# Patient Record
Sex: FEMALE | Race: WHITE | Employment: UNEMPLOYED | URBAN - METROPOLITAN AREA
[De-identification: names, ages, dates, MRNs, and addresses within clinical notes are randomized per-mention and may not be internally consistent; named-entity substitution may affect disease eponyms.]

---

## 2019-03-27 ENCOUNTER — HOSPITAL ENCOUNTER (OUTPATIENT)
Dept: NEUROLOGY | Age: 49
Discharge: HOME | End: 2019-03-27
Payer: MEDICARE

## 2019-03-27 PROCEDURE — 95886 MUSC TEST DONE W/N TEST COMP: CPT

## 2019-03-27 PROCEDURE — 95908 NRV CNDJ TST 3-4 STUDIES: CPT

## 2019-04-04 DIAGNOSIS — M25.572 ACUTE LEFT ANKLE PAIN: Primary | ICD-10-CM

## 2019-04-08 ENCOUNTER — OFFICE VISIT (OUTPATIENT)
Dept: FAMILY MEDICINE CLINIC | Age: 49
End: 2019-04-08
Payer: MEDICARE

## 2019-04-08 VITALS
HEART RATE: 80 BPM | DIASTOLIC BLOOD PRESSURE: 64 MMHG | BODY MASS INDEX: 17.85 KG/M2 | WEIGHT: 97 LBS | SYSTOLIC BLOOD PRESSURE: 106 MMHG | HEIGHT: 62 IN | OXYGEN SATURATION: 93 %

## 2019-04-08 DIAGNOSIS — M21.372 ACQUIRED LEFT FOOT DROP: ICD-10-CM

## 2019-04-08 DIAGNOSIS — Z13.1 SCREENING FOR DIABETES MELLITUS (DM): Primary | ICD-10-CM

## 2019-04-08 DIAGNOSIS — F10.10 ALCOHOL ABUSE: ICD-10-CM

## 2019-04-08 PROBLEM — K86.0 ALCOHOL-INDUCED CHRONIC PANCREATITIS (HCC): Status: ACTIVE | Noted: 2017-07-07

## 2019-04-08 PROBLEM — A41.9 SEPTIC SHOCK (HCC): Status: ACTIVE | Noted: 2019-04-08

## 2019-04-08 PROBLEM — J80 ARDS (ADULT RESPIRATORY DISTRESS SYNDROME) (HCC): Status: RESOLVED | Noted: 2019-04-08 | Resolved: 2019-04-08

## 2019-04-08 PROBLEM — D53.9 MACROCYTIC ANEMIA: Status: ACTIVE | Noted: 2018-01-28

## 2019-04-08 PROBLEM — R65.21 SEPTIC SHOCK (HCC): Status: ACTIVE | Noted: 2019-04-08

## 2019-04-08 PROBLEM — I26.99 PULMONARY EMBOLISM (HCC): Status: ACTIVE | Noted: 2018-03-14

## 2019-04-08 PROBLEM — K55.20 AVM (ARTERIOVENOUS MALFORMATION) OF SMALL BOWEL, ACQUIRED: Status: ACTIVE | Noted: 2018-03-17

## 2019-04-08 PROBLEM — I48.0 PAROXYSMAL ATRIAL FIBRILLATION (HCC): Status: ACTIVE | Noted: 2018-01-31

## 2019-04-08 PROBLEM — A41.9 SEPTIC SHOCK (HCC): Status: RESOLVED | Noted: 2019-04-08 | Resolved: 2019-04-08

## 2019-04-08 PROBLEM — J80 ARDS (ADULT RESPIRATORY DISTRESS SYNDROME) (HCC): Status: ACTIVE | Noted: 2019-04-08

## 2019-04-08 PROBLEM — R65.21 SEPTIC SHOCK (HCC): Status: RESOLVED | Noted: 2019-04-08 | Resolved: 2019-04-08

## 2019-04-08 LAB
A/G RATIO: 1.5 (ref 1.1–2.2)
ALBUMIN SERPL-MCNC: 4.4 G/DL (ref 3.4–5)
ALP BLD-CCNC: 94 U/L (ref 40–129)
ALT SERPL-CCNC: 9 U/L (ref 10–40)
ANION GAP SERPL CALCULATED.3IONS-SCNC: 13 MMOL/L (ref 3–16)
AST SERPL-CCNC: 14 U/L (ref 15–37)
BASOPHILS ABSOLUTE: 0.1 K/UL (ref 0–0.2)
BASOPHILS RELATIVE PERCENT: 1.3 %
BILIRUB SERPL-MCNC: <0.2 MG/DL (ref 0–1)
BILIRUBIN DIRECT: <0.2 MG/DL (ref 0–0.3)
BILIRUBIN, INDIRECT: NORMAL MG/DL (ref 0–1)
BUN BLDV-MCNC: 8 MG/DL (ref 7–20)
CALCIUM SERPL-MCNC: 9.6 MG/DL (ref 8.3–10.6)
CHLORIDE BLD-SCNC: 98 MMOL/L (ref 99–110)
CHOLESTEROL, TOTAL: 214 MG/DL (ref 0–199)
CO2: 27 MMOL/L (ref 21–32)
CREAT SERPL-MCNC: 0.5 MG/DL (ref 0.6–1.1)
EOSINOPHILS ABSOLUTE: 0.2 K/UL (ref 0–0.6)
EOSINOPHILS RELATIVE PERCENT: 2.4 %
GFR AFRICAN AMERICAN: >60
GFR NON-AFRICAN AMERICAN: >60
GLOBULIN: 3 G/DL
GLUCOSE BLD-MCNC: 98 MG/DL (ref 70–99)
HCT VFR BLD CALC: 40.3 % (ref 36–48)
HDLC SERPL-MCNC: 47 MG/DL (ref 40–60)
HEMOGLOBIN: 13.5 G/DL (ref 12–16)
LDL CHOLESTEROL CALCULATED: 154 MG/DL
LYMPHOCYTES ABSOLUTE: 2 K/UL (ref 1–5.1)
LYMPHOCYTES RELATIVE PERCENT: 28.2 %
MCH RBC QN AUTO: 33.8 PG (ref 26–34)
MCHC RBC AUTO-ENTMCNC: 33.5 G/DL (ref 31–36)
MCV RBC AUTO: 101 FL (ref 80–100)
MONOCYTES ABSOLUTE: 0.5 K/UL (ref 0–1.3)
MONOCYTES RELATIVE PERCENT: 7.6 %
NEUTROPHILS ABSOLUTE: 4.2 K/UL (ref 1.7–7.7)
NEUTROPHILS RELATIVE PERCENT: 60.5 %
PDW BLD-RTO: 12.8 % (ref 12.4–15.4)
PLATELET # BLD: 342 K/UL (ref 135–450)
PMV BLD AUTO: 7.6 FL (ref 5–10.5)
POTASSIUM SERPL-SCNC: 4.5 MMOL/L (ref 3.5–5.1)
RBC # BLD: 3.99 M/UL (ref 4–5.2)
SODIUM BLD-SCNC: 138 MMOL/L (ref 136–145)
TOTAL PROTEIN: 7.4 G/DL (ref 6.4–8.2)
TRIGL SERPL-MCNC: 65 MG/DL (ref 0–150)
VLDLC SERPL CALC-MCNC: 13 MG/DL
WBC # BLD: 6.9 K/UL (ref 4–11)

## 2019-04-08 PROCEDURE — 4004F PT TOBACCO SCREEN RCVD TLK: CPT | Performed by: NURSE PRACTITIONER

## 2019-04-08 PROCEDURE — 36415 COLL VENOUS BLD VENIPUNCTURE: CPT | Performed by: NURSE PRACTITIONER

## 2019-04-08 PROCEDURE — G8427 DOCREV CUR MEDS BY ELIG CLIN: HCPCS | Performed by: NURSE PRACTITIONER

## 2019-04-08 PROCEDURE — 99204 OFFICE O/P NEW MOD 45 MIN: CPT | Performed by: NURSE PRACTITIONER

## 2019-04-08 PROCEDURE — G8419 CALC BMI OUT NRM PARAM NOF/U: HCPCS | Performed by: NURSE PRACTITIONER

## 2019-04-08 PROCEDURE — 93000 ELECTROCARDIOGRAM COMPLETE: CPT | Performed by: NURSE PRACTITIONER

## 2019-04-08 RX ORDER — PANCRELIPASE 36000; 180000; 114000 [USP'U]/1; [USP'U]/1; [USP'U]/1
36000 CAPSULE, DELAYED RELEASE PELLETS ORAL 4 TIMES DAILY
Refills: 3 | COMMUNITY
Start: 2019-03-16

## 2019-04-08 RX ORDER — ATENOLOL 25 MG/1
25 TABLET ORAL DAILY
Refills: 2 | COMMUNITY
Start: 2019-03-05 | End: 2019-11-11 | Stop reason: SDUPTHER

## 2019-04-08 RX ORDER — PHENOL 1.4 %
AEROSOL, SPRAY (ML) MUCOUS MEMBRANE
Refills: 0 | Status: ON HOLD | COMMUNITY
Start: 2019-02-15 | End: 2020-05-12 | Stop reason: HOSPADM

## 2019-04-08 RX ORDER — POTASSIUM CITRATE 10 MEQ/1
TABLET, EXTENDED RELEASE ORAL
COMMUNITY
End: 2019-11-11 | Stop reason: SDUPTHER

## 2019-04-08 RX ORDER — DILTIAZEM HYDROCHLORIDE 240 MG/1
240 CAPSULE, COATED, EXTENDED RELEASE ORAL DAILY
COMMUNITY

## 2019-04-08 RX ORDER — FUROSEMIDE 20 MG/1
20 TABLET ORAL DAILY
Refills: 0 | COMMUNITY
Start: 2019-03-14 | End: 2019-11-11 | Stop reason: DRUGHIGH

## 2019-04-08 RX ORDER — LANOLIN ALCOHOL/MO/W.PET/CERES
3 CREAM (GRAM) TOPICAL NIGHTLY PRN
COMMUNITY

## 2019-04-08 RX ORDER — SERTRALINE HYDROCHLORIDE 100 MG/1
100 TABLET, FILM COATED ORAL DAILY
Refills: 2 | COMMUNITY
Start: 2019-02-20 | End: 2020-03-09 | Stop reason: DRUGHIGH

## 2019-04-08 ASSESSMENT — PATIENT HEALTH QUESTIONNAIRE - PHQ9
SUM OF ALL RESPONSES TO PHQ QUESTIONS 1-9: 1
1. LITTLE INTEREST OR PLEASURE IN DOING THINGS: 0
2. FEELING DOWN, DEPRESSED OR HOPELESS: 1
SUM OF ALL RESPONSES TO PHQ9 QUESTIONS 1 & 2: 1
SUM OF ALL RESPONSES TO PHQ QUESTIONS 1-9: 1

## 2019-04-08 ASSESSMENT — ENCOUNTER SYMPTOMS
ABDOMINAL DISTENTION: 0
WHEEZING: 1
VOMITING: 0
SINUS PRESSURE: 0
RHINORRHEA: 0
NAUSEA: 0
SINUS PAIN: 0
CONSTIPATION: 0
SHORTNESS OF BREATH: 1
CHEST TIGHTNESS: 0
DIARRHEA: 0
BACK PAIN: 1

## 2019-04-08 NOTE — PROGRESS NOTES
Lab preformed in R arm and sent number of tubes below to be processed. 1 attempt made and stopped bleeding by applying band aide and guaze.      2 Red    1 purple    0 blue    0 Urine

## 2019-04-08 NOTE — PROGRESS NOTES
[facility] PHYSICIAN PRACTICES  [address]  [address] [address]  Dept: [phone]  Dept Fax: [phone]    Preoperative Consultation    Dory Domínguez  YOB: 1970    Date of Service:  4/8/2019    Vitals:    04/08/19 0912 04/08/19 1015   BP: (!) 90/59 106/64   Site: Right Upper Arm Left Upper Arm   Position: Sitting Sitting   Cuff Size: Small Adult    Pulse: 80    SpO2: 93%    Weight: 97 lb (44 kg)    Height: 5' 1.5\" (1.562 m)       Wt Readings from Last 2 Encounters:   04/08/19 97 lb (44 kg)     BP Readings from Last 3 Encounters:   04/08/19 106/64      Chief Complaint   Patient presents with   Inderjit Rodriguez Exam     Dr Heather Manning Left foot      Allergies   Allergen Reactions    Sulfa Antibiotics Swelling and Hives     Outpatient Medications Marked as Taking for the 4/8/19 encounter (Office Visit) with PEEWEE Odom - CNP   Medication Sig Dispense Refill    furosemide (LASIX) 20 MG tablet Take 20 mg by mouth daily  0    PAIN RELIEVER 325 MG tablet Take 325 mg by mouth daily  1    sertraline (ZOLOFT) 100 MG tablet Take 100 mg by mouth daily  2    CREON 55617 units CPEP Take 36,000 Units by mouth 4 times daily  3    Multiple Vitamins-Minerals (MULTIVITAMIN WOMEN) TABS TK 1 T PO QD WF  0    melatonin 3 MG TABS tablet Take 3 mg by mouth daily      atenolol (TENORMIN) 25 MG tablet Take 25 mg by mouth daily  2    diltiazem (CARDIZEM CD) 240 MG extended release capsule Take 240 mg by mouth daily      potassium citrate (UROCIT-K) 10 MEQ (1080 MG) extended release tablet Take by mouth 3 times daily (with meals)      omeprazole (PRILOSEC) 20 MG capsule Take 20 mg by mouth daily      budesonide-formoterol (SYMBICORT) 160-4.5 MCG/ACT AERO Inhale 2 puffs into the lungs 2 times daily      albuterol (PROVENTIL) (2.5 MG/3ML) 0.083% nebulizer solution Take 2.5 mg by nebulization as needed for Wheezing      aspirin 325 MG EC tablet Take 1 tablet by mouth daily 60 tablet 0     This patient presents to the office today for a preoperative consultation at the request of surgeon, Dr. Lynn Reynolds, who plans on performing Moderately severe left peroneal (fibular) neuropathy repair, left foot drop repair on at [facility].  The current problem began 1 year ago, and symptoms have been worsening with time. Conservative therapy: Yes: PT/nursing home, which has been not very effective. .    Planned anesthesia: General   Known anesthesia problems: None   Bleeding risk: No recent or remote history of abnormal bleeding  Personal or FH of DVT/PE: History of PE    Patient objection to receiving blood products: No      RCRI       +1 +0    1.) High-Risk Surgery      []   [x]     ? Intraperitoneal   ? Intrathoracic   ? Suprainguinal vascular     2.) History of ischemic heart disease   []   [x]     ? History of MI   ? History of positiveexercise test   ? Current chest paint considered due       to myocardial ischemia   ? Use of nitrate therapy   ? ECG with pathological Q waves     3.) History of congestive heart failure   []   [x]     ? Pulmonary edema, bilateral rales or S3 gallop   ? Paroxysmal nocturnal dyspnea   ? CXR showing pulmonary vascular redistribution     4. )History of cerebrovascular disease   []   [x]     ? Prior TIA or stroke     5.) Pre-operative insulin treatment   []   [x]     6.) Pre-operative creatinine >2 mg/dL   []   [x]       1. Are you a loud and/or regular snorer? []  Yes      [x] No     2. Have you been observed to gasp or stop breathing during sleep? []  Yes      [x] No     3. Do you feel tired or groggy upon awakening or do you awaken with a headache?                       []  Yes      [x] No     4. Are you often tired or fatigued during the wake time hours? []  Yes      [x] No     5. Do you fall asleep sitting, reading, watching TV or driving? []  Yes      [x] No     6.   Do you often have problems with memory or concentration? [x]  Yes      [] No     **If patient's score is ? 3 they are considered high risk for ALETA. Notify the anesthesiologist of the high risk and document in focus note.     Note:  If the patient's BMI is more than 35 kg m¯² , has neck circumference > 40 cm, and/or high blood pressure the risk is greater (© American Sleep Apnea Association, 2006). Patient Active Problem List   Diagnosis    Alcoholism ([facility]    Lung collapse    Macrocytic anemia    Paroxysmal atrial fibrillation (HCC)    Pulmonary embolism (HCC)    Tobacco abuse    Chronic hyponatremia    AVM (arteriovenous malformation) of small bowel, acquired ([facility]    Alcohol-induced chronic pancreatitis ([facility]     Past Medical History:   Diagnosis Date    A-fib ([facility]     Chronic pancreatitis ([facility]     Collapse of right lung     COPD (chronic obstructive pulmonary disease) (HCC)     GI bleed     H/O colonoscopy     Hypertension     Pancreatitis      History reviewed. No pertinent surgical history. Family History   Family history unknown: Yes     Social History     Socioeconomic History    Marital status: Single     Spouse name: Not on file    Number of children: Not on file    Years of education: Not on file    Highest education level: Not on file   Occupational History    Not on file   Social Needs    Financial resource strain: Not on file    Food insecurity:     Worry: Not on file     Inability: Not on file    Transportation needs:     Medical: Not on file     Non-medical: Not on file   Tobacco Use    Smoking status: Current Some Day Smoker     Packs/day: 1.00     Types: Cigarettes    Smokeless tobacco: Never Used   Substance and Sexual Activity    Alcohol use:  Yes     Alcohol/week: 7.2 oz     Types: 12 Cans of beer per week     Comment: daily    Drug use: No    Sexual activity: Not on file   Lifestyle    Physical activity:     Days per week: Not on file     Minutes per session: Not on file    Stress: Not on file   Relationships    Social connections:     Talks on phone: Not on file     Gets together: Not on file     Attends Church service: Not on file     Active member of club or organization: Not on file     Attends meetings of clubs or organizations: Not on file     Relationship status: Not on file    Intimate partner violence:     Fear of current or ex partner: Not on file     Emotionally abused: Not on file     Physically abused: Not on file     Forced sexual activity: Not on file   Other Topics Concern    Not on file   Social History Narrative    Not on file     Review of Systems   Constitutional: Negative for activity change, fatigue and unexpected weight change. HENT: Negative for congestion, rhinorrhea, sinus pressure and sinus pain. Eyes: Negative for visual disturbance. Respiratory: Positive for shortness of breath (On Occasion) and wheezing. Negative for chest tightness. Cardiovascular: Negative for chest pain, palpitations and leg swelling. Gastrointestinal: Negative for abdominal distention, constipation, diarrhea, nausea and vomiting. Genitourinary: Negative for difficulty urinating and urgency. Musculoskeletal: Positive for arthralgias, back pain, gait problem and myalgias. LLE r/t neuropathic changes   Skin: Negative for rash. Neurological: Negative for dizziness, seizures, weakness and light-headedness. All other systems were reviewed and are negative. Physical Exam   Constitutional: Vital signs are normal. She appears well-developed and well-nourished. HENT:   Head: Normocephalic and atraumatic. Right Ear: Hearing and external ear normal.   Left Ear: Hearing and external ear normal.   Nose: Nose normal.   Eyes: Pupils are equal, round, and reactive to light. Lids are normal.   Neck: Normal range of motion. Cardiovascular: Normal rate, regular rhythm, S1 normal, S2 normal, normal heart sounds and normal pulses. No extrasystoles are present.  PMI is not postitive stress test  · Intermediate or high risk surgery with history of CAD   · Intermediate or high risk surgery with multiple clinical predictors of CAD- 2 of the following: history of compensated or prior heart failure, history ofcerebrovascular disease, DM, or renal insufficiency    Routine administration of higher-dose, long-acting metoprolol in beta-blocker-naïve patients on the day of surgery, and in the absence of dose titration is associated with an overall increase in mortality. Beta-blockers should be started days to weeks prior to surgery and titrated to pulse < 70.  4. Deep vein thrombosis prophylaxis: regimen to be chosen by surgical team  5. Surgery should be delayed until lab tests results and cardiac clearance is obtained. Schedule with cardiology in 1 week.          Paige Gil, APRN - CNP

## 2019-04-09 DIAGNOSIS — E78.41 ELEVATED LIPOPROTEIN(A): Primary | ICD-10-CM

## 2019-04-09 LAB
ESTIMATED AVERAGE GLUCOSE: 111.2 MG/DL
HBA1C MFR BLD: 5.5 %

## 2019-04-09 RX ORDER — ATORVASTATIN CALCIUM 10 MG/1
10 TABLET, FILM COATED ORAL DAILY
Qty: 30 TABLET | Refills: 2 | Status: SHIPPED | OUTPATIENT
Start: 2019-04-09 | End: 2019-07-12 | Stop reason: SDUPTHER

## 2019-05-27 ENCOUNTER — HOSPITAL ENCOUNTER (EMERGENCY)
Age: 49
Discharge: HOME | End: 2019-05-27
Payer: MEDICARE

## 2019-05-27 ENCOUNTER — APPOINTMENT (OUTPATIENT)
Dept: GENERAL RADIOLOGY | Age: 49
End: 2019-05-27
Payer: MEDICARE

## 2019-05-27 VITALS
HEIGHT: 61 IN | TEMPERATURE: 98.5 F | WEIGHT: 100 LBS | DIASTOLIC BLOOD PRESSURE: 64 MMHG | BODY MASS INDEX: 18.88 KG/M2 | SYSTOLIC BLOOD PRESSURE: 96 MMHG | HEART RATE: 104 BPM | RESPIRATION RATE: 20 BRPM | OXYGEN SATURATION: 92 %

## 2019-05-27 DIAGNOSIS — S20.211A BRUISED RIB, RIGHT, INITIAL ENCOUNTER: ICD-10-CM

## 2019-05-27 DIAGNOSIS — J40 BRONCHITIS: ICD-10-CM
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
## 2019-05-27 DIAGNOSIS — S40.021A ARM CONTUSION, RIGHT, INITIAL ENCOUNTER: ICD-10-CM

## 2019-05-27 PROCEDURE — 71100 X-RAY EXAM RIBS UNI 2 VIEWS: CPT

## 2019-05-27 PROCEDURE — 99283 EMERGENCY DEPT VISIT LOW MDM: CPT

## 2019-05-27 PROCEDURE — 71046 X-RAY EXAM CHEST 2 VIEWS: CPT

## 2019-05-27 PROCEDURE — 6370000000 HC RX 637 (ALT 250 FOR IP): Performed by: PHYSICIAN ASSISTANT

## 2019-05-27 RX ORDER — AZITHROMYCIN 250 MG/1
250 TABLET, FILM COATED ORAL DAILY
Qty: 4 TABLET | Refills: 0 | Status: SHIPPED | OUTPATIENT
Start: 2019-05-27 | End: 2019-05-31

## 2019-05-27 RX ORDER — LIDOCAINE 4 G/G
1 PATCH TOPICAL DAILY
Status: DISCONTINUED | OUTPATIENT
Start: 2019-05-27 | End: 2019-05-27 | Stop reason: HOSPADM

## 2019-05-27 RX ORDER — HYDROCODONE BITARTRATE AND ACETAMINOPHEN 5; 325 MG/1; MG/1
1 TABLET ORAL ONCE
Status: COMPLETED | OUTPATIENT
Start: 2019-05-27 | End: 2019-05-27

## 2019-05-27 RX ORDER — HYDROCODONE BITARTRATE AND ACETAMINOPHEN 5; 325 MG/1; MG/1
1 TABLET ORAL EVERY 6 HOURS PRN
Qty: 10 TABLET | Refills: 0 | Status: SHIPPED | OUTPATIENT
Start: 2019-05-27 | End: 2019-05-30

## 2019-05-27 RX ORDER — AZITHROMYCIN 250 MG/1
500 TABLET, FILM COATED ORAL ONCE
Status: COMPLETED | OUTPATIENT
Start: 2019-05-27 | End: 2019-05-27

## 2019-05-27 RX ORDER — ALBUTEROL SULFATE 90 UG/1
2 AEROSOL, METERED RESPIRATORY (INHALATION) ONCE
Status: COMPLETED | OUTPATIENT
Start: 2019-05-27 | End: 2019-05-27

## 2019-05-27 RX ADMIN — ALBUTEROL SULFATE 2 PUFF: 90 AEROSOL, METERED RESPIRATORY (INHALATION) at 17:20

## 2019-05-27 RX ADMIN — AZITHROMYCIN 500 MG: 250 TABLET, FILM COATED ORAL at 17:20

## 2019-05-27 RX ADMIN — HYDROCODONE BITARTRATE AND ACETAMINOPHEN 1 TABLET: 5; 325 TABLET ORAL at 17:20

## 2019-05-27 ASSESSMENT — PAIN DESCRIPTION - PAIN TYPE: TYPE: ACUTE PAIN

## 2019-05-27 ASSESSMENT — ENCOUNTER SYMPTOMS
COUGH: 1
VOMITING: 0
BACK PAIN: 0
SHORTNESS OF BREATH: 0
VISUAL CHANGE: 0
ABDOMINAL PAIN: 0

## 2019-05-27 ASSESSMENT — PAIN DESCRIPTION - ORIENTATION: ORIENTATION: RIGHT

## 2019-05-27 ASSESSMENT — PAIN SCALES - GENERAL
PAINLEVEL_OUTOF10: 2
PAINLEVEL_OUTOF10: 1

## 2019-05-27 ASSESSMENT — PAIN DESCRIPTION - FREQUENCY: FREQUENCY: INTERMITTENT

## 2019-05-27 ASSESSMENT — PAIN - FUNCTIONAL ASSESSMENT: PAIN_FUNCTIONAL_ASSESSMENT: 0-10

## 2019-05-27 ASSESSMENT — PAIN DESCRIPTION - DESCRIPTORS: DESCRIPTORS: ACHING;DISCOMFORT

## 2019-05-27 ASSESSMENT — PAIN DESCRIPTION - LOCATION: LOCATION: RIB CAGE

## 2019-05-27 NOTE — ED NOTES
Ambulatory from department without difficulty. No distress noted. Pt instructed to follow up with family doctor or doctor referred by ED physician. Pt also given number to the Pt advocate. Pt reports no further needs or questions prior to discharge.      Rupali Davalos RN  05/27/19 6813

## 2019-05-27 NOTE — ED PROVIDER NOTES
Evaluated by G[facility] supervising physician available for consult    Patient states she was visiting a friend yesterday they were outside at the [address] and she fell outside and landed on right ribs hitting on the ground and thinks landed on a rock. Pain at right lateral ribs since injury. Pain with movement, position change and deep breathing. No shortness of breath. Also bruised right forearm during fall but is not bothering her. No abdominal pain. No vomiting. Denies hitting head. No LOC. No dizziness. No neck or back injury. Has also had a cough and chest congestion for few days. Hx COPD. No fever. The history is provided by the patient. Fall   The fall occurred while standing. She landed on grass (and rocks). There was no blood loss. Point of impact: rt ribs. Pain location: rt ribs. She was ambulatory at the scene. There was no entrapment after the fall. Pertinent negatives include no visual change, no fever, no numbness, no abdominal pain, no vomiting, no headaches and no loss of consciousness. Review of Systems   Constitutional: Negative for chills and fever. HENT: Positive for congestion. Eyes: Negative for visual disturbance. Respiratory: Positive for cough. Negative for shortness of breath. Gastrointestinal: Negative for abdominal pain and vomiting. Musculoskeletal: Negative for back pain and neck pain. Rt rib pain   Skin: Negative for wound. Neurological: Negative for dizziness, loss of consciousness, syncope, weakness, numbness and headaches. Psychiatric/Behavioral: Negative for confusion. All other systems reviewed and are negative. PAST MEDICAL HISTORY   has a past medical history of A-fib ([address] Anxiety, Chronic pancreatitis ([address] Collapse of right lung, COPD (chronic obstructive pulmonary disease) ([address] COPD (chronic obstructive pulmonary disease) ([address] Depression, GI bleed, H/O colonoscopy, Hypertension, and Pancreatitis.     PAST SURGICAL HISTORY   has a past surgical history that includes Upper gastrointestinal endoscopy. FAMILY HISTORY  Family history is unknown by patient. SOCIAL HISTORY   reports that she has been smoking cigarettes. She has a 15.00 pack-year smoking history. She has never used smokeless tobacco. She reports that she drank about 7.2 oz of alcohol per week. She reports that she does not use drugs. HOME MEDICATIONS     Prior to Admission medications    Medication Sig Start Date End Date Taking? Authorizing Provider   HYDROcodone-acetaminophen (NORCO) 5-325 MG per tablet Take 1 tablet by mouth every 6 hours as needed for Pain for up to 3 days. Take lowest effective dose.  No driving or operating heavy machinery while taking. 5/27/19 5/30/19 Yes Estela Prater PA-C   azithromycin [facility]) 250 MG tablet Take 1 tablet by mouth daily for 4 days 5/27/19 5/31/19 Yes Estela Prater PA-C   atorvastatin (LIPITOR) 10 MG tablet Take 1 tablet by mouth daily 4/9/19  Yes PEEWEE Agrawal CNP   b complex vitamins tablet Take 1 tablet by mouth daily 4/9/19  Yes PEEWEE Agrawal CNP   furosemide (LASIX) 20 MG tablet Take 20 mg by mouth daily 3/14/19  Yes Historical Provider, MD   PAIN RELIEVER 325 MG tablet Take 325 mg by mouth daily 3/14/19  Yes Historical Provider, MD   sertraline (ZOLOFT) 100 MG tablet Take 100 mg by mouth daily 2/20/19  Yes Historical Provider, MD   Multiple Vitamins-Minerals (MULTIVITAMIN WOMEN) TABS TK 1 T PO QD WF 2/15/19  Yes Historical Provider, MD   melatonin 3 MG TABS tablet Take 3 mg by mouth daily   Yes Historical Provider, MD   atenolol (TENORMIN) 25 MG tablet Take 25 mg by mouth daily 3/5/19  Yes Historical Provider, MD   diltiazem (CARDIZEM CD) 240 MG extended release capsule Take 240 mg by mouth daily   Yes Historical Provider, MD   potassium citrate (UROCIT-K) 10 MEQ (1080 MG) extended release tablet Take by mouth 3 times daily (with meals)   Yes Historical Provider, MD   omeprazole (PRILOSEC) 20 MG capsule Take 20 mg by mouth daily   Yes Historical Provider, MD   budesonide-formoterol (SYMBICORT) 160-4.5 MCG/ACT AERO Inhale 2 puffs into the lungs 2 times daily   Yes Historical Provider, MD   lisinopril (PRINIVIL;ZESTRIL) 10 MG tablet Take 10 mg by mouth daily   Yes Historical Provider, MD   albuterol (PROVENTIL) (2.5 MG/3ML) 0.083% nebulizer solution Take 2.5 mg by nebulization as needed for Wheezing   Yes Historical Provider, MD   aspirin 325 MG EC tablet Take 1 tablet by mouth daily 7/12/15  Yes Martha Cee MD   CREON 48365 units CPEP Take 36,000 Units by mouth 4 times daily 3/16/19   Historical Provider, MD        ALLERGIES  is allergic to sulfa antibiotics. BP 96/64   Pulse 104   Temp 98.5 °F (36.9 °C) (Oral)   Resp 20   Ht 5' 1\" (1.549 m)   Wt 100 lb (45.4 kg)   LMP 07/12/2015   SpO2 92%   BMI 18.89 kg/m²     Physical Exam   Constitutional: She is oriented to person, place, and time. She appears well-developed and well-nourished. Non-toxic appearance. HENT:   Head: Normocephalic and atraumatic. Right Ear: Tympanic membrane and ear canal normal.   Left Ear: Tympanic membrane and ear canal normal.   Mouth/Throat: Uvula is midline, oropharynx is clear and moist and mucous membranes are normal. No posterior oropharyngeal edema or posterior oropharyngeal erythema. Eyes: Conjunctivae are normal.   Neck: Normal range of motion. Neck supple. Cardiovascular: Normal rate, regular rhythm and normal heart sounds. Pulses:       Radial pulses are 2+ on the right side, and 2+ on the left side. Pulmonary/Chest: Effort normal. No stridor. No respiratory distress. She has wheezes (mild bases). She has rhonchi (scattered). She has no rales. She exhibits tenderness (right lateral ribs tender to palpation, bruise at site, no crepitus). Abdominal: Soft. Bowel sounds are normal. She exhibits no distension. There is no tenderness. There is no rebound and no guarding.    Musculoskeletal:        Cervical back:

## 2019-06-30 ENCOUNTER — HOSPITAL ENCOUNTER (EMERGENCY)
Age: 49
Discharge: HOME | End: 2019-06-30
Attending: EMERGENCY MEDICINE
Payer: MEDICARE

## 2019-06-30 VITALS
BODY MASS INDEX: 18.88 KG/M2 | HEART RATE: 109 BPM | HEIGHT: 61 IN | SYSTOLIC BLOOD PRESSURE: 129 MMHG | TEMPERATURE: 98.5 F | OXYGEN SATURATION: 99 % | WEIGHT: 100 LBS | RESPIRATION RATE: 16 BRPM | DIASTOLIC BLOOD PRESSURE: 87 MMHG

## 2019-06-30 DIAGNOSIS — M54.32 SCIATICA OF LEFT SIDE: Primary | ICD-10-CM

## 2019-06-30 PROCEDURE — 6370000000 HC RX 637 (ALT 250 FOR IP): Performed by: EMERGENCY MEDICINE

## 2019-06-30 PROCEDURE — 99282 EMERGENCY DEPT VISIT SF MDM: CPT

## 2019-06-30 PROCEDURE — 6360000002 HC RX W HCPCS: Performed by: EMERGENCY MEDICINE

## 2019-06-30 PROCEDURE — 96372 THER/PROPH/DIAG INJ SC/IM: CPT

## 2019-06-30 RX ORDER — PREDNISONE 10 MG/1
30 TABLET ORAL DAILY
Qty: 15 TABLET | Refills: 0 | Status: SHIPPED | OUTPATIENT
Start: 2019-06-30 | End: 2019-07-05

## 2019-06-30 RX ORDER — KETOROLAC TROMETHAMINE 30 MG/ML
30 INJECTION, SOLUTION INTRAMUSCULAR; INTRAVENOUS ONCE
Status: COMPLETED | OUTPATIENT
Start: 2019-06-30 | End: 2019-06-30

## 2019-06-30 RX ORDER — KETOROLAC TROMETHAMINE 30 MG/ML
30 INJECTION, SOLUTION INTRAMUSCULAR; INTRAVENOUS ONCE
Status: DISCONTINUED | OUTPATIENT
Start: 2019-06-30 | End: 2019-06-30

## 2019-06-30 RX ADMIN — KETOROLAC TROMETHAMINE 30 MG: 30 INJECTION, SOLUTION INTRAMUSCULAR at 04:33

## 2019-06-30 RX ADMIN — PREDNISONE 30 MG: 10 TABLET ORAL at 05:04

## 2019-06-30 ASSESSMENT — PAIN SCALES - GENERAL: PAINLEVEL_OUTOF10: 10

## 2019-06-30 NOTE — ED PROVIDER NOTES
Emergency Department Attending Note    Tapan Burns MD    Date of ED VIsit: 6/30/2019    CHIEF COMPLAINT  Leg Pain (ongoing for week. Pt states pain from low back down buttock, down the back of her leg.)      HISTORY OF PRESENT ILLNESS  Ange Holt is a 51 y.o. female  With Vital signs of /87   Pulse 109   Temp 98.5 °F (36.9 °C)   Resp 16   Ht 5' 1\" (1.549 m)   Wt 100 lb (45.4 kg)   LMP 07/12/2015   SpO2 99%   BMI 18.89 kg/m²  who presents to the ED with a complaint of posterior buttocks pain. Patient seen and evaluated in room 7. Patient comes in the emerge department complaining of buttocks pain that radiates down the posterior aspect of the leg. She has been told she is has sciatica and that the sciatica work woke her up this morning and that is what prompted her to come in the emerge department for evaluation. She denies any injury to the area. She walks and has had a foot drop from this in the past and currently has one now. She denies any fevers or chills and no nausea no vomiting no other systemic symptoms. Interestingly when asked what medication she is been treated for in the past she states Percocet. The pain is been ongoing for over a year has gotten progressively worse over the last month. No acute trauma reported no other complaints, modifying factors or associated symptoms. Patients Past medical history reviewed and listed below  Past Medical History:   Diagnosis Date    A-fib ([address]     Anxiety     [facility]     Collapse of right lung     COPD (chronic obstructive pulmonary disease) ([address]     COPD (chronic obstructive pulmonary disease) (HCC)     Depression     GI bleed     H/O colonoscopy     Hypertension     Pancreatitis      Past Surgical History:   Procedure Laterality Date    UPPER GASTROINTESTINAL ENDOSCOPY         I have reviewed the following from the nursing documentation.     Family History   Family history unknown: Yes     Social 3    furosemide (LASIX) 20 MG tablet Take 20 mg by mouth daily  0    PAIN RELIEVER 325 MG tablet Take 325 mg by mouth daily  1    sertraline (ZOLOFT) 100 MG tablet Take 100 mg by mouth daily  2    CREON 53735 units CPEP Take 36,000 Units by mouth 4 times daily  3    Multiple Vitamins-Minerals (MULTIVITAMIN WOMEN) TABS TK 1 T PO QD WF  0    melatonin 3 MG TABS tablet Take 3 mg by mouth daily      atenolol (TENORMIN) 25 MG tablet Take 25 mg by mouth daily  2    diltiazem (CARDIZEM CD) 240 MG extended release capsule Take 240 mg by mouth daily      potassium citrate (UROCIT-K) 10 MEQ (1080 MG) extended release tablet Take by mouth 3 times daily (with meals)      omeprazole (PRILOSEC) 20 MG capsule Take 20 mg by mouth daily      budesonide-formoterol (SYMBICORT) 160-4.5 MCG/ACT AERO Inhale 2 puffs into the lungs 2 times daily      lisinopril (PRINIVIL;ZESTRIL) 10 MG tablet Take 10 mg by mouth daily      albuterol (PROVENTIL) (2.5 MG/3ML) 0.083% nebulizer solution Take 2.5 mg by nebulization as needed for Wheezing      aspirin 325 MG EC tablet Take 1 tablet by mouth daily 60 tablet 0     Allergies   Allergen Reactions    Sulfa Antibiotics Swelling and Hives       REVIEW OF SYSTEMS  10 systems reviewed, pertinent positives per HPI otherwise noted to be negative     PHYSICAL EXAM  /87   Pulse 109   Temp 98.5 °F (36.9 °C)   Resp 16   Ht 5' 1\" (1.549 m)   Wt 100 lb (45.4 kg)   LMP 07/12/2015   SpO2 99%   BMI 18.89 kg/m²   GENERAL APPEARANCE: Awake and alert. Cooperative. In mild distress with certain movement. HEAD: Normocephalic. Atraumatic. EYES: PERRL. EOM's grossly intact. ENT: Mucous membranes are pink and moist.   NECK: Supple. HEART: RRR. No murmurs. LUNGS: Respirations unlabored. CTAB. Good air exchange. ABDOMEN: Soft. Non-distended. Non-tender. No masses. No organomegaly. No guarding or rebound. EXTREMITIES: No peripheral edema. Moves all extremities equally.  All extremities neurovascularly intact. Crossed straight leg raise was positive  SKIN: Warm and dry. No acute rashes. NEUROLOGICAL: Alert and oriented. Patient has a foot drop on the left is mild. Strength 5/5, sensation intact. Gait normal.   PSYCHIATRIC: Normal mood and affect. No HI or SI expressed to me. RADIOLOGY    Not indicated    EKG:     If acquired see below       ED COURSE/MDM    Patient tolerated the exam well and certainly demonstrated across straight leg raise positivity indicating a sciatic problem. She was administered 30 mg of Toradol IM and reassessed. The patient states that she did feel some relief but not complete relief. Based on that I will place patient on a steroid taper to help with the inflammation that is associated with sciatica. She once again asked for opiate pain medications and I declined that and stated to her that she needed to follow-up with her primary care physician to get the opiate pain medications.          The ED course and plan were reviewed and results discussed with the patient      CLINICAL IMPRESSION and DISPOSITION    Bonny Diggs was stable and diagnosed with sciatica    Patient was treated with Toradol and prednisone                   Susan Duron MD  06/30/19 1102       Susan Duron MD  06/30/19 5130

## 2019-07-15 ENCOUNTER — OFFICE VISIT (OUTPATIENT)
Dept: FAMILY MEDICINE CLINIC | Age: 49
End: 2019-07-15
Payer: MEDICARE

## 2019-07-15 VITALS
DIASTOLIC BLOOD PRESSURE: 62 MMHG | OXYGEN SATURATION: 97 % | HEART RATE: 93 BPM | BODY MASS INDEX: 18.12 KG/M2 | SYSTOLIC BLOOD PRESSURE: 132 MMHG | WEIGHT: 96 LBS | HEIGHT: 61 IN

## 2019-07-15 DIAGNOSIS — M79.605 LEFT LEG PAIN: Primary | ICD-10-CM

## 2019-07-15 PROCEDURE — G8419 CALC BMI OUT NRM PARAM NOF/U: HCPCS | Performed by: FAMILY MEDICINE

## 2019-07-15 PROCEDURE — 4004F PT TOBACCO SCREEN RCVD TLK: CPT | Performed by: FAMILY MEDICINE

## 2019-07-15 PROCEDURE — 99213 OFFICE O/P EST LOW 20 MIN: CPT | Performed by: FAMILY MEDICINE

## 2019-07-15 PROCEDURE — G8427 DOCREV CUR MEDS BY ELIG CLIN: HCPCS | Performed by: FAMILY MEDICINE

## 2019-07-17 ENCOUNTER — TELEPHONE (OUTPATIENT)
Dept: FAMILY MEDICINE CLINIC | Age: 49
End: 2019-07-17

## 2019-07-18 ASSESSMENT — ENCOUNTER SYMPTOMS
RESPIRATORY NEGATIVE: 1
BACK PAIN: 1
ABDOMINAL PAIN: 0

## 2019-07-18 NOTE — TELEPHONE ENCOUNTER
Patient stated she had a Gastrointestinal bleeding when she was in the hospital 2/2018. She states she has taken tylenol with no problems.      She is not on any anticoagulant medication either. /

## 2019-08-12 RX ORDER — B-COMPLEX WITH VITAMIN C
TABLET ORAL
Qty: 30 TABLET | Refills: 0 | Status: SHIPPED | OUTPATIENT
Start: 2019-08-12 | End: 2019-10-05 | Stop reason: SDUPTHER

## 2019-09-10 DIAGNOSIS — E78.41 ELEVATED LIPOPROTEIN(A): ICD-10-CM

## 2019-09-10 RX ORDER — ATORVASTATIN CALCIUM 10 MG/1
TABLET, FILM COATED ORAL
Qty: 30 TABLET | Refills: 0 | Status: SHIPPED | OUTPATIENT
Start: 2019-09-10 | End: 2019-11-11 | Stop reason: SDUPTHER

## 2019-09-25 ENCOUNTER — HOSPITAL ENCOUNTER (OUTPATIENT)
Age: 49
Discharge: HOME | End: 2019-09-25
Payer: MEDICARE

## 2019-09-25 ENCOUNTER — HOSPITAL ENCOUNTER (OUTPATIENT)
Dept: GENERAL RADIOLOGY | Age: 49
Discharge: HOME | End: 2019-09-25
Payer: MEDICARE

## 2019-09-25 DIAGNOSIS — M46.1 BILATERAL SACROILIITIS (HCC): ICD-10-CM

## 2019-09-25 DIAGNOSIS — M47.816 SPONDYLOSIS OF LUMBAR REGION WITHOUT MYELOPATHY OR RADICULOPATHY: ICD-10-CM

## 2019-09-25 PROCEDURE — 72202 X-RAY EXAM SI JOINTS 3/> VWS: CPT

## 2019-09-25 PROCEDURE — 72100 X-RAY EXAM L-S SPINE 2/3 VWS: CPT

## 2019-11-06 DIAGNOSIS — E78.41 ELEVATED LIPOPROTEIN(A): ICD-10-CM

## 2019-11-06 RX ORDER — ATORVASTATIN CALCIUM 10 MG/1
TABLET, FILM COATED ORAL
Qty: 30 TABLET | Refills: 0 | OUTPATIENT
Start: 2019-11-06

## 2019-11-11 ENCOUNTER — OFFICE VISIT (OUTPATIENT)
Dept: FAMILY MEDICINE CLINIC | Age: 49
End: 2019-11-11
Payer: MEDICARE

## 2019-11-11 VITALS
TEMPERATURE: 97.4 F | DIASTOLIC BLOOD PRESSURE: 60 MMHG | BODY MASS INDEX: 17.19 KG/M2 | WEIGHT: 91 LBS | SYSTOLIC BLOOD PRESSURE: 118 MMHG | HEART RATE: 107 BPM | OXYGEN SATURATION: 94 %

## 2019-11-11 DIAGNOSIS — F10.10 ALCOHOL ABUSE: ICD-10-CM

## 2019-11-11 DIAGNOSIS — K86.0 ALCOHOL-INDUCED CHRONIC PANCREATITIS (HCC): ICD-10-CM

## 2019-11-11 DIAGNOSIS — E78.41 ELEVATED LIPOPROTEIN(A): ICD-10-CM

## 2019-11-11 DIAGNOSIS — Z23 NEED FOR INFLUENZA VACCINATION: Primary | ICD-10-CM

## 2019-11-11 DIAGNOSIS — I48.0 PAROXYSMAL ATRIAL FIBRILLATION (HCC): ICD-10-CM

## 2019-11-11 LAB
A/G RATIO: 2.2 (ref 1.1–2.2)
ALBUMIN SERPL-MCNC: 5.2 G/DL (ref 3.4–5)
ALP BLD-CCNC: 82 U/L (ref 40–129)
ALT SERPL-CCNC: 11 U/L (ref 10–40)
ANION GAP SERPL CALCULATED.3IONS-SCNC: 17 MMOL/L (ref 3–16)
AST SERPL-CCNC: 15 U/L (ref 15–37)
BASOPHILS ABSOLUTE: 0.1 K/UL (ref 0–0.2)
BASOPHILS RELATIVE PERCENT: 1.3 %
BILIRUB SERPL-MCNC: <0.2 MG/DL (ref 0–1)
BUN BLDV-MCNC: 8 MG/DL (ref 7–20)
CALCIUM SERPL-MCNC: 9.9 MG/DL (ref 8.3–10.6)
CHLORIDE BLD-SCNC: 97 MMOL/L (ref 99–110)
CO2: 24 MMOL/L (ref 21–32)
CREAT SERPL-MCNC: <0.5 MG/DL (ref 0.6–1.1)
EOSINOPHILS ABSOLUTE: 0.2 K/UL (ref 0–0.6)
EOSINOPHILS RELATIVE PERCENT: 1.8 %
GFR AFRICAN AMERICAN: >60
GFR NON-AFRICAN AMERICAN: >60
GLOBULIN: 2.4 G/DL
GLUCOSE BLD-MCNC: 90 MG/DL (ref 70–99)
HCT VFR BLD CALC: 44.7 % (ref 36–48)
HEMOGLOBIN: 14.9 G/DL (ref 12–16)
LYMPHOCYTES ABSOLUTE: 2.4 K/UL (ref 1–5.1)
LYMPHOCYTES RELATIVE PERCENT: 28.1 %
MCH RBC QN AUTO: 33.4 PG (ref 26–34)
MCHC RBC AUTO-ENTMCNC: 33.2 G/DL (ref 31–36)
MCV RBC AUTO: 100.6 FL (ref 80–100)
MONOCYTES ABSOLUTE: 0.7 K/UL (ref 0–1.3)
MONOCYTES RELATIVE PERCENT: 7.9 %
NEUTROPHILS ABSOLUTE: 5.2 K/UL (ref 1.7–7.7)
NEUTROPHILS RELATIVE PERCENT: 60.9 %
PDW BLD-RTO: 13 % (ref 12.4–15.4)
PLATELET # BLD: 370 K/UL (ref 135–450)
PMV BLD AUTO: 7.8 FL (ref 5–10.5)
POTASSIUM SERPL-SCNC: 4.8 MMOL/L (ref 3.5–5.1)
RBC # BLD: 4.44 M/UL (ref 4–5.2)
SODIUM BLD-SCNC: 138 MMOL/L (ref 136–145)
TOTAL PROTEIN: 7.6 G/DL (ref 6.4–8.2)
WBC # BLD: 8.5 K/UL (ref 4–11)

## 2019-11-11 PROCEDURE — 90471 IMMUNIZATION ADMIN: CPT | Performed by: FAMILY MEDICINE

## 2019-11-11 PROCEDURE — G8482 FLU IMMUNIZE ORDER/ADMIN: HCPCS | Performed by: FAMILY MEDICINE

## 2019-11-11 PROCEDURE — 36415 COLL VENOUS BLD VENIPUNCTURE: CPT | Performed by: FAMILY MEDICINE

## 2019-11-11 PROCEDURE — G8427 DOCREV CUR MEDS BY ELIG CLIN: HCPCS | Performed by: FAMILY MEDICINE

## 2019-11-11 PROCEDURE — 99214 OFFICE O/P EST MOD 30 MIN: CPT | Performed by: FAMILY MEDICINE

## 2019-11-11 PROCEDURE — 90688 IIV4 VACCINE SPLT 0.5 ML IM: CPT | Performed by: FAMILY MEDICINE

## 2019-11-11 PROCEDURE — 4004F PT TOBACCO SCREEN RCVD TLK: CPT | Performed by: FAMILY MEDICINE

## 2019-11-11 PROCEDURE — G8419 CALC BMI OUT NRM PARAM NOF/U: HCPCS | Performed by: FAMILY MEDICINE

## 2019-11-11 RX ORDER — ONDANSETRON 4 MG/1
4 TABLET, FILM COATED ORAL EVERY 8 HOURS PRN
COMMUNITY

## 2019-11-11 RX ORDER — FUROSEMIDE 20 MG/1
20 TABLET ORAL DAILY
Qty: 60 TABLET | Refills: 0 | Status: CANCELLED | OUTPATIENT
Start: 2019-11-11

## 2019-11-11 RX ORDER — BUDESONIDE AND FORMOTEROL FUMARATE DIHYDRATE 160; 4.5 UG/1; UG/1
2 AEROSOL RESPIRATORY (INHALATION) 2 TIMES DAILY
Qty: 1 INHALER | Refills: 5 | Status: SHIPPED | OUTPATIENT
Start: 2019-11-11 | End: 2021-06-23 | Stop reason: SDUPTHER

## 2019-11-11 RX ORDER — ATORVASTATIN CALCIUM 10 MG/1
TABLET, FILM COATED ORAL
Qty: 30 TABLET | Refills: 5 | Status: ON HOLD | OUTPATIENT
Start: 2019-11-11 | End: 2020-05-11

## 2019-11-11 RX ORDER — POTASSIUM CITRATE 10 MEQ/1
10 TABLET, EXTENDED RELEASE ORAL DAILY
Qty: 30 TABLET | Refills: 5 | Status: SHIPPED | OUTPATIENT
Start: 2019-11-11

## 2019-11-11 RX ORDER — ATENOLOL 25 MG/1
25 TABLET ORAL DAILY
Qty: 30 TABLET | Refills: 5 | Status: SHIPPED | OUTPATIENT
Start: 2019-11-11

## 2019-11-11 RX ORDER — FUROSEMIDE 20 MG/1
20 TABLET ORAL DAILY PRN
Qty: 20 TABLET | Refills: 0 | Status: SHIPPED | OUTPATIENT
Start: 2019-11-11

## 2020-01-20 ENCOUNTER — HOSPITAL ENCOUNTER (OUTPATIENT)
Dept: MRI IMAGING | Age: 50
Discharge: HOME | End: 2020-01-20
Payer: MEDICARE

## 2020-01-20 PROCEDURE — 72148 MRI LUMBAR SPINE W/O DYE: CPT

## 2020-01-21 ENCOUNTER — OFFICE VISIT (OUTPATIENT)
Dept: FAMILY MEDICINE CLINIC | Age: 50
End: 2020-01-21
Payer: MEDICARE

## 2020-01-21 VITALS
HEART RATE: 82 BPM | HEIGHT: 61 IN | SYSTOLIC BLOOD PRESSURE: 102 MMHG | BODY MASS INDEX: 17.94 KG/M2 | OXYGEN SATURATION: 96 % | DIASTOLIC BLOOD PRESSURE: 52 MMHG | WEIGHT: 95 LBS

## 2020-01-21 PROCEDURE — 99213 OFFICE O/P EST LOW 20 MIN: CPT | Performed by: FAMILY MEDICINE

## 2020-01-21 PROCEDURE — G8482 FLU IMMUNIZE ORDER/ADMIN: HCPCS | Performed by: FAMILY MEDICINE

## 2020-01-21 PROCEDURE — 4004F PT TOBACCO SCREEN RCVD TLK: CPT | Performed by: FAMILY MEDICINE

## 2020-01-21 PROCEDURE — G8419 CALC BMI OUT NRM PARAM NOF/U: HCPCS | Performed by: FAMILY MEDICINE

## 2020-01-21 PROCEDURE — G8427 DOCREV CUR MEDS BY ELIG CLIN: HCPCS | Performed by: FAMILY MEDICINE

## 2020-01-21 NOTE — PROGRESS NOTES
Subjective:      Patient ID: Susana Garcia is a 51 y.o. female. HPI  Chief Complaint   Patient presents with    Back Pain     ongoing and she wanted to discuss the injections that pain management is going to do     Leg Pain     Chief complaint present illness: 81-year-old white female presents unaccompanied to discuss pain clinics plans for injecting or using anesthesia on her nerve to see if it needs to be burned. Patient feels that she is not communicated with very well at the clinic and feels as a rather disorganized clinic. Does have pain in her back and other modalities have been tried. Review of Systems  background/entire past medical,social and family history obtained and reviewed/updated today   Objective:   Physical Exam  BP (!) 102/52   Pulse 82   Ht 5' 1\" (1.549 m)   Wt 95 lb (43.1 kg)   LMP 07/12/2015   SpO2 96%   BMI 17.95 kg/m²   >15 minutes with patient, all one on one or with accompanying family member re illness, possible etiologies,w/u and treatment with greater than 50% of time in counseling for: Her pain syndrome, difficulties in making an exact diagnosis Orgaran tearing resolved prior to the procedure, need for her to have confidence in what is being done and with the person who is doing it. Did discuss possibility of getting a second opinion  Assessment:      Bonny was seen today for back pain and leg pain. Diagnoses and all orders for this visit:    Lumbosacral radiculopathy at L5           Plan:      No follow-ups on file.           Cat Jimenez MA

## 2020-02-21 ENCOUNTER — OFFICE VISIT (OUTPATIENT)
Dept: FAMILY MEDICINE CLINIC | Age: 50
End: 2020-02-21
Payer: MEDICARE

## 2020-02-21 ENCOUNTER — TELEPHONE (OUTPATIENT)
Dept: FAMILY MEDICINE CLINIC | Age: 50
End: 2020-02-21

## 2020-02-21 VITALS
SYSTOLIC BLOOD PRESSURE: 108 MMHG | BODY MASS INDEX: 18.71 KG/M2 | WEIGHT: 99 LBS | OXYGEN SATURATION: 95 % | HEART RATE: 77 BPM | DIASTOLIC BLOOD PRESSURE: 70 MMHG

## 2020-02-21 PROCEDURE — G8482 FLU IMMUNIZE ORDER/ADMIN: HCPCS | Performed by: FAMILY MEDICINE

## 2020-02-21 PROCEDURE — G8427 DOCREV CUR MEDS BY ELIG CLIN: HCPCS | Performed by: FAMILY MEDICINE

## 2020-02-21 PROCEDURE — 4004F PT TOBACCO SCREEN RCVD TLK: CPT | Performed by: FAMILY MEDICINE

## 2020-02-21 PROCEDURE — 99213 OFFICE O/P EST LOW 20 MIN: CPT | Performed by: FAMILY MEDICINE

## 2020-02-21 PROCEDURE — G8420 CALC BMI NORM PARAMETERS: HCPCS | Performed by: FAMILY MEDICINE

## 2020-03-05 ENCOUNTER — HOSPITAL ENCOUNTER (OUTPATIENT)
Age: 50
Discharge: HOME | End: 2020-03-05
Payer: MEDICARE

## 2020-03-05 LAB
A/G RATIO: 1.4 (ref 1.1–2.2)
ALBUMIN SERPL-MCNC: 4.4 G/DL (ref 3.4–5)
ALP BLD-CCNC: 84 U/L (ref 40–129)
ALT SERPL-CCNC: 14 U/L (ref 10–40)
ANION GAP SERPL CALCULATED.3IONS-SCNC: 12 MMOL/L (ref 3–16)
AST SERPL-CCNC: 17 U/L (ref 15–37)
BASOPHILS ABSOLUTE: 0.1 K/UL (ref 0–0.2)
BASOPHILS RELATIVE PERCENT: 1.2 %
BILIRUB SERPL-MCNC: <0.2 MG/DL (ref 0–1)
BUN BLDV-MCNC: 6 MG/DL (ref 7–20)
CALCIUM SERPL-MCNC: 9.3 MG/DL (ref 8.3–10.6)
CHLORIDE BLD-SCNC: 102 MMOL/L (ref 99–110)
CO2: 25 MMOL/L (ref 21–32)
CREAT SERPL-MCNC: <0.5 MG/DL (ref 0.6–1.1)
EOSINOPHILS ABSOLUTE: 0.2 K/UL (ref 0–0.6)
EOSINOPHILS RELATIVE PERCENT: 2.8 %
GFR AFRICAN AMERICAN: >60
GFR NON-AFRICAN AMERICAN: >60
GLOBULIN: 3.2 G/DL
GLUCOSE BLD-MCNC: 97 MG/DL (ref 70–99)
HCT VFR BLD CALC: 42 % (ref 36–48)
HEMOGLOBIN: 14.1 G/DL (ref 12–16)
LYMPHOCYTES ABSOLUTE: 2.3 K/UL (ref 1–5.1)
LYMPHOCYTES RELATIVE PERCENT: 33.2 %
MCH RBC QN AUTO: 33.1 PG (ref 26–34)
MCHC RBC AUTO-ENTMCNC: 33.6 G/DL (ref 31–36)
MCV RBC AUTO: 98.6 FL (ref 80–100)
MONOCYTES ABSOLUTE: 0.5 K/UL (ref 0–1.3)
MONOCYTES RELATIVE PERCENT: 6.8 %
NEUTROPHILS ABSOLUTE: 3.9 K/UL (ref 1.7–7.7)
NEUTROPHILS RELATIVE PERCENT: 56 %
PDW BLD-RTO: 12.4 % (ref 12.4–15.4)
PLATELET # BLD: 311 K/UL (ref 135–450)
PMV BLD AUTO: 7.1 FL (ref 5–10.5)
POTASSIUM SERPL-SCNC: 4.5 MMOL/L (ref 3.5–5.1)
RBC # BLD: 4.26 M/UL (ref 4–5.2)
SODIUM BLD-SCNC: 139 MMOL/L (ref 136–145)
TOTAL PROTEIN: 7.6 G/DL (ref 6.4–8.2)
WBC # BLD: 7 K/UL (ref 4–11)

## 2020-03-05 PROCEDURE — 80053 COMPREHEN METABOLIC PANEL: CPT

## 2020-03-05 PROCEDURE — 85025 COMPLETE CBC W/AUTO DIFF WBC: CPT

## 2020-03-05 PROCEDURE — 36415 COLL VENOUS BLD VENIPUNCTURE: CPT

## 2020-03-09 ENCOUNTER — OFFICE VISIT (OUTPATIENT)
Dept: FAMILY MEDICINE CLINIC | Age: 50
End: 2020-03-09
Payer: MEDICARE

## 2020-03-09 VITALS
DIASTOLIC BLOOD PRESSURE: 58 MMHG | HEART RATE: 80 BPM | OXYGEN SATURATION: 94 % | WEIGHT: 100 LBS | BODY MASS INDEX: 18.89 KG/M2 | SYSTOLIC BLOOD PRESSURE: 120 MMHG

## 2020-03-09 PROCEDURE — G8420 CALC BMI NORM PARAMETERS: HCPCS | Performed by: FAMILY MEDICINE

## 2020-03-09 PROCEDURE — G8482 FLU IMMUNIZE ORDER/ADMIN: HCPCS | Performed by: FAMILY MEDICINE

## 2020-03-09 PROCEDURE — G8427 DOCREV CUR MEDS BY ELIG CLIN: HCPCS | Performed by: FAMILY MEDICINE

## 2020-03-09 PROCEDURE — 4004F PT TOBACCO SCREEN RCVD TLK: CPT | Performed by: FAMILY MEDICINE

## 2020-03-09 PROCEDURE — 99213 OFFICE O/P EST LOW 20 MIN: CPT | Performed by: FAMILY MEDICINE

## 2020-03-09 RX ORDER — DULOXETIN HYDROCHLORIDE 60 MG/1
60 CAPSULE, DELAYED RELEASE ORAL DAILY
Qty: 30 CAPSULE | Refills: 2 | Status: SHIPPED | OUTPATIENT
Start: 2020-03-09

## 2020-03-09 RX ORDER — OMEPRAZOLE 40 MG/1
40 CAPSULE, DELAYED RELEASE ORAL DAILY
COMMUNITY

## 2020-05-09 ENCOUNTER — HOSPITAL ENCOUNTER (INPATIENT)
Age: 50
LOS: 2 days | Discharge: HOME | DRG: 140 | End: 2020-05-12
Attending: EMERGENCY MEDICINE | Admitting: HOSPITALIST
Payer: MEDICARE

## 2020-05-09 PROCEDURE — 80053 COMPREHEN METABOLIC PANEL: CPT

## 2020-05-09 PROCEDURE — 85025 COMPLETE CBC W/AUTO DIFF WBC: CPT

## 2020-05-09 PROCEDURE — 84484 ASSAY OF TROPONIN QUANT: CPT

## 2020-05-09 PROCEDURE — 99285 EMERGENCY DEPT VISIT HI MDM: CPT

## 2020-05-09 PROCEDURE — 36415 COLL VENOUS BLD VENIPUNCTURE: CPT

## 2020-05-09 PROCEDURE — 83880 ASSAY OF NATRIURETIC PEPTIDE: CPT

## 2020-05-10 ENCOUNTER — APPOINTMENT (OUTPATIENT)
Dept: GENERAL RADIOLOGY | Age: 50
DRG: 140 | End: 2020-05-10
Payer: MEDICARE

## 2020-05-10 PROBLEM — J44.1 COPD WITH ACUTE EXACERBATION (HCC): Status: ACTIVE | Noted: 2020-05-10

## 2020-05-10 LAB
A/G RATIO: 1.2 (ref 1.1–2.2)
ALBUMIN SERPL-MCNC: 4 G/DL (ref 3.4–5)
ALBUMIN SERPL-MCNC: 4.1 G/DL (ref 3.4–5)
ALP BLD-CCNC: 102 U/L (ref 40–129)
ALP BLD-CCNC: 92 U/L (ref 40–129)
ALT SERPL-CCNC: 31 U/L (ref 10–40)
ALT SERPL-CCNC: 34 U/L (ref 10–40)
ANION GAP SERPL CALCULATED.3IONS-SCNC: 12 MMOL/L (ref 3–16)
ANION GAP SERPL CALCULATED.3IONS-SCNC: 18 MMOL/L (ref 3–16)
AST SERPL-CCNC: 29 U/L (ref 15–37)
AST SERPL-CCNC: 34 U/L (ref 15–37)
BASOPHILS ABSOLUTE: 0 K/UL (ref 0–0.2)
BASOPHILS ABSOLUTE: 0.1 K/UL (ref 0–0.2)
BASOPHILS RELATIVE PERCENT: 0.4 %
BASOPHILS RELATIVE PERCENT: 1.5 %
BILIRUB SERPL-MCNC: <0.2 MG/DL (ref 0–1)
BILIRUB SERPL-MCNC: <0.2 MG/DL (ref 0–1)
BILIRUBIN DIRECT: <0.2 MG/DL (ref 0–0.3)
BILIRUBIN, INDIRECT: NORMAL MG/DL (ref 0–1)
BUN BLDV-MCNC: 3 MG/DL (ref 7–20)
BUN BLDV-MCNC: 3 MG/DL (ref 7–20)
CALCIUM SERPL-MCNC: 8.6 MG/DL (ref 8.3–10.6)
CALCIUM SERPL-MCNC: 8.6 MG/DL (ref 8.3–10.6)
CHLORIDE BLD-SCNC: 101 MMOL/L (ref 99–110)
CHLORIDE BLD-SCNC: 96 MMOL/L (ref 99–110)
CO2: 20 MMOL/L (ref 21–32)
CO2: 25 MMOL/L (ref 21–32)
CREAT SERPL-MCNC: <0.5 MG/DL (ref 0.6–1.1)
CREAT SERPL-MCNC: <0.5 MG/DL (ref 0.6–1.1)
EKG ATRIAL RATE: 73 BPM
EKG DIAGNOSIS: NORMAL
EKG P AXIS: 71 DEGREES
EKG P-R INTERVAL: 146 MS
EKG Q-T INTERVAL: 428 MS
EKG QRS DURATION: 88 MS
EKG QTC CALCULATION (BAZETT): 471 MS
EKG R AXIS: 95 DEGREES
EKG T AXIS: 63 DEGREES
EKG VENTRICULAR RATE: 73 BPM
EOSINOPHILS ABSOLUTE: 0 K/UL (ref 0–0.6)
EOSINOPHILS ABSOLUTE: 0.2 K/UL (ref 0–0.6)
EOSINOPHILS RELATIVE PERCENT: 0.1 %
EOSINOPHILS RELATIVE PERCENT: 2.6 %
GFR AFRICAN AMERICAN: >60
GFR AFRICAN AMERICAN: >60
GFR NON-AFRICAN AMERICAN: >60
GFR NON-AFRICAN AMERICAN: >60
GLOBULIN: 3.4 G/DL
GLUCOSE BLD-MCNC: 103 MG/DL (ref 70–99)
GLUCOSE BLD-MCNC: 120 MG/DL (ref 70–99)
HCT VFR BLD CALC: 40.4 % (ref 36–48)
HCT VFR BLD CALC: 42.9 % (ref 36–48)
HEMOGLOBIN: 13.5 G/DL (ref 12–16)
HEMOGLOBIN: 14.3 G/DL (ref 12–16)
LYMPHOCYTES ABSOLUTE: 0.5 K/UL (ref 1–5.1)
LYMPHOCYTES ABSOLUTE: 2.6 K/UL (ref 1–5.1)
LYMPHOCYTES RELATIVE PERCENT: 33.3 %
LYMPHOCYTES RELATIVE PERCENT: 9.3 %
MCH RBC QN AUTO: 32.4 PG (ref 26–34)
MCH RBC QN AUTO: 32.6 PG (ref 26–34)
MCHC RBC AUTO-ENTMCNC: 33.3 G/DL (ref 31–36)
MCHC RBC AUTO-ENTMCNC: 33.4 G/DL (ref 31–36)
MCV RBC AUTO: 96.7 FL (ref 80–100)
MCV RBC AUTO: 97.9 FL (ref 80–100)
MONOCYTES ABSOLUTE: 0.1 K/UL (ref 0–1.3)
MONOCYTES ABSOLUTE: 0.6 K/UL (ref 0–1.3)
MONOCYTES RELATIVE PERCENT: 1.4 %
MONOCYTES RELATIVE PERCENT: 8.2 %
NEUTROPHILS ABSOLUTE: 4.2 K/UL (ref 1.7–7.7)
NEUTROPHILS ABSOLUTE: 4.7 K/UL (ref 1.7–7.7)
NEUTROPHILS RELATIVE PERCENT: 54.4 %
NEUTROPHILS RELATIVE PERCENT: 88.8 %
PDW BLD-RTO: 14.6 % (ref 12.4–15.4)
PDW BLD-RTO: 14.7 % (ref 12.4–15.4)
PLATELET # BLD: 284 K/UL (ref 135–450)
PLATELET # BLD: 286 K/UL (ref 135–450)
PMV BLD AUTO: 7 FL (ref 5–10.5)
PMV BLD AUTO: 7.1 FL (ref 5–10.5)
POTASSIUM REFLEX MAGNESIUM: 4 MMOL/L (ref 3.5–5.1)
POTASSIUM REFLEX MAGNESIUM: 4.6 MMOL/L (ref 3.5–5.1)
PRO-BNP: 138 PG/ML (ref 0–124)
RBC # BLD: 4.18 M/UL (ref 4–5.2)
RBC # BLD: 4.39 M/UL (ref 4–5.2)
SODIUM BLD-SCNC: 133 MMOL/L (ref 136–145)
SODIUM BLD-SCNC: 139 MMOL/L (ref 136–145)
TOTAL PROTEIN: 7.4 G/DL (ref 6.4–8.2)
TOTAL PROTEIN: 7.4 G/DL (ref 6.4–8.2)
TROPONIN: <0.01 NG/ML
WBC # BLD: 5.3 K/UL (ref 4–11)
WBC # BLD: 7.7 K/UL (ref 4–11)

## 2020-05-10 PROCEDURE — 94761 N-INVAS EAR/PLS OXIMETRY MLT: CPT

## 2020-05-10 PROCEDURE — 6370000000 HC RX 637 (ALT 250 FOR IP): Performed by: HOSPITALIST

## 2020-05-10 PROCEDURE — 2700000000 HC OXYGEN THERAPY PER DAY

## 2020-05-10 PROCEDURE — 96365 THER/PROPH/DIAG IV INF INIT: CPT

## 2020-05-10 PROCEDURE — 6370000000 HC RX 637 (ALT 250 FOR IP): Performed by: INTERNAL MEDICINE

## 2020-05-10 PROCEDURE — 6360000002 HC RX W HCPCS: Performed by: HOSPITALIST

## 2020-05-10 PROCEDURE — 96375 TX/PRO/DX INJ NEW DRUG ADDON: CPT

## 2020-05-10 PROCEDURE — 6360000002 HC RX W HCPCS: Performed by: EMERGENCY MEDICINE

## 2020-05-10 PROCEDURE — 99232 SBSQ HOSP IP/OBS MODERATE 35: CPT | Performed by: INTERNAL MEDICINE

## 2020-05-10 PROCEDURE — 6360000002 HC RX W HCPCS

## 2020-05-10 PROCEDURE — 86140 C-REACTIVE PROTEIN: CPT

## 2020-05-10 PROCEDURE — 99255 IP/OBS CONSLTJ NEW/EST HI 80: CPT | Performed by: INTERNAL MEDICINE

## 2020-05-10 PROCEDURE — 94640 AIRWAY INHALATION TREATMENT: CPT

## 2020-05-10 PROCEDURE — 93005 ELECTROCARDIOGRAM TRACING: CPT | Performed by: EMERGENCY MEDICINE

## 2020-05-10 PROCEDURE — 1200000000 HC SEMI PRIVATE

## 2020-05-10 PROCEDURE — 71045 X-RAY EXAM CHEST 1 VIEW: CPT

## 2020-05-10 PROCEDURE — 93010 ELECTROCARDIOGRAM REPORT: CPT | Performed by: INTERNAL MEDICINE

## 2020-05-10 PROCEDURE — 80048 BASIC METABOLIC PNL TOTAL CA: CPT

## 2020-05-10 PROCEDURE — 82728 ASSAY OF FERRITIN: CPT

## 2020-05-10 PROCEDURE — 36415 COLL VENOUS BLD VENIPUNCTURE: CPT

## 2020-05-10 PROCEDURE — 2580000003 HC RX 258: Performed by: HOSPITALIST

## 2020-05-10 PROCEDURE — U0003 INFECTIOUS AGENT DETECTION BY NUCLEIC ACID (DNA OR RNA); SEVERE ACUTE RESPIRATORY SYNDROME CORONAVIRUS 2 (SARS-COV-2) (CORONAVIRUS DISEASE [COVID-19]), AMPLIFIED PROBE TECHNIQUE, MAKING USE OF HIGH THROUGHPUT TECHNOLOGIES AS DESCRIBED BY CMS-2020-01-R: HCPCS

## 2020-05-10 PROCEDURE — 80076 HEPATIC FUNCTION PANEL: CPT

## 2020-05-10 PROCEDURE — 85025 COMPLETE CBC W/AUTO DIFF WBC: CPT

## 2020-05-10 PROCEDURE — 2580000003 HC RX 258: Performed by: EMERGENCY MEDICINE

## 2020-05-10 RX ORDER — PROMETHAZINE HYDROCHLORIDE 25 MG/1
12.5 TABLET ORAL EVERY 6 HOURS PRN
Status: DISCONTINUED | OUTPATIENT
Start: 2020-05-10 | End: 2020-05-12 | Stop reason: HOSPADM

## 2020-05-10 RX ORDER — DILTIAZEM HYDROCHLORIDE 240 MG/1
240 CAPSULE, COATED, EXTENDED RELEASE ORAL DAILY
Status: DISCONTINUED | OUTPATIENT
Start: 2020-05-10 | End: 2020-05-12 | Stop reason: HOSPADM

## 2020-05-10 RX ORDER — AZITHROMYCIN 250 MG/1
500 TABLET, FILM COATED ORAL DAILY
Status: COMPLETED | OUTPATIENT
Start: 2020-05-10 | End: 2020-05-10

## 2020-05-10 RX ORDER — ALBUTEROL SULFATE 90 UG/1
2 AEROSOL, METERED RESPIRATORY (INHALATION) 4 TIMES DAILY
Status: DISCONTINUED | OUTPATIENT
Start: 2020-05-10 | End: 2020-05-12 | Stop reason: HOSPADM

## 2020-05-10 RX ORDER — PREDNISONE 20 MG/1
40 TABLET ORAL DAILY
Status: DISCONTINUED | OUTPATIENT
Start: 2020-05-10 | End: 2020-05-12 | Stop reason: HOSPADM

## 2020-05-10 RX ORDER — SODIUM CHLORIDE 9 MG/ML
INJECTION, SOLUTION INTRAVENOUS CONTINUOUS
Status: DISCONTINUED | OUTPATIENT
Start: 2020-05-10 | End: 2020-05-12 | Stop reason: HOSPADM

## 2020-05-10 RX ORDER — ATORVASTATIN CALCIUM 10 MG/1
10 TABLET, FILM COATED ORAL DAILY
Status: DISCONTINUED | OUTPATIENT
Start: 2020-05-10 | End: 2020-05-12 | Stop reason: HOSPADM

## 2020-05-10 RX ORDER — SODIUM CHLORIDE 9 MG/ML
INJECTION, SOLUTION INTRAVENOUS CONTINUOUS
Status: DISCONTINUED | OUTPATIENT
Start: 2020-05-10 | End: 2020-05-10

## 2020-05-10 RX ORDER — DULOXETIN HYDROCHLORIDE 60 MG/1
60 CAPSULE, DELAYED RELEASE ORAL DAILY
Status: DISCONTINUED | OUTPATIENT
Start: 2020-05-10 | End: 2020-05-12 | Stop reason: HOSPADM

## 2020-05-10 RX ORDER — METHYLPREDNISOLONE SODIUM SUCCINATE 40 MG/ML
40 INJECTION, POWDER, LYOPHILIZED, FOR SOLUTION INTRAMUSCULAR; INTRAVENOUS ONCE
Status: COMPLETED | OUTPATIENT
Start: 2020-05-10 | End: 2020-05-10

## 2020-05-10 RX ORDER — POLYETHYLENE GLYCOL 3350 17 G/17G
17 POWDER, FOR SOLUTION ORAL DAILY PRN
Status: DISCONTINUED | OUTPATIENT
Start: 2020-05-10 | End: 2020-05-12 | Stop reason: HOSPADM

## 2020-05-10 RX ORDER — LANOLIN ALCOHOL/MO/W.PET/CERES
3 CREAM (GRAM) TOPICAL DAILY
Status: DISCONTINUED | OUTPATIENT
Start: 2020-05-10 | End: 2020-05-12 | Stop reason: HOSPADM

## 2020-05-10 RX ORDER — BUDESONIDE AND FORMOTEROL FUMARATE DIHYDRATE 160; 4.5 UG/1; UG/1
2 AEROSOL RESPIRATORY (INHALATION) 2 TIMES DAILY
Status: DISCONTINUED | OUTPATIENT
Start: 2020-05-10 | End: 2020-05-12 | Stop reason: HOSPADM

## 2020-05-10 RX ORDER — ALBUTEROL SULFATE 90 UG/1
2 AEROSOL, METERED RESPIRATORY (INHALATION) EVERY 6 HOURS PRN
Status: DISCONTINUED | OUTPATIENT
Start: 2020-05-10 | End: 2020-05-12 | Stop reason: HOSPADM

## 2020-05-10 RX ORDER — ALBUTEROL SULFATE 90 UG/1
2 AEROSOL, METERED RESPIRATORY (INHALATION) EVERY 6 HOURS PRN
Status: DISCONTINUED | OUTPATIENT
Start: 2020-05-10 | End: 2020-05-10

## 2020-05-10 RX ORDER — BUDESONIDE AND FORMOTEROL FUMARATE DIHYDRATE 160; 4.5 UG/1; UG/1
2 AEROSOL RESPIRATORY (INHALATION) 2 TIMES DAILY
Status: DISCONTINUED | OUTPATIENT
Start: 2020-05-10 | End: 2020-05-10

## 2020-05-10 RX ORDER — ALBUTEROL SULFATE 90 UG/1
2 AEROSOL, METERED RESPIRATORY (INHALATION) 4 TIMES DAILY
Status: DISCONTINUED | OUTPATIENT
Start: 2020-05-10 | End: 2020-05-10

## 2020-05-10 RX ORDER — ONDANSETRON 2 MG/ML
4 INJECTION INTRAMUSCULAR; INTRAVENOUS EVERY 6 HOURS PRN
Status: DISCONTINUED | OUTPATIENT
Start: 2020-05-10 | End: 2020-05-12 | Stop reason: HOSPADM

## 2020-05-10 RX ORDER — MAGNESIUM SULFATE 1 G/100ML
INJECTION INTRAVENOUS
Status: COMPLETED
Start: 2020-05-10 | End: 2020-05-10

## 2020-05-10 RX ORDER — ASPIRIN 81 MG/1
81 TABLET, CHEWABLE ORAL DAILY
Status: DISCONTINUED | OUTPATIENT
Start: 2020-05-10 | End: 2020-05-12 | Stop reason: HOSPADM

## 2020-05-10 RX ORDER — ACETAMINOPHEN 650 MG/1
650 SUPPOSITORY RECTAL EVERY 6 HOURS PRN
Status: DISCONTINUED | OUTPATIENT
Start: 2020-05-10 | End: 2020-05-12 | Stop reason: HOSPADM

## 2020-05-10 RX ORDER — SODIUM CHLORIDE 0.9 % (FLUSH) 0.9 %
10 SYRINGE (ML) INJECTION PRN
Status: DISCONTINUED | OUTPATIENT
Start: 2020-05-10 | End: 2020-05-12 | Stop reason: HOSPADM

## 2020-05-10 RX ORDER — ATENOLOL 25 MG/1
25 TABLET ORAL DAILY
Status: DISCONTINUED | OUTPATIENT
Start: 2020-05-10 | End: 2020-05-12 | Stop reason: HOSPADM

## 2020-05-10 RX ORDER — ALBUTEROL SULFATE 2.5 MG/3ML
2.5 SOLUTION RESPIRATORY (INHALATION)
Status: DISCONTINUED | OUTPATIENT
Start: 2020-05-10 | End: 2020-05-10

## 2020-05-10 RX ORDER — MAGNESIUM SULFATE HEPTAHYDRATE 500 MG/ML
1 INJECTION, SOLUTION INTRAMUSCULAR; INTRAVENOUS ONCE
Status: DISCONTINUED | OUTPATIENT
Start: 2020-05-10 | End: 2020-05-12 | Stop reason: HOSPADM

## 2020-05-10 RX ORDER — AZITHROMYCIN 250 MG/1
250 TABLET, FILM COATED ORAL DAILY
Status: DISCONTINUED | OUTPATIENT
Start: 2020-05-11 | End: 2020-05-12 | Stop reason: HOSPADM

## 2020-05-10 RX ORDER — ACETAMINOPHEN 325 MG/1
650 TABLET ORAL EVERY 6 HOURS PRN
Status: DISCONTINUED | OUTPATIENT
Start: 2020-05-10 | End: 2020-05-12 | Stop reason: HOSPADM

## 2020-05-10 RX ORDER — SODIUM CHLORIDE 0.9 % (FLUSH) 0.9 %
10 SYRINGE (ML) INJECTION EVERY 12 HOURS SCHEDULED
Status: DISCONTINUED | OUTPATIENT
Start: 2020-05-10 | End: 2020-05-12 | Stop reason: HOSPADM

## 2020-05-10 RX ORDER — PANTOPRAZOLE SODIUM 40 MG/1
40 TABLET, DELAYED RELEASE ORAL
Status: DISCONTINUED | OUTPATIENT
Start: 2020-05-10 | End: 2020-05-12 | Stop reason: HOSPADM

## 2020-05-10 RX ADMIN — MAGNESIUM SULFATE 1 G: 1 INJECTION INTRAVENOUS at 00:56

## 2020-05-10 RX ADMIN — PREDNISONE 40 MG: 20 TABLET ORAL at 12:00

## 2020-05-10 RX ADMIN — Medication 2 PUFF: at 15:33

## 2020-05-10 RX ADMIN — PANCRELIPASE 36000 UNITS: 24000; 76000; 120000 CAPSULE, DELAYED RELEASE PELLETS ORAL at 11:58

## 2020-05-10 RX ADMIN — SODIUM CHLORIDE: 9 INJECTION, SOLUTION INTRAVENOUS at 16:54

## 2020-05-10 RX ADMIN — Medication 2 PUFF: at 20:56

## 2020-05-10 RX ADMIN — IPRATROPIUM BROMIDE 0.5 MG: 0.5 SOLUTION RESPIRATORY (INHALATION) at 00:16

## 2020-05-10 RX ADMIN — DULOXETINE HYDROCHLORIDE 60 MG: 60 CAPSULE, DELAYED RELEASE ORAL at 08:24

## 2020-05-10 RX ADMIN — MELATONIN TAB 3 MG 3 MG: 3 TAB at 20:13

## 2020-05-10 RX ADMIN — ALBUTEROL SULFATE 2.5 MG: 2.5 SOLUTION RESPIRATORY (INHALATION) at 00:16

## 2020-05-10 RX ADMIN — PANCRELIPASE 36000 UNITS: 24000; 76000; 120000 CAPSULE, DELAYED RELEASE PELLETS ORAL at 08:24

## 2020-05-10 RX ADMIN — PANTOPRAZOLE SODIUM 40 MG: 40 TABLET, DELAYED RELEASE ORAL at 08:44

## 2020-05-10 RX ADMIN — PANCRELIPASE 36000 UNITS: 24000; 76000; 120000 CAPSULE, DELAYED RELEASE PELLETS ORAL at 20:13

## 2020-05-10 RX ADMIN — METHYLPREDNISOLONE SODIUM SUCCINATE 40 MG: 40 INJECTION, POWDER, FOR SOLUTION INTRAMUSCULAR; INTRAVENOUS at 00:16

## 2020-05-10 RX ADMIN — SODIUM CHLORIDE: 9 INJECTION, SOLUTION INTRAVENOUS at 04:16

## 2020-05-10 RX ADMIN — AZITHROMYCIN MONOHYDRATE 500 MG: 250 TABLET ORAL at 12:00

## 2020-05-10 RX ADMIN — ENOXAPARIN SODIUM 40 MG: 40 INJECTION SUBCUTANEOUS at 08:25

## 2020-05-10 RX ADMIN — DILTIAZEM HYDROCHLORIDE 240 MG: 240 CAPSULE, COATED, EXTENDED RELEASE ORAL at 08:24

## 2020-05-10 RX ADMIN — ASPIRIN 81 MG 81 MG: 81 TABLET ORAL at 08:24

## 2020-05-10 RX ADMIN — ATENOLOL 25 MG: 25 TABLET ORAL at 08:24

## 2020-05-10 RX ADMIN — Medication 2 PUFF: at 07:53

## 2020-05-10 RX ADMIN — ATORVASTATIN CALCIUM 10 MG: 10 TABLET, FILM COATED ORAL at 08:24

## 2020-05-10 RX ADMIN — SERTRALINE HYDROCHLORIDE 50 MG: 50 TABLET ORAL at 08:24

## 2020-05-10 RX ADMIN — SODIUM CHLORIDE: 9 INJECTION, SOLUTION INTRAVENOUS at 00:16

## 2020-05-10 RX ADMIN — PANCRELIPASE 36000 UNITS: 24000; 76000; 120000 CAPSULE, DELAYED RELEASE PELLETS ORAL at 16:54

## 2020-05-10 NOTE — ED PROVIDER NOTES
Emergency Physician Note    Chief Complaint  Shortness of Breath (ongoing shortness of breath. Worsening over past few days. Arrive per EJFED withO2 at 10L/NRB. )       History of Present Illness  Bonny Doan is a 53 y.o. female who presents to the ED for tautness of breath. Patient reports she has had gradual increasing shortness of breath for the last several hours. Patient does smoke cigarettes. She also reports for the past 3 or 4 days she has been dealing with congestion of her head and a productive cough. Denies any fever. Patient had been using her inhaler at home but her shortness of breath kept continually getting worse so she ended up calling 911. EMS reports when they arrived her initial oxygen saturation was around 74%, they put her on 10 L nonrebreather and her oxygen saturations increased significantly to 98%. Patient reports when I went into the room that she is feeling better but she still feels short of breath. Denies any chest pain. Reports she has had 3 days of dark loose stool/diarrhea. Denies fever, chills, malaise, chest pain,  abdominal pain, nausea, vomiting, diarrhea, headache, sore throat, dysuria, back pain, rash. No palliative/provocative factors. Unless otherwise stated in this report or unable to obtain because of the patient's clinical or mental status as evidenced by the medical record, this patient's positive and negative responses for review of systems, constitutional, psych, eyes, ENT, cardiovascular, respiratory, gastrointestinal, neurological, genitourinary, musculoskeletal, integument systems and systems related to the presenting problem are either stated in the preceding paragraph or were not pertinent or were negative for the symptoms and/or complaints related to the medical problem. I have reviewed the following from the nursing documentation:      Prior to Admission medications    Medication Sig Start Date End Date Taking?  Authorizing Provider omeprazole (PRILOSEC) 40 MG delayed release capsule Take 40 mg by mouth daily    Historical Provider, MD   DULoxetine (CYMBALTA) 60 MG extended release capsule Take 1 capsule by mouth daily 3/9/20 6/7/20  Vanessa Guillen MD   sertraline (ZOLOFT) 50 MG tablet Take 1 tablet by mouth daily 3/9/20   Vanessa Guillen MD   ondansetron (ZOFRAN) 4 MG tablet Take 4 mg by mouth every 8 hours as needed for Nausea or Vomiting    Historical Provider, MD   aspirin 81 MG tablet Take 81 mg by mouth daily    Historical Provider, MD   Multiple Vitamins-Minerals (CEROVITE ADVANCED FORMULA PO) Take 1 tablet by mouth daily    Historical Provider, MD   b complex vitamins tablet TAKE ONE TABLET BY MOUTH DAILY 11/11/19   Vanessa Guillen MD   potassium citrate (UROCIT-K) 10 MEQ (1080 MG) extended release tablet Take 1 tablet by mouth daily 11/11/19   Vanessa Guillen MD   atorvastatin (LIPITOR) 10 MG tablet TAKE ONE TABLET BY MOUTH DAILY 11/11/19   Vanessa Guillen MD   atenolol (TENORMIN) 25 MG tablet Take 1 tablet by mouth daily 11/11/19   Vanessa Guillen MD   budesonide-formoterol [facility]) 160-4.5 MCG/ACT AERO Inhale 2 puffs into the lungs 2 times daily 11/11/19   Vanessa Guillen MD   furosemide (LASIX) 20 MG tablet Take 1 tablet by mouth daily as needed 11/11/19   Vanessa Guillen MD   PAIN RELIEVER 325 MG tablet Take 650 mg by mouth daily  3/14/19   Historical Provider, MD   CREON 77323 units CPEP Take 36,000 Units by mouth 4 times daily 3/16/19   Historical Provider, MD   Multiple Vitamins-Minerals (MULTIVITAMIN WOMEN) TABS TK 1 T PO QD WF 2/15/19   Historical Provider, MD   melatonin 3 MG TABS tablet Take 3 mg by mouth daily    Historical Provider, MD   diltiazem (CARDIZEM CD) 240 MG extended release capsule Take 240 mg by mouth daily    Historical Provider, MD   albuterol (PROVENTIL) (2.5 MG/3ML) 0.083% nebulizer solution Take 2.5 mg by nebulization as needed for Wheezing    Historical Provider, MD  methylPREDNISolone sodium (SOLU-MEDROL) injection 40 mg       ED course notes for this visit       I wore goggles, surgical mask, N95 mask and gloves when I evaluated the patient. - Patient seen and evaluated in room 4    I spoke with Dr. Aria Gaston. We thoroughly discussed the history, physical exam, laboratory and imaging studies, as well as, emergency department course. Based upon that discussion, we've decided to admit Bonny Serrano for further observation and evaluation of Bonny Serrano's dyspnea. As I have deemed necessary from their history, physical, and studies, I have considered and evaluated Bonny Serrano for the following diagnoses:  ACUTE CORONARY SYNDROME, CHRONIC OBSTRUCTIVE PULMONARY DISEASE, CONGESTIVE HEART FAILURE, PERICARDIAL TAMPONADE, PNEUMONIA, PNEUMOTHORAX, PULMONARY EMBOLISM, SEPSIS, and THORACIC DISSECTION. FINAL IMPRESSION  1. Acute on chronic respiratory failure with hypoxia (HCC)    2. COPD exacerbation ([facility] [address]    3. Hypoxemia requiring supplemental oxygen        Vitals:  Blood pressure 100/65, pulse 75, temperature 98.1 °F (36.7 °C), temperature source Oral, resp. rate 18, height 5' 1\" (1.549 m), weight 107 lb (48.5 kg), last menstrual period 07/12/2015, SpO2 95 %, not currently breastfeeding. Disposition    Pt is in stable condition upon Admit to telemetry. Please note, critical care time was at least 20 minutes, obtaining history, conducting a physical exam, performing and monitoring interventions, ordering, collecting and interpreting tests, and establishing medical decision-making and discussion with the patient and/or family, specifically for management of the presenting complaint and symptoms initially, direct bedside care, reevaluation, review of records, and consultation. There was a high probability of clinically significant life-threatening deterioration in the patient's condition, which required my urgent intervention.  This time does not include separately

## 2020-05-10 NOTE — H&P
Hospital Medicine History & Physical      PCP: Grover Alberto MD    Date of Admission: 5/9/2020    Date of Service: Pt seen/examined on 5/10/2020    Chief Complaint:    Chief Complaint   Patient presents with    Shortness of Breath     ongoing shortness of breath. Worsening over past few days. Arrive per EJFED withO2 at 10L/NRB. History Of Present Illness: The patient is a 53 y.o. female with atrial fibrillation not on AC, anxiety, chronic pancreatitis, COPD, depression, hypertension who presented to [facility] ED with complaint of shortness of breath x 2 weeks, reports no cough, no chest pain or pedal edema, hx of copd , using inhalers with no benefit. No fevers , chills . No change in bladder or bowel habit changes  No recent travel or sick contacts    Noted hypoxia on arrival and was admitted        Past Medical History:        Diagnosis Date    A-fib ([facility] [address]     Anxiety     Chronic pancreatitis ([facility] [address]     Collapse of right lung     COPD (chronic obstructive pulmonary disease) ([facility] [address]     COPD (chronic obstructive pulmonary disease) ([facility] [address]     Depression     GI bleed     H/O colonoscopy     Hypertension     Pancreatitis        Past Surgical History:        Procedure Laterality Date    UPPER GASTROINTESTINAL ENDOSCOPY         Medications Prior to Admission:    Prior to Admission medications    Medication Sig Start Date End Date Taking?  Authorizing Provider   omeprazole (PRILOSEC) 40 MG delayed release capsule Take 40 mg by mouth daily   Yes Historical Provider, MD   DULoxetine (CYMBALTA) 60 MG extended release capsule Take 1 capsule by mouth daily 3/9/20 6/7/20 Yes Grover Alberto MD   sertraline (ZOLOFT) 50 MG tablet Take 1 tablet by mouth daily 3/9/20  Yes Grover Alberto MD   ondansetron (ZOFRAN) 4 MG tablet Take 4 mg by mouth every 8 hours as needed for Nausea or Vomiting   Yes Historical Provider, MD   aspirin 81 MG tablet Take 81 mg by mouth daily   Yes Historical Provider, MD Multiple Vitamins-Minerals (CEROVITE ADVANCED FORMULA PO) Take 1 tablet by mouth daily   Yes Historical Provider, MD   b complex vitamins tablet TAKE ONE TABLET BY MOUTH DAILY 11/11/19  Yes Anastasiya Damian MD   potassium citrate (UROCIT-K) 10 MEQ (1080 MG) extended release tablet Take 1 tablet by mouth daily 11/11/19  Yes Anastasiya Damian MD   atorvastatin (LIPITOR) 10 MG tablet TAKE ONE TABLET BY MOUTH DAILY 11/11/19  Yes Anastasiya Damian MD   atenolol (TENORMIN) 25 MG tablet Take 1 tablet by mouth daily 11/11/19  Yes Anastasiya Damian MD   budesonide-formoterol [facility]) 160-4.5 MCG/ACT AERO Inhale 2 puffs into the lungs 2 times daily 11/11/19  Yes Anastasiya Damian MD   furosemide (LASIX) 20 MG tablet Take 1 tablet by mouth daily as needed 11/11/19  Yes Anastasiya Damian MD   PAIN RELIEVER 325 MG tablet Take 500 mg by mouth daily  3/14/19  Yes Historical Provider, MD   CREON 79337 units CPEP Take 36,000 Units by mouth 4 times daily 3/16/19  Yes Historical Provider, MD   Multiple Vitamins-Minerals (MULTIVITAMIN WOMEN) TABS TK 1 T PO QD WF 2/15/19  Yes Historical Provider, MD   melatonin 3 MG TABS tablet Take 3 mg by mouth nightly as needed    Yes Historical Provider, MD   diltiazem (CARDIZEM CD) 240 MG extended release capsule Take 240 mg by mouth daily   Yes Historical Provider, MD   albuterol (PROVENTIL) (2.5 MG/3ML) 0.083% nebulizer solution Take 2.5 mg by nebulization as needed for Wheezing   Yes Historical Provider, MD       Allergies:  Sulfa antibiotics    Social History:  The patient currently lives at home     TOBACCO:   reports that she has been smoking cigarettes. She has a 7.50 pack-year smoking history. She has never used smokeless tobacco.  ETOH:   reports current alcohol use.       Family History:   Positive as follows:        Family history unknown: Yes       REVIEW OF SYSTEMS:       Constitutional: Negative for fever   HENT: Negative for sore throat   Eyes: Negative for redness Respiratory: as above  Cardiovascular: Negative for chest pain   Gastrointestinal: Negative for vomiting, diarrhea   Genitourinary: Negative for hematuria   Musculoskeletal: Negative for arthralgias   Skin: Negative for rash   Neurological: Negative for syncope   Hematological: Negative for adenopathy   Psychiatric/Behavorial: Negative for anxiety    PHYSICAL EXAM:    /80   Pulse 108   Temp 97.2 °F (36.2 °C) (Oral)   Resp 20   Ht 5' 1\" (1.549 m)   Wt 107 lb (48.5 kg)   LMP 07/12/2015   SpO2 94%   BMI 20.22 kg/m²           General:  Middle aged female, Awake, alert and oriented. Appears to be not in any distress  Mucous Membranes:  Pink , anicteric  Neck: No JVD, no carotid bruit, no thyromegaly  Chest diminished quan with scattered wheeze  Cardiovascular:  RRR S1S2 heard, no murmurs or gallops  Abdomen:  Soft, undistended, non tender, no organomegaly, BS present  Extremities: No edema or cyanosis.  Distal pulses well felt  Neurological : grossly normal        CBC:   Recent Labs     05/09/20  2358 05/10/20  0534   WBC 7.7 5.3   HGB 13.5 14.3   HCT 40.4 42.9   MCV 96.7 97.9    286     BMP:   Recent Labs     05/09/20  2358 05/10/20  0534   * 139   K 4.0 4.6   CL 96* 101   CO2 25 20*   BUN 3* 3*   CREATININE <0.5* <0.5*     LIVER PROFILE:   Recent Labs     05/09/20  2358 05/10/20  0534   AST 34 29   ALT 31 34   BILIDIR  --  <0.2   BILITOT <0.2 <0.2   ALKPHOS 92 102     CARDIAC ENZYMES  Recent Labs     05/09/20 2358   TROPONINI <0.01     CULTURES  COVID-19: pending     EKG:  I have reviewed the EKG with the following interpretation:   Normal sinus rhythm  Rightward axis  Borderline ECG    RADIOLOGY  XR CHEST PORTABLE   Final Result   No acute cardiopulmonary process           Pertinent previous results reviewed   None     ASSESSMENT/PLAN:    Acute Hypoxic Respiratory Failure  - suspect 2/2 to COPD AE  - does not usually require supplemental O2 at home   - Was requiring 10L on arrival to ED,

## 2020-05-10 NOTE — CONSULTS
Shermon Garfield, MUCUS, TRICHOMONAS, YEAST, BACTERIA, CLARITYU, SPECGRAV, LEUKOCYTESUR, UROBILINOGEN, BILIRUBINUR, BLOODU, GLUCOSEU, AMORPHOUS in the last 72 hours. Invalid input(s): KETONESU  No results for input(s): PHART, AXZ2OHB, PO2ART in the last 72 hours. Chest imaging was reviewed by me and showed clear lungs    ASSESSMENT:  · Acute hypoxic respiratory failure  · COPD AE  · Chronic Pancreatitis  · Chronic alcohol abuse  · Paroxysmal Afib  · Tobacco abuse  · H/o PE 2018 associated with hospital stay for pneumonia    PLAN:  · Droplet plus precautions while ruling out COVID  · Symbicort BID  · Atrovent and albuterol MDI QID  · Albuterol MDI PRN  Supplemental oxygen to maintain SaO2 >92%; wean as tolerated  · Prednisone taper   · Azithromycin  · Sputum GS&C.   · Smoking cessation advised    Thank you Delmy Addison, * for this consult

## 2020-05-10 NOTE — ED NOTES
Report called to SARAH Cherry at [facility].      Jamie Lei RN  05/10/20 0017

## 2020-05-11 LAB
C-REACTIVE PROTEIN: 8.2 MG/L (ref 0–5.1)
FERRITIN: 29.5 NG/ML (ref 15–150)
SARS-COV-2: NOT DETECTED

## 2020-05-11 PROCEDURE — 6360000002 HC RX W HCPCS: Performed by: HOSPITALIST

## 2020-05-11 PROCEDURE — 6370000000 HC RX 637 (ALT 250 FOR IP): Performed by: INTERNAL MEDICINE

## 2020-05-11 PROCEDURE — 2580000003 HC RX 258: Performed by: HOSPITALIST

## 2020-05-11 PROCEDURE — 6370000000 HC RX 637 (ALT 250 FOR IP): Performed by: HOSPITALIST

## 2020-05-11 PROCEDURE — 1200000000 HC SEMI PRIVATE

## 2020-05-11 PROCEDURE — 99233 SBSQ HOSP IP/OBS HIGH 50: CPT | Performed by: INTERNAL MEDICINE

## 2020-05-11 PROCEDURE — 99232 SBSQ HOSP IP/OBS MODERATE 35: CPT | Performed by: INTERNAL MEDICINE

## 2020-05-11 PROCEDURE — 94640 AIRWAY INHALATION TREATMENT: CPT

## 2020-05-11 RX ORDER — ATORVASTATIN CALCIUM 10 MG/1
TABLET, FILM COATED ORAL
Qty: 30 TABLET | Refills: 2 | Status: SHIPPED | OUTPATIENT
Start: 2020-05-11

## 2020-05-11 RX ADMIN — PANCRELIPASE 36000 UNITS: 24000; 76000; 120000 CAPSULE, DELAYED RELEASE PELLETS ORAL at 20:21

## 2020-05-11 RX ADMIN — ATENOLOL 25 MG: 25 TABLET ORAL at 09:12

## 2020-05-11 RX ADMIN — PANTOPRAZOLE SODIUM 40 MG: 40 TABLET, DELAYED RELEASE ORAL at 04:40

## 2020-05-11 RX ADMIN — Medication 2 PUFF: at 07:45

## 2020-05-11 RX ADMIN — ACETAMINOPHEN 650 MG: 325 TABLET ORAL at 04:39

## 2020-05-11 RX ADMIN — ENOXAPARIN SODIUM 40 MG: 40 INJECTION SUBCUTANEOUS at 09:11

## 2020-05-11 RX ADMIN — ATORVASTATIN CALCIUM 10 MG: 10 TABLET, FILM COATED ORAL at 09:12

## 2020-05-11 RX ADMIN — MELATONIN TAB 3 MG 3 MG: 3 TAB at 21:36

## 2020-05-11 RX ADMIN — Medication 10 ML: at 20:23

## 2020-05-11 RX ADMIN — Medication 2 PUFF: at 15:28

## 2020-05-11 RX ADMIN — PANCRELIPASE 36000 UNITS: 24000; 76000; 120000 CAPSULE, DELAYED RELEASE PELLETS ORAL at 17:10

## 2020-05-11 RX ADMIN — PANCRELIPASE 36000 UNITS: 24000; 76000; 120000 CAPSULE, DELAYED RELEASE PELLETS ORAL at 11:36

## 2020-05-11 RX ADMIN — ASPIRIN 81 MG 81 MG: 81 TABLET ORAL at 09:12

## 2020-05-11 RX ADMIN — DULOXETINE HYDROCHLORIDE 60 MG: 60 CAPSULE, DELAYED RELEASE ORAL at 09:12

## 2020-05-11 RX ADMIN — Medication 10 ML: at 09:12

## 2020-05-11 RX ADMIN — Medication 2 PUFF: at 11:25

## 2020-05-11 RX ADMIN — SERTRALINE HYDROCHLORIDE 50 MG: 50 TABLET ORAL at 09:11

## 2020-05-11 RX ADMIN — DILTIAZEM HYDROCHLORIDE 240 MG: 240 CAPSULE, COATED, EXTENDED RELEASE ORAL at 09:11

## 2020-05-11 RX ADMIN — Medication 2 PUFF: at 19:18

## 2020-05-11 RX ADMIN — SODIUM CHLORIDE: 9 INJECTION, SOLUTION INTRAVENOUS at 17:13

## 2020-05-11 RX ADMIN — PREDNISONE 40 MG: 20 TABLET ORAL at 09:12

## 2020-05-11 RX ADMIN — Medication 2 PUFF: at 15:27

## 2020-05-11 RX ADMIN — AZITHROMYCIN MONOHYDRATE 250 MG: 250 TABLET ORAL at 09:12

## 2020-05-11 RX ADMIN — SODIUM CHLORIDE: 9 INJECTION, SOLUTION INTRAVENOUS at 04:39

## 2020-05-11 ASSESSMENT — PAIN SCALES - GENERAL
PAINLEVEL_OUTOF10: 2
PAINLEVEL_OUTOF10: 0
PAINLEVEL_OUTOF10: 2
PAINLEVEL_OUTOF10: 2

## 2020-05-11 NOTE — CARE COORDINATION
Case Management Assessment  Initial Evaluation    Date/Time of Evaluation: 5/11/2020 1:35 PM  Assessment Completed by: Juan José Petit    Patient Name: Jose Diane  YOB: 1970  Diagnosis: COPD with acute exacerbation [facility]) [J44.1]  Date / Time: 5/9/2020 11:50 PM  Admission status/Date: 5/10/20 inpatient  Chart Reviewed: Yes      Patient Tawnya Root: Yes via phone  Family Interviewed:  No      Hospitalization in the last 30 days:  Yes    Contacts  :   Aracely  Relationship to Patient: jessie  Phone Number:  [phone]  Alternate Contact:     Relationship to Patient:     Phone Number:    Met with: spoke with pt via phone    Current PCP [address] required for SNF : Y      3 night stay required -  [employer]  Support Systems: Spouse/Significant Other  Transportation:   Insurance provider  Meal Preparation: self    Housing  Home Environment:  Lives 1 story home  Steps: 2  Plans to Return to Present Housing: Yes  Other Identified Issues:     Maricel Brady  Currently active with [address] : No  Type of Home Care Services: None  Passport/Waiver : No  :                      Phone Number:    Passport/Waiver Services: N/A          Durable Medical Equipment   DME Provider:   Equipment: Walker___Cane___RTS___ BSC___Shower Chair___  02__ at ____Liter(s)---Uses________HHN__X_ CPAP___ BiPap___ Hospital Bed___W/C____Other________      Has Home O2 in place on admit:  No  Informed of need to bring portable home O2 tank on day of discharge for nursing to connect prior to leaving:   Not Indicated  Verbalized agreement/Understanding:   Not Indicated    Community Service Affiliation  Dialysis:  No    · Name:  · Location  · Dialysis Schedule:  · Phone:   · Fax:     Outpatient PT/OT: No    Cancer Center: No     CHF Clinic: No     Pulmonary Rehab: No  Pain Clinic: No  Community Mental Health: No    Wound Clinic: No     COVID SCREENING: No       Other: The Plan for Transition of Care is related to the following treatment goals: home    The Patient  was provided with a choice of provider and agrees   with the discharge plan. [x] Yes [] No    Freedom of choice list was provided with basic dialogue that supports the patient's individualized plan of care/goals, treatment preferences and shares the quality data associated with the providers. [] Yes [] No  Waived D/T social distancing    DISCHARGE PLAN:  Reviewed chart and spoke with pt via phone. Role of CM explained. States lives home with S.O. and plan return at dc. States IPTA. Does have HHN at home but NO O  Explained Case Management role/services. . Denies needs. Will follow for poss home O2 needs at dc.

## 2020-05-11 NOTE — PLAN OF CARE
Pt AOx4 VSSA no complaints of pain. Pt weaned to RA at 0900 and was at 96%. Pt denies any needs at this time.  Will continue to monitor

## 2020-05-11 NOTE — CARE COORDINATION
Advance Care Planning   Advance Care Planning Clinical Specialist  Conversation Note      Date of ACP Conversation: 5/11/2020    Conversation Conducted with:   Patient with [address]: Next of Kin by law (only applies in absence of above)     ACP Clinical Specialist: Mela Goodrich RN      *When Decision Maker makes decisions on behalf of the incapacitated patient: Decision Maker is asked to consider and make decisions based on patient values, known preferences, or best interests. Current Designated Health Care Decision Maker:   (as entered in [address] field. Validate  this information as still accurate & up-to-date; edit [address] field as needed.)    If no Decision Maker listed above or available through scanned documents, then:    [address] [place] [address]   Who do you trust to make healthcare decisions for you? Name:    [employer] daughter  Phone  Number: [phone]  Can this person be reached and be able to respond quickly, such as within a few minutes or hours? Yes      For below questions, when conducting conversation with [address], substitute \"she\" and \"her\" for \"you\" and \"your\". Hospitalization  If your health were to worsen and it became clear that your chance of recovery was unlikely, what would your preferences be regarding hospitalization?:    Choice:  [x]  The patient would want hospitalization  []  The patient would prefer comfort-focused treatment without hospitalization. Ventilation  If you were in your present state of health and suddenly became very ill and were unable to breathe on your own, what would your preference be about the use of a ventilator (breathing machine) if it were available to you?       If patient would desire the use of a ventilator (breathing machine), answer \"yes\", if not \"no\":yes    If your health were to worsen and it became clear that your chance of recovery was unlikely, would that change your answer? Resuscitation  CPR works best to restart the heart when there is a sudden event, like a heart attack, in someone who is otherwise healthy. Unfortunately, CPR does not typically restart the heart for people who have serious health conditions or who are very sick. In the event your heart stopped, would you want attempts to restart your heart (answer \"yes\")-YES or would you prefer a natural death (answer \"no\")? No    If your health were to worsen and it became clear that your chance of recovery was unlikely, would that change your answer? Unsure    [] Yes  [x] No   Educated Patient / Ervin Cheadle regarding differences between Advance Directives and portable DNR orders.     Length of ACP Conversation in minutes:  10 minutes    Conversation Outcomes:  [x] ACP discussion completed  [] Existing advance directive reviewed with patient; no changes to patient's previously recorded wishes   [] New Advance Directive completed   [] Portable Do Not Rescitate prepared for Provider review and signature  [] POLST/POST/MOLST/MOST prepared for Provider review and signature      Follow-up plan:    [] Schedule follow-up conversation to continue planning  [] Referred individual to Provider for additional questions/concerns   [] Advised patient/agent/surrogate to review completed ACP document and update if needed with changes in condition, patient preferences or care setting     [] This note routed to one or more involved healthcare providers

## 2020-05-12 VITALS
SYSTOLIC BLOOD PRESSURE: 162 MMHG | HEART RATE: 83 BPM | OXYGEN SATURATION: 95 % | RESPIRATION RATE: 18 BRPM | DIASTOLIC BLOOD PRESSURE: 96 MMHG | BODY MASS INDEX: 19.33 KG/M2 | HEIGHT: 61 IN | WEIGHT: 102.4 LBS | TEMPERATURE: 97.1 F

## 2020-05-12 PROCEDURE — 2580000003 HC RX 258: Performed by: HOSPITALIST

## 2020-05-12 PROCEDURE — 6360000002 HC RX W HCPCS: Performed by: HOSPITALIST

## 2020-05-12 PROCEDURE — 6370000000 HC RX 637 (ALT 250 FOR IP): Performed by: HOSPITALIST

## 2020-05-12 PROCEDURE — 99238 HOSP IP/OBS DSCHRG MGMT 30/<: CPT | Performed by: INTERNAL MEDICINE

## 2020-05-12 PROCEDURE — 94640 AIRWAY INHALATION TREATMENT: CPT

## 2020-05-12 PROCEDURE — 99233 SBSQ HOSP IP/OBS HIGH 50: CPT | Performed by: INTERNAL MEDICINE

## 2020-05-12 PROCEDURE — 6370000000 HC RX 637 (ALT 250 FOR IP): Performed by: INTERNAL MEDICINE

## 2020-05-12 RX ORDER — GUAIFENESIN 600 MG/1
600 TABLET, EXTENDED RELEASE ORAL 2 TIMES DAILY
Status: DISCONTINUED | OUTPATIENT
Start: 2020-05-12 | End: 2020-05-12 | Stop reason: HOSPADM

## 2020-05-12 RX ORDER — AZITHROMYCIN 250 MG/1
250 TABLET, FILM COATED ORAL DAILY
Qty: 2 TABLET | Refills: 0 | Status: SHIPPED | OUTPATIENT
Start: 2020-05-13 | End: 2020-05-15

## 2020-05-12 RX ORDER — GUAIFENESIN/DEXTROMETHORPHAN 100-10MG/5
5 SYRUP ORAL 3 TIMES DAILY PRN
Qty: 120 ML | Refills: 0 | Status: SHIPPED | OUTPATIENT
Start: 2020-05-12 | End: 2020-05-22

## 2020-05-12 RX ORDER — PREDNISONE 10 MG/1
TABLET ORAL
Qty: 18 TABLET | Refills: 0 | Status: SHIPPED | OUTPATIENT
Start: 2020-05-12 | End: 2020-05-26 | Stop reason: ALTCHOICE

## 2020-05-12 RX ADMIN — ATENOLOL 25 MG: 25 TABLET ORAL at 08:50

## 2020-05-12 RX ADMIN — Medication 2 PUFF: at 10:42

## 2020-05-12 RX ADMIN — Medication 2 PUFF: at 06:45

## 2020-05-12 RX ADMIN — DULOXETINE HYDROCHLORIDE 60 MG: 60 CAPSULE, DELAYED RELEASE ORAL at 08:49

## 2020-05-12 RX ADMIN — PREDNISONE 40 MG: 20 TABLET ORAL at 08:49

## 2020-05-12 RX ADMIN — SERTRALINE HYDROCHLORIDE 50 MG: 50 TABLET ORAL at 08:49

## 2020-05-12 RX ADMIN — SODIUM CHLORIDE: 9 INJECTION, SOLUTION INTRAVENOUS at 05:36

## 2020-05-12 RX ADMIN — ASPIRIN 81 MG 81 MG: 81 TABLET ORAL at 08:50

## 2020-05-12 RX ADMIN — PANCRELIPASE 36000 UNITS: 24000; 76000; 120000 CAPSULE, DELAYED RELEASE PELLETS ORAL at 13:47

## 2020-05-12 RX ADMIN — ENOXAPARIN SODIUM 40 MG: 40 INJECTION SUBCUTANEOUS at 08:49

## 2020-05-12 RX ADMIN — DILTIAZEM HYDROCHLORIDE 240 MG: 240 CAPSULE, COATED, EXTENDED RELEASE ORAL at 08:50

## 2020-05-12 RX ADMIN — PANCRELIPASE 36000 UNITS: 24000; 76000; 120000 CAPSULE, DELAYED RELEASE PELLETS ORAL at 08:52

## 2020-05-12 RX ADMIN — AZITHROMYCIN MONOHYDRATE 250 MG: 250 TABLET ORAL at 08:50

## 2020-05-12 RX ADMIN — PANTOPRAZOLE SODIUM 40 MG: 40 TABLET, DELAYED RELEASE ORAL at 05:36

## 2020-05-12 RX ADMIN — ATORVASTATIN CALCIUM 10 MG: 10 TABLET, FILM COATED ORAL at 08:50

## 2020-05-12 NOTE — DISCHARGE SUMMARY
tablet  Commonly known as:  TENORMIN  Take 1 tablet by mouth daily     atorvastatin 10 MG tablet  Commonly known as:  LIPITOR  TAKE ONE TABLET BY MOUTH DAILY     b complex vitamins tablet  TAKE ONE TABLET BY MOUTH DAILY     budesonide-formoterol 160-4.5 MCG/ACT Aero  Commonly known as:  Symbicort  Inhale 2 puffs into the lungs 2 times daily     Creon 41459 units Cpep delayed release capsule  Generic drug:  lipase-protease-amylase     dilTIAZem 240 MG extended release capsule  Commonly known as:  CARDIZEM CD     DULoxetine 60 MG extended release capsule  Commonly known as:  CYMBALTA  Take 1 capsule by mouth daily     furosemide 20 MG tablet  Commonly known as:  LASIX  Take 1 tablet by mouth daily as needed     melatonin 3 MG Tabs tablet     omeprazole 40 MG delayed release capsule  Commonly known as:  PRILOSEC     ondansetron 4 MG tablet  Commonly known as:  ZOFRAN     potassium citrate 10 MEQ (1080 MG) extended release tablet  Commonly known as:  UROCIT-K  Take 1 tablet by mouth daily     sertraline 50 MG tablet  Commonly known as:  ZOLOFT  Take 1 tablet by mouth daily        STOP taking these medications    Pain Reliever 325 MG tablet  Generic drug:  acetaminophen           Where to Get Your Medications      These medications were sent to [facility] - [place], [place] - [address], [address]    Phone:  [phone]   · atorvastatin 10 MG tablet     You can get these medications from any pharmacy    Bring a paper prescription for each of these medications  · azithromycin 250 MG tablet  · ipratropium 17 MCG/ACT inhaler  · predniSONE 10 MG tablet       Discharged in stable condition to home    Follow Up:   Follow up with PCP, pulmonology       Marcel Morrow MD   5/12/2020

## 2020-05-12 NOTE — PROGRESS NOTES
05/10/20 0815   Vital Signs   Temp 97.2 °F (36.2 °C)   Temp Source Oral   Pulse 108   Heart Rate Source Monitor   Resp 20   /80   BP Location Left upper arm   BP Upper/Lower Upper   Patient Position Semi fowlers   Level of Consciousness 0   MEWS Score 2   Patient Currently in Pain Denies   Oxygen Therapy   SpO2 94 %   O2 Device Nasal cannula   O2 Flow Rate (L/min) 2 L/min     Assessment completed. Patient in stable condition at this time. Droplet plus precautions maintained. Patient took medications with no complications. Patient vitals as above. Patient denies needs, has call light within reach. Will continue to monitor.
Ambulated on room air 300 feet. Patient did not drop below 93%. Kiki Devine
COVID test = not detected. OK to dc isolation per Dr. Bonny Nova.
Patient educated on discharge instructions as well as new medications use, dosage, administration and possible side effects. Patient verified knowledge. IV removed without difficulty and dry dressing in place. Pt left facility in stable condition to Home with all of their personal belongings. Kiki Vargas
Pt assessed. End expiratory wheezes auscultated throughout bilateral lobes. Scheduled medications given at this time. Pt denies any needs. Call light and bedside table with-in easy reach. Kiki Hardy
Pulmonary Progress Note    CC: Shortness of breath    Subjective:   2 L O2  Still with shortness of breath    IV line:      Intake/Output Summary (Last 24 hours) at 5/11/2020 0656  Last data filed at 5/11/2020 0439  Gross per 24 hour   Intake 2301 ml   Output --   Net 2301 ml       Exam:   /74   Pulse 82   Temp 97.7 °F (36.5 °C) (Oral)   Resp 18   Ht 5' 1\" (1.549 m)   Wt 107 lb (48.5 kg)   LMP 07/12/2015   SpO2 97%   BMI 20.22 kg/m²  on 2 L O2  Gen: Mild distress. Alert. Eyes: PERRL. No sclera icterus. No conjunctival injection. ENT: No discharge. Pharynx clear. Neck: Trachea midline. Normal thyroid. Resp: + Accessory muscle use. No crackles. Few wheezes. No rhonchi. No dullness on percussion. CV: Regular rate. Regular rhythm. No murmur or rub. No edema. GI: Non-tender. Non-distended. No masses. No organomegaly. Normal bowel sounds. No hernia. Skin: Warm and dry. No nodule on exposed extremities. No rash on exposed extremities. Lymph: No cervical LAD. No supraclavicular LAD. M/S: No cyanosis. No joint deformity. No clubbing. Neuro: Awake. Moves all extremities. CN grossly intact. Psych: Oriented x 3. No anxiety or agitation.      Scheduled Meds:   magnesium sulfate  1 g Intravenous Once    aspirin  81 mg Oral Daily    atenolol  25 mg Oral Daily    atorvastatin  10 mg Oral Daily    lipase-protease-amylase  36,000 Units Oral 4x Daily    dilTIAZem  240 mg Oral Daily    DULoxetine  60 mg Oral Daily    melatonin  3 mg Oral Daily    pantoprazole  40 mg Oral QAM AC    sertraline  50 mg Oral Daily    sodium chloride flush  10 mL Intravenous 2 times per day    enoxaparin  40 mg Subcutaneous Daily    budesonide-formoterol  2 puff Inhalation BID    predniSONE  40 mg Oral Daily    azithromycin  250 mg Oral Daily    albuterol sulfate HFA  2 puff Inhalation 4x daily    ipratropium  2 puff Inhalation 4x daily     Continuous Infusions:   sodium chloride 75 mL/hr at 05/11/20 0433
Report given to [facility] [address]
,  ,    Cough: Strong, spontaneous, non-productive = 0    Vital Signs   /86   Pulse 110   Temp 97.2 °F (36.2 °C) (Oral)   Resp 18   Ht 5' 1\" (1.549 m)   Wt 107 lb (48.5 kg)   LMP 07/12/2015   SpO2 92%   BMI 20.22 kg/m²   SPO2 (COPD values may differ): 90-91% on room air or greater than 92% on FiO2 24- 28% = 1    Peak Flow (asthma only): not applicable = 0    RSI: 0-4 = See once and convert to home regimen or discontinue        Plan       Goals: medication delivery, mobilize retained secretions, volume expansion and improve oxygenation    Patient/caregiver was educated on the proper method of use for Respiratory Care Devices:  Yes      Level of patient/caregiver understanding able to:   ? Verbalize understanding   ? Demonstrate understanding       ? Teach back        ? Needs reinforcement       ? No available caregiver               ? Other:     Response to education:  Very Good     Is patient being placed on Home Treatment Regimen? Yes     Does the patient have everything they need prior to discharge? NA     Comments: pt assessed & chart reviewed    Plan of Care: maintain home regimen    Electronically signed by Michel Bhandari RCP on 5/10/2020 at 4:03 AM    Respiratory Protocol Guidelines     1. Assessment and treatment by Respiratory Therapy will be initiated for medication and therapeutic interventions upon initiation of aerosolized medication. 2. Physician will be contacted for respiratory rate (RR) greater than 35 breaths per minute. Therapy will be held for heart rate (HR) greater than 140 beats per minute, pending direction from physician. 3. Bronchodilators will be administered via Metered Dose Inhaler (MDI) with spacer when the following criteria are met:  a. Alert and cooperative     b. HR < 140 bpm  c. RR < 30 bpm                d. Can demonstrate a 2-3 second inspiratory hold  4.  Bronchodilators will be administered via Hand Held Nebulizer [facility]) to patients when ANY of the following
05/12/20 0536     PRN Meds:  sodium chloride flush, acetaminophen **OR** acetaminophen, polyethylene glycol, promethazine **OR** ondansetron, albuterol sulfate HFA    Labs:  CBC:   Recent Labs     05/09/20  2358 05/10/20  0534   WBC 7.7 5.3   HGB 13.5 14.3   HCT 40.4 42.9   MCV 96.7 97.9    286     BMP:   Recent Labs     05/09/20  2358 05/10/20  0534   * 139   K 4.0 4.6   CL 96* 101   CO2 25 20*   BUN 3* 3*   CREATININE <0.5* <0.5*     LIVER PROFILE:   Recent Labs     05/09/20  2358 05/10/20  0534   AST 34 29   ALT 31 34   BILIDIR  --  <0.2   BILITOT <0.2 <0.2   ALKPHOS 92 102     PT/INR: No results for input(s): PROTIME, INR in the last 72 hours. APTT: No results for input(s): APTT in the last 72 hours. UA:No results for input(s): NITRITE, COLORU, PHUR, LABCAST, WBCUA, RBCUA, MUCUS, TRICHOMONAS, YEAST, BACTERIA, CLARITYU, SPECGRAV, LEUKOCYTESUR, UROBILINOGEN, BILIRUBINUR, BLOODU, GLUCOSEU, AMORPHOUS in the last 72 hours. Invalid input(s): KETONESU  No results for input(s): PHART, ZTY1LXW, PO2ART in the last 72 hours. Cultures:   COVID-19 5/10 negative    Films:  CXR 5/10 reviewed by me and showed   No acute cardiopulmonary disease      ASSESSMENT:  · Acute hypoxemic respiratory failure   · COPD with acute exacerbation  · Persistent cough  · Chronic alcohol abuse  · Tobacco abuse  · Chronic pancreatitis  · Paroxysmal A.  Fib  · History of PE in 2018        PLAN:  · Supplemental oxygen to maintain SaO2 >92%; wean as tolerated  · Symbicort twice daily  · Albuterol and Atrovent MDI  · Prednisone taper  · Zithromax day # 3/5  · Mucinex twice daily  · Follow-up cultures  · Smoking cessation counseling
supplemental O2 as tolerated   - CXR clear      COPD AE  - MDI (no nebulizers while awaiting COVID results)   - Hold steroids for now  - Zithromax D#2  - Pulmonology consult      Hyponatremia- resolved. - Mild   - Hold home Lasix  - IVF, monitor   resolved      Paroxsymal Atrial fibrillation  -  Not on any AC  - Continue rate controlling medications   - Rate mildly elevated on 5/10  - EKG in ED with NSR   - rates controlled.      Anxiety  Depression   - continue Cymbalta and Zoloft      HTN  - BP is well controlled   - Continue BB, cardizem     HLD  - Continue statin      Chronic Pancreatitis   - Continue Creon      GERD  - Continue PPI     DVT Prophylaxis: Lovenox   Diet: DIET GENERAL;  Code Status: Full Code    ANDREA Avalos.

## 2020-05-12 NOTE — PLAN OF CARE
Problem: Infection:  Goal: Will remain free from infection  Description: Will remain free from infection  Outcome: Ongoing     Problem: Safety:  Goal: Free from accidental physical injury  Description: Free from accidental physical injury  Outcome: Ongoing  Goal: Free from intentional harm  Description: Free from intentional harm  Outcome: Ongoing     Problem: Daily Care:  Goal: Daily care needs are met  Description: Daily care needs are met  Outcome: Ongoing     Problem: Pain:  Goal: Patient's pain/discomfort is manageable  Description: Patient's pain/discomfort is manageable  Outcome: Ongoing     Problem: Skin Integrity:  Goal: Skin integrity will stabilize  Description: Skin integrity will stabilize  Outcome: Ongoing     Problem: Discharge Planning:  Goal: Patients continuum of care needs are met  Description: Patients continuum of care needs are met  Outcome: Ongoing     Problem:  Activity:  Goal: Fatigue will decrease  Description: Fatigue will decrease  Outcome: Ongoing     Problem: Cardiac:  Goal: Hemodynamic stability will improve  Description: Hemodynamic stability will improve  Outcome: Ongoing     Problem: Coping:  Goal: Level of anxiety will decrease  Description: Level of anxiety will decrease  Outcome: Ongoing  Goal: Ability to cope will improve  Description: Ability to cope will improve  Outcome: Ongoing  Goal: Ability to establish a method of communication will improve  Description: Ability to establish a method of communication will improve  Outcome: Ongoing     Problem: Nutritional:  Goal: Consumption of the prescribed amount of daily calories will improve  Description: Consumption of the prescribed amount of daily calories will improve  Outcome: Ongoing     Problem: Respiratory:  Goal: Ability to maintain a clear airway will improve  Description: Ability to maintain a clear airway will improve  Outcome: Ongoing  Goal: Ability to maintain adequate ventilation will improve  Description: Ability to

## 2020-05-12 NOTE — CARE COORDINATION
DISCHARGE ORDER  Date/Time 2020 1:50 PM  Completed by: Andi Riley, Case Management    Patient Name: Marcy Sol    : 1970  Admitting Diagnosis: COPD with acute exacerbation ([facility] [address] [J44.1]      Admit order Date and Status: 5/10 inpatient  (verify MD's last order for status of admission)      Noted discharge order. Confirmed discharge plan  : Yes  with whom patient  If pt confirmed DC plan does family need to be contacted by CM No Discharge Plan: Order for dc noted. Spoke with pt via phone. Noted O2 sat 93% on RA. Denies other needs. CHart reviewed and no other dc needs identified. Reviewed chart. Role of discharge planner explained and patient verbalized understanding. Discharge order is noted. Has Home O2 in place on admit:  No  Informed of need to bring portable home O2 tank on day of discharge for nursing to connect prior to leaving:   Not Indicated  Verbalized agreement/Understanding:   Not Indicated  Pt is being d/c'd to home today. Pt's O2 sats are 93% on RA. Discharge timeout done with  Nsg, CM and pt. All discharge needs and concerns addressed.

## 2020-05-13 ENCOUNTER — CARE COORDINATION (OUTPATIENT)
Dept: CASE MANAGEMENT | Age: 50
End: 2020-05-13

## 2020-05-14 ENCOUNTER — TELEPHONE (OUTPATIENT)
Dept: PULMONOLOGY | Age: 50
End: 2020-05-14

## 2020-05-26 ENCOUNTER — VIRTUAL VISIT (OUTPATIENT)
Dept: PULMONOLOGY | Age: 50
End: 2020-05-26
Payer: MEDICARE

## 2020-05-26 ENCOUNTER — TELEPHONE (OUTPATIENT)
Dept: PULMONOLOGY | Age: 50
End: 2020-05-26

## 2020-05-26 PROCEDURE — 99213 OFFICE O/P EST LOW 20 MIN: CPT | Performed by: INTERNAL MEDICINE

## 2020-05-26 NOTE — TELEPHONE ENCOUNTER
Within this Telehealth Consent, the terms you and yours refer to the person using the Telehealth Service (Service), or in the case of a use of the Service by or on behalf of a minor, you and yours refer to and include (i) the parent or legal guardian who provides consent to the use of the Service by such minor or uses the Service on behalf of such minor, and (ii) the minor for whom consent is being provided or on whose behalf the Service is being utilized. When using Service, you will be consulting with your health care providers via the use of Telehealth.   Telehealth involves the delivery of healthcare services using electronic communications, information technology or other means between a healthcare provider and a patient who are not in the same physical location. Telehealth may be used for diagnosis, treatment, follow-up and/or patient education, and may include, but is not limited to, one or more of the following:    Electronic transmission of medical records, photo images, personal health information or other data between a patient and a healthcare provider    Interactions between a patient and healthcare provider via audio, video and/or data communications    Use of output data from medical devices, sound and video files    Anticipated Benefits   The use of Telehealth by your Provider(s) through the Service may have the following possible benefits:    Making it easier and more efficient for you to access medical care and treatment for the conditions treated by such Provider(s) utilizing the Service    Allowing you to obtain medical care and treatment by Provider(s) at times that are convenient for you    Enabling you to interact with Provider(s) without the necessity of an in-office appointment     Possible Risks   While the use of Telehealth can provide potential benefits for you, there are also potential risks associated with the use of Telehealth.  These risks include, but may not be limited to the following:    Your Provider(s) may not able to provide medical treatment for your particular condition and you may be required to seek alternative healthcare or emergency care services.  The electronic systems or other security protocols or safeguards used in the Service could fail, causing a breach of privacy of your medical or other information.  Given regulatory requirements in certain jurisdictions, your Provider(s) diagnosis and/or treatment options, especially pertaining to certain prescriptions, may be limited. Acceptance   1. You understand that Services will be provided via Telehealth. This process involves the use of HIPAA compliant and secure, real-time audio-visual interfacing with a qualified and appropriately trained provider located at [facility]. 2. You understand that, under no circumstances, will this session be recorded. 3. You understand that the Provider(s) at [facility] and other clinical participants will be party to the information obtained during the Telehealth session in accordance with best medical practices. 4. You understand that the information obtained during the Telehealth session will be used to help determine the most appropriate treatment options. 5. You understand that You have the right to revoke this consent at any point in time. 6. You understand that Telehealth is voluntary, and that continued treatment is not dependent upon consent. 7. You understand that, in the event of non-consent to Telehealth services and/or technical difficulties, you will obtain services as typically provided in the absence of Telehealth technology. 8. You understand that this consent will be kept in Your medical record. 9. No potential benefits from the use of Telehealth or specific results can be guaranteed. Your condition may not be cured or improved and, in some cases, may get worse.    10. There are limitations in the provision of medical care and treatment via Telehealth and the Service and you may not be able to receive diagnosis and/or treatment through the Service for every condition for which you seek diagnosis and/or treatment. 11. There are potential risks to the use of Telehealth, including but not limited to the risks described in this Telehealth Consent. 12. Your Provider(s) have discussed the use of Telehealth and the Service with you, including the benefits and risks of such and you have provided oral consent to your Provider(s) for the use of Telehealth and the Service. 15. You understand that it is your duty to provide your Provider(s) truthful, accurate and complete information, including all relevant information regarding care that you may have received or may be receiving from other healthcare providers outside of the Service. 14. You understand that each of your Provider(s) may determine in his or sole discretion that your condition is not suitable for diagnosis and/or treatment using the Service, and that you may need to seek medical care and treatment a specialist or other healthcare provider, outside of the Service. 15. You understand that you are fully responsible for payment for all services provided by Provider(s) or through use of the Service and that you may not be able to use third-party insurance. 16. You represent that (a) you have read this Telehealth Consent carefully, (b) you understand the risks and benefits of the Service and the use of Telehealth in the medical care and treatment provided to you by Provider(s) using the Service, and (c) you have the legal capacity and authority to provide this consent for yourself and/or the minor for which you are consenting under applicable federal and state laws, including laws relating to the age of [de-identified] and/or parental/guardian consent.    17. You give your informed consent to the use of Telehealth by Provider(s) using the Service under

## 2020-08-27 ENCOUNTER — OFFICE VISIT (OUTPATIENT)
Dept: PRIMARY CARE CLINIC | Age: 50
End: 2020-08-27

## 2020-08-27 RX ORDER — ATORVASTATIN CALCIUM 10 MG/1
TABLET, FILM COATED ORAL
Qty: 30 TABLET | Refills: 1 | OUTPATIENT
Start: 2020-08-27

## 2020-08-27 NOTE — PROGRESS NOTES
Bonny Serrano received a viral test for COVID-19. They were educated on isolation and quarantine as appropriate. For any symptoms, they were directed to seek care from their PCP, given contact information to establish with a doctor, directed to an urgent care or the emergency room.

## 2020-08-28 LAB — SARS-COV-2, NAA: NOT DETECTED

## 2020-09-02 ENCOUNTER — VIRTUAL VISIT (OUTPATIENT)
Dept: PULMONOLOGY | Age: 50
End: 2020-09-02
Payer: MEDICARE

## 2020-09-02 ENCOUNTER — HOSPITAL ENCOUNTER (OUTPATIENT)
Dept: PULMONOLOGY | Age: 50
Discharge: HOME | End: 2020-09-02
Payer: MEDICARE

## 2020-09-02 VITALS — OXYGEN SATURATION: 96 %

## 2020-09-02 PROBLEM — F17.210 CIGARETTE SMOKER: Status: ACTIVE | Noted: 2020-09-02

## 2020-09-02 LAB
DLCO %PRED: 53 %
DLCO PRED: NORMAL
DLCO/VA %PRED: NORMAL
DLCO/VA PRED: NORMAL
DLCO/VA: NORMAL
DLCO: NORMAL
EXPIRATORY TIME-POST: NORMAL
EXPIRATORY TIME: NORMAL
FEF 25-75% %CHNG: NORMAL
FEF 25-75% %PRED-POST: NORMAL
FEF 25-75% %PRED-PRE: NORMAL
FEF 25-75% PRED: NORMAL
FEF 25-75%-POST: NORMAL
FEF 25-75%-PRE: NORMAL
FEV1 %PRED-POST: 46 %
FEV1 %PRED-PRE: 46 %
FEV1 PRED: NORMAL
FEV1-POST: NORMAL
FEV1-PRE: NORMAL
FEV1/FVC %PRED-POST: NORMAL
FEV1/FVC %PRED-PRE: NORMAL
FEV1/FVC PRED: NORMAL
FEV1/FVC-POST: 48 %
FEV1/FVC-PRE: 50 %
FVC %PRED-POST: NORMAL
FVC %PRED-PRE: NORMAL
FVC PRED: NORMAL
FVC-POST: NORMAL
FVC-PRE: NORMAL
GAW %PRED: NORMAL
GAW PRED: NORMAL
GAW: NORMAL
IC %PRED: NORMAL
IC PRED: NORMAL
IC: NORMAL
MEP: NORMAL
MIP: NORMAL
MVV %PRED-PRE: NORMAL
MVV PRED: NORMAL
MVV-PRE: NORMAL
PEF %PRED-POST: NORMAL
PEF %PRED-PRE: NORMAL
PEF PRED: NORMAL
PEF%CHNG: NORMAL
PEF-POST: NORMAL
PEF-PRE: NORMAL
RAW %PRED: NORMAL
RAW PRED: NORMAL
RAW: NORMAL
RV %PRED: NORMAL
RV PRED: NORMAL
RV: NORMAL
SVC %PRED: NORMAL
SVC PRED: NORMAL
SVC: NORMAL
TLC %PRED: 118 %
TLC PRED: NORMAL
TLC: NORMAL
VA %PRED: NORMAL
VA PRED: NORMAL
VA: NORMAL
VTG %PRED: NORMAL
VTG PRED: NORMAL
VTG: NORMAL

## 2020-09-02 PROCEDURE — 94060 EVALUATION OF WHEEZING: CPT

## 2020-09-02 PROCEDURE — 94729 DIFFUSING CAPACITY: CPT

## 2020-09-02 PROCEDURE — 94640 AIRWAY INHALATION TREATMENT: CPT

## 2020-09-02 PROCEDURE — 99213 OFFICE O/P EST LOW 20 MIN: CPT | Performed by: INTERNAL MEDICINE

## 2020-09-02 PROCEDURE — 94726 PLETHYSMOGRAPHY LUNG VOLUMES: CPT

## 2020-09-02 PROCEDURE — 94760 N-INVAS EAR/PLS OXIMETRY 1: CPT

## 2020-09-02 PROCEDURE — 6370000000 HC RX 637 (ALT 250 FOR IP): Performed by: INTERNAL MEDICINE

## 2020-09-02 RX ORDER — VARENICLINE TARTRATE 1 MG/1
1 TABLET, FILM COATED ORAL 2 TIMES DAILY
Qty: 60 TABLET | Refills: 3 | Status: SHIPPED | OUTPATIENT
Start: 2020-09-02

## 2020-09-02 RX ORDER — ALBUTEROL SULFATE 2.5 MG/3ML
2.5 SOLUTION RESPIRATORY (INHALATION) EVERY 6 HOURS PRN
Qty: 120 EACH | Refills: 3 | Status: SHIPPED | OUTPATIENT
Start: 2020-09-02 | End: 2021-07-08 | Stop reason: SDUPTHER

## 2020-09-02 RX ORDER — VARENICLINE TARTRATE
KIT
Qty: 1 BOX | Refills: 0 | Status: SHIPPED | OUTPATIENT
Start: 2020-09-02

## 2020-09-02 RX ORDER — ALBUTEROL SULFATE 90 UG/1
2 AEROSOL, METERED RESPIRATORY (INHALATION) ONCE
Status: COMPLETED | OUTPATIENT
Start: 2020-09-02 | End: 2020-09-02

## 2020-09-02 RX ADMIN — Medication 2 PUFF: at 10:06

## 2020-09-02 ASSESSMENT — PULMONARY FUNCTION TESTS
FEV1_PERCENT_PREDICTED_POST: 46
FEV1/FVC_PRE: 50
FEV1_PERCENT_PREDICTED_PRE: 46
FEV1/FVC_POST: 48

## 2020-09-21 ENCOUNTER — HOSPITAL ENCOUNTER (EMERGENCY)
Age: 50
Discharge: HOME | End: 2020-09-21
Attending: EMERGENCY MEDICINE
Payer: MEDICARE

## 2020-09-21 ENCOUNTER — APPOINTMENT (OUTPATIENT)
Dept: GENERAL RADIOLOGY | Age: 50
End: 2020-09-21
Payer: MEDICARE

## 2020-09-21 VITALS
HEIGHT: 61 IN | OXYGEN SATURATION: 99 % | WEIGHT: 106 LBS | SYSTOLIC BLOOD PRESSURE: 120 MMHG | BODY MASS INDEX: 20.01 KG/M2 | HEART RATE: 75 BPM | TEMPERATURE: 98.4 F | DIASTOLIC BLOOD PRESSURE: 66 MMHG | RESPIRATION RATE: 16 BRPM

## 2020-09-21 PROCEDURE — 73110 X-RAY EXAM OF WRIST: CPT

## 2020-09-21 PROCEDURE — 10061 I&D ABSCESS COMP/MULTIPLE: CPT

## 2020-09-21 PROCEDURE — 99284 EMERGENCY DEPT VISIT MOD MDM: CPT

## 2020-09-21 ASSESSMENT — PAIN DESCRIPTION - LOCATION
LOCATION: HAND

## 2020-09-21 ASSESSMENT — PAIN DESCRIPTION - ORIENTATION
ORIENTATION: LEFT

## 2020-09-21 ASSESSMENT — ENCOUNTER SYMPTOMS
NAUSEA: 0
COLOR CHANGE: 1
VOMITING: 0

## 2020-09-21 ASSESSMENT — PAIN DESCRIPTION - PAIN TYPE
TYPE: ACUTE PAIN

## 2020-09-21 ASSESSMENT — PAIN SCALES - GENERAL
PAINLEVEL_OUTOF10: 10
PAINLEVEL_OUTOF10: 5
PAINLEVEL_OUTOF10: 10

## 2020-09-21 NOTE — ED PROVIDER NOTES
tablet Take 1 tablet by mouth 2 times daily, Disp-60 tablet,R-3Normal      varenicline (CHANTIX STARTING MONTH BLANCA) 0.5 MG X 11 & 1 MG X 42 tablet Take by mouth., Disp-1 box,R-0Normal      tiotropium (SPIRIVA RESPIMAT) 2.5 MCG/ACT AERS inhaler Inhale 2 puffs into the lungs daily, Disp-1 Inhaler,R-5Normal      !! albuterol (PROVENTIL) (2.5 MG/3ML) 0.083% nebulizer solution Take 3 mLs by nebulization every 6 hours as needed for Wheezing, Disp-120 each,R-3Normal      atorvastatin (LIPITOR) 10 MG tablet TAKE ONE TABLET BY MOUTH DAILY, Disp-30 tablet, R-2Normal      omeprazole (PRILOSEC) 40 MG delayed release capsule Take 40 mg by mouth dailyHistorical Med      DULoxetine (CYMBALTA) 60 MG extended release capsule Take 1 capsule by mouth daily, Disp-30 capsule,R-2Normal      sertraline (ZOLOFT) 50 MG tablet Take 1 tablet by mouth daily, Disp-30 tablet, R-5Adjust Sig      ondansetron (ZOFRAN) 4 MG tablet Take 4 mg by mouth every 8 hours as needed for Nausea or VomitingHistorical Med      aspirin 81 MG tablet Take 81 mg by mouth dailyHistorical Med      Multiple Vitamins-Minerals (CEROVITE ADVANCED FORMULA PO) Take 1 tablet by mouth dailyHistorical Med      b complex vitamins tablet TAKE ONE TABLET BY MOUTH DAILY, Disp-30 tablet, R-5Normal      potassium citrate (UROCIT-K) 10 MEQ (1080 MG) extended release tablet Take 1 tablet by mouth daily, Disp-30 tablet, R-5Normal      atenolol (TENORMIN) 25 MG tablet Take 1 tablet by mouth daily, Disp-30 tablet, R-5Normal      budesonide-formoterol (SYMBICORT) 160-4.5 MCG/ACT AERO Inhale 2 puffs into the lungs 2 times daily, Disp-1 Inhaler, R-5Normal      furosemide (LASIX) 20 MG tablet Take 1 tablet by mouth daily as needed, Disp-20 tablet, R-0Normal      CREON 08597 units CPEP Take 36,000 Units by mouth 4 times daily, R-3, DAWHistorical Med      melatonin 3 MG TABS tablet Take 3 mg by mouth nightly as needed Historical Med      diltiazem (CARDIZEM CD) 240 MG extended release capsule Take 240 mg by mouth dailyHistorical Med      !! albuterol (PROVENTIL) (2.5 MG/3ML) 0.083% nebulizer solution Take 2.5 mg by nebulization as needed for Wheezing       !! - Potential duplicate medications found. Please discuss with provider.           ALLERGIES     Sulfa antibiotics    FAMILY HISTORY       Family History   Problem Relation Age of Onset    Diabetes Maternal Grandfather     Asthma Neg Hx     Emphysema Neg Hx     Heart Failure Neg Hx     Hypertension Neg Hx     Sleep Apnea Neg Hx           SOCIAL HISTORY       Social History     Socioeconomic History    Marital status: Single     Spouse name: None    Number of children: None    Years of education: None    Highest education level: None   Occupational History    None   Social Needs    Financial resource strain: None    Food insecurity     Worry: None     Inability: None    Transportation needs     Medical: None     Non-medical: None   Tobacco Use    Smoking status: Current Every Day Smoker     Packs/day: 1.00     Years: 25.00     Pack years: 25.00     Types: Cigarettes     Last attempt to quit: 2020     Years since quittin.3    Smokeless tobacco: Never Used    Tobacco comment: restarted smoking 3 wks ago 20   Substance and Sexual Activity    Alcohol use: Yes     Comment: 4 beers 2-3 times a week    Drug use: Yes     Types: Marijuana     Comment: occ    Sexual activity: Yes     Partners: Male   Lifestyle    Physical activity     Days per week: None     Minutes per session: None    Stress: None   Relationships    Social connections     Talks on phone: None     Gets together: None     Attends Baptism service: None     Active member of club or organization: None     Attends meetings of clubs or organizations: None     Relationship status: None    Intimate partner violence     Fear of current or ex partner: None     Emotionally abused: None     Physically abused: None     Forced sexual activity: None   Other Topics Concern  None   Social History Narrative    11/2019 lives with sign other;disability -waiting on it;       SCREENINGS    [place] Coma Scale  Eye Opening: Spontaneous  Best Verbal Response: Oriented  Best Motor Response: Obeys commands  [place] Coma Scale Score: 15        PHYSICAL EXAM    (up to 7 for level 4, 8 ormore for level 5)     ED Triage Vitals [09/21/20 1527]   BP Temp Temp Source Pulse Resp SpO2 Height Weight   128/71 98.4 °F (36.9 °C) Oral 77 16 99 % 5' 1\" (1.549 m) 106 lb (48.1 kg)       Physical Exam  Vitals signs and nursing note reviewed. Constitutional:       General: She is not in acute distress. Appearance: She is well-developed. She is not ill-appearing, toxic-appearing or diaphoretic. Comments: Well-appearing, nontoxic, not in acute distress. Answers questions in full sentences and following verbal commands appropriately   HENT:      Head: Normocephalic and atraumatic. Eyes:      General:         Right eye: No discharge. Left eye: No discharge. Conjunctiva/sclera: Conjunctivae normal.   Neck:      Musculoskeletal: Normal range of motion and neck supple. Pulmonary:      Effort: Pulmonary effort is normal. No respiratory distress. Musculoskeletal: Normal range of motion. Left wrist: She exhibits tenderness. She exhibits normal range of motion, no swelling and no effusion. Hands:    Skin:     Coloration: Skin is not pale. Neurological:      Mental Status: She is alert and oriented to person, place, and time. Psychiatric:         Behavior: Behavior normal. Behavior is cooperative.                      DIAGNOSTIC RESULTS     EKG: All EKG's are interpreted by the Emergency Department Physicianwho either signs or Co-signs this chart in the absence of a cardiologist.      RADIOLOGY:   Non-plain film images such as CT, Ultrasound and MRI are read by the radiologist. Plain radiographic images are visualized and preliminarily interpreted by the emergency physician with the below findings:      Interpretation per the Radiologist below, if available at the time of this note:    XR WRIST LEFT (MIN 3 VIEWS)   Final Result   Normal wrist radiographs               ED BEDSIDE ULTRASOUND:   Performed by ED Physician - none    LABS:  Labs Reviewed - No data to display    All other labs were within normal range ornot returned as of this dictation. EMERGENCY DEPARTMENT COURSE and DIFFERENTIAL DIAGNOSIS/MDM:   Vitals:    Vitals:    09/21/20 1630 09/21/20 1740 09/21/20 1845 09/21/20 1945   BP: 125/71 123/79 129/69 120/66   Pulse: 75 72 77 75   Resp: 16 16 16 16   Temp:       TempSrc:       SpO2: 99% 98% 98% 99%   Weight:       Height:             MDM    ED COURSE/MDM    -Griselda Torres is a 53 y.o. female presents ED with swelling to her left hand that she noted after injury to it. Patient concerned that it may be an abscess. X-ray of left wrist shows no acute findings. Bedside ultrasound shows a collection of fluid unclear etiology.  -Though it does have characteristics of possible abscess and may also be a cyst or a hematoma. Dilia risks and benefits of I&D versus referral for surgery. Patient states that she would like to try I&D here in the ED.  -Clean the area with Hibiclens, made a stab incision initially had some clear fluid however soon after was able to express blood clot/hematoma. After expressing as much as I could, swelling flattened out. No further active bleed noted from the incision site.  -Steri-Strip was applied with pressure dressing. Educated patient on avoiding soaking the area, washing with soap and water and not applying alcohol or hydrogen peroxide. Informed patient that he can potentially accumulate again however as there is no acute bleeding noted and with the pressure dressing applied it is not very likely. However was given strict ED return precautions given for new/worsending symptoms.  Patient in agreement withplan, verbally confirm understanding and have no further questions/concerns. REASSESSMENT      Well appearing, non toxic, alert, oriented speaking in full sentences and hemodynamically stable upon discharge      CRITICAL CARE TIME   Total Critical Care time was 0 minutes, excluding separately reportableprocedures. There was a high probability of clinicallysignificant/life threatening deterioration in the patient's condition which required my urgent intervention. CONSULTS:  None    PROCEDURES:  Unless otherwise noted below, none     Incision/Drainage    Date/Time: 9/21/2020 9:14 PM  Performed by: Daniel Vazquez MD  Authorized by: Daniel Vazquez MD     Consent:     Consent obtained:  Verbal    Consent given by:  Patient    Risks discussed:  Incomplete drainage, bleeding and pain    Alternatives discussed:  No treatment  Location:     Type:  Hematoma    Size:  3    Location:  Upper extremity    Upper extremity location:  Hand    Hand location:  L hand  Pre-procedure details:     Skin preparation:  Hibiclens  Anesthesia (see MAR for exact dosages): Anesthesia method:  Local infiltration    Local anesthetic:  Bupivacaine 0.25% w/o epi  Procedure type:     Complexity:  Simple  Procedure details:     Incision types:  Stab incision    Incision depth:  Dermal    Scalpel blade:  11    Wound management:  Probed and deloculated    Drainage:  Bloody    Drainage amount: Moderate    Wound treatment:  Wound left open    Packing materials:  None  Post-procedure details:     Patient tolerance of procedure: Tolerated well, no immediate complications        FINAL IMPRESSION      1. Traumatic hematoma of left hand, initial encounter          DISPOSITION/PLAN   DISPOSITION Decision To Discharge 09/21/2020 07:42:49 PM      PATIENT REFERREDTO:  No follow-up provider specified.     DISCHARGE MEDICATIONS:  Discharge Medication List as of 9/21/2020  7:45 PM             (Please note that portions of this note were completed with a voice recognition program.  Efforts were made to edit the dictations but occasionally wordsare mis-transcribed.)    Winnie Read MD (electronically signed)  Attending Emergency Physician            Winnie Read MD  09/21/20 2676

## 2020-09-22 ENCOUNTER — CARE COORDINATION (OUTPATIENT)
Dept: FAMILY MEDICINE CLINIC | Age: 50
End: 2020-09-22

## 2020-09-23 NOTE — CARE COORDINATION
Patient contacted regarding recent discharge and COVID-19 risk. Discussed COVID-19 related testing which was not done at this time. Test results were not done. Patient informed of results, if available? [facility] Transition Nurse/ Ambulatory Care Manager contacted the patient by telephone to perform post discharge assessment. Verified name and  with patient as identifiers. Patient has following risk factors of: COPD. CTN/ACM reviewed discharge instructions, medical action plan and red flags related to discharge diagnosis. Reviewed and educated them on any new and changed medications related to discharge diagnosis. RN was able to educate on signs and symptoms of wound infection and to keep her wound clean and dry using mild soap and water, but not emersing in a bath or pool of water until healed. Patient reports she has an appointment to see her PCP tomorrow. Education attempted regarding infection prevention, and signs and symptoms of COVID-19 and when to seek medical attention with patient who thanked me for the call, then said \"goodbye\" and hung up     Plan for follow-up call in 7-14 days based on severity of symptoms and risk factors.     Mike Blanca RN, MSN  Ambulatory Care Manager  [phone]

## 2020-10-06 ENCOUNTER — CARE COORDINATION (OUTPATIENT)
Dept: FAMILY MEDICINE CLINIC | Age: 50
End: 2020-10-06

## 2020-10-06 NOTE — CARE COORDINATION
Placed call to patient for 14 day follow up on recent ED visit. HIPPA compliant message left with RN contact information.     Raymon Godwin RN, MSN  Ambulatory Care Manager  [phone]

## 2020-10-08 NOTE — CARE COORDINATION
You Patient resolved from the Care Transitions episode on 9/22/20  Discussed COVID-19 related testing which was not done at this time. Test results were not done. Patient informed of results, if available? NA              Patient currently reports that the following symptoms have improved:  Hand swelling and no new/worsening symptoms     Patient reports that she has followed up with her PCP who ordered antibiotics. She reports her hand still \"as a bump\", but the redness and swelling have \"gone down\"  Declines further CC, but expressed appreciation for the follow up call     No further outreach scheduled with this CTN/ACM. Episode of Care resolved. Patient has this CTN/ACM contact information if future needs arise.     Alexis Franks RN, MSN  Ambulatory Care Manager  [phone]

## 2021-01-06 ENCOUNTER — VIRTUAL VISIT (OUTPATIENT)
Dept: PULMONOLOGY | Age: 51
End: 2021-01-06
Payer: MEDICARE

## 2021-01-06 DIAGNOSIS — J44.9 CHRONIC OBSTRUCTIVE PULMONARY DISEASE, UNSPECIFIED COPD TYPE (HCC): Primary | ICD-10-CM

## 2021-01-06 DIAGNOSIS — F17.210 CIGARETTE SMOKER: ICD-10-CM

## 2021-01-06 PROCEDURE — 99213 OFFICE O/P EST LOW 20 MIN: CPT | Performed by: INTERNAL MEDICINE

## 2021-01-06 NOTE — PROGRESS NOTES
10 MEQ (1080 MG) extended release tablet, Take 1 tablet by mouth daily, Disp: 30 tablet, Rfl: 5    atenolol (TENORMIN) 25 MG tablet, Take 1 tablet by mouth daily, Disp: 30 tablet, Rfl: 5    budesonide-formoterol (SYMBICORT) 160-4.5 MCG/ACT AERO, Inhale 2 puffs into the lungs 2 times daily, Disp: 1 Inhaler, Rfl: 5    furosemide (LASIX) 20 MG tablet, Take 1 tablet by mouth daily as needed, Disp: 20 tablet, Rfl: 0    CREON 79080 units CPEP, Take 36,000 Units by mouth 4 times daily, Disp: , Rfl: 3    melatonin 3 MG TABS tablet, Take 3 mg by mouth nightly as needed , Disp: , Rfl:     diltiazem (CARDIZEM CD) 240 MG extended release capsule, Take 240 mg by mouth daily, Disp: , Rfl:     varenicline (CHANTIX CONTINUING MONTH PAK) 1 MG tablet, Take 1 tablet by mouth 2 times daily (Patient not taking: Reported on 1/6/2021), Disp: 60 tablet, Rfl: 3    varenicline (CHANTIX STARTING MONTH PAK) 0.5 MG X 11 & 1 MG X 42 tablet, Take by mouth. (Patient not taking: Reported on 1/6/2021), Disp: 1 box, Rfl: 0    albuterol (PROVENTIL) (2.5 MG/3ML) 0.083% nebulizer solution, Take 2.5 mg by nebulization as needed for Wheezing, Disp: , Rfl:     Objective:   PHYSICAL EXAM: LMP 07/12/2015    Tele    LABS:  Reviewed any pertinent new labs that are available. PFTs 9/2/20  FVC  (74%) FEV1 1.16 (46%) FEV1/FVC ratio 0.50   TLC  (118%)  RV  (202%)   DLCO (53%) Bronchodilator response:    IMAGING:  I personally reviewed and interpreted the following today in the office:   CXR:      Chest CT:    Assessment:   · Severe COPD  · Tobacco abuse  · pAfib  · H/o PE 2018 associated with hospital stay for pneumonia  · Chronic etoh abuse and chronic pancreatitis    Plan:   · Continue Spiriva and Symbicort  · PRN Albuterol INH/Nebs  · We discussed smoking cessation for >3 minutes. We discussed the association of smoking with the patient's current symptoms and the risks of continued use and the options for achieving cessation.  The patient was advised

## 2021-03-03 ENCOUNTER — TELEPHONE (OUTPATIENT)
Dept: PULMONOLOGY | Age: 51
End: 2021-03-03

## 2021-03-03 NOTE — TELEPHONE ENCOUNTER
Received lenny and Ct scan from recent stay in rehab, they are requesting appt for f/u/. Scans and consult note scanned into chart. Please advise when to [facility] for f/u?    1/6/21    Assessment:   · Severe COPD  · Tobacco abuse  · pAfib  · H/o PE 2018 associated with hospital stay for pneumonia  · Chronic etoh abuse and chronic pancreatitis     Plan:   · Continue Spiriva and Symbicort  · PRN Albuterol INH/Nebs  · We discussed smoking cessation for >3 minutes. We discussed the association of smoking with the patient's current symptoms and the risks of continued use and the options for achieving cessation. The patient was advised to set a quit date and alert family members and remove smoking paraphernalia before the quit date. She currently is in the contemplative phase of quitting and will continue to consider specific actions. Depression controlled. Denies HI. Chantix caused bad dreams but she plans to retry.   · F/u in 6months

## 2021-03-09 NOTE — TELEPHONE ENCOUNTER
No # to call to [facility] appt will wait for call back or try reaching patient once d/c. [place]

## 2021-03-11 RX ORDER — TIOTROPIUM BROMIDE INHALATION SPRAY 3.12 UG/1
SPRAY, METERED RESPIRATORY (INHALATION)
Qty: 1 INHALER | Refills: 5 | Status: SHIPPED | OUTPATIENT
Start: 2021-03-11 | End: 2022-02-07

## 2021-06-23 RX ORDER — ALBUTEROL SULFATE 90 UG/1
2 AEROSOL, METERED RESPIRATORY (INHALATION) EVERY 6 HOURS PRN
Qty: 18 G | Refills: 5 | Status: SHIPPED | OUTPATIENT
Start: 2021-06-23 | End: 2022-01-12 | Stop reason: SDUPTHER

## 2021-06-23 RX ORDER — BUDESONIDE AND FORMOTEROL FUMARATE DIHYDRATE 160; 4.5 UG/1; UG/1
2 AEROSOL RESPIRATORY (INHALATION) 2 TIMES DAILY
Qty: 10.2 G | Refills: 5 | Status: SHIPPED | OUTPATIENT
Start: 2021-06-23 | End: 2022-01-12

## 2021-07-08 ENCOUNTER — OFFICE VISIT (OUTPATIENT)
Dept: PULMONOLOGY | Age: 51
End: 2021-07-08
Payer: MEDICAID

## 2021-07-08 VITALS
SYSTOLIC BLOOD PRESSURE: 111 MMHG | RESPIRATION RATE: 18 BRPM | TEMPERATURE: 98.5 F | HEART RATE: 89 BPM | BODY MASS INDEX: 16.06 KG/M2 | OXYGEN SATURATION: 90 % | WEIGHT: 85 LBS | DIASTOLIC BLOOD PRESSURE: 73 MMHG

## 2021-07-08 DIAGNOSIS — F17.210 CIGARETTE SMOKER: ICD-10-CM

## 2021-07-08 DIAGNOSIS — J44.9 CHRONIC OBSTRUCTIVE PULMONARY DISEASE, UNSPECIFIED COPD TYPE (HCC): Primary | ICD-10-CM

## 2021-07-08 PROCEDURE — 99214 OFFICE O/P EST MOD 30 MIN: CPT | Performed by: INTERNAL MEDICINE

## 2021-07-08 RX ORDER — ALBUTEROL SULFATE 2.5 MG/3ML
2.5 SOLUTION RESPIRATORY (INHALATION) EVERY 6 HOURS PRN
Qty: 120 EACH | Refills: 5 | Status: SHIPPED | OUTPATIENT
Start: 2021-07-08

## 2021-07-08 ASSESSMENT — SLEEP AND FATIGUE QUESTIONNAIRES
HOW LIKELY ARE YOU TO NOD OFF OR FALL ASLEEP WHILE SITTING INACTIVE IN A PUBLIC PLACE: 0
HOW LIKELY ARE YOU TO NOD OFF OR FALL ASLEEP IN A CAR, WHILE STOPPED FOR A FEW MINUTES IN TRAFFIC: 0
HOW LIKELY ARE YOU TO NOD OFF OR FALL ASLEEP WHILE WATCHING TV: 1
HOW LIKELY ARE YOU TO NOD OFF OR FALL ASLEEP WHEN YOU ARE A PASSENGER IN A CAR FOR AN HOUR WITHOUT A BREAK: 0
HOW LIKELY ARE YOU TO NOD OFF OR FALL ASLEEP WHILE SITTING QUIETLY AFTER LUNCH WITHOUT ALCOHOL: 1
ESS TOTAL SCORE: 4
HOW LIKELY ARE YOU TO NOD OFF OR FALL ASLEEP WHILE SITTING AND READING: 1
HOW LIKELY ARE YOU TO NOD OFF OR FALL ASLEEP WHILE SITTING AND TALKING TO SOMEONE: 0
HOW LIKELY ARE YOU TO NOD OFF OR FALL ASLEEP WHILE LYING DOWN TO REST IN THE AFTERNOON WHEN CIRCUMSTANCES PERMIT: 1

## 2022-01-12 RX ORDER — BUDESONIDE AND FORMOTEROL FUMARATE DIHYDRATE 160; 4.5 UG/1; UG/1
AEROSOL RESPIRATORY (INHALATION)
Qty: 10.2 G | Refills: 5 | Status: SHIPPED | OUTPATIENT
Start: 2022-01-12

## 2022-01-12 RX ORDER — ALBUTEROL SULFATE 90 UG/1
2 AEROSOL, METERED RESPIRATORY (INHALATION) EVERY 6 HOURS PRN
Qty: 18 G | Refills: 5 | Status: SHIPPED | OUTPATIENT
Start: 2022-01-12

## 2022-01-21 ENCOUNTER — TELEPHONE (OUTPATIENT)
Dept: PULMONOLOGY | Age: 52
End: 2022-01-21

## 2022-01-21 ENCOUNTER — VIRTUAL VISIT (OUTPATIENT)
Dept: PULMONOLOGY | Age: 52
End: 2022-01-21
Payer: MEDICAID

## 2022-01-21 DIAGNOSIS — F17.210 CIGARETTE SMOKER: ICD-10-CM

## 2022-01-21 DIAGNOSIS — J44.9 CHRONIC OBSTRUCTIVE PULMONARY DISEASE, UNSPECIFIED COPD TYPE (HCC): Primary | ICD-10-CM

## 2022-01-21 PROCEDURE — 99406 BEHAV CHNG SMOKING 3-10 MIN: CPT | Performed by: INTERNAL MEDICINE

## 2022-01-21 PROCEDURE — 99214 OFFICE O/P EST MOD 30 MIN: CPT | Performed by: INTERNAL MEDICINE

## 2022-01-21 NOTE — TELEPHONE ENCOUNTER
Within this Telehealth Consent, the terms you and yours refer to the person using the Telehealth Service (Service), or in the case of a use of the Service by or on behalf of a minor, you and yours refer to and include (i) the parent or legal guardian who provides consent to the use of the Service by such minor or uses the Service on behalf of such minor, and (ii) the minor for whom consent is being provided or on whose behalf the Service is being utilized. When using Service, you will be consulting with your health care providers via the use of Telehealth.   Telehealth involves the delivery of healthcare services using electronic communications, information technology or other means between a healthcare provider and a patient who are not in the same physical location. Telehealth may be used for diagnosis, treatment, follow-up and/or patient education, and may include, but is not limited to, one or more of the following:    Electronic transmission of medical records, photo images, personal health information or other data between a patient and a healthcare provider    Interactions between a patient and healthcare provider via audio, video and/or data communications    Use of output data from medical devices, sound and video files    Anticipated Benefits   The use of Telehealth by your Provider(s) through the Service may have the following possible benefits:    Making it easier and more efficient for you to access medical care and treatment for the conditions treated by such Provider(s) utilizing the Service    Allowing you to obtain medical care and treatment by Provider(s) at times that are convenient for you    Enabling you to interact with Provider(s) without the necessity of an in-office appointment     Possible Risks   While the use of Telehealth can provide potential benefits for you, there are also potential risks associated with the use of Telehealth.  These risks include, but may not be limited to the following:    Your Provider(s) may not able to provide medical treatment for your particular condition and you may be required to seek alternative healthcare or emergency care services.  The electronic systems or other security protocols or safeguards used in the Service could fail, causing a breach of privacy of your medical or other information.  Given regulatory requirements in certain jurisdictions, your Provider(s) diagnosis and/or treatment options, especially pertaining to certain prescriptions, may be limited. Acceptance   1. You understand that Services will be provided via Telehealth. This process involves the use of HIPAA compliant and secure, real-time audio-visual interfacing with a qualified and appropriately trained provider located at [facility]. 2. You understand that, under no circumstances, will this session be recorded. 3. You understand that the Provider(s) at [facility] and other clinical participants will be party to the information obtained during the Telehealth session in accordance with best medical practices. 4. You understand that the information obtained during the Telehealth session will be used to help determine the most appropriate treatment options. 5. You understand that You have the right to revoke this consent at any point in time. 6. You understand that Telehealth is voluntary, and that continued treatment is not dependent upon consent. 7. You understand that, in the event of non-consent to Telehealth services and/or technical difficulties, you will obtain services as typically provided in the absence of Telehealth technology. 8. You understand that this consent will be kept in Your medical record. 9. No potential benefits from the use of Telehealth or specific results can be guaranteed. Your condition may not be cured or improved and, in some cases, may get worse.    10. There are limitations in the provision of medical care and treatment via Telehealth and the Service and you may not be able to receive diagnosis and/or treatment through the Service for every condition for which you seek diagnosis and/or treatment. 11. There are potential risks to the use of Telehealth, including but not limited to the risks described in this Telehealth Consent. 12. Your Provider(s) have discussed the use of Telehealth and the Service with you, including the benefits and risks of such and you have provided oral consent to your Provider(s) for the use of Telehealth and the Service. 15. You understand that it is your duty to provide your Provider(s) truthful, accurate and complete information, including all relevant information regarding care that you may have received or may be receiving from other healthcare providers outside of the Service. 14. You understand that each of your Provider(s) may determine in his or sole discretion that your condition is not suitable for diagnosis and/or treatment using the Service, and that you may need to seek medical care and treatment a specialist or other healthcare provider, outside of the Service. 15. You understand that you are fully responsible for payment for all services provided by Provider(s) or through use of the Service and that you may not be able to use third-party insurance. 16. You represent that (a) you have read this Telehealth Consent carefully, (b) you understand the risks and benefits of the Service and the use of Telehealth in the medical care and treatment provided to you by Provider(s) using the Service, and (c) you have the legal capacity and authority to provide this consent for yourself and/or the minor for which you are consenting under applicable federal and state laws, including laws relating to the age of [de-identified] and/or parental/guardian consent.    17. You give your informed consent to the use of Telehealth by Provider(s) using the Service under the terms described in the Terms of Service and this Telehealth Consent. The patient was read the following statement and has consented to the visit as of 1/21/22. The patient has been scheduled for their first telehealth visit on 1/21/22 with Dr. Darcie Tirado.

## 2022-01-21 NOTE — PROGRESS NOTES
Ester Clancy is a 49 y.o. female evaluated via telephone on 1/21/2022. She and/or health care decision maker is aware that that she may receive a bill for this telephone service, which includes applicable co-pays, depending on her insurance coverage, and has provided verbal consent to proceed. I affirm this is a Patient Initiated Episode with a Patient who has not had a related appointment within my department in the past 7 days or scheduled within the next 24 hours. Patient identification was verified at the start of the visit: Yes Total Time: minutes: 21-30 minutes Bonny Serrano, was evaluated through a synchronous (real-time) audio-video encounter. The patient (or guardian if applicable) is aware that this is a billable service, which includes applicable co-pays. This Virtual Visit was conducted with patient's (and/or legal guardian's) consent. The visit was conducted pursuant to the emergency declaration under the [address] [place] [address], [address] [place] [address] authority and the [employer] Act. Patient identification was verified, and a caregiver was present when appropriate. The patient was located at home in a state where the provider was licensed to provide care. [facility]    Outpatient Follow Up Note    Chief Complaint: COPD  Consulting provider: No ref. provider found    Interval History: 49 y.o. female c/o sore throat and dry mouth. C/o nasal congestion. Dry cough. SOB is worse than normal. Still smoking. Using her rescue inhaler 4x/day.  Still smoking    Current Outpatient Medications:     SYMBICORT 160-4.5 MCG/ACT AERO, INHALE TWO PUFFS BY MOUTH TWICE A DAY, Disp: 10.2 g, Rfl: 5    albuterol sulfate HFA (PROAIR HFA) 108 (90 Base) MCG/ACT inhaler, Inhale 2 puffs into the lungs every 6 hours as needed for Wheezing or Shortness of Breath, Disp: 18 g, Rfl: 5    guaiFENesin (MUCINEX) 600 MG extended release tablet, Take 1 tablet by mouth 2 times daily, Disp: 60 tablet, Rfl: 6    albuterol (PROVENTIL) (2.5 MG/3ML) 0.083% nebulizer solution, Take 3 mLs by nebulization every 6 hours as needed for Wheezing, Disp: 120 each, Rfl: 5    SPIRIVA RESPIMAT 2.5 MCG/ACT AERS inhaler, INHALE TWO PUFFS BY MOUTH DAILY, Disp: 1 Inhaler, Rfl: 5    atorvastatin (LIPITOR) 10 MG tablet, TAKE ONE TABLET BY MOUTH DAILY, Disp: 30 tablet, Rfl: 2    omeprazole (PRILOSEC) 40 MG delayed release capsule, Take 40 mg by mouth daily, Disp: , Rfl:     DULoxetine (CYMBALTA) 60 MG extended release capsule, Take 1 capsule by mouth daily, Disp: 30 capsule, Rfl: 2    sertraline (ZOLOFT) 50 MG tablet, Take 1 tablet by mouth daily, Disp: 30 tablet, Rfl: 5    ondansetron (ZOFRAN) 4 MG tablet, Take 4 mg by mouth every 8 hours as needed for Nausea or Vomiting, Disp: , Rfl:     aspirin 81 MG tablet, Take 81 mg by mouth daily, Disp: , Rfl:     Multiple Vitamins-Minerals (CEROVITE ADVANCED FORMULA PO), Take 1 tablet by mouth daily, Disp: , Rfl:     potassium citrate (UROCIT-K) 10 MEQ (1080 MG) extended release tablet, Take 1 tablet by mouth daily, Disp: 30 tablet, Rfl: 5    atenolol (TENORMIN) 25 MG tablet, Take 1 tablet by mouth daily, Disp: 30 tablet, Rfl: 5    furosemide (LASIX) 20 MG tablet, Take 1 tablet by mouth daily as needed, Disp: 20 tablet, Rfl: 0    CREON 44978 units CPEP, Take 36,000 Units by mouth 4 times daily, Disp: , Rfl: 3    melatonin 3 MG TABS tablet, Take 3 mg by mouth nightly as needed , Disp: , Rfl:     diltiazem (CARDIZEM CD) 240 MG extended release capsule, Take 240 mg by mouth daily, Disp: , Rfl:     albuterol (PROVENTIL) (2.5 MG/3ML) 0.083% nebulizer solution, Take 2.5 mg by nebulization as needed for Wheezing, Disp: , Rfl:     varenicline (CHANTIX CONTINUING MONTH BLANCA) 1 MG tablet, Take 1 tablet by mouth 2 times daily (Patient not taking: Reported on 7/8/2021), Disp: 60 tablet, Rfl: 3    varenicline (CHANTIX STARTING MONTH BLANCA) 0.5 MG X 11 & 1 MG X 42 tablet, Take by mouth. (Patient not taking: Reported on 1/6/2021), Disp: 1 box, Rfl: 0    b complex vitamins tablet, TAKE ONE TABLET BY MOUTH DAILY (Patient not taking: Reported on 7/8/2021), Disp: 30 tablet, Rfl: 5    Objective:   PHYSICAL EXAM:   Tele    LABS:  Reviewed any pertinent new labs that are available. PFTs 9/2/20  FVC  (74%) FEV1 1.16 (46%) FEV1/FVC ratio 0.50   TLC  (118%)  RV  (202%)   DLCO (53%) Bronchodilator response:    IMAGING:  I personally reviewed and interpreted the following today in the office:   CXR:      Chest CT:    Assessment:   · Severe COPD  · Tobacco abuse  · pAfib  · H/o PE 2018 associated with hospital stay for pneumonia  · Chronic etoh abuse and chronic pancreatitis    Plan:   · Recommend covid and flu test. Call or go to urgent care if progressive symptoms. · Continue Spiriva and Symbicort  · PRN Mucinex  · PRN Albuterol INH/Nebs  · We discussed smoking cessation for >3 minutes. We discussed the association of smoking with the patient's current symptoms and the risks of continued use and the options for achieving cessation. The patient was advised to set a quit date and alert family members and remove smoking paraphernalia before the quit date. She currently is in the contemplative phase of quitting and will continue to consider specific actions. Depression controlled. Denies HI. Chantix caused bad dreams and GI upset.   · R/s 6MWT and call with results  · F/u in August

## 2022-02-07 DIAGNOSIS — J44.9 CHRONIC OBSTRUCTIVE PULMONARY DISEASE, UNSPECIFIED COPD TYPE (HCC): Primary | ICD-10-CM

## 2022-02-07 RX ORDER — TIOTROPIUM BROMIDE INHALATION SPRAY 3.12 UG/1
SPRAY, METERED RESPIRATORY (INHALATION)
Qty: 4 G | Refills: 5 | Status: SHIPPED | OUTPATIENT
Start: 2022-02-07 | End: 2022-08-18

## 2022-03-14 ENCOUNTER — HOSPITAL ENCOUNTER (OUTPATIENT)
Dept: PULMONOLOGY | Age: 52
Discharge: HOME | End: 2022-03-14
Payer: MEDICAID

## 2022-03-14 DIAGNOSIS — J44.9 CHRONIC OBSTRUCTIVE PULMONARY DISEASE, UNSPECIFIED COPD TYPE (HCC): ICD-10-CM

## 2022-03-14 PROCEDURE — 94618 PULMONARY STRESS TESTING: CPT

## 2022-03-16 NOTE — PROCEDURES
[address]                 [address] [address]                               PULMONARY FUNCTION    PATIENT NAME: VENUS Muir                      :        1970  MED REC NO:   [id number]                          ROOM:  ACCOUNT NO:   [de-identified]                           ADMIT DATE: 2022  PROVIDER:     Tung Childs MD    DATE OF PROCEDURE:  2022    ATTENDING PROVIDER:  Tung Childs MD    INDICATION:  COPD. FINDINGS:  A six-minute walk test was conducted per [facility]  protocol. The patient walked 1300 feet on room air. The resting SpO2  on room air was 93%. The lowest recorded SpO2 while walking was 90%. Heart rate at rest was 83 and the heart rate maximum was 101. The  patient does not require supplemental oxygen.         Heavenly Fuentes MD    D: 03/15/2022 16:13:18       T: 03/15/2022 22:35:53     [email or web address]  Job#: [id number]     Doc#: [id number]    CC:

## 2022-08-17 DIAGNOSIS — J44.9 CHRONIC OBSTRUCTIVE PULMONARY DISEASE, UNSPECIFIED COPD TYPE (HCC): ICD-10-CM

## 2022-08-18 RX ORDER — TIOTROPIUM BROMIDE INHALATION SPRAY 3.12 UG/1
SPRAY, METERED RESPIRATORY (INHALATION)
Qty: 1 EACH | Refills: 5 | Status: SHIPPED | OUTPATIENT
Start: 2022-08-18

## 2022-08-30 ENCOUNTER — TELEPHONE (OUTPATIENT)
Dept: PULMONOLOGY | Age: 52
End: 2022-08-30

## 2022-08-30 ENCOUNTER — SCHEDULED TELEPHONE ENCOUNTER (OUTPATIENT)
Dept: PULMONOLOGY | Age: 52
End: 2022-08-30
Payer: MEDICAID

## 2022-08-30 DIAGNOSIS — J44.9 CHRONIC OBSTRUCTIVE PULMONARY DISEASE, UNSPECIFIED COPD TYPE (HCC): Primary | ICD-10-CM

## 2022-08-30 DIAGNOSIS — J44.1 CHRONIC OBSTRUCTIVE PULMONARY DISEASE WITH ACUTE EXACERBATION (HCC): ICD-10-CM

## 2022-08-30 PROCEDURE — 99214 OFFICE O/P EST MOD 30 MIN: CPT | Performed by: INTERNAL MEDICINE

## 2022-08-30 RX ORDER — HYDROXYZINE PAMOATE 50 MG/1
50 CAPSULE ORAL 3 TIMES DAILY PRN
COMMUNITY

## 2022-08-30 RX ORDER — GABAPENTIN 300 MG/1
300 CAPSULE ORAL 3 TIMES DAILY
COMMUNITY

## 2022-08-30 RX ORDER — METHOCARBAMOL 500 MG/1
500 TABLET, FILM COATED ORAL 4 TIMES DAILY
COMMUNITY

## 2022-08-30 NOTE — PROGRESS NOTES
P  [facility]                                               Pulmonary Clinic Consult     I had the pleasure of seeing  Meli Lee     Chief Complaint   Patient presents with    Follow-up     7mo copd        HISTORY OF PRESENT ILLNESS:    Meli Lee is a 47y.o. year old  Who start smoking at age 16 And increase gradually 1 pack daily nad has about 35-40 PPY smoking history       The Patient comes in with SOB that has been going on for the last few years and she had PE /DVT on 2018 and she is off anticoagulation      Associated with cough ,she  states that it get worse with exercise or walking long distance and he can walk 1 block go less than flight of stairs before get short winded    She use Spiriva and Symbicort and Proair     She was admitted twice or 3 times to pulmonary rehab           ALLERGIES:    Allergies   Allergen Reactions    Sulfa Antibiotics Swelling and Hives       PAST MEDICAL HISTORY:       Diagnosis Date    A-fib ([facility] [address]     Anemia     Anxiety     Chronic pancreatitis ([facility] [address]     Collapse of right lung     COPD (chronic obstructive pulmonary disease) (HCC)     COPD (chronic obstructive pulmonary disease) ([facility] [address]     Depression     GI bleed     H/O colonoscopy     Hypertension     Pancreatitis     Pulmonary embolism ([facility] [address] 02/2018       MEDICATIONS:   Current Outpatient Medications   Medication Sig Dispense Refill    gabapentin (NEURONTIN) 300 MG capsule Take 300 mg by mouth 3 times daily.       methocarbamol (ROBAXIN) 500 MG tablet Take 500 mg by mouth 4 times daily      hydrOXYzine pamoate (VISTARIL) 50 MG capsule Take 50 mg by mouth 3 times daily as needed for Itching      SPIRIVA RESPIMAT 2.5 MCG/ACT AERS inhaler INHALE TWO PUFFS BY MOUTH DAILY 1 each 5    SYMBICORT 160-4.5 MCG/ACT AERO INHALE TWO PUFFS BY MOUTH TWICE A DAY 10.2 g 5    albuterol sulfate HFA (PROAIR HFA) 108 (90 Base) MCG/ACT inhaler Inhale 2 puffs into the lungs every 6 hours as needed for Wheezing or Shortness of Breath 18 g 5    albuterol (PROVENTIL) (2.5 MG/3ML) 0.083% nebulizer solution Take 3 mLs by nebulization every 6 hours as needed for Wheezing 120 each 5    atorvastatin (LIPITOR) 10 MG tablet TAKE ONE TABLET BY MOUTH DAILY 30 tablet 2    omeprazole (PRILOSEC) 40 MG delayed release capsule Take 40 mg by mouth daily      DULoxetine (CYMBALTA) 60 MG extended release capsule Take 1 capsule by mouth daily 30 capsule 2    sertraline (ZOLOFT) 50 MG tablet Take 1 tablet by mouth daily 30 tablet 5    ondansetron (ZOFRAN) 4 MG tablet Take 4 mg by mouth every 8 hours as needed for Nausea or Vomiting      aspirin 81 MG tablet Take 81 mg by mouth daily      Multiple Vitamins-Minerals (CEROVITE ADVANCED FORMULA PO) Take 1 tablet by mouth daily      potassium citrate (UROCIT-K) 10 MEQ (1080 MG) extended release tablet Take 1 tablet by mouth daily 30 tablet 5    atenolol (TENORMIN) 25 MG tablet Take 1 tablet by mouth daily 30 tablet 5    furosemide (LASIX) 20 MG tablet Take 1 tablet by mouth daily as needed 20 tablet 0    CREON 62300 units CPEP Take 36,000 Units by mouth 4 times daily  3    diltiazem (CARDIZEM CD) 240 MG extended release capsule Take 240 mg by mouth daily      albuterol (PROVENTIL) (2.5 MG/3ML) 0.083% nebulizer solution Take 2.5 mg by nebulization as needed for Wheezing      varenicline (CHANTIX CONTINUING MONTH BLANCA) 1 MG tablet Take 1 tablet by mouth 2 times daily (Patient not taking: No sig reported) 60 tablet 3    varenicline (CHANTIX STARTING MONTH BLANCA) 0.5 MG X 11 & 1 MG X 42 tablet Take by mouth. (Patient not taking: No sig reported) 1 box 0    b complex vitamins tablet TAKE ONE TABLET BY MOUTH DAILY (Patient not taking: No sig reported) 30 tablet 5    melatonin 3 MG TABS tablet Take 3 mg by mouth nightly as needed  (Patient not taking: Reported on 8/30/2022)       No current facility-administered medications for this visit.        SOCIAL AND OCCUPATIONAL HEALTH:  Social History Tobacco Use   Smoking Status Every Day    Packs/day: 1.00    Years: 25.00    Pack years: 25.00    Types: Cigarettes    Last attempt to quit: 2020    Years since quittin.3   Smokeless Tobacco Never   Tobacco Comments    restarted smoking 3 wks ago 20     TB :no   Pets no   Industrial exposure:no   Birds :no     SURGICAL HISTORY:   Past Surgical History:   Procedure Laterality Date    UPPER GASTROINTESTINAL ENDOSCOPY         FAMILY HISTORY:   Lung cancer:no   DVT or PE +    REVIEW OF SYSTEMS:  Constitutional: General health is good . There has been no weight changes. No fevers, fatigue or weakness. Head: Patient denies any history of trauma, convulsive disorder or syncope. Skin:  Patient denies history of changes in pigmentation, eruptions or pruritus. No easy bruising or bleeding. EENT: no nasal congestion   Cardiovascular ,No chest pain ,No edema ,  Respiration:SOB:+  ,FRANCISCO :+  Gastrointestinal:No GI bleed ,no melena  ,no hematemesis    Musculoskeletal: no joint pain ,no swelling  Neurological:no , syncope. Denies twitching, convulsions, loss of consciousness or memory. Endocrine:  . No history of goiter, exophthalmos or dryness of skin. The patient has no history of diabetes. Hematopoietic:  No history of bleeding disorders or easy bruising. Rheumatic:  No connective tissue disease history or polyarthritis/inflammatory joint disease. PHYSICAL EXAMINATION:  There were no vitals filed for this visit. Constitutional: This is a well developed, well nourished 47y.o. year old female who is alert, oriented, cooperative and in no apparent distress. Head was normocephalic and atraumatic. EENT: Mallampati class :  Extraocular muscles intact. External canals are patent and no discharge was appreciated. Septum was midline,   mucosa was without erythema, exudates or cobblestoning. No thrush was noted. Neck: Supple without thyromegaly. No elevated JVP. Trachea was midline.  No however, inadvertent computerized transcription errors may be present.

## 2022-10-06 ENCOUNTER — HOSPITAL ENCOUNTER (OUTPATIENT)
Dept: PULMONOLOGY | Age: 52
Discharge: HOME | End: 2022-10-06
Payer: MEDICAID

## 2022-10-06 VITALS — OXYGEN SATURATION: 92 %

## 2022-10-06 DIAGNOSIS — J44.9 CHRONIC OBSTRUCTIVE PULMONARY DISEASE, UNSPECIFIED COPD TYPE (HCC): ICD-10-CM

## 2022-10-06 LAB
DLCO %PRED: 38 %
DLCO PRED: NORMAL
DLCO/VA %PRED: NORMAL
DLCO/VA PRED: NORMAL
DLCO/VA: NORMAL
DLCO: NORMAL
EXPIRATORY TIME-POST: NORMAL
EXPIRATORY TIME: NORMAL
FEF 25-75% %CHNG: NORMAL
FEF 25-75% %PRED-POST: NORMAL
FEF 25-75% %PRED-PRE: NORMAL
FEF 25-75% PRED: NORMAL
FEF 25-75%-POST: NORMAL
FEF 25-75%-PRE: NORMAL
FEV1 %PRED-POST: NORMAL %
FEV1 %PRED-PRE: 34 %
FEV1 PRED: NORMAL
FEV1-POST: NORMAL
FEV1-PRE: NORMAL
FEV1/FVC %PRED-POST: NORMAL
FEV1/FVC %PRED-PRE: NORMAL
FEV1/FVC PRED: NORMAL
FEV1/FVC-POST: NORMAL %
FEV1/FVC-PRE: 34 %
FVC %PRED-POST: NORMAL
FVC %PRED-PRE: NORMAL
FVC PRED: NORMAL
FVC-POST: NORMAL
FVC-PRE: NORMAL
GAW %PRED: NORMAL
GAW PRED: NORMAL
GAW: NORMAL
IC %PRED: NORMAL
IC PRED: NORMAL
IC: NORMAL
MEP: NORMAL
MIP: NORMAL
MVV %PRED-PRE: NORMAL
MVV PRED: NORMAL
MVV-PRE: NORMAL
PEF %PRED-POST: NORMAL
PEF %PRED-PRE: NORMAL
PEF PRED: NORMAL
PEF%CHNG: NORMAL
PEF-POST: NORMAL
PEF-PRE: NORMAL
RAW %PRED: NORMAL
RAW PRED: NORMAL
RAW: NORMAL
RV %PRED: NORMAL
RV PRED: NORMAL
RV: NORMAL
SVC %PRED: NORMAL
SVC PRED: NORMAL
SVC: NORMAL
TLC %PRED: 120 %
TLC PRED: NORMAL
TLC: NORMAL
VA %PRED: NORMAL
VA PRED: NORMAL
VA: NORMAL
VTG %PRED: NORMAL
VTG PRED: NORMAL
VTG: NORMAL

## 2022-10-06 PROCEDURE — 94640 AIRWAY INHALATION TREATMENT: CPT

## 2022-10-06 PROCEDURE — 6370000000 HC RX 637 (ALT 250 FOR IP): Performed by: INTERNAL MEDICINE

## 2022-10-06 PROCEDURE — 94729 DIFFUSING CAPACITY: CPT

## 2022-10-06 PROCEDURE — 94726 PLETHYSMOGRAPHY LUNG VOLUMES: CPT

## 2022-10-06 PROCEDURE — 94760 N-INVAS EAR/PLS OXIMETRY 1: CPT

## 2022-10-06 PROCEDURE — 94060 EVALUATION OF WHEEZING: CPT

## 2022-10-06 RX ORDER — ALBUTEROL SULFATE 90 UG/1
4 AEROSOL, METERED RESPIRATORY (INHALATION) ONCE
Status: DISCONTINUED | OUTPATIENT
Start: 2022-10-06 | End: 2022-10-06

## 2022-10-06 ASSESSMENT — PULMONARY FUNCTION TESTS
FEV1_PERCENT_PREDICTED_PRE: 34
FEV1/FVC_PRE: 34

## 2022-10-06 NOTE — PROCEDURES
[address]                 [address] [address]                               PULMONARY FUNCTION    PATIENT NAME: VENUS Stephenson                      :        1970  MED REC NO:   [id number]                          ROOM:  ACCOUNT NO:   [de-identified]                           ADMIT DATE: 10/06/2022  PROVIDER:     Andrew Mathews MD    DATE OF PROCEDURE:  10/06/2022    INDICATION:  COPD. FINDINGS:  1. Spirometry revealed evidence of severe obstructive defect. FEV1 is  0.86 liters, which is 34% of predicted. FEV1/FVC ratio of 34%. 2.  Lung volume revealed air trapping and hyperinflation. Total lung  capacity of 5.75 liters, which is 120% of predicted. Air trapping  residual volume of 3.32 liters, which is 205% of predicted. 3.  Diffusion capacity is severely decreased at 8.53, which is 38% of  predicted. 4.  Flow volume loops consistent with obstructive defect. 5. In comparison with the test done in 2020, there was significant  deterioration in diffusion capacity as well as FEV1. CONCLUSION:  1. Severe obstructive defect with air trapping hyperinflation and  severely decreased diffusion capacity. 2.  Worsening FEV1 and diffusion capacity in comparison with the test  done in 2020.         Gregor Jarrell MD    D: 10/06/2022 13:06:05       T: 10/06/2022 14:11:34     [email or web address]  Job#: [id number]     Doc#: [id number]    CC:

## 2022-11-10 ENCOUNTER — TELEPHONE (OUTPATIENT)
Dept: PULMONOLOGY | Age: 52
End: 2022-11-10

## 2022-11-10 NOTE — LETTER
Rebel Bowden MD        December 1, 2022    [address]  [address]      Dear Gera Cade:    I am writing you because I have been informed of your missed appointment(s) and your missed CT scan of the chest which was scheduled for 11/10/2022. We ask that you please call 1 business day in advance if you are unable to make your appointment so that we can give that time to another patient in need. We care about you and the management of your healthcare and want to make sure that you follow up as recommended. As your Pulmonologist I must stress to you how important it is for you to have the tests I order as well to see me in follow up. The tests are necessary so that I can monitor your pulmonary nodule for any changes that could be cancer. I have to also mention, that in an effort to provide quality care and timely appointments to all my patients, it is our policy that patients be discharged from the practice if they do not show for three scheduled appointments. You would be informed of this termination by mail, and would have 30 days from the date of discharge to locate another physician. We're sorry you were unable to keep your appointment and hope that you are doing well. We would like to continue treating your healthcare needs. Please call the office to let us know your plans for treatment and to reschedule your appointment.      Sincerely,        Rebel Bowden MD

## 2022-11-10 NOTE — TELEPHONE ENCOUNTER
Patient did not show for ct fu (ct not completed) appointment  with Dr. Ilana Us on 11/10/22    Same Day Cancellation: No    Patient rescheduled:  No    New appointment:     Patient was also no show on: na    LOV  08/30/22   IMPRESSION:    1-Severe COPD   2-mild Hypoxia   3-recurrent COPD exacerbation              PLAN:      -Patient with PFT showing severe   Will proceed with following work up  Will need CT scan r/o nodule/cancer  Will get follow up PFT  Will get 6 m walk test no hypoxia less than 90   Trelegy      Pulmonary rehab   Will check ABG

## 2022-12-08 NOTE — CARE COORDINATION
Placed call to patient to follow up on recent ED visit. HIPPA compliant message left with RN contact information.     Dexter Olivares RN, MSN  Ambulatory Care Manager  [phone]

## 2023-03-17 DIAGNOSIS — J44.9 CHRONIC OBSTRUCTIVE PULMONARY DISEASE, UNSPECIFIED COPD TYPE (HCC): ICD-10-CM

## 2023-03-17 NOTE — TELEPHONE ENCOUNTER
Pharmacy sent refill request for Spiriva. Pt had a refill prior to last visit and isn't currently taking Trelegy. Please refill if appropriate.

## 2024-01-02 ENCOUNTER — APPOINTMENT (OUTPATIENT)
Dept: GENERAL RADIOLOGY | Age: 54
End: 2024-01-02
Payer: MEDICAID

## 2024-01-02 ENCOUNTER — APPOINTMENT (OUTPATIENT)
Dept: CT IMAGING | Age: 54
End: 2024-01-02
Payer: MEDICAID

## 2024-01-02 ENCOUNTER — HOSPITAL ENCOUNTER (INPATIENT)
Age: 54
LOS: 3 days | Discharge: HOME | End: 2024-01-05
Attending: STUDENT IN AN ORGANIZED HEALTH CARE EDUCATION/TRAINING PROGRAM | Admitting: INTERNAL MEDICINE
Payer: MEDICAID

## 2024-01-02 DIAGNOSIS — R82.998 HYPOMAGNESURIA: ICD-10-CM

## 2024-01-02 DIAGNOSIS — E87.6 HYPOKALEMIA: ICD-10-CM

## 2024-01-02 DIAGNOSIS — R65.21 SEPTIC SHOCK (HCC): Primary | ICD-10-CM

## 2024-01-02 DIAGNOSIS — E87.1 HYPONATREMIA: ICD-10-CM

## 2024-01-02 DIAGNOSIS — A41.9 SEPTIC SHOCK (HCC): Primary | ICD-10-CM

## 2024-01-02 DIAGNOSIS — D64.9 ANEMIA, UNSPECIFIED TYPE: ICD-10-CM

## 2024-01-02 DIAGNOSIS — R94.31 PROLONGED Q-T INTERVAL ON ECG: ICD-10-CM

## 2024-01-02 DIAGNOSIS — R79.89 ELEVATED TROPONIN: ICD-10-CM

## 2024-01-02 PROBLEM — J44.1 COPD EXACERBATION (HCC): Status: RESOLVED | Noted: 2020-05-10 | Resolved: 2024-01-02

## 2024-01-02 LAB
ALBUMIN SERPL-MCNC: 2.5 G/DL (ref 3.4–5)
ALBUMIN/GLOB SERPL: 0.7 {RATIO} (ref 1.1–2.2)
ALP SERPL-CCNC: 178 U/L (ref 40–129)
ALT SERPL-CCNC: 35 U/L (ref 10–40)
AMPHETAMINES UR QL SCN>1000 NG/ML: NORMAL
ANION GAP SERPL CALCULATED.3IONS-SCNC: 12 MMOL/L (ref 3–16)
ANION GAP SERPL CALCULATED.3IONS-SCNC: 14 MMOL/L (ref 3–16)
ANION GAP SERPL CALCULATED.3IONS-SCNC: 8 MMOL/L (ref 3–16)
AST SERPL-CCNC: 73 U/L (ref 15–37)
BARBITURATES UR QL SCN>200 NG/ML: NORMAL
BASOPHILS # BLD: 0 K/UL (ref 0–0.2)
BASOPHILS NFR BLD: 0.3 %
BENZODIAZ UR QL SCN>200 NG/ML: NORMAL
BILIRUB SERPL-MCNC: 0.5 MG/DL (ref 0–1)
BILIRUB UR QL STRIP.AUTO: NEGATIVE
BUN SERPL-MCNC: 11 MG/DL (ref 7–20)
BUN SERPL-MCNC: 14 MG/DL (ref 7–20)
BUN SERPL-MCNC: 9 MG/DL (ref 7–20)
CALCIUM SERPL-MCNC: 7.1 MG/DL (ref 8.3–10.6)
CALCIUM SERPL-MCNC: 7.2 MG/DL (ref 8.3–10.6)
CALCIUM SERPL-MCNC: 8.2 MG/DL (ref 8.3–10.6)
CANNABINOIDS UR QL SCN>50 NG/ML: NORMAL
CHLORIDE SERPL-SCNC: 78 MMOL/L (ref 99–110)
CHLORIDE SERPL-SCNC: 86 MMOL/L (ref 99–110)
CHLORIDE SERPL-SCNC: 91 MMOL/L (ref 99–110)
CHLORIDE UR-SCNC: 30 MMOL/L
CLARITY UR: CLEAR
CO2 SERPL-SCNC: 23 MMOL/L (ref 21–32)
CO2 SERPL-SCNC: 23 MMOL/L (ref 21–32)
CO2 SERPL-SCNC: 24 MMOL/L (ref 21–32)
COCAINE UR QL SCN: NORMAL
COLOR UR: YELLOW
CREAT SERPL-MCNC: <0.5 MG/DL (ref 0.6–1.1)
DEPRECATED RDW RBC AUTO: 13.2 % (ref 12.4–15.4)
DRUG SCREEN COMMENT UR-IMP: NORMAL
EKG ATRIAL RATE: 74 BPM
EKG DIAGNOSIS: NORMAL
EKG P AXIS: 74 DEGREES
EKG P-R INTERVAL: 138 MS
EKG Q-T INTERVAL: 550 MS
EKG QRS DURATION: 86 MS
EKG QTC CALCULATION (BAZETT): 610 MS
EKG R AXIS: 89 DEGREES
EKG T AXIS: 78 DEGREES
EKG VENTRICULAR RATE: 74 BPM
EOSINOPHIL # BLD: 0.1 K/UL (ref 0–0.6)
EOSINOPHIL NFR BLD: 0.7 %
ETHANOLAMINE SERPL-MCNC: 51 MG/DL (ref 0–0.08)
FENTANYL SCREEN, URINE: NORMAL
FLUAV RNA UPPER RESP QL NAA+PROBE: NEGATIVE
FLUBV AG NPH QL: NEGATIVE
GFR SERPLBLD CREATININE-BSD FMLA CKD-EPI: >60 ML/MIN/{1.73_M2}
GLUCOSE BLD-MCNC: 99 MG/DL (ref 70–99)
GLUCOSE SERPL-MCNC: 100 MG/DL (ref 70–99)
GLUCOSE SERPL-MCNC: 102 MG/DL (ref 70–99)
GLUCOSE SERPL-MCNC: 95 MG/DL (ref 70–99)
GLUCOSE UR STRIP.AUTO-MCNC: NEGATIVE MG/DL
HCT VFR BLD AUTO: 32.2 % (ref 36–48)
HGB BLD-MCNC: 11.4 G/DL (ref 12–16)
HGB UR QL STRIP.AUTO: NEGATIVE
KETONES UR STRIP.AUTO-MCNC: ABNORMAL MG/DL
LACTATE BLDV-SCNC: 1 MMOL/L (ref 0.4–2)
LACTATE BLDV-SCNC: 1.6 MMOL/L (ref 0.4–1.9)
LACTATE BLDV-SCNC: 2.2 MMOL/L (ref 0.4–1.9)
LEUKOCYTE ESTERASE UR QL STRIP.AUTO: NEGATIVE
LYMPHOCYTES # BLD: 1.3 K/UL (ref 1–5.1)
LYMPHOCYTES NFR BLD: 16.7 %
MAGNESIUM SERPL-MCNC: 1.5 MG/DL (ref 1.8–2.4)
MAGNESIUM SERPL-MCNC: 2.1 MG/DL (ref 1.8–2.4)
MAGNESIUM SERPL-MCNC: 2.7 MG/DL (ref 1.8–2.4)
MCH RBC QN AUTO: 33.8 PG (ref 26–34)
MCHC RBC AUTO-ENTMCNC: 35.3 G/DL (ref 31–36)
MCV RBC AUTO: 95.6 FL (ref 80–100)
METHADONE UR QL SCN>300 NG/ML: NORMAL
MONOCYTES # BLD: 0.5 K/UL (ref 0–1.3)
MONOCYTES NFR BLD: 6.5 %
NEUTROPHILS # BLD: 5.8 K/UL (ref 1.7–7.7)
NEUTROPHILS NFR BLD: 75.8 %
NITRITE UR QL STRIP.AUTO: NEGATIVE
OPIATES UR QL SCN>300 NG/ML: NORMAL
OSMOLALITY UR: 224 MOSM/KG (ref 390–1070)
OXYCODONE UR QL SCN: NORMAL
PCP UR QL SCN>25 NG/ML: NORMAL
PERFORMED ON: NORMAL
PH UR STRIP.AUTO: 6.5 [PH] (ref 5–8)
PH UR STRIP: 6.5 [PH]
PLATELET # BLD AUTO: 279 K/UL (ref 135–450)
PMV BLD AUTO: 7.6 FL (ref 5–10.5)
POTASSIUM SERPL-SCNC: 2.3 MMOL/L (ref 3.5–5.1)
POTASSIUM SERPL-SCNC: 2.7 MMOL/L (ref 3.5–5.1)
POTASSIUM SERPL-SCNC: 3.1 MMOL/L (ref 3.5–5.1)
POTASSIUM UR-SCNC: 11.6 MMOL/L
PROT SERPL-MCNC: 5.9 G/DL (ref 6.4–8.2)
PROT UR STRIP.AUTO-MCNC: NEGATIVE MG/DL
RBC # BLD AUTO: 3.36 M/UL (ref 4–5.2)
SARS-COV-2 RDRP RESP QL NAA+PROBE: NOT DETECTED
SODIUM SERPL-SCNC: 116 MMOL/L (ref 136–145)
SODIUM SERPL-SCNC: 121 MMOL/L (ref 136–145)
SODIUM SERPL-SCNC: 122 MMOL/L (ref 136–145)
SODIUM UR-SCNC: <20 MMOL/L
SP GR UR STRIP.AUTO: <=1.005 (ref 1–1.03)
TROPONIN, HIGH SENSITIVITY: 15 NG/L (ref 0–14)
TSH SERPL DL<=0.005 MIU/L-ACNC: 0.64 UIU/ML (ref 0.27–4.2)
UA COMPLETE W REFLEX CULTURE PNL UR: ABNORMAL
UA DIPSTICK W REFLEX MICRO PNL UR: ABNORMAL
URATE SERPL-MCNC: 2.9 MG/DL (ref 2.6–6)
URN SPEC COLLECT METH UR: ABNORMAL
UROBILINOGEN UR STRIP-ACNC: 1 E.U./DL
WBC # BLD AUTO: 7.7 K/UL (ref 4–11)

## 2024-01-02 PROCEDURE — 82607 VITAMIN B-12: CPT

## 2024-01-02 PROCEDURE — 6360000002 HC RX W HCPCS: Performed by: NURSE PRACTITIONER

## 2024-01-02 PROCEDURE — 71045 X-RAY EXAM CHEST 1 VIEW: CPT

## 2024-01-02 PROCEDURE — 96367 TX/PROPH/DG ADDL SEQ IV INF: CPT

## 2024-01-02 PROCEDURE — 85025 COMPLETE CBC W/AUTO DIFF WBC: CPT

## 2024-01-02 PROCEDURE — 72125 CT NECK SPINE W/O DYE: CPT

## 2024-01-02 PROCEDURE — 51702 INSERT TEMP BLADDER CATH: CPT

## 2024-01-02 PROCEDURE — 02HV33Z INSERTION OF INFUSION DEVICE INTO SUPERIOR VENA CAVA, PERCUTANEOUS APPROACH: ICD-10-PCS | Performed by: STUDENT IN AN ORGANIZED HEALTH CARE EDUCATION/TRAINING PROGRAM

## 2024-01-02 PROCEDURE — 82077 ASSAY SPEC XCP UR&BREATH IA: CPT

## 2024-01-02 PROCEDURE — 80053 COMPREHEN METABOLIC PANEL: CPT

## 2024-01-02 PROCEDURE — 96366 THER/PROPH/DIAG IV INF ADDON: CPT

## 2024-01-02 PROCEDURE — 70498 CT ANGIOGRAPHY NECK: CPT

## 2024-01-02 PROCEDURE — 90715 TDAP VACCINE 7 YRS/> IM: CPT | Performed by: STUDENT IN AN ORGANIZED HEALTH CARE EDUCATION/TRAINING PROGRAM

## 2024-01-02 PROCEDURE — 36415 COLL VENOUS BLD VENIPUNCTURE: CPT

## 2024-01-02 PROCEDURE — 2500000003 HC RX 250 WO HCPCS: Performed by: NURSE PRACTITIONER

## 2024-01-02 PROCEDURE — 99285 EMERGENCY DEPT VISIT HI MDM: CPT

## 2024-01-02 PROCEDURE — 70450 CT HEAD/BRAIN W/O DYE: CPT

## 2024-01-02 PROCEDURE — 83605 ASSAY OF LACTIC ACID: CPT

## 2024-01-02 PROCEDURE — 84133 ASSAY OF URINE POTASSIUM: CPT

## 2024-01-02 PROCEDURE — 93005 ELECTROCARDIOGRAM TRACING: CPT | Performed by: STUDENT IN AN ORGANIZED HEALTH CARE EDUCATION/TRAINING PROGRAM

## 2024-01-02 PROCEDURE — 2580000003 HC RX 258: Performed by: NURSE PRACTITIONER

## 2024-01-02 PROCEDURE — 2500000003 HC RX 250 WO HCPCS: Performed by: STUDENT IN AN ORGANIZED HEALTH CARE EDUCATION/TRAINING PROGRAM

## 2024-01-02 PROCEDURE — 84425 ASSAY OF VITAMIN B-1: CPT

## 2024-01-02 PROCEDURE — 3E033XZ INTRODUCTION OF VASOPRESSOR INTO PERIPHERAL VEIN, PERCUTANEOUS APPROACH: ICD-10-PCS | Performed by: INTERNAL MEDICINE

## 2024-01-02 PROCEDURE — 6360000004 HC RX CONTRAST MEDICATION: Performed by: STUDENT IN AN ORGANIZED HEALTH CARE EDUCATION/TRAINING PROGRAM

## 2024-01-02 PROCEDURE — 82436 ASSAY OF URINE CHLORIDE: CPT

## 2024-01-02 PROCEDURE — 82746 ASSAY OF FOLIC ACID SERUM: CPT

## 2024-01-02 PROCEDURE — 96365 THER/PROPH/DIAG IV INF INIT: CPT

## 2024-01-02 PROCEDURE — 81003 URINALYSIS AUTO W/O SCOPE: CPT

## 2024-01-02 PROCEDURE — 93010 ELECTROCARDIOGRAM REPORT: CPT | Performed by: INTERNAL MEDICINE

## 2024-01-02 PROCEDURE — 84300 ASSAY OF URINE SODIUM: CPT

## 2024-01-02 PROCEDURE — 90471 IMMUNIZATION ADMIN: CPT | Performed by: STUDENT IN AN ORGANIZED HEALTH CARE EDUCATION/TRAINING PROGRAM

## 2024-01-02 PROCEDURE — 70496 CT ANGIOGRAPHY HEAD: CPT

## 2024-01-02 PROCEDURE — 83735 ASSAY OF MAGNESIUM: CPT

## 2024-01-02 PROCEDURE — 6370000000 HC RX 637 (ALT 250 FOR IP): Performed by: NURSE PRACTITIONER

## 2024-01-02 PROCEDURE — 6370000000 HC RX 637 (ALT 250 FOR IP): Performed by: STUDENT IN AN ORGANIZED HEALTH CARE EDUCATION/TRAINING PROGRAM

## 2024-01-02 PROCEDURE — 96361 HYDRATE IV INFUSION ADD-ON: CPT

## 2024-01-02 PROCEDURE — 84443 ASSAY THYROID STIM HORMONE: CPT

## 2024-01-02 PROCEDURE — 2580000003 HC RX 258: Performed by: STUDENT IN AN ORGANIZED HEALTH CARE EDUCATION/TRAINING PROGRAM

## 2024-01-02 PROCEDURE — 83935 ASSAY OF URINE OSMOLALITY: CPT

## 2024-01-02 PROCEDURE — 96375 TX/PRO/DX INJ NEW DRUG ADDON: CPT

## 2024-01-02 PROCEDURE — 2000000000 HC ICU R&B

## 2024-01-02 PROCEDURE — 87804 INFLUENZA ASSAY W/OPTIC: CPT

## 2024-01-02 PROCEDURE — 6360000002 HC RX W HCPCS: Performed by: STUDENT IN AN ORGANIZED HEALTH CARE EDUCATION/TRAINING PROGRAM

## 2024-01-02 PROCEDURE — 87635 SARS-COV-2 COVID-19 AMP PRB: CPT

## 2024-01-02 PROCEDURE — 84484 ASSAY OF TROPONIN QUANT: CPT

## 2024-01-02 PROCEDURE — 72170 X-RAY EXAM OF PELVIS: CPT

## 2024-01-02 PROCEDURE — 84550 ASSAY OF BLOOD/URIC ACID: CPT

## 2024-01-02 PROCEDURE — 71260 CT THORAX DX C+: CPT

## 2024-01-02 PROCEDURE — 96368 THER/DIAG CONCURRENT INF: CPT

## 2024-01-02 PROCEDURE — 87040 BLOOD CULTURE FOR BACTERIA: CPT

## 2024-01-02 PROCEDURE — 80307 DRUG TEST PRSMV CHEM ANLYZR: CPT

## 2024-01-02 PROCEDURE — 36556 INSERT NON-TUNNEL CV CATH: CPT

## 2024-01-02 RX ORDER — ACETAMINOPHEN 325 MG/1
650 TABLET ORAL EVERY 6 HOURS PRN
Status: DISCONTINUED | OUTPATIENT
Start: 2024-01-02 | End: 2024-01-05 | Stop reason: HOSPADM

## 2024-01-02 RX ORDER — 0.9 % SODIUM CHLORIDE 0.9 %
1000 INTRAVENOUS SOLUTION INTRAVENOUS ONCE
Status: DISCONTINUED | OUTPATIENT
Start: 2024-01-02 | End: 2024-01-02

## 2024-01-02 RX ORDER — POTASSIUM CHLORIDE 7.45 MG/ML
10 INJECTION INTRAVENOUS ONCE
Status: COMPLETED | OUTPATIENT
Start: 2024-01-02 | End: 2024-01-02

## 2024-01-02 RX ORDER — LORAZEPAM 2 MG/1
4 TABLET ORAL
Status: DISCONTINUED | OUTPATIENT
Start: 2024-01-02 | End: 2024-01-05 | Stop reason: HOSPADM

## 2024-01-02 RX ORDER — SODIUM CHLORIDE, SODIUM LACTATE, POTASSIUM CHLORIDE, AND CALCIUM CHLORIDE .6; .31; .03; .02 G/100ML; G/100ML; G/100ML; G/100ML
500 INJECTION, SOLUTION INTRAVENOUS ONCE
Status: COMPLETED | OUTPATIENT
Start: 2024-01-02 | End: 2024-01-02

## 2024-01-02 RX ORDER — 0.9 % SODIUM CHLORIDE 0.9 %
1000 INTRAVENOUS SOLUTION INTRAVENOUS ONCE
Status: COMPLETED | OUTPATIENT
Start: 2024-01-02 | End: 2024-01-02

## 2024-01-02 RX ORDER — POTASSIUM CHLORIDE 750 MG/1
40 TABLET, EXTENDED RELEASE ORAL ONCE
Status: COMPLETED | OUTPATIENT
Start: 2024-01-02 | End: 2024-01-02

## 2024-01-02 RX ORDER — LORAZEPAM 2 MG/ML
3 INJECTION INTRAMUSCULAR
Status: DISCONTINUED | OUTPATIENT
Start: 2024-01-02 | End: 2024-01-05 | Stop reason: HOSPADM

## 2024-01-02 RX ORDER — LORAZEPAM 2 MG/ML
1 INJECTION INTRAMUSCULAR
Status: DISCONTINUED | OUTPATIENT
Start: 2024-01-02 | End: 2024-01-05 | Stop reason: HOSPADM

## 2024-01-02 RX ORDER — THIAMINE HYDROCHLORIDE 100 MG/ML
100 INJECTION, SOLUTION INTRAMUSCULAR; INTRAVENOUS DAILY
Status: DISCONTINUED | OUTPATIENT
Start: 2024-01-03 | End: 2024-01-05 | Stop reason: HOSPADM

## 2024-01-02 RX ORDER — ENOXAPARIN SODIUM 100 MG/ML
30 INJECTION SUBCUTANEOUS DAILY
Status: DISCONTINUED | OUTPATIENT
Start: 2024-01-03 | End: 2024-01-05 | Stop reason: HOSPADM

## 2024-01-02 RX ORDER — SODIUM CHLORIDE, SODIUM LACTATE, POTASSIUM CHLORIDE, AND CALCIUM CHLORIDE .6; .31; .03; .02 G/100ML; G/100ML; G/100ML; G/100ML
1000 INJECTION, SOLUTION INTRAVENOUS ONCE
Status: COMPLETED | OUTPATIENT
Start: 2024-01-02 | End: 2024-01-02

## 2024-01-02 RX ORDER — M-VIT,TX,IRON,MINS/CALC/FOLIC 27MG-0.4MG
1 TABLET ORAL ONCE
Status: COMPLETED | OUTPATIENT
Start: 2024-01-02 | End: 2024-01-02

## 2024-01-02 RX ORDER — ACETAMINOPHEN 650 MG/1
650 SUPPOSITORY RECTAL EVERY 6 HOURS PRN
Status: DISCONTINUED | OUTPATIENT
Start: 2024-01-02 | End: 2024-01-05 | Stop reason: HOSPADM

## 2024-01-02 RX ORDER — M-VIT,TX,IRON,MINS/CALC/FOLIC 27MG-0.4MG
1 TABLET ORAL DAILY
Status: DISCONTINUED | OUTPATIENT
Start: 2024-01-03 | End: 2024-01-05 | Stop reason: HOSPADM

## 2024-01-02 RX ORDER — ONDANSETRON 2 MG/ML
4 INJECTION INTRAMUSCULAR; INTRAVENOUS EVERY 6 HOURS PRN
Status: CANCELLED | OUTPATIENT
Start: 2024-01-02

## 2024-01-02 RX ORDER — LORAZEPAM 1 MG/1
1 TABLET ORAL
Status: DISCONTINUED | OUTPATIENT
Start: 2024-01-02 | End: 2024-01-05 | Stop reason: HOSPADM

## 2024-01-02 RX ORDER — SODIUM CHLORIDE 0.9 % (FLUSH) 0.9 %
5-40 SYRINGE (ML) INJECTION EVERY 12 HOURS SCHEDULED
Status: DISCONTINUED | OUTPATIENT
Start: 2024-01-02 | End: 2024-01-05 | Stop reason: HOSPADM

## 2024-01-02 RX ORDER — LORAZEPAM 2 MG/1
2 TABLET ORAL
Status: DISCONTINUED | OUTPATIENT
Start: 2024-01-02 | End: 2024-01-05 | Stop reason: HOSPADM

## 2024-01-02 RX ORDER — LORAZEPAM 2 MG/ML
4 INJECTION INTRAMUSCULAR
Status: DISCONTINUED | OUTPATIENT
Start: 2024-01-02 | End: 2024-01-05 | Stop reason: HOSPADM

## 2024-01-02 RX ORDER — THIAMINE HYDROCHLORIDE 100 MG/ML
100 INJECTION, SOLUTION INTRAMUSCULAR; INTRAVENOUS ONCE
Status: COMPLETED | OUTPATIENT
Start: 2024-01-02 | End: 2024-01-02

## 2024-01-02 RX ORDER — POLYETHYLENE GLYCOL 3350 17 G/17G
17 POWDER, FOR SOLUTION ORAL DAILY PRN
Status: DISCONTINUED | OUTPATIENT
Start: 2024-01-02 | End: 2024-01-05 | Stop reason: HOSPADM

## 2024-01-02 RX ORDER — MAGNESIUM SULFATE IN WATER 40 MG/ML
2000 INJECTION, SOLUTION INTRAVENOUS ONCE
Status: COMPLETED | OUTPATIENT
Start: 2024-01-02 | End: 2024-01-02

## 2024-01-02 RX ORDER — LORAZEPAM 2 MG/ML
2 INJECTION INTRAMUSCULAR
Status: DISCONTINUED | OUTPATIENT
Start: 2024-01-02 | End: 2024-01-05 | Stop reason: HOSPADM

## 2024-01-02 RX ORDER — ONDANSETRON 4 MG/1
4 TABLET, ORALLY DISINTEGRATING ORAL EVERY 8 HOURS PRN
Status: CANCELLED | OUTPATIENT
Start: 2024-01-02

## 2024-01-02 RX ORDER — POTASSIUM CHLORIDE 750 MG/1
40 TABLET, EXTENDED RELEASE ORAL PRN
Status: DISCONTINUED | OUTPATIENT
Start: 2024-01-02 | End: 2024-01-05 | Stop reason: HOSPADM

## 2024-01-02 RX ORDER — MAGNESIUM SULFATE IN WATER 40 MG/ML
2000 INJECTION, SOLUTION INTRAVENOUS PRN
Status: DISCONTINUED | OUTPATIENT
Start: 2024-01-02 | End: 2024-01-05 | Stop reason: HOSPADM

## 2024-01-02 RX ORDER — PIPERACILLIN SODIUM, TAZOBACTAM SODIUM 4; .5 G/20ML; G/20ML
4500 INJECTION, POWDER, LYOPHILIZED, FOR SOLUTION INTRAVENOUS ONCE
Status: DISCONTINUED | OUTPATIENT
Start: 2024-01-02 | End: 2024-01-02

## 2024-01-02 RX ORDER — SODIUM CHLORIDE 0.9 % (FLUSH) 0.9 %
5-40 SYRINGE (ML) INJECTION PRN
Status: DISCONTINUED | OUTPATIENT
Start: 2024-01-02 | End: 2024-01-05 | Stop reason: HOSPADM

## 2024-01-02 RX ORDER — SODIUM CHLORIDE 9 MG/ML
INJECTION, SOLUTION INTRAVENOUS PRN
Status: DISCONTINUED | OUTPATIENT
Start: 2024-01-02 | End: 2024-01-05 | Stop reason: HOSPADM

## 2024-01-02 RX ORDER — POTASSIUM CHLORIDE 7.45 MG/ML
10 INJECTION INTRAVENOUS PRN
Status: DISCONTINUED | OUTPATIENT
Start: 2024-01-02 | End: 2024-01-05 | Stop reason: HOSPADM

## 2024-01-02 RX ORDER — SODIUM CHLORIDE, SODIUM LACTATE, POTASSIUM CHLORIDE, AND CALCIUM CHLORIDE .6; .31; .03; .02 G/100ML; G/100ML; G/100ML; G/100ML
500 INJECTION, SOLUTION INTRAVENOUS ONCE
Status: DISCONTINUED | OUTPATIENT
Start: 2024-01-02 | End: 2024-01-02

## 2024-01-02 RX ADMIN — POTASSIUM CHLORIDE 10 MEQ: 7.46 INJECTION, SOLUTION INTRAVENOUS at 16:09

## 2024-01-02 RX ADMIN — SODIUM CHLORIDE 1000 ML: 9 INJECTION, SOLUTION INTRAVENOUS at 11:41

## 2024-01-02 RX ADMIN — THIAMINE HYDROCHLORIDE 100 MG: 100 INJECTION, SOLUTION INTRAMUSCULAR; INTRAVENOUS at 11:54

## 2024-01-02 RX ADMIN — VANCOMYCIN HYDROCHLORIDE 500 MG: 500 INJECTION, POWDER, LYOPHILIZED, FOR SOLUTION INTRAVENOUS at 22:48

## 2024-01-02 RX ADMIN — POTASSIUM CHLORIDE 40 MEQ: 750 TABLET, EXTENDED RELEASE ORAL at 13:38

## 2024-01-02 RX ADMIN — IOMEPROL INJECTION 45 ML: 714 INJECTION, SOLUTION INTRAVASCULAR at 13:33

## 2024-01-02 RX ADMIN — ACETAMINOPHEN 650 MG: 325 TABLET ORAL at 21:56

## 2024-01-02 RX ADMIN — SODIUM CHLORIDE, POTASSIUM CHLORIDE, SODIUM LACTATE AND CALCIUM CHLORIDE 1000 ML: 600; 310; 30; 20 INJECTION, SOLUTION INTRAVENOUS at 13:48

## 2024-01-02 RX ADMIN — MAGNESIUM SULFATE HEPTAHYDRATE 2000 MG: 40 INJECTION, SOLUTION INTRAVENOUS at 13:54

## 2024-01-02 RX ADMIN — MAGNESIUM SULFATE HEPTAHYDRATE 2000 MG: 40 INJECTION, SOLUTION INTRAVENOUS at 12:03

## 2024-01-02 RX ADMIN — PIPERACILLIN SODIUM AND TAZOBACTAM SODIUM 4500 MG: 4; .5 INJECTION, POWDER, LYOPHILIZED, FOR SOLUTION INTRAVENOUS at 13:58

## 2024-01-02 RX ADMIN — POTASSIUM CHLORIDE 10 MEQ: 7.46 INJECTION, SOLUTION INTRAVENOUS at 14:59

## 2024-01-02 RX ADMIN — Medication 10 ML: at 22:01

## 2024-01-02 RX ADMIN — POTASSIUM CHLORIDE 40 MEQ: 750 TABLET, EXTENDED RELEASE ORAL at 17:06

## 2024-01-02 RX ADMIN — VANCOMYCIN HYDROCHLORIDE 750 MG: 1 INJECTION, POWDER, LYOPHILIZED, FOR SOLUTION INTRAVENOUS at 14:28

## 2024-01-02 RX ADMIN — POTASSIUM CHLORIDE 10 MEQ: 7.46 INJECTION, SOLUTION INTRAVENOUS at 13:55

## 2024-01-02 RX ADMIN — NOREPINEPHRINE BITARTRATE 5 MCG/MIN: 1 SOLUTION INTRAVENOUS at 21:55

## 2024-01-02 RX ADMIN — TETANUS TOXOID, REDUCED DIPHTHERIA TOXOID AND ACELLULAR PERTUSSIS VACCINE, ADSORBED 0.5 ML: 5; 2.5; 8; 8; 2.5 SUSPENSION INTRAMUSCULAR at 15:56

## 2024-01-02 RX ADMIN — SODIUM CHLORIDE, POTASSIUM CHLORIDE, SODIUM LACTATE AND CALCIUM CHLORIDE 500 ML: 600; 310; 30; 20 INJECTION, SOLUTION INTRAVENOUS at 17:12

## 2024-01-02 RX ADMIN — MULTIPLE VITAMINS W/ MINERALS TAB 1 TABLET: TAB at 11:58

## 2024-01-02 RX ADMIN — IOMEPROL INJECTION 75 ML: 714 INJECTION, SOLUTION INTRAVASCULAR at 13:21

## 2024-01-02 RX ADMIN — NOREPINEPHRINE BITARTRATE 5 MCG/MIN: 1 SOLUTION INTRAVENOUS at 13:52

## 2024-01-02 RX ADMIN — CEFEPIME 2000 MG: 2 INJECTION, POWDER, FOR SOLUTION INTRAVENOUS at 21:56

## 2024-01-02 ASSESSMENT — LIFESTYLE VARIABLES
HOW MANY STANDARD DRINKS CONTAINING ALCOHOL DO YOU HAVE ON A TYPICAL DAY: 5 OR 6
HOW OFTEN DO YOU HAVE A DRINK CONTAINING ALCOHOL: 2-3 TIMES A WEEK

## 2024-01-02 ASSESSMENT — PAIN SCALES - GENERAL: PAINLEVEL_OUTOF10: 8

## 2024-01-02 ASSESSMENT — PAIN DESCRIPTION - LOCATION: LOCATION: GENERALIZED

## 2024-01-02 ASSESSMENT — PAIN - FUNCTIONAL ASSESSMENT
PAIN_FUNCTIONAL_ASSESSMENT: PREVENTS OR INTERFERES SOME ACTIVE ACTIVITIES AND ADLS
PAIN_FUNCTIONAL_ASSESSMENT: NONE - DENIES PAIN

## 2024-01-02 ASSESSMENT — PAIN DESCRIPTION - DESCRIPTORS: DESCRIPTORS: ACHING

## 2024-01-02 NOTE — PROGRESS NOTES
Vancomycin per ED  Dx: Sepsis  Pt wt 33kg, vanc 750g time one (23mg/kg)  Karen Law Pharm D 1/2/202412:53 PM  .

## 2024-01-02 NOTE — PLAN OF CARE
Admit to ICU from [facility]     Multiple falls at home, dizziness prior to coming in   Hypotensive, central line placed and started on levophed in ED  Hyponatremia, urine studies ordered ? Beer potomania   Hx of alcohol use, ? Wernicke's  Started on IV thiamine   CTA head and neck no acute abnormalities   Nephrology consulted in ER, will defer IVF to nephro  Hypotensive, central line placed and started on levophed in ED  ? Sepsis vs hypovolemia   ?PNA started on vancomycin and cefepime   Hypokalemia and hypomagnesemia   Prolonged QTc  intensivist consult requested in ED      Alina Card, APRN - CNP

## 2024-01-02 NOTE — ED NOTES
Dr Gutiérrez in with pt at this time going over testing results. Pt requested something to eat which was ok per MD. Pt given turkey sandwich and fruit cup with jayna crackers and drink. Pt has no further needs at this time.

## 2024-01-02 NOTE — ED NOTES
Spoke with pt friend Waqas who is advised that pt is going to [facility] but not sure when or what room. Attempted to call pts Daughter but no answer and mailbox was full at this time.

## 2024-01-02 NOTE — ED PROVIDER NOTES
Suspected.     Must meet 1:    [x]Lactate > 2       or   []Signs of Organ Dysfunction:    - SBP < 90 or MAP < 65  -Creatinine > 2 or increased from baseline  -Urine Output < 0.5 ml/kg/hr  -Bilirubin > 2  -INR > 1.5 (not anticoagulated)  -Platelets < 100,000  -Acute Respiratory Failure as evidenced by new need for NIPPV or mechanical ventilation   Must meet 1:    []Lactate > 4        or   []SBP < 90 or MAP < 65 for at least two readings in the first hour after fluid bolus administration    []Vasopressors initiated (if hypotension persists after fluid resuscitation)   Patient Vitals for the past 6 hrs:   BP Temp Pulse Resp SpO2 Height Weight Weight Method Percent Weight Change   01/02/24 1124 (!) 69/54 -- 75 14 100 % 1.549 m (5' 1\") 32.9 kg (72 lb 8 oz) Stated 0   01/02/24 1149 (!) 60/49 (!) 96.5 °F (35.8 °C) (!) 110 14 100 % -- -- -- --   01/02/24 1200 (!) 70/53 -- 81 16 100 % -- -- -- --   01/02/24 1215 (!) 75/53 -- 73 16 100 % -- -- -- --   01/02/24 1230 (!) 63/46 -- 83 16 100 % -- -- -- --   01/02/24 1300 (!) 69/48 -- 83 16 97 % -- -- -- --   01/02/24 1315 (!) 74/50 -- 75 16 100 % -- -- -- --   01/02/24 1345 (!) 69/55 -- 86 16 97 % -- -- -- --   01/02/24 1400 (!) 77/60 -- 76 16 99 % -- -- -- --   01/02/24 1412 (!) 84/67 -- 85 16 98 % -- -- -- --   01/02/24 1418 (!) 87/65 -- (!) 101 16 100 % -- -- -- --   01/02/24 1431 92/67 -- (!) 121 16 96 % -- -- -- --   01/02/24 1433 -- -- 86 -- -- -- -- -- --   01/02/24 1445 (!) 86/65 -- 83 16 100 % -- -- -- --   01/02/24 1500 (!) 82/63 -- (!) 108 16 100 % -- -- -- --   01/02/24 1515 (!) 86/61 -- 81 18 99 % -- -- -- --      Recent Labs     01/02/24  1135 01/02/24  1454   WBC 7.7  --    CREATININE <0.5* <0.5*   BILITOT 0.5  --      --         Severe sepsis identified date: 1/2/24 time: 1135    Fluid Resuscitation Rationale: at least 30mL/kg based on entered actual weight at time of triage    Repeat lactate level: improving    Reassessment Exam: I have reassessed tissue

## 2024-01-03 PROBLEM — E43 SEVERE PROTEIN-CALORIE MALNUTRITION (HCC): Chronic | Status: ACTIVE | Noted: 2024-01-03

## 2024-01-03 PROBLEM — E87.6 HYPOKALEMIA: Status: ACTIVE | Noted: 2024-01-03

## 2024-01-03 PROBLEM — R29.6 FALLS: Status: ACTIVE | Noted: 2024-01-03

## 2024-01-03 PROBLEM — I95.9 HYPOTENSION: Status: ACTIVE | Noted: 2024-01-03

## 2024-01-03 PROBLEM — R91.8 MASS OF UPPER LOBE OF RIGHT LUNG: Status: ACTIVE | Noted: 2024-01-03
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
## 2024-01-03 LAB
ANION GAP SERPL CALCULATED.3IONS-SCNC: 13 MMOL/L (ref 3–16)
ANION GAP SERPL CALCULATED.3IONS-SCNC: 6 MMOL/L (ref 3–16)
ANION GAP SERPL CALCULATED.3IONS-SCNC: 7 MMOL/L (ref 3–16)
ANION GAP SERPL CALCULATED.3IONS-SCNC: 8 MMOL/L (ref 3–16)
BASOPHILS # BLD: 0 K/UL (ref 0–0.2)
BASOPHILS NFR BLD: 0.1 %
BUN SERPL-MCNC: 7 MG/DL (ref 7–20)
BUN SERPL-MCNC: 8 MG/DL (ref 7–20)
CALCIUM SERPL-MCNC: 7.1 MG/DL (ref 8.3–10.6)
CALCIUM SERPL-MCNC: 7.1 MG/DL (ref 8.3–10.6)
CALCIUM SERPL-MCNC: 7.6 MG/DL (ref 8.3–10.6)
CALCIUM SERPL-MCNC: 7.6 MG/DL (ref 8.3–10.6)
CHLORIDE SERPL-SCNC: 87 MMOL/L (ref 99–110)
CHLORIDE SERPL-SCNC: 90 MMOL/L (ref 99–110)
CHLORIDE SERPL-SCNC: 90 MMOL/L (ref 99–110)
CHLORIDE SERPL-SCNC: 91 MMOL/L (ref 99–110)
CO2 SERPL-SCNC: 24 MMOL/L (ref 21–32)
CO2 SERPL-SCNC: 26 MMOL/L (ref 21–32)
CORTIS SERPL-MCNC: 11.6 UG/DL
CREAT SERPL-MCNC: <0.5 MG/DL (ref 0.6–1.1)
DEPRECATED RDW RBC AUTO: 12.8 % (ref 12.4–15.4)
EOSINOPHIL # BLD: 0 K/UL (ref 0–0.6)
EOSINOPHIL NFR BLD: 0 %
FOLATE SERPL-MCNC: 8.48 NG/ML (ref 4.78–24.2)
GFR SERPLBLD CREATININE-BSD FMLA CKD-EPI: >60 ML/MIN/{1.73_M2}
GLUCOSE BLD-MCNC: 120 MG/DL (ref 70–99)
GLUCOSE BLD-MCNC: 136 MG/DL (ref 70–99)
GLUCOSE BLD-MCNC: 221 MG/DL (ref 70–99)
GLUCOSE BLD-MCNC: 304 MG/DL (ref 70–99)
GLUCOSE SERPL-MCNC: 124 MG/DL (ref 70–99)
GLUCOSE SERPL-MCNC: 147 MG/DL (ref 70–99)
GLUCOSE SERPL-MCNC: 209 MG/DL (ref 70–99)
GLUCOSE SERPL-MCNC: 238 MG/DL (ref 70–99)
HCT VFR BLD AUTO: 30.2 % (ref 36–48)
HGB BLD-MCNC: 10.7 G/DL (ref 12–16)
LACTATE BLDV-SCNC: 0.6 MMOL/L (ref 0.4–2)
LYMPHOCYTES # BLD: 0.1 K/UL (ref 1–5.1)
LYMPHOCYTES NFR BLD: 1.4 %
MCH RBC QN AUTO: 34.7 PG (ref 26–34)
MCHC RBC AUTO-ENTMCNC: 35.5 G/DL (ref 31–36)
MCV RBC AUTO: 97.7 FL (ref 80–100)
MONOCYTES # BLD: 0.1 K/UL (ref 0–1.3)
MONOCYTES NFR BLD: 1.2 %
NEUTROPHILS # BLD: 9.3 K/UL (ref 1.7–7.7)
NEUTROPHILS NFR BLD: 97.3 %
PERFORMED ON: ABNORMAL
PLATELET # BLD AUTO: 340 K/UL (ref 135–450)
PMV BLD AUTO: 7.9 FL (ref 5–10.5)
POTASSIUM SERPL-SCNC: 3.9 MMOL/L (ref 3.5–5.1)
POTASSIUM SERPL-SCNC: 4 MMOL/L (ref 3.5–5.1)
POTASSIUM SERPL-SCNC: 4 MMOL/L (ref 3.5–5.1)
POTASSIUM SERPL-SCNC: 4.7 MMOL/L (ref 3.5–5.1)
PROCALCITONIN SERPL IA-MCNC: 5.21 NG/ML (ref 0–0.15)
RBC # BLD AUTO: 3.09 M/UL (ref 4–5.2)
SODIUM SERPL-SCNC: 121 MMOL/L (ref 136–145)
SODIUM SERPL-SCNC: 122 MMOL/L (ref 136–145)
SODIUM SERPL-SCNC: 123 MMOL/L (ref 136–145)
SODIUM SERPL-SCNC: 124 MMOL/L (ref 136–145)
VIT B12 SERPL-MCNC: 1504 PG/ML (ref 211–911)
WBC # BLD AUTO: 9.5 K/UL (ref 4–11)

## 2024-01-03 PROCEDURE — 94761 N-INVAS EAR/PLS OXIMETRY MLT: CPT

## 2024-01-03 PROCEDURE — 97165 OT EVAL LOW COMPLEX 30 MIN: CPT

## 2024-01-03 PROCEDURE — 97535 SELF CARE MNGMENT TRAINING: CPT

## 2024-01-03 PROCEDURE — 85025 COMPLETE CBC W/AUTO DIFF WBC: CPT

## 2024-01-03 PROCEDURE — 6360000002 HC RX W HCPCS: Performed by: INTERNAL MEDICINE

## 2024-01-03 PROCEDURE — 6370000000 HC RX 637 (ALT 250 FOR IP): Performed by: INTERNAL MEDICINE

## 2024-01-03 PROCEDURE — 80048 BASIC METABOLIC PNL TOTAL CA: CPT

## 2024-01-03 PROCEDURE — 99291 CRITICAL CARE FIRST HOUR: CPT | Performed by: INTERNAL MEDICINE

## 2024-01-03 PROCEDURE — 84145 PROCALCITONIN (PCT): CPT

## 2024-01-03 PROCEDURE — 97530 THERAPEUTIC ACTIVITIES: CPT

## 2024-01-03 PROCEDURE — 6370000000 HC RX 637 (ALT 250 FOR IP): Performed by: NURSE PRACTITIONER

## 2024-01-03 PROCEDURE — 97161 PT EVAL LOW COMPLEX 20 MIN: CPT

## 2024-01-03 PROCEDURE — 82533 TOTAL CORTISOL: CPT

## 2024-01-03 PROCEDURE — 2580000003 HC RX 258: Performed by: NURSE PRACTITIONER

## 2024-01-03 PROCEDURE — 36592 COLLECT BLOOD FROM PICC: CPT

## 2024-01-03 PROCEDURE — 83605 ASSAY OF LACTIC ACID: CPT

## 2024-01-03 PROCEDURE — 92526 ORAL FUNCTION THERAPY: CPT

## 2024-01-03 PROCEDURE — 94640 AIRWAY INHALATION TREATMENT: CPT

## 2024-01-03 PROCEDURE — 51702 INSERT TEMP BLADDER CATH: CPT

## 2024-01-03 PROCEDURE — 2000000000 HC ICU R&B

## 2024-01-03 PROCEDURE — 6360000002 HC RX W HCPCS: Performed by: NURSE PRACTITIONER

## 2024-01-03 PROCEDURE — 92610 EVALUATE SWALLOWING FUNCTION: CPT

## 2024-01-03 PROCEDURE — 2580000003 HC RX 258: Performed by: INTERNAL MEDICINE

## 2024-01-03 RX ORDER — GLUCAGON 1 MG/ML
1 KIT INJECTION PRN
Status: DISCONTINUED | OUTPATIENT
Start: 2024-01-03 | End: 2024-01-05 | Stop reason: HOSPADM

## 2024-01-03 RX ORDER — INSULIN LISPRO 100 [IU]/ML
0-8 INJECTION, SOLUTION INTRAVENOUS; SUBCUTANEOUS
Status: DISCONTINUED | OUTPATIENT
Start: 2024-01-03 | End: 2024-01-05 | Stop reason: HOSPADM

## 2024-01-03 RX ORDER — DEXTROSE MONOHYDRATE 100 MG/ML
INJECTION, SOLUTION INTRAVENOUS CONTINUOUS PRN
Status: DISCONTINUED | OUTPATIENT
Start: 2024-01-03 | End: 2024-01-05 | Stop reason: HOSPADM

## 2024-01-03 RX ORDER — INSULIN LISPRO 100 [IU]/ML
0-4 INJECTION, SOLUTION INTRAVENOUS; SUBCUTANEOUS NIGHTLY
Status: DISCONTINUED | OUTPATIENT
Start: 2024-01-03 | End: 2024-01-05 | Stop reason: HOSPADM

## 2024-01-03 RX ORDER — POTASSIUM CHLORIDE 750 MG/1
40 TABLET, EXTENDED RELEASE ORAL ONCE
Status: COMPLETED | OUTPATIENT
Start: 2024-01-03 | End: 2024-01-03

## 2024-01-03 RX ORDER — DEXAMETHASONE SODIUM PHOSPHATE 10 MG/ML
10 INJECTION, SOLUTION INTRAMUSCULAR; INTRAVENOUS EVERY 8 HOURS
Status: DISCONTINUED | OUTPATIENT
Start: 2024-01-03 | End: 2024-01-04

## 2024-01-03 RX ADMIN — TIOTROPIUM BROMIDE INHALATION SPRAY 2 PUFF: 3.12 SPRAY, METERED RESPIRATORY (INHALATION) at 07:54

## 2024-01-03 RX ADMIN — POTASSIUM CHLORIDE 40 MEQ: 750 TABLET, EXTENDED RELEASE ORAL at 01:14

## 2024-01-03 RX ADMIN — THIAMINE HYDROCHLORIDE 100 MG: 100 INJECTION, SOLUTION INTRAMUSCULAR; INTRAVENOUS at 08:01

## 2024-01-03 RX ADMIN — Medication 1 TABLET: at 08:01

## 2024-01-03 RX ADMIN — DEXAMETHASONE SODIUM PHOSPHATE 10 MG: 10 INJECTION, SOLUTION INTRAMUSCULAR; INTRAVENOUS at 16:23

## 2024-01-03 RX ADMIN — INSULIN LISPRO 4 UNITS: 100 INJECTION, SOLUTION INTRAVENOUS; SUBCUTANEOUS at 20:31

## 2024-01-03 RX ADMIN — Medication 10 ML: at 20:21

## 2024-01-03 RX ADMIN — VANCOMYCIN HYDROCHLORIDE 500 MG: 500 INJECTION, POWDER, LYOPHILIZED, FOR SOLUTION INTRAVENOUS at 16:23

## 2024-01-03 RX ADMIN — INSULIN LISPRO 2 UNITS: 100 INJECTION, SOLUTION INTRAVENOUS; SUBCUTANEOUS at 12:35

## 2024-01-03 RX ADMIN — CEFEPIME 2000 MG: 2 INJECTION, POWDER, FOR SOLUTION INTRAVENOUS at 21:43

## 2024-01-03 RX ADMIN — Medication 10 ML: at 08:01

## 2024-01-03 RX ADMIN — VANCOMYCIN HYDROCHLORIDE 500 MG: 500 INJECTION, POWDER, LYOPHILIZED, FOR SOLUTION INTRAVENOUS at 08:06

## 2024-01-03 RX ADMIN — DEXAMETHASONE SODIUM PHOSPHATE 10 MG: 10 INJECTION, SOLUTION INTRAMUSCULAR; INTRAVENOUS at 08:01

## 2024-01-03 RX ADMIN — VANCOMYCIN HYDROCHLORIDE 500 MG: 500 INJECTION, POWDER, LYOPHILIZED, FOR SOLUTION INTRAVENOUS at 22:51

## 2024-01-03 RX ADMIN — DEXAMETHASONE SODIUM PHOSPHATE 10 MG: 10 INJECTION, SOLUTION INTRAMUSCULAR; INTRAVENOUS at 01:14

## 2024-01-03 RX ADMIN — ENOXAPARIN SODIUM 30 MG: 100 INJECTION SUBCUTANEOUS at 08:01

## 2024-01-03 RX ADMIN — CEFEPIME 2000 MG: 2 INJECTION, POWDER, FOR SOLUTION INTRAVENOUS at 10:25

## 2024-01-03 RX ADMIN — VASOPRESSIN: 20 INJECTION, SOLUTION INTRAVENOUS at 16:29

## 2024-01-03 ASSESSMENT — PAIN SCALES - GENERAL
PAINLEVEL_OUTOF10: 0
PAINLEVEL_OUTOF10: 3
PAINLEVEL_OUTOF10: 3

## 2024-01-03 ASSESSMENT — PAIN DESCRIPTION - ONSET
ONSET: PROGRESSIVE
ONSET: PROGRESSIVE

## 2024-01-03 ASSESSMENT — PAIN DESCRIPTION - PAIN TYPE
TYPE: ACUTE PAIN
TYPE: ACUTE PAIN

## 2024-01-03 ASSESSMENT — PAIN DESCRIPTION - LOCATION
LOCATION: GENERALIZED
LOCATION: GENERALIZED

## 2024-01-03 ASSESSMENT — PAIN DESCRIPTION - FREQUENCY
FREQUENCY: INTERMITTENT
FREQUENCY: INTERMITTENT

## 2024-01-03 ASSESSMENT — PAIN - FUNCTIONAL ASSESSMENT
PAIN_FUNCTIONAL_ASSESSMENT: PREVENTS OR INTERFERES SOME ACTIVE ACTIVITIES AND ADLS
PAIN_FUNCTIONAL_ASSESSMENT: PREVENTS OR INTERFERES SOME ACTIVE ACTIVITIES AND ADLS

## 2024-01-03 ASSESSMENT — PAIN DESCRIPTION - DESCRIPTORS
DESCRIPTORS: ACHING
DESCRIPTORS: ACHING

## 2024-01-03 NOTE — PROGRESS NOTES
4 Eyes Skin Assessment     NAME:  Bonny Serrano  YOB: 1970  MEDICAL RECORD NUMBER:  [id number]    The patient is being assessed for  Shift Handoff    I agree that at least one RN has performed a thorough Head to Toe Skin Assessment on the patient. ALL assessment sites listed below have been assessed.      Areas assessed by both nurses:    Head, Face, Ears, Shoulders, Back, Chest, Arms, Elbows, Hands, Sacrum. Buttock, Coccyx, Ischium, Legs. Feet and Heels, and Under Medical Devices         Does the Patient have a Wound? Yes wound(s) were present on assessment. LDA wound assessment was Initiated and completed by RN                             [place] Prevention initiated by RN: Yes  Wound Care Orders initiated by RN: Yes    Pressure Injury (Stage 3,4, Unstageable, DTI, NWPT, and Complex wounds) if present, place Wound referral order by RN under : Yes    New Ostomies, if present place, Ostomy referral order under : No     Nurse 1 eSignature: Electronically signed by Ny Ocampo RN on 1/3/24 at 1:58 AM EST    **SHARE this note so that the co-signing nurse can place an eSignature**    Nurse 2 eSignature: Electronically signed by Stacey Xiao RN on 1/3/24 at 6:16 AM EST

## 2024-01-03 NOTE — PROGRESS NOTES
Inpatient Occupational Therapy Evaluation and Treatment    Unit: ICU  Date:  1/3/2024  Patient Name:    Bonny Serrano  Admitting diagnosis:  Hypokalemia [E87.6]  Hyponatremia [E87.1]  Prolonged Q-T interval on ECG [R94.31]  Elevated troponin [R79.89]  Septic shock (HCC) [A41.9, R65.21]  Anemia, unspecified type [D64.9]  Hypomagnesuria [R82.998]  Admit Date:  1/2/2024  Precautions/Restrictions/WB Status/ Lines/ Wounds/ Oxygen: Fall risk, Bed/chair alarm, Lines (IV, internal catheter, and IJ right), Telemetry, and Continuous pulse oximetry    Pt seen for cotreatment this date due to patient endurance and acute illness/injury    Treatment Time:  9:50-10:24  Treatment Number:  1  Timed Code Treatment Minutes: 24 minutes  Total Treatment Minutes:  34  minutes    Patient Goals for Therapy: \" to get out of bed \"          Discharge Recommendations: SNF  DME needs for discharge: Defer to facility       Therapy recommendations for staff:   Assist of 1 for transfers with use of gait belt to/from chair    History of Present Illness: 53 y.o. female with a pmh of hypertension, atrial fibrillation, pulmonary embolism no longer anticoagulated, COPD, chronic pancreatitis secondary to past alcohol abuse who presents with multiple falls at home.     Patient initially seen at [facility] emergency room.  Patient stated that she had fallen several times today and had felt dizzy.  She denies any problems with p.o. intake.     ER evaluation revealed electrolyte abnormalities including hyponatremia and hypokalemia.  QTc was elevated and magnesium was subsequently given.  Systolic blood pressure has been in the 80s such that Levophed has been initiated after fairly aggressive fluid resuscitation.  CT of the chest was unremarkable as far as explaining her present issues but there is a new right upper lobe 3 x 3 cm mass identified.    Home Health S4 Level Recommendation:  NA    AM-PAC Score: AM-PAC Inpatient Daily Activity Raw Score: 15

## 2024-01-03 NOTE — CONSULTS
(PROVENTIL) (2.5 MG/3ML) 0.083% nebulizer solution Take 3 mLs by nebulization as needed for Wheezing         Allergies:  Sulfa antibiotics    Social History:    Social History     Socioeconomic History    Marital status: Single     Spouse name: Not on file    Number of children: Not on file    Years of education: Not on file    Highest education level: Not on file   Occupational History    Not on file   Tobacco Use    Smoking status: Every Day     Current packs/day: 0.00     Average packs/day: 1 pack/day for 25.0 years (25.0 ttl pk-yrs)     Types: Cigarettes     Start date: 5/9/1995     Last attempt to quit: 5/9/2020     Years since quitting: 3.6    Smokeless tobacco: Never    Tobacco comments:     restarted smoking 3 wks ago 9/2/20   Vaping Use    Vaping Use: Former    Substances: Always   Substance and Sexual Activity    Alcohol use: Yes     Comment: 4 beers 2-3 times a week    Drug use: Yes     Types: Marijuana (Weed)     Comment: occ    Sexual activity: Yes     Partners: Male   Other Topics Concern    Not on file   Social History Narrative    11/2019 lives with sign other;disability -waiting on it;     Social Determinants of Health     Financial Resource Strain: Not on file   Food Insecurity: Patient Declined (1/2/2024)    Hunger Vital Sign     Worried About Running Out of Food in the Last Year: Patient declined     Ran Out of Food in the Last Year: Patient declined   Transportation Needs: Patient Declined (1/2/2024)    PRAPARE - Transportation     Lack of Transportation (Medical): Patient declined     Lack of Transportation (Non-Medical): Patient declined   Physical Activity: Not on file   Stress: Not on file   Social Connections: Not on file   Intimate Partner Violence: Not on file   Housing Stability: Low Risk  (1/2/2024)    Housing Stability Vital Sign     Unable to Pay for Housing in the Last Year: No     Number of Places Lived in the Last Year: 1     Unstable Housing in the Last Year: No       Family  History:   Family History   Problem Relation Age of Onset    Diabetes Maternal Grandfather     Asthma Neg Hx     Emphysema Neg Hx     Heart Failure Neg Hx     Hypertension Neg Hx     Sleep Apnea Neg Hx        Review of Systems:   Pertinent positives stated above in HPI. Remainder of 10 point review of systems were reviewed and were negative.    Physical exam:   Constitutional:  VITALS:  BP 94/69   Pulse 89   Temp 97.8 °F (36.6 °C) (Temporal)   Resp 16   Ht 1.549 m (5' 0.98\")   Wt 35.9 kg (79 lb 2.3 oz)   LMP 07/12/2015   SpO2 98%   BMI 14.96 kg/m²   Gen: alert, awake, ill-appearing  Skin: no rash, turgor wnl  Heent:  eomi, mmm  Neck: no bruits or jvd noted, thyroid normal  Cardiovascular:  S1, S2 without m/r/g  Respiratory: CTA B without w/r/r; respiratory effort normal  Abdomen:  +bs, soft, nt, nd, no hepatosplenomegaly  Ext: no lower extremity edema  Psychiatric: mood and affect appropriate; judgement and insight intact  Musculoskeletal:  Rom, muscular strength limited; digits, nails normal    Data/  CBC:   Lab Results   Component Value Date/Time    WBC 9.5 01/03/2024 05:15 AM    RBC 3.09 01/03/2024 05:15 AM    HGB 10.7 01/03/2024 05:15 AM    HCT 30.2 01/03/2024 05:15 AM    MCV 97.7 01/03/2024 05:15 AM    MCH 34.7 01/03/2024 05:15 AM    MCHC 35.5 01/03/2024 05:15 AM    RDW 12.8 01/03/2024 05:15 AM     01/03/2024 05:15 AM    MPV 7.9 01/03/2024 05:15 AM     BMP:    Lab Results   Component Value Date/Time     01/03/2024 10:29 AM    K 4.7 01/03/2024 10:29 AM    CL 90 01/03/2024 10:29 AM    CO2 26 01/03/2024 10:29 AM    BUN 7 01/03/2024 10:29 AM    LABALBU 2.5 01/02/2024 11:35 AM    CREATININE <0.5 01/03/2024 10:29 AM    CALCIUM 7.6 01/03/2024 10:29 AM    GFRAA >60 05/10/2020 05:34 AM    LABGLOM >60 01/03/2024 10:29 AM    GLUCOSE 209 01/03/2024 10:29 AM         Assessment/    - Hyponatremia - acute on chronic, beer potomania + translocation effect from Hypokalemia    - Hypokalemia - prn

## 2024-01-03 NOTE — PROGRESS NOTES
Pt ashton catheter leaking, second time now. Since pt up to chair, pt believes she can use BSC.   Removed catheter, pt tolerated well.   Educated pt on needing to urinate by 1830, stated understanding.

## 2024-01-03 NOTE — PROGRESS NOTES
Nephrology consult called to answering service, Electronically signed by Jordi Edwards on 1/3/2024 at 7:29 AM

## 2024-01-03 NOTE — PROGRESS NOTES
Reassessment completed, see flowsheets, unchanged from prior. VSS.   Pt up in chair, denies dizziness at this time.   Levophed 5 mcg/min.   All ICU Lines and monitoring remain in place. Bed locked in lowest position. Call light within reach.

## 2024-01-03 NOTE — PLAN OF CARE
Problem: SLP Adult - Impaired Swallowing  Goal: By Discharge: Advance to least restrictive diet without signs or symptoms of aspiration for planned discharge setting.  See evaluation for individualized goals.  Note: SLP completed evaluation. Please refer to notes in EMR.    Daindra Hamilton M.A., [facility]-SLP #[id number]  Speech-Language Pathologist  [employer] Phone (inpatient): [phone]  Speech Desk (outpatient)- [phone]    Certified to provide:  FEES, MBS, Vital Stim, and [facility] Dysphagia Therapy Program (MDTP)

## 2024-01-03 NOTE — PROGRESS NOTES
Friend, Nisha Olea, called and pt lives in his [place] house. He states pt needs help getting mediations as she no longer drives, curious once pt goes home if medications could be delivered.     Also stated \"pt drink a lot. From the time she wakes up to the time she goes to bed.. she was doing well until she found out alcohol could be delivered to the house\".

## 2024-01-03 NOTE — PROGRESS NOTES
Inpatient Physical Therapy Evaluation & Treatment    Unit: ICU  Date:  1/3/2024  Patient Name:    Bonny Serrano  Admitting diagnosis:  Hypokalemia [E87.6]  Hyponatremia [E87.1]  Prolonged Q-T interval on ECG [R94.31]  Elevated troponin [R79.89]  Septic shock (HCC) [A41.9, R65.21]  Anemia, unspecified type [D64.9]  Hypomagnesuria [R82.998]  Admit Date:  1/2/2024  Precautions/Restrictions/WB Status/ Lines/ Wounds/ Oxygen: Fall risk, Bed/chair alarm, Lines (IV and IJ right), Telemetry, and Continuous pulse oximetry      Pt seen for cotreatment this date due to patient safety, patient endurance, and acute illness/injury    Treatment Time:  9:50 - 10:24  Treatment Number:  1   Timed Code Treatment Minutes: 24 minutes  Total Treatment Minutes:  34  minutes    Patient Stated Goals for Therapy: \" go to rehab \"          Discharge Recommendations: SNF  DME needs for discharge: Defer to facility       Therapy recommendation for EMS Transport: can transport by wheelchair    Therapy recommendations for staff:   Assist of 1 for transfers with use of rolling walker (RW) and gait belt to/from BSC  to/from chair    History of Present Illness: 53 y.o. female with a pmh of hypertension, atrial fibrillation, pulmonary embolism no longer anticoagulated, COPD, chronic pancreatitis secondary to past alcohol abuse who presents with multiple falls at home.     Patient initially seen at [facility] emergency room.  Patient stated that she had fallen several times today and had felt dizzy.  She denies any problems with p.o. intake.     ER evaluation revealed electrolyte abnormalities including hyponatremia and hypokalemia.  QTc was elevated and magnesium was subsequently given.  Systolic blood pressure has been in the 80s such that Levophed has been initiated after fairly aggressive fluid resuscitation.  CT of the chest was unremarkable as far as explaining her present issues but there is a new right upper lobe 3 x 3 cm mass identified.    Home  SBA   Comments: EOB    Static Standing: Fair ; CGA  Dynamic Standing: Fair -; CGA  Comments: HH assist bed to chair    Posture  Seated: Forward head and neck  Standing: Forward head and neck    Bed Mobility   Supine to Sit:    SBA  Sit to Supine:   Not Tested  Rolling:   Not Tested   Scooting in sitting: SBA   Scooting in supine:  Not Tested   Bridging:  Not Tested    Transfer Training     Sit to stand:   CGA   Stand to sit:   CGA   Bed to/from Chair:  CGA with use of gait belt and HH assist    Gait gait deferred due to fatigue; pt ambulated 0 ft.   Distance:      0 ft  Deviations (firm surface/linoleum):  N/A  Assistive Device Used:    N/A  Level of Assist:    Not Tested   Comment:     Stair Training deferred, pt unsafe/ not appropriate to complete stairs at this time  # of Steps:   N/A  Level of Assist:  Not Tested   UE Support:  NA  Assistive Device:  N/A  Pattern:   N/A  Comments:      Therapeutic Exercises Initiated  all completed bilaterally unless indicated  Supine:  N/A    Seated:  Ankle pumps: 20 reps    Standing:  N/A    Activity Tolerance   During therapy session noted pt with fatigue  dizziness/lightheadedness        BP (mmHg) HR (bpm) SpO2 (%) on RA Comments   Supine at rest 111/75 103 98%     Seated at EOB 98/72  107/78 (after 5 mins) 96       Standing           End of session                Positioning Needs   Pt up in chair, alarm set, positioned in proper neutral alignment and pressure relief provided.   Call light provided and all needs within reach  RN aware of pt position/status    Other Activities  None.    Patient/Family Education   Pt educated on role of inpatient PT, POC, importance of continued activity, DC recommendations, safety awareness, transfer techniques, pacing activity, and calling for assist with mobility.      Assessment  Pt seen today for physical therapy Evaluation & Treatment. Pt demonstrated decreased Activity tolerance, Balance, Safety, and Strength as well as decreased

## 2024-01-03 NOTE — PROGRESS NOTES
Vancomycin Day: 2  Current Regimen: 500 mg IV every 8 hours    Patient's labs, cultures, vitals, and vancomycin regimen reviewed.   SCr stable  U/O not measured/well documented  [employer] updated    Plan:   Order level for 1/4 at 1400  Karen Law Pharm D 1/3/46817:04 PM  .

## 2024-01-03 NOTE — CARE COORDINATION
Received call from Ryanne - friend of the patient. Ryanne can transport the patient home at discharge. Ryanne can also assist with helping the patient take a shower at home. Ryanne asked that CM let patient know she has been trying to call her.     CM spoke to patient and advised that Ryanne would like a call from her. CM also advised the patient that OT/PT are recommending the patient go to rehab at a nursing facility.    The patient asked for how long and when told 20 days or less the patient stated she is in agreement with rehab.     Choices:    1) James VILLANUEVA - michaela  stephanie Costa making referral for patient.

## 2024-01-03 NOTE — PROGRESS NOTES
Comprehensive Nutrition Assessment    Type and Reason for Visit:  Initial, Positive Nutrition Screen (+ screen for MST = 3)    Nutrition Recommendations/Plan:   Continue ADULT DIET; Regular diet order.   Added Ensure Compact with meals.   Monitor appetite, meal intake, and acceptance/intake of ONS.   Monitor nutrition-related labs, bowel function, and weight trends.      Malnutrition Assessment:  Malnutrition Status:  Severe malnutrition (01/03/24 1443)    Context:  Chronic Illness     Findings of the 6 clinical characteristics of malnutrition:  Energy Intake:  75% or less estimated energy requirements for 1 month or longer  Weight Loss:  Unable to assess     Body Fat Loss:  Severe body fat loss Triceps   Muscle Mass Loss:  Severe muscle mass loss Temples (temporalis), Clavicles (pectoralis & deltoids), Thigh (quadraceps), Calf (gastrocnemius), Hand (interosseous)  Fluid Accumulation:  No significant fluid accumulation     Strength:  Not Performed    Nutrition Assessment:    patient is nutritionally compromised AEB hx of aclohol abuse PTA, diarrhea for the last few months PTA, and decreased appetite/po intake PTA and she is at risk for further compromise d/t severe fat/muscle loss, altered nutrition-related labs, and underweight status (BMI = 14.95); will continue ADULT DIET; Regular diet order and add Ensure Compact with meals    Nutrition Related Findings:    patient is A & O x 4; patient presented with c/o falling at home and dizziness off and on for 1 week PTA; patient has a hx of alcohol abuse - she reported that she only drinks on occasion but did get upset when the RN inquired more about her alcohol consumption; per patient's friend and the person who owns the home that patient lives in, patient drinks ETOH from the time she wakes up until the time she goes to bed; she doesn't drive so she has difficulty getting her medications; she has alcohol delivered to her home; SLP recommended regular solids and

## 2024-01-03 NOTE — CONSULTS
Pharmacy to dose IV Vanco:  750mg IV given at 1430 to provide Gram+ organism coverage to include MRSA.  Will continue to dose at 500mg IV Q8hrs.  Trough 1/4/24 at 1400.  Pred , Tr 12.9mg/L, Tox 8%  Michael Frazier [facility] PharmD 1/2/2024 9:19 PM

## 2024-01-03 NOTE — PLAN OF CARE
Admitted for falls and workup with electrolyte abn  Severe hyponatremia with Na of 116- nephrology consulted  Hypokalemia  - ongoing replacement  Soft tissue right upper lobe mass

## 2024-01-03 NOTE — PROGRESS NOTES
Speech Language Pathology  Swallowing Disorders and Dysphagia  Clinical Bedside Swallow Assessment  Facility/Department: [facility] ICU    Instrumentation: Not clinically indicated at this time. Will continue to monitor  Diet recommendation: IDDSI 7 Regular Solids; IDDSI 0 Thin Liquids; Meds whole with thin liquids  Risk management: upright for all intake, stay upright for at least 30 mins after intake, small bites/sips, distant supervision with intake, oral care 2-3x/day to reduce adverse affects in the event of aspiration, increase physical mobility as able, slow rate of intake, general GERD precautions, general aspiration precautions, and hold PO and contact SLP if s/s of aspiration or worsening respiratory status develop.    NAME:Bonny Serrano  : 1970 (53 y.o.)   MRN: [id number]  ROOM: [id number]-  ADMISSION DATE: 2024  PATIENT DIAGNOSIS(ES): Hypokalemia [E87.6]  Hyponatremia [E87.1]  Prolonged Q-T interval on ECG [R94.31]  Elevated troponin [R79.89]  Septic shock (HCC) [A41.9, R65.21]  Anemia, unspecified type [D64.9]  Hypomagnesuria [R82.998]  Chief Complaint   Patient presents with    Dizziness     Per pt dizziness off and on for a week and pt has been off medications and not able too get refills until appt scheduled for tomorrow. Pt stated multiple falls over the last few days.      Patient Active Problem List    Diagnosis Date Noted    Hypokalemia 2024    Hypotension 2024    Mass of upper lobe of right lung 2024    Falls 2024    Cigarette smoker 2020    Hyponatremia     AVM (arteriovenous malformation) of small bowel, acquired 2018    Pulmonary embolism (HCC) 2018    Paroxysmal atrial fibrillation (HCC) 2018    Macrocytic anemia 2018    Alcohol-induced chronic pancreatitis (HCC) 2017    Alcoholism (HCC) 2016    Lung collapse 2014    Tobacco abuse 2014    Chronic hyponatremia 2014     Past Medical History:

## 2024-01-03 NOTE — PROGRESS NOTES
Shift assessment, completed, see flow sheet. Pt is alert and oriented x 4. Following commands. Pt c/o \"little dizziness\" this AM.     SR 96, BP 91/58 (69), SpO2 98% on RA. Respirations are easy, even, and unlabored. Bilateral lung sounds clear/diminished.     R IJ CVC, WNL with levophed 7 mcg/min and vancomycin infusing. 2 PIVs, WNL, saline locked.    Roth in place and patent with STAT lock.     Call light within reach. Bed in lowest position. Bed alarm on.

## 2024-01-03 NOTE — H&P
V2.0  History and Physical      Name:  Bonny Serrano /Age/Sex: 1970  (53 y.o. female)   MRN & CSN:  [id number] & [id number] Encounter Date/Time: 1/3/2024 12:58 AM EST   Location:  [id number]- PCP: Ryanne Gonzalez PA       Hospital Day: 2    Assessment and Plan:   Bonny Serrano is a 53 y.o. female with a pmh of hypertension, atrial fibrillation, pulmonary embolism no longer anticoagulated, COPD, chronic pancreatitis secondary to past alcohol abuse who presents with multiple falls at home.    Hospital Problems             Last Modified POA    * (Principal) Hyponatremia 2024 Yes    Hypokalemia 1/3/2024 Yes    Hypotension 1/3/2024 Yes    Mass of upper lobe of right lung 1/3/2024 Yes    Falls 1/3/2024 Yes         Principal Problem:    Hyponatremia  Consider dehydration as a cause but also SIADH in the presence of lung mass which could be malignant  Plan:   No further sodium replacement a.m. labs correction.  Active Problems:    Hypokalemia  Plan:   Replace as clinically indicated      Hypotension  In a patient normally hypertensive not easily responding to IV fluids or Levophed.  Consider adrenal insufficiency.  Plan:   Decadron 10 mg every 8 hours  Cortisol level in the a.m.      Mass of upper lobe of right lung  Suspect malignancy until proven otherwise  Plan:   Defer to pulmonary service to determine best approach for diagnosis      Falls  Likely multifactorial secondary to hypovolemia, hypotension, electrolyte abnormality, and deconditioning  Plan:   After correcting what we are able to will have physical and Occupational Therapy see her           Disposition:   Current Living situation: Home  Expected Disposition: To be determined  Estimated D/C: 4-5 days    Diet ADULT DIET; Easy to Chew   DVT Prophylaxis [x] Lovenox, []  Heparin, [] SCDs, [] Ambulation,  [] Eliquis, [] Xarelto, [] Coumadin   Code Status Full Code   Surrogate Decision Maker/ POA Unknown     Personally reviewed Lab Studies and  approaching density of CSF may represent prominent perivascular spaces versus tiny foci of remote ischemic change.  No abnormal extra-axial fluid collection is identified.  There is atherosclerotic calcification of distal internal carotid arteries. ORBITS: The visualized portion of the orbits demonstrate no acute abnormality. SINUSES: The visualized paranasal sinuses and mastoid air cells demonstrate no acute abnormality.  Right mastoid air cells less well aerated as compared to the left which could be developmental versus changes of prior sclerosing mastoiditis.  Tiny filling defect within right external auditory canal may be related to retained cerumen (image 13). SOFT TISSUES/SKULL:  No acute abnormality of the visualized skull or soft tissues.     No evidence of acute intracranial abnormality.     XR CHEST PORTABLE    Result Date: 1/2/2024  EXAMINATION: ONE XRAY VIEW OF THE CHEST 1/2/2024 12:58 pm COMPARISON: 05/10/2020 HISTORY: ORDERING SYSTEM PROVIDED HISTORY: line placement TECHNOLOGIST PROVIDED HISTORY: Reason for exam:->line placement Reason for Exam: central line placement, dizziness FINDINGS: There is increasing density noted in the right upper lung zone.  This could represent an infiltrate or tumor and correlation with CT scan of the chest is recommended.  There is some scarring noted in the the medial aspect of the left upper lobe.  The lungs otherwise are clear.  There is a calcified right hilar lymph node.  The heart appears normal.  There is a central line in place with its distal tip overlying the superior vena cava.     Increasing density noted in the right upper lung zone.  This could represent infiltrate or tumor and correlation with CT scan recommended.          DAVID SEALS MD    Thank you Ryanne Gonzalez PA for the opportunity to be involved in this patient's care.     Comment: Please note this report has been produced using speech recognition software and may contain errors related

## 2024-01-03 NOTE — PROGRESS NOTES
Admission assessment done,patient brought from [facility] via stretcher accompanied by RN,on levo 5mcg/min.  Patient given a CHG bath,admission questions asked and settled in bed.  Room orientation done.  She is awake,alert and oriented X 4.  She is on levo 5mcg/min.  All ICU lines and connections checked.  She is NPO.  She has a ashton in or urine output.  Bed is at its lowest level,call light within reach,will continue to monitor patient.

## 2024-01-03 NOTE — CONSULTS
P Pulmonary, Critical Care and Sleep Specialists                                 Pulmonary/Critical care  Consult /Progress Note :                                                                  CC :Fall ,hypotension   Patient is being seen at the request of Nemo for a consultation for hypotension    HISTORY OF PRESENT ILLNESS:   Patient is a 53-year-old female with previous history of pulmonary embolism not on anticoagulation chronic pancreatitis and past alcohol abuse    She presented from home after she got involved in multiple falls in the last few weeks    She was seen in [facility] emergency her YONG was 116    She was feeling dizzy she also admitted that she has not been drinking or eating very well    She also had elevated QTc    She was hypotensive her blood pressure was in the 80s she was started on Levophed    She had CT scan of the chest as well and shows right upper lobe 3 cm mass  PAST MEDICAL HISTORY:  Past Medical History:   Diagnosis Date    A-fib (HCC)     Anemia     Anxiety     Chronic pancreatitis (HCC)     Collapse of right lung     COPD (chronic obstructive pulmonary disease) (HCC)     COPD (chronic obstructive pulmonary disease) (HCC)     Depression     GI bleed     H/O colonoscopy     Hypertension     Pancreatitis     Pulmonary embolism (HCC) 02/2018     PAST SURGICAL HISTORY:  Past Surgical History:   Procedure Laterality Date    UPPER GASTROINTESTINAL ENDOSCOPY         FAMILY HISTORY:  family history includes Diabetes in her maternal grandfather.    SOCIAL HISTORY:   reports that she has been smoking cigarettes. She started smoking about 28 years ago. She has a 25.0 pack-year smoking history. She has never used smokeless tobacco.    Scheduled Meds:   dexAMETHasone  10 mg IntraVENous Q8H    [Held by provider] sertraline  50 mg Oral Daily    tiotropium  2 puff Inhalation Daily RT    sodium chloride flush  5-40 mL IntraVENous 2 times per

## 2024-01-03 NOTE — PLAN OF CARE
Problem: Discharge Planning  Goal: Discharge to home or other facility with appropriate resources  1/3/2024 0149 by Ny Ocampo RN  Outcome: Progressing  1/3/2024 0148 by Ny Ocampo RN  Outcome: Progressing  Flowsheets (Taken 1/2/2024 2132)  Discharge to home or other facility with appropriate resources:   Identify barriers to discharge with patient and caregiver   Arrange for needed discharge resources and transportation as appropriate   Identify discharge learning needs (meds, wound care, etc)   Arrange for interpreters to assist at discharge as needed     Problem: Skin/Tissue Integrity  Goal: Absence of new skin breakdown  Description: 1.  Monitor for areas of redness and/or skin breakdown  2.  Assess vascular access sites hourly  3.  Every 4-6 hours minimum:  Change oxygen saturation probe site  4.  Every 4-6 hours:  If on nasal continuous positive airway pressure, respiratory therapy assess nares and determine need for appliance change or resting period.  1/3/2024 0149 by Ny Ocampo RN  Outcome: Progressing  1/3/2024 0148 by Ny Ocampo RN  Outcome: Progressing     Problem: Safety - Adult  Goal: Free from fall injury  Outcome: Progressing     Problem: Pain  Goal: Verbalizes/displays adequate comfort level or baseline comfort level  Outcome: Progressing     Problem: Cardiovascular - Adult  Goal: Maintains optimal cardiac output and hemodynamic stability  Outcome: Progressing  Goal: Absence of cardiac dysrhythmias or at baseline  Outcome: Progressing     Problem: Skin/Tissue Integrity - Adult  Goal: Skin integrity remains intact  Outcome: Progressing  Goal: Incisions, wounds, or drain sites healing without S/S of infection  Outcome: Progressing  Goal: Oral mucous membranes remain intact  Outcome: Progressing     Problem: Musculoskeletal - Adult  Goal: Return mobility to safest level of function  Outcome: Progressing  Goal: Maintain proper alignment of affected body part  Outcome:

## 2024-01-03 NOTE — PROGRESS NOTES
Reassessment done,patient is alert and oriented X 4.  She is on room air,has clear breath sounds bilaterally.  She is on levo 7mcg/min,titrated according to MAR and BP monitored closely.  All ICU lines and connections checked.  She is able to turn herself in bed.  She has a ashton in for urine output,urine noted to be yellow in colour.  Bed is at its lowest level,call light within reach,will continue to monitor patiet.   Normal vision: sees adequately in most situations; can see medication labels, newsprint/glasses

## 2024-01-03 NOTE — PROGRESS NOTES
Reassessment done,patient is on room ai,on levo 11mcg/min,titrated as per MAR and BP monitored closely.  She is on a regular diet.  All ICU lines and connections checked.  She has a ashton in for urine output.  Bed is at its lowest level,call light within reach,will continue to monitor patient.

## 2024-01-03 NOTE — PROGRESS NOTES
Reassessment completed, see flowsheets, unchanged from prior. VSS.   Levophed 5 mcg/min.   All ICU Lines and monitoring remain in place. Bed locked in lowest position. Call light within reach.

## 2024-01-04 PROBLEM — R79.89 ELEVATED TROPONIN: Status: ACTIVE | Noted: 2024-01-04

## 2024-01-04 PROBLEM — A41.9 SEPTIC SHOCK (HCC): Status: ACTIVE | Noted: 2024-01-04

## 2024-01-04 PROBLEM — R65.21 SEPTIC SHOCK (HCC): Status: ACTIVE | Noted: 2024-01-04

## 2024-01-04 PROBLEM — R82.998 HYPOMAGNESURIA: Status: ACTIVE | Noted: 2024-01-04

## 2024-01-04 LAB
ANION GAP SERPL CALCULATED.3IONS-SCNC: 4 MMOL/L (ref 3–16)
ANION GAP SERPL CALCULATED.3IONS-SCNC: 6 MMOL/L (ref 3–16)
ANION GAP SERPL CALCULATED.3IONS-SCNC: 7 MMOL/L (ref 3–16)
BASOPHILS # BLD: 0 K/UL (ref 0–0.2)
BASOPHILS NFR BLD: 0.1 %
BUN SERPL-MCNC: 5 MG/DL (ref 7–20)
BUN SERPL-MCNC: 5 MG/DL (ref 7–20)
BUN SERPL-MCNC: 6 MG/DL (ref 7–20)
CALCIUM SERPL-MCNC: 7.3 MG/DL (ref 8.3–10.6)
CALCIUM SERPL-MCNC: 7.4 MG/DL (ref 8.3–10.6)
CALCIUM SERPL-MCNC: 7.4 MG/DL (ref 8.3–10.6)
CHLORIDE SERPL-SCNC: 91 MMOL/L (ref 99–110)
CHLORIDE SERPL-SCNC: 95 MMOL/L (ref 99–110)
CHLORIDE SERPL-SCNC: 96 MMOL/L (ref 99–110)
CO2 SERPL-SCNC: 26 MMOL/L (ref 21–32)
CREAT SERPL-MCNC: <0.5 MG/DL (ref 0.6–1.1)
DEPRECATED RDW RBC AUTO: 13 % (ref 12.4–15.4)
EOSINOPHIL # BLD: 0 K/UL (ref 0–0.6)
EOSINOPHIL NFR BLD: 0.1 %
GFR SERPLBLD CREATININE-BSD FMLA CKD-EPI: >60 ML/MIN/{1.73_M2}
GLUCOSE BLD-MCNC: 136 MG/DL (ref 70–99)
GLUCOSE BLD-MCNC: 197 MG/DL (ref 70–99)
GLUCOSE BLD-MCNC: 199 MG/DL (ref 70–99)
GLUCOSE SERPL-MCNC: 151 MG/DL (ref 70–99)
GLUCOSE SERPL-MCNC: 162 MG/DL (ref 70–99)
GLUCOSE SERPL-MCNC: 171 MG/DL (ref 70–99)
HCT VFR BLD AUTO: 24.6 % (ref 36–48)
HGB BLD-MCNC: 8.5 G/DL (ref 12–16)
LYMPHOCYTES # BLD: 0.2 K/UL (ref 1–5.1)
LYMPHOCYTES NFR BLD: 2.8 %
MAGNESIUM SERPL-MCNC: 1.4 MG/DL (ref 1.8–2.4)
MCH RBC QN AUTO: 33.9 PG (ref 26–34)
MCHC RBC AUTO-ENTMCNC: 34.7 G/DL (ref 31–36)
MCV RBC AUTO: 97.8 FL (ref 80–100)
MONOCYTES # BLD: 0.1 K/UL (ref 0–1.3)
MONOCYTES NFR BLD: 2.3 %
NEUTROPHILS # BLD: 5.8 K/UL (ref 1.7–7.7)
NEUTROPHILS NFR BLD: 94.7 %
PERFORMED ON: ABNORMAL
PLATELET # BLD AUTO: 298 K/UL (ref 135–450)
PMV BLD AUTO: 7.3 FL (ref 5–10.5)
POTASSIUM SERPL-SCNC: 3.3 MMOL/L (ref 3.5–5.1)
POTASSIUM SERPL-SCNC: 3.8 MMOL/L (ref 3.5–5.1)
POTASSIUM SERPL-SCNC: 3.8 MMOL/L (ref 3.5–5.1)
RBC # BLD AUTO: 2.52 M/UL (ref 4–5.2)
SODIUM SERPL-SCNC: 121 MMOL/L (ref 136–145)
SODIUM SERPL-SCNC: 127 MMOL/L (ref 136–145)
SODIUM SERPL-SCNC: 129 MMOL/L (ref 136–145)
TSH SERPL DL<=0.005 MIU/L-ACNC: 0.41 UIU/ML (ref 0.27–4.2)
WBC # BLD AUTO: 6.1 K/UL (ref 4–11)

## 2024-01-04 PROCEDURE — 2580000003 HC RX 258: Performed by: NURSE PRACTITIONER

## 2024-01-04 PROCEDURE — 85025 COMPLETE CBC W/AUTO DIFF WBC: CPT

## 2024-01-04 PROCEDURE — 80048 BASIC METABOLIC PNL TOTAL CA: CPT

## 2024-01-04 PROCEDURE — 6360000002 HC RX W HCPCS: Performed by: INTERNAL MEDICINE

## 2024-01-04 PROCEDURE — 6370000000 HC RX 637 (ALT 250 FOR IP): Performed by: INTERNAL MEDICINE

## 2024-01-04 PROCEDURE — 2580000003 HC RX 258: Performed by: INTERNAL MEDICINE

## 2024-01-04 PROCEDURE — 6370000000 HC RX 637 (ALT 250 FOR IP): Performed by: NURSE PRACTITIONER

## 2024-01-04 PROCEDURE — 87205 SMEAR GRAM STAIN: CPT

## 2024-01-04 PROCEDURE — 94761 N-INVAS EAR/PLS OXIMETRY MLT: CPT

## 2024-01-04 PROCEDURE — 6360000002 HC RX W HCPCS: Performed by: NURSE PRACTITIONER

## 2024-01-04 PROCEDURE — 99233 SBSQ HOSP IP/OBS HIGH 50: CPT | Performed by: INTERNAL MEDICINE

## 2024-01-04 PROCEDURE — 2060000000 HC ICU INTERMEDIATE R&B

## 2024-01-04 PROCEDURE — 94640 AIRWAY INHALATION TREATMENT: CPT

## 2024-01-04 PROCEDURE — 36415 COLL VENOUS BLD VENIPUNCTURE: CPT

## 2024-01-04 PROCEDURE — 87070 CULTURE OTHR SPECIMN AEROBIC: CPT

## 2024-01-04 PROCEDURE — 87641 MR-STAPH DNA AMP PROBE: CPT

## 2024-01-04 PROCEDURE — 83735 ASSAY OF MAGNESIUM: CPT

## 2024-01-04 PROCEDURE — 84443 ASSAY THYROID STIM HORMONE: CPT

## 2024-01-04 RX ORDER — PANTOPRAZOLE SODIUM 40 MG/1
40 TABLET, DELAYED RELEASE ORAL
Status: DISCONTINUED | OUTPATIENT
Start: 2024-01-05 | End: 2024-01-04

## 2024-01-04 RX ORDER — TOLVAPTAN 15 MG/1
7.5 TABLET ORAL ONCE
Status: DISCONTINUED | OUTPATIENT
Start: 2024-01-04 | End: 2024-01-05 | Stop reason: HOSPADM

## 2024-01-04 RX ORDER — PANTOPRAZOLE SODIUM 40 MG/1
40 TABLET, DELAYED RELEASE ORAL
Status: DISCONTINUED | OUTPATIENT
Start: 2024-01-04 | End: 2024-01-05 | Stop reason: HOSPADM

## 2024-01-04 RX ORDER — ALBUTEROL SULFATE 90 UG/1
2 AEROSOL, METERED RESPIRATORY (INHALATION) EVERY 6 HOURS PRN
Status: DISCONTINUED | OUTPATIENT
Start: 2024-01-04 | End: 2024-01-05 | Stop reason: HOSPADM

## 2024-01-04 RX ORDER — BUDESONIDE AND FORMOTEROL FUMARATE DIHYDRATE 160; 4.5 UG/1; UG/1
2 AEROSOL RESPIRATORY (INHALATION)
Status: DISCONTINUED | OUTPATIENT
Start: 2024-01-04 | End: 2024-01-05 | Stop reason: HOSPADM

## 2024-01-04 RX ORDER — DEXAMETHASONE SODIUM PHOSPHATE 4 MG/ML
4 INJECTION, SOLUTION INTRA-ARTICULAR; INTRALESIONAL; INTRAMUSCULAR; INTRAVENOUS; SOFT TISSUE EVERY 8 HOURS
Status: DISCONTINUED | OUTPATIENT
Start: 2024-01-04 | End: 2024-01-05 | Stop reason: HOSPADM

## 2024-01-04 RX ADMIN — VANCOMYCIN HYDROCHLORIDE 500 MG: 500 INJECTION, POWDER, LYOPHILIZED, FOR SOLUTION INTRAVENOUS at 08:10

## 2024-01-04 RX ADMIN — CEFEPIME 2000 MG: 2 INJECTION, POWDER, FOR SOLUTION INTRAVENOUS at 18:26

## 2024-01-04 RX ADMIN — PANTOPRAZOLE SODIUM 40 MG: 40 TABLET, DELAYED RELEASE ORAL at 10:46

## 2024-01-04 RX ADMIN — MUPIROCIN: 20 OINTMENT TOPICAL at 19:59

## 2024-01-04 RX ADMIN — VASOPRESSIN: 20 INJECTION, SOLUTION INTRAVENOUS at 09:30

## 2024-01-04 RX ADMIN — POTASSIUM CHLORIDE 40 MEQ: 750 TABLET, EXTENDED RELEASE ORAL at 06:12

## 2024-01-04 RX ADMIN — DEXAMETHASONE SODIUM PHOSPHATE 10 MG: 10 INJECTION, SOLUTION INTRAMUSCULAR; INTRAVENOUS at 08:08

## 2024-01-04 RX ADMIN — DEXAMETHASONE SODIUM PHOSPHATE 4 MG: 4 INJECTION, SOLUTION INTRAMUSCULAR; INTRAVENOUS at 23:42

## 2024-01-04 RX ADMIN — MAGNESIUM SULFATE HEPTAHYDRATE 2000 MG: 40 INJECTION, SOLUTION INTRAVENOUS at 08:30

## 2024-01-04 RX ADMIN — MUPIROCIN: 20 OINTMENT TOPICAL at 10:44

## 2024-01-04 RX ADMIN — CEFEPIME 2000 MG: 2 INJECTION, POWDER, FOR SOLUTION INTRAVENOUS at 10:45

## 2024-01-04 RX ADMIN — LORAZEPAM 1 MG: 1 TABLET ORAL at 20:10

## 2024-01-04 RX ADMIN — ACETAMINOPHEN 650 MG: 325 TABLET ORAL at 00:28

## 2024-01-04 RX ADMIN — Medication 1 TABLET: at 08:08

## 2024-01-04 RX ADMIN — Medication 2 PUFF: at 19:59

## 2024-01-04 RX ADMIN — Medication 10 ML: at 19:59

## 2024-01-04 RX ADMIN — ENOXAPARIN SODIUM 30 MG: 100 INJECTION SUBCUTANEOUS at 08:07

## 2024-01-04 RX ADMIN — TIOTROPIUM BROMIDE INHALATION SPRAY 2 PUFF: 3.12 SPRAY, METERED RESPIRATORY (INHALATION) at 08:01

## 2024-01-04 RX ADMIN — Medication 10 ML: at 08:09

## 2024-01-04 RX ADMIN — THIAMINE HYDROCHLORIDE 100 MG: 100 INJECTION, SOLUTION INTRAMUSCULAR; INTRAVENOUS at 08:08

## 2024-01-04 RX ADMIN — DEXAMETHASONE SODIUM PHOSPHATE 4 MG: 4 INJECTION, SOLUTION INTRAMUSCULAR; INTRAVENOUS at 17:04

## 2024-01-04 RX ADMIN — DEXAMETHASONE SODIUM PHOSPHATE 10 MG: 10 INJECTION, SOLUTION INTRAMUSCULAR; INTRAVENOUS at 00:17

## 2024-01-04 ASSESSMENT — PAIN DESCRIPTION - ORIENTATION
ORIENTATION: RIGHT;LEFT
ORIENTATION: RIGHT;LEFT
ORIENTATION: LEFT;RIGHT

## 2024-01-04 ASSESSMENT — PAIN DESCRIPTION - DESCRIPTORS
DESCRIPTORS: ACHING

## 2024-01-04 ASSESSMENT — PAIN SCALES - GENERAL
PAINLEVEL_OUTOF10: 6
PAINLEVEL_OUTOF10: 6
PAINLEVEL_OUTOF10: 5
PAINLEVEL_OUTOF10: 0

## 2024-01-04 ASSESSMENT — PAIN DESCRIPTION - FREQUENCY
FREQUENCY: INTERMITTENT
FREQUENCY: INTERMITTENT

## 2024-01-04 ASSESSMENT — PAIN - FUNCTIONAL ASSESSMENT
PAIN_FUNCTIONAL_ASSESSMENT: ACTIVITIES ARE NOT PREVENTED

## 2024-01-04 ASSESSMENT — PAIN DESCRIPTION - LOCATION
LOCATION: LEG

## 2024-01-04 ASSESSMENT — PAIN DESCRIPTION - PAIN TYPE
TYPE: ACUTE PAIN
TYPE: ACUTE PAIN

## 2024-01-04 ASSESSMENT — PAIN DESCRIPTION - ONSET
ONSET: ON-GOING
ONSET: PROGRESSIVE

## 2024-01-04 NOTE — FLOWSHEET NOTE
01/04/24 1631   Vital Signs   Temp 97.5 °F (36.4 °C)   Temp Source Oral   Pulse (!) 119   Heart Rate Source Monitor   Respirations 16   /69   MAP (Calculated) 80   MAP (mmHg) 80   BP Location Left upper arm   Pain Assessment   Pain Assessment None - Denies Pain   Oxygen Therapy   SpO2 98 %   O2 Device None (Room air)     Vitals completed, VSS without significant changes. Patient admitted to PCU room 316. Patient oriented to room, call light, bed rails, phone, lights and bathroom. Patient instructed about the schedule of the day including: vital sign frequency, lab draws, possible tests, frequency of MD and staff rounds, daily weights, I &O's and prescribed diet. Telemetry box in place, patient aware of placement and reason. Bed locked, in lowest position, side rails up 2/4, call light within reach.        Recliner Assessment  Patient is able to demonstrate the ability to move from a reclining position to an upright position within the recliner.     Lore Rivers RN

## 2024-01-04 NOTE — PROGRESS NOTES
Reassessment done,patient is awake,on room air,able to turn herself in bed and is able to walk to the bedside commode with minimal assistance.  She is on levo 5mcg/min,2%NS at 60mls/hr.  All ICU lines and connections checked.  Bed is at its lowest level,call light within reach,will continue to monitor patient.

## 2024-01-04 NOTE — PROGRESS NOTES
Latest Reference Range & Units 01/04/24 04:48 01/04/24 10:54   Sodium 136 - 145 mmol/L 121 (L) 129 (L)   (L): Data is abnormally low    Pt with 2% saline at 60 ml/h. Nephrology goal for 1530 is -130. Made Dr. Smith aware, new order to stop 2% at this time.

## 2024-01-04 NOTE — PLAN OF CARE
Problem: Discharge Planning  Goal: Discharge to home or other facility with appropriate resources  1/4/2024 1120 by Dana Torrez RN  Outcome: Progressing     Problem: Skin/Tissue Integrity  Goal: Absence of new skin breakdown  Description: 1.  Monitor for areas of redness and/or skin breakdown  2.  Assess vascular access sites hourly  3.  Every 4-6 hours minimum:  Change oxygen saturation probe site  4.  Every 4-6 hours:  If on nasal continuous positive airway pressure, respiratory therapy assess nares and determine need for appliance change or resting period.  1/4/2024 1120 by Dana Torrez RN  Outcome: Progressing     Problem: Safety - Adult  Goal: Free from fall injury  1/4/2024 1120 by Dana Torrez RN  Outcome: Progressing     Problem: Pain  Goal: Verbalizes/displays adequate comfort level or baseline comfort level  1/4/2024 1120 by Dana Torrez RN  Outcome: Progressing     Problem: Cardiovascular - Adult  Goal: Maintains optimal cardiac output and hemodynamic stability  1/4/2024 1120 by Dana Torrez RN  Outcome: Progressing     Problem: Cardiovascular - Adult  Goal: Absence of cardiac dysrhythmias or at baseline  1/4/2024 1120 by Dana Torrez RN  Outcome: Progressing     Problem: Skin/Tissue Integrity - Adult  Goal: Skin integrity remains intact  1/4/2024 1120 by Dana Torrez RN  Outcome: Progressing

## 2024-01-04 NOTE — PROGRESS NOTES
IM Progress Note    Admit Date:  1/2/2024  2  Admitted for falls and workup with electrolyte abn  Severe hyponatremia with Na of 116- nephrology consulted    Interval history:      Subjective:  Ms. Serrano seen up in bed, feels ok today   Remains on RA  Off levophed    Sodium improved with 2% saline    Objective:   /75   Pulse (!) 104   Temp 98.1 °F (36.7 °C) (Axillary)   Resp 16   Ht 1.549 m (5' 1\")   Wt 38.6 kg (85 lb 1.6 oz)   LMP 07/12/2015   SpO2 91%   BMI 16.08 kg/m²     Intake/Output Summary (Last 24 hours) at 1/4/2024 0739  Last data filed at 1/4/2024 0424  Gross per 24 hour   Intake 2869.29 ml   Output 387 ml   Net 2482.29 ml       Physical Exam:        General:  middle aged female, fatigued  Awake, alert and oriented. Appears to be not in any distress  Mucous Membranes:  Pink , anicteric  Neck: No JVD, no carotid bruit, no thyromegaly  Chest:  Clear to auscultation bilaterally, minimal basilar crackles  Cardiovascular:  RRR S1S2 heard, no murmurs or gallops  Abdomen:  Soft, undistended, non tender, no organomegaly, BS present  Extremities: No edema or cyanosis. Distal pulses well felt  Neurological : grossly normal with gen weakness    Medications:   Scheduled Medications:    dexAMETHasone  10 mg IntraVENous Q8H    insulin lispro  0-8 Units SubCUTAneous TID WC    insulin lispro  0-4 Units SubCUTAneous Nightly    [Held by provider] sertraline  50 mg Oral Daily    tiotropium  2 puff Inhalation Daily RT    sodium chloride flush  5-40 mL IntraVENous 2 times per day    enoxaparin  30 mg SubCUTAneous Daily    thiamine  100 mg IntraVENous Daily    multivitamin  1 tablet Oral Daily    cefepime  2,000 mg IntraVENous Q12H    vancomycin  500 mg IntraVENous Q8H     I   dextrose      sodium chloride 154 mEq in sodium chloride 0.9 % 1,000 mL infusion 60 mL/hr at 01/03/24 1629    norepinephrine 1 mcg/min (01/04/24 0425)    sodium chloride       glucose, dextrose bolus **OR** dextrose bolus, glucagon (rDNA),  well as neoplastic etiologies.  Recommend   PET/CT versus tissue sampling         CT HEAD WO CONTRAST   Preliminary Result   No evidence of acute intracranial abnormality.         CT CERVICAL SPINE WO CONTRAST   Final Result   No acute abnormality of the cervical spine.         XR CHEST PORTABLE   Final Result   Increasing density noted in the right upper lung zone.  This could represent   infiltrate or tumor and correlation with CT scan recommended.           Cultures  Blood- NGTD  Sputum - pending      Assessment & Plan:      Hyponatremia     Dehydration vs SIADH in the presence of lung mass which could be malignant  Also ethanol level elevated on arrival   Na at 116 >123  Sodium improved with 2% saline nephrology consulted  Serial BMP and seizure precautions      Hypokalemia- likely nutritional  Replaced as clinically indicated     Possible sepsis   In a patient normally hypertensive not easily responding to IV fluids  Needed levophed  Source likely pna   Improved now  Check cortisol       Mass of upper lobe of right lung  Cavitory lesion vs malignancy  Started abx - cefepime and vanc , cx pending  Repeat ct planned in 6 weeks  Pulm consulted    Paroxysmal Afibb  Not on any AC- NSR on adm  Was on cardizem not sure if taking      HLD  - Continue statin      Chronic Pancreatitis   - Continue Creon      GERD  - Continue PPI      Falls  Likely multifactorial secondary to hypovolemia, hypotension, electrolyte abnormality, and deconditioning  Start PT /OT when able    Dvt prophylaxis  Full code    Ok for PCU    Timmy Lim MD, 1/4/2024 7:39 AM

## 2024-01-04 NOTE — PROGRESS NOTES
[facility] Progress Note           Subjective/   53 y.o. year old female who we are seeing in consultation for Hyponatremia.     HPI:  Sodium trend better with course of ~2% saline.  Intake better.    ROS:  +weak, no shortness of breath.    Objective/   GEN:  Chronically ill, /74   Pulse (!) 125   Temp 97.7 °F (36.5 °C) (Oral)   Resp 18   Ht 1.549 m (5' 1\")   Wt 38.6 kg (85 lb 1.6 oz)   LMP 07/12/2015   SpO2 94%   BMI 16.08 kg/m²   HEENT: non-icteric, no JVD  CV: S1, S2, tachycardic, without m/r/g; no LE edema  RESP: CTA B without w/r/r; breathing wnl  ABD: +bs, soft, nt, no hsm  SKIN: warm, no rashes    Data/  Recent Labs     01/02/24  1135 01/03/24  0515 01/04/24  0447   WBC 7.7 9.5 6.1   HGB 11.4* 10.7* 8.5*   HCT 32.2* 30.2* 24.6*   MCV 95.6 97.7 97.8    340 298     Recent Labs     01/02/24  1454 01/02/24  2214 01/03/24  0515 01/03/24  2147 01/04/24  0448 01/04/24  1054   * 122*   < > 123* 121* 129*   K 2.7* 3.1*   < > 3.9 3.3* 3.8   CL 86* 91*   < > 91* 91* 96*   CO2 23 23   < > 24 26 26   GLUCOSE 102* 100*   < > 238* 162* 151*   MG 2.70* 2.10  --   --  1.40*  --    BUN 11 9   < > 7 6* 5*   CREATININE <0.5* <0.5*   < > <0.5* <0.5* <0.5*   LABGLOM >60 >60   < > >60 >60 >60    < > = values in this interval not displayed.       Assessment/     - Hyponatremia - acute on chronic, beer potomania + translocation effect from Hypokalemia     - Hypokalemia - prn replacement     - RUL cavitary mass - plans per Pulmonary        Plan/     - Trial off IVF's today, prn dose of samsca if repeat sodium 126 or lower  - Zoloft on hold  - Trend labs, bp's, weights, & urine output    ____________________________________  Wilber Smith MD  [facility]  [email or web address]  Office: [phone]

## 2024-01-04 NOTE — PROGRESS NOTES
Reassessment completed (see Flowsheet ) pt alert able to make needs known.no complaints voiced at this time. Resp Cx collected and sent to lab per orders    Call light and bedside table in reach .

## 2024-01-04 NOTE — PROGRESS NOTES
Telemetry Assignment Communication Form    Patient has orders for continuous telemetry OR pulse oximeter only orders    To be filled out by Clinical    Patient has Admission or Transfer orders and is assigned to 317      (Once this top section is completed:  Select \"Route\" and send note to Fax number: [phone]))      ___________________________________________________________________________      To be filled out by [facility]    Patient assigned to tele box number: _____[id number]               (to be written in by [facility] when telemetry box is assigned to patient)    ___________________________________________________________________________      Bedside RN confirming that the box listed above is in fact the telemetry box number being placed on the patient listed above.        X________________________________________ RN signature        ______________COURTNEY__________________RN assigned to Patient (please print)    _______________ Date    ____________ Time

## 2024-01-04 NOTE — PROGRESS NOTES
RT Inhaler-Nebulizer Bronchodilator Protocol Note    There is a bronchodilator order in the chart from a provider indicating to follow the RT Bronchodilator Protocol and there is an “Initiate RT Inhaler-Nebulizer Bronchodilator Protocol” order as well (see protocol at bottom of note).    CXR Findings:  XR CHEST PORTABLE    Result Date: 1/2/2024  Increasing density noted in the right upper lung zone.  This could represent infiltrate or tumor and correlation with CT scan recommended.       The findings from the last RT Protocol Assessment were as follows:   History Pulmonary Disease: Chronic pulmonary disease  Respiratory Pattern: Regular pattern and RR 12-20 bpm  Breath Sounds: Clear breath sounds  Cough: Strong, spontaneous, non-productive  Indication for Bronchodilator Therapy: None  Bronchodilator Assessment Score: 2    Aerosolized bronchodilator medication orders have been revised according to the RT Inhaler-Nebulizer Bronchodilator Protocol below.    Respiratory Therapist to perform RT Therapy Protocol Assessment initially then follow the protocol.  Repeat RT Therapy Protocol Assessment PRN for score 0-3 or on second treatment, BID, and PRN for scores above 3.    No Indications - adjust the frequency to every 6 hours PRN wheezing or bronchospasm, if no treatments needed after 48 hours then discontinue using Per Protocol order mode.     If indication present, adjust the RT bronchodilator orders based on the Bronchodilator Assessment Score as indicated below.  Use Inhaler orders unless patient has one or more of the following: on home nebulizer, not able to hold breath for 10 seconds, is not alert and oriented, cannot activate and use MDI correctly, or respiratory rate 25 breaths per minute or more, then use the equivalent nebulizer order(s) with same Frequency and PRN reasons based on the score.  If a patient is on this medication at home then do not decrease Frequency below that used at home.    0-3 - enter or

## 2024-01-04 NOTE — PROGRESS NOTES
4 Eyes Skin Assessment     NAME:  Bonny Serrano  YOB: 1970  MEDICAL RECORD NUMBER:  [id number]    The patient is being assessed for  Shift Handoff    I agree that at least one RN has performed a thorough Head to Toe Skin Assessment on the patient. ALL assessment sites listed below have been assessed.      Areas assessed by both nurses:    Head, Face, Ears, Shoulders, Back, Chest, Arms, Elbows, Hands, Sacrum. Buttock, Coccyx, Ischium, Legs. Feet and Heels, and Under Medical Devices         Does the Patient have a Wound? Yes wound(s) were present on assessment. LDA wound assessment was Initiated and completed by RN                             [place] Prevention initiated by RN: Yes  Wound Care Orders initiated by RN: Yes    Pressure Injury (Stage 3,4, Unstageable, DTI, NWPT, and Complex wounds) if present, place Wound referral order by RN under : No    New Ostomies, if present place, Ostomy referral order under : No     Nurse 1 eSignature: Electronically signed by Ny Ocampo RN on 1/4/24 at 12:48 AM EST    **SHARE this note so that the co-signing nurse can place an eSignature**    Nurse 2 eSignature: Electronically signed by Lore Rivers RN on 1/4/24 at 7:02 PM EST

## 2024-01-04 NOTE — PROGRESS NOTES
Care rounds completed with Dr. Dickinson  and multidisciplinary team. Reviewed labs, med's, VS, assessment, & plan of care for today.  Mag 1.4 replaced per PRN see MAR   Also notified Levo stopped at 0751 d/t   reviewed pt request for home sprivia, Symbicort, and PRN puffers and Prilosec   CVC Need:  High risk vesicant drug infusing: yes spoke with Pharmacy recommendation would be to keep CVC for current continuous fluids .

## 2024-01-04 NOTE — PROGRESS NOTES
Shift assessment done,patient is awake,alert and oriented X 4.  She is on room air and oxygenating well,has clear breath sounds bilaterally.  She is on levo 5mcg/min,2%NS at 60mls/hr.  All ICU lines and connections checked.  She is able to use the bedside commode with minimal assistance.  She is on regular diet and tolerates oral fluids well.  She is able to turn herself in bed.  She denies being in pain.  Bed is at its lowest level,call light within reach,will continue to monitor patient.

## 2024-01-04 NOTE — PROGRESS NOTES
Reassessment done,patient is asleep,calm,able to turn herself in bed,she is on room air,she is humberto to walk to the bedside commode with minimal assistance  She is on levo 3mcg/min,titrated as per protocol and 2% NS at 60mls/hr.  All ICU lines and connections checked.  Bed is at its lowest level,call light within reach,will continue to monitor patient.

## 2024-01-04 NOTE — PROGRESS NOTES
Blood pressure 95/75, pulse (!) 102, temperature 97.7 °F (36.5 °C), temperature source Oral, resp. rate 18, height 1.549 m (5' 1\"), weight 38.6 kg (85 lb 1.6 oz), last menstrual period 07/12/2015, SpO2 94 %, not currently breastfeeding.  Shift assessment was completed (see flow sheet).   Pt is alert/oriented.able to make needs known . 2 % NS infusing at 60 ml/hr per orders in R IJ. Pt asking about home med's notified pt will speak with DR Dickinson  with AM rounds .  Call light within reach. Bed in lowest position. Bed alarm on.

## 2024-01-04 NOTE — PLAN OF CARE
Problem: Discharge Planning  Goal: Discharge to home or other facility with appropriate resources  Outcome: Progressing     Problem: Skin/Tissue Integrity  Goal: Absence of new skin breakdown  Description: 1.  Monitor for areas of redness and/or skin breakdown  2.  Assess vascular access sites hourly  3.  Every 4-6 hours minimum:  Change oxygen saturation probe site  4.  Every 4-6 hours:  If on nasal continuous positive airway pressure, respiratory therapy assess nares and determine need for appliance change or resting period.  Outcome: Progressing     Problem: Safety - Adult  Goal: Free from fall injury  Outcome: Progressing     Problem: Pain  Goal: Verbalizes/displays adequate comfort level or baseline comfort level  Outcome: Progressing     Problem: Cardiovascular - Adult  Goal: Maintains optimal cardiac output and hemodynamic stability  Outcome: Progressing  Goal: Absence of cardiac dysrhythmias or at baseline  Outcome: Progressing     Problem: Skin/Tissue Integrity - Adult  Goal: Skin integrity remains intact  Outcome: Progressing  Flowsheets (Taken 1/3/2024 2111)  Skin Integrity Remains Intact:   Monitor for areas of redness and/or skin breakdown   Assess vascular access sites hourly   Every 4-6 hours minimum: Change oxygen saturation probe site  Goal: Incisions, wounds, or drain sites healing without S/S of infection  Outcome: Progressing  Goal: Oral mucous membranes remain intact  Outcome: Progressing     Problem: Musculoskeletal - Adult  Goal: Return mobility to safest level of function  Outcome: Progressing  Goal: Maintain proper alignment of affected body part  Outcome: Progressing  Goal: Return ADL status to a safe level of function  Outcome: Progressing     Problem: Metabolic/Fluid and Electrolytes - Adult  Goal: Electrolytes maintained within normal limits  Outcome: Progressing  Goal: Hemodynamic stability and optimal renal function maintained  Outcome: Progressing  Goal: Glucose maintained within  prescribed range  Outcome: Progressing     Problem: Nutrition Deficit:  Goal: Optimize nutritional status  Outcome: Progressing

## 2024-01-04 NOTE — PROGRESS NOTES
Reassessment completed (see Flowsheet ) pt A/Ox 4  able to make needs known. Call light and bedside table in reach .

## 2024-01-04 NOTE — CONSULTS
[facility]                                 Pulmonary/Critical care  Consult /Progress Note :                                                                  CC :Fall ,hypotension   Patient is being seen at the request of Nemo for a consultation for hypotension    HPI   Off Levophed  Awake and alert  No CP   Given mag  Restart home inhalers ,Symbicort and   On 2 % saline and Na 121   Cough with no sputum     PHYSICAL EXAM:  Blood pressure 119/77, pulse (!) 125, temperature 97.7 °F (36.5 °C), temperature source Oral, resp. rate 18, height 1.549 m (5' 1\"), weight 38.6 kg (85 lb 1.6 oz), last menstrual period 07/12/2015, SpO2 94 %, not currently breastfeeding.' on RA  Gen: No distress.   Eyes: PERRL. No sclera icterus. No conjunctival injection.   ENT: No discharge. Pharynx clear.   Neck: Trachea midline. No obvious mass.    Resp: Rhonchi bilateral   CV: Regular rate. Regular rhythm. No murmur or rub. No edema. Peripheral pulses are 2+.  Capillary refill is less than 3 seconds.  GI: Non-tender. Non-distended. No hernia.   Skin: Warm and dry. No nodule on exposed extremities.   Lymph: No cervical LAD. No supraclavicular LAD.   M/S: No cyanosis. No joint deformity. No clubbing.   Neuro: Awake. Alert. Moves all four extremities.   Psych: Oriented x 3. No anxiety.     LABS:  CBC:   Recent Labs     01/02/24  1135 01/03/24  0515 01/04/24  0447   WBC 7.7 9.5 6.1   HGB 11.4* 10.7* 8.5*   HCT 32.2* 30.2* 24.6*   MCV 95.6 97.7 97.8    340 298       BMP:   Recent Labs     01/03/24  1632 01/03/24  2147 01/04/24  0448   * 123* 121*   K 4.0 3.9 3.3*   CL 90* 91* 91*   CO2 24 24 26   BUN 7 7 6*   CREATININE <0.5* <0.5* <0.5*       LIVER PROFILE:   Recent Labs     01/02/24  1135   AST 73*   ALT 35   BILITOT 0.5   ALKPHOS 178*       PT/INR: No results for input(s): \"PROTIME\", \"INR\" in the last 72 hours.  APTT: No results for  input(s): \"APTT\" in the last 72 hours.  UA:  Recent Labs     01/02/24  1506   COLORU Yellow   PHUR 6.5  6.5   CLARITYU Clear   SPECGRAV <=1.005   LEUKOCYTESUR Negative   UROBILINOGEN 1.0   BILIRUBINUR Negative   BLOODU Negative   GLUCOSEU Negative       No results for input(s): \"PHART\", \"KMA6FND\", \"PO2ART\" in the last 72 hours.    Microbiology:  Ordered    Imaging:  Chest imaging was reviewed by me and showed RUL     ASSESSMENT:  Severe hyponatremia  Right upper lobe cavitary mass/consult  Hypokalemia  Prolonged QT  Possible sepsis and septic shock  COPD    PLAN:  Keep saturation above 90  Wean levophed maintain MAP of 65  Pan culture so far negative   Continue IV antibiotic broad-spectrum follow on pan culture.stop vancomycin ,will do nasal   Lactic acid has been in the lower side  Check cortisol level  Hyponateremia ,renal following ,adjust as per them ,watch BS ,on 2%   Right upper lobe mass seems to be pneumonia versus malignancy we will treat with antibiotic and repeat CAT scan in 6 weeks if persist will do biopsy check pneumonia panel Legionella and strep  IV fluid as per renal  DVT px   Cortisol level    Care reviewed with nursing staff, medical and surgical specialty care, primary care and the patient's family as available.     Chart review/lab review/X-ray viewing/documentation and had long Conversation with patient/family re: prognosis, care options and any end of life issues:      Critical care time spent reviewing labs/films, examining patient, collaborating with other physicians more than 35  Minutes  excluding procedures ..    Asher Benson M.D.   1/4/2024  9:00 AM

## 2024-01-04 NOTE — PROGRESS NOTES
Pt to transfer to PCU . Bedside report given Lore SMITH. Pt denies needs at this time. Pt left with belongings.

## 2024-01-05 VITALS
BODY MASS INDEX: 16.16 KG/M2 | OXYGEN SATURATION: 98 % | SYSTOLIC BLOOD PRESSURE: 100 MMHG | HEIGHT: 61 IN | DIASTOLIC BLOOD PRESSURE: 66 MMHG | TEMPERATURE: 97.4 F | RESPIRATION RATE: 19 BRPM | WEIGHT: 85.6 LBS | HEART RATE: 99 BPM

## 2024-01-05 LAB
ANION GAP SERPL CALCULATED.3IONS-SCNC: 4 MMOL/L (ref 3–16)
BASOPHILS # BLD: 0 K/UL (ref 0–0.2)
BASOPHILS NFR BLD: 0 %
BUN SERPL-MCNC: 4 MG/DL (ref 7–20)
CALCIUM SERPL-MCNC: 7.3 MG/DL (ref 8.3–10.6)
CHLORIDE SERPL-SCNC: 97 MMOL/L (ref 99–110)
CO2 SERPL-SCNC: 30 MMOL/L (ref 21–32)
CREAT SERPL-MCNC: <0.5 MG/DL (ref 0.6–1.1)
DEPRECATED RDW RBC AUTO: 13.5 % (ref 12.4–15.4)
EOSINOPHIL # BLD: 0 K/UL (ref 0–0.6)
EOSINOPHIL NFR BLD: 0 %
GFR SERPLBLD CREATININE-BSD FMLA CKD-EPI: >60 ML/MIN/{1.73_M2}
GLUCOSE BLD-MCNC: 116 MG/DL (ref 70–99)
GLUCOSE BLD-MCNC: 186 MG/DL (ref 70–99)
GLUCOSE BLD-MCNC: 223 MG/DL (ref 70–99)
GLUCOSE SERPL-MCNC: 127 MG/DL (ref 70–99)
HCT VFR BLD AUTO: 24.1 % (ref 36–48)
HGB BLD-MCNC: 8.2 G/DL (ref 12–16)
LYMPHOCYTES # BLD: 0.2 K/UL (ref 1–5.1)
LYMPHOCYTES NFR BLD: 5 %
MAGNESIUM SERPL-MCNC: 1.5 MG/DL (ref 1.8–2.4)
MCH RBC QN AUTO: 34 PG (ref 26–34)
MCHC RBC AUTO-ENTMCNC: 34 G/DL (ref 31–36)
MCV RBC AUTO: 100 FL (ref 80–100)
MONOCYTES # BLD: 0.4 K/UL (ref 0–1.3)
MONOCYTES NFR BLD: 8 %
MRSA DNA SPEC QL NAA+PROBE: NORMAL
NEUTROPHILS # BLD: 3.9 K/UL (ref 1.7–7.7)
NEUTROPHILS NFR BLD: 87 %
PERFORMED ON: ABNORMAL
PLATELET # BLD AUTO: 354 K/UL (ref 135–450)
PLATELET BLD QL SMEAR: ADEQUATE
PMV BLD AUTO: 7.1 FL (ref 5–10.5)
POTASSIUM SERPL-SCNC: 3.4 MMOL/L (ref 3.5–5.1)
RBC # BLD AUTO: 2.41 M/UL (ref 4–5.2)
SLIDE REVIEW: ABNORMAL
SODIUM SERPL-SCNC: 131 MMOL/L (ref 136–145)
WBC # BLD AUTO: 4.5 K/UL (ref 4–11)

## 2024-01-05 PROCEDURE — 99238 HOSP IP/OBS DSCHRG MGMT 30/<: CPT | Performed by: INTERNAL MEDICINE

## 2024-01-05 PROCEDURE — 6360000002 HC RX W HCPCS: Performed by: NURSE PRACTITIONER

## 2024-01-05 PROCEDURE — 6360000002 HC RX W HCPCS: Performed by: INTERNAL MEDICINE

## 2024-01-05 PROCEDURE — 80048 BASIC METABOLIC PNL TOTAL CA: CPT

## 2024-01-05 PROCEDURE — 85025 COMPLETE CBC W/AUTO DIFF WBC: CPT

## 2024-01-05 PROCEDURE — 83735 ASSAY OF MAGNESIUM: CPT

## 2024-01-05 PROCEDURE — 94640 AIRWAY INHALATION TREATMENT: CPT

## 2024-01-05 PROCEDURE — 6370000000 HC RX 637 (ALT 250 FOR IP): Performed by: INTERNAL MEDICINE

## 2024-01-05 PROCEDURE — 6370000000 HC RX 637 (ALT 250 FOR IP): Performed by: NURSE PRACTITIONER

## 2024-01-05 PROCEDURE — 2580000003 HC RX 258: Performed by: INTERNAL MEDICINE

## 2024-01-05 PROCEDURE — 97530 THERAPEUTIC ACTIVITIES: CPT

## 2024-01-05 PROCEDURE — 2580000003 HC RX 258: Performed by: NURSE PRACTITIONER

## 2024-01-05 PROCEDURE — 97116 GAIT TRAINING THERAPY: CPT

## 2024-01-05 PROCEDURE — 94761 N-INVAS EAR/PLS OXIMETRY MLT: CPT

## 2024-01-05 RX ORDER — PREDNISONE 10 MG/1
TABLET ORAL
Qty: 18 TABLET | Refills: 0 | Status: SHIPPED | OUTPATIENT
Start: 2024-01-05

## 2024-01-05 RX ORDER — AMOXICILLIN AND CLAVULANATE POTASSIUM 875; 125 MG/1; MG/1
1 TABLET, FILM COATED ORAL 2 TIMES DAILY
Qty: 20 TABLET | Refills: 0 | Status: SHIPPED | OUTPATIENT
Start: 2024-01-05 | End: 2024-01-15

## 2024-01-05 RX ADMIN — Medication 2 PUFF: at 17:28

## 2024-01-05 RX ADMIN — DEXAMETHASONE SODIUM PHOSPHATE 4 MG: 4 INJECTION, SOLUTION INTRAMUSCULAR; INTRAVENOUS at 17:11

## 2024-01-05 RX ADMIN — PANTOPRAZOLE SODIUM 40 MG: 40 TABLET, DELAYED RELEASE ORAL at 05:09

## 2024-01-05 RX ADMIN — Medication 10 ML: at 08:36

## 2024-01-05 RX ADMIN — SERTRALINE HYDROCHLORIDE 50 MG: 50 TABLET ORAL at 08:33

## 2024-01-05 RX ADMIN — CEFEPIME 2000 MG: 2 INJECTION, POWDER, FOR SOLUTION INTRAVENOUS at 02:53

## 2024-01-05 RX ADMIN — Medication 2 PUFF: at 08:19

## 2024-01-05 RX ADMIN — ENOXAPARIN SODIUM 30 MG: 100 INJECTION SUBCUTANEOUS at 08:35

## 2024-01-05 RX ADMIN — CEFEPIME 2000 MG: 2 INJECTION, POWDER, FOR SOLUTION INTRAVENOUS at 11:16

## 2024-01-05 RX ADMIN — DEXAMETHASONE SODIUM PHOSPHATE 4 MG: 4 INJECTION, SOLUTION INTRAMUSCULAR; INTRAVENOUS at 08:34

## 2024-01-05 RX ADMIN — POTASSIUM CHLORIDE 40 MEQ: 750 TABLET, EXTENDED RELEASE ORAL at 06:31

## 2024-01-05 RX ADMIN — Medication 1 TABLET: at 08:33

## 2024-01-05 RX ADMIN — INSULIN LISPRO 2 UNITS: 100 INJECTION, SOLUTION INTRAVENOUS; SUBCUTANEOUS at 11:54

## 2024-01-05 RX ADMIN — THIAMINE HYDROCHLORIDE 100 MG: 100 INJECTION, SOLUTION INTRAMUSCULAR; INTRAVENOUS at 08:34

## 2024-01-05 RX ADMIN — MAGNESIUM SULFATE HEPTAHYDRATE 2000 MG: 40 INJECTION, SOLUTION INTRAVENOUS at 06:32

## 2024-01-05 RX ADMIN — TIOTROPIUM BROMIDE INHALATION SPRAY 2 PUFF: 3.12 SPRAY, METERED RESPIRATORY (INHALATION) at 08:19

## 2024-01-05 NOTE — PROGRESS NOTES
[facility]                                 Pulmonary/Critical care  Consult /Progress Note :                                                                  CC :Fall ,hypotension   Patient is being seen at the request of Nemo for a consultation for hypotension    HPI   Off Levophed  Awake and alert  No CP   Given mag  Restart home inhalers ,Symbicort and   On 2 % saline and Na 121   Cough with no sputum     PHYSICAL EXAM:  Blood pressure 121/77, pulse (!) 120, temperature 97.5 °F (36.4 °C), temperature source Oral, resp. rate 16, height 1.549 m (5' 1\"), weight 38.6 kg (85 lb 1.6 oz), last menstrual period 07/12/2015, SpO2 98 %, not currently breastfeeding.' on RA  Gen: No distress.   Eyes: PERRL. No sclera icterus. No conjunctival injection.   ENT: No discharge. Pharynx clear.   Neck: Trachea midline. No obvious mass.    Resp: Rhonchi bilateral   CV: Regular rate. Regular rhythm. No murmur or rub. No edema. Peripheral pulses are 2+.  Capillary refill is less than 3 seconds.  GI: Non-tender. Non-distended. No hernia.   Skin: Warm and dry. No nodule on exposed extremities.   Lymph: No cervical LAD. No supraclavicular LAD.   M/S: No cyanosis. No joint deformity. No clubbing.   Neuro: Awake. Alert. Moves all four extremities.   Psych: Oriented x 3. No anxiety.     LABS:  CBC:   Recent Labs     01/02/24  1135 01/03/24  0515 01/04/24  0447   WBC 7.7 9.5 6.1   HGB 11.4* 10.7* 8.5*   HCT 32.2* 30.2* 24.6*   MCV 95.6 97.7 97.8    340 298       BMP:   Recent Labs     01/04/24  0448 01/04/24  1054 01/04/24  1643   * 129* 127*   K 3.3* 3.8 3.8   CL 91* 96* 95*   CO2 26 26 26   BUN 6* 5* 5*   CREATININE <0.5* <0.5* <0.5*       LIVER PROFILE:   Recent Labs     01/02/24  1135   AST 73*   ALT 35   BILITOT 0.5   ALKPHOS 178*       PT/INR: No results for input(s): \"PROTIME\", \"INR\" in the last 72 hours.  APTT: No results for

## 2024-01-05 NOTE — PROGRESS NOTES
Inpatient Physical Therapy Treatment    Unit: PCU  Date:  1/5/2024  Patient Name:    Bonny Serrano  Admitting diagnosis:  Hypokalemia [E87.6]  Hyponatremia [E87.1]  Prolonged Q-T interval on ECG [R94.31]  Elevated troponin [R79.89]  Septic shock (HCC) [A41.9, R65.21]  Anemia, unspecified type [D64.9]  Hypomagnesuria [R82.998]  Admit Date:  1/2/2024  Precautions/Restrictions/WB Status/ Lines/ Wounds/ Oxygen: Fall risk, Bed/chair alarm, and Telemetry        Treatment Time:  8310-4716  Treatment Number:  2   Timed Code Treatment Minutes: 28 minutes  Total Treatment Minutes:  28  minutes    Patient Stated Goals for Therapy: \" go to rehab \"          Discharge Recommendations: SNF - pt was declined at [facility], requesting home with HHPT   DME needs for discharge: Defer to facility       Therapy recommendation for EMS Transport: can transport by wheelchair    Therapy recommendations for staff:   Assist of 1 for transfers with use of rolling walker (RW) and gait belt to/from BSC  to/from chair    History of Present Illness: 53 y.o. female with a pmh of hypertension, atrial fibrillation, pulmonary embolism no longer anticoagulated, COPD, chronic pancreatitis secondary to past alcohol abuse who presents with multiple falls at home.     Patient initially seen at [facility] emergency room.  Patient stated that she had fallen several times today and had felt dizzy.  She denies any problems with p.o. intake.     ER evaluation revealed electrolyte abnormalities including hyponatremia and hypokalemia.  QTc was elevated and magnesium was subsequently given.  Systolic blood pressure has been in the 80s such that Levophed has been initiated after fairly aggressive fluid resuscitation.  CT of the chest was unremarkable as far as explaining her present issues but there is a new right upper lobe 3 x 3 cm mass identified.    Home Health S4 Level Recommendation:  NA        AM-PAC Mobility Score       AM-PAC Inpatient Mobility without Stair  Climbing Raw Score : 15      Subjective  Patient lying reclined in bed with no family present.  Pt agreeable to this PT session.     Cognition    A&O x4   Able to follow 2 step commands    Pain   No  Location:   Rating: NA /10  Pain Medicine Status: No request made    Preadmission Environment:   Pt. Lives                                              with friend (24/hr assist not available)   Home environment:                            one story home with basement  Steps to enter first floor:                     3 steps to enter with no HR  Steps to second floor/basement:        Full flight of 12-13 with HRs  Laundry:                                              Basement  Bathroom:                                           walk in shower with built in shower seat (patient is afraid to sit on tile seat. Patient has been sponge bathing)   Pt sleeps in a:                                     Couch  Equipment owned:                              none     Preadmission Status:  Pt. Able to drive:                                 No  Pt. Fully independent with ADLs:       Yes  Pt. Required assistance for:               Independent PTA  Pt. independent for functional transfers and utilized No Device for mobility in home and No Device out in community  History of falls:                                                Yes >4x due to weakness and dizziness  Home Health Services:                       None    Objective  Does this pt have an acute or acute on chronic diagnosis of CHF? No    Upper Extremity ROM/Strength  Please see OT evaluation.      Lower Extremity ROM / Strength   AROM WFL: Yes  ROM limitations:     BLE strength impaired, but not formally assessed with MMT.    Lower Extremity Sensation    WFL    Coordination  WFL    Tone  WNL    Balance  Static Sitting:  Good ; Supervision  Dynamic Sitting:  Good ; Supervision   Comments: EOB    Static Standing: Good - ; SBA  Dynamic Standing: Fair +; CGA  Comments: with no AD and

## 2024-01-05 NOTE — FLOWSHEET NOTE
01/04/24 1730   Vital Signs   Pulse (!) 120   /77   MAP (Calculated) 92   MAP (mmHg) 90   Oxygen Therapy   SpO2 98 %     Patient transferred to PCU room 316. Patient stable, no further needs at this time.     Lore Rivers RN

## 2024-01-05 NOTE — PROGRESS NOTES
Patient educated on discharge instructions as well as new medications use, dosage, administration and possible side effects.  Patient verified knowledge. IV removed without difficulty and dry dressing in place. Telemetry monitor removed and returned to CMU. Pt left facility in stable condition to Home with home health with all of their personal belongings.      Lore Rivers RN

## 2024-01-05 NOTE — DISCHARGE INSTRUCTIONS
F/w PCP in 1 week   Avoid alcohol      F.w pulmonary in 2 weeks for repeat imaging   Avoid smoking

## 2024-01-05 NOTE — FLOWSHEET NOTE
01/05/24 1117   Vital Signs   Temp 97.4 °F (36.3 °C)   Temp Source Oral   Pulse 99   Heart Rate Source Monitor   Respirations 19   /66   MAP (Calculated) 77   BP Location Right upper arm   BP Method Automatic   Patient Position Sitting   Pain Assessment   Pain Assessment None - Denies Pain   Oxygen Therapy   SpO2 98 %   O2 Device None (Room air)     Vitals and reassessment completed. No significant changes. CVC of the right removed without complication, dressing applied. Both PIVs infiltrated due to limited use. Both removed. PIV replaced, see flowsheet. IV antibiotics infusing at this time. No further needs. PT/OT ordered, will see patient today.     Lore Rivers RN

## 2024-01-05 NOTE — DISCHARGE INSTR - COC
Continuity of Care Form    Patient Name: Bonny Serrano   :  1970  MRN:  [id number]    Admit date:  2024  Discharge date:  2024      Code Status Order: Full Code   Advance Directives:     Admitting Physician:  Julian Dill MD  PCP: Ryanne Gonzalez PA    Discharging Nurse: SARAH Torrez  Discharging Hospital Unit/Room#: /0316-01  Discharging Unit Phone Number: [phone]    Emergency Contact:   Extended Emergency Contact Information  Primary Emergency Contact: TAYLER BEDOLLA  Home Phone: [phone]  Relation: Child  Secondary Emergency Contact: Waqas Olea  Mobile Phone: [phone]  Relation: Friend    Past Surgical History:  Past Surgical History:   Procedure Laterality Date    UPPER GASTROINTESTINAL ENDOSCOPY         Immunization History:   Immunization History   Administered Date(s) Administered    COVID-19, MODERNA BLUE border, Primary or Immunocompromised, (age 12y+), IM, 100 mcg/0.5mL 2021, 09/10/2021    Influenza Vaccine, unspecified formulation 2017, 09/10/2018    Influenza Virus Vaccine 2017, 09/10/2018, 2019    Influenza, AFLURIA (age 3 yrs+), FLUZONE, (age 6 mo+), MDV, 0.5mL 2019    Influenza, FLUARIX, FLULAVAL, FLUZONE (age 6 mo+) AND AFLURIA, (age 3 y+), PF, 0.5mL 09/10/2018    Influenza, FLUBLOK, (age 18 y+), PF, 0.5mL 10/13/2021    Pneumococcal, PPSV23, PNEUMOVAX 23, (age 2y+), SC/IM, 0.5mL 2014    TDaP, ADACEL (age 10y-64y), BOOSTRIX (age 10y+), IM, 0.5mL 2015, 2024       Active Problems:  Patient Active Problem List   Diagnosis Code    Alcoholism (HCC) F10.20    Lung collapse J98.19    Macrocytic anemia D53.9    Paroxysmal atrial fibrillation (HCC) I48.0    Pulmonary embolism (HCC) I26.99    Tobacco abuse Z72.0    Chronic hyponatremia E87.1    AVM (arteriovenous malformation) of small bowel, 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     RN SIGNATURE:  Electronically signed by Lore Rivers RN on 1/5/24 at 3:13 PM EST    CASE MANAGEMENT/SOCIAL WORK SECTION    Inpatient Status Date: 1/2    Readmission Risk Assessment Score:  Readmission Risk              Risk of Unplanned Readmission:  20           Discharging to Facility/ Agency   Name: [facility]  Address:  Phone: [phone]  Fax:      / signature: Electronically signed by Simi Galeas RN on 1/5/24 at 1:31 PM EST

## 2024-01-05 NOTE — CARE COORDINATION
Met with pt at bedside. Spoke to Lavinia at [facility] who states they are unable to take pt due to her insurance.    Relayed info to pt and she states she just wants to go home with home health care.     [facility]-unable to accept    [facility]-will service SN, PT, OT

## 2024-01-05 NOTE — PROGRESS NOTES
[facility] Progress Note           Subjective/   53 y.o. year old female who we are seeing in consultation for Hyponatremia.     HPI:  Sodium trend better with course of ~2% saline.  Intake better.    ROS:  +weak, no shortness of breath.    Objective/   GEN:  Chronically ill, /66   Pulse 99   Temp 97.4 °F (36.3 °C) (Oral)   Resp 19   Ht 1.549 m (5' 1\")   Wt 38.8 kg (85 lb 9.6 oz)   LMP 07/12/2015   SpO2 98%   BMI 16.17 kg/m²   HEENT: non-icteric, no JVD  CV: S1, S2, without m/r/g; no LE edema  RESP: CTA B without w/r/r; breathing wnl  ABD: +bs, soft, nt, no hsm  SKIN: warm, no rashes    Data/  Recent Labs     01/03/24  0515 01/04/24  0447 01/05/24  0510   WBC 9.5 6.1 4.5   HGB 10.7* 8.5* 8.2*   HCT 30.2* 24.6* 24.1*   MCV 97.7 97.8 100.0    298 354       Recent Labs     01/02/24  2214 01/03/24  0515 01/04/24  0448 01/04/24  1054 01/04/24  1643 01/05/24  0510   *   < > 121* 129* 127* 131*   K 3.1*   < > 3.3* 3.8 3.8 3.4*   CL 91*   < > 91* 96* 95* 97*   CO2 23   < > 26 26 26 30   GLUCOSE 100*   < > 162* 151* 171* 127*   MG 2.10  --  1.40*  --   --  1.50*   BUN 9   < > 6* 5* 5* 4*   CREATININE <0.5*   < > <0.5* <0.5* <0.5* <0.5*   LABGLOM >60   < > >60 >60 >60 >60    < > = values in this interval not displayed.         Assessment/     - Hyponatremia - acute on chronic, beer potomania + translocation effect from Hypokalemia     - Hypokalemia - prn replacement     - RUL cavitary mass - plans per Pulmonary        Plan/     - Trial off IVF's from 1/4, prn dose of samsca if repeat sodium 126 or lower  - Zoloft on hold  - Trend labs, bp's, weights, & urine output    ____________________________________  Wilber Smith MD  [facility]  [email or web address]  Office: [phone]

## 2024-01-05 NOTE — PROGRESS NOTES
[facility]                                 Pulmonary/Critical care  Consult /Progress Note :                                                                  CC :Fall ,hypotension   Patient is being seen at the request of [facility] for a consultation for hypotension    HPI   On RA  Awake and alert  No CP   Restart home inhalers ,Symbicort and   Na 131    PHYSICAL EXAM:  Blood pressure 118/84, pulse (!) 112, temperature 97.3 °F (36.3 °C), temperature source Oral, resp. rate 17, height 1.549 m (5' 1\"), weight 38.8 kg (85 lb 9.6 oz), last menstrual period 07/12/2015, SpO2 95 %, not currently breastfeeding.' on RA  Gen: No distress.   Eyes: PERRL. No sclera icterus. No conjunctival injection.   ENT: No discharge. Pharynx clear.   Neck: Trachea midline. No obvious mass.    Resp: Rhonchi bilateral   CV: Regular rate. Regular rhythm. No murmur or rub. No edema. Peripheral pulses are 2+.  Capillary refill is less than 3 seconds.  GI: Non-tender. Non-distended. No hernia.   Skin: Warm and dry. No nodule on exposed extremities.   Lymph: No cervical LAD. No supraclavicular LAD.   M/S: No cyanosis. No joint deformity. No clubbing.   Neuro: Awake. Alert. Moves all four extremities.   Psych: Oriented x 3. No anxiety.     LABS:  CBC:   Recent Labs     01/03/24  0515 01/04/24  0447 01/05/24  0510   WBC 9.5 6.1 4.5   HGB 10.7* 8.5* 8.2*   HCT 30.2* 24.6* 24.1*   MCV 97.7 97.8 100.0    298 354       BMP:   Recent Labs     01/04/24  1054 01/04/24  1643 01/05/24  0510   * 127* 131*   K 3.8 3.8 3.4*   CL 96* 95* 97*   CO2 26 26 30   BUN 5* 5* 4*   CREATININE <0.5* <0.5* <0.5*       LIVER PROFILE:   Recent Labs     01/02/24  1135   AST 73*   ALT 35   BILITOT 0.5   ALKPHOS 178*       PT/INR: No results for input(s): \"PROTIME\", \"INR\" in the last 72 hours.  APTT: No results for input(s): \"APTT\" in the last 72 hours.  UA:  Recent Labs

## 2024-01-05 NOTE — PLAN OF CARE
Problem: Discharge Planning  Goal: Discharge to home or other facility with appropriate resources  Outcome: Progressing  Flowsheets (Taken 1/5/2024 0800)  Discharge to home or other facility with appropriate resources:   Identify barriers to discharge with patient and caregiver   Arrange for needed discharge resources and transportation as appropriate   Identify discharge learning needs (meds, wound care, etc)     Problem: Skin/Tissue Integrity  Goal: Absence of new skin breakdown  Description: 1.  Monitor for areas of redness and/or skin breakdown  2.  Assess vascular access sites hourly  3.  Every 4-6 hours minimum:  Change oxygen saturation probe site  4.  Every 4-6 hours:  If on nasal continuous positive airway pressure, respiratory therapy assess nares and determine need for appliance change or resting period.  Outcome: Progressing     Problem: Safety - Adult  Goal: Free from fall injury  Outcome: Progressing     Problem: Pain  Goal: Verbalizes/displays adequate comfort level or baseline comfort level  Outcome: Progressing     Problem: Cardiovascular - Adult  Goal: Maintains optimal cardiac output and hemodynamic stability  Outcome: Progressing  Flowsheets (Taken 1/5/2024 0800)  Maintains optimal cardiac output and hemodynamic stability: Monitor blood pressure and heart rate  Goal: Absence of cardiac dysrhythmias or at baseline  Outcome: Progressing  Flowsheets (Taken 1/5/2024 0800)  Absence of cardiac dysrhythmias or at baseline: Monitor cardiac rate and rhythm     Problem: Skin/Tissue Integrity - Adult  Goal: Skin integrity remains intact  Outcome: Progressing  Flowsheets (Taken 1/5/2024 0800)  Skin Integrity Remains Intact: Monitor for areas of redness and/or skin breakdown  Goal: Incisions, wounds, or drain sites healing without S/S of infection  Outcome: Progressing  Flowsheets (Taken 1/5/2024 0800)  Incisions, Wounds, or Drain Sites Healing Without Sign and Symptoms of Infection: ADMISSION and DAILY:

## 2024-01-05 NOTE — PROGRESS NOTES
IM Progress Note    Admit Date:  1/2/2024  3  Admitted for falls and workup with electrolyte abn  Severe hyponatremia with Na of 116- nephrology consulted    Interval history:      Subjective:  Ms. Serrano seen up in bed, feels ok today   Remains on RA      Sodium improved with 2% saline    Objective:   /66   Pulse (!) 107   Temp 98.7 °F (37.1 °C) (Oral)   Resp 16   Ht 1.549 m (5' 1\")   Wt 38.8 kg (85 lb 9.6 oz)   LMP 07/12/2015   SpO2 95%   BMI 16.17 kg/m²     Intake/Output Summary (Last 24 hours) at 1/5/2024 0724  Last data filed at 1/5/2024 0522  Gross per 24 hour   Intake 2685.31 ml   Output 1550 ml   Net 1135.31 ml         Physical Exam:        General:  middle aged female, fatigued  Awake, alert and oriented. Appears to be not in any distress  Mucous Membranes:  Pink , anicteric  Neck: No JVD, no carotid bruit, no thyromegaly  Chest:  Clear to auscultation bilaterally, minimal basilar crackles  Cardiovascular:  RRR S1S2 heard, no murmurs or gallops  Abdomen:  Soft, undistended, non tender, no organomegaly, BS present  Extremities: No edema or cyanosis. Distal pulses well felt  Neurological : grossly normal with gen weakness    Medications:   Scheduled Medications:    mupirocin   Each Nostril BID    tiotropium  2 puff Inhalation Daily RT    budesonide-formoterol  2 puff Inhalation BID RT    pantoprazole  40 mg Oral QAM AC    tolvaptan  7.5 mg Oral Once    cefepime  2,000 mg IntraVENous Q8H    dexAMETHasone  4 mg IntraVENous Q8H    insulin lispro  0-8 Units SubCUTAneous TID WC    insulin lispro  0-4 Units SubCUTAneous Nightly    sertraline  50 mg Oral Daily    sodium chloride flush  5-40 mL IntraVENous 2 times per day    enoxaparin  30 mg SubCUTAneous Daily    thiamine  100 mg IntraVENous Daily    multivitamin  1 tablet Oral Daily     I   dextrose      sodium chloride       albuterol sulfate HFA, glucose, dextrose bolus **OR** dextrose bolus, glucagon (rDNA), dextrose, sodium chloride flush, sodium  Recommend   PET/CT versus tissue sampling         CT HEAD WO CONTRAST   Final Result   No evidence of acute intracranial abnormality.         CT CERVICAL SPINE WO CONTRAST   Final Result   No acute abnormality of the cervical spine.         XR CHEST PORTABLE   Final Result   Increasing density noted in the right upper lung zone.  This could represent   infiltrate or tumor and correlation with CT scan recommended.           Cultures  Blood- NGTD  Sputum - pending      Assessment & Plan:      Hyponatremia     Dehydration vs SIADH in the presence of lung mass which could be malignant  Also ethanol level elevated on arrival   Na at 116 >123>131  Sodium improved with 2% saline nephrology consulted  Serial BMP and seizure precautions      Hypokalemia- likely nutritional  Replaced as clinically indicated     Possible sepsis   In a patient normally hypertensive not easily responding to IV fluids  Needed levophed  Source likely pna   Improved now  cortisol- wnl  Wean off steroids       Mass of upper lobe of right lung  Cavitory lesion vs malignancy  Started abx - cefepime and vanc , cx pending  Repeat ct planned in 6 weeks  Pulm consulted    Paroxysmal Afibb  Not on any AC- NSR on adm  Was on cardizem not sure if taking      HLD  - Continue statin      Chronic Pancreatitis   - Continue Creon      GERD  - Continue PPI      Falls  Likely multifactorial secondary to hypovolemia, hypotension, electrolyte abnormality, and deconditioning  Start PT /OT when able    Dvt prophylaxis  Full code    Dc planning ok   Remove central line      Timmy Lim MD, 1/5/2024 7:24 AM

## 2024-01-05 NOTE — FLOWSHEET NOTE
01/05/24 0726   Vital Signs   Temp 97.3 °F (36.3 °C)   Temp Source Oral   Pulse (!) 112   Heart Rate Source Monitor   Respirations 17   /84   MAP (Calculated) 95   BP Location Left upper arm   BP Method Automatic   Patient Position Semi fowlers;Lying left side   Oxygen Therapy   SpO2 95 %   O2 Device None (Room air)     Vitals and assessment completed. No s/s of distress. Medications given per MAR. Order to remove IJ today and walk patient. No further needs at this time.     Lore Rivers RN

## 2024-01-05 NOTE — PROGRESS NOTES
Bedside report and transfer of care given to SARAH Hernandez. Pt currently resting in bed with the call light within reach. Pt denies any other care needs at this time. Pt stable at this time.      Lore Rivers RN

## 2024-01-06 LAB
BACTERIA SPEC RESP CULT: NORMAL
GRAM STN SPEC: NORMAL

## 2024-01-07 LAB
BACTERIA BLD CULT ORG #2: NORMAL
BACTERIA BLD CULT: NORMAL

## 2024-01-08 ENCOUNTER — TELEPHONE (OUTPATIENT)
Dept: PULMONOLOGY | Age: 54
End: 2024-01-08

## 2024-01-08 DIAGNOSIS — R91.1 LUNG NODULE: Primary | ICD-10-CM

## 2024-01-08 NOTE — TELEPHONE ENCOUNTER
ASSESSMENT:  Severe hyponatremia  Right upper lobe cavitary mass/consult  Hypokalemia  Prolonged QT  Possible sepsis and septic shock  COPD     PLAN:  Keep saturation above 90  Wean levophed maintain MAP of 65  Pan culture so far negative   Continue IV antibiotic broad-spectrum follow on pan culture.stop vancomycin ,can go on Augmentin   Lactic acid has been in the lower side  Check cortisol level  Hyponateremia ,resolved  Right upper lobe mass seems to be pneumonia versus malignancy we will treat with antibiotic and repeat CAT scan in 6 weeks if persist will do biopsy check pneumonia panel Legionella and strep  IV fluid as per renal  DVT px   Office notified ,CT in 4 weeks if RUL persist ,then bronch/buiopsy

## 2024-01-11 LAB — VIT B1 SERPL-MCNC: 228 NMOL/L (ref 4–15)

## 2024-01-29 NOTE — TELEPHONE ENCOUNTER
Orders in for CT Chest w/o contrast.     Called [phone] (home) [phone] (work)  Spoke w/ pt and sched CT for 1-31-24 @ 1400. Pt aware to arrive by 1330. I began to sched pt f/u w/ Dr. Benson and was unable to confirm pt was still on phone.       Call pt and confirm CT appt. Call and sched follow up w/ MARNIE rivera for 02-07-24 or 02-08-24 appt.

## 2024-01-29 NOTE — TELEPHONE ENCOUNTER
Pt Cb to schedule but above message  wants CT prior to f/u appt, informed Pt a MA will CB to assist in scheduling both

## 2024-02-03 PROBLEM — R79.89 ELEVATED TROPONIN: Status: RESOLVED | Noted: 2024-01-04 | Resolved: 2024-02-03

## 2024-03-19 ENCOUNTER — HOSPITAL ENCOUNTER (OUTPATIENT)
Dept: CT IMAGING | Age: 54
Discharge: HOME | End: 2024-03-19
Attending: INTERNAL MEDICINE
Payer: MEDICAID

## 2024-03-19 DIAGNOSIS — R91.1 LUNG NODULE: ICD-10-CM

## 2024-03-19 PROCEDURE — 71250 CT THORAX DX C-: CPT

## 2024-03-26 DIAGNOSIS — R91.8 LUNG MASS: Primary | ICD-10-CM

## 2024-04-08 ENCOUNTER — OFFICE VISIT (OUTPATIENT)
Dept: PULMONOLOGY | Age: 54
End: 2024-04-08
Payer: MEDICAID

## 2024-04-08 VITALS
SYSTOLIC BLOOD PRESSURE: 100 MMHG | DIASTOLIC BLOOD PRESSURE: 58 MMHG | OXYGEN SATURATION: 94 % | WEIGHT: 85 LBS | HEIGHT: 61 IN | BODY MASS INDEX: 16.05 KG/M2 | RESPIRATION RATE: 18 BRPM | HEART RATE: 104 BPM

## 2024-04-08 DIAGNOSIS — R91.8 LUNG MASS: Primary | ICD-10-CM

## 2024-04-08 PROCEDURE — 99214 OFFICE O/P EST MOD 30 MIN: CPT | Performed by: INTERNAL MEDICINE

## 2024-04-08 RX ORDER — GUAIFENESIN 600 MG/1
1200 TABLET, EXTENDED RELEASE ORAL 2 TIMES DAILY PRN
COMMUNITY
Start: 2024-04-08

## 2024-04-08 NOTE — PROGRESS NOTES
the nodule/cavitary lesion stable then we will hold on further eval however if there is any enlargement then we will consider biopsy  We will get 6-minute walk test   Will do PFT  Will continue Symbicort times per  Albuterol as  I spent 4-6 minutes counseling patient regarding smoking and the risk of Lung cancer and COPD and respiratory failure   He was [facility] to [facility] ,given instuction  I will talk to the patient again after we do the review of the CAT scan and then decide about the need    Will see in 3 months as discussed with    Asher Benson M.D.   4/8/2024  11:41 AM

## 2024-04-09 ENCOUNTER — TELEPHONE (OUTPATIENT)
Dept: PULMONOLOGY | Age: 54
End: 2024-04-09

## 2024-04-09 NOTE — TELEPHONE ENCOUNTER
Faxed request to  for image push into [employer] PACS for CT done 05-14-22- 04-08-24. Images still not in PACS.     CD requested also.

## 2024-04-23 ENCOUNTER — TELEPHONE (OUTPATIENT)
Dept: PULMONOLOGY | Age: 54
End: 2024-04-23

## 2024-04-23 NOTE — TELEPHONE ENCOUNTER
Patient called asking about her Mucinex, informed her it was for OTC , she wants to know why a script wasn't called into her pharmacy.

## 2024-04-24 DIAGNOSIS — J44.9 CHRONIC OBSTRUCTIVE PULMONARY DISEASE, UNSPECIFIED COPD TYPE (HCC): Primary | ICD-10-CM

## 2024-04-25 RX ORDER — GUAIFENESIN 600 MG/1
1200 TABLET, EXTENDED RELEASE ORAL 2 TIMES DAILY PRN
Qty: 60 TABLET | Refills: 0 | Status: SHIPPED | OUTPATIENT
Start: 2024-04-25

## 2024-07-11 ENCOUNTER — APPOINTMENT (OUTPATIENT)
Dept: GENERAL RADIOLOGY | Age: 54
End: 2024-07-11
Payer: MEDICAID

## 2024-07-11 ENCOUNTER — APPOINTMENT (OUTPATIENT)
Dept: CT IMAGING | Age: 54
End: 2024-07-11
Payer: MEDICAID

## 2024-07-11 ENCOUNTER — HOSPITAL ENCOUNTER (INPATIENT)
Age: 54
LOS: 7 days | Discharge: NURSING, REHAB OR LONG-TERM CARE | DRG: 308 | End: 2024-07-18
Attending: INTERNAL MEDICINE | Admitting: INTERNAL MEDICINE
Payer: MEDICAID

## 2024-07-11 ENCOUNTER — HOSPITAL ENCOUNTER (EMERGENCY)
Age: 54
Discharge: ANOTHER HOSPITAL | End: 2024-07-11
Attending: EMERGENCY MEDICINE
Payer: MEDICAID

## 2024-07-11 VITALS
TEMPERATURE: 98.8 F | WEIGHT: 75 LBS | SYSTOLIC BLOOD PRESSURE: 105 MMHG | HEART RATE: 105 BPM | OXYGEN SATURATION: 94 % | BODY MASS INDEX: 14.16 KG/M2 | DIASTOLIC BLOOD PRESSURE: 72 MMHG | HEIGHT: 61 IN | RESPIRATION RATE: 16 BRPM

## 2024-07-11 DIAGNOSIS — R79.89 ELEVATED TROPONIN: ICD-10-CM

## 2024-07-11 DIAGNOSIS — S72.001A CLOSED FRACTURE OF RIGHT HIP, INITIAL ENCOUNTER (HCC): Primary | ICD-10-CM

## 2024-07-11 DIAGNOSIS — E83.42 HYPOMAGNESEMIA: ICD-10-CM

## 2024-07-11 DIAGNOSIS — E87.6 HYPOKALEMIA: ICD-10-CM

## 2024-07-11 DIAGNOSIS — E83.51 HYPOCALCEMIA: ICD-10-CM

## 2024-07-11 DIAGNOSIS — S72.141A INTERTROCHANTERIC FRACTURE OF RIGHT FEMUR, CLOSED, INITIAL ENCOUNTER (HCC): Primary | ICD-10-CM

## 2024-07-11 PROBLEM — S72.002A CLOSED LEFT HIP FRACTURE, INITIAL ENCOUNTER (HCC): Status: ACTIVE | Noted: 2024-07-11

## 2024-07-11 LAB
ABO + RH BLD: NORMAL
ALBUMIN SERPL-MCNC: 1.1 G/DL (ref 3.4–5)
ALBUMIN/GLOB SERPL: 0.8 {RATIO} (ref 1.1–2.2)
ALP SERPL-CCNC: 81 U/L (ref 40–129)
ALT SERPL-CCNC: 12 U/L (ref 10–40)
ANION GAP SERPL CALCULATED.3IONS-SCNC: 5 MMOL/L (ref 3–16)
ANION GAP SERPL CALCULATED.3IONS-SCNC: 8 MMOL/L (ref 3–16)
AST SERPL-CCNC: 19 U/L (ref 15–37)
BASOPHILS # BLD: 0 K/UL (ref 0–0.2)
BASOPHILS NFR BLD: 0.3 %
BILIRUB SERPL-MCNC: <0.2 MG/DL (ref 0–1)
BILIRUB UR QL STRIP.AUTO: NEGATIVE
BLD GP AB SCN SERPL QL: NORMAL
BUN SERPL-MCNC: 3 MG/DL (ref 7–20)
BUN SERPL-MCNC: <2 MG/DL (ref 7–20)
CALCIUM SERPL-MCNC: 4.7 MG/DL (ref 8.3–10.6)
CALCIUM SERPL-MCNC: 6.9 MG/DL (ref 8.3–10.6)
CHLORIDE SERPL-SCNC: 101 MMOL/L (ref 99–110)
CHLORIDE SERPL-SCNC: 97 MMOL/L (ref 99–110)
CK SERPL-CCNC: 30 U/L (ref 26–192)
CLARITY UR: CLEAR
CO2 SERPL-SCNC: 19 MMOL/L (ref 21–32)
CO2 SERPL-SCNC: 21 MMOL/L (ref 21–32)
COLOR UR: YELLOW
CREAT SERPL-MCNC: <0.5 MG/DL (ref 0.6–1.1)
CREAT SERPL-MCNC: <0.5 MG/DL (ref 0.6–1.1)
DEPRECATED RDW RBC AUTO: 14.1 % (ref 12.4–15.4)
EKG ATRIAL RATE: 111 BPM
EKG DIAGNOSIS: NORMAL
EKG P AXIS: 78 DEGREES
EKG P-R INTERVAL: 118 MS
EKG Q-T INTERVAL: 328 MS
EKG QRS DURATION: 76 MS
EKG QTC CALCULATION (BAZETT): 446 MS
EKG R AXIS: 88 DEGREES
EKG T AXIS: 70 DEGREES
EKG VENTRICULAR RATE: 111 BPM
EOSINOPHIL # BLD: 0 K/UL (ref 0–0.6)
EOSINOPHIL NFR BLD: 0.1 %
GFR SERPLBLD CREATININE-BSD FMLA CKD-EPI: >90 ML/MIN/{1.73_M2}
GFR SERPLBLD CREATININE-BSD FMLA CKD-EPI: >90 ML/MIN/{1.73_M2}
GLUCOSE SERPL-MCNC: 78 MG/DL (ref 70–99)
GLUCOSE SERPL-MCNC: 79 MG/DL (ref 70–99)
GLUCOSE UR STRIP.AUTO-MCNC: NEGATIVE MG/DL
HCT VFR BLD AUTO: 21.3 % (ref 36–48)
HCT VFR BLD AUTO: 28.5 % (ref 36–48)
HGB BLD-MCNC: 7.3 G/DL (ref 12–16)
HGB BLD-MCNC: 9.6 G/DL (ref 12–16)
HGB UR QL STRIP.AUTO: NEGATIVE
KETONES UR STRIP.AUTO-MCNC: ABNORMAL MG/DL
LEUKOCYTE ESTERASE UR QL STRIP.AUTO: NEGATIVE
LYMPHOCYTES # BLD: 1.2 K/UL (ref 1–5.1)
LYMPHOCYTES NFR BLD: 11.4 %
MAGNESIUM SERPL-MCNC: 0.9 MG/DL (ref 1.8–2.4)
MAGNESIUM SERPL-MCNC: 2.2 MG/DL (ref 1.8–2.4)
MAGNESIUM SERPL-MCNC: 2.3 MG/DL (ref 1.8–2.4)
MCH RBC QN AUTO: 33.4 PG (ref 26–34)
MCHC RBC AUTO-ENTMCNC: 34.1 G/DL (ref 31–36)
MCV RBC AUTO: 97.9 FL (ref 80–100)
MONOCYTES # BLD: 0.7 K/UL (ref 0–1.3)
MONOCYTES NFR BLD: 6.8 %
NEUTROPHILS # BLD: 8.6 K/UL (ref 1.7–7.7)
NEUTROPHILS NFR BLD: 81.4 %
NITRITE UR QL STRIP.AUTO: NEGATIVE
PH UR STRIP.AUTO: 6.5 [PH] (ref 5–8)
PLATELET # BLD AUTO: 185 K/UL (ref 135–450)
PMV BLD AUTO: 6.6 FL (ref 5–10.5)
POTASSIUM SERPL-SCNC: 3.1 MMOL/L (ref 3.5–5.1)
POTASSIUM SERPL-SCNC: 4.3 MMOL/L (ref 3.5–5.1)
PROT SERPL-MCNC: 2.5 G/DL (ref 6.4–8.2)
PROT UR STRIP.AUTO-MCNC: NEGATIVE MG/DL
RBC # BLD AUTO: 2.17 M/UL (ref 4–5.2)
SODIUM SERPL-SCNC: 125 MMOL/L (ref 136–145)
SODIUM SERPL-SCNC: 126 MMOL/L (ref 136–145)
SP GR UR STRIP.AUTO: 1.01 (ref 1–1.03)
TROPONIN, HIGH SENSITIVITY: 18 NG/L (ref 0–14)
TROPONIN, HIGH SENSITIVITY: 25 NG/L (ref 0–14)
TROPONIN, HIGH SENSITIVITY: 28 NG/L (ref 0–14)
UA COMPLETE W REFLEX CULTURE PNL UR: ABNORMAL
UA DIPSTICK W REFLEX MICRO PNL UR: ABNORMAL
URN SPEC COLLECT METH UR: ABNORMAL
UROBILINOGEN UR STRIP-ACNC: 1 E.U./DL
WBC # BLD AUTO: 10.6 K/UL (ref 4–11)

## 2024-07-11 PROCEDURE — 80048 BASIC METABOLIC PNL TOTAL CA: CPT

## 2024-07-11 PROCEDURE — 36415 COLL VENOUS BLD VENIPUNCTURE: CPT

## 2024-07-11 PROCEDURE — 30233N1 TRANSFUSION OF NONAUTOLOGOUS RED BLOOD CELLS INTO PERIPHERAL VEIN, PERCUTANEOUS APPROACH: ICD-10-PCS | Performed by: FAMILY MEDICINE

## 2024-07-11 PROCEDURE — 85025 COMPLETE CBC W/AUTO DIFF WBC: CPT

## 2024-07-11 PROCEDURE — 6360000002 HC RX W HCPCS: Performed by: EMERGENCY MEDICINE

## 2024-07-11 PROCEDURE — 81003 URINALYSIS AUTO W/O SCOPE: CPT

## 2024-07-11 PROCEDURE — 51702 INSERT TEMP BLADDER CATH: CPT

## 2024-07-11 PROCEDURE — 96366 THER/PROPH/DIAG IV INF ADDON: CPT

## 2024-07-11 PROCEDURE — 2580000003 HC RX 258: Performed by: EMERGENCY MEDICINE

## 2024-07-11 PROCEDURE — 6370000000 HC RX 637 (ALT 250 FOR IP): Performed by: INTERNAL MEDICINE

## 2024-07-11 PROCEDURE — 96368 THER/DIAG CONCURRENT INF: CPT

## 2024-07-11 PROCEDURE — 71045 X-RAY EXAM CHEST 1 VIEW: CPT

## 2024-07-11 PROCEDURE — 80053 COMPREHEN METABOLIC PANEL: CPT

## 2024-07-11 PROCEDURE — 86923 COMPATIBILITY TEST ELECTRIC: CPT

## 2024-07-11 PROCEDURE — 2580000003 HC RX 258: Performed by: INTERNAL MEDICINE

## 2024-07-11 PROCEDURE — 85014 HEMATOCRIT: CPT

## 2024-07-11 PROCEDURE — 99285 EMERGENCY DEPT VISIT HI MDM: CPT

## 2024-07-11 PROCEDURE — 6360000002 HC RX W HCPCS: Performed by: INTERNAL MEDICINE

## 2024-07-11 PROCEDURE — 84484 ASSAY OF TROPONIN QUANT: CPT

## 2024-07-11 PROCEDURE — 85018 HEMOGLOBIN: CPT

## 2024-07-11 PROCEDURE — P9016 RBC LEUKOCYTES REDUCED: HCPCS

## 2024-07-11 PROCEDURE — 93005 ELECTROCARDIOGRAM TRACING: CPT | Performed by: EMERGENCY MEDICINE

## 2024-07-11 PROCEDURE — 86901 BLOOD TYPING SEROLOGIC RH(D): CPT

## 2024-07-11 PROCEDURE — 96365 THER/PROPH/DIAG IV INF INIT: CPT

## 2024-07-11 PROCEDURE — 73502 X-RAY EXAM HIP UNI 2-3 VIEWS: CPT

## 2024-07-11 PROCEDURE — 86850 RBC ANTIBODY SCREEN: CPT

## 2024-07-11 PROCEDURE — 94640 AIRWAY INHALATION TREATMENT: CPT

## 2024-07-11 PROCEDURE — 83735 ASSAY OF MAGNESIUM: CPT

## 2024-07-11 PROCEDURE — 82550 ASSAY OF CK (CPK): CPT

## 2024-07-11 PROCEDURE — 96375 TX/PRO/DX INJ NEW DRUG ADDON: CPT

## 2024-07-11 PROCEDURE — 86900 BLOOD TYPING SEROLOGIC ABO: CPT

## 2024-07-11 PROCEDURE — 1200000000 HC SEMI PRIVATE

## 2024-07-11 PROCEDURE — 70450 CT HEAD/BRAIN W/O DYE: CPT

## 2024-07-11 RX ORDER — POTASSIUM CHLORIDE 20 MEQ/1
40 TABLET, EXTENDED RELEASE ORAL PRN
Status: DISCONTINUED | OUTPATIENT
Start: 2024-07-11 | End: 2024-07-18 | Stop reason: HOSPADM

## 2024-07-11 RX ORDER — POTASSIUM CHLORIDE 7.45 MG/ML
10 INJECTION INTRAVENOUS PRN
Status: DISCONTINUED | OUTPATIENT
Start: 2024-07-11 | End: 2024-07-18 | Stop reason: HOSPADM

## 2024-07-11 RX ORDER — MAGNESIUM SULFATE IN WATER 40 MG/ML
2000 INJECTION, SOLUTION INTRAVENOUS ONCE
Status: COMPLETED | OUTPATIENT
Start: 2024-07-11 | End: 2024-07-11

## 2024-07-11 RX ORDER — POTASSIUM CITRATE 10 MEQ/1
10 TABLET, EXTENDED RELEASE ORAL DAILY
Status: DISCONTINUED | OUTPATIENT
Start: 2024-07-12 | End: 2024-07-18 | Stop reason: HOSPADM

## 2024-07-11 RX ORDER — GAUZE BANDAGE 2" X 2"
100 BANDAGE TOPICAL DAILY
Status: DISCONTINUED | OUTPATIENT
Start: 2024-07-11 | End: 2024-07-12

## 2024-07-11 RX ORDER — 0.9 % SODIUM CHLORIDE 0.9 %
1000 INTRAVENOUS SOLUTION INTRAVENOUS ONCE
Status: COMPLETED | OUTPATIENT
Start: 2024-07-11 | End: 2024-07-11

## 2024-07-11 RX ORDER — ONDANSETRON 4 MG/1
4 TABLET, ORALLY DISINTEGRATING ORAL EVERY 8 HOURS PRN
Status: DISCONTINUED | OUTPATIENT
Start: 2024-07-11 | End: 2024-07-18 | Stop reason: HOSPADM

## 2024-07-11 RX ORDER — ONDANSETRON 2 MG/ML
4 INJECTION INTRAMUSCULAR; INTRAVENOUS ONCE
Status: COMPLETED | OUTPATIENT
Start: 2024-07-11 | End: 2024-07-11

## 2024-07-11 RX ORDER — ATORVASTATIN CALCIUM 10 MG/1
10 TABLET, FILM COATED ORAL DAILY
Status: DISCONTINUED | OUTPATIENT
Start: 2024-07-12 | End: 2024-07-18 | Stop reason: HOSPADM

## 2024-07-11 RX ORDER — ACETAMINOPHEN 650 MG/1
650 SUPPOSITORY RECTAL EVERY 6 HOURS PRN
Status: DISCONTINUED | OUTPATIENT
Start: 2024-07-11 | End: 2024-07-18 | Stop reason: HOSPADM

## 2024-07-11 RX ORDER — MAGNESIUM SULFATE IN WATER 40 MG/ML
2000 INJECTION, SOLUTION INTRAVENOUS PRN
Status: DISCONTINUED | OUTPATIENT
Start: 2024-07-11 | End: 2024-07-18 | Stop reason: HOSPADM

## 2024-07-11 RX ORDER — PANTOPRAZOLE SODIUM 40 MG/1
40 TABLET, DELAYED RELEASE ORAL
Status: DISCONTINUED | OUTPATIENT
Start: 2024-07-12 | End: 2024-07-18 | Stop reason: HOSPADM

## 2024-07-11 RX ORDER — PHENOBARBITAL 32.4 MG/1
32.4 TABLET ORAL 2 TIMES DAILY
Status: CANCELLED | OUTPATIENT
Start: 2024-07-12 | End: 2024-07-13

## 2024-07-11 RX ORDER — ATENOLOL 25 MG/1
25 TABLET ORAL DAILY
Status: DISCONTINUED | OUTPATIENT
Start: 2024-07-12 | End: 2024-07-14

## 2024-07-11 RX ORDER — NALOXONE HYDROCHLORIDE 0.4 MG/ML
0.4 INJECTION, SOLUTION INTRAMUSCULAR; INTRAVENOUS; SUBCUTANEOUS PRN
Status: DISCONTINUED | OUTPATIENT
Start: 2024-07-11 | End: 2024-07-18 | Stop reason: HOSPADM

## 2024-07-11 RX ORDER — PHENOBARBITAL 32.4 MG/1
16.2 TABLET ORAL EVERY 6 HOURS PRN
Status: CANCELLED | OUTPATIENT
Start: 2024-07-13 | End: 2024-07-14

## 2024-07-11 RX ORDER — PHENOBARBITAL 32.4 MG/1
32.4 TABLET ORAL EVERY 6 HOURS PRN
Status: CANCELLED | OUTPATIENT
Start: 2024-07-11 | End: 2024-07-13

## 2024-07-11 RX ORDER — GUAIFENESIN 600 MG/1
600 TABLET, EXTENDED RELEASE ORAL 2 TIMES DAILY
Status: DISCONTINUED | OUTPATIENT
Start: 2024-07-11 | End: 2024-07-18 | Stop reason: HOSPADM

## 2024-07-11 RX ORDER — SODIUM CHLORIDE 0.9 % (FLUSH) 0.9 %
5-40 SYRINGE (ML) INJECTION PRN
Status: DISCONTINUED | OUTPATIENT
Start: 2024-07-11 | End: 2024-07-12

## 2024-07-11 RX ORDER — SODIUM CHLORIDE 9 MG/ML
INJECTION, SOLUTION INTRAVENOUS CONTINUOUS
Status: DISCONTINUED | OUTPATIENT
Start: 2024-07-11 | End: 2024-07-12

## 2024-07-11 RX ORDER — ACETAMINOPHEN 325 MG/1
650 TABLET ORAL EVERY 6 HOURS PRN
Status: DISCONTINUED | OUTPATIENT
Start: 2024-07-11 | End: 2024-07-18 | Stop reason: HOSPADM

## 2024-07-11 RX ORDER — MORPHINE SULFATE 2 MG/ML
2 INJECTION, SOLUTION INTRAMUSCULAR; INTRAVENOUS EVERY 4 HOURS PRN
Status: DISCONTINUED | OUTPATIENT
Start: 2024-07-11 | End: 2024-07-18 | Stop reason: HOSPADM

## 2024-07-11 RX ORDER — PHENOBARBITAL 32.4 MG/1
32.4 TABLET ORAL 4 TIMES DAILY
Status: CANCELLED | OUTPATIENT
Start: 2024-07-11 | End: 2024-07-12

## 2024-07-11 RX ORDER — BUDESONIDE AND FORMOTEROL FUMARATE DIHYDRATE 160; 4.5 UG/1; UG/1
2 AEROSOL RESPIRATORY (INHALATION)
Status: DISCONTINUED | OUTPATIENT
Start: 2024-07-11 | End: 2024-07-18 | Stop reason: HOSPADM

## 2024-07-11 RX ORDER — DULOXETIN HYDROCHLORIDE 60 MG/1
60 CAPSULE, DELAYED RELEASE ORAL DAILY
Status: DISCONTINUED | OUTPATIENT
Start: 2024-07-12 | End: 2024-07-18 | Stop reason: HOSPADM

## 2024-07-11 RX ORDER — DILTIAZEM HYDROCHLORIDE 240 MG/1
240 CAPSULE, COATED, EXTENDED RELEASE ORAL DAILY
Status: DISCONTINUED | OUTPATIENT
Start: 2024-07-12 | End: 2024-07-16

## 2024-07-11 RX ORDER — PHENOBARBITAL 32.4 MG/1
16.2 TABLET ORAL 2 TIMES DAILY
Status: CANCELLED | OUTPATIENT
Start: 2024-07-13 | End: 2024-07-14

## 2024-07-11 RX ORDER — IPRATROPIUM BROMIDE AND ALBUTEROL SULFATE 2.5; .5 MG/3ML; MG/3ML
1 SOLUTION RESPIRATORY (INHALATION) EVERY 4 HOURS PRN
Status: DISCONTINUED | OUTPATIENT
Start: 2024-07-11 | End: 2024-07-18 | Stop reason: HOSPADM

## 2024-07-11 RX ORDER — ONDANSETRON 2 MG/ML
4 INJECTION INTRAMUSCULAR; INTRAVENOUS EVERY 6 HOURS PRN
Status: DISCONTINUED | OUTPATIENT
Start: 2024-07-11 | End: 2024-07-18 | Stop reason: HOSPADM

## 2024-07-11 RX ORDER — SODIUM CHLORIDE 0.9 % (FLUSH) 0.9 %
5-40 SYRINGE (ML) INJECTION EVERY 12 HOURS SCHEDULED
Status: DISCONTINUED | OUTPATIENT
Start: 2024-07-11 | End: 2024-07-12

## 2024-07-11 RX ORDER — POLYETHYLENE GLYCOL 3350 17 G/17G
17 POWDER, FOR SOLUTION ORAL DAILY PRN
Status: DISCONTINUED | OUTPATIENT
Start: 2024-07-11 | End: 2024-07-18 | Stop reason: HOSPADM

## 2024-07-11 RX ORDER — GABAPENTIN 300 MG/1
300 CAPSULE ORAL 3 TIMES DAILY PRN
Status: DISCONTINUED | OUTPATIENT
Start: 2024-07-11 | End: 2024-07-18 | Stop reason: HOSPADM

## 2024-07-11 RX ORDER — POTASSIUM CHLORIDE 7.45 MG/ML
10 INJECTION INTRAVENOUS ONCE
Status: COMPLETED | OUTPATIENT
Start: 2024-07-11 | End: 2024-07-11

## 2024-07-11 RX ORDER — MORPHINE SULFATE 4 MG/ML
4 INJECTION, SOLUTION INTRAMUSCULAR; INTRAVENOUS EVERY 4 HOURS PRN
Status: DISCONTINUED | OUTPATIENT
Start: 2024-07-11 | End: 2024-07-18 | Stop reason: HOSPADM

## 2024-07-11 RX ORDER — SODIUM CHLORIDE 9 MG/ML
INJECTION, SOLUTION INTRAVENOUS PRN
Status: DISCONTINUED | OUTPATIENT
Start: 2024-07-11 | End: 2024-07-12

## 2024-07-11 RX ORDER — MORPHINE SULFATE 4 MG/ML
4 INJECTION, SOLUTION INTRAMUSCULAR; INTRAVENOUS ONCE
Status: COMPLETED | OUTPATIENT
Start: 2024-07-11 | End: 2024-07-11

## 2024-07-11 RX ADMIN — Medication 10 ML: at 22:00

## 2024-07-11 RX ADMIN — MORPHINE SULFATE 4 MG: 4 INJECTION, SOLUTION INTRAMUSCULAR; INTRAVENOUS at 13:56

## 2024-07-11 RX ADMIN — SODIUM CHLORIDE: 9 INJECTION, SOLUTION INTRAVENOUS at 22:51

## 2024-07-11 RX ADMIN — GUAIFENESIN 600 MG: 600 TABLET ORAL at 21:59

## 2024-07-11 RX ADMIN — SODIUM CHLORIDE 1000 ML: 9 INJECTION, SOLUTION INTRAVENOUS at 17:02

## 2024-07-11 RX ADMIN — MAGNESIUM SULFATE HEPTAHYDRATE 2000 MG: 40 INJECTION, SOLUTION INTRAVENOUS at 15:29

## 2024-07-11 RX ADMIN — HYDROMORPHONE HYDROCHLORIDE 0.5 MG: 1 INJECTION, SOLUTION INTRAMUSCULAR; INTRAVENOUS; SUBCUTANEOUS at 15:26

## 2024-07-11 RX ADMIN — SODIUM CHLORIDE 1000 ML: 9 INJECTION, SOLUTION INTRAVENOUS at 13:54

## 2024-07-11 RX ADMIN — Medication 100 MG: at 23:07

## 2024-07-11 RX ADMIN — BUDESONIDE AND FORMOTEROL FUMARATE DIHYDRATE 2 PUFF: 160; 4.5 AEROSOL RESPIRATORY (INHALATION) at 22:36

## 2024-07-11 RX ADMIN — POTASSIUM CHLORIDE 10 MEQ: 7.46 INJECTION, SOLUTION INTRAVENOUS at 17:02

## 2024-07-11 RX ADMIN — GABAPENTIN 300 MG: 300 CAPSULE ORAL at 21:59

## 2024-07-11 RX ADMIN — MORPHINE SULFATE 2 MG: 2 INJECTION, SOLUTION INTRAMUSCULAR; INTRAVENOUS at 21:59

## 2024-07-11 RX ADMIN — ONDANSETRON 4 MG: 2 INJECTION INTRAMUSCULAR; INTRAVENOUS at 13:55

## 2024-07-11 ASSESSMENT — PAIN DESCRIPTION - PAIN TYPE: TYPE: ACUTE PAIN

## 2024-07-11 ASSESSMENT — PAIN SCALES - GENERAL
PAINLEVEL_OUTOF10: 10
PAINLEVEL_OUTOF10: 10
PAINLEVEL_OUTOF10: 0
PAINLEVEL_OUTOF10: 7
PAINLEVEL_OUTOF10: 8
PAINLEVEL_OUTOF10: 10
PAINLEVEL_OUTOF10: 10

## 2024-07-11 ASSESSMENT — PAIN - FUNCTIONAL ASSESSMENT
PAIN_FUNCTIONAL_ASSESSMENT: 0-10
PAIN_FUNCTIONAL_ASSESSMENT: PREVENTS OR INTERFERES WITH MANY ACTIVE NOT PASSIVE ACTIVITIES

## 2024-07-11 ASSESSMENT — PAIN DESCRIPTION - LOCATION
LOCATION: LEG
LOCATION: LEG

## 2024-07-11 ASSESSMENT — PAIN DESCRIPTION - ORIENTATION
ORIENTATION: RIGHT
ORIENTATION: RIGHT

## 2024-07-11 NOTE — ED NOTES
Bedside report given to [facility]. Pt left ED via stretcher in stable condition on 2LO2 NC in stable condition. Two bags of belongings sent with transport team. All paperwork sent with transport team. Care transferred.

## 2024-07-11 NOTE — H&P
Value Date/Time    BC No Growth after 4 days of incubation. 01/02/2024 11:35 AM     Lab Results   Component Value Date/Time    BLOODCULT2 No Growth after 4 days of incubation. 01/02/2024 12:32 PM     Organism: No results found for: \"ORG\"    Imaging/Diagnostics Last 24 Hours   XR HIP RIGHT (2-3 VIEWS)    Result Date: 7/11/2024  EXAMINATION: TWO XRAY VIEWS OF THE RIGHT HIP 7/11/2024 2:26 pm COMPARISON: None. HISTORY: ORDERING SYSTEM PROVIDED HISTORY: Fall, pain TECHNOLOGIST PROVIDED HISTORY: Reason for exam:->Fall, pain Reason for Exam: Fall FINDINGS: Comminuted intertrochanteric fracture right femoral neck.  No dislocation. No other fractures.     Comminuted intertrochanteric fracture right femoral neck     XR CHEST PORTABLE    Result Date: 7/11/2024  EXAMINATION: ONE XRAY VIEW OF THE CHEST 7/11/2024 2:26 pm COMPARISON: 01/02/2024 HISTORY: ORDERING SYSTEM PROVIDED HISTORY: Fall, hypoxia TECHNOLOGIST PROVIDED HISTORY: Reason for exam:->Fall, hypoxia Reason for Exam: Fall, hypoxia FINDINGS: The lungs are without acute focal process.  Scarring again evident in both lungs.  There is no effusion or pneumothorax. The cardiomediastinal silhouette is stable. The osseous structures are stable.     No acute process.     CT HEAD WO CONTRAST    Result Date: 7/11/2024  EXAMINATION: CT OF THE HEAD WITHOUT CONTRAST  7/11/2024 1:52 pm TECHNIQUE: CT of the head was performed without the administration of intravenous contrast. Automated exposure control, iterative reconstruction, and/or weight based adjustment of the mA/kV was utilized to reduce the radiation dose to as low as reasonably achievable. COMPARISON: 01/02/2024 HISTORY: ORDERING SYSTEM PROVIDED HISTORY: Lightheadedness, fall TECHNOLOGIST PROVIDED HISTORY: Reason for exam:->Lightheadedness, fall Has a \"code stroke\" or \"stroke alert\" been called?->No Decision Support Exception - unselect if not a suspected or confirmed emergency medical condition->Emergency Medical Condition

## 2024-07-11 NOTE — ED NOTES
AC called with bed assignment and eta.  Pt going to [facility].  Bed - 3109  N2N - [phone]  [facility] picked up the pt with an eta at 1730

## 2024-07-11 NOTE — ED NOTES
1449 - [facility] sent to [facility] for Orthopedic Surgery consult    1511 - Dr. Man called and spoke with Dr. Chaudhary at this time to complete the consult    1601 - [facility] called with Dr. Willams on the line who spoke with Dr. Chaudhary

## 2024-07-11 NOTE — PLAN OF CARE
[facility] Orthopedic Surgery  Consult Note          Orthopedic Consult, full note to follow.    Bonny Serrano 54 y.o. presented to [facility] ED for a fall with right hip pain.    Xray reviewed, and showed comminuted right hip intertrochanteric fracture.    Plan:  - Recommend Gamma nail right femur.  - She will be transferred to [facility] with surgery tomorrow 11:00 AM.    Thank you very much for the kind consultation and allowing me to participate in this patient's care.  I will continue to keep you apprised of her progress.         Simba Man MD, 7/11/2024 3:22 PM

## 2024-07-11 NOTE — ED NOTES
Telephone report given to SARAH Cam from [facility]. Pt awaiting [facility] - ETA 1730.

## 2024-07-11 NOTE — ED PROVIDER NOTES
[facility] ED  eMERGENCY dEPARTMENT eNCOUnter      Pt Name: Bonny Serrano  MRN: [id number]  Birthdate 1970  Date of evaluation: 7/11/2024  Provider: Delano Chaudhary MD    CHIEF COMPLAINT       Chief Complaint   Patient presents with    Hip Pain    Fall     Got dizzy and fell last night around 2 am and not able to get up.  Pt would not let anyone call squad until now.  Pt sat on ground all night.  Right leg shortened and rotated.         HISTORY OF PRESENT ILLNESS   (Location/Symptom, Timing/Onset, Context/Setting, Quality, Duration, Modifying Factors, Severity)  Note limiting factors.     History obtained from: The patient    Bonny Serrano is a 54 y.o. female who reports last night at 2 AM she was attempting to get up and felt very lightheaded and fell onto her right hip.  Patient with severe right hip pain and inability to bear weight and reports she laid on the ground all night.  Patient denies any head trauma.  Patient reports pain to her right hip.  Patient has any chest pain or shortness of breath.  Patient does report diffuse weakness.      HPI    Nursing Notes were reviewed.    REVIEW OFSYSTEMS    (2-9 systems for level 4, 10 or more for level 5)     Review of Systems   Musculoskeletal:  Positive for arthralgias and gait problem.       Except as noted above the remainder of the review of systems was reviewed and negative.       PAST MEDICAL HISTORY     Past Medical History:   Diagnosis Date    A-fib (HCC)     Anemia     Anxiety     Chronic pancreatitis (HCC)     Collapse of right lung     COPD (chronic obstructive pulmonary disease) (HCC)     COPD (chronic obstructive pulmonary disease) (HCC)     Depression     GI bleed     H/O colonoscopy     Hypertension     Pancreatitis     Pulmonary embolism (HCC) 02/2018         SURGICAL HISTORY       Past Surgical History:   Procedure Laterality Date    UPPER GASTROINTESTINAL ENDOSCOPY           CURRENT MEDICATIONS       Previous Medications

## 2024-07-12 ENCOUNTER — ANESTHESIA (OUTPATIENT)
Dept: OPERATING ROOM | Age: 54
End: 2024-07-12
Payer: MEDICAID

## 2024-07-12 ENCOUNTER — ANESTHESIA EVENT (OUTPATIENT)
Dept: OPERATING ROOM | Age: 54
End: 2024-07-12
Payer: MEDICAID

## 2024-07-12 ENCOUNTER — APPOINTMENT (OUTPATIENT)
Dept: GENERAL RADIOLOGY | Age: 54
DRG: 308 | End: 2024-07-12
Attending: INTERNAL MEDICINE
Payer: MEDICAID

## 2024-07-12 PROBLEM — S72.141A INTERTROCHANTERIC FRACTURE OF RIGHT FEMUR, CLOSED, INITIAL ENCOUNTER (HCC): Status: ACTIVE | Noted: 2024-07-12

## 2024-07-12 PROBLEM — S72.002A CLOSED LEFT HIP FRACTURE, INITIAL ENCOUNTER (HCC): Status: RESOLVED | Noted: 2024-07-11 | Resolved: 2024-07-12

## 2024-07-12 LAB
ALBUMIN SERPL-MCNC: 2.2 G/DL (ref 3.4–5)
ANION GAP SERPL CALCULATED.3IONS-SCNC: 7 MMOL/L (ref 3–16)
ANION GAP SERPL CALCULATED.3IONS-SCNC: 8 MMOL/L (ref 3–16)
BASOPHILS # BLD: 0 K/UL (ref 0–0.2)
BASOPHILS NFR BLD: 0.5 %
BUN SERPL-MCNC: 3 MG/DL (ref 7–20)
BUN SERPL-MCNC: 4 MG/DL (ref 7–20)
CA-I BLD-SCNC: 1.1 MMOL/L (ref 1.12–1.32)
CALCIUM SERPL-MCNC: 7.2 MG/DL (ref 8.3–10.6)
CALCIUM SERPL-MCNC: 7.2 MG/DL (ref 8.3–10.6)
CHLORIDE SERPL-SCNC: 96 MMOL/L (ref 99–110)
CHLORIDE SERPL-SCNC: 97 MMOL/L (ref 99–110)
CO2 SERPL-SCNC: 23 MMOL/L (ref 21–32)
CO2 SERPL-SCNC: 25 MMOL/L (ref 21–32)
CREAT SERPL-MCNC: <0.5 MG/DL (ref 0.6–1.1)
CREAT SERPL-MCNC: <0.5 MG/DL (ref 0.6–1.1)
DEPRECATED RDW RBC AUTO: 13.6 % (ref 12.4–15.4)
EOSINOPHIL # BLD: 0 K/UL (ref 0–0.6)
EOSINOPHIL NFR BLD: 0 %
FERRITIN SERPL IA-MCNC: 196.6 NG/ML (ref 15–150)
GFR SERPLBLD CREATININE-BSD FMLA CKD-EPI: >90 ML/MIN/{1.73_M2}
GFR SERPLBLD CREATININE-BSD FMLA CKD-EPI: >90 ML/MIN/{1.73_M2}
GLUCOSE BLD-MCNC: 88 MG/DL (ref 70–99)
GLUCOSE SERPL-MCNC: 83 MG/DL (ref 70–99)
GLUCOSE SERPL-MCNC: 91 MG/DL (ref 70–99)
HCT VFR BLD AUTO: 25.3 % (ref 36–48)
HGB BLD-MCNC: 8.7 G/DL (ref 12–16)
INR PPP: 1.17 (ref 0.85–1.15)
IRON SATN MFR SERPL: 37 % (ref 15–50)
IRON SERPL-MCNC: 38 UG/DL (ref 37–145)
LYMPHOCYTES # BLD: 0.8 K/UL (ref 1–5.1)
LYMPHOCYTES NFR BLD: 8.2 %
MCH RBC QN AUTO: 33.3 PG (ref 26–34)
MCHC RBC AUTO-ENTMCNC: 34.3 G/DL (ref 31–36)
MCV RBC AUTO: 97.3 FL (ref 80–100)
MONOCYTES # BLD: 0.5 K/UL (ref 0–1.3)
MONOCYTES NFR BLD: 5.6 %
NEUTROPHILS # BLD: 8 K/UL (ref 1.7–7.7)
NEUTROPHILS NFR BLD: 85.7 %
OSMOLALITY SERPL: 266 MOSM/KG (ref 275–295)
OSMOLALITY UR: 501 MOSM/KG (ref 390–1070)
PERFORMED ON: NORMAL
PH BLDV: 7.25 [PH] (ref 7.35–7.45)
PHOSPHATE SERPL-MCNC: 3.4 MG/DL (ref 2.5–4.9)
PLATELET # BLD AUTO: 214 K/UL (ref 135–450)
PMV BLD AUTO: 7.1 FL (ref 5–10.5)
POTASSIUM SERPL-SCNC: 3.9 MMOL/L (ref 3.5–5.1)
POTASSIUM SERPL-SCNC: 4.1 MMOL/L (ref 3.5–5.1)
PROTHROMBIN TIME: 15.1 SEC (ref 11.9–14.9)
RBC # BLD AUTO: 2.6 M/UL (ref 4–5.2)
SODIUM SERPL-SCNC: 125 MMOL/L (ref 136–145)
SODIUM SERPL-SCNC: 128 MMOL/L (ref 136–145)
SODIUM SERPL-SCNC: 128 MMOL/L (ref 136–145)
TIBC SERPL-MCNC: 102 UG/DL (ref 260–445)
TSH SERPL DL<=0.005 MIU/L-ACNC: 1.19 UIU/ML (ref 0.27–4.2)
URATE SERPL-MCNC: 1.7 MG/DL (ref 2.6–6)
WBC # BLD AUTO: 9.3 K/UL (ref 4–11)

## 2024-07-12 PROCEDURE — 2500000003 HC RX 250 WO HCPCS

## 2024-07-12 PROCEDURE — 83540 ASSAY OF IRON: CPT

## 2024-07-12 PROCEDURE — 27245 TREAT THIGH FRACTURE: CPT | Performed by: ORTHOPAEDIC SURGERY

## 2024-07-12 PROCEDURE — 2580000003 HC RX 258: Performed by: ORTHOPAEDIC SURGERY

## 2024-07-12 PROCEDURE — 83930 ASSAY OF BLOOD OSMOLALITY: CPT

## 2024-07-12 PROCEDURE — 2709999900 HC NON-CHARGEABLE SUPPLY: Performed by: ORTHOPAEDIC SURGERY

## 2024-07-12 PROCEDURE — 3600000014 HC SURGERY LEVEL 4 ADDTL 15MIN: Performed by: ORTHOPAEDIC SURGERY

## 2024-07-12 PROCEDURE — 82330 ASSAY OF CALCIUM: CPT

## 2024-07-12 PROCEDURE — C1713 ANCHOR/SCREW BN/BN,TIS/BN: HCPCS | Performed by: ORTHOPAEDIC SURGERY

## 2024-07-12 PROCEDURE — 3700000001 HC ADD 15 MINUTES (ANESTHESIA): Performed by: ORTHOPAEDIC SURGERY

## 2024-07-12 PROCEDURE — 6370000000 HC RX 637 (ALT 250 FOR IP): Performed by: ORTHOPAEDIC SURGERY

## 2024-07-12 PROCEDURE — 73502 X-RAY EXAM HIP UNI 2-3 VIEWS: CPT

## 2024-07-12 PROCEDURE — 80069 RENAL FUNCTION PANEL: CPT

## 2024-07-12 PROCEDURE — 9990000010 HC NO CHARGE VISIT

## 2024-07-12 PROCEDURE — 84443 ASSAY THYROID STIM HORMONE: CPT

## 2024-07-12 PROCEDURE — 6360000002 HC RX W HCPCS: Performed by: ANESTHESIOLOGY

## 2024-07-12 PROCEDURE — 7100000000 HC PACU RECOVERY - FIRST 15 MIN: Performed by: ORTHOPAEDIC SURGERY

## 2024-07-12 PROCEDURE — 2580000003 HC RX 258: Performed by: INTERNAL MEDICINE

## 2024-07-12 PROCEDURE — 6360000002 HC RX W HCPCS: Performed by: INTERNAL MEDICINE

## 2024-07-12 PROCEDURE — 2580000003 HC RX 258

## 2024-07-12 PROCEDURE — 2720000010 HC SURG SUPPLY STERILE: Performed by: ORTHOPAEDIC SURGERY

## 2024-07-12 PROCEDURE — 3600000004 HC SURGERY LEVEL 4 BASE: Performed by: ORTHOPAEDIC SURGERY

## 2024-07-12 PROCEDURE — 99223 1ST HOSP IP/OBS HIGH 75: CPT | Performed by: ORTHOPAEDIC SURGERY

## 2024-07-12 PROCEDURE — 27245 TREAT THIGH FRACTURE: CPT | Performed by: NURSE PRACTITIONER

## 2024-07-12 PROCEDURE — 7100000001 HC PACU RECOVERY - ADDTL 15 MIN: Performed by: ORTHOPAEDIC SURGERY

## 2024-07-12 PROCEDURE — 82728 ASSAY OF FERRITIN: CPT

## 2024-07-12 PROCEDURE — 94760 N-INVAS EAR/PLS OXIMETRY 1: CPT

## 2024-07-12 PROCEDURE — 6360000002 HC RX W HCPCS: Performed by: ORTHOPAEDIC SURGERY

## 2024-07-12 PROCEDURE — 3700000000 HC ANESTHESIA ATTENDED CARE: Performed by: ORTHOPAEDIC SURGERY

## 2024-07-12 PROCEDURE — 83550 IRON BINDING TEST: CPT

## 2024-07-12 PROCEDURE — 6360000002 HC RX W HCPCS

## 2024-07-12 PROCEDURE — 1200000000 HC SEMI PRIVATE

## 2024-07-12 PROCEDURE — 0QS604Z REPOSITION RIGHT UPPER FEMUR WITH INTERNAL FIXATION DEVICE, OPEN APPROACH: ICD-10-PCS | Performed by: ORTHOPAEDIC SURGERY

## 2024-07-12 PROCEDURE — 85025 COMPLETE CBC W/AUTO DIFF WBC: CPT

## 2024-07-12 PROCEDURE — 85610 PROTHROMBIN TIME: CPT

## 2024-07-12 PROCEDURE — 36415 COLL VENOUS BLD VENIPUNCTURE: CPT

## 2024-07-12 PROCEDURE — 94640 AIRWAY INHALATION TREATMENT: CPT

## 2024-07-12 PROCEDURE — 6370000000 HC RX 637 (ALT 250 FOR IP): Performed by: INTERNAL MEDICINE

## 2024-07-12 PROCEDURE — 83935 ASSAY OF URINE OSMOLALITY: CPT

## 2024-07-12 PROCEDURE — 84295 ASSAY OF SERUM SODIUM: CPT

## 2024-07-12 PROCEDURE — A4217 STERILE WATER/SALINE, 500 ML: HCPCS | Performed by: ORTHOPAEDIC SURGERY

## 2024-07-12 PROCEDURE — 84550 ASSAY OF BLOOD/URIC ACID: CPT

## 2024-07-12 PROCEDURE — 94150 VITAL CAPACITY TEST: CPT

## 2024-07-12 PROCEDURE — 2700000000 HC OXYGEN THERAPY PER DAY

## 2024-07-12 PROCEDURE — 6370000000 HC RX 637 (ALT 250 FOR IP)

## 2024-07-12 DEVICE — LAG SCREW
Type: IMPLANTABLE DEVICE | Site: FEMUR | Status: FUNCTIONAL
Brand: GAMMA

## 2024-07-12 DEVICE — LOCKING SCREW
Type: IMPLANTABLE DEVICE | Site: FEMUR | Status: FUNCTIONAL
Brand: T2 ALPHA

## 2024-07-12 DEVICE — LONG NAIL, RIGHT
Type: IMPLANTABLE DEVICE | Site: FEMUR | Status: FUNCTIONAL
Brand: GAMMA

## 2024-07-12 RX ORDER — PROPOFOL 10 MG/ML
INJECTION, EMULSION INTRAVENOUS PRN
Status: DISCONTINUED | OUTPATIENT
Start: 2024-07-12 | End: 2024-07-12 | Stop reason: SDUPTHER

## 2024-07-12 RX ORDER — CALCIUM GLUCONATE 20 MG/ML
2000 INJECTION, SOLUTION INTRAVENOUS ONCE
Status: COMPLETED | OUTPATIENT
Start: 2024-07-12 | End: 2024-07-12

## 2024-07-12 RX ORDER — SODIUM CHLORIDE 9 MG/ML
INJECTION, SOLUTION INTRAVENOUS PRN
Status: DISCONTINUED | OUTPATIENT
Start: 2024-07-12 | End: 2024-07-12 | Stop reason: SDUPTHER

## 2024-07-12 RX ORDER — FENTANYL CITRATE 50 UG/ML
INJECTION, SOLUTION INTRAMUSCULAR; INTRAVENOUS PRN
Status: DISCONTINUED | OUTPATIENT
Start: 2024-07-12 | End: 2024-07-12 | Stop reason: SDUPTHER

## 2024-07-12 RX ORDER — ENOXAPARIN SODIUM 100 MG/ML
40 INJECTION SUBCUTANEOUS DAILY
Status: DISCONTINUED | OUTPATIENT
Start: 2024-07-12 | End: 2024-07-12 | Stop reason: DRUGHIGH

## 2024-07-12 RX ORDER — LORAZEPAM 2 MG/ML
4 INJECTION INTRAMUSCULAR
Status: DISCONTINUED | OUTPATIENT
Start: 2024-07-12 | End: 2024-07-18 | Stop reason: HOSPADM

## 2024-07-12 RX ORDER — ONDANSETRON 2 MG/ML
4 INJECTION INTRAMUSCULAR; INTRAVENOUS
Status: DISCONTINUED | OUTPATIENT
Start: 2024-07-12 | End: 2024-07-12 | Stop reason: HOSPADM

## 2024-07-12 RX ORDER — OXYCODONE HYDROCHLORIDE 5 MG/1
5 TABLET ORAL EVERY 4 HOURS PRN
Qty: 30 TABLET | Refills: 0 | Status: SHIPPED | OUTPATIENT
Start: 2024-07-12 | End: 2024-07-17

## 2024-07-12 RX ORDER — LORAZEPAM 1 MG/1
4 TABLET ORAL
Status: DISCONTINUED | OUTPATIENT
Start: 2024-07-12 | End: 2024-07-18 | Stop reason: HOSPADM

## 2024-07-12 RX ORDER — SODIUM CHLORIDE 9 MG/ML
INJECTION, SOLUTION INTRAVENOUS PRN
Status: DISCONTINUED | OUTPATIENT
Start: 2024-07-12 | End: 2024-07-18 | Stop reason: HOSPADM

## 2024-07-12 RX ORDER — OXYCODONE HYDROCHLORIDE 5 MG/1
5 TABLET ORAL PRN
Status: DISCONTINUED | OUTPATIENT
Start: 2024-07-12 | End: 2024-07-12 | Stop reason: HOSPADM

## 2024-07-12 RX ORDER — OXYCODONE HYDROCHLORIDE 10 MG/1
10 TABLET ORAL PRN
Status: DISCONTINUED | OUTPATIENT
Start: 2024-07-12 | End: 2024-07-12 | Stop reason: HOSPADM

## 2024-07-12 RX ORDER — SODIUM CHLORIDE 0.9 % (FLUSH) 0.9 %
5-40 SYRINGE (ML) INJECTION EVERY 12 HOURS SCHEDULED
Status: DISCONTINUED | OUTPATIENT
Start: 2024-07-12 | End: 2024-07-18 | Stop reason: HOSPADM

## 2024-07-12 RX ORDER — SODIUM CHLORIDE 0.9 % (FLUSH) 0.9 %
5-40 SYRINGE (ML) INJECTION PRN
Status: DISCONTINUED | OUTPATIENT
Start: 2024-07-12 | End: 2024-07-12 | Stop reason: HOSPADM

## 2024-07-12 RX ORDER — DEXAMETHASONE SODIUM PHOSPHATE 4 MG/ML
INJECTION, SOLUTION INTRA-ARTICULAR; INTRALESIONAL; INTRAMUSCULAR; INTRAVENOUS; SOFT TISSUE PRN
Status: DISCONTINUED | OUTPATIENT
Start: 2024-07-12 | End: 2024-07-12 | Stop reason: SDUPTHER

## 2024-07-12 RX ORDER — DILTIAZEM HYDROCHLORIDE 240 MG/1
240 CAPSULE, EXTENDED RELEASE ORAL DAILY
Status: ON HOLD | COMMUNITY
Start: 2024-06-12 | End: 2024-07-18

## 2024-07-12 RX ORDER — ENOXAPARIN SODIUM 100 MG/ML
30 INJECTION SUBCUTANEOUS DAILY
Status: DISCONTINUED | OUTPATIENT
Start: 2024-07-13 | End: 2024-07-18 | Stop reason: HOSPADM

## 2024-07-12 RX ORDER — GAUZE BANDAGE 2" X 2"
100 BANDAGE TOPICAL DAILY
Status: DISCONTINUED | OUTPATIENT
Start: 2024-07-12 | End: 2024-07-18 | Stop reason: HOSPADM

## 2024-07-12 RX ORDER — LORAZEPAM 1 MG/1
3 TABLET ORAL
Status: DISCONTINUED | OUTPATIENT
Start: 2024-07-12 | End: 2024-07-18 | Stop reason: HOSPADM

## 2024-07-12 RX ORDER — LORAZEPAM 1 MG/1
1 TABLET ORAL
Status: DISCONTINUED | OUTPATIENT
Start: 2024-07-12 | End: 2024-07-18 | Stop reason: HOSPADM

## 2024-07-12 RX ORDER — ESMOLOL HYDROCHLORIDE 10 MG/ML
INJECTION INTRAVENOUS PRN
Status: DISCONTINUED | OUTPATIENT
Start: 2024-07-12 | End: 2024-07-12 | Stop reason: SDUPTHER

## 2024-07-12 RX ORDER — ROCURONIUM BROMIDE 10 MG/ML
INJECTION, SOLUTION INTRAVENOUS PRN
Status: DISCONTINUED | OUTPATIENT
Start: 2024-07-12 | End: 2024-07-12 | Stop reason: SDUPTHER

## 2024-07-12 RX ORDER — ONDANSETRON 2 MG/ML
INJECTION INTRAMUSCULAR; INTRAVENOUS PRN
Status: DISCONTINUED | OUTPATIENT
Start: 2024-07-12 | End: 2024-07-12 | Stop reason: SDUPTHER

## 2024-07-12 RX ORDER — SODIUM CHLORIDE 9 MG/ML
INJECTION, SOLUTION INTRAVENOUS PRN
Status: DISCONTINUED | OUTPATIENT
Start: 2024-07-12 | End: 2024-07-12 | Stop reason: HOSPADM

## 2024-07-12 RX ORDER — LORAZEPAM 2 MG/ML
3 INJECTION INTRAMUSCULAR
Status: DISCONTINUED | OUTPATIENT
Start: 2024-07-12 | End: 2024-07-18 | Stop reason: HOSPADM

## 2024-07-12 RX ORDER — ACETAMINOPHEN 325 MG/1
650 TABLET ORAL
Status: DISCONTINUED | OUTPATIENT
Start: 2024-07-12 | End: 2024-07-12 | Stop reason: HOSPADM

## 2024-07-12 RX ORDER — MIDAZOLAM HYDROCHLORIDE 1 MG/ML
INJECTION INTRAMUSCULAR; INTRAVENOUS PRN
Status: DISCONTINUED | OUTPATIENT
Start: 2024-07-12 | End: 2024-07-12 | Stop reason: SDUPTHER

## 2024-07-12 RX ORDER — LORAZEPAM 2 MG/ML
2 INJECTION INTRAMUSCULAR
Status: DISCONTINUED | OUTPATIENT
Start: 2024-07-12 | End: 2024-07-18 | Stop reason: HOSPADM

## 2024-07-12 RX ORDER — IPRATROPIUM BROMIDE AND ALBUTEROL SULFATE 2.5; .5 MG/3ML; MG/3ML
1 SOLUTION RESPIRATORY (INHALATION)
Status: DISCONTINUED | OUTPATIENT
Start: 2024-07-12 | End: 2024-07-12 | Stop reason: HOSPADM

## 2024-07-12 RX ORDER — SODIUM CHLORIDE 450 MG/100ML
INJECTION, SOLUTION INTRAVENOUS CONTINUOUS
Status: DISCONTINUED | OUTPATIENT
Start: 2024-07-12 | End: 2024-07-12

## 2024-07-12 RX ORDER — FOLIC ACID 1 MG/1
1 TABLET ORAL DAILY
Status: DISCONTINUED | OUTPATIENT
Start: 2024-07-12 | End: 2024-07-18 | Stop reason: HOSPADM

## 2024-07-12 RX ORDER — SODIUM CHLORIDE 0.9 % (FLUSH) 0.9 %
5-40 SYRINGE (ML) INJECTION EVERY 12 HOURS SCHEDULED
Status: DISCONTINUED | OUTPATIENT
Start: 2024-07-12 | End: 2024-07-12 | Stop reason: HOSPADM

## 2024-07-12 RX ORDER — LIDOCAINE HYDROCHLORIDE 20 MG/ML
INJECTION, SOLUTION EPIDURAL; INFILTRATION; INTRACAUDAL; PERINEURAL PRN
Status: DISCONTINUED | OUTPATIENT
Start: 2024-07-12 | End: 2024-07-12 | Stop reason: SDUPTHER

## 2024-07-12 RX ORDER — SODIUM CHLORIDE 0.9 % (FLUSH) 0.9 %
5-40 SYRINGE (ML) INJECTION PRN
Status: DISCONTINUED | OUTPATIENT
Start: 2024-07-12 | End: 2024-07-12 | Stop reason: SDUPTHER

## 2024-07-12 RX ORDER — BUPIVACAINE HYDROCHLORIDE 5 MG/ML
INJECTION, SOLUTION EPIDURAL; INTRACAUDAL
Status: COMPLETED | OUTPATIENT
Start: 2024-07-12 | End: 2024-07-12

## 2024-07-12 RX ORDER — NALOXONE HYDROCHLORIDE 0.4 MG/ML
INJECTION, SOLUTION INTRAMUSCULAR; INTRAVENOUS; SUBCUTANEOUS PRN
Status: DISCONTINUED | OUTPATIENT
Start: 2024-07-12 | End: 2024-07-12 | Stop reason: HOSPADM

## 2024-07-12 RX ORDER — SODIUM CHLORIDE 0.9 % (FLUSH) 0.9 %
5-40 SYRINGE (ML) INJECTION PRN
Status: DISCONTINUED | OUTPATIENT
Start: 2024-07-12 | End: 2024-07-18 | Stop reason: HOSPADM

## 2024-07-12 RX ORDER — LORAZEPAM 1 MG/1
2 TABLET ORAL
Status: DISCONTINUED | OUTPATIENT
Start: 2024-07-12 | End: 2024-07-18 | Stop reason: HOSPADM

## 2024-07-12 RX ORDER — SODIUM CHLORIDE 9 MG/ML
INJECTION, SOLUTION INTRAVENOUS CONTINUOUS
Status: DISCONTINUED | OUTPATIENT
Start: 2024-07-12 | End: 2024-07-15

## 2024-07-12 RX ORDER — PHENYLEPHRINE HCL IN 0.9% NACL 1 MG/10 ML
SYRINGE (ML) INTRAVENOUS PRN
Status: DISCONTINUED | OUTPATIENT
Start: 2024-07-12 | End: 2024-07-12 | Stop reason: SDUPTHER

## 2024-07-12 RX ORDER — FENTANYL CITRATE 0.05 MG/ML
50 INJECTION, SOLUTION INTRAMUSCULAR; INTRAVENOUS EVERY 5 MIN PRN
Status: DISCONTINUED | OUTPATIENT
Start: 2024-07-12 | End: 2024-07-12 | Stop reason: HOSPADM

## 2024-07-12 RX ORDER — CEFAZOLIN SODIUM 1 G/3ML
INJECTION, POWDER, FOR SOLUTION INTRAMUSCULAR; INTRAVENOUS PRN
Status: DISCONTINUED | OUTPATIENT
Start: 2024-07-12 | End: 2024-07-12 | Stop reason: SDUPTHER

## 2024-07-12 RX ORDER — OXYCODONE HYDROCHLORIDE 5 MG/1
5 TABLET ORAL EVERY 4 HOURS PRN
Status: DISCONTINUED | OUTPATIENT
Start: 2024-07-12 | End: 2024-07-18 | Stop reason: HOSPADM

## 2024-07-12 RX ORDER — SODIUM CHLORIDE 0.9 % (FLUSH) 0.9 %
5-40 SYRINGE (ML) INJECTION EVERY 12 HOURS SCHEDULED
Status: DISCONTINUED | OUTPATIENT
Start: 2024-07-12 | End: 2024-07-12 | Stop reason: SDUPTHER

## 2024-07-12 RX ORDER — LORAZEPAM 2 MG/ML
1 INJECTION INTRAMUSCULAR
Status: DISCONTINUED | OUTPATIENT
Start: 2024-07-12 | End: 2024-07-18 | Stop reason: HOSPADM

## 2024-07-12 RX ADMIN — PANCRELIPASE LIPASE, PANCRELIPASE PROTEASE, PANCRELIPASE AMYLASE 20000 UNITS: 20000; 63000; 84000 CAPSULE, DELAYED RELEASE ORAL at 14:45

## 2024-07-12 RX ADMIN — MORPHINE SULFATE 2 MG: 2 INJECTION, SOLUTION INTRAMUSCULAR; INTRAVENOUS at 14:46

## 2024-07-12 RX ADMIN — ATORVASTATIN CALCIUM 10 MG: 10 TABLET, FILM COATED ORAL at 14:45

## 2024-07-12 RX ADMIN — GUAIFENESIN 600 MG: 600 TABLET ORAL at 20:55

## 2024-07-12 RX ADMIN — BUDESONIDE AND FORMOTEROL FUMARATE DIHYDRATE 2 PUFF: 160; 4.5 AEROSOL RESPIRATORY (INHALATION) at 08:01

## 2024-07-12 RX ADMIN — GUAIFENESIN 600 MG: 600 TABLET ORAL at 14:45

## 2024-07-12 RX ADMIN — TIOTROPIUM BROMIDE INHALATION SPRAY 2 PUFF: 3.12 SPRAY, METERED RESPIRATORY (INHALATION) at 08:01

## 2024-07-12 RX ADMIN — CALCIUM GLUCONATE 2000 MG: 20 INJECTION, SOLUTION INTRAVENOUS at 16:01

## 2024-07-12 RX ADMIN — SUGAMMADEX 150 MG: 100 INJECTION, SOLUTION INTRAVENOUS at 10:39

## 2024-07-12 RX ADMIN — Medication 100 MCG: at 10:12

## 2024-07-12 RX ADMIN — ESMOLOL HYDROCHLORIDE 10 MG: 10 INJECTION, SOLUTION INTRAVENOUS at 10:06

## 2024-07-12 RX ADMIN — SODIUM CHLORIDE: 9 INJECTION, SOLUTION INTRAVENOUS at 18:08

## 2024-07-12 RX ADMIN — ATENOLOL 25 MG: 25 TABLET ORAL at 14:46

## 2024-07-12 RX ADMIN — SODIUM CHLORIDE, PRESERVATIVE FREE 5 ML: 5 INJECTION INTRAVENOUS at 21:00

## 2024-07-12 RX ADMIN — POTASSIUM CITRATE 10 MEQ: 10 TABLET ORAL at 14:46

## 2024-07-12 RX ADMIN — SODIUM CHLORIDE: 9 INJECTION, SOLUTION INTRAVENOUS at 14:47

## 2024-07-12 RX ADMIN — FENTANYL CITRATE 25 MCG: 50 INJECTION INTRAMUSCULAR; INTRAVENOUS at 10:02

## 2024-07-12 RX ADMIN — BUDESONIDE AND FORMOTEROL FUMARATE DIHYDRATE 2 PUFF: 160; 4.5 AEROSOL RESPIRATORY (INHALATION) at 19:41

## 2024-07-12 RX ADMIN — MIDAZOLAM 1 MG: 1 INJECTION INTRAMUSCULAR; INTRAVENOUS at 09:57

## 2024-07-12 RX ADMIN — Medication 20 ML: at 20:57

## 2024-07-12 RX ADMIN — DULOXETINE HYDROCHLORIDE 60 MG: 60 CAPSULE, DELAYED RELEASE ORAL at 14:45

## 2024-07-12 RX ADMIN — DILTIAZEM HYDROCHLORIDE 240 MG: 240 CAPSULE, EXTENDED RELEASE ORAL at 14:45

## 2024-07-12 RX ADMIN — SODIUM CHLORIDE 2000 MG: 900 INJECTION INTRAVENOUS at 18:10

## 2024-07-12 RX ADMIN — PROPOFOL 60 MG: 10 INJECTION, EMULSION INTRAVENOUS at 10:02

## 2024-07-12 RX ADMIN — SERTRALINE 50 MG: 50 TABLET, FILM COATED ORAL at 13:01

## 2024-07-12 RX ADMIN — LIDOCAINE HYDROCHLORIDE 50 MG: 20 INJECTION, SOLUTION EPIDURAL; INFILTRATION; INTRACAUDAL; PERINEURAL at 10:02

## 2024-07-12 RX ADMIN — FENTANYL CITRATE 50 MCG: 0.05 INJECTION, SOLUTION INTRAMUSCULAR; INTRAVENOUS at 11:23

## 2024-07-12 RX ADMIN — CEFAZOLIN SODIUM 1 G: 1 INJECTION, POWDER, FOR SOLUTION INTRAMUSCULAR; INTRAVENOUS at 10:15

## 2024-07-12 RX ADMIN — MORPHINE SULFATE 2 MG: 2 INJECTION, SOLUTION INTRAMUSCULAR; INTRAVENOUS at 20:55

## 2024-07-12 RX ADMIN — PANCRELIPASE LIPASE, PANCRELIPASE PROTEASE, PANCRELIPASE AMYLASE 15000 UNITS: 15000; 47000; 63000 CAPSULE, DELAYED RELEASE ORAL at 14:45

## 2024-07-12 RX ADMIN — GABAPENTIN 300 MG: 300 CAPSULE ORAL at 14:45

## 2024-07-12 RX ADMIN — MORPHINE SULFATE 2 MG: 2 INJECTION, SOLUTION INTRAMUSCULAR; INTRAVENOUS at 06:52

## 2024-07-12 RX ADMIN — FOLIC ACID 1 MG: 1 TABLET ORAL at 14:45

## 2024-07-12 RX ADMIN — DEXAMETHASONE SODIUM PHOSPHATE 4 MG: 4 INJECTION, SOLUTION INTRAMUSCULAR; INTRAVENOUS at 10:06

## 2024-07-12 RX ADMIN — ESMOLOL HYDROCHLORIDE 10 MG: 10 INJECTION, SOLUTION INTRAVENOUS at 10:44

## 2024-07-12 RX ADMIN — FENTANYL CITRATE 25 MCG: 50 INJECTION INTRAMUSCULAR; INTRAVENOUS at 10:24

## 2024-07-12 RX ADMIN — ROCURONIUM BROMIDE 30 MG: 10 SOLUTION INTRAVENOUS at 10:02

## 2024-07-12 RX ADMIN — Medication 100 MCG: at 10:16

## 2024-07-12 RX ADMIN — ONDANSETRON 4 MG: 2 INJECTION INTRAMUSCULAR; INTRAVENOUS at 10:06

## 2024-07-12 RX ADMIN — Medication 100 MG: at 14:46

## 2024-07-12 ASSESSMENT — PAIN DESCRIPTION - DESCRIPTORS
DESCRIPTORS: ACHING
DESCRIPTORS: DISCOMFORT
DESCRIPTORS: ACHING;DISCOMFORT

## 2024-07-12 ASSESSMENT — COPD QUESTIONNAIRES: CAT_SEVERITY: SEVERE

## 2024-07-12 ASSESSMENT — PAIN DESCRIPTION - FREQUENCY
FREQUENCY: CONTINUOUS
FREQUENCY: CONTINUOUS

## 2024-07-12 ASSESSMENT — PAIN SCALES - GENERAL
PAINLEVEL_OUTOF10: 6
PAINLEVEL_OUTOF10: 7
PAINLEVEL_OUTOF10: 2
PAINLEVEL_OUTOF10: 3
PAINLEVEL_OUTOF10: 4
PAINLEVEL_OUTOF10: 0
PAINLEVEL_OUTOF10: 10
PAINLEVEL_OUTOF10: 4
PAINLEVEL_OUTOF10: 0
PAINLEVEL_OUTOF10: 3

## 2024-07-12 ASSESSMENT — PAIN DESCRIPTION - LOCATION
LOCATION: HIP
LOCATION: HIP;LEG
LOCATION: LEG
LOCATION: HIP;LEG
LOCATION: HIP

## 2024-07-12 ASSESSMENT — PAIN - FUNCTIONAL ASSESSMENT
PAIN_FUNCTIONAL_ASSESSMENT: PREVENTS OR INTERFERES SOME ACTIVE ACTIVITIES AND ADLS
PAIN_FUNCTIONAL_ASSESSMENT: 0-10
PAIN_FUNCTIONAL_ASSESSMENT: ACTIVITIES ARE NOT PREVENTED
PAIN_FUNCTIONAL_ASSESSMENT: PREVENTS OR INTERFERES SOME ACTIVE ACTIVITIES AND ADLS

## 2024-07-12 ASSESSMENT — PAIN SCALES - WONG BAKER: WONGBAKER_NUMERICALRESPONSE: HURTS A LITTLE BIT

## 2024-07-12 ASSESSMENT — PAIN DESCRIPTION - ORIENTATION
ORIENTATION: RIGHT

## 2024-07-12 ASSESSMENT — PAIN DESCRIPTION - ONSET
ONSET: AWAKENED FROM SLEEP
ONSET: AWAKENED FROM SLEEP

## 2024-07-12 ASSESSMENT — LIFESTYLE VARIABLES: SMOKING_STATUS: 1

## 2024-07-12 ASSESSMENT — PAIN DESCRIPTION - PAIN TYPE
TYPE: SURGICAL PAIN
TYPE: SURGICAL PAIN

## 2024-07-12 NOTE — ANESTHESIA POSTPROCEDURE EVALUATION
Department of Anesthesiology  Postprocedure Note    Patient: Bonny Serrano  MRN: [id number]  YOB: 1970  Date of evaluation: 7/12/2024    Procedure Summary       Date: 07/12/24 Room / Location: [address]    Anesthesia Start: 0957 Anesthesia Stop: 1053    Procedure: RIGHT FEMUR GAMMA NAILING (Right: Hip) Diagnosis:       Closed fracture of right hip, initial encounter ([facility])      (Closed fracture of right hip, initial encounter ([facility]) [S72.001A])    Surgeons: Simba Man MD Responsible Provider: Susan Carrillo MD    Anesthesia Type: General ASA Status: 3            Anesthesia Type: General    Phoenix Phase I: Phoenix Score: 8    Phoenix Phase II:      Anesthesia Post Evaluation    Patient location during evaluation: bedside  Patient participation: complete - patient participated  Level of consciousness: awake and alert  Pain score: 4  Airway patency: patent  Nausea & Vomiting: no vomiting  Cardiovascular status: blood pressure returned to baseline  Respiratory status: acceptable  Hydration status: euvolemic  Multimodal analgesia pain management approach  Pain management: adequate    No notable events documented.

## 2024-07-12 NOTE — BRIEF OP NOTE
Brief Postoperative Note      Patient: Bonny Serrano  YOB: 1970  MRN: [id number]    Date of Procedure: 7/12/2024    Pre-Op Diagnosis Codes:     * Closed fracture of right hip, initial encounter ([facility]) [S72.001A]    Post-Op Diagnosis: Same       Procedure(s):  RIGHT FEMUR GAMMA NAILING    Surgeon(s):  Simba Man MD    Assistant: KYA Banks    Anesthesia: General    Estimated Blood Loss (mL): Minimal    Complications: None    Specimens:   * No specimens in log *    Implants:  Implant Name Type Inv. Item Serial No.  Lot No. LRB No. Used Action   SCREW LAG GAMMA 10.5X80MM - [id number]  SCREW LAG GAMMA 10.5X80MM  Reissued [id number] Right 1 Implanted   SCREW LK 5X40MM - [id number]  SCREW LK 5X40MM  Reissued [id number] Right 1 Implanted   SCREW LK 5X45MM - [id number]  SCREW LK 5X45MM  Reissued [id number] Right 1 Implanted   GAMMA4 LONG NAIL, RIGHT     [id number] Right 1 Implanted         Drains:   Urinary Catheter 07/11/24 Roth (Active)   $ Urethral catheter insertion $ Not inserted for procedure 07/11/24 1358   Catheter Indications Perioperative use for selected surgical procedures 07/12/24 0805   Site Assessment No urethral drainage 07/12/24 0805   Urine Color Yellow 07/12/24 0805   Urine Appearance Clear 07/12/24 0805   Collection Container Standard 07/12/24 0805   Securement Method Securing device (Describe) 07/12/24 0805   Catheter Care  Perineal wipes 07/11/24 2145   Catheter Best Practices  Drainage tube clipped to bed;Catheter secured to thigh;Tamper seal intact;Bag below bladder;Bag not on floor;Lack of dependent loop in tubing;Drainage bag less than half full 07/12/24 0805   Status Draining;Patent 07/12/24 0805   Output (mL) 325 mL 07/12/24 0808       Findings:  Infection Present At Time Of Surgery (PATOS) (choose all levels that have infection present):  No infection present  Other Findings: same    Electronically signed by Simba STUART

## 2024-07-12 NOTE — CARE COORDINATION
Attempted to see patient regarding rehab after discharge. She requested SW return another time as she is trying to sleep. Provided patient snf list and confirmed she lives in [place], [place].    Electronically signed by ALEXANDER Chaves, JEVON, Case Management on 7/12/2024 at 3:45 PM  [place] [phone]

## 2024-07-12 NOTE — CONSULTS
[facility] Orthopedic Surgery  Consult Note         This patient is seen in consultation at the request of Delano Hart MD and Liat Kurtz MD     Reason for Consult:  Right hip fracture.    CHIEF COMPLAINT:  Right hip pain/ fall    History Obtained From:  patient, electronic medical record    HISTORY OF PRESENT ILLNESS:    Ms. Serrano 54 y.o.  female seen today at [facility] for consultation and evaluation of a right hip pain which occurred when she got dizzy and fell.   She is complaining of right hip pain.  This is better with rest and worse with bearing any wt.  The pain is sharp and not radiating. No numbness or tingling sensation.  No other complaint. She was seen 1st at [facility], came via EMS, where she was x-rayed and I was consulted. She was then got transferred to [place]. She has PMH of hypertension, paroxysmal atrial fibrillation, not on anticoagulation, COPD, not on home oxygen, prior history of tracheostomy, PEG tube, history of PE, history of GI bleed, chronic tobacco dependence, alcohol use, severe protein calorie malnutrition.     Past Medical History:        Diagnosis Date    A-fib (HCC)     Anemia     Anxiety     Chronic pancreatitis (HCC)     Collapse of right lung     COPD (chronic obstructive pulmonary disease) (HCC)     COPD (chronic obstructive pulmonary disease) (HCC)     Depression     GI bleed     H/O colonoscopy     Hypertension     Pancreatitis     Pulmonary embolism (HCC) 02/2018       Past Surgical History:        Procedure Laterality Date    UPPER GASTROINTESTINAL ENDOSCOPY         Medications prior to admission:   Prior to Admission medications    Medication Sig Start Date End Date Taking? Authorizing Provider   lipase-protease-amylase (CREON) 25144-640915 units CPEP delayed release capsule Take 1 capsule by mouth 3 times daily (with meals)   Yes Provider, MD Pan   guaiFENesin (MUCINEX) 600 MG extended release tablet Take 2 tablets by mouth 2 times daily as

## 2024-07-12 NOTE — CONSULTS
[facility]          [address] [place] [address]                              CONSULTATION      PATIENT NAME: VENUS GE                : 1970  MED REC NO: [id number]                      ROOM: [place] [address]  ACCOUNT NO: [id number]                       ADMIT DATE: 2024  PROVIDER: Brian Weiss MD      CONSULT DATE: 2024    REFERRING PHYSICIAN:  ROULA MO    REASON FOR CONSULTATION:  Hyponatremia.    HISTORY OF PRESENTING ILLNESS:  She is a 54-year-old  female with past medical history significant for hypertension, depression, chronic pancreatitis, COPD, pulmonary embolism, anemia of chronic disease, atrial fibrillation, chronic alcohol abuse, came to ER after having a fall.  CT scan of the head was negative.  The patient was found to have a right femur fracture.  Orthopedic was consulted and the patient was taken to the OR earlier today.  Renal consultation was called because of acute on chronic hyponatremia and electrolyte disorder.  Overnight, the patient is being supplemented on potassium, magnesium, calcium.    At the time of consultation, the patient is drowsy, sleepy, but arousable.  The patient is seen postop.  Most of the information obtained from the record.  At this point, it is not clear if the patient is actively drinking or not.    PAST MEDICAL HISTORY:    1. Multiple medical issues including chronic pancreatitis.  2. Chronic atrial fibrillation.  3. Pulmonary embolism.  4. History of GI bleed.  5. COPD.  6. History of respiratory failure requiring tracheostomy.  7. Chronic pancreatitis.  8. Anemia of chronic disease.  9. Anxiety/depression.  10. GI bleed.    PAST SURGICAL HISTORY:  Status post PEG and tracheostomy followed by reversal.    ALLERGIES:  SULFA.      OUTPATIENT MEDICATIONS:  Include Cardizem, Proventil, potassium, Lasix, Zofran, Zoloft, Cymbalta, Prilosec, Lipitor, Symbicort, Neurontin, prednisone,

## 2024-07-12 NOTE — ACP (ADVANCE CARE PLANNING)
Discussed CODE STATUS with patient-opted full code  Surrogate decision maker-friend-Manan,Whbdcdiy-[phone]

## 2024-07-12 NOTE — OP NOTE
[facility]          [address] [place] [address]                            OPERATIVE REPORT      PATIENT NAME: VENUS GE                : 1970  MED REC NO: [id number]                      ROOM: [place] [address]  ACCOUNT NO: [id number]                       ADMIT DATE: 2024  PROVIDER: Simba Man MD      DATE OF PROCEDURE:  2024    SURGEON:  Simba Man MD    ASSISTANT:  Beth Banks CNP.    PREOPERATIVE DIAGNOSIS:  Right proximal femur intertrochanteric displaced fracture.    POSTOPERATIVE DIAGNOSIS:  Right proximal femur intertrochanteric displaced fracture.    PROCEDURE DONE:  Open treatment of right proximal femur intertrochanteric fracture with a locked gamma nail.    ANESTHESIA:  General anesthesia.    ESTIMATED BLOOD LOSS:  Minimal.    COMPLICATIONS:  None.    IMPLANT USED:  Grace titanium long Gamma4 size 10 x 360 with 80 mm hip screw and 2 distal locking screws.    INDICATION:  This is a 54-year-old white female with a BMI of 14, who is malnourished, also history of alcohol use, who sustained a fall with right hip pain.  She was seen initially at [facility].  She was found to have an intertrochanteric displaced fracture.  I was consulted for injury.  She was then transferred to [facility] for a timely fashion surgical fixation.  All risks, benefits, and alternatives were discussed with the patient, and she agreed to proceed with surgical treatment.    DESCRIPTION OF PROCEDURE:  The patient's right hip was marked.  She received 2 g Ancef IV preoperatively.  The patient was then brought to the operating room, underwent general anesthesia.  She was then transferred to the Clinton table with the right foot in a boot and the left leg in a leg bolster.  Closed reduction was performed.  We were able to achieve a near-anatomic reduction on the Clinton table.  At this point, we had the right hip and lower extremity

## 2024-07-12 NOTE — DISCHARGE INSTR - COC
Status:  oriented and alert     IV Access:  - None    Nursing Mobility/ADLs:  Walking   Assisted  Transfer  Assisted  Bathing  Assisted  Dressing  Assisted  Toileting  Assisted  Feeding  Independent  Med Admin  Independent  Med Delivery   whole    Wound Care Documentation and Therapy:  Keep Mepilex dressing clean and intact for 7 days after surgery then remove and leave wound to air. Mepilex is waterproof for showering.         Elimination:  Urinary Catheter: None   Colostomy/Ileostomy: No  Continence:   Bowel: No  Bladder: No  Date of Last BM: 7/16/24      Intake/Output Summary (Last 24 hours)     Intake/Output Summary (Last 24 hours) at 7/12/2024 1437  Last data filed at 7/12/2024 1408  Gross per 24 hour   Intake 550 ml   Output 775 ml   Net -225 ml     Safety Concerns:     History of Falls (last 30 days) and At Risk for Falls    Impairments/Disabilities:      None    Nutrition Therapy:  Current Nutrition Therapy: ADULT DIET; Regular  ADULT ORAL NUTRITION SUPPLEMENT; Breakfast, Dinner, Lunch; Standard High Calorie/High Protein Oral Supplement  Routes of Feeding: Oral  Liquids: Thin Liquids  Daily Fluid Restriction: yes - amount 1200 ml  Last Modified Barium Swallow with Video (Video Swallowing Test): not done    Treatments at the Time of Hospital Discharge:   Respiratory Treatments: none  Oxygen Therapy:  is not on home oxygen therapy.  Ventilator:    - No ventilator support    Lab orders for discharge:        Rehab Therapies: Physical Therapy, Occupational Therapy and nursing care  Weight Bearing Status/Restrictions: No weight bearing restrictions  Other Medical Equipment (for information only, NOT a DME order): rolling walker   Other Treatments: Eliquis bid for 30 total days after discharge from hospital for DVT prophylaxis     Patient's personal belongings (please select all that are sent with patient):  None    RN SIGNATURE:  Electronically signed by Aracely Perez RN on 7/16/24 at 9:10 AM EDT    PHYSICIAN

## 2024-07-13 LAB
ALBUMIN SERPL-MCNC: 1.7 G/DL (ref 3.4–5)
ALBUMIN SERPL-MCNC: 1.8 G/DL (ref 3.4–5)
ALP SERPL-CCNC: 113 U/L (ref 40–129)
ALP SERPL-CCNC: 128 U/L (ref 40–129)
ALT SERPL-CCNC: 17 U/L (ref 10–40)
ALT SERPL-CCNC: 19 U/L (ref 10–40)
ANION GAP SERPL CALCULATED.3IONS-SCNC: 4 MMOL/L (ref 3–16)
AST SERPL-CCNC: 21 U/L (ref 15–37)
AST SERPL-CCNC: 29 U/L (ref 15–37)
BASOPHILS # BLD: 0 K/UL (ref 0–0.2)
BASOPHILS NFR BLD: 0.2 %
BILIRUB DIRECT SERPL-MCNC: 0.3 MG/DL (ref 0–0.3)
BILIRUB DIRECT SERPL-MCNC: <0.2 MG/DL (ref 0–0.3)
BILIRUB INDIRECT SERPL-MCNC: 0.2 MG/DL (ref 0–1)
BILIRUB INDIRECT SERPL-MCNC: ABNORMAL MG/DL (ref 0–1)
BILIRUB SERPL-MCNC: 0.3 MG/DL (ref 0–1)
BILIRUB SERPL-MCNC: 0.5 MG/DL (ref 0–1)
BLOOD BANK DISPENSE STATUS: NORMAL
BLOOD BANK PRODUCT CODE: NORMAL
BPU ID: NORMAL
BUN SERPL-MCNC: 2 MG/DL (ref 7–20)
CA-I BLD-SCNC: 0.95 MMOL/L (ref 1.12–1.32)
CALCIUM SERPL-MCNC: 7.2 MG/DL (ref 8.3–10.6)
CHLORIDE SERPL-SCNC: 93 MMOL/L (ref 99–110)
CO2 SERPL-SCNC: 26 MMOL/L (ref 21–32)
CREAT SERPL-MCNC: <0.5 MG/DL (ref 0.6–1.1)
DEPRECATED RDW RBC AUTO: 13.8 % (ref 12.4–15.4)
DESCRIPTION BLOOD BANK: NORMAL
EOSINOPHIL # BLD: 0 K/UL (ref 0–0.6)
EOSINOPHIL NFR BLD: 0 %
FOLATE SERPL-MCNC: 11.82 NG/ML (ref 4.78–24.2)
FOLATE SERPL-MCNC: 9.62 NG/ML (ref 4.78–24.2)
GFR SERPLBLD CREATININE-BSD FMLA CKD-EPI: >90 ML/MIN/{1.73_M2}
GLUCOSE SERPL-MCNC: 146 MG/DL (ref 70–99)
HCT VFR BLD AUTO: 19.1 % (ref 36–48)
HCT VFR BLD AUTO: 24.1 % (ref 36–48)
HCT VFR BLD AUTO: 24.8 % (ref 36–48)
HGB BLD-MCNC: 6.3 G/DL (ref 12–16)
HGB BLD-MCNC: 8.4 G/DL (ref 12–16)
HGB BLD-MCNC: 8.4 G/DL (ref 12–16)
IRON SATN MFR SERPL: 66 % (ref 15–50)
IRON SERPL-MCNC: 42 UG/DL (ref 37–145)
LYMPHOCYTES # BLD: 0.7 K/UL (ref 1–5.1)
LYMPHOCYTES NFR BLD: 7.3 %
MAGNESIUM SERPL-MCNC: 1.2 MG/DL (ref 1.8–2.4)
MAGNESIUM SERPL-MCNC: 1.7 MG/DL (ref 1.8–2.4)
MAGNESIUM SERPL-MCNC: 2.1 MG/DL (ref 1.8–2.4)
MCH RBC QN AUTO: 32.8 PG (ref 26–34)
MCHC RBC AUTO-ENTMCNC: 33.2 G/DL (ref 31–36)
MCV RBC AUTO: 98.7 FL (ref 80–100)
MONOCYTES # BLD: 0.7 K/UL (ref 0–1.3)
MONOCYTES NFR BLD: 7.2 %
NEUTROPHILS # BLD: 8.3 K/UL (ref 1.7–7.7)
NEUTROPHILS NFR BLD: 85.3 %
PH BLDV: 7.54 [PH] (ref 7.35–7.45)
PHOSPHATE SERPL-MCNC: 2.4 MG/DL (ref 2.5–4.9)
PLATELET # BLD AUTO: 179 K/UL (ref 135–450)
PMV BLD AUTO: 7.1 FL (ref 5–10.5)
POTASSIUM SERPL-SCNC: 4 MMOL/L (ref 3.5–5.1)
POTASSIUM SERPL-SCNC: 4 MMOL/L (ref 3.5–5.1)
PROT SERPL-MCNC: 3.7 G/DL (ref 6.4–8.2)
PROT SERPL-MCNC: 4 G/DL (ref 6.4–8.2)
RBC # BLD AUTO: 1.94 M/UL (ref 4–5.2)
REASON FOR REJECTION: NORMAL
REJECTED TEST: NORMAL
SODIUM SERPL-SCNC: 123 MMOL/L (ref 136–145)
SODIUM SERPL-SCNC: 123 MMOL/L (ref 136–145)
SODIUM SERPL-SCNC: 124 MMOL/L (ref 136–145)
SODIUM SERPL-SCNC: 125 MMOL/L (ref 136–145)
SODIUM SERPL-SCNC: 127 MMOL/L (ref 136–145)
TIBC SERPL-MCNC: 64 UG/DL (ref 260–445)
VIT B12 SERPL-MCNC: 1126 PG/ML (ref 211–911)
WBC # BLD AUTO: 9.7 K/UL (ref 4–11)

## 2024-07-13 PROCEDURE — 97530 THERAPEUTIC ACTIVITIES: CPT

## 2024-07-13 PROCEDURE — 94761 N-INVAS EAR/PLS OXIMETRY MLT: CPT

## 2024-07-13 PROCEDURE — 6370000000 HC RX 637 (ALT 250 FOR IP): Performed by: INTERNAL MEDICINE

## 2024-07-13 PROCEDURE — 80076 HEPATIC FUNCTION PANEL: CPT

## 2024-07-13 PROCEDURE — 1200000000 HC SEMI PRIVATE

## 2024-07-13 PROCEDURE — 97162 PT EVAL MOD COMPLEX 30 MIN: CPT

## 2024-07-13 PROCEDURE — 6370000000 HC RX 637 (ALT 250 FOR IP): Performed by: ORTHOPAEDIC SURGERY

## 2024-07-13 PROCEDURE — 6370000000 HC RX 637 (ALT 250 FOR IP)

## 2024-07-13 PROCEDURE — 85018 HEMOGLOBIN: CPT

## 2024-07-13 PROCEDURE — 6360000002 HC RX W HCPCS: Performed by: ORTHOPAEDIC SURGERY

## 2024-07-13 PROCEDURE — 85014 HEMATOCRIT: CPT

## 2024-07-13 PROCEDURE — 83735 ASSAY OF MAGNESIUM: CPT

## 2024-07-13 PROCEDURE — 2580000003 HC RX 258: Performed by: ORTHOPAEDIC SURGERY

## 2024-07-13 PROCEDURE — 36415 COLL VENOUS BLD VENIPUNCTURE: CPT

## 2024-07-13 PROCEDURE — 2580000003 HC RX 258

## 2024-07-13 PROCEDURE — 82330 ASSAY OF CALCIUM: CPT

## 2024-07-13 PROCEDURE — 83550 IRON BINDING TEST: CPT

## 2024-07-13 PROCEDURE — 84295 ASSAY OF SERUM SODIUM: CPT

## 2024-07-13 PROCEDURE — 2580000003 HC RX 258: Performed by: HOSPITALIST

## 2024-07-13 PROCEDURE — 83540 ASSAY OF IRON: CPT

## 2024-07-13 PROCEDURE — APPNB45 APP NON BILLABLE 31-45 MINUTES

## 2024-07-13 PROCEDURE — 94640 AIRWAY INHALATION TREATMENT: CPT

## 2024-07-13 PROCEDURE — 85025 COMPLETE CBC W/AUTO DIFF WBC: CPT

## 2024-07-13 PROCEDURE — 2500000003 HC RX 250 WO HCPCS: Performed by: INTERNAL MEDICINE

## 2024-07-13 PROCEDURE — 80069 RENAL FUNCTION PANEL: CPT

## 2024-07-13 PROCEDURE — 82746 ASSAY OF FOLIC ACID SERUM: CPT

## 2024-07-13 PROCEDURE — 2700000000 HC OXYGEN THERAPY PER DAY

## 2024-07-13 PROCEDURE — 97165 OT EVAL LOW COMPLEX 30 MIN: CPT

## 2024-07-13 PROCEDURE — 36430 TRANSFUSION BLD/BLD COMPNT: CPT

## 2024-07-13 PROCEDURE — 82607 VITAMIN B-12: CPT

## 2024-07-13 RX ORDER — CALCIUM GLUCONATE 20 MG/ML
2000 INJECTION, SOLUTION INTRAVENOUS ONCE
Status: COMPLETED | OUTPATIENT
Start: 2024-07-13 | End: 2024-07-13

## 2024-07-13 RX ORDER — 0.9 % SODIUM CHLORIDE 0.9 %
500 INTRAVENOUS SOLUTION INTRAVENOUS ONCE
Status: COMPLETED | OUTPATIENT
Start: 2024-07-13 | End: 2024-07-13

## 2024-07-13 RX ORDER — SODIUM CHLORIDE 9 MG/ML
INJECTION, SOLUTION INTRAVENOUS PRN
Status: DISCONTINUED | OUTPATIENT
Start: 2024-07-13 | End: 2024-07-18 | Stop reason: HOSPADM

## 2024-07-13 RX ORDER — SODIUM CHLORIDE 1 G/1
1 TABLET ORAL 2 TIMES DAILY WITH MEALS
Status: DISCONTINUED | OUTPATIENT
Start: 2024-07-13 | End: 2024-07-14

## 2024-07-13 RX ORDER — SODIUM CHLORIDE 9 MG/ML
INJECTION, SOLUTION INTRAVENOUS ONCE
Status: COMPLETED | OUTPATIENT
Start: 2024-07-13 | End: 2024-07-13

## 2024-07-13 RX ADMIN — TIOTROPIUM BROMIDE INHALATION SPRAY 2 PUFF: 3.12 SPRAY, METERED RESPIRATORY (INHALATION) at 07:44

## 2024-07-13 RX ADMIN — Medication 100 MG: at 08:33

## 2024-07-13 RX ADMIN — BUDESONIDE AND FORMOTEROL FUMARATE DIHYDRATE 2 PUFF: 160; 4.5 AEROSOL RESPIRATORY (INHALATION) at 07:44

## 2024-07-13 RX ADMIN — BUDESONIDE AND FORMOTEROL FUMARATE DIHYDRATE 2 PUFF: 160; 4.5 AEROSOL RESPIRATORY (INHALATION) at 19:47

## 2024-07-13 RX ADMIN — SODIUM CHLORIDE 2000 MG: 900 INJECTION INTRAVENOUS at 02:49

## 2024-07-13 RX ADMIN — SODIUM CHLORIDE 500 ML: 9 INJECTION, SOLUTION INTRAVENOUS at 08:47

## 2024-07-13 RX ADMIN — Medication 1 G: at 16:43

## 2024-07-13 RX ADMIN — PANCRELIPASE LIPASE, PANCRELIPASE PROTEASE, PANCRELIPASE AMYLASE 20000 UNITS: 20000; 63000; 84000 CAPSULE, DELAYED RELEASE ORAL at 12:08

## 2024-07-13 RX ADMIN — PANCRELIPASE LIPASE, PANCRELIPASE PROTEASE, PANCRELIPASE AMYLASE 15000 UNITS: 15000; 47000; 63000 CAPSULE, DELAYED RELEASE ORAL at 08:33

## 2024-07-13 RX ADMIN — ACETAMINOPHEN 325MG 650 MG: 325 TABLET ORAL at 06:35

## 2024-07-13 RX ADMIN — PANTOPRAZOLE SODIUM 40 MG: 40 TABLET, DELAYED RELEASE ORAL at 06:24

## 2024-07-13 RX ADMIN — PANCRELIPASE LIPASE, PANCRELIPASE PROTEASE, PANCRELIPASE AMYLASE 15000 UNITS: 15000; 47000; 63000 CAPSULE, DELAYED RELEASE ORAL at 12:08

## 2024-07-13 RX ADMIN — GUAIFENESIN 600 MG: 600 TABLET ORAL at 08:33

## 2024-07-13 RX ADMIN — SODIUM CHLORIDE: 9 INJECTION, SOLUTION INTRAVENOUS at 06:58

## 2024-07-13 RX ADMIN — ATORVASTATIN CALCIUM 10 MG: 10 TABLET, FILM COATED ORAL at 08:33

## 2024-07-13 RX ADMIN — SODIUM CHLORIDE, PRESERVATIVE FREE 10 ML: 5 INJECTION INTRAVENOUS at 20:26

## 2024-07-13 RX ADMIN — GUAIFENESIN 600 MG: 600 TABLET ORAL at 20:25

## 2024-07-13 RX ADMIN — PANCRELIPASE LIPASE, PANCRELIPASE PROTEASE, PANCRELIPASE AMYLASE 20000 UNITS: 20000; 63000; 84000 CAPSULE, DELAYED RELEASE ORAL at 08:33

## 2024-07-13 RX ADMIN — FOLIC ACID 1 MG: 1 TABLET ORAL at 08:33

## 2024-07-13 RX ADMIN — MAGNESIUM SULFATE HEPTAHYDRATE 2000 MG: 40 INJECTION, SOLUTION INTRAVENOUS at 15:09

## 2024-07-13 RX ADMIN — POTASSIUM CITRATE 10 MEQ: 10 TABLET ORAL at 08:34

## 2024-07-13 RX ADMIN — PANCRELIPASE LIPASE, PANCRELIPASE PROTEASE, PANCRELIPASE AMYLASE 15000 UNITS: 15000; 47000; 63000 CAPSULE, DELAYED RELEASE ORAL at 16:43

## 2024-07-13 RX ADMIN — ENOXAPARIN SODIUM 30 MG: 100 INJECTION SUBCUTANEOUS at 08:34

## 2024-07-13 RX ADMIN — CALCIUM GLUCONATE 2000 MG: 20 INJECTION, SOLUTION INTRAVENOUS at 15:15

## 2024-07-13 RX ADMIN — MAGNESIUM SULFATE HEPTAHYDRATE 2000 MG: 40 INJECTION, SOLUTION INTRAVENOUS at 12:12

## 2024-07-13 RX ADMIN — DILTIAZEM HYDROCHLORIDE 240 MG: 240 CAPSULE, EXTENDED RELEASE ORAL at 08:33

## 2024-07-13 RX ADMIN — IPRATROPIUM BROMIDE AND ALBUTEROL SULFATE 1 DOSE: 2.5; .5 SOLUTION RESPIRATORY (INHALATION) at 19:48

## 2024-07-13 RX ADMIN — PANCRELIPASE LIPASE, PANCRELIPASE PROTEASE, PANCRELIPASE AMYLASE 20000 UNITS: 20000; 63000; 84000 CAPSULE, DELAYED RELEASE ORAL at 16:43

## 2024-07-13 ASSESSMENT — PAIN DESCRIPTION - ORIENTATION: ORIENTATION: LEFT

## 2024-07-13 ASSESSMENT — PAIN SCALES - GENERAL
PAINLEVEL_OUTOF10: 1
PAINLEVEL_OUTOF10: 0

## 2024-07-13 ASSESSMENT — PAIN DESCRIPTION - LOCATION: LOCATION: HIP;FLANK

## 2024-07-13 ASSESSMENT — PAIN - FUNCTIONAL ASSESSMENT: PAIN_FUNCTIONAL_ASSESSMENT: ACTIVITIES ARE NOT PREVENTED

## 2024-07-13 ASSESSMENT — PAIN DESCRIPTION - DESCRIPTORS: DESCRIPTORS: ACHING

## 2024-07-13 ASSESSMENT — PAIN SCALES - WONG BAKER: WONGBAKER_NUMERICALRESPONSE: HURTS A LITTLE BIT

## 2024-07-14 LAB
ALBUMIN SERPL-MCNC: 1.7 G/DL (ref 3.4–5)
ANION GAP SERPL CALCULATED.3IONS-SCNC: 6 MMOL/L (ref 3–16)
ANION GAP SERPL CALCULATED.3IONS-SCNC: 7 MMOL/L (ref 3–16)
BASOPHILS # BLD: 0 K/UL (ref 0–0.2)
BASOPHILS NFR BLD: 0.3 %
BUN SERPL-MCNC: <2 MG/DL (ref 7–20)
BUN SERPL-MCNC: <2 MG/DL (ref 7–20)
CA-I BLD-SCNC: 1 MMOL/L (ref 1.12–1.32)
CALCIUM SERPL-MCNC: 6.7 MG/DL (ref 8.3–10.6)
CALCIUM SERPL-MCNC: 6.8 MG/DL (ref 8.3–10.6)
CHLORIDE SERPL-SCNC: 92 MMOL/L (ref 99–110)
CHLORIDE SERPL-SCNC: 92 MMOL/L (ref 99–110)
CO2 SERPL-SCNC: 26 MMOL/L (ref 21–32)
CO2 SERPL-SCNC: 28 MMOL/L (ref 21–32)
CREAT SERPL-MCNC: <0.5 MG/DL (ref 0.6–1.1)
CREAT SERPL-MCNC: <0.5 MG/DL (ref 0.6–1.1)
DEPRECATED RDW RBC AUTO: 17.9 % (ref 12.4–15.4)
EOSINOPHIL # BLD: 0 K/UL (ref 0–0.6)
EOSINOPHIL NFR BLD: 0.2 %
GFR SERPLBLD CREATININE-BSD FMLA CKD-EPI: >90 ML/MIN/{1.73_M2}
GFR SERPLBLD CREATININE-BSD FMLA CKD-EPI: >90 ML/MIN/{1.73_M2}
GLUCOSE SERPL-MCNC: 104 MG/DL (ref 70–99)
GLUCOSE SERPL-MCNC: 95 MG/DL (ref 70–99)
HCT VFR BLD AUTO: 24.7 % (ref 36–48)
HCT VFR BLD AUTO: 25 % (ref 36–48)
HCT VFR BLD AUTO: 25.4 % (ref 36–48)
HCT VFR BLD AUTO: 27 % (ref 36–48)
HCT VFR BLD AUTO: 27.4 % (ref 36–48)
HGB BLD-MCNC: 8.7 G/DL (ref 12–16)
HGB BLD-MCNC: 8.8 G/DL (ref 12–16)
HGB BLD-MCNC: 9 G/DL (ref 12–16)
HGB BLD-MCNC: 9.2 G/DL (ref 12–16)
HGB BLD-MCNC: 9.5 G/DL (ref 12–16)
LYMPHOCYTES # BLD: 0.9 K/UL (ref 1–5.1)
LYMPHOCYTES NFR BLD: 11.5 %
MAGNESIUM SERPL-MCNC: 1.4 MG/DL (ref 1.8–2.4)
MCH RBC QN AUTO: 31.3 PG (ref 26–34)
MCHC RBC AUTO-ENTMCNC: 35 G/DL (ref 31–36)
MCV RBC AUTO: 89.4 FL (ref 80–100)
MONOCYTES # BLD: 0.6 K/UL (ref 0–1.3)
MONOCYTES NFR BLD: 8.2 %
NEUTROPHILS # BLD: 6.1 K/UL (ref 1.7–7.7)
NEUTROPHILS NFR BLD: 79.8 %
PH BLDV: 7.45 [PH] (ref 7.35–7.45)
PHOSPHATE SERPL-MCNC: 1.9 MG/DL (ref 2.5–4.9)
PLATELET # BLD AUTO: 187 K/UL (ref 135–450)
PMV BLD AUTO: 7 FL (ref 5–10.5)
POTASSIUM SERPL-SCNC: 3.1 MMOL/L (ref 3.5–5.1)
POTASSIUM SERPL-SCNC: 3.1 MMOL/L (ref 3.5–5.1)
POTASSIUM SERPL-SCNC: 3.7 MMOL/L (ref 3.5–5.1)
RBC # BLD AUTO: 2.8 M/UL (ref 4–5.2)
SODIUM SERPL-SCNC: 125 MMOL/L (ref 136–145)
SODIUM SERPL-SCNC: 125 MMOL/L (ref 136–145)
SODIUM SERPL-SCNC: 126 MMOL/L (ref 136–145)
SODIUM SERPL-SCNC: 127 MMOL/L (ref 136–145)
SODIUM SERPL-SCNC: 128 MMOL/L (ref 136–145)
SODIUM SERPL-SCNC: 128 MMOL/L (ref 136–145)
WBC # BLD AUTO: 7.7 K/UL (ref 4–11)

## 2024-07-14 PROCEDURE — 6370000000 HC RX 637 (ALT 250 FOR IP)

## 2024-07-14 PROCEDURE — 80069 RENAL FUNCTION PANEL: CPT

## 2024-07-14 PROCEDURE — 2580000003 HC RX 258

## 2024-07-14 PROCEDURE — 6360000002 HC RX W HCPCS: Performed by: INTERNAL MEDICINE

## 2024-07-14 PROCEDURE — 36415 COLL VENOUS BLD VENIPUNCTURE: CPT

## 2024-07-14 PROCEDURE — 6360000002 HC RX W HCPCS: Performed by: ORTHOPAEDIC SURGERY

## 2024-07-14 PROCEDURE — 84295 ASSAY OF SERUM SODIUM: CPT

## 2024-07-14 PROCEDURE — 94761 N-INVAS EAR/PLS OXIMETRY MLT: CPT

## 2024-07-14 PROCEDURE — 94640 AIRWAY INHALATION TREATMENT: CPT

## 2024-07-14 PROCEDURE — 2500000003 HC RX 250 WO HCPCS: Performed by: INTERNAL MEDICINE

## 2024-07-14 PROCEDURE — 85014 HEMATOCRIT: CPT

## 2024-07-14 PROCEDURE — 2580000003 HC RX 258: Performed by: INTERNAL MEDICINE

## 2024-07-14 PROCEDURE — 2700000000 HC OXYGEN THERAPY PER DAY

## 2024-07-14 PROCEDURE — 85018 HEMOGLOBIN: CPT

## 2024-07-14 PROCEDURE — 85025 COMPLETE CBC W/AUTO DIFF WBC: CPT

## 2024-07-14 PROCEDURE — 6370000000 HC RX 637 (ALT 250 FOR IP): Performed by: NURSE PRACTITIONER

## 2024-07-14 PROCEDURE — 6370000000 HC RX 637 (ALT 250 FOR IP): Performed by: INTERNAL MEDICINE

## 2024-07-14 PROCEDURE — 6370000000 HC RX 637 (ALT 250 FOR IP): Performed by: ORTHOPAEDIC SURGERY

## 2024-07-14 PROCEDURE — 1200000000 HC SEMI PRIVATE

## 2024-07-14 PROCEDURE — 82330 ASSAY OF CALCIUM: CPT

## 2024-07-14 PROCEDURE — 83735 ASSAY OF MAGNESIUM: CPT

## 2024-07-14 RX ORDER — MAGNESIUM SULFATE IN WATER 40 MG/ML
2000 INJECTION, SOLUTION INTRAVENOUS ONCE
Status: COMPLETED | OUTPATIENT
Start: 2024-07-14 | End: 2024-07-14

## 2024-07-14 RX ORDER — TOLVAPTAN 15 MG/1
15 TABLET ORAL ONCE
Status: COMPLETED | OUTPATIENT
Start: 2024-07-14 | End: 2024-07-14

## 2024-07-14 RX ORDER — ATENOLOL 25 MG/1
25 TABLET ORAL DAILY
Status: DISCONTINUED | OUTPATIENT
Start: 2024-07-14 | End: 2024-07-18 | Stop reason: HOSPADM

## 2024-07-14 RX ORDER — CALCIUM GLUCONATE 20 MG/ML
2000 INJECTION, SOLUTION INTRAVENOUS ONCE
Status: COMPLETED | OUTPATIENT
Start: 2024-07-14 | End: 2024-07-14

## 2024-07-14 RX ADMIN — ATENOLOL 25 MG: 25 TABLET ORAL at 12:01

## 2024-07-14 RX ADMIN — PANCRELIPASE LIPASE, PANCRELIPASE PROTEASE, PANCRELIPASE AMYLASE 15000 UNITS: 15000; 47000; 63000 CAPSULE, DELAYED RELEASE ORAL at 16:30

## 2024-07-14 RX ADMIN — ENOXAPARIN SODIUM 30 MG: 100 INJECTION SUBCUTANEOUS at 07:46

## 2024-07-14 RX ADMIN — POTASSIUM CITRATE 10 MEQ: 10 TABLET ORAL at 07:59

## 2024-07-14 RX ADMIN — SODIUM CHLORIDE: 9 INJECTION, SOLUTION INTRAVENOUS at 16:31

## 2024-07-14 RX ADMIN — DILTIAZEM HYDROCHLORIDE 240 MG: 240 CAPSULE, EXTENDED RELEASE ORAL at 07:46

## 2024-07-14 RX ADMIN — Medication 1 G: at 07:46

## 2024-07-14 RX ADMIN — SODIUM CHLORIDE, PRESERVATIVE FREE 10 ML: 5 INJECTION INTRAVENOUS at 07:48

## 2024-07-14 RX ADMIN — TIOTROPIUM BROMIDE INHALATION SPRAY 2 PUFF: 3.12 SPRAY, METERED RESPIRATORY (INHALATION) at 08:49

## 2024-07-14 RX ADMIN — ACETAMINOPHEN 325MG 650 MG: 325 TABLET ORAL at 18:43

## 2024-07-14 RX ADMIN — BUDESONIDE AND FORMOTEROL FUMARATE DIHYDRATE 2 PUFF: 160; 4.5 AEROSOL RESPIRATORY (INHALATION) at 19:40

## 2024-07-14 RX ADMIN — GUAIFENESIN 600 MG: 600 TABLET ORAL at 07:46

## 2024-07-14 RX ADMIN — PANCRELIPASE LIPASE, PANCRELIPASE PROTEASE, PANCRELIPASE AMYLASE 15000 UNITS: 15000; 47000; 63000 CAPSULE, DELAYED RELEASE ORAL at 07:46

## 2024-07-14 RX ADMIN — Medication 100 MG: at 07:46

## 2024-07-14 RX ADMIN — GUAIFENESIN 600 MG: 600 TABLET ORAL at 20:08

## 2024-07-14 RX ADMIN — PANCRELIPASE LIPASE, PANCRELIPASE PROTEASE, PANCRELIPASE AMYLASE 20000 UNITS: 20000; 63000; 84000 CAPSULE, DELAYED RELEASE ORAL at 12:01

## 2024-07-14 RX ADMIN — CALCIUM GLUCONATE 2000 MG: 20 INJECTION, SOLUTION INTRAVENOUS at 16:35

## 2024-07-14 RX ADMIN — PANCRELIPASE LIPASE, PANCRELIPASE PROTEASE, PANCRELIPASE AMYLASE 20000 UNITS: 20000; 63000; 84000 CAPSULE, DELAYED RELEASE ORAL at 16:30

## 2024-07-14 RX ADMIN — PANCRELIPASE LIPASE, PANCRELIPASE PROTEASE, PANCRELIPASE AMYLASE 20000 UNITS: 20000; 63000; 84000 CAPSULE, DELAYED RELEASE ORAL at 07:46

## 2024-07-14 RX ADMIN — ATORVASTATIN CALCIUM 10 MG: 10 TABLET, FILM COATED ORAL at 07:46

## 2024-07-14 RX ADMIN — FOLIC ACID 1 MG: 1 TABLET ORAL at 07:46

## 2024-07-14 RX ADMIN — TOLVAPTAN 15 MG: 15 TABLET ORAL at 17:02

## 2024-07-14 RX ADMIN — MAGNESIUM SULFATE HEPTAHYDRATE 2000 MG: 40 INJECTION, SOLUTION INTRAVENOUS at 12:02

## 2024-07-14 RX ADMIN — OXYCODONE 5 MG: 5 TABLET ORAL at 07:46

## 2024-07-14 RX ADMIN — SODIUM CHLORIDE, PRESERVATIVE FREE 10 ML: 5 INJECTION INTRAVENOUS at 20:08

## 2024-07-14 RX ADMIN — PANCRELIPASE LIPASE, PANCRELIPASE PROTEASE, PANCRELIPASE AMYLASE 15000 UNITS: 15000; 47000; 63000 CAPSULE, DELAYED RELEASE ORAL at 12:01

## 2024-07-14 RX ADMIN — BUDESONIDE AND FORMOTEROL FUMARATE DIHYDRATE 2 PUFF: 160; 4.5 AEROSOL RESPIRATORY (INHALATION) at 08:49

## 2024-07-14 RX ADMIN — PANTOPRAZOLE SODIUM 40 MG: 40 TABLET, DELAYED RELEASE ORAL at 05:49

## 2024-07-14 RX ADMIN — OXYCODONE 5 MG: 5 TABLET ORAL at 12:06

## 2024-07-14 ASSESSMENT — PAIN SCALES - GENERAL
PAINLEVEL_OUTOF10: 8
PAINLEVEL_OUTOF10: 7
PAINLEVEL_OUTOF10: 10
PAINLEVEL_OUTOF10: 4
PAINLEVEL_OUTOF10: 3
PAINLEVEL_OUTOF10: 7

## 2024-07-14 ASSESSMENT — PAIN DESCRIPTION - DESCRIPTORS
DESCRIPTORS: ACHING

## 2024-07-14 ASSESSMENT — PAIN DESCRIPTION - ORIENTATION
ORIENTATION: RIGHT

## 2024-07-14 ASSESSMENT — PAIN DESCRIPTION - LOCATION
LOCATION: HIP
LOCATION: ABDOMEN;LEG
LOCATION: LEG;HIP

## 2024-07-15 ENCOUNTER — APPOINTMENT (OUTPATIENT)
Dept: GENERAL RADIOLOGY | Age: 54
DRG: 308 | End: 2024-07-15
Attending: INTERNAL MEDICINE
Payer: MEDICAID

## 2024-07-15 LAB
ALBUMIN SERPL-MCNC: 1.8 G/DL (ref 3.4–5)
ANION GAP SERPL CALCULATED.3IONS-SCNC: -5 MMOL/L (ref 3–16)
BASOPHILS # BLD: 0 K/UL (ref 0–0.2)
BASOPHILS NFR BLD: 0.3 %
BUN SERPL-MCNC: <2 MG/DL (ref 7–20)
CA-I BLD-SCNC: 1.16 MMOL/L (ref 1.12–1.32)
CALCIUM SERPL-MCNC: 7.7 MG/DL (ref 8.3–10.6)
CHLORIDE SERPL-SCNC: 101 MMOL/L (ref 99–110)
CO2 SERPL-SCNC: 30 MMOL/L (ref 21–32)
CREAT SERPL-MCNC: <0.5 MG/DL (ref 0.6–1.1)
DEPRECATED RDW RBC AUTO: 18.6 % (ref 12.4–15.4)
EOSINOPHIL # BLD: 0 K/UL (ref 0–0.6)
EOSINOPHIL NFR BLD: 0.2 %
GFR SERPLBLD CREATININE-BSD FMLA CKD-EPI: >90 ML/MIN/{1.73_M2}
GLUCOSE SERPL-MCNC: 148 MG/DL (ref 70–99)
HCT VFR BLD AUTO: 28.8 % (ref 36–48)
HGB BLD-MCNC: 9.8 G/DL (ref 12–16)
LYMPHOCYTES # BLD: 0.9 K/UL (ref 1–5.1)
LYMPHOCYTES NFR BLD: 11.3 %
MAGNESIUM SERPL-MCNC: 1.6 MG/DL (ref 1.8–2.4)
MCH RBC QN AUTO: 31.3 PG (ref 26–34)
MCHC RBC AUTO-ENTMCNC: 34 G/DL (ref 31–36)
MCV RBC AUTO: 92 FL (ref 80–100)
MONOCYTES # BLD: 0.8 K/UL (ref 0–1.3)
MONOCYTES NFR BLD: 10.6 %
NEUTROPHILS # BLD: 5.9 K/UL (ref 1.7–7.7)
NEUTROPHILS NFR BLD: 77.6 %
OSMOLALITY UR: 66 MOSM/KG (ref 390–1070)
PH BLDV: 7.37 [PH] (ref 7.35–7.45)
PHOSPHATE SERPL-MCNC: 2.8 MG/DL (ref 2.5–4.9)
PLATELET # BLD AUTO: 260 K/UL (ref 135–450)
PMV BLD AUTO: 7.2 FL (ref 5–10.5)
POTASSIUM SERPL-SCNC: 3.1 MMOL/L (ref 3.5–5.1)
POTASSIUM SERPL-SCNC: 3.1 MMOL/L (ref 3.5–5.1)
PROCALCITONIN SERPL IA-MCNC: 0.35 NG/ML (ref 0–0.15)
RBC # BLD AUTO: 3.13 M/UL (ref 4–5.2)
SODIUM SERPL-SCNC: 126 MMOL/L (ref 136–145)
SODIUM SERPL-SCNC: 128 MMOL/L (ref 136–145)
SODIUM SERPL-SCNC: 137 MMOL/L (ref 136–145)
SODIUM UR-SCNC: <20 MMOL/L
WBC # BLD AUTO: 7.6 K/UL (ref 4–11)

## 2024-07-15 PROCEDURE — 94761 N-INVAS EAR/PLS OXIMETRY MLT: CPT

## 2024-07-15 PROCEDURE — 6370000000 HC RX 637 (ALT 250 FOR IP)

## 2024-07-15 PROCEDURE — 6370000000 HC RX 637 (ALT 250 FOR IP): Performed by: NURSE PRACTITIONER

## 2024-07-15 PROCEDURE — 82330 ASSAY OF CALCIUM: CPT

## 2024-07-15 PROCEDURE — 1200000000 HC SEMI PRIVATE

## 2024-07-15 PROCEDURE — 97530 THERAPEUTIC ACTIVITIES: CPT

## 2024-07-15 PROCEDURE — 84300 ASSAY OF URINE SODIUM: CPT

## 2024-07-15 PROCEDURE — 85025 COMPLETE CBC W/AUTO DIFF WBC: CPT

## 2024-07-15 PROCEDURE — 94760 N-INVAS EAR/PLS OXIMETRY 1: CPT

## 2024-07-15 PROCEDURE — 6360000002 HC RX W HCPCS: Performed by: ORTHOPAEDIC SURGERY

## 2024-07-15 PROCEDURE — 6370000000 HC RX 637 (ALT 250 FOR IP): Performed by: ORTHOPAEDIC SURGERY

## 2024-07-15 PROCEDURE — 2700000000 HC OXYGEN THERAPY PER DAY

## 2024-07-15 PROCEDURE — 84145 PROCALCITONIN (PCT): CPT

## 2024-07-15 PROCEDURE — 84295 ASSAY OF SERUM SODIUM: CPT

## 2024-07-15 PROCEDURE — 71045 X-RAY EXAM CHEST 1 VIEW: CPT

## 2024-07-15 PROCEDURE — 83735 ASSAY OF MAGNESIUM: CPT

## 2024-07-15 PROCEDURE — 80069 RENAL FUNCTION PANEL: CPT

## 2024-07-15 PROCEDURE — 83935 ASSAY OF URINE OSMOLALITY: CPT

## 2024-07-15 PROCEDURE — 94640 AIRWAY INHALATION TREATMENT: CPT

## 2024-07-15 PROCEDURE — 36415 COLL VENOUS BLD VENIPUNCTURE: CPT

## 2024-07-15 RX ADMIN — PANCRELIPASE LIPASE, PANCRELIPASE PROTEASE, PANCRELIPASE AMYLASE 15000 UNITS: 15000; 47000; 63000 CAPSULE, DELAYED RELEASE ORAL at 11:43

## 2024-07-15 RX ADMIN — MAGNESIUM SULFATE HEPTAHYDRATE 2000 MG: 40 INJECTION, SOLUTION INTRAVENOUS at 14:31

## 2024-07-15 RX ADMIN — BUDESONIDE AND FORMOTEROL FUMARATE DIHYDRATE 2 PUFF: 160; 4.5 AEROSOL RESPIRATORY (INHALATION) at 08:29

## 2024-07-15 RX ADMIN — OXYCODONE 5 MG: 5 TABLET ORAL at 22:08

## 2024-07-15 RX ADMIN — PANCRELIPASE LIPASE, PANCRELIPASE PROTEASE, PANCRELIPASE AMYLASE 20000 UNITS: 20000; 63000; 84000 CAPSULE, DELAYED RELEASE ORAL at 11:43

## 2024-07-15 RX ADMIN — ATORVASTATIN CALCIUM 10 MG: 10 TABLET, FILM COATED ORAL at 08:58

## 2024-07-15 RX ADMIN — ENOXAPARIN SODIUM 30 MG: 100 INJECTION SUBCUTANEOUS at 08:57

## 2024-07-15 RX ADMIN — FOLIC ACID 1 MG: 1 TABLET ORAL at 08:58

## 2024-07-15 RX ADMIN — MORPHINE SULFATE 2 MG: 2 INJECTION, SOLUTION INTRAMUSCULAR; INTRAVENOUS at 06:12

## 2024-07-15 RX ADMIN — POTASSIUM CHLORIDE 40 MEQ: 1500 TABLET, EXTENDED RELEASE ORAL at 14:28

## 2024-07-15 RX ADMIN — TIOTROPIUM BROMIDE INHALATION SPRAY 2 PUFF: 3.12 SPRAY, METERED RESPIRATORY (INHALATION) at 08:29

## 2024-07-15 RX ADMIN — PANCRELIPASE LIPASE, PANCRELIPASE PROTEASE, PANCRELIPASE AMYLASE 15000 UNITS: 15000; 47000; 63000 CAPSULE, DELAYED RELEASE ORAL at 08:57

## 2024-07-15 RX ADMIN — OXYCODONE 5 MG: 5 TABLET ORAL at 11:43

## 2024-07-15 RX ADMIN — ATENOLOL 25 MG: 25 TABLET ORAL at 08:58

## 2024-07-15 RX ADMIN — POTASSIUM CITRATE 10 MEQ: 10 TABLET ORAL at 08:58

## 2024-07-15 RX ADMIN — GUAIFENESIN 600 MG: 600 TABLET ORAL at 20:41

## 2024-07-15 RX ADMIN — Medication 100 MG: at 08:57

## 2024-07-15 RX ADMIN — PANTOPRAZOLE SODIUM 40 MG: 40 TABLET, DELAYED RELEASE ORAL at 05:38

## 2024-07-15 RX ADMIN — BUDESONIDE AND FORMOTEROL FUMARATE DIHYDRATE 2 PUFF: 160; 4.5 AEROSOL RESPIRATORY (INHALATION) at 20:43

## 2024-07-15 RX ADMIN — GUAIFENESIN 600 MG: 600 TABLET ORAL at 08:58

## 2024-07-15 RX ADMIN — DILTIAZEM HYDROCHLORIDE 240 MG: 240 CAPSULE, EXTENDED RELEASE ORAL at 08:57

## 2024-07-15 RX ADMIN — PANCRELIPASE LIPASE, PANCRELIPASE PROTEASE, PANCRELIPASE AMYLASE 20000 UNITS: 20000; 63000; 84000 CAPSULE, DELAYED RELEASE ORAL at 08:57

## 2024-07-15 ASSESSMENT — PAIN DESCRIPTION - LOCATION
LOCATION: LEG
LOCATION: HIP

## 2024-07-15 ASSESSMENT — PAIN DESCRIPTION - DESCRIPTORS
DESCRIPTORS: ACHING
DESCRIPTORS: ACHING
DESCRIPTORS: THROBBING

## 2024-07-15 ASSESSMENT — PAIN SCALES - GENERAL
PAINLEVEL_OUTOF10: 10
PAINLEVEL_OUTOF10: 3
PAINLEVEL_OUTOF10: 6
PAINLEVEL_OUTOF10: 9
PAINLEVEL_OUTOF10: 4

## 2024-07-15 ASSESSMENT — PAIN DESCRIPTION - ORIENTATION
ORIENTATION: RIGHT
ORIENTATION: RIGHT

## 2024-07-15 ASSESSMENT — PAIN DESCRIPTION - FREQUENCY: FREQUENCY: CONTINUOUS

## 2024-07-15 ASSESSMENT — PAIN DESCRIPTION - PAIN TYPE: TYPE: ACUTE PAIN

## 2024-07-15 ASSESSMENT — PAIN - FUNCTIONAL ASSESSMENT
PAIN_FUNCTIONAL_ASSESSMENT: ACTIVITIES ARE NOT PREVENTED
PAIN_FUNCTIONAL_ASSESSMENT: PREVENTS OR INTERFERES SOME ACTIVE ACTIVITIES AND ADLS

## 2024-07-15 ASSESSMENT — PAIN DESCRIPTION - ONSET: ONSET: ON-GOING

## 2024-07-15 NOTE — CONSULTS
Comprehensive Nutrition Assessment    Type and Reason for Visit:  Reassess    Nutrition Recommendations/Plan:   Continue current diet with Ensure TID.     Malnutrition Assessment:  Malnutrition Status:  Severe malnutrition (07/15/24 1037)    Context:  Acute Illness     Findings of the 6 clinical characteristics of malnutrition:  Energy Intake:  Mild decrease in energy intake (Comment)  Weight Loss:  Greater than 7.5% over 3 months (12%)     Body Fat Loss:  Moderate body fat loss Triceps, Buccal region   Muscle Mass Loss:  Moderate muscle mass loss Temples (temporalis), Clavicles (pectoralis & deltoids), Calf (gastrocnemius)    Nutrition Assessment:    FU - Pt. continues a regular diet. Diet acceptance appears okay. Pt. reports her appetite is better than in the recent past. Ensure started TID. Will keep in place. Pt. typically supplements with Ensure once daily at home. Recommend to continue at discharge. Discussed Pt.'s poor intake at home. She relies mostly on ready- made items d/t living alone. Discussed getting frozen vegetables to add to TV dinners to increase PO intake and keep costs low. She was agreeable to this. CBW recorded as 49.4kg, admission weight was 34 kg; If CBW is accurate then weight is appropriate. Pt. does show moderate muscle and fat wasting around extremities and clavicles. No new recommendations at this time. Will continue to monitor nutritional adequacy and diet acceptance.    Nutrition Related Findings:    Na 128, K 3.1, BUN <2, Cr <0.5, Mg 1.60, Ca 7.7. LBM 7/13. Skin w/ area to Rt. thigh. Wound Type: Surgical Incision       Current Nutrition Intake & Therapies:    Average Meal Intake: 51-75%  Average Supplements Intake: Unable to assess  ADULT DIET; Regular  ADULT ORAL NUTRITION SUPPLEMENT; Breakfast, Dinner, Lunch; Standard High Calorie/High Protein Oral Supplement    Anthropometric Measures:  Height: 154.9 cm (5' 0.98\")  Ideal Body Weight (IBW): 105 lbs (48 kg)       Current Body

## 2024-07-15 NOTE — CARE COORDINATION
Received call from Bernie at [facility])--they can accept patient. They would need a pasarr and level of care from [facility].  Cleve yancey [facility] can accept. Sukhjinder Jackson and Najma requested more information.   Spoke with patient regarding above.She prefers [facility]. Left message for Bernie at the facility ([phone]) regarding patient choosing [facility].     Electronically signed by Haylee Sung, ALEXANDER, LISW, Case Management on 7/15/2024 at 4:38 PM  [place] [phone]

## 2024-07-15 NOTE — DISCHARGE INSTR - DIET

## 2024-07-15 NOTE — CARE COORDINATION
Case Management Assessment  Initial Evaluation    Date/Time of Evaluation: 7/15/2024 1:33 PM  Assessment Completed by: JEVON Salmon    If patient is discharged prior to next notation, then this note serves as note for discharge by case management.    Patient Name: Bonny Serrano                   YOB: 1970  Diagnosis: Closed left hip fracture, initial encounter ([facility]) [S72.002A]                   Date / Time: 7/11/2024  7:23 PM    Patient Admission Status: Inpatient   Readmission Risk (Low < 19, Mod (19-27), High > 27): Readmission Risk Score: 16.9    Current PCP: Rhonda Reyez APRN - CNP  PCP verified by CM? Yes    Chart Reviewed: Yes     History Provided by: Patient, Medical Record  Patient Orientation: Alert and Oriented    Patient Cognition: Alert    Hospitalization in the last 30 days (Readmission):  No    If yes, Readmission Assessment in CM Navigator will be completed.    Advance Directives:      Code Status: Full Code   Patient's Primary Decision Maker is: Legal Next of Kin    Primary Decision Maker: GRAEMETAYLER - Child - [phone]    Secondary Decision Maker: Waqas Olea - Friend - [phone]    Secondary Decision Maker: Ryanne Hinkle - Friend - [phone]    Discharge Planning:    Patient lives with: Alone Type of Home: House  Primary Care Giver: Self  Patient Support Systems include: Family Members, Children   Current Financial resources: Medicaid  Current community resources: None  Current services prior to admission: None            Current DME:              Type of Home Care services:  None    ADLS  Prior functional level: Independent in ADLs/IADLs  Current functional level: Assistance with the following:, Cooking, Housework, Mobility    PT AM-PAC: 10 /24  OT AM-PAC: 15 /24    Family can provide assistance at DC: No  Would you like Case Management to discuss the discharge plan with any other family members/significant others, and if so, who? No  Plans to Return to

## 2024-07-16 LAB
ANION GAP SERPL CALCULATED.3IONS-SCNC: 4 MMOL/L (ref 3–16)
BASOPHILS # BLD: 0 K/UL (ref 0–0.2)
BASOPHILS NFR BLD: 0.7 %
BUN SERPL-MCNC: 2 MG/DL (ref 7–20)
CALCIUM SERPL-MCNC: 7.1 MG/DL (ref 8.3–10.6)
CHLORIDE SERPL-SCNC: 101 MMOL/L (ref 99–110)
CO2 SERPL-SCNC: 32 MMOL/L (ref 21–32)
CREAT SERPL-MCNC: <0.5 MG/DL (ref 0.6–1.1)
DEPRECATED RDW RBC AUTO: 18.3 % (ref 12.4–15.4)
EOSINOPHIL # BLD: 0.1 K/UL (ref 0–0.6)
EOSINOPHIL NFR BLD: 1 %
GFR SERPLBLD CREATININE-BSD FMLA CKD-EPI: >90 ML/MIN/{1.73_M2}
GLUCOSE SERPL-MCNC: 92 MG/DL (ref 70–99)
HCT VFR BLD AUTO: 26.3 % (ref 36–48)
HGB BLD-MCNC: 9.1 G/DL (ref 12–16)
LYMPHOCYTES # BLD: 1.2 K/UL (ref 1–5.1)
LYMPHOCYTES NFR BLD: 17.6 %
MAGNESIUM SERPL-MCNC: 1.6 MG/DL (ref 1.8–2.4)
MCH RBC QN AUTO: 31.9 PG (ref 26–34)
MCHC RBC AUTO-ENTMCNC: 34.6 G/DL (ref 31–36)
MCV RBC AUTO: 92.2 FL (ref 80–100)
MONOCYTES # BLD: 0.9 K/UL (ref 0–1.3)
MONOCYTES NFR BLD: 14 %
NEUTROPHILS # BLD: 4.4 K/UL (ref 1.7–7.7)
NEUTROPHILS NFR BLD: 66.7 %
PLATELET # BLD AUTO: 263 K/UL (ref 135–450)
PMV BLD AUTO: 6.7 FL (ref 5–10.5)
POTASSIUM SERPL-SCNC: 3.4 MMOL/L (ref 3.5–5.1)
RBC # BLD AUTO: 2.86 M/UL (ref 4–5.2)
SODIUM SERPL-SCNC: 137 MMOL/L (ref 136–145)
WBC # BLD AUTO: 6.6 K/UL (ref 4–11)

## 2024-07-16 PROCEDURE — 6370000000 HC RX 637 (ALT 250 FOR IP): Performed by: ORTHOPAEDIC SURGERY

## 2024-07-16 PROCEDURE — 6370000000 HC RX 637 (ALT 250 FOR IP)

## 2024-07-16 PROCEDURE — 2580000003 HC RX 258

## 2024-07-16 PROCEDURE — 85025 COMPLETE CBC W/AUTO DIFF WBC: CPT

## 2024-07-16 PROCEDURE — 94640 AIRWAY INHALATION TREATMENT: CPT

## 2024-07-16 PROCEDURE — 83735 ASSAY OF MAGNESIUM: CPT

## 2024-07-16 PROCEDURE — 6370000000 HC RX 637 (ALT 250 FOR IP): Performed by: NURSE PRACTITIONER

## 2024-07-16 PROCEDURE — 94761 N-INVAS EAR/PLS OXIMETRY MLT: CPT

## 2024-07-16 PROCEDURE — 80048 BASIC METABOLIC PNL TOTAL CA: CPT

## 2024-07-16 PROCEDURE — 6360000002 HC RX W HCPCS

## 2024-07-16 PROCEDURE — 2700000000 HC OXYGEN THERAPY PER DAY

## 2024-07-16 PROCEDURE — 36415 COLL VENOUS BLD VENIPUNCTURE: CPT

## 2024-07-16 PROCEDURE — 6360000002 HC RX W HCPCS: Performed by: ORTHOPAEDIC SURGERY

## 2024-07-16 PROCEDURE — 1200000000 HC SEMI PRIVATE

## 2024-07-16 RX ORDER — AMOXICILLIN AND CLAVULANATE POTASSIUM 875; 125 MG/1; MG/1
1 TABLET, FILM COATED ORAL 2 TIMES DAILY
Qty: 8 TABLET | Refills: 0
Start: 2024-07-16 | End: 2024-07-20

## 2024-07-16 RX ORDER — AZITHROMYCIN 250 MG/1
250 TABLET, FILM COATED ORAL DAILY
Status: DISCONTINUED | OUTPATIENT
Start: 2024-07-17 | End: 2024-07-18 | Stop reason: HOSPADM

## 2024-07-16 RX ORDER — AZITHROMYCIN 250 MG/1
250 TABLET, FILM COATED ORAL DAILY
Qty: 4 TABLET | Refills: 0
Start: 2024-07-17 | End: 2024-07-21

## 2024-07-16 RX ORDER — AZITHROMYCIN 500 MG/1
500 TABLET, FILM COATED ORAL ONCE
Status: COMPLETED | OUTPATIENT
Start: 2024-07-16 | End: 2024-07-16

## 2024-07-16 RX ORDER — DILTIAZEM HYDROCHLORIDE 180 MG/1
180 CAPSULE, COATED, EXTENDED RELEASE ORAL DAILY
Status: DISCONTINUED | OUTPATIENT
Start: 2024-07-17 | End: 2024-07-18 | Stop reason: HOSPADM

## 2024-07-16 RX ADMIN — POTASSIUM CITRATE 10 MEQ: 10 TABLET ORAL at 09:37

## 2024-07-16 RX ADMIN — GUAIFENESIN 600 MG: 600 TABLET ORAL at 09:25

## 2024-07-16 RX ADMIN — SODIUM CHLORIDE, PRESERVATIVE FREE 10 ML: 5 INJECTION INTRAVENOUS at 09:26

## 2024-07-16 RX ADMIN — ATENOLOL 25 MG: 25 TABLET ORAL at 09:25

## 2024-07-16 RX ADMIN — ATORVASTATIN CALCIUM 10 MG: 10 TABLET, FILM COATED ORAL at 09:25

## 2024-07-16 RX ADMIN — Medication 100 MG: at 09:27

## 2024-07-16 RX ADMIN — PANCRELIPASE LIPASE, PANCRELIPASE PROTEASE, PANCRELIPASE AMYLASE 20000 UNITS: 20000; 63000; 84000 CAPSULE, DELAYED RELEASE ORAL at 16:58

## 2024-07-16 RX ADMIN — GABAPENTIN 300 MG: 300 CAPSULE ORAL at 22:53

## 2024-07-16 RX ADMIN — PANCRELIPASE LIPASE, PANCRELIPASE PROTEASE, PANCRELIPASE AMYLASE 20000 UNITS: 20000; 63000; 84000 CAPSULE, DELAYED RELEASE ORAL at 09:27

## 2024-07-16 RX ADMIN — AZITHROMYCIN 500 MG: 500 TABLET, FILM COATED ORAL at 09:27

## 2024-07-16 RX ADMIN — OXYCODONE 5 MG: 5 TABLET ORAL at 11:05

## 2024-07-16 RX ADMIN — PANCRELIPASE LIPASE, PANCRELIPASE PROTEASE, PANCRELIPASE AMYLASE 15000 UNITS: 15000; 47000; 63000 CAPSULE, DELAYED RELEASE ORAL at 09:27

## 2024-07-16 RX ADMIN — ENOXAPARIN SODIUM 30 MG: 100 INJECTION SUBCUTANEOUS at 09:27

## 2024-07-16 RX ADMIN — PANTOPRAZOLE SODIUM 40 MG: 40 TABLET, DELAYED RELEASE ORAL at 05:30

## 2024-07-16 RX ADMIN — WATER 1000 MG: 1 INJECTION INTRAMUSCULAR; INTRAVENOUS; SUBCUTANEOUS at 09:24

## 2024-07-16 RX ADMIN — TIOTROPIUM BROMIDE INHALATION SPRAY 2 PUFF: 3.12 SPRAY, METERED RESPIRATORY (INHALATION) at 08:29

## 2024-07-16 RX ADMIN — POTASSIUM CHLORIDE 40 MEQ: 1500 TABLET, EXTENDED RELEASE ORAL at 09:25

## 2024-07-16 RX ADMIN — PANCRELIPASE LIPASE, PANCRELIPASE PROTEASE, PANCRELIPASE AMYLASE 15000 UNITS: 15000; 47000; 63000 CAPSULE, DELAYED RELEASE ORAL at 11:35

## 2024-07-16 RX ADMIN — FOLIC ACID 1 MG: 1 TABLET ORAL at 09:25

## 2024-07-16 RX ADMIN — BUDESONIDE AND FORMOTEROL FUMARATE DIHYDRATE 2 PUFF: 160; 4.5 AEROSOL RESPIRATORY (INHALATION) at 19:21

## 2024-07-16 RX ADMIN — OXYCODONE 5 MG: 5 TABLET ORAL at 05:30

## 2024-07-16 RX ADMIN — IPRATROPIUM BROMIDE AND ALBUTEROL SULFATE 1 DOSE: 2.5; .5 SOLUTION RESPIRATORY (INHALATION) at 17:37

## 2024-07-16 RX ADMIN — DILTIAZEM HYDROCHLORIDE 240 MG: 240 CAPSULE, EXTENDED RELEASE ORAL at 09:27

## 2024-07-16 RX ADMIN — PANCRELIPASE LIPASE, PANCRELIPASE PROTEASE, PANCRELIPASE AMYLASE 20000 UNITS: 20000; 63000; 84000 CAPSULE, DELAYED RELEASE ORAL at 11:35

## 2024-07-16 RX ADMIN — BUDESONIDE AND FORMOTEROL FUMARATE DIHYDRATE 2 PUFF: 160; 4.5 AEROSOL RESPIRATORY (INHALATION) at 08:29

## 2024-07-16 RX ADMIN — GUAIFENESIN 600 MG: 600 TABLET ORAL at 22:52

## 2024-07-16 RX ADMIN — MAGNESIUM SULFATE HEPTAHYDRATE 2000 MG: 40 INJECTION, SOLUTION INTRAVENOUS at 09:43

## 2024-07-16 RX ADMIN — OXYCODONE 5 MG: 5 TABLET ORAL at 22:51

## 2024-07-16 RX ADMIN — PANCRELIPASE LIPASE, PANCRELIPASE PROTEASE, PANCRELIPASE AMYLASE 15000 UNITS: 15000; 47000; 63000 CAPSULE, DELAYED RELEASE ORAL at 16:58

## 2024-07-16 ASSESSMENT — PAIN DESCRIPTION - PAIN TYPE
TYPE: SURGICAL PAIN
TYPE: ACUTE PAIN
TYPE: ACUTE PAIN
TYPE: SURGICAL PAIN

## 2024-07-16 ASSESSMENT — PAIN DESCRIPTION - DESCRIPTORS
DESCRIPTORS: THROBBING
DESCRIPTORS: THROBBING;ACHING;BURNING
DESCRIPTORS: ACHING
DESCRIPTORS: ACHING

## 2024-07-16 ASSESSMENT — PAIN SCALES - GENERAL
PAINLEVEL_OUTOF10: 8
PAINLEVEL_OUTOF10: 3
PAINLEVEL_OUTOF10: 8
PAINLEVEL_OUTOF10: 6
PAINLEVEL_OUTOF10: 4
PAINLEVEL_OUTOF10: 6

## 2024-07-16 ASSESSMENT — PAIN DESCRIPTION - ONSET
ONSET: ON-GOING
ONSET: ON-GOING
ONSET: GRADUAL
ONSET: ON-GOING

## 2024-07-16 ASSESSMENT — PAIN DESCRIPTION - ORIENTATION
ORIENTATION: RIGHT

## 2024-07-16 ASSESSMENT — PAIN DESCRIPTION - LOCATION
LOCATION: LEG;HIP
LOCATION: HIP
LOCATION: HIP;LEG
LOCATION: HIP

## 2024-07-16 ASSESSMENT — PAIN DESCRIPTION - FREQUENCY
FREQUENCY: CONTINUOUS

## 2024-07-16 NOTE — CARE COORDINATION
Spoke with Veronica at [place] [facility] regarding request for Medicaid Level of Care ([phone]).   Brea Martinez reported that a Medicaid patient is able to admit to a skilled facility under a 7000 (HENS) and a Medicaid Level of Care is not needed.    A patient admitting to a skilled facility would  need a Medicaid Level of Care if the patient needs to stay longer than 30 days--the patient would then need a Resident Review and a Pasarr submitted by the nursing facility.     Another instance would be, if the patient is admitting to the nursing facility under long term care, the patient would need a Pasarr and a medicaid level of care.     Spoke with Bernie at [facility] regarding above. She said ok and will accept the patient to the facility with a HENS.    Electronically signed by ALEXANDER Chaves, LISW, Case Management on 7/16/2024 at 3:16 PM  [place] [phone]

## 2024-07-17 LAB
ANION GAP SERPL CALCULATED.3IONS-SCNC: 3 MMOL/L (ref 3–16)
BUN SERPL-MCNC: 4 MG/DL (ref 7–20)
CALCIUM SERPL-MCNC: 7.5 MG/DL (ref 8.3–10.6)
CHLORIDE SERPL-SCNC: 96 MMOL/L (ref 99–110)
CO2 SERPL-SCNC: 32 MMOL/L (ref 21–32)
CREAT SERPL-MCNC: <0.5 MG/DL (ref 0.6–1.1)
GFR SERPLBLD CREATININE-BSD FMLA CKD-EPI: >90 ML/MIN/{1.73_M2}
GLUCOSE SERPL-MCNC: 94 MG/DL (ref 70–99)
MAGNESIUM SERPL-MCNC: 1.5 MG/DL (ref 1.8–2.4)
POTASSIUM SERPL-SCNC: 4.2 MMOL/L (ref 3.5–5.1)
SODIUM SERPL-SCNC: 131 MMOL/L (ref 136–145)

## 2024-07-17 PROCEDURE — 6370000000 HC RX 637 (ALT 250 FOR IP): Performed by: ORTHOPAEDIC SURGERY

## 2024-07-17 PROCEDURE — 2580000003 HC RX 258

## 2024-07-17 PROCEDURE — 6370000000 HC RX 637 (ALT 250 FOR IP): Performed by: NURSE PRACTITIONER

## 2024-07-17 PROCEDURE — 80048 BASIC METABOLIC PNL TOTAL CA: CPT

## 2024-07-17 PROCEDURE — 94640 AIRWAY INHALATION TREATMENT: CPT

## 2024-07-17 PROCEDURE — 6370000000 HC RX 637 (ALT 250 FOR IP)

## 2024-07-17 PROCEDURE — 6360000002 HC RX W HCPCS: Performed by: ORTHOPAEDIC SURGERY

## 2024-07-17 PROCEDURE — 1200000000 HC SEMI PRIVATE

## 2024-07-17 PROCEDURE — 94761 N-INVAS EAR/PLS OXIMETRY MLT: CPT

## 2024-07-17 PROCEDURE — 6360000002 HC RX W HCPCS

## 2024-07-17 PROCEDURE — 83735 ASSAY OF MAGNESIUM: CPT

## 2024-07-17 PROCEDURE — 36415 COLL VENOUS BLD VENIPUNCTURE: CPT

## 2024-07-17 PROCEDURE — 2700000000 HC OXYGEN THERAPY PER DAY

## 2024-07-17 RX ADMIN — PANCRELIPASE LIPASE, PANCRELIPASE PROTEASE, PANCRELIPASE AMYLASE 20000 UNITS: 20000; 63000; 84000 CAPSULE, DELAYED RELEASE ORAL at 16:53

## 2024-07-17 RX ADMIN — GUAIFENESIN 600 MG: 600 TABLET ORAL at 22:27

## 2024-07-17 RX ADMIN — MAGNESIUM SULFATE HEPTAHYDRATE 2000 MG: 40 INJECTION, SOLUTION INTRAVENOUS at 10:00

## 2024-07-17 RX ADMIN — PANCRELIPASE LIPASE, PANCRELIPASE PROTEASE, PANCRELIPASE AMYLASE 15000 UNITS: 15000; 47000; 63000 CAPSULE, DELAYED RELEASE ORAL at 12:00

## 2024-07-17 RX ADMIN — ENOXAPARIN SODIUM 30 MG: 100 INJECTION SUBCUTANEOUS at 08:38

## 2024-07-17 RX ADMIN — Medication 100 MG: at 08:38

## 2024-07-17 RX ADMIN — SODIUM CHLORIDE, PRESERVATIVE FREE 10 ML: 5 INJECTION INTRAVENOUS at 08:39

## 2024-07-17 RX ADMIN — BUDESONIDE AND FORMOTEROL FUMARATE DIHYDRATE 2 PUFF: 160; 4.5 AEROSOL RESPIRATORY (INHALATION) at 08:38

## 2024-07-17 RX ADMIN — SODIUM CHLORIDE, PRESERVATIVE FREE 10 ML: 5 INJECTION INTRAVENOUS at 21:00

## 2024-07-17 RX ADMIN — BUDESONIDE AND FORMOTEROL FUMARATE DIHYDRATE 2 PUFF: 160; 4.5 AEROSOL RESPIRATORY (INHALATION) at 19:24

## 2024-07-17 RX ADMIN — PANCRELIPASE LIPASE, PANCRELIPASE PROTEASE, PANCRELIPASE AMYLASE 15000 UNITS: 15000; 47000; 63000 CAPSULE, DELAYED RELEASE ORAL at 08:38

## 2024-07-17 RX ADMIN — PANCRELIPASE LIPASE, PANCRELIPASE PROTEASE, PANCRELIPASE AMYLASE 20000 UNITS: 20000; 63000; 84000 CAPSULE, DELAYED RELEASE ORAL at 08:38

## 2024-07-17 RX ADMIN — POTASSIUM CITRATE 10 MEQ: 10 TABLET ORAL at 08:38

## 2024-07-17 RX ADMIN — PANCRELIPASE LIPASE, PANCRELIPASE PROTEASE, PANCRELIPASE AMYLASE 15000 UNITS: 15000; 47000; 63000 CAPSULE, DELAYED RELEASE ORAL at 16:54

## 2024-07-17 RX ADMIN — OXYCODONE 5 MG: 5 TABLET ORAL at 10:03

## 2024-07-17 RX ADMIN — PANCRELIPASE LIPASE, PANCRELIPASE PROTEASE, PANCRELIPASE AMYLASE 20000 UNITS: 20000; 63000; 84000 CAPSULE, DELAYED RELEASE ORAL at 12:00

## 2024-07-17 RX ADMIN — OXYCODONE 5 MG: 5 TABLET ORAL at 14:37

## 2024-07-17 RX ADMIN — GUAIFENESIN 600 MG: 600 TABLET ORAL at 08:38

## 2024-07-17 RX ADMIN — FOLIC ACID 1 MG: 1 TABLET ORAL at 08:38

## 2024-07-17 RX ADMIN — MORPHINE SULFATE 2 MG: 2 INJECTION, SOLUTION INTRAMUSCULAR; INTRAVENOUS at 22:27

## 2024-07-17 RX ADMIN — PANTOPRAZOLE SODIUM 40 MG: 40 TABLET, DELAYED RELEASE ORAL at 05:19

## 2024-07-17 RX ADMIN — DILTIAZEM HYDROCHLORIDE 180 MG: 180 CAPSULE, EXTENDED RELEASE ORAL at 08:38

## 2024-07-17 RX ADMIN — WATER 1000 MG: 1 INJECTION INTRAMUSCULAR; INTRAVENOUS; SUBCUTANEOUS at 08:39

## 2024-07-17 RX ADMIN — AZITHROMYCIN 250 MG: 250 TABLET, FILM COATED ORAL at 08:38

## 2024-07-17 RX ADMIN — GABAPENTIN 300 MG: 300 CAPSULE ORAL at 22:27

## 2024-07-17 RX ADMIN — OXYCODONE 5 MG: 5 TABLET ORAL at 05:19

## 2024-07-17 RX ADMIN — ATORVASTATIN CALCIUM 10 MG: 10 TABLET, FILM COATED ORAL at 08:38

## 2024-07-17 RX ADMIN — TIOTROPIUM BROMIDE INHALATION SPRAY 2 PUFF: 3.12 SPRAY, METERED RESPIRATORY (INHALATION) at 08:38

## 2024-07-17 ASSESSMENT — PAIN DESCRIPTION - ORIENTATION
ORIENTATION: RIGHT

## 2024-07-17 ASSESSMENT — PAIN SCALES - GENERAL
PAINLEVEL_OUTOF10: 7
PAINLEVEL_OUTOF10: 3
PAINLEVEL_OUTOF10: 5
PAINLEVEL_OUTOF10: 5
PAINLEVEL_OUTOF10: 6
PAINLEVEL_OUTOF10: 6
PAINLEVEL_OUTOF10: 8
PAINLEVEL_OUTOF10: 3
PAINLEVEL_OUTOF10: 6

## 2024-07-17 ASSESSMENT — PAIN DESCRIPTION - LOCATION
LOCATION: HIP;LEG
LOCATION: HIP
LOCATION: HIP
LOCATION: HIP;LEG
LOCATION: HIP

## 2024-07-17 ASSESSMENT — PAIN DESCRIPTION - DESCRIPTORS
DESCRIPTORS: ACHING;DISCOMFORT
DESCRIPTORS: ACHING;THROBBING
DESCRIPTORS: SQUEEZING
DESCRIPTORS: ACHING;THROBBING
DESCRIPTORS: STABBING
DESCRIPTORS: ACHING
DESCRIPTORS: ACHING;DISCOMFORT

## 2024-07-17 ASSESSMENT — PAIN DESCRIPTION - PAIN TYPE
TYPE: ACUTE PAIN
TYPE: ACUTE PAIN
TYPE: SURGICAL PAIN

## 2024-07-17 ASSESSMENT — PAIN DESCRIPTION - FREQUENCY
FREQUENCY: CONTINUOUS

## 2024-07-17 ASSESSMENT — PAIN DESCRIPTION - ONSET
ONSET: ON-GOING
ONSET: ON-GOING

## 2024-07-18 VITALS
HEIGHT: 61 IN | RESPIRATION RATE: 16 BRPM | OXYGEN SATURATION: 97 % | HEART RATE: 86 BPM | DIASTOLIC BLOOD PRESSURE: 68 MMHG | TEMPERATURE: 98.4 F | SYSTOLIC BLOOD PRESSURE: 106 MMHG | WEIGHT: 90.39 LBS | BODY MASS INDEX: 17.07 KG/M2

## 2024-07-18 LAB
ANION GAP SERPL CALCULATED.3IONS-SCNC: 2 MMOL/L (ref 3–16)
BUN SERPL-MCNC: 6 MG/DL (ref 7–20)
CALCIUM SERPL-MCNC: 7.8 MG/DL (ref 8.3–10.6)
CHLORIDE SERPL-SCNC: 97 MMOL/L (ref 99–110)
CO2 SERPL-SCNC: 34 MMOL/L (ref 21–32)
CREAT SERPL-MCNC: <0.5 MG/DL (ref 0.6–1.1)
GFR SERPLBLD CREATININE-BSD FMLA CKD-EPI: >90 ML/MIN/{1.73_M2}
GLUCOSE SERPL-MCNC: 79 MG/DL (ref 70–99)
MAGNESIUM SERPL-MCNC: 1.6 MG/DL (ref 1.8–2.4)
POTASSIUM SERPL-SCNC: 4.2 MMOL/L (ref 3.5–5.1)
SODIUM SERPL-SCNC: 133 MMOL/L (ref 136–145)

## 2024-07-18 PROCEDURE — 97535 SELF CARE MNGMENT TRAINING: CPT

## 2024-07-18 PROCEDURE — 97530 THERAPEUTIC ACTIVITIES: CPT

## 2024-07-18 PROCEDURE — 6360000002 HC RX W HCPCS

## 2024-07-18 PROCEDURE — 94761 N-INVAS EAR/PLS OXIMETRY MLT: CPT

## 2024-07-18 PROCEDURE — 6370000000 HC RX 637 (ALT 250 FOR IP): Performed by: ORTHOPAEDIC SURGERY

## 2024-07-18 PROCEDURE — 6370000000 HC RX 637 (ALT 250 FOR IP): Performed by: NURSE PRACTITIONER

## 2024-07-18 PROCEDURE — 6370000000 HC RX 637 (ALT 250 FOR IP)

## 2024-07-18 PROCEDURE — 36415 COLL VENOUS BLD VENIPUNCTURE: CPT

## 2024-07-18 PROCEDURE — 6360000002 HC RX W HCPCS: Performed by: ORTHOPAEDIC SURGERY

## 2024-07-18 PROCEDURE — 94640 AIRWAY INHALATION TREATMENT: CPT

## 2024-07-18 PROCEDURE — 83735 ASSAY OF MAGNESIUM: CPT

## 2024-07-18 PROCEDURE — 2580000003 HC RX 258

## 2024-07-18 PROCEDURE — 80048 BASIC METABOLIC PNL TOTAL CA: CPT

## 2024-07-18 PROCEDURE — 97116 GAIT TRAINING THERAPY: CPT

## 2024-07-18 PROCEDURE — 2700000000 HC OXYGEN THERAPY PER DAY

## 2024-07-18 RX ORDER — ATENOLOL 25 MG/1
25 TABLET ORAL DAILY
Qty: 30 TABLET | Refills: 0
Start: 2024-07-18

## 2024-07-18 RX ORDER — DIPHENHYDRAMINE HCL 25 MG
25 TABLET ORAL EVERY 6 HOURS PRN
Status: DISCONTINUED | OUTPATIENT
Start: 2024-07-18 | End: 2024-07-18 | Stop reason: HOSPADM

## 2024-07-18 RX ORDER — DILTIAZEM HYDROCHLORIDE 180 MG/1
180 CAPSULE, EXTENDED RELEASE ORAL DAILY
Qty: 30 CAPSULE | Refills: 0
Start: 2024-07-18

## 2024-07-18 RX ADMIN — SODIUM CHLORIDE, PRESERVATIVE FREE 10 ML: 5 INJECTION INTRAVENOUS at 08:01

## 2024-07-18 RX ADMIN — PANCRELIPASE LIPASE, PANCRELIPASE PROTEASE, PANCRELIPASE AMYLASE 15000 UNITS: 15000; 47000; 63000 CAPSULE, DELAYED RELEASE ORAL at 08:01

## 2024-07-18 RX ADMIN — FOLIC ACID 1 MG: 1 TABLET ORAL at 08:01

## 2024-07-18 RX ADMIN — PANCRELIPASE LIPASE, PANCRELIPASE PROTEASE, PANCRELIPASE AMYLASE 15000 UNITS: 15000; 47000; 63000 CAPSULE, DELAYED RELEASE ORAL at 12:05

## 2024-07-18 RX ADMIN — MAGNESIUM SULFATE HEPTAHYDRATE 2000 MG: 40 INJECTION, SOLUTION INTRAVENOUS at 08:10

## 2024-07-18 RX ADMIN — GUAIFENESIN 600 MG: 600 TABLET ORAL at 08:01

## 2024-07-18 RX ADMIN — PANCRELIPASE LIPASE, PANCRELIPASE PROTEASE, PANCRELIPASE AMYLASE 20000 UNITS: 20000; 63000; 84000 CAPSULE, DELAYED RELEASE ORAL at 12:05

## 2024-07-18 RX ADMIN — OXYCODONE 5 MG: 5 TABLET ORAL at 12:05

## 2024-07-18 RX ADMIN — DIPHENHYDRAMINE HCL 25 MG: 25 TABLET ORAL at 14:34

## 2024-07-18 RX ADMIN — POTASSIUM CITRATE 10 MEQ: 10 TABLET ORAL at 08:11

## 2024-07-18 RX ADMIN — BUDESONIDE AND FORMOTEROL FUMARATE DIHYDRATE 2 PUFF: 160; 4.5 AEROSOL RESPIRATORY (INHALATION) at 08:49

## 2024-07-18 RX ADMIN — TIOTROPIUM BROMIDE INHALATION SPRAY 2 PUFF: 3.12 SPRAY, METERED RESPIRATORY (INHALATION) at 08:49

## 2024-07-18 RX ADMIN — ENOXAPARIN SODIUM 30 MG: 100 INJECTION SUBCUTANEOUS at 08:01

## 2024-07-18 RX ADMIN — OXYCODONE 5 MG: 5 TABLET ORAL at 06:57

## 2024-07-18 RX ADMIN — PANTOPRAZOLE SODIUM 40 MG: 40 TABLET, DELAYED RELEASE ORAL at 06:54

## 2024-07-18 RX ADMIN — WATER 1000 MG: 1 INJECTION INTRAMUSCULAR; INTRAVENOUS; SUBCUTANEOUS at 08:02

## 2024-07-18 RX ADMIN — ATORVASTATIN CALCIUM 10 MG: 10 TABLET, FILM COATED ORAL at 08:01

## 2024-07-18 RX ADMIN — Medication 100 MG: at 08:01

## 2024-07-18 RX ADMIN — ACETAMINOPHEN 325MG 650 MG: 325 TABLET ORAL at 14:34

## 2024-07-18 RX ADMIN — AZITHROMYCIN 250 MG: 250 TABLET, FILM COATED ORAL at 08:01

## 2024-07-18 RX ADMIN — PANCRELIPASE LIPASE, PANCRELIPASE PROTEASE, PANCRELIPASE AMYLASE 20000 UNITS: 20000; 63000; 84000 CAPSULE, DELAYED RELEASE ORAL at 08:01

## 2024-07-18 RX ADMIN — DILTIAZEM HYDROCHLORIDE 180 MG: 180 CAPSULE, EXTENDED RELEASE ORAL at 08:01

## 2024-07-18 RX ADMIN — DIPHENHYDRAMINE HCL 25 MG: 25 TABLET ORAL at 01:15

## 2024-07-18 ASSESSMENT — PAIN SCALES - GENERAL
PAINLEVEL_OUTOF10: 7
PAINLEVEL_OUTOF10: 2
PAINLEVEL_OUTOF10: 3
PAINLEVEL_OUTOF10: 4
PAINLEVEL_OUTOF10: 6

## 2024-07-18 ASSESSMENT — PAIN DESCRIPTION - LOCATION
LOCATION: HIP

## 2024-07-18 ASSESSMENT — PAIN DESCRIPTION - DESCRIPTORS
DESCRIPTORS: ACHING;DISCOMFORT
DESCRIPTORS: ACHING;DISCOMFORT
DESCRIPTORS: ACHING

## 2024-07-18 ASSESSMENT — PAIN DESCRIPTION - ORIENTATION
ORIENTATION: RIGHT

## 2024-07-18 ASSESSMENT — PAIN - FUNCTIONAL ASSESSMENT: PAIN_FUNCTIONAL_ASSESSMENT: PREVENTS OR INTERFERES SOME ACTIVE ACTIVITIES AND ADLS

## 2024-07-18 NOTE — PLAN OF CARE
Problem: Safety - Adult  Goal: Free from fall injury  7/12/2024 0854 by Radha Alston RN  Outcome: Progressing  7/12/2024 0357 by Deann Disla RN  Outcome: Progressing  Flowsheets (Taken 7/12/2024 0346)  Free From Fall Injury: Instruct family/caregiver on patient safety     Problem: Discharge Planning  Goal: Discharge to home or other facility with appropriate resources  7/12/2024 0854 by Radha Alston RN  Outcome: Progressing  7/12/2024 0357 by Deann Disla RN  Outcome: Progressing  Flowsheets (Taken 7/11/2024 2145)  Discharge to home or other facility with appropriate resources:   Identify barriers to discharge with patient and caregiver   Arrange for needed discharge resources and transportation as appropriate   Identify discharge learning needs (meds, wound care, etc)     Problem: Pain  Goal: Verbalizes/displays adequate comfort level or baseline comfort level  7/12/2024 0854 by Radha Alston RN  Outcome: Progressing  7/12/2024 0357 by Deann Disla RN  Outcome: Progressing     Problem: ABCDS Injury Assessment  Goal: Absence of physical injury  7/12/2024 0854 by Radha Alston RN  Outcome: Progressing  7/12/2024 0357 by Deann Disla RN  Outcome: Progressing     
  Problem: Safety - Adult  Goal: Free from fall injury  7/12/2024 1951 by Susan Juarez RN  Outcome: Progressing  Flowsheets (Taken 7/12/2024 1948)  Free From Fall Injury: Instruct family/caregiver on patient safety  7/12/2024 0854 by Radha Alston RN  Outcome: Progressing     Problem: Discharge Planning  Goal: Discharge to home or other facility with appropriate resources  7/12/2024 1951 by Susan Juarez RN  Outcome: Progressing  7/12/2024 0854 by Radha Alston RN  Outcome: Progressing     Problem: Pain  Goal: Verbalizes/displays adequate comfort level or baseline comfort level  7/12/2024 1951 by Susan Juarez RN  Outcome: Progressing  7/12/2024 0854 by Radha Alston RN  Outcome: Progressing     Problem: Skin/Tissue Integrity  Goal: Absence of new skin breakdown  Description: 1.  Monitor for areas of redness and/or skin breakdown  2.  Assess vascular access sites hourly  3.  Every 4-6 hours minimum:  Change oxygen saturation probe site  4.  Every 4-6 hours:  If on nasal continuous positive airway pressure, respiratory therapy assess nares and determine need for appliance change or resting period.  7/12/2024 1951 by Susan Juarez RN  Outcome: Progressing  7/12/2024 0854 by Radha Alston RN  Outcome: Progressing     Problem: ABCDS Injury Assessment  Goal: Absence of physical injury  7/12/2024 1951 by Susan Juarez RN  Outcome: Progressing  Flowsheets (Taken 7/12/2024 1948)  Absence of Physical Injury: Implement safety measures based on patient assessment  7/12/2024 0854 by Radha Alston RN  Outcome: Progressing     Problem: Nutrition Deficit:  Goal: Optimize nutritional status  Outcome: Progressing     
  Problem: Safety - Adult  Goal: Free from fall injury  7/12/2024 1956 by Susan Juarez RN  Outcome: Progressing  7/12/2024 1951 by Susan Juarez RN  Outcome: Progressing  Flowsheets (Taken 7/12/2024 1948)  Free From Fall Injury: Instruct family/caregiver on patient safety  7/12/2024 0854 by Radha Alston RN  Outcome: Progressing     Problem: Discharge Planning  Goal: Discharge to home or other facility with appropriate resources  7/12/2024 1956 by Susan Juarez RN  Outcome: Progressing  7/12/2024 1951 by Susan Juarez RN  Outcome: Progressing  7/12/2024 0854 by Radha Alston RN  Outcome: Progressing     Problem: Pain  Goal: Verbalizes/displays adequate comfort level or baseline comfort level  7/12/2024 1956 by Susan Juarez RN  Outcome: Progressing  7/12/2024 1951 by Susan Juarez RN  Outcome: Progressing  7/12/2024 0854 by Radha Alston RN  Outcome: Progressing     Problem: Skin/Tissue Integrity  Goal: Absence of new skin breakdown  Description: 1.  Monitor for areas of redness and/or skin breakdown  2.  Assess vascular access sites hourly  3.  Every 4-6 hours minimum:  Change oxygen saturation probe site  4.  Every 4-6 hours:  If on nasal continuous positive airway pressure, respiratory therapy assess nares and determine need for appliance change or resting period.  7/12/2024 1956 by Susan Juarez RN  Outcome: Progressing  7/12/2024 1951 by Susan Juarez RN  Outcome: Progressing  7/12/2024 0854 by Radha Alston RN  Outcome: Progressing     Problem: ABCDS Injury Assessment  Goal: Absence of physical injury  7/12/2024 1956 by Susan Juarez RN  Outcome: Progressing  7/12/2024 1951 by Susan Juarez RN  Outcome: Progressing  Flowsheets (Taken 7/12/2024 1948)  Absence of Physical Injury: Implement safety measures based on patient assessment  7/12/2024 0854 by Radha Alston RN  Outcome: Progressing     Problem: Nutrition Deficit:  Goal: Optimize nutritional status  7/12/2024 
  Problem: Safety - Adult  Goal: Free from fall injury  7/13/2024 0949 by Tierra Bermudez RN  Outcome: Progressing     Problem: Discharge Planning  Goal: Discharge to home or other facility with appropriate resources  7/13/2024 0949 by Tierra Bermudez RN  Outcome: Progressing  Flowsheets  Taken 7/13/2024 0830 by Tierra Bermudez RN  Discharge to home or other facility with appropriate resources: Identify barriers to discharge with patient and caregiver    Problem: Pain  Goal: Verbalizes/displays adequate comfort level or baseline comfort level  7/13/2024 0949 by Tierra Bermudez RN  Outcome: Progressing     Problem: Skin/Tissue Integrity  Goal: Absence of new skin breakdown  Description: 1.  Monitor for areas of redness and/or skin breakdown  2.  Assess vascular access sites hourly  3.  Every 4-6 hours minimum:  Change oxygen saturation probe site  4.  Every 4-6 hours:  If on nasal continuous positive airway pressure, respiratory therapy assess nares and determine need for appliance change or resting period.  7/13/2024 0949 by Tierra Bermudez RN  Outcome: Progressing     Problem: ABCDS Injury Assessment  Goal: Absence of physical injury  7/13/2024 0949 by Tierra Bermudez RN  Outcome: Progressing     Problem: Nutrition Deficit:  Goal: Optimize nutritional status  7/13/2024 0949 by Tierra Bermudez RN  Outcome: Progressing        
  Problem: Safety - Adult  Goal: Free from fall injury  7/14/2024 1050 by Lore Law RN  Outcome: Progressing  7/14/2024 0154 by Susan Juarez RN  Outcome: Progressing  Flowsheets (Taken 7/13/2024 2200)  Free From Fall Injury: Instruct family/caregiver on patient safety     Problem: Discharge Planning  Goal: Discharge to home or other facility with appropriate resources  7/14/2024 1050 by Lore Law RN  Outcome: Progressing  7/14/2024 0154 by Susan Juarez RN  Outcome: Progressing     Problem: Pain  Goal: Verbalizes/displays adequate comfort level or baseline comfort level  7/14/2024 1050 by Lore Law RN  Outcome: Progressing  7/14/2024 0154 by Susan Juarez RN  Outcome: Progressing     Problem: Skin/Tissue Integrity  Goal: Absence of new skin breakdown  Description: 1.  Monitor for areas of redness and/or skin breakdown  2.  Assess vascular access sites hourly  3.  Every 4-6 hours minimum:  Change oxygen saturation probe site  4.  Every 4-6 hours:  If on nasal continuous positive airway pressure, respiratory therapy assess nares and determine need for appliance change or resting period.  7/14/2024 1050 by Lore Law RN  Outcome: Progressing  7/14/2024 0154 by Susan Juarez RN  Outcome: Progressing     Problem: ABCDS Injury Assessment  Goal: Absence of physical injury  7/14/2024 1050 by Lore Law RN  Outcome: Progressing  7/14/2024 0154 by Susan Juarez RN  Outcome: Progressing  Flowsheets (Taken 7/13/2024 2200)  Absence of Physical Injury: Implement safety measures based on patient assessment     Problem: Nutrition Deficit:  Goal: Optimize nutritional status  Outcome: Progressing     
  Problem: Safety - Adult  Goal: Free from fall injury  7/14/2024 2220 by Connie Zaidi, RN  Outcome: Progressing     Problem: Discharge Planning  Goal: Discharge to home or other facility with appropriate resources  7/14/2024 2220 by Connie Zaidi, RN  Outcome: Progressing  Flowsheets (Taken 7/14/2024 2008)  Discharge to home or other facility with appropriate resources:   Identify barriers to discharge with patient and caregiver   Arrange for needed discharge resources and transportation as appropriate     Problem: Pain  Goal: Verbalizes/displays adequate comfort level or baseline comfort level  7/14/2024 2220 by Connie Zaidi, RN  Outcome: Progressing     Problem: Skin/Tissue Integrity  Goal: Absence of new skin breakdown  Description: 1.  Monitor for areas of redness and/or skin breakdown  2.  Assess vascular access sites hourly  3.  Every 4-6 hours minimum:  Change oxygen saturation probe site  4.  Every 4-6 hours:  If on nasal continuous positive airway pressure, respiratory therapy assess nares and determine need for appliance change or resting period.  7/14/2024 2220 by Connie Zaidi, RN  Outcome: Progressing     Problem: ABCDS Injury Assessment  Goal: Absence of physical injury  7/14/2024 2220 by Connie Zaidi, RN  Outcome: Progressing  Flowsheets (Taken 7/14/2024 2008)  Absence of Physical Injury: Implement safety measures based on patient assessment     Problem: Nutrition Deficit:  Goal: Optimize nutritional status  7/14/2024 2220 by Connie Zaidi, RN  Outcome: Progressing     
  Problem: Safety - Adult  Goal: Free from fall injury  7/15/2024 0803 by Aracely Perez RN  Outcome: Progressing  Flowsheets (Taken 7/15/2024 0803)  Free From Fall Injury: Instruct family/caregiver on patient safety  7/14/2024 2220 by Connie Zaidi RN  Outcome: Progressing     Problem: Discharge Planning  Goal: Discharge to home or other facility with appropriate resources  7/15/2024 0803 by Aracely Perez RN  Outcome: Progressing  Flowsheets (Taken 7/15/2024 0803)  Discharge to home or other facility with appropriate resources:   Identify barriers to discharge with patient and caregiver   Arrange for needed discharge resources and transportation as appropriate   Identify discharge learning needs (meds, wound care, etc)   Refer to discharge planning if patient needs post-hospital services based on physician order or complex needs related to functional status, cognitive ability or social support system  7/14/2024 2220 by Connie Zaidi RN  Outcome: Progressing  Flowsheets (Taken 7/14/2024 2008)  Discharge to home or other facility with appropriate resources:   Identify barriers to discharge with patient and caregiver   Arrange for needed discharge resources and transportation as appropriate     Problem: Pain  Goal: Verbalizes/displays adequate comfort level or baseline comfort level  7/15/2024 0803 by Aracely Perez RN  Outcome: Progressing  Flowsheets (Taken 7/15/2024 0803)  Verbalizes/displays adequate comfort level or baseline comfort level:   Assess pain using appropriate pain scale   Encourage patient to monitor pain and request assistance   Administer analgesics based on type and severity of pain and evaluate response   Implement non-pharmacological measures as appropriate and evaluate response   Consider cultural and social influences on pain and pain management   Notify Licensed Independent Practitioner if interventions unsuccessful or patient reports new pain  7/14/2024 2220 by Dejuan 
  Problem: Safety - Adult  Goal: Free from fall injury  7/16/2024 0729 by Aracely Perez RN  Outcome: Progressing  Flowsheets (Taken 7/16/2024 0729)  Free From Fall Injury: Instruct family/caregiver on patient safety  7/16/2024 0102 by Mima Baugh RN  Outcome: Progressing     Problem: Discharge Planning  Goal: Discharge to home or other facility with appropriate resources  Outcome: Progressing  Flowsheets (Taken 7/16/2024 0729)  Discharge to home or other facility with appropriate resources:   Identify barriers to discharge with patient and caregiver   Arrange for needed discharge resources and transportation as appropriate   Identify discharge learning needs (meds, wound care, etc)   Refer to discharge planning if patient needs post-hospital services based on physician order or complex needs related to functional status, cognitive ability or social support system     Problem: Pain  Goal: Verbalizes/displays adequate comfort level or baseline comfort level  7/16/2024 0729 by Aracely Perez RN  Outcome: Progressing  Flowsheets (Taken 7/16/2024 0729)  Verbalizes/displays adequate comfort level or baseline comfort level:   Encourage patient to monitor pain and request assistance   Assess pain using appropriate pain scale   Administer analgesics based on type and severity of pain and evaluate response   Implement non-pharmacological measures as appropriate and evaluate response   Consider cultural and social influences on pain and pain management   Notify Licensed Independent Practitioner if interventions unsuccessful or patient reports new pain  7/16/2024 0102 by Mima Baugh, RN  Outcome: Progressing     Problem: Skin/Tissue Integrity  Goal: Absence of new skin breakdown  Description: 1.  Monitor for areas of redness and/or skin breakdown  2.  Assess vascular access sites hourly  3.  Every 4-6 hours minimum:  Change oxygen saturation probe site  4.  Every 4-6 hours:  If on nasal continuous positive airway 
  Problem: Safety - Adult  Goal: Free from fall injury  7/17/2024 0925 by Radha Alston RN  Outcome: Progressing  7/17/2024 0343 by Connie Zaidi RN  Outcome: Progressing  Flowsheets (Taken 7/17/2024 0343)  Free From Fall Injury: Instruct family/caregiver on patient safety     Problem: Discharge Planning  Goal: Discharge to home or other facility with appropriate resources  7/17/2024 0925 by Radha Alston RN  Outcome: Progressing  7/17/2024 0343 by Connie Zaidi RN  Outcome: Progressing  Flowsheets (Taken 7/17/2024 0343)  Discharge to home or other facility with appropriate resources:   Identify barriers to discharge with patient and caregiver   Identify discharge learning needs (meds, wound care, etc)     Problem: Pain  Goal: Verbalizes/displays adequate comfort level or baseline comfort level  7/17/2024 0925 by Radha Alston RN  Outcome: Progressing  7/17/2024 0343 by Connie Zaidi RN  Outcome: Progressing  Flowsheets (Taken 7/17/2024 0343)  Verbalizes/displays adequate comfort level or baseline comfort level:   Encourage patient to monitor pain and request assistance   Assess pain using appropriate pain scale   Implement non-pharmacological measures as appropriate and evaluate response   Administer analgesics based on type and severity of pain and evaluate response     Problem: Skin/Tissue Integrity  Goal: Absence of new skin breakdown  Description: 1.  Monitor for areas of redness and/or skin breakdown  2.  Assess vascular access sites hourly  3.  Every 4-6 hours minimum:  Change oxygen saturation probe site  4.  Every 4-6 hours:  If on nasal continuous positive airway pressure, respiratory therapy assess nares and determine need for appliance change or resting period.  7/17/2024 0925 by Radha Alston RN  Outcome: Progressing  7/17/2024 0343 by Connie Zaidi RN  Outcome: Progressing  Note: Skin assessment complete. No new signs of skin breakdown noted. Assistance provided with 
  Problem: Safety - Adult  Goal: Free from fall injury  7/18/2024 0923 by Radha Alston RN  Outcome: Progressing  7/18/2024 0130 by Connie Zaidi RN  Outcome: Progressing  Flowsheets (Taken 7/18/2024 0130)  Free From Fall Injury: Instruct family/caregiver on patient safety     Problem: Discharge Planning  Goal: Discharge to home or other facility with appropriate resources  7/18/2024 0923 by Radha Alston RN  Outcome: Progressing  7/18/2024 0130 by Connie Zaidi RN  Outcome: Progressing  Flowsheets (Taken 7/18/2024 0130)  Discharge to home or other facility with appropriate resources:   Identify barriers to discharge with patient and caregiver   Arrange for needed discharge resources and transportation as appropriate   Identify discharge learning needs (meds, wound care, etc)     Problem: Pain  Goal: Verbalizes/displays adequate comfort level or baseline comfort level  7/18/2024 0923 by Radha Alston RN  Outcome: Progressing  7/18/2024 0130 by Connie Zaidi RN  Outcome: Progressing  Flowsheets (Taken 7/18/2024 0130)  Verbalizes/displays adequate comfort level or baseline comfort level:   Encourage patient to monitor pain and request assistance   Administer analgesics based on type and severity of pain and evaluate response   Assess pain using appropriate pain scale   Implement non-pharmacological measures as appropriate and evaluate response     Problem: Skin/Tissue Integrity  Goal: Absence of new skin breakdown  Description: 1.  Monitor for areas of redness and/or skin breakdown  2.  Assess vascular access sites hourly  3.  Every 4-6 hours minimum:  Change oxygen saturation probe site  4.  Every 4-6 hours:  If on nasal continuous positive airway pressure, respiratory therapy assess nares and determine need for appliance change or resting period.  7/18/2024 0923 by Radha Alston RN  Outcome: Progressing  7/18/2024 0130 by Connie Zaidi RN  Outcome: Progressing  Note: Skin 
  Problem: Safety - Adult  Goal: Free from fall injury  Outcome: Progressing   Patient has remained free of falls. 2/4 bed rails up, bed locked and in lowest position, call light within reach. Patient instructed on use of call light and uses appropriately. Bed alarm on. Non-skid footwear and fall band on.       Problem: Pain  Goal: Verbalizes/displays adequate comfort level or baseline comfort level  Outcome: Progressing   Numeric pain rating scale being used. Patient repositioned for comfort. Ice applied. Patient is tolerating PO pain medicine.     
  Problem: Safety - Adult  Goal: Free from fall injury  Outcome: Progressing  Flowsheets (Taken 7/12/2024 0346)  Free From Fall Injury: Instruct family/caregiver on patient safety     Problem: Discharge Planning  Goal: Discharge to home or other facility with appropriate resources  Outcome: Progressing  Flowsheets (Taken 7/11/2024 2148)  Discharge to home or other facility with appropriate resources:   Identify barriers to discharge with patient and caregiver   Arrange for needed discharge resources and transportation as appropriate   Identify discharge learning needs (meds, wound care, etc)     Problem: Pain  Goal: Verbalizes/displays adequate comfort level or baseline comfort level  Outcome: Progressing     Problem: Skin/Tissue Integrity  Goal: Absence of new skin breakdown  Description: 1.  Monitor for areas of redness and/or skin breakdown  2.  Assess vascular access sites hourly  3.  Every 4-6 hours minimum:  Change oxygen saturation probe site  4.  Every 4-6 hours:  If on nasal continuous positive airway pressure, respiratory therapy assess nares and determine need for appliance change or resting period.  Outcome: Progressing     Problem: ABCDS Injury Assessment  Goal: Absence of physical injury  Outcome: Progressing     
  Problem: Safety - Adult  Goal: Free from fall injury  Outcome: Progressing  Flowsheets (Taken 7/13/2024 2200)  Free From Fall Injury: Instruct family/caregiver on patient safety     Problem: Discharge Planning  Goal: Discharge to home or other facility with appropriate resources  Outcome: Progressing     Problem: Pain  Goal: Verbalizes/displays adequate comfort level or baseline comfort level  Outcome: Progressing     Problem: Skin/Tissue Integrity  Goal: Absence of new skin breakdown  Description: 1.  Monitor for areas of redness and/or skin breakdown  2.  Assess vascular access sites hourly  3.  Every 4-6 hours minimum:  Change oxygen saturation probe site  4.  Every 4-6 hours:  If on nasal continuous positive airway pressure, respiratory therapy assess nares and determine need for appliance change or resting period.  Outcome: Progressing     Problem: ABCDS Injury Assessment  Goal: Absence of physical injury  Outcome: Progressing  Flowsheets (Taken 7/13/2024 2200)  Absence of Physical Injury: Implement safety measures based on patient assessment     
  Problem: Safety - Adult  Goal: Free from fall injury  Outcome: Progressing  Flowsheets (Taken 7/18/2024 0130)  Free From Fall Injury: Instruct family/caregiver on patient safety     Problem: Discharge Planning  Goal: Discharge to home or other facility with appropriate resources  Outcome: Progressing  Flowsheets (Taken 7/18/2024 0130)  Discharge to home or other facility with appropriate resources:   Identify barriers to discharge with patient and caregiver   Arrange for needed discharge resources and transportation as appropriate   Identify discharge learning needs (meds, wound care, etc)     Problem: Pain  Goal: Verbalizes/displays adequate comfort level or baseline comfort level  Outcome: Progressing  Flowsheets (Taken 7/18/2024 0130)  Verbalizes/displays adequate comfort level or baseline comfort level:   Encourage patient to monitor pain and request assistance   Administer analgesics based on type and severity of pain and evaluate response   Assess pain using appropriate pain scale   Implement non-pharmacological measures as appropriate and evaluate response     Problem: Skin/Tissue Integrity  Goal: Absence of new skin breakdown  Description: 1.  Monitor for areas of redness and/or skin breakdown  2.  Assess vascular access sites hourly  3.  Every 4-6 hours minimum:  Change oxygen saturation probe site  4.  Every 4-6 hours:  If on nasal continuous positive airway pressure, respiratory therapy assess nares and determine need for appliance change or resting period.  Outcome: Progressing  Note: Skin assessment complete. No new signs of skin breakdown noted. Assistance provided with repositioning while in bed.     Problem: Nutrition Deficit:  Goal: Optimize nutritional status  Outcome: Progressing  Flowsheets (Taken 7/18/2024 0130)  Nutrient intake appropriate for improving, restoring, or maintaining nutritional needs: Assess nutritional status and recommend course of action     Problem: ABCDS Injury 
4 Eyes Skin Assessment     NAME:  Bonny Serrano  YOB: 1970  MEDICAL RECORD NUMBER:  [id number]    The patient is being assessed for  Post-Op Surgical    I agree that at least one RN has performed a thorough Head to Toe Skin Assessment on the patient. ALL assessment sites listed below have been assessed.      Areas assessed by both nurses:    Head, Face, Ears, Shoulders, Back, Chest, Arms, Elbows, Hands, Sacrum. Buttock, Coccyx, Ischium, Legs. Feet and Heels, and Under Medical Devices         Does the Patient have a Wound? No noted wound(s)       [place] Prevention initiated by RN: No  Wound Care Orders initiated by RN: No    Pressure Injury (Stage 3,4, Unstageable, DTI, NWPT, and Complex wounds) if present, place Wound referral order by RN under : No    New Ostomies, if present place, Ostomy referral order under : No     Nurse 1 eSignature: Electronically signed by Radha Alston RN on 7/12/24 at 2:19 PM EDT    **SHARE this note so that the co-signing nurse can place an eSignature**    Nurse 2 eSignature: Electronically signed by Dustin Apodaca RN on 7/12/24 at 3:50 PM EDT   
Report given to Pj at [facility]  
restoring, or maintaining nutritional needs:   Assess nutritional status and recommend course of action   Monitor oral intake, labs, and treatment plans

## 2024-07-18 NOTE — DISCHARGE SUMMARY
intact bilaterally   Psychiatric: Alert and oriented, thought content appropriate, normal insight      Labs: For convenience and continuity at follow-up the following most recent labs are provided:      CBC:    Lab Results   Component Value Date/Time    WBC 6.6 07/16/2024 07:48 AM    HGB 9.1 07/16/2024 07:48 AM    HCT 26.3 07/16/2024 07:48 AM     07/16/2024 07:48 AM       Renal:    Lab Results   Component Value Date/Time     07/18/2024 04:22 AM    K 4.2 07/18/2024 04:22 AM    CL 97 07/18/2024 04:22 AM    CO2 34 07/18/2024 04:22 AM    BUN 6 07/18/2024 04:22 AM    CREATININE <0.5 07/18/2024 04:22 AM    CALCIUM 7.8 07/18/2024 04:22 AM    PHOS 2.8 07/15/2024 05:32 AM         Significant Diagnostic Studies    Radiology:   XR CHEST PORTABLE   Final Result   Small bilateral pleural effusions with associated hazy airspace disease which   could reflect atelectasis or pneumonia         FLUORO FOR SURGICAL PROCEDURES   Final Result      XR HIP 2-3 VW W PELVIS RIGHT   Final Result             Consults:     IP CONSULT TO ORTHOPEDIC SURGERY  IP CONSULT TO NEPHROLOGY  IP CONSULT TO DIETITIAN  IP CONSULT TO SOCIAL WORK  IP CONSULT TO SOCIAL WORK  IP CONSULT TO SOCIAL WORK    Disposition:  SNF     Condition at Discharge: Stable    Discharge Instructions/Follow-up:  Follow up with physician at SNF.    Code Status:  Full Code     Activity: activity as tolerated    Diet: regular diet, 1.2 L/day fluid restriction      Discharge Medications:     Current Discharge Medication List             Details   azithromycin (ZITHROMAX) 250 MG tablet Take 1 tablet by mouth daily for 4 doses  Qty: 4 tablet, Refills: 0      amoxicillin-clavulanate (AUGMENTIN) 875-125 MG per tablet Take 1 tablet by mouth 2 times daily for 4 days  Qty: 8 tablet, Refills: 0      apixaban (ELIQUIS) 2.5 MG TABS tablet Take 1 tablet by mouth 2 times daily  Qty: 60 tablet, Refills: 0      oxyCODONE (ROXICODONE) 5 MG immediate release tablet Take 1 tablet by

## 2024-07-18 NOTE — CARE COORDINATION
DISCHARGE SUMMARY     DATE OF DISCHARGE: 7/18/24    DISCHARGE DESTINATION: SNF      FACILITY: [facility]   PHONE NUMBER: [phone]    FAX NUMBER:     INSURANCE PRECERT OBTAINED: DEREK GUO/CARRIE COMPLETED: yes    COVID RESULT: na      TRANSPORTATION:     Company Name:  [employer]     Time: 3pm    Phone Number: [phone]       COMMENTS: Patient is aware and in agreement with plan.

## 2024-07-18 NOTE — PROGRESS NOTES
Hospitalist Progress Note      PCP: Rhonda Reyez APRN - CNP    Date of Admission: 7/11/2024    Chief Complaint: Right hip pain for mechanical fall    Hospital Course: 54-year-old female with past medical history of alcohol abuse, anemia, hyponatremia, COPD, chronic pancreatitis, anxiety/depression who presented to the ED for right hip pain after mechanical fall.  X-ray of the right hip showed comminuted intertrochanteric fracture involving the right femoral neck.  CT head was negative for any acute abnormality.  Patient was initially seen in [place] ED and then transferred to [facility] for orthopedic evaluation/intervention. Patient underwent ORIF on 7/12. PT/OT recommended SNF for continued therapy at discharge.    While in ED, she was noted to have multiple electrolyte disturbances with hyponatremia, hypokalemia, hypomagnesia and hypocalcemia. She was given oral replacement, started on gentle IV fluids and nephrology was consulted for management.    Patient's hemoglobin dropped to 6.3 on 7/13, requiring a blood transfusion. She was given a fluid bolus and her atenolol was held for hypotension. Hemoglobin and blood pressure have been stable since. No evidence of bleeding. Home atenolol resumed 7/13.      Subjective: Patient seen sitting in chair, states she has an increased cough and feels some shortness of breath.  Still having pain in her right hip.  States she is eating and drinking a little bit more but does not like the ensures.  Discussed getting a chest x-ray, she was agreeable.    Assessment/Plan:    Comminuted intertrochanteric fracture of the right femoral neck  Mechanical fall  -CT head with no abnormality  -Orthopedics consulted, s/p ORIF 7/12  -Pain control per Ortho  -Will need Eliquis BID x30 days at discharge  -PT/OT consulted, recommend SNF  - to assist with discharge planning    Hyponatremia, acute on chronic  Hypokalemia  Hypomagnesia  Hypocalcemia  -Likely secondary to 
      Hospitalist Progress Note      PCP: Rhonda Reyez APRN - CNP    Date of Admission: 7/11/2024    Chief Complaint: Right hip pain for mechanical fall    Hospital Course: 54-year-old female with past medical history of alcohol abuse, anemia, hyponatremia, COPD, chronic pancreatitis, anxiety/depression who presented to the ED for right hip pain after mechanical fall.  X-ray of the right hip showed comminuted intertrochanteric fracture involving the right femoral neck.  CT head was negative for any acute abnormality.  Patient was initially seen in [place] ED and then transferred to [facility] for orthopedic evaluation/intervention. Patient underwent ORIF on 7/12. PT/OT recommended SNF for continued therapy at discharge.    While in ED, she was noted to have multiple electrolyte disturbances with hyponatremia, hypokalemia, hypomagnesia and hypocalcemia. She was given oral replacement, started on gentle IV fluids and nephrology was consulted for management.    Patient's hemoglobin dropped to 6.3 on 7/13, requiring a blood transfusion. She was given a fluid bolus and her atenolol was held for hypotension. Hemoglobin and blood pressure have been stable since. No evidence of bleeding. Home atenolol resumed 7/13.      Subjective: Patient seen sitting in bed, states she is tired today as she did not sleep much last night.  Still having a cough but is improved.  No fevers or chills.  Pain currently controlled in the right lower leg as long as she does not move her head too much.    Assessment/Plan:    Comminuted intertrochanteric fracture of the right femoral neck  Mechanical fall  -CT head with no abnormality  -Orthopedics consulted, s/p ORIF 7/12  -Pain control per Ortho  -Will need Eliquis BID x30 days at discharge--> prescribed  -PT/OT consulted, recommend SNF  - to assist with discharge planning    Hyponatremia, acute on chronic  Hypokalemia  Hypomagnesia  Hypocalcemia  -Likely secondary to alcoholism versus 
      Hospitalist Progress Note      PCP: Rhonda Reyez APRN - CNP    Date of Admission: 7/11/2024    Chief Complaint: Right hip pain for mechanical fall    Hospital Course: 54-year-old female with past medical history of alcohol abuse, anemia, hyponatremia, COPD, chronic pancreatitis, anxiety/depression who presented to the ED for right hip pain after mechanical fall.  X-ray of the right hip showed comminuted intertrochanteric fracture involving the right femoral neck.  CT head was negative for any acute abnormality.  Patient was initially seen in [place] ED and then transferred to [facility] for orthopedic evaluation/intervention. Patient underwent ORIF on 7/12. PT/OT recommended SNF for continued therapy at discharge.    While in ED, she was noted to have multiple electrolyte disturbances with hyponatremia, hypokalemia, hypomagnesia and hypocalcemia. She was given oral replacement, started on gentle IV fluids and nephrology was consulted for management.    Patient's hemoglobin dropped to 6.3 on 7/13, requiring a blood transfusion. She was given a fluid bolus and her atenolol was held for hypotension. Hemoglobin and blood pressure have been stable since. No evidence of bleeding. Home atenolol resumed 7/13.      Subjective: Patient seen sitting in bed, states she is tired today as she did not sleep much last night.  Still having a cough but is improved.  No fevers or chills.  Pain currently controlled in the right lower leg as long as she does not move her head too much.    Assessment/Plan:    Comminuted intertrochanteric fracture of the right femoral neck  Mechanical fall  -CT head with no abnormality  -Orthopedics consulted, s/p ORIF 7/12  -Pain control per Ortho  -Will need Eliquis BID x30 days at discharge--> prescribed  -PT/OT consulted, recommend SNF  - to assist with discharge planning    Hyponatremia, acute on chronic  Hypokalemia  Hypomagnesia  Hypocalcemia  -Likely secondary to alcoholism versus 
      Hospitalist Progress Note      PCP: Rhonda Reyez APRN - CNP    Date of Admission: 7/11/2024    Chief Complaint: Right hip pain for mechanical fall    Hospital Course: 54-year-old female with past medical history of alcohol abuse, anemia, hyponatremia, COPD, chronic pancreatitis, anxiety/depression who presented to the ED for right hip pain after mechanical fall.  X-ray of the right hip showed comminuted intertrochanteric fracture involving the right femoral neck.  CT head was negative for any acute abnormality.  Patient was initially seen in [place] ED and then transferred to [facility] for orthopedic evaluation/intervention. Patient underwent ORIF on 7/12. PT/OT recommended SNF for continued therapy at discharge.    While in ED, she was noted to have multiple electrolyte disturbances with hyponatremia, hypokalemia, hypomagnesia and hypocalcemia. She was given oral replacement, started on gentle IV fluids and nephrology was consulted for management.    Patient's hemoglobin dropped to 6.3 on 7/13, requiring a blood transfusion. She was given a fluid bolus and her atenolol was held for hypotension. Hemoglobin and blood pressure have been stable since. No evidence of bleeding. Home atenolol resumed 7/13.      Subjective: Patient seen in bed. She reports she is doing well today. Was having fairly intense right hip pain but this is now much improved after medication. She denies any sign of bleeding. Discussed her blood counts being stable today. She continues with electrolyte abnormalities. Discussed continue fluids and electrolyte correction per nephrology. She is agreeable to SNF when she is ready for discharge and would like to speak with social work regarding options when available.    Assessment/Plan:    Comminuted intertrochanteric fracture of the right femoral neck  Mechanical fall  -CT head with no abnormality  -Orthopedics consulted, s/p ORIF 7/12  -Pain control per Ortho  -Will need Eliquis BID x30 
    Physical Therapy    Facility/Department: [address] ORTHOPEDICS    Physical Therapy Treatment note       Name: Bonny Serrano    : 1970    MRN: [id number]    Date of Service: 2024    Assessment / Discharge Recommendations:    -right hip fracture  ORIF  wbat   -able to mobilize up to walker this session to ambulate a short distance in room   -remains with need for continued PTOT and nursing care in a skilled setting  -anticipate need for slow pace     Bonny Serrano scored a 13/ on the AM-PAC short mobility form.   Current research shows that an AM-PAC score of 17 or less is typically not associated with a discharge to the patient's home setting.   Based on the patient's AM-PAC score and their current functional mobility deficits, it is recommended that the patient have 3-5 sessions per week of Physical Therapy at d/c to increase the patient's independence.          Patient Diagnosis(es): The encounter diagnosis was Intertrochanteric fracture of right femur, closed, initial encounter ([facility]).  Past Medical History:  has a past medical history of A-fib ([facility]), Anemia, Anxiety, Chronic pancreatitis ([facility]), Collapse of right lung, COPD (chronic obstructive pulmonary disease) ([facility]), COPD (chronic obstructive pulmonary disease) ([facility]), Depression, GI bleed, H/O colonoscopy, Hypertension, Pancreatitis, and Pulmonary embolism ([facility]).  Past Surgical History:  has a past surgical history that includes Upper gastrointestinal endoscopy and hip surgery (Right, 2024).      Body Structures, Functions, Activity Limitations Requiring Skilled Therapeutic Intervention: Decreased functional mobility ;Decreased ADL status;Decreased safe awareness;Decreased posture;Increased pain  Requires PT Follow-Up: Yes  Activity Tolerance  Activity Tolerance: Patient limited by pain;Patient limited by endurance     Plan   Physical Therapy Plan  General Plan: 3-5 times per week  Current Treatment Recommendations: Functional mobility 
  Department of Orthopedic Surgery  Physician Assistant   Progress Note    Subjective:     POD 1 right Gamma nail, Dr. Man    Systemic or Specific Complaints:Pain Control. Has not had pain meds due to soft BP. Tolerating PO diet. Voiding via catheter. No calf pain, chest, pain shortness of breath, denies numbness and tingling. Couldn't work meaningfully with PT due to pain. Denies dizziness. On O2, does not require O2 at baseline but does have history of COPD  Was noted to have acute blood loss anemia and is sp one unit of PRBC on 7/13/24    Objective:     Patient Vitals for the past 24 hrs:   BP Temp Temp src Pulse Resp SpO2 Weight   07/13/24 1315 (!) 95/59 98.3 °F (36.8 °C) Oral 93 16 97 % --   07/13/24 1249 97/63 98 °F (36.7 °C) Oral 100 16 94 % --   07/13/24 1200 -- -- -- -- -- 93 % --   07/13/24 1150 (!) 80/53 98.4 °F (36.9 °C) Oral (!) 102 16 94 % --   07/13/24 0755 95/61 98.3 °F (36.8 °C) Oral 93 16 92 % --   07/13/24 0750 -- -- -- -- -- 91 % --   07/13/24 0745 -- -- -- -- -- (!) 87 % --   07/13/24 0615 (!) 82/53 -- -- -- -- -- --   07/13/24 0511 -- -- -- -- -- -- 44.6 kg (98 lb 5.2 oz)   07/13/24 0438 (!) 86/44 98.6 °F (37 °C) Oral 88 12 93 % --   07/13/24 0033 (!) 74/48 98.9 °F (37.2 °C) Oral 79 16 91 % --   07/12/24 2323 -- -- -- -- -- 90 % --   07/12/24 2125 -- -- -- -- 16 -- --   07/12/24 2055 -- -- -- -- 16 -- --   07/12/24 1949 -- -- -- 97 -- 95 % --   07/12/24 1902 99/65 98.4 °F (36.9 °C) Oral 97 -- 96 % --   07/12/24 1640 -- -- -- -- -- 95 % --   07/12/24 1606 120/75 98.3 °F (36.8 °C) Oral (!) 117 17 96 % --   07/12/24 1516 -- -- -- -- 14 -- --       General: alert, appears stated age, cooperative, and no distress   Wound: Post op dressing clean, dry and intact. Some dry bloody, show.    Motion: Painful range of Motion in affected extremity   DVT Exam: No evidence of DVT seen on physical exam.     Additional exam: patient in bed with both hips flexed to 90 degrees.   Some swelling in right thigh, 
  Physical Therapy      Bonny Serrano  7/12/2024    -chart reviewed   -in CIWA and seizure precautions   -PT OT discussed with Charge RN   -recommend defer PT OT assessments until tomorrow     Electronically signed by KRAIG VALDEZ PT on 7/12/2024 at 2:34 PM        
  Physician Progress Note      PATIENT:               VENUS GE  CSN #:                  [id number]  :                       1970  ADMIT DATE:       2024 7:23 PM  DISCH DATE:  RESPONDING  PROVIDER #:        Mima Armando PA-C          QUERY TEXT:    Pt admitted with right femur fracture and has Acute on chronic anemia   documented. If possible, please document in progress notes and discharge   summary further specificity regarding the acuity and type of anemia:    The medical record reflects the following:  Risk Factors: right femur fracture and Acute on chronic anemia  Clinical Indicators:  note-  hemoglobin dropped to 6.3 on , requiring   a blood transfusion  Treatment:  received PRBC, continue Lovenox and plan for Eliquis at discharge,   rend and transfuse further for Hgb <7    Thank You Cheyenne Frias RN, CDS [email or web address]  Options provided:  -- Anemia due to acute blood loss  -- Anemia due to acute on chronic blood loss  -- Other - I will add my own diagnosis  -- Disagree - Not applicable / Not valid  -- Disagree - Clinically unable to determine / Unknown  -- Refer to Clinical Documentation Reviewer    PROVIDER RESPONSE TEXT:    Provider is clinically unable to determine a response to this query.    Query created by: Cheyenne Frias on 7/15/2024 9:40 AM      Electronically signed by:  Mima Armando PA-C 7/15/2024 10:20 AM          
 Patient in bed resting in bed at this time.Patient is  alert and oriented x 4. Able to make all needs known. PT complained of pain. Prn given per md order. Assessment completed. VS WNL.. IV capped and flushed. Pt not scoring on ciwa assessment at this time,Pt states her last drink was Monday. Denies withdrawal symptoms. Dressing ob right hip has small amount of draining. Fall precautions in place. Call light within reach.    
1 unit of blood infusing per order. Blood verified with SARAH Fuller.     Noted that patient's Mg is 1.2. Started 1st bag of magnesium replacement prior to blood administration. Pt's EJ line is not infusing well so had to stop magnesium replacement early in lieu of blood administration. Will resume Mg replacement when blood transfusion is complete.    Electronically signed by Tierra Bermudez RN on 7/13/2024 at 1:05 PM    
4 Eyes Skin Assessment     NAME:  Bonny Serrano  YOB: 1970  MEDICAL RECORD NUMBER:  [id number]    The patient is being assessed for  Admission    I agree that at least one RN has performed a thorough Head to Toe Skin Assessment on the patient. ALL assessment sites listed below have been assessed.      Areas assessed by both nurses:    Head, Face, Ears, Shoulders, Back, Chest, Arms, Elbows, Hands, Sacrum. Buttock, Coccyx, Ischium, Legs. Feet and Heels, and Under Medical Devices         Does the Patient have a Wound? No noted wound(s)       [place] Prevention initiated by RN: Yes  Wound Care Orders initiated by RN: No    Pressure Injury (Stage 3,4, Unstageable, DTI, NWPT, and Complex wounds) if present, place Wound referral order by RN under : No    New Ostomies, if present place, Ostomy referral order under : No     Nurse 1 eSignature: Electronically signed by Deann Ortiz RN on 7/12/24 at 8:01 AM EDT    **SHARE this note so that the co-signing nurse can place an eSignature**    Nurse 2 eSignature: Electronically signed by Radha Alston RN on 7/12/24 at 8:01 AM EDT   
Blood transfusion complete no complications.    Electronically signed by Tierra Bermudez RN on 7/13/2024 at 3:04 PM    
Caitlin LAGOS was sent perfect serve for pt complaints of itching. Order for benadryl given.  
Call placed to ed charge. Message given to redraw pt mg level.   
Clinical Pharmacy Note  Subcutaneous Anticoagulant Adjustment     Enoxaparin has been adjusted to 30 mg subQ daily based on [facility] policy.    Recent Labs     07/11/24 2057 07/12/24 0322   CREATININE <0.5* <0.5*     Recent Labs     07/12/24  0322   HGB 8.7*   HCT 25.3*      INR 1.17*     Estimated Creatinine Clearance: 70 mL/min (based on SCr of 0.5 mg/dL).    Pharmacist Review of Appropriate Use and Automatic Dose Adjustment of Subcutaneous Anticoagulants (Adult)    The guidance below is to provide initial recommendations for dosing. If recommended dose does not align well with patient's current clinical picture, communications with the care team will occur to determine most appropriate medication and dose.    TABLE 1.  ENOXAPARIN ROUTINE PROPHYLAXIS DOSING (Medically ill, routine surgery)   Patient Weight (kg)     50.9 and below 51 - 100.9 101 - 150.9 151 - 174.9 175 or greater         Estimated CrCl  (ml/min) 30 or greater   30 mg SUBQ daily   40 mg SUBQ daily 30 mg SUBQ BID  40 mg SUBQ BID 60mg SUBQ BID      15-29 UFH 5000 units SUBQ BID   30 mg SUBQ daily 30 mg SUBQ daily 40 mg SUBQ daily   60 mg SUBQ daily      Less than 15 or Dialysis UFH 5000 units SUBQ BID   UFH 5000 units SUBQ TID UFH 7500 units SUBQ TID       TABLE 2.  ENOXAPARIN TREATMENT DOSING   (Based on 1mg/kg BID for DVT/PE/AFib)   Patient Weight (kg)     50.9 and below .9 151-189.9 190 or greater         Estimated CrCl  (ml/min) 30 or greater Recommend [facility] standardized UFH infusion, apixaban or rivaroxaban 1mg/kg SUBQ BID 1mg/kg SUBQ BID if anti-Xa levels are feasible per institution.  Alternatively,  recommend switch to [facility] standardized UFH infusion     Recommend switch to [facility] standardized UFH infusion.      15-29 Recommend [facility] standardized UFH infusion or apixaban 1mg/kg SUBQ daily Recommend switch to [facility] standardized UFH infusion     Less than 15 or Dialysis Recommend switch to [facility] standardized UFH infusion.     Carissa 
Comprehensive Nutrition Assessment    Type and Reason for Visit:  Positive Nutrition Screen, Consult    Nutrition Recommendations/Plan:   Continue current diet.  Increase Ensure to TID.      Malnutrition Assessment:  Malnutrition Status:  Severe malnutrition (07/12/24 1400)    Context:  Acute Illness       Nutrition Assessment:    MST=3. Consult for general nutrition management. Pt. admitted for Rt. femur Fx. S/P ORIF today. Regular diet started, intakes TBD. Ensure ordered BID. Pt. has a Hx. of malnutrition, current BMI 14. Saline running at 50 mL/hr. Nutrition labs reviewed. Protein needs increased d/t surgical incision and BMI. Recommend to increase Ensure to TID to support adequate PO intake, weight gain, and wound healing. Will continue to monitor nutritional adequacy and diet acceptance.    Nutrition Related Findings:    Na 128, BUN 3, Cr < 0.5, Ca 7.2. Skin w/ area to Lt. thigh. Wound Type: Surgical Incision       Current Nutrition Intake & Therapies:    Average Meal Intake: Unable to assess  Average Supplements Intake: Unable to assess  ADULT DIET; Regular  ADULT ORAL NUTRITION SUPPLEMENT; Breakfast, Dinner; Standard High Calorie/High Protein Oral Supplement    Anthropometric Measures:  Height: 154.9 cm (5' 0.98\")  Ideal Body Weight (IBW): 105 lbs (48 kg)       Current Body Weight: 34 kg (74 lb 15.3 oz), 71.4 % IBW.    Current BMI (kg/m2): 14.2                          BMI Categories: Underweight (BMI less than 18.5)    Estimated Daily Nutrient Needs:  Energy Requirements Based On: Kcal/kg  Weight Used for Energy Requirements: Ideal  Energy (kcal/day): 7725-5411 kcal (25-30 kcal/kg)  Weight Used for Protein Requirements: Ideal  Protein (g/day): 60-72 g (1.25-1.5g/kg)  Method Used for Fluid Requirements: 1 ml/kcal  Fluid (ml/day): TBD    Nutrition Diagnosis:   Inadequate oral intake related to inadequate protein-energy intake as evidenced by BMI    Nutrition Interventions:   Food and/or Nutrient Delivery: 
Department of Internal Medicine  Nephrology Progress Note    SUBJECTIVE:  We are following this patient for hyponatremia /Electrolytes ds .     HPI:  Breathing comfortably.  No CP.  Fewer complaints.  Doing better with the slightly more liberalized fluid restriction.  Serum sodium remains stable.  ROS:  In bed.  No fever.  PMFSH:  medications reviewed.    Physical Exam:    VITALS:  /68   Pulse 86   Temp 98.4 °F (36.9 °C) (Oral)   Resp 17   Ht 1.549 m (5' 0.98\")   Wt 41 kg (90 lb 6.2 oz)   LMP 07/12/2015   SpO2 97%   BMI 17.09 kg/m²   24HR INTAKE/OUTPUT:    Intake/Output Summary (Last 24 hours) at 7/18/2024 1230  Last data filed at 7/18/2024 0937  Gross per 24 hour   Intake 1140 ml   Output --   Net 1140 ml       Constitutional: resting, emaciated  Respiratory:  CTA  Gastrointestinal:  No  tenderness.  Normal Bowel Sounds  Cardiovascular:  S1, S2 RRR   Edema:  Lower Extremity has no edema    DATA:    CBC:  Lab Results   Component Value Date/Time    WBC 6.6 07/16/2024 07:48 AM    RBC 2.86 07/16/2024 07:48 AM    HGB 9.1 07/16/2024 07:48 AM    HCT 26.3 07/16/2024 07:48 AM    MCV 92.2 07/16/2024 07:48 AM    MCH 31.9 07/16/2024 07:48 AM    MCHC 34.6 07/16/2024 07:48 AM    RDW 18.3 07/16/2024 07:48 AM     07/16/2024 07:48 AM    MPV 6.7 07/16/2024 07:48 AM     CMP:  Lab Results   Component Value Date/Time     07/18/2024 04:22 AM    K 4.2 07/18/2024 04:22 AM    CL 97 07/18/2024 04:22 AM    CO2 34 07/18/2024 04:22 AM    BUN 6 07/18/2024 04:22 AM    CREATININE <0.5 07/18/2024 04:22 AM    GFRAA >60 05/10/2020 05:34 AM    AGRATIO 0.8 07/11/2024 01:50 PM    LABGLOM >90 07/18/2024 04:22 AM    LABGLOM >60 01/05/2024 05:10 AM    GLUCOSE 79 07/18/2024 04:22 AM    CALCIUM 7.8 07/18/2024 04:22 AM    BILITOT 0.5 07/13/2024 04:51 PM    ALKPHOS 128 07/13/2024 04:51 PM    AST 29 07/13/2024 04:51 PM    ALT 19 07/13/2024 04:51 PM      Hepatic Function Panel:   Lab Results   Component Value Date/Time    ALKPHOS 128 
Department of Internal Medicine  Nephrology Progress Note    SUBJECTIVE:  We are following this patient for hyponatremia /Electrolytes ds .     HPI:  Breathing comfortably.  No CP.  Patient has multiple complaints today, which do not make sense.  She complains about being thirsty.  ROS:  In bed.  No fever.  PMFSH:  medications reviewed.    Physical Exam:    VITALS:  BP (!) 90/55   Pulse 91   Temp 98.2 °F (36.8 °C) (Oral)   Resp 14   Ht 1.549 m (5' 0.98\")   Wt 41 kg (90 lb 6.2 oz)   LMP 07/12/2015   SpO2 94%   BMI 17.09 kg/m²   24HR INTAKE/OUTPUT:    Intake/Output Summary (Last 24 hours) at 7/16/2024 1412  Last data filed at 7/16/2024 0945  Gross per 24 hour   Intake 1700 ml   Output 650 ml   Net 1050 ml         Constitutional: resting, emaciated  Respiratory:  CTA  Gastrointestinal:  No  tenderness.  Normal Bowel Sounds  Cardiovascular:  S1, S2 RRR   Edema:  Lower Extremity has no edema    DATA:    CBC:  Lab Results   Component Value Date/Time    WBC 6.6 07/16/2024 07:48 AM    RBC 2.86 07/16/2024 07:48 AM    HGB 9.1 07/16/2024 07:48 AM    HCT 26.3 07/16/2024 07:48 AM    MCV 92.2 07/16/2024 07:48 AM    MCH 31.9 07/16/2024 07:48 AM    MCHC 34.6 07/16/2024 07:48 AM    RDW 18.3 07/16/2024 07:48 AM     07/16/2024 07:48 AM    MPV 6.7 07/16/2024 07:48 AM     CMP:  Lab Results   Component Value Date/Time     07/16/2024 05:47 AM    K 3.4 07/16/2024 05:47 AM     07/16/2024 05:47 AM    CO2 32 07/16/2024 05:47 AM    BUN 2 07/16/2024 05:47 AM    CREATININE <0.5 07/16/2024 05:47 AM    GFRAA >60 05/10/2020 05:34 AM    AGRATIO 0.8 07/11/2024 01:50 PM    LABGLOM >90 07/16/2024 05:47 AM    LABGLOM >60 01/05/2024 05:10 AM    GLUCOSE 92 07/16/2024 05:47 AM    CALCIUM 7.1 07/16/2024 05:47 AM    BILITOT 0.5 07/13/2024 04:51 PM    ALKPHOS 128 07/13/2024 04:51 PM    AST 29 07/13/2024 04:51 PM    ALT 19 07/13/2024 04:51 PM      Hepatic Function Panel:   Lab Results   Component Value Date/Time    ALKPHOS 128 
Department of Internal Medicine  Nephrology Progress Note    SUBJECTIVE:  We are following this patient for hyponatremia /Electrolytes ds .     HPI:  Breathing comfortably.  No CP.  Patient has multiple complaints. She complains about being thirsty.  ROS:  In bed.  No fever.  PMFSH:  medications reviewed.    Physical Exam:    VITALS:  BP 99/64   Pulse 94   Temp 98.8 °F (37.1 °C) (Oral)   Resp 16   Ht 1.549 m (5' 0.98\")   Wt 42.6 kg (93 lb 14.7 oz)   LMP 07/12/2015   SpO2 94%   BMI 17.75 kg/m²   24HR INTAKE/OUTPUT:    Intake/Output Summary (Last 24 hours) at 7/17/2024 1539  Last data filed at 7/17/2024 1438  Gross per 24 hour   Intake 1100 ml   Output 100 ml   Net 1000 ml       Constitutional: resting, emaciated  Respiratory:  CTA  Gastrointestinal:  No  tenderness.  Normal Bowel Sounds  Cardiovascular:  S1, S2 RRR   Edema:  Lower Extremity has no edema    DATA:    CBC:  Lab Results   Component Value Date/Time    WBC 6.6 07/16/2024 07:48 AM    RBC 2.86 07/16/2024 07:48 AM    HGB 9.1 07/16/2024 07:48 AM    HCT 26.3 07/16/2024 07:48 AM    MCV 92.2 07/16/2024 07:48 AM    MCH 31.9 07/16/2024 07:48 AM    MCHC 34.6 07/16/2024 07:48 AM    RDW 18.3 07/16/2024 07:48 AM     07/16/2024 07:48 AM    MPV 6.7 07/16/2024 07:48 AM     CMP:  Lab Results   Component Value Date/Time     07/17/2024 06:43 AM    K 4.2 07/17/2024 06:43 AM    CL 96 07/17/2024 06:43 AM    CO2 32 07/17/2024 06:43 AM    BUN 4 07/17/2024 06:43 AM    CREATININE <0.5 07/17/2024 06:43 AM    GFRAA >60 05/10/2020 05:34 AM    AGRATIO 0.8 07/11/2024 01:50 PM    LABGLOM >90 07/17/2024 06:43 AM    LABGLOM >60 01/05/2024 05:10 AM    GLUCOSE 94 07/17/2024 06:43 AM    CALCIUM 7.5 07/17/2024 06:43 AM    BILITOT 0.5 07/13/2024 04:51 PM    ALKPHOS 128 07/13/2024 04:51 PM    AST 29 07/13/2024 04:51 PM    ALT 19 07/13/2024 04:51 PM      Hepatic Function Panel:   Lab Results   Component Value Date/Time    ALKPHOS 128 07/13/2024 04:51 PM    ALT 19 07/13/2024 
Department of Internal Medicine  Nephrology Progress Note    SUBJECTIVE:  We are following this patient for hyponatremia /Electrolytes ds .     HPI:  Breathing comfortably.  No CP.  Patient has way too much fluid on her bedside table.  She complains about being thirsty.  ROS:  In bed.  No fever.  PMFSH:  medications reviewed.    Physical Exam:    VITALS:  /73   Pulse 84   Temp 97.8 °F (36.6 °C) (Oral)   Resp 16   Ht 1.549 m (5' 0.98\")   Wt 49.4 kg (108 lb 14.5 oz)   LMP 07/12/2015   SpO2 98%   BMI 20.59 kg/m²   24HR INTAKE/OUTPUT:    Intake/Output Summary (Last 24 hours) at 7/15/2024 1350  Last data filed at 7/15/2024 0933  Gross per 24 hour   Intake 300 ml   Output 2001 ml   Net -1701 ml         Constitutional: resting, emaciated  Respiratory:  CTA  Gastrointestinal:  No  tenderness.  Normal Bowel Sounds  Cardiovascular:  S1, S2 RRR   Edema:  Lower Extremity has no edema    DATA:    CBC:  Lab Results   Component Value Date/Time    WBC 7.6 07/15/2024 05:32 AM    RBC 3.13 07/15/2024 05:32 AM    HGB 9.8 07/15/2024 05:32 AM    HCT 28.8 07/15/2024 05:32 AM    MCV 92.0 07/15/2024 05:32 AM    MCH 31.3 07/15/2024 05:32 AM    MCHC 34.0 07/15/2024 05:32 AM    RDW 18.6 07/15/2024 05:32 AM     07/15/2024 05:32 AM    MPV 7.2 07/15/2024 05:32 AM     CMP:  Lab Results   Component Value Date/Time     07/15/2024 05:32 AM     07/15/2024 05:32 AM    K 3.1 07/15/2024 05:32 AM    K 3.1 07/15/2024 05:32 AM     07/15/2024 05:32 AM    CO2 30 07/15/2024 05:32 AM    BUN <2 07/15/2024 05:32 AM    CREATININE <0.5 07/15/2024 05:32 AM    GFRAA >60 05/10/2020 05:34 AM    AGRATIO 0.8 07/11/2024 01:50 PM    LABGLOM >90 07/15/2024 05:32 AM    LABGLOM >60 01/05/2024 05:10 AM    GLUCOSE 148 07/15/2024 05:32 AM    CALCIUM 7.7 07/15/2024 05:32 AM    BILITOT 0.5 07/13/2024 04:51 PM    ALKPHOS 128 07/13/2024 04:51 PM    AST 29 07/13/2024 04:51 PM    ALT 19 07/13/2024 04:51 PM      Hepatic Function Panel:   Lab 
Department of Internal Medicine  Nephrology Progress Note    SUBJECTIVE:  We are following this patient for hyponatremia /Electrolytes ds . Patient progress reviewed.         Over all feels better . No marked change clinically .     REVIEW OF SYSTEMS:  No acute complaints . Still confused .     Physical Exam:    VITALS:  BP (!) 95/59   Pulse 93   Temp 98.3 °F (36.8 °C) (Oral)   Resp 16   Ht 1.549 m (5' 0.98\")   Wt 44.6 kg (98 lb 5.2 oz)   LMP 07/12/2015   SpO2 97%   BMI 18.59 kg/m²   24HR INTAKE/OUTPUT:    Intake/Output Summary (Last 24 hours) at 7/13/2024 1424  Last data filed at 7/13/2024 0849  Gross per 24 hour   Intake 125 ml   Output 450 ml   Net -325 ml         Constitutional: resting   Respiratory:  CTA  Gastrointestinal:  No  tenderness.  Normal Bowel Sounds  Cardiovascular:  S1, S2 RRR   Edema:  Lower Extremity has no edema    DATA:    CBC:  Lab Results   Component Value Date/Time    WBC 9.7 07/13/2024 06:00 AM    RBC 1.94 07/13/2024 06:00 AM    HGB 6.3 07/13/2024 06:00 AM    HCT 19.1 07/13/2024 06:00 AM    MCV 98.7 07/13/2024 06:00 AM    MCH 32.8 07/13/2024 06:00 AM    MCHC 33.2 07/13/2024 06:00 AM    RDW 13.8 07/13/2024 06:00 AM     07/13/2024 06:00 AM    MPV 7.1 07/13/2024 06:00 AM     CMP:  Lab Results   Component Value Date/Time     07/13/2024 06:00 AM    K 4.0 07/13/2024 06:00 AM    K 4.0 07/13/2024 06:00 AM    CL 93 07/13/2024 06:00 AM    CO2 26 07/13/2024 06:00 AM    BUN 2 07/13/2024 06:00 AM    CREATININE <0.5 07/13/2024 06:00 AM    GFRAA >60 05/10/2020 05:34 AM    AGRATIO 0.8 07/11/2024 01:50 PM    LABGLOM >90 07/13/2024 06:00 AM    LABGLOM >60 01/05/2024 05:10 AM    GLUCOSE 146 07/13/2024 06:00 AM    CALCIUM 7.2 07/13/2024 06:00 AM    BILITOT 0.3 07/13/2024 06:00 AM    ALKPHOS 113 07/13/2024 06:00 AM    AST 21 07/13/2024 06:00 AM    ALT 17 07/13/2024 06:00 AM      Hepatic Function Panel:   Lab Results   Component Value Date/Time    ALKPHOS 113 07/13/2024 06:00 AM    ALT 17 
Department of Internal Medicine  Nephrology Progress Note    SUBJECTIVE:  We are following this patient for hyponatremia /Electrolytes ds . Patient progress reviewed.         Over all feels better . No marked change clinically .     REVIEW OF SYSTEMS:  No acute complaints . Still confused ?      Physical Exam:    VITALS:  /73   Pulse (!) 110   Temp 98.7 °F (37.1 °C) (Oral)   Resp 18   Ht 1.549 m (5' 0.98\")   Wt 49.4 kg (108 lb 14.5 oz)   LMP 07/12/2015   SpO2 92%   BMI 20.59 kg/m²   24HR INTAKE/OUTPUT:    Intake/Output Summary (Last 24 hours) at 7/14/2024 1502  Last data filed at 7/14/2024 0926  Gross per 24 hour   Intake 630 ml   Output 900 ml   Net -270 ml         Constitutional: resting   Respiratory:  CTA  Gastrointestinal:  No  tenderness.  Normal Bowel Sounds  Cardiovascular:  S1, S2 RRR   Edema:  Lower Extremity has no edema    DATA:    CBC:  Lab Results   Component Value Date/Time    WBC 7.7 07/14/2024 05:30 AM    RBC 2.80 07/14/2024 05:30 AM    HGB 9.2 07/14/2024 01:40 PM    HCT 27.0 07/14/2024 01:40 PM    MCV 89.4 07/14/2024 05:30 AM    MCH 31.3 07/14/2024 05:30 AM    MCHC 35.0 07/14/2024 05:30 AM    RDW 17.9 07/14/2024 05:30 AM     07/14/2024 05:30 AM    MPV 7.0 07/14/2024 05:30 AM     CMP:  Lab Results   Component Value Date/Time     07/14/2024 01:40 PM    K 3.7 07/14/2024 11:00 AM    K 3.1 07/14/2024 05:30 AM    CL 92 07/14/2024 11:00 AM    CO2 26 07/14/2024 11:00 AM    BUN <2 07/14/2024 11:00 AM    CREATININE <0.5 07/14/2024 11:00 AM    GFRAA >60 05/10/2020 05:34 AM    AGRATIO 0.8 07/11/2024 01:50 PM    LABGLOM >90 07/14/2024 11:00 AM    LABGLOM >60 01/05/2024 05:10 AM    GLUCOSE 104 07/14/2024 11:00 AM    CALCIUM 6.8 07/14/2024 11:00 AM    BILITOT 0.5 07/13/2024 04:51 PM    ALKPHOS 128 07/13/2024 04:51 PM    AST 29 07/13/2024 04:51 PM    ALT 19 07/13/2024 04:51 PM      Hepatic Function Panel:   Lab Results   Component Value Date/Time    ALKPHOS 128 07/13/2024 04:51 PM    ALT 
Md notified about soft bp and pain meds administration. Also critical labs report for hemoglobin 6.3 and hemat 19.1 Md made aware awaiting response   
New orders to , hold pain med until sbp>100   
Occupational Therapy    07/12/24    Bonny Serrano  1970  [id number]    OT orders noted, patient chart reviewed. Spoke with charge RN, pt in seizure precautions and s/p Day 0 R femur ORIF. RN requesting holding therapy and OOB activity this date. Will attempt evaluation this date.    Electronically signed by LIBIA Moore, OTR/L on 7/12/2024 at 2:32 PM    
Occupational Therapy    Attempted OT follow up.  Pt declining therapy at this time reporting she is trying to sleep.  Pt asking therapy to attempt back later this date.  Will attempt later as schedule permits.      EDIL Dillon/GUY [id number]    
Page sent to Mima VALDEZ: Patients Mag is 1.40 this AM. K is 3.1. She only has PRN K Replacement ordered, however she takes this medication -potassium citrate (UROCIT-K) extended release tablet 10 mEq daily. Do you want me to give her the PRN replacement with this scheduled med? Could you order the mag replacement? Thank you.    Per Mima- Notify Nephro.    RN forward page to Dr. Weiss- See new orders.   
Patient arrives to PACU at this time. VSS on 3L per NC. Patient resting comfortably with eyes closed. No signs or symptoms of acute distress.     
Patient is alert & oriented x4,  2/4 bed rails up, bed in lowest position, fall precautions in place, call light within reach. Noted orders for blood transfusion for hemoglobin of 6.3. Blood transfusion consent obtained and is signed and in patient's chart.     Notified JOSÉ MIGUEL Armando of deterioration index score of greater than 50, informed JOSÉ MIGUEL Armando of this per protocol via perfect serve.     Electronically signed by Tierra Bermudez RN on 7/13/2024 at 7:59 AM    
Patient is alert and oriented x4. VSS. Dressing to hip is CDI. Neuro checks WNL. Patient tolerating ambulation fairly well with stedy. Urinating adequately. Pain controlled with PO pain medicine. Fall precautions in place.    
Patient resting in bed. A&OX4, VSS. Pt reports 10/10 pain at this time to R Hip. AM medications administered as ordered (see eMAR). IV flushed and capped. HTT Assessment complete. Pt denies any further needs at this time. Fall precautions in place.    
Pharmacy Medication Reconciliation Note     List of medications patient is currently taking is complete.    Source of information:   1. Conversation with patient   2. EMR    Notes regarding home medications:   Removed aspirin and hydroxyzine from list, adjusted frequency of gabapentin to daily      Carissa Madeline Mason  7/12/2024 1:07 PM    
Pt awake in bed alert and oriented x 4. Pt denies SOB and chest pain at this time. Respirations even and unlabored at this time. Morning medications administered, pt tolerated well. Pt states she has no other needs at this time. Call light placed within reach. Electronically signed by Aracely Perez RN on 7/15/2024 at 6:04 PM      
Pt awake in bed alert and oriented x 4. Pt denies SOB and chest pain at this time. Respirations even and unlabored at this time. Morning medications administered, pt tolerated well. Pt states she has no other needs at this time. Call light placed within reach. Electronically signed by Aracely Perez RN on 7/16/2024 at 9:44 AM      
Pt received 1 unit of blood this shift and also received Magnesium replacement per protocol. Called lab and informed them of the correct times to draw Hgb and Hct and Magnesium labs. IV fluid infusing per order. Surgical dressing on R hip remains dry and intact. Patient A&O. Call light within reach, able to make needs known, fall precautions in place.    Electronically signed by Tierra Bermudez RN on 7/13/2024 at 6:13 PM    
Pt sleeping, wakes briefly. States pain 4/10 and tolerable.  Quickly back to sleep. Report to Radha.  To room per bed.   
Roth pulled at this time  
Stable H/H  Disposition per hospitalist  Followup Dr Man in office in 2 weeks.   
The patients OARRS report has been reviewed online and any prescribing of pain related medications is based on our findings.The patient has been issued narcotics to safely reduce postoperative pain and promote tolerance with physical therapies and ADL's. Reduction in dosing will be addressed with the next narcotic refill request. Dosing is adjusted for patients with history of chronic pain disorders.      Lovenox as inpt then plan DC on Eliquis bid for 30 total days after discharge from hospital for DVT prophylaxis . Pt is \"high\" risk with hx PE  
[facility] Orthopedic Surgery   Progress Note      S/P :  SUBJECTIVE  in recliner. Alert and oriented. . Pain is   described in right hip and with the intensity of moderate. Pain is described as aching.       OBJECTIVE              Physical                      VITALS:  /69   Pulse (!) 103   Temp 97.4 °F (36.3 °C) (Axillary)   Resp 14   Ht 1.549 m (5' 0.98\")   Wt 49.4 kg (108 lb 14.5 oz)   LMP 07/12/2015   SpO2 94%   BMI 20.59 kg/m²                     MUSCULOSKELETAL:  right foot NVI. Wiggles toes to command. Able to plantarflex and dorsiflex ankle Pedal pulses are palpable.                    NEUROLOGIC:                                  Sensory:  Touch:  Right Lower Extremity:  normal                                                 Surgical wound appears with small serosang on 3 right hip and lateral thigh Mepilex squares. Area is soft to palpation. Refused ice packs \"I'm cold already\"    Data       CBC:   Lab Results   Component Value Date/Time    WBC 7.6 07/15/2024 05:32 AM    RBC 3.13 07/15/2024 05:32 AM    HGB 9.8 07/15/2024 05:32 AM    HCT 28.8 07/15/2024 05:32 AM    MCV 92.0 07/15/2024 05:32 AM    MCH 31.3 07/15/2024 05:32 AM    MCHC 34.0 07/15/2024 05:32 AM    RDW 18.6 07/15/2024 05:32 AM     07/15/2024 05:32 AM    MPV 7.2 07/15/2024 05:32 AM        WBC:    Lab Results   Component Value Date/Time    WBC 7.6 07/15/2024 05:32 AM        Hemoglobin/Hematocrit:    Lab Results   Component Value Date/Time    HGB 9.8 07/15/2024 05:32 AM    HCT 28.8 07/15/2024 05:32 AM        PT/INR:    Lab Results   Component Value Date/Time    PROTIME 15.1 07/12/2024 03:22 AM    INR 1.17 07/12/2024 03:22 AM              Current Inpatient Medications             Current Facility-Administered Medications: atenolol (TENORMIN) tablet 25 mg, 25 mg, Oral, Daily  0.9 % sodium chloride infusion, , IntraVENous, PRN  sodium chloride flush 0.9 % injection 5-40 mL, 5-40 mL, IntraVENous, 2 times per day  sodium chloride flush 
7.3, improving to 8.7 this a.m. with no intervention.  Recent Baseline from 1/24 was around 8  -Continue to monitor with daily CBC.  Check iron panel    Alcohol abuse  -Drinks 4-5 beers daily.  -Started on CIWA protocol with Ativan as needed  -Continue thiamine and folic acid    Malnutrition cachexia  -BMI only 14.16.  Consult dietitian    PAF  -Follows with cardio at [facility].  Continue Cardizem and atenolol  -Not currently on anticoagulation secondary to previous GI bleeds    COPD  Hypoxia  Cavitary lung lesion  -Requiring 2 L nasal cannula, no oxygen at baseline for the patient states she has been told she needed it before.  -CXR admission shows no acute process  -Has seen Dr. Benson with [facility] pulmonology for cavitary lung lesion on CAT scan, workup outpatient pending  -No segment wheezing on exam.  Continue supportive care with home inhalers.    Chronic pancreatitis  -Resume home enzymes    Anxiety/depression  -Hold home Zoloft and Cymbalta due to hyponatremia.    Elevated troponin  -Patient denies any chest pain.  EKG with no ST elevation.  Continue to monitor.  Active Hospital Problems    Diagnosis     Intertrochanteric fracture of right femur, closed, initial encounter ([facility]) [S72.141A]        Medications:  Reviewed    Infusion Medications    sodium chloride      sodium chloride 50 mL/hr at 07/11/24 2878     Scheduled Medications    sodium chloride flush  5-40 mL IntraVENous 2 times per day    atenolol  25 mg Oral Daily    atorvastatin  10 mg Oral Daily    dilTIAZem  240 mg Oral Daily    DULoxetine  60 mg Oral Daily    guaiFENesin  600 mg Oral BID    lipase-protease-amylas  15,000 Units Oral TID WC    lipase-protease-amylase  20,000 Units Oral TID WC    pantoprazole  40 mg Oral QAM AC    potassium citrate  10 mEq Oral Daily    sertraline  50 mg Oral Daily    budesonide-formoterol  2 puff Inhalation BID RT    tiotropium  2 puff Inhalation Daily RT    thiamine  100 mg Oral Daily     PRN Meds: sodium chloride 
AM    IBILI see below 05/10/2020 05:34 AM      Phosphorus: No results found for: \"PHOS\"    ASSESSMENT:  Principal Problem:    Intertrochanteric fracture of right femur, closed, initial encounter ([facility])  Resolved Problems:    Closed left hip fracture, initial encounter ([facility])      PLAN:  Hyponatremia Most likely Ct ETOH abuse ( bear potomania )   Meds ( SSRI) . Will do w/u . Check Ser ,Ur Osm ,UA,Ur lytes ,TSH,cortisol . IVF adjusted .  Electrolytes ds S/p supp . Recheck K/Mg/Ionized Ca .   Keep K. 3.8 ,Mg > 1.7 ,Ionized Ca > 1.2  Comminuted intertrochanteric fracture of right femoral neck S/p surgery.  Acute on chronic anemia w/u initiated .   Paroxysmal atrial fibrillation  Cards on board .   Ch Alcohol use .Admits to 4-5 beers daily . Supportive care per team.  Severe protein calorie malnutrition Albumin 1.1 Dietary eval p .    Brian Weiss MD, FACP   
Patient/Caregiver education & training, Safety education & training, ADL/Self-care training, Positioning, Therapeutic activities  Safety Devices  Type of Devices: Left in chair, Call light within reach, Nurse notified, Gait belt, Patient at risk for falls, Chair alarm in place     Restrictions  Restrictions/Precautions  Restrictions/Precautions: Weight Bearing, Fall Risk  Lower Extremity Weight Bearing Restrictions  Right Lower Extremity Weight Bearing: Weight Bearing As Tolerated  Position Activity Restriction  Other position/activity restrictions: 7-15-24 2L O2 via NC.     Subjective   General  Chart Reviewed: Yes  Patient assessed for rehabilitation services?: Yes  Additional Pertinent Hx: Per Dr. Man Op note 7-12-24:  \"This is a 54-year-old white female with a BMI of 14, who is malnourished, also history of alcohol use, who sustained a fall with right hip pain.  She was seen initially at [facility].  She was found to have an intertrochanteric displaced fracture.\" CT head negative. Presents with acute on chronic anemia, severe electrolyte abnormalities. On 7-12-24 Dr Man performs locked gamma nailing. WBAT. Blood transfusion pending 7-13-24 for Hg 6.3.  PMHx as noted, including macrocytic anemia, A Fib, PE. On 7-12-24 Dr Man performs locked gamma nailing. WBAT.  PMHx as noted, including macrocytic anemia, A Fib, PE.  Response To Previous Treatment: Patient with no complaints from previous session.  Family / Caregiver Present: No  Follows Commands: Within Functional Limits  Subjective  Subjective: to room along with OT to patient resting in bed - breakfast partially completed - she is agreeable to PTOT session for attempt to mobilize from bed via triston bustillo - rates pain at 7/10 at rest         Social/Functional History  Social/Functional History  Lives With: Alone  Type of Home: House  Home Layout: One level, Laundry in basement  Home Access: Stairs to enter without rails  Entrance Stairs - 
tablet 250 mg, 250 mg, Oral, Daily  dilTIAZem (CARDIZEM CD) extended release capsule 180 mg, 180 mg, Oral, Daily  [Held by provider] atenolol (TENORMIN) tablet 25 mg, 25 mg, Oral, Daily  0.9 % sodium chloride infusion, , IntraVENous, PRN  sodium chloride flush 0.9 % injection 5-40 mL, 5-40 mL, IntraVENous, 2 times per day  sodium chloride flush 0.9 % injection 5-40 mL, 5-40 mL, IntraVENous, PRN  0.9 % sodium chloride infusion, , IntraVENous, PRN  LORazepam (ATIVAN) tablet 1 mg, 1 mg, Oral, Q1H PRN **OR** LORazepam (ATIVAN) injection 1 mg, 1 mg, IntraVENous, Q1H PRN **OR** LORazepam (ATIVAN) tablet 2 mg, 2 mg, Oral, Q1H PRN **OR** LORazepam (ATIVAN) injection 2 mg, 2 mg, IntraVENous, Q1H PRN **OR** LORazepam (ATIVAN) tablet 3 mg, 3 mg, Oral, Q1H PRN **OR** LORazepam (ATIVAN) injection 3 mg, 3 mg, IntraVENous, Q1H PRN **OR** LORazepam (ATIVAN) tablet 4 mg, 4 mg, Oral, Q1H PRN **OR** LORazepam (ATIVAN) injection 4 mg, 4 mg, IntraVENous, Q1H PRN  folic acid (FOLVITE) tablet 1 mg, 1 mg, Oral, Daily  thiamine mononitrate tablet 100 mg, 100 mg, Oral, Daily  enoxaparin Sodium (LOVENOX) injection 30 mg, 30 mg, SubCUTAneous, Daily  oxyCODONE (ROXICODONE) immediate release tablet 5 mg, 5 mg, Oral, Q4H PRN  potassium chloride (KLOR-CON M) extended release tablet 40 mEq, 40 mEq, Oral, PRN **OR** potassium bicarb-citric acid (EFFER-K) effervescent tablet 40 mEq, 40 mEq, Oral, PRN **OR** potassium chloride 10 mEq/100 mL IVPB (Peripheral Line), 10 mEq, IntraVENous, PRN  magnesium sulfate 2000 mg in 50 mL IVPB premix, 2,000 mg, IntraVENous, PRN  ondansetron (ZOFRAN-ODT) disintegrating tablet 4 mg, 4 mg, Oral, Q8H PRN **OR** ondansetron (ZOFRAN) injection 4 mg, 4 mg, IntraVENous, Q6H PRN  polyethylene glycol (GLYCOLAX) packet 17 g, 17 g, Oral, Daily PRN  acetaminophen (TYLENOL) tablet 650 mg, 650 mg, Oral, Q6H PRN **OR** acetaminophen (TYLENOL) suppository 650 mg, 650 mg, Rectal, Q6H PRN  morphine (PF) injection 2 mg, 2 mg, 
performs locked gamma nailing. WBAT. PMHx as noted, including macrocytic anemia, A Fib, PE.  Family / Caregiver Present: No  Referring Practitioner: Simba Man MD  Diagnosis: R femurx fx  Subjective  Subjective: Pt seen bedside and agreeable to therapy with significant encouragement. Pt reporting 10/10 pain at rest  General Comment  Comments: per RN ok for therapy despite low H&H with plans to transfuse later today.     Social/Functional History  Social/Functional History  Lives With: Alone  Type of Home: House  Home Layout: One level, Laundry in basement  Home Access: Stairs to enter without rails  Entrance Stairs - Number of Steps: 3 EDWIN house with no rail; 3 steps with rail down to sunken living room; flight to basement  Bathroom Shower/Tub: Walk-in shower  Bathroom Toilet: Standard (vanity close by)  Bathroom Equipment: Built-in shower seat  Home Equipment: None  Has the patient had two or more falls in the past year or any fall with injury in the past year?: Yes (4x in the past year)  ADL Assistance: Independent  Homemaking Assistance: Independent  Ambulation Assistance: Independent (no AD)  Transfer Assistance: Independent  Active : No (neighbor)  Occupation: On disability       Objective   Safety Devices  Type of Devices: Left in chair;Call light within reach;Nurse notified;Gait belt;Left in bed;Bed alarm in place           ADL  Toileting: Dependent/Total  Toileting Skilled Clinical Factors: ashton  Functional Mobility Skilled Clinical Factors: Pt stood to stedy LIFT with min/mod A. Transfer to chair deferred d/t low H&H.  Additional Comments: Anticipate pt will require mod A for LB bathing/dressing, SBA for UB bathing/dressing and grooming when seated based on balance and pain tolerance observed        Bed mobility  Supine to Sit: Minimal assistance  Sit to Supine: Minimal assistance    Transfers  Sit to stand: Minimal assistance  Stand to sit: Minimal assistance  Transfer Comments: stood from bed 
assistance  Sit to Supine: Unable to assess  Scooting: Contact guard assistance;Stand by assistance  Transfers  Sit to stand: Minimal assistance  Stand to sit: Minimal assistance  Transfer Comments: Min A for sit<>stand from EOB to RW to commode to recliner chair with cues for hand placement. Functional  mobility with RW with Min A, slow, slightly unsteady gait, narrow ERNST, no overt LOB noted. Required assist for walker management  Vision  Vision: Impaired  Vision Exceptions: Wears glasses at all times  Hearing  Hearing: Within functional limits  Cognition  Overall Cognitive Status: Exceptions  Following Commands: Follows one step commands consistently  Attention Span: Attends with cues to redirect  Memory: Decreased recall of precautions;Decreased recall of recent events  Insights: Decreased awareness of deficits  Initiation: Requires cues for some  Cognition Comment: dec insight/awareness of importance of mobility following surgery  Orientation  Overall Orientation Status: Within Functional Limits               Static Sitting Balance Exercises: SBA at EOB in prep for tx  Static Standing Balance Exercises: Stood with RW with CGA for ADL tasks  Education Given To: Patient  Education Provided: Role of Therapy;Transfer Training;Plan of Care;ADL Adaptive Strategies          AM-PAC - ADL  AM-PAC Daily Activity - Inpatient   How much help is needed for putting on and taking off regular lower body clothing?: A Lot  How much help is needed for bathing (which includes washing, rinsing, drying)?: A Lot  How much help is needed for toileting (which includes using toilet, bedpan, or urinal)?: A Lot  How much help is needed for putting on and taking off regular upper body clothing?: A Little  How much help is needed for taking care of personal grooming?: A Little  How much help for eating meals?: None  AM-[facility] Inpatient Daily Activity Raw Score: 16  AM-PAC Inpatient ADL T-Scale Score : 35.96  ADL Inpatient CMS 0-100% Score: 
sounds.  Musculoskeletal: No BLE edema, no significant ecchymoses over right hip. Dressings clean and dry. Pain with palpation to right hip or movement of RLE.  Neurologic:  Face symmetrical, speech clear, moves all 4 extremities, sensations are intact bilaterally   Psychiatric: Alert and oriented, thought content appropriate, normal insight        Labs:   Recent Labs     07/11/24  1350 07/11/24 2057 07/12/24  0322 07/13/24  0600   WBC 10.6  --  9.3 9.7   HGB 7.3* 9.6* 8.7* 6.3*   HCT 21.3* 28.5* 25.3* 19.1*     --  214 179     Recent Labs     07/12/24 0322 07/12/24  1255 07/12/24  1939 07/13/24  0312 07/13/24  0522 07/13/24  0600   * 128*   < > 124* 123* 123*   K 4.1 3.9  --   --   --  4.0  4.0   CL 96* 97*  --   --   --  93*   CO2 25 23  --   --   --  26   BUN 3* 4*  --   --   --  2*   CREATININE <0.5* <0.5*  --   --   --  <0.5*   CALCIUM 7.2* 7.2*  --   --   --  7.2*   PHOS  --  3.4  --   --   --  2.4*    < > = values in this interval not displayed.     Recent Labs     07/11/24  1350 07/13/24  0600   AST 19 21   ALT 12 17   BILIDIR  --  <0.2   BILITOT <0.2 0.3   ALKPHOS 81 113     Recent Labs     07/12/24  0322   INR 1.17*     Recent Labs     07/11/24  1350   CKTOTAL 30       Urinalysis:      Lab Results   Component Value Date/Time    NITRU Negative 07/11/2024 01:38 PM    WBCUA 0-2 07/12/2015 01:16 PM    BACTERIA Rare 07/12/2015 01:16 PM    RBCUA 0-2 07/12/2015 01:16 PM    BLOODU Negative 07/11/2024 01:38 PM    GLUCOSEU Negative 07/11/2024 01:38 PM       Radiology:  [facility] FOR SURGICAL PROCEDURES   Final Result      XR HIP 2-3 VW W PELVIS RIGHT   Final Result              DVT Prophylaxis: Lovenox  Diet: ADULT DIET; Regular  ADULT ORAL NUTRITION SUPPLEMENT; Breakfast, Dinner, Lunch; Standard High Calorie/High Protein Oral Supplement  Code Status: Full Code    PT/OT Eval Status: Recommend SNF     Dispo: SNF when medically stable, likely 1-2 days    Mima Armando PA-C      NOTE:  This report was 
Total  How much help is needed standing up from a chair using your arms?: A Lot  How much help is needed walking in hospital room?: Total  How much help is needed climbing 3-5 steps with a railing?: Total  AM-PAC Inpatient Mobility Raw Score : 11  AM-PAC Inpatient T-Scale Score : 33.86  Mobility Inpatient CMS 0-100% Score: 72.57  Mobility Inpatient CMS G-Code Modifier : CL    Goals  Short Term Goals  Time Frame for Short Term Goals: By acute DC  Short Term Goal 1: Bed Mobility CGA  Short Term Goal 2: Transfers to  CGA  Short Term Goal 3: Wh walker gait 30' Min-CGA.  Short Term Goal 4: Tolerates B LE exs x 10 each  Patient Goals   Patient Goals : None stated.       Education  Patient Education  Education Given To: Patient  Education Provided: Role of Therapy;Plan of Care;Transfer Training  Education Method: Demonstration;Verbal  Education Outcome: Verbalized understanding;Demonstrated understanding      Therapy Time   Individual Concurrent Group Co-treatment   Time In       0905   Time Out       0940   Minutes       35      Ev 15 TA 20    Ce Jolly, PT  Electronically signed by CE JOLLY, ALIE [id number] [phone])  on 7/13/2024 at 3:01 PM        
rinsing, drying)?: A Lot  How much help is needed for toileting (which includes using toilet, bedpan, or urinal)?: Total  How much help is needed for putting on and taking off regular upper body clothing?: A Little  How much help is needed for taking care of personal grooming?: A Little  How much help for eating meals?: None  AM-PAC Inpatient Daily Activity Raw Score: 15  AM-PAC Inpatient ADL T-Scale Score : 34.69  ADL Inpatient CMS 0-100% Score: 56.46  ADL Inpatient CMS G-Code Modifier : CK    Goals  Short Term Goals  Time Frame for Short Term Goals: Prior to DC: ongoing 7/15  Short Term Goal 1: Pt will complete ADL transfer/mobility with SBA  Short Term Goal 2: Pt will tolerate standing > 3 min for functional task with SBA  Short Term Goal 3: Pt will complete toileting with SBA  Short Term Goal 4: Pt will complete LB dressing with SBA  Short Term Goal 5: Pt will complete bed mobility in prep for ADL transfers with SBA  Patient Goals   Patient goals : to get stronger and return home       Therapy Time   Individual Concurrent Group Co-treatment   Time In 0750         Time Out 0830         Minutes 40         Timed Code Treatment Minutes: 40 Minutes (TA)       LIBIA Moore, OTR/L

## 2024-07-22 ENCOUNTER — TELEPHONE (OUTPATIENT)
Dept: ORTHOPEDIC SURGERY | Age: 54
End: 2024-07-22

## 2024-07-22 NOTE — TELEPHONE ENCOUNTER
Appointment Request     Patient requesting earlier appointment: Yes  Appointment offered to patient: 08/13 [place]    Patient Contact Number: [phone]    EMMETT WITH [facility] WOULD LIKE A CALL BACK TO HAVE PATIENT WORKED IN AT [facility]

## 2024-07-29 ENCOUNTER — TELEPHONE (OUTPATIENT)
Dept: PULMONOLOGY | Age: 54
End: 2024-07-29

## 2024-07-29 NOTE — TELEPHONE ENCOUNTER
Patient did not show for follow up  with Jean-Pierre Olson on 7/29/24.    Reason:  na    This is patient's first no show.  Patient was ano show on: na.      Patient did not reschedule.  Reschedule date:  na    No  set up

## 2024-07-30 ENCOUNTER — OFFICE VISIT (OUTPATIENT)
Dept: ORTHOPEDIC SURGERY | Age: 54
End: 2024-07-30

## 2024-07-30 VITALS — BODY MASS INDEX: 17.67 KG/M2 | WEIGHT: 90 LBS | HEIGHT: 60 IN

## 2024-07-30 DIAGNOSIS — S72.141A INTERTROCHANTERIC FRACTURE OF RIGHT FEMUR, CLOSED, INITIAL ENCOUNTER (HCC): Primary | ICD-10-CM

## 2024-07-30 PROCEDURE — 99024 POSTOP FOLLOW-UP VISIT: CPT | Performed by: NURSE PRACTITIONER

## 2024-07-30 PROCEDURE — APPNB15 APP NON BILLABLE TIME 0-15 MINS: Performed by: NURSE PRACTITIONER

## 2024-07-30 NOTE — PROGRESS NOTES
DIAGNOSIS:  Right intertrochanteric femur comminuted fracture, status post Gamma nail.    DATE OF SURGERY:  7/12/2024 .    HISTORY OF PRESENT ILLNESS: Ms. Serrano 54 y.o.  female  who came in today for 2 weeks postoperative visit.  The patient denies any significant pain in the right hip. Rates pain a 8/10 VAS moderate, aching, constant and show no change. Aggravating factors walking. Alleviating factors rest.  She has been working on ROM and WBAT.  No numbness or tingling sensation. No fever or Chills. She has a h/o heavy alcohol use. She is a smoker. She is in a rehab center working with PT. She is on SSI.     PHYSICAL EXAMINATION:  The incision is healed well, right hip.  No signs of any erythema or drainage.  She has no pain with the active or passive range of motion of the right hip.  She has intact sensation, distally, and is neurovascularly intact.    IMAGING:  Two views right hip and femur, and AP pelvis taken today in the office showed anatomic alignment of the intertrochanteric fracture, Gamma nail in good position, no loosening, or hardware failure.      IMPRESSION:  2 weeks out from right Gamma nail intertrochanteric femur comminuted fracture and doing very well.    PLAN:  I have told the patient to work on ROM with  PT, WBAT as well as strengthening exercises. I discussed with the patient the risks and benefits of staple removal and they agreed to proceed. Tolerated well. Incisional care provided.   The patient will come back for a follow up in 6 weeks.  At that time, we will take two views right hip, and AP pelvis.    The patient smokes cigarettes, and we discussed with the patient the risks of smoking on general health and also on bone and soft tissue healing (delay and non-union), and promised to cut down or stop smoking.     Smoking: Educated the patient regarding the hazards of smoking and that it harms their body in many ways. It increases the chance of developing heart disease, lung

## 2024-08-02 DIAGNOSIS — S72.141A INTERTROCHANTERIC FRACTURE OF RIGHT FEMUR, CLOSED, INITIAL ENCOUNTER (HCC): Primary | ICD-10-CM

## 2024-08-02 RX ORDER — HYDROCODONE BITARTRATE AND ACETAMINOPHEN 5; 325 MG/1; MG/1
1 TABLET ORAL EVERY 8 HOURS PRN
Qty: 15 TABLET | Refills: 0 | Status: SHIPPED | OUTPATIENT
Start: 2024-08-02 | End: 2024-08-07

## 2024-08-02 NOTE — TELEPHONE ENCOUNTER
7/12/24 Oxycodone 5 mg QT: 30 written by Leila SAGASTUME 7/12/24    No pain med prescriptions provided by [facility] or [facility] [place] [place] yet. Providers are not in office today. Per 7/30 note, patient is in rehab facility with PT.

## 2024-08-02 NOTE — TELEPHONE ENCOUNTER
Spoke with Bonny. She stated that she was discharged from the rehab center and does not have any pain medication. Notified her that both providers are out of office, but that I will try to get a message to Beth regarding a prescription. Patient understands that a prescription might not be possible until next week. Patient was advised that the oxycodone is not something either provider would prescribe and she understands.

## 2024-08-02 NOTE — TELEPHONE ENCOUNTER
Prescription Refill     Medication Name:  OXYCODONE  Pharmacy: [facility]  Patient Contact Number:  [phone]   REQUESTING A CALL BACK

## 2024-08-09 ENCOUNTER — APPOINTMENT (OUTPATIENT)
Dept: CT IMAGING | Age: 54
DRG: 813 | End: 2024-08-09
Payer: MEDICAID

## 2024-08-09 ENCOUNTER — HOSPITAL ENCOUNTER (INPATIENT)
Age: 54
LOS: 3 days | Discharge: NURSING, REHAB OR LONG-TERM CARE | DRG: 813 | End: 2024-08-12
Attending: EMERGENCY MEDICINE | Admitting: STUDENT IN AN ORGANIZED HEALTH CARE EDUCATION/TRAINING PROGRAM
Payer: MEDICAID

## 2024-08-09 DIAGNOSIS — E83.42 HYPOMAGNESEMIA: ICD-10-CM

## 2024-08-09 DIAGNOSIS — R60.0 PERIPHERAL EDEMA: Primary | ICD-10-CM

## 2024-08-09 DIAGNOSIS — R91.8 MASS OF UPPER LOBE OF RIGHT LUNG: ICD-10-CM

## 2024-08-09 DIAGNOSIS — E87.1 HYPONATREMIA: ICD-10-CM

## 2024-08-09 DIAGNOSIS — I77.6 VASCULITIS (HCC): ICD-10-CM

## 2024-08-09 DIAGNOSIS — Z87.81 HISTORY OF HIP FRACTURE: ICD-10-CM

## 2024-08-09 DIAGNOSIS — S72.141A INTERTROCHANTERIC FRACTURE OF RIGHT FEMUR, CLOSED, INITIAL ENCOUNTER (HCC): ICD-10-CM

## 2024-08-09 LAB
ALBUMIN SERPL-MCNC: 2.9 G/DL (ref 3.4–5)
ALBUMIN/GLOB SERPL: 0.8 {RATIO} (ref 1.1–2.2)
ALP SERPL-CCNC: 259 U/L (ref 40–129)
ALT SERPL-CCNC: 13 U/L (ref 10–40)
ANION GAP SERPL CALCULATED.3IONS-SCNC: 13 MMOL/L (ref 3–16)
AST SERPL-CCNC: 30 U/L (ref 15–37)
BILIRUB SERPL-MCNC: <0.2 MG/DL (ref 0–1)
BUN SERPL-MCNC: <2 MG/DL (ref 7–20)
CALCIUM SERPL-MCNC: 8.4 MG/DL (ref 8.3–10.6)
CHLORIDE SERPL-SCNC: 83 MMOL/L (ref 99–110)
CO2 SERPL-SCNC: 30 MMOL/L (ref 21–32)
CREAT SERPL-MCNC: <0.5 MG/DL (ref 0.6–1.1)
D-DIMER QUANTITATIVE: 2.06 UG/ML FEU (ref 0–0.6)
DEPRECATED RDW RBC AUTO: 16.6 % (ref 12.4–15.4)
GFR SERPLBLD CREATININE-BSD FMLA CKD-EPI: >90 ML/MIN/{1.73_M2}
GLUCOSE SERPL-MCNC: 82 MG/DL (ref 70–99)
HCT VFR BLD AUTO: 28.8 % (ref 36–48)
HGB BLD-MCNC: 9.6 G/DL (ref 12–16)
LACTATE BLDV-SCNC: 1.8 MMOL/L (ref 0.4–2)
MAGNESIUM SERPL-MCNC: 1.4 MG/DL (ref 1.8–2.4)
MCH RBC QN AUTO: 31.6 PG (ref 26–34)
MCHC RBC AUTO-ENTMCNC: 33.2 G/DL (ref 31–36)
MCV RBC AUTO: 95.3 FL (ref 80–100)
NT-PROBNP SERPL-MCNC: 88 PG/ML (ref 0–124)
PLATELET # BLD AUTO: 515 K/UL (ref 135–450)
PMV BLD AUTO: 6.4 FL (ref 5–10.5)
POTASSIUM SERPL-SCNC: 3.5 MMOL/L (ref 3.5–5.1)
PROT SERPL-MCNC: 6.4 G/DL (ref 6.4–8.2)
RBC # BLD AUTO: 3.02 M/UL (ref 4–5.2)
SODIUM SERPL-SCNC: 126 MMOL/L (ref 136–145)
TROPONIN, HIGH SENSITIVITY: 32 NG/L (ref 0–14)
TROPONIN, HIGH SENSITIVITY: 35 NG/L (ref 0–14)
WBC # BLD AUTO: 6.1 K/UL (ref 4–11)

## 2024-08-09 PROCEDURE — 6360000004 HC RX CONTRAST MEDICATION: Performed by: EMERGENCY MEDICINE

## 2024-08-09 PROCEDURE — 36415 COLL VENOUS BLD VENIPUNCTURE: CPT

## 2024-08-09 PROCEDURE — 6360000002 HC RX W HCPCS: Performed by: EMERGENCY MEDICINE

## 2024-08-09 PROCEDURE — 6370000000 HC RX 637 (ALT 250 FOR IP): Performed by: EMERGENCY MEDICINE

## 2024-08-09 PROCEDURE — 99285 EMERGENCY DEPT VISIT HI MDM: CPT

## 2024-08-09 PROCEDURE — 84484 ASSAY OF TROPONIN QUANT: CPT

## 2024-08-09 PROCEDURE — 71260 CT THORAX DX C+: CPT

## 2024-08-09 PROCEDURE — 83605 ASSAY OF LACTIC ACID: CPT

## 2024-08-09 PROCEDURE — 80053 COMPREHEN METABOLIC PANEL: CPT

## 2024-08-09 PROCEDURE — 2060000000 HC ICU INTERMEDIATE R&B

## 2024-08-09 PROCEDURE — 93005 ELECTROCARDIOGRAM TRACING: CPT | Performed by: EMERGENCY MEDICINE

## 2024-08-09 PROCEDURE — 2580000003 HC RX 258: Performed by: EMERGENCY MEDICINE

## 2024-08-09 PROCEDURE — 96374 THER/PROPH/DIAG INJ IV PUSH: CPT

## 2024-08-09 PROCEDURE — 85027 COMPLETE CBC AUTOMATED: CPT

## 2024-08-09 PROCEDURE — 83735 ASSAY OF MAGNESIUM: CPT

## 2024-08-09 PROCEDURE — 85379 FIBRIN DEGRADATION QUANT: CPT

## 2024-08-09 PROCEDURE — 83880 ASSAY OF NATRIURETIC PEPTIDE: CPT

## 2024-08-09 RX ORDER — POTASSIUM CHLORIDE 7.45 MG/ML
10 INJECTION INTRAVENOUS PRN
Status: DISCONTINUED | OUTPATIENT
Start: 2024-08-09 | End: 2024-08-12 | Stop reason: HOSPADM

## 2024-08-09 RX ORDER — ACETAMINOPHEN 650 MG/1
650 SUPPOSITORY RECTAL EVERY 6 HOURS PRN
Status: DISCONTINUED | OUTPATIENT
Start: 2024-08-09 | End: 2024-08-12 | Stop reason: HOSPADM

## 2024-08-09 RX ORDER — SODIUM CHLORIDE 0.9 % (FLUSH) 0.9 %
5-40 SYRINGE (ML) INJECTION PRN
Status: DISCONTINUED | OUTPATIENT
Start: 2024-08-09 | End: 2024-08-12 | Stop reason: HOSPADM

## 2024-08-09 RX ORDER — POTASSIUM CHLORIDE 750 MG/1
40 TABLET, EXTENDED RELEASE ORAL PRN
Status: DISCONTINUED | OUTPATIENT
Start: 2024-08-09 | End: 2024-08-12 | Stop reason: HOSPADM

## 2024-08-09 RX ORDER — MAGNESIUM SULFATE IN WATER 40 MG/ML
2000 INJECTION, SOLUTION INTRAVENOUS PRN
Status: DISCONTINUED | OUTPATIENT
Start: 2024-08-09 | End: 2024-08-12 | Stop reason: HOSPADM

## 2024-08-09 RX ORDER — LANOLIN ALCOHOL/MO/W.PET/CERES
400 CREAM (GRAM) TOPICAL DAILY
Status: DISCONTINUED | OUTPATIENT
Start: 2024-08-10 | End: 2024-08-09

## 2024-08-09 RX ORDER — ONDANSETRON 2 MG/ML
4 INJECTION INTRAMUSCULAR; INTRAVENOUS EVERY 6 HOURS PRN
Status: DISCONTINUED | OUTPATIENT
Start: 2024-08-09 | End: 2024-08-12 | Stop reason: HOSPADM

## 2024-08-09 RX ORDER — SODIUM CHLORIDE, SODIUM LACTATE, POTASSIUM CHLORIDE, AND CALCIUM CHLORIDE .6; .31; .03; .02 G/100ML; G/100ML; G/100ML; G/100ML
1000 INJECTION, SOLUTION INTRAVENOUS ONCE
Status: COMPLETED | OUTPATIENT
Start: 2024-08-09 | End: 2024-08-10

## 2024-08-09 RX ORDER — LANOLIN ALCOHOL/MO/W.PET/CERES
400 CREAM (GRAM) TOPICAL ONCE
Status: COMPLETED | OUTPATIENT
Start: 2024-08-09 | End: 2024-08-09

## 2024-08-09 RX ORDER — POLYETHYLENE GLYCOL 3350 17 G/17G
17 POWDER, FOR SOLUTION ORAL DAILY PRN
Status: DISCONTINUED | OUTPATIENT
Start: 2024-08-09 | End: 2024-08-12 | Stop reason: HOSPADM

## 2024-08-09 RX ORDER — ENOXAPARIN SODIUM 100 MG/ML
40 INJECTION SUBCUTANEOUS DAILY
Status: DISCONTINUED | OUTPATIENT
Start: 2024-08-10 | End: 2024-08-10

## 2024-08-09 RX ORDER — SODIUM CHLORIDE 0.9 % (FLUSH) 0.9 %
5-40 SYRINGE (ML) INJECTION EVERY 12 HOURS SCHEDULED
Status: DISCONTINUED | OUTPATIENT
Start: 2024-08-09 | End: 2024-08-12 | Stop reason: HOSPADM

## 2024-08-09 RX ORDER — DROPERIDOL 2.5 MG/ML
0.62 INJECTION, SOLUTION INTRAMUSCULAR; INTRAVENOUS EVERY 6 HOURS PRN
Status: DISCONTINUED | OUTPATIENT
Start: 2024-08-09 | End: 2024-08-09

## 2024-08-09 RX ORDER — ACETAMINOPHEN 325 MG/1
650 TABLET ORAL EVERY 6 HOURS PRN
Status: DISCONTINUED | OUTPATIENT
Start: 2024-08-09 | End: 2024-08-12 | Stop reason: HOSPADM

## 2024-08-09 RX ORDER — SODIUM CHLORIDE 9 MG/ML
INJECTION, SOLUTION INTRAVENOUS PRN
Status: DISCONTINUED | OUTPATIENT
Start: 2024-08-09 | End: 2024-08-12 | Stop reason: HOSPADM

## 2024-08-09 RX ORDER — ONDANSETRON 4 MG/1
4 TABLET, ORALLY DISINTEGRATING ORAL EVERY 8 HOURS PRN
Status: DISCONTINUED | OUTPATIENT
Start: 2024-08-09 | End: 2024-08-12 | Stop reason: HOSPADM

## 2024-08-09 RX ORDER — DROPERIDOL 2.5 MG/ML
0.62 INJECTION, SOLUTION INTRAMUSCULAR; INTRAVENOUS ONCE
Status: COMPLETED | OUTPATIENT
Start: 2024-08-09 | End: 2024-08-09

## 2024-08-09 RX ADMIN — SODIUM CHLORIDE, POTASSIUM CHLORIDE, SODIUM LACTATE AND CALCIUM CHLORIDE 1000 ML: 600; 310; 30; 20 INJECTION, SOLUTION INTRAVENOUS at 21:28

## 2024-08-09 RX ADMIN — Medication 400 MG: at 21:30

## 2024-08-09 RX ADMIN — DROPERIDOL 0.62 MG: 2.5 INJECTION, SOLUTION INTRAMUSCULAR; INTRAVENOUS at 20:53

## 2024-08-09 ASSESSMENT — LIFESTYLE VARIABLES
HOW MANY STANDARD DRINKS CONTAINING ALCOHOL DO YOU HAVE ON A TYPICAL DAY: PATIENT DOES NOT DRINK
HOW OFTEN DO YOU HAVE A DRINK CONTAINING ALCOHOL: NEVER

## 2024-08-10 LAB
ALBUMIN SERPL-MCNC: 2.9 G/DL (ref 3.4–5)
ALBUMIN/GLOB SERPL: 0.8 {RATIO} (ref 1.1–2.2)
ALP SERPL-CCNC: 272 U/L (ref 40–129)
ALT SERPL-CCNC: 11 U/L (ref 10–40)
ANION GAP SERPL CALCULATED.3IONS-SCNC: 9 MMOL/L (ref 3–16)
AST SERPL-CCNC: 23 U/L (ref 15–37)
BASOPHILS # BLD: 0.1 K/UL (ref 0–0.2)
BASOPHILS NFR BLD: 1 %
BILIRUB SERPL-MCNC: <0.2 MG/DL (ref 0–1)
BUN SERPL-MCNC: 3 MG/DL (ref 7–20)
CALCIUM SERPL-MCNC: 8 MG/DL (ref 8.3–10.6)
CHLORIDE SERPL-SCNC: 93 MMOL/L (ref 99–110)
CO2 SERPL-SCNC: 32 MMOL/L (ref 21–32)
CREAT SERPL-MCNC: <0.5 MG/DL (ref 0.6–1.1)
DEPRECATED RDW RBC AUTO: 16.8 % (ref 12.4–15.4)
EKG ATRIAL RATE: 107 BPM
EKG DIAGNOSIS: NORMAL
EKG P AXIS: 87 DEGREES
EKG P-R INTERVAL: 140 MS
EKG Q-T INTERVAL: 368 MS
EKG QRS DURATION: 84 MS
EKG QTC CALCULATION (BAZETT): 491 MS
EKG R AXIS: 92 DEGREES
EKG T AXIS: 82 DEGREES
EKG VENTRICULAR RATE: 107 BPM
EOSINOPHIL # BLD: 0.1 K/UL (ref 0–0.6)
EOSINOPHIL NFR BLD: 2.1 %
GFR SERPLBLD CREATININE-BSD FMLA CKD-EPI: >90 ML/MIN/{1.73_M2}
GLUCOSE SERPL-MCNC: 92 MG/DL (ref 70–99)
HCT VFR BLD AUTO: 33.6 % (ref 36–48)
HGB BLD-MCNC: 11.2 G/DL (ref 12–16)
LYMPHOCYTES # BLD: 0.8 K/UL (ref 1–5.1)
LYMPHOCYTES NFR BLD: 15.1 %
MCH RBC QN AUTO: 31.5 PG (ref 26–34)
MCHC RBC AUTO-ENTMCNC: 33.2 G/DL (ref 31–36)
MCV RBC AUTO: 94.9 FL (ref 80–100)
MONOCYTES # BLD: 0.5 K/UL (ref 0–1.3)
MONOCYTES NFR BLD: 8.5 %
NEUTROPHILS # BLD: 4.1 K/UL (ref 1.7–7.7)
NEUTROPHILS NFR BLD: 73.3 %
PLATELET # BLD AUTO: 532 K/UL (ref 135–450)
PMV BLD AUTO: 6.2 FL (ref 5–10.5)
POTASSIUM SERPL-SCNC: 3.8 MMOL/L (ref 3.5–5.1)
PROT SERPL-MCNC: 6.7 G/DL (ref 6.4–8.2)
RBC # BLD AUTO: 3.54 M/UL (ref 4–5.2)
SODIUM SERPL-SCNC: 134 MMOL/L (ref 136–145)
WBC # BLD AUTO: 5.6 K/UL (ref 4–11)

## 2024-08-10 PROCEDURE — 6360000002 HC RX W HCPCS: Performed by: STUDENT IN AN ORGANIZED HEALTH CARE EDUCATION/TRAINING PROGRAM

## 2024-08-10 PROCEDURE — 85025 COMPLETE CBC W/AUTO DIFF WBC: CPT

## 2024-08-10 PROCEDURE — 80053 COMPREHEN METABOLIC PANEL: CPT

## 2024-08-10 PROCEDURE — 6370000000 HC RX 637 (ALT 250 FOR IP): Performed by: STUDENT IN AN ORGANIZED HEALTH CARE EDUCATION/TRAINING PROGRAM

## 2024-08-10 PROCEDURE — 94640 AIRWAY INHALATION TREATMENT: CPT

## 2024-08-10 PROCEDURE — 2060000000 HC ICU INTERMEDIATE R&B

## 2024-08-10 PROCEDURE — 93010 ELECTROCARDIOGRAM REPORT: CPT | Performed by: INTERNAL MEDICINE

## 2024-08-10 PROCEDURE — 2700000000 HC OXYGEN THERAPY PER DAY

## 2024-08-10 PROCEDURE — 36415 COLL VENOUS BLD VENIPUNCTURE: CPT

## 2024-08-10 PROCEDURE — 94761 N-INVAS EAR/PLS OXIMETRY MLT: CPT

## 2024-08-10 PROCEDURE — 2580000003 HC RX 258: Performed by: STUDENT IN AN ORGANIZED HEALTH CARE EDUCATION/TRAINING PROGRAM

## 2024-08-10 RX ORDER — BUDESONIDE AND FORMOTEROL FUMARATE DIHYDRATE 160; 4.5 UG/1; UG/1
2 AEROSOL RESPIRATORY (INHALATION) 2 TIMES DAILY
Status: DISCONTINUED | OUTPATIENT
Start: 2024-08-10 | End: 2024-08-12 | Stop reason: HOSPADM

## 2024-08-10 RX ORDER — ATORVASTATIN CALCIUM 10 MG/1
10 TABLET, FILM COATED ORAL NIGHTLY
Status: DISCONTINUED | OUTPATIENT
Start: 2024-08-10 | End: 2024-08-12 | Stop reason: HOSPADM

## 2024-08-10 RX ORDER — DILTIAZEM HYDROCHLORIDE 120 MG/1
240 CAPSULE, COATED, EXTENDED RELEASE ORAL DAILY
Status: DISCONTINUED | OUTPATIENT
Start: 2024-08-10 | End: 2024-08-12 | Stop reason: HOSPADM

## 2024-08-10 RX ORDER — SODIUM CHLORIDE 9 MG/ML
INJECTION, SOLUTION INTRAVENOUS CONTINUOUS
Status: DISCONTINUED | OUTPATIENT
Start: 2024-08-10 | End: 2024-08-10

## 2024-08-10 RX ORDER — FUROSEMIDE 10 MG/ML
40 INJECTION INTRAMUSCULAR; INTRAVENOUS 2 TIMES DAILY
Status: COMPLETED | OUTPATIENT
Start: 2024-08-10 | End: 2024-08-11

## 2024-08-10 RX ORDER — PANTOPRAZOLE SODIUM 40 MG/1
40 TABLET, DELAYED RELEASE ORAL
Status: DISCONTINUED | OUTPATIENT
Start: 2024-08-10 | End: 2024-08-12 | Stop reason: HOSPADM

## 2024-08-10 RX ORDER — GABAPENTIN 300 MG/1
300 CAPSULE ORAL DAILY
Status: DISCONTINUED | OUTPATIENT
Start: 2024-08-10 | End: 2024-08-12 | Stop reason: HOSPADM

## 2024-08-10 RX ORDER — ATENOLOL 25 MG/1
25 TABLET ORAL DAILY
Status: DISCONTINUED | OUTPATIENT
Start: 2024-08-10 | End: 2024-08-12 | Stop reason: HOSPADM

## 2024-08-10 RX ADMIN — PANCRELIPASE LIPASE, PANCRELIPASE PROTEASE, PANCRELIPASE AMYLASE 5000 UNITS: 5000; 17000; 24000 CAPSULE, DELAYED RELEASE ORAL at 16:43

## 2024-08-10 RX ADMIN — SODIUM CHLORIDE: 9 INJECTION, SOLUTION INTRAVENOUS at 05:08

## 2024-08-10 RX ADMIN — FUROSEMIDE 40 MG: 10 INJECTION, SOLUTION INTRAMUSCULAR; INTRAVENOUS at 08:12

## 2024-08-10 RX ADMIN — ATORVASTATIN CALCIUM 10 MG: 10 TABLET, FILM COATED ORAL at 20:16

## 2024-08-10 RX ADMIN — PANCRELIPASE LIPASE, PANCRELIPASE PROTEASE, PANCRELIPASE AMYLASE 5000 UNITS: 5000; 17000; 24000 CAPSULE, DELAYED RELEASE ORAL at 08:13

## 2024-08-10 RX ADMIN — BUDESONIDE AND FORMOTEROL FUMARATE DIHYDRATE 2 PUFF: 160; 4.5 AEROSOL RESPIRATORY (INHALATION) at 07:29

## 2024-08-10 RX ADMIN — ATENOLOL 25 MG: 25 TABLET ORAL at 08:12

## 2024-08-10 RX ADMIN — Medication 10 ML: at 08:20

## 2024-08-10 RX ADMIN — BUDESONIDE AND FORMOTEROL FUMARATE DIHYDRATE 2 PUFF: 160; 4.5 AEROSOL RESPIRATORY (INHALATION) at 20:14

## 2024-08-10 RX ADMIN — ACETAMINOPHEN 650 MG: 325 TABLET ORAL at 19:18

## 2024-08-10 RX ADMIN — TIOTROPIUM BROMIDE INHALATION SPRAY 2 PUFF: 3.12 SPRAY, METERED RESPIRATORY (INHALATION) at 07:29

## 2024-08-10 RX ADMIN — DILTIAZEM HYDROCHLORIDE 240 MG: 120 CAPSULE, COATED, EXTENDED RELEASE ORAL at 08:11

## 2024-08-10 RX ADMIN — APIXABAN 2.5 MG: 5 TABLET, FILM COATED ORAL at 08:12

## 2024-08-10 RX ADMIN — APIXABAN 2.5 MG: 5 TABLET, FILM COATED ORAL at 20:15

## 2024-08-10 RX ADMIN — FUROSEMIDE 40 MG: 10 INJECTION, SOLUTION INTRAMUSCULAR; INTRAVENOUS at 16:43

## 2024-08-10 RX ADMIN — PANTOPRAZOLE SODIUM 40 MG: 40 TABLET, DELAYED RELEASE ORAL at 08:11

## 2024-08-10 RX ADMIN — GABAPENTIN 300 MG: 300 CAPSULE ORAL at 08:11

## 2024-08-10 RX ADMIN — Medication 10 ML: at 20:16

## 2024-08-10 ASSESSMENT — PAIN SCALES - GENERAL
PAINLEVEL_OUTOF10: 5
PAINLEVEL_OUTOF10: 5

## 2024-08-10 ASSESSMENT — PAIN DESCRIPTION - LOCATION
LOCATION: HIP
LOCATION: HIP

## 2024-08-10 NOTE — ED PROVIDER NOTES
[facility]  EMERGENCY DEPARTMENT ENCOUNTER      Pt Name: Bonny Serrano  MRN: [id number]  Birthdate 1970  Date of evaluation: 8/9/2024  Provider: Laura Montes MD    CHIEF COMPLAINT       Chief Complaint   Patient presents with    Leg Swelling     Bilateral leg and feet swelling, pain in hips and feet since hip surgery.           HISTORY OF PRESENT ILLNESS   (Location/Symptom, Timing/Onset, Context/Setting, Quality, Duration, Modifying Factors, Severity)  Note limiting factors.   Bonny Serrano is a 54 y.o. female who presents to the emergency department with bilateral feet swelling and pain.  She had right hip replacement at [facility] and notes that she has had bilateral foot swelling and redness.  The redness started yesterday.  No shortness of breath or chest pain.      This patient is at risk for a communicable infection. Therefore, personal protection equipment consisting of a mask and gloves worn for the exam.     Nursing Notes were reviewed.    REVIEW OF SYSTEMS    (2-9 systems for level 4, 10 or more for level 5)     As per HPI    Except as noted above the remainder of the review of systems was reviewed and negative.       PAST MEDICAL HISTORY     Past Medical History:   Diagnosis Date    A-fib (HCC)     Anemia     Anxiety     Chronic pancreatitis (HCC)     Collapse of right lung     COPD (chronic obstructive pulmonary disease) (HCC)     COPD (chronic obstructive pulmonary disease) (HCC)     Depression     GI bleed     H/O colonoscopy     Hypertension     Pancreatitis     Pulmonary embolism (HCC) 02/2018         SURGICAL HISTORY       Past Surgical History:   Procedure Laterality Date    HIP SURGERY Right 7/12/2024    RIGHT FEMUR GAMMA NAILING performed by Simba Man MD at [facility] OR    UPPER GASTROINTESTINAL ENDOSCOPY           CURRENT MEDICATIONS       Current Discharge Medication List        CONTINUE these medications which have NOT CHANGED    Details   dilTIAZem (TIAZAC) 180 MG extended  CARE TIME   none    CONSULTS:  IP CONSULT TO HOSPITALIST  IP CONSULT TO SOCIAL WORK    PROCEDURES:  Unless otherwise noted below, none     Procedures        FINAL IMPRESSION      1. Peripheral edema    2. Vasculitis (HCC)    3. Hyponatremia    4. Hypomagnesemia          DISPOSITION/PLAN   DISPOSITION Admitted 08/09/2024 11:38:06 PM      PATIENT REFERRED TO:  No follow-up provider specified.    DISCHARGE MEDICATIONS:  Current Discharge Medication List        Controlled Substances Monitoring:     RX Monitoring Attestation   5/27/2019   5:10 PM The Prescription Monitoring Report for this patient was reviewed today.       (Please note that portions of this note were completed with a voice recognition program.  Efforts were made to edit the dictations but occasionally words are mis-transcribed.)    Laura Montes MD (electronically signed)  Attending Emergency Physician            Laura Montes MD  08/10/24 [id number]       Laura Montes MD  08/16/24 [id number]       Laura Montes MD  08/16/24 [id number]

## 2024-08-10 NOTE — CARE COORDINATION
Met with patient and daughter at bedside. The patient is interested in going back to  [facility] for more therapy. Dustin SMITH will order OT/PT.     Provided [facility] on Aging information for family.  M for Jo Ann at [facility] to discuss possible referral.    Met with patient and her brother to answer questions. The brother is concerned that the patient is currently unable to get around by herself at all and she lives alone.  The brother is also concerned that the patient is an alcoholic and feels she cannot live where she has access to alcohol. The patient orders alcohol from [employer].    JAYESH is attempting to send the patient back to rehab however she may not qualify. JAYESH asked the patient if she would be willing to go long term care for a period of time - that could be for any period of time until she is stronger and is able to care for herself. At that time she could leave the facility to live on her own again.     The patient understands the state will take her disability check and will give her back a sum of money. Both the patient and the uncle are in agreement with long term care.    JAYESH spoke with J oAnn/[facility]. Jo Ann advised the patient was at [facility] from July 18th until July 31st. Jo Ann is willing to have patient looked at for long term care. Per Jo Ann after two years of being on disability you can try to get Medicare. [facility] can help with this process.     Jo Ann will call CM back with an update.

## 2024-08-10 NOTE — PROGRESS NOTES
Patient stable.  Doppler will be completed Monday.  Medical team and Family updated. Case management found placement for patient at discharge. Call light within reach.

## 2024-08-10 NOTE — H&P
Hospital Medicine History & Physical      Date of Admission: 8/9/2024    Date of Service:  Pt seen/examined on 8/9/2024    [x]Admitted to Inpatient with expected LOS greater than two midnights due to medical therapy.  []Placed in Observation status.    Chief Admission Complaint: Lower extremity swelling    Presenting Admission History:      54 y.o. female with past medical history of hypertension, hyperlipidemia, neuropathy, pancreatic insufficiency, GERD, COPD presented to [facility] as transfer from [facility] where she presented for bilateral lower extremity swelling.  Patient states that she recently had right hip replacement at [facility] and shortly after returning home she noticed that she had lower extremity swelling.  States that the swelling has been progressive since discharge and then redness started yesterday.  When the redness worsened today she came into emergency department for evaluation.  Patient was discharged on Eliquis for DVT prophylaxis after hip replacement.  Patient denies rest of review of systems.    Assessment/Plan:      Current Principal Problem:  Bilateral lower extremity edema    Bilateral lower extremity edema  - Status post right hip replacement; was discharged with twice daily Eliquis for DVT prophylaxis  - D-dimer elevated, will obtain bilateral lower extremity duplexes    Hypertension  - Diltiazem, as well    Hyperlipidemia  - Atorvastatin    COPD  - Symbicort, Spiriva    GERD  - Omeprazole    Neuropathy  Dose gabapentin    Pancreatic insufficiency  - Creon    Discussed management and the need for Hospitalization of the patient w/ the Emergency Department Provider: Simon    CXR: I have reviewed the CXR with the following interpretation:   EKG:  I have reviewed the EKG with the following interpretation: Sinus tach    Physical Exam Performed:      BP (!) 154/90   Pulse (!) 112   Temp 97 °F (36.1 °C) (Oral)   Resp 18   Ht 1.549 m (5' 1\")   Wt 35.7 kg (78 lb 9.6 oz)   LMP    ADDENDUM: Typographical error in the excretion.  Impression 2.  Should state stable appearance of the lungs.     Result Date: 8/9/2024  EXAMINATION: CTA OF THE CHEST 8/9/2024 8:58 pm TECHNIQUE: CTA of the chest was performed after the administration of intravenous contrast.  Multiplanar reformatted images are provided for review.  MIP images are provided for review. Automated exposure control, iterative reconstruction, and/or weight based adjustment of the mA/kV was utilized to reduce the radiation dose to as low as reasonably achievable. COMPARISON: 03/19/2024 without contrast HISTORY: ORDERING SYSTEM PROVIDED HISTORY: Swelling TECHNOLOGIST PROVIDED HISTORY: Reason for exam:->Swelling Additional Contrast?->1 Reason for Exam: leg swelling, FINDINGS: Pulmonary Arteries: Pulmonary arteries are adequately opacified for evaluation.  No evidence of intraluminal filling defect to suggest pulmonary embolism.  Main pulmonary artery is normal in caliber. Mediastinum: No evidence of mediastinal lymphadenopathy.  The heart and pericardium demonstrate no acute abnormality.  There is no acute abnormality of the thoracic aorta.  Calcified mediastinal lymph nodes. Lungs/pleura: Consolidation in the right lung is still present and is similar in appearance..  Calcified granuloma in this region.  Small adjacent pleural effusion.  This is triangular in shape.  Small nodule in the left lower lobe centrally.  It was noted on the previous study this similar.  Emphysema. Tiny nodules in the medial aspect of the right lower lobe unchanged.  These are sub mm.  Large amount of mucus in the left mainstem bronchus.  Apical scarring.  Probable scarring in the medial aspect of the left lung apex. This is unchanged. Upper Abdomen: Limited images of the upper abdomen are unremarkable. Soft Tissues/Bones: No acute bone or soft tissue abnormality.     1. No pulmonary embolus. 2. The appearance of the lungs.     XR HIP 2-3 VW W PELVIS

## 2024-08-10 NOTE — CARE COORDINATION
Case Management Assessment  Initial Evaluation    Date/Time of Evaluation: 8/10/2024 1:52 PM  Assessment Completed by: Teresa Boeck, RN    If patient is discharged prior to next notation, then this note serves as note for discharge by case management.    Patient Name: Bonny Serrano                   YOB: 1970  Diagnosis: Hypomagnesemia [E83.42]  Hyponatremia [E87.1]  Vasculitis (HCC) [I77.6]  Peripheral edema [R60.0]  Bilateral lower extremity edema [R60.0]                   Date / Time: 8/9/2024  7:51 PM    Patient Admission Status: Inpatient   Readmission Risk (Low < 19, Mod (19-27), High > 27): Readmission Risk Score: 21.9    Current PCP: Rhonda Reyez APRN - CNP  PCP verified by CM? (P) Yes    Chart Reviewed: Yes      History Provided by: (P) Patient  Patient Orientation: (P) Alert and Oriented, Person, Place, Situation    Patient Cognition: (P) Alert    Hospitalization in the last 30 days (Readmission):  Yes    If yes, Readmission Assessment in  Navigator will be completed.    Advance Directives:      Code Status: Full Code   Patient's Primary Decision Maker is: (P) Legal Next of Kin    Primary Decision Maker: TAYLER BEDOLLA - Child - [phone]    Secondary Decision Maker: CatherinembchikaTate - Brother/Sister - [phone]    Secondary Decision Maker: Waqas Olea - Friend - [phone]    Secondary Decision Maker: Ryanne Hinkle - Friend - [phone]    Discharge Planning:    Patient lives with: (P) Alone Type of Home: (P) House  Primary Care Giver: (P) Self  Patient Support Systems include: (P) Children, Family Members   Current Financial resources: (P) Medicaid  Current community resources: (P) None  Current services prior to admission: (P) Oxygen Therapy, Durable Medical Equipment (DME company unknown)            Current DME: (P) Walker            Type of Home Care services:  (P) None    ADLS  Prior functional level: (P) Independent in ADLs/IADLs, Other (see comment) (having some

## 2024-08-10 NOTE — PROGRESS NOTES
Doppler tech not in on weekends. Family and medical team updated.  Ok to wait til Monday. Call light within reach

## 2024-08-10 NOTE — ACP (ADVANCE CARE PLANNING)
Advance Care Planning     General Advance Care Planning (ACP) Conversation    Date of Conversation: 8/10/2024  Conducted with: Patient with Decision Making Capacity  Other persons present: Daughter Aracely Jacobsen    Healthcare Decision Maker:   Primary Decision Maker: ARACELY BEDOLLA - Child - [phone]    Secondary Decision Maker: CatherineTate dixon - Brother/Sister - [phone]    Secondary Decision Maker: Tigist Oleaian - Friend - [phone]    Secondary Decision Maker: Deshawn Hinkleelle - Friend - [phone]     Today we documented Decision Maker(s) consistent with Legal Next of Kin hierarchy.  Content/Action Overview:  Daughter is legal next of kin as the only child.  Reviewed DNR/DNI and patient elects Full Code (Attempt Resuscitation)    The daughter advised the patient and the daughter are in the process of making a POA on paper.       Length of Voluntary ACP Conversation in minutes:  <16 minutes (Non-Billable)    Teresa Boeck, RN

## 2024-08-10 NOTE — PROGRESS NOTES
4 Eyes Skin Assessment     NAME:  Bonny Serrano  YOB: 1970  MEDICAL RECORD NUMBER:  [id number]    The patient is being assessed for  Admission    I agree that at least one RN has performed a thorough Head to Toe Skin Assessment on the patient. ALL assessment sites listed below have been assessed.      Areas assessed by both nurses:    Head, Face, Ears, Shoulders, Back, Chest, Arms, Elbows, Hands, Sacrum. Buttock, Coccyx, Ischium, Legs. Feet and Heels, and Under Medical Devices           Blanchable redness to coccyx & bilateral heels,  scattered bruises & abrasions, and redness & swelling BLE & bilateral feet present on admission.    Does the Patient have a Wound? No noted wound(s)       [place] Prevention initiated by RN: Yes  Wound Care Orders initiated by RN: No    Pressure Injury (Stage 3,4, Unstageable, DTI, NWPT, and Complex wounds) if present, place Wound referral order by RN under : No    New Ostomies, if present place, Ostomy referral order under : No     Nurse 1 eSignature: Electronically signed by Cheyenne Santos RN on 8/10/24 at 2:11 AM EDT    **SHARE this note so that the co-signing nurse can place an eSignature**    Nurse 2 eSignature: Electronically signed by Iva Reynoso RN on 8/10/24 at 2:26 AM EDT

## 2024-08-10 NOTE — PROGRESS NOTES
Received from [facility] ER to U 322-1 in stable condition.  VSS.  94% on 2L NC.  Alert & oriented.  Admission completed.  Orientation provided.  Instructed to call for assist before attempting out of bed & patient verbalized good understanding.  Patient is able to demonstrate the ability to move from a reclining position to an upright position within the recliner.  Currently resting quietly in bed with call in easy reach.  Bed alarm on for safety.  Continue to monitor closely.  Cheyenne Santos RN

## 2024-08-11 PROBLEM — J44.9 CHRONIC OBSTRUCTIVE PULMONARY DISEASE (HCC): Status: ACTIVE | Noted: 2020-05-10

## 2024-08-11 LAB
ANION GAP SERPL CALCULATED.3IONS-SCNC: 10 MMOL/L (ref 3–16)
BUN SERPL-MCNC: 3 MG/DL (ref 7–20)
CALCIUM SERPL-MCNC: 8.1 MG/DL (ref 8.3–10.6)
CHLORIDE SERPL-SCNC: 87 MMOL/L (ref 99–110)
CO2 SERPL-SCNC: 31 MMOL/L (ref 21–32)
CREAT SERPL-MCNC: <0.5 MG/DL (ref 0.6–1.1)
GFR SERPLBLD CREATININE-BSD FMLA CKD-EPI: >90 ML/MIN/{1.73_M2}
GLUCOSE SERPL-MCNC: 116 MG/DL (ref 70–99)
POTASSIUM SERPL-SCNC: 3.2 MMOL/L (ref 3.5–5.1)
SODIUM SERPL-SCNC: 128 MMOL/L (ref 136–145)

## 2024-08-11 PROCEDURE — 97530 THERAPEUTIC ACTIVITIES: CPT

## 2024-08-11 PROCEDURE — 99232 SBSQ HOSP IP/OBS MODERATE 35: CPT | Performed by: INTERNAL MEDICINE

## 2024-08-11 PROCEDURE — 2700000000 HC OXYGEN THERAPY PER DAY

## 2024-08-11 PROCEDURE — 97110 THERAPEUTIC EXERCISES: CPT

## 2024-08-11 PROCEDURE — 97165 OT EVAL LOW COMPLEX 30 MIN: CPT

## 2024-08-11 PROCEDURE — 99223 1ST HOSP IP/OBS HIGH 75: CPT | Performed by: INTERNAL MEDICINE

## 2024-08-11 PROCEDURE — 36415 COLL VENOUS BLD VENIPUNCTURE: CPT

## 2024-08-11 PROCEDURE — 2580000003 HC RX 258: Performed by: STUDENT IN AN ORGANIZED HEALTH CARE EDUCATION/TRAINING PROGRAM

## 2024-08-11 PROCEDURE — 97162 PT EVAL MOD COMPLEX 30 MIN: CPT

## 2024-08-11 PROCEDURE — 80048 BASIC METABOLIC PNL TOTAL CA: CPT

## 2024-08-11 PROCEDURE — 6360000002 HC RX W HCPCS: Performed by: STUDENT IN AN ORGANIZED HEALTH CARE EDUCATION/TRAINING PROGRAM

## 2024-08-11 PROCEDURE — 94640 AIRWAY INHALATION TREATMENT: CPT

## 2024-08-11 PROCEDURE — 97535 SELF CARE MNGMENT TRAINING: CPT

## 2024-08-11 PROCEDURE — 94761 N-INVAS EAR/PLS OXIMETRY MLT: CPT

## 2024-08-11 PROCEDURE — 6370000000 HC RX 637 (ALT 250 FOR IP): Performed by: STUDENT IN AN ORGANIZED HEALTH CARE EDUCATION/TRAINING PROGRAM

## 2024-08-11 PROCEDURE — 2060000000 HC ICU INTERMEDIATE R&B

## 2024-08-11 RX ADMIN — BUDESONIDE AND FORMOTEROL FUMARATE DIHYDRATE 2 PUFF: 160; 4.5 AEROSOL RESPIRATORY (INHALATION) at 19:34

## 2024-08-11 RX ADMIN — Medication 10 ML: at 08:25

## 2024-08-11 RX ADMIN — POTASSIUM CHLORIDE 40 MEQ: 750 TABLET, EXTENDED RELEASE ORAL at 13:11

## 2024-08-11 RX ADMIN — ATORVASTATIN CALCIUM 10 MG: 10 TABLET, FILM COATED ORAL at 21:06

## 2024-08-11 RX ADMIN — APIXABAN 2.5 MG: 5 TABLET, FILM COATED ORAL at 21:06

## 2024-08-11 RX ADMIN — ACETAMINOPHEN 650 MG: 325 TABLET ORAL at 21:06

## 2024-08-11 RX ADMIN — PANCRELIPASE LIPASE, PANCRELIPASE PROTEASE, PANCRELIPASE AMYLASE 5000 UNITS: 5000; 17000; 24000 CAPSULE, DELAYED RELEASE ORAL at 12:03

## 2024-08-11 RX ADMIN — BUDESONIDE AND FORMOTEROL FUMARATE DIHYDRATE 2 PUFF: 160; 4.5 AEROSOL RESPIRATORY (INHALATION) at 07:13

## 2024-08-11 RX ADMIN — DILTIAZEM HYDROCHLORIDE 240 MG: 120 CAPSULE, COATED, EXTENDED RELEASE ORAL at 08:23

## 2024-08-11 RX ADMIN — ACETAMINOPHEN 650 MG: 325 TABLET ORAL at 13:58

## 2024-08-11 RX ADMIN — GABAPENTIN 300 MG: 300 CAPSULE ORAL at 08:23

## 2024-08-11 RX ADMIN — PANTOPRAZOLE SODIUM 40 MG: 40 TABLET, DELAYED RELEASE ORAL at 08:23

## 2024-08-11 RX ADMIN — TIOTROPIUM BROMIDE INHALATION SPRAY 2 PUFF: 3.12 SPRAY, METERED RESPIRATORY (INHALATION) at 07:13

## 2024-08-11 RX ADMIN — ATENOLOL 25 MG: 25 TABLET ORAL at 08:23

## 2024-08-11 RX ADMIN — Medication 10 ML: at 21:06

## 2024-08-11 RX ADMIN — PANCRELIPASE LIPASE, PANCRELIPASE PROTEASE, PANCRELIPASE AMYLASE 5000 UNITS: 5000; 17000; 24000 CAPSULE, DELAYED RELEASE ORAL at 16:38

## 2024-08-11 RX ADMIN — FUROSEMIDE 40 MG: 10 INJECTION, SOLUTION INTRAMUSCULAR; INTRAVENOUS at 08:24

## 2024-08-11 RX ADMIN — PANCRELIPASE LIPASE, PANCRELIPASE PROTEASE, PANCRELIPASE AMYLASE 5000 UNITS: 5000; 17000; 24000 CAPSULE, DELAYED RELEASE ORAL at 08:24

## 2024-08-11 RX ADMIN — APIXABAN 2.5 MG: 5 TABLET, FILM COATED ORAL at 08:23

## 2024-08-11 ASSESSMENT — PAIN DESCRIPTION - DESCRIPTORS
DESCRIPTORS: SHARP
DESCRIPTORS: SHARP

## 2024-08-11 ASSESSMENT — PAIN DESCRIPTION - LOCATION
LOCATION: HIP;LEG
LOCATION: HIP

## 2024-08-11 ASSESSMENT — PAIN DESCRIPTION - ORIENTATION
ORIENTATION: RIGHT
ORIENTATION: RIGHT

## 2024-08-11 ASSESSMENT — PAIN SCALES - GENERAL
PAINLEVEL_OUTOF10: 7
PAINLEVEL_OUTOF10: 6

## 2024-08-11 NOTE — PROGRESS NOTES
Inpatient Occupational Therapy Evaluation and Treatment    Unit: PCU  Date:  8/11/2024  Patient Name:    Bonny Serrano  Admitting diagnosis:  Hypomagnesemia [E83.42]  Hyponatremia [E87.1]  Vasculitis (HCC) [I77.6]  Peripheral edema [R60.0]  Bilateral lower extremity edema [R60.0]  Admit Date:  8/9/2024  Precautions/Restrictions/WB Status/ Lines/ Wounds/ Oxygen: Fall risk, Bed/chair alarm, Lines (IV, Supplemental O2 (2L), and external catheter), Telemetry, and Isolation Precautions: Contact    Pt seen for cotreatment this date due to patient safety, patient endurance, and complexity of condition    Treatment Time:  09:54-10:57  Treatment Number:  1  Timed Code Treatment Minutes: 53 minutes  Total Treatment Minutes: 63  minutes    Patient Goals for Therapy: \"to get better\"          Discharge Recommendations: SNF  DME needs for discharge: Defer to facility       Therapy recommendations for staff:   Assist of 1 for ambulation with use of rolling walker (RW) and gait belt to/from chair  to/from bathroom  within room  within halls    History of Present Illness: Per admission H&P  Patient is a 54 y.o. female with past medical history of hypertension, hyperlipidemia, neuropathy, pancreatic insufficiency, GERD, COPD presented to [facility] as transfer from [facility] where she presented for bilateral lower extremity swelling.  Patient states that she recently had right hip replacement at [facility] and shortly after returning home she noticed that she had lower extremity swelling.  States that the swelling has been progressive since discharge and then redness started yesterday.  When the redness worsened today she came into emergency department for evaluation.  Patient was discharged on Eliquis for DVT prophylaxis after hip replacement.  Patient denies rest of review of systems. \"     Pt has been home from rehab for about  2 weeks, see pre-admission info below for details.    AM-PAC Score: AM-PAC Inpatient Daily Activity Raw

## 2024-08-11 NOTE — PROGRESS NOTES
Pt. Resting in bed, eating breakfast. Pt. Assessment completed- see flow sheet. Pt. Assisted with standby assist up to bedside commode then to recliner. Pt. Tolerated well. Pt. denies further needs at this time. Will continue to monitor. Call light is within reach.  Ryanne Pickard RN

## 2024-08-11 NOTE — PROGRESS NOTES
Admit: 2024    Name:  Bonny Serrano  Room:  /0322-01  MRN:    [id number]    Daily Progress Note for 2024   Admitted with swelling in LEs   Interval History:   Denies complaints    Scheduled Meds:   apixaban  2.5 mg Oral BID    atenolol  25 mg Oral Daily    atorvastatin  10 mg Oral Nightly    dilTIAZem  240 mg Oral Daily    gabapentin  300 mg Oral Daily    lipase-protease-amylase  5,000 Units Oral TID WC    pantoprazole  40 mg Oral QAM AC    budesonide-formoterol  2 puff Inhalation BID    tiotropium  2 puff Inhalation Daily RT    sodium chloride flush  5-40 mL IntraVENous 2 times per day       Continuous Infusions:   sodium chloride         PRN Meds:  sodium chloride flush, sodium chloride, potassium chloride **OR** potassium alternative oral replacement **OR** potassium chloride, magnesium sulfate, ondansetron **OR** ondansetron, polyethylene glycol, acetaminophen **OR** acetaminophen                  Objective:     Temp  Av.5 °F (36.4 °C)  Min: 97 °F (36.1 °C)  Max: 98.1 °F (36.7 °C)  Pulse  Av.3  Min: 80  Max: 105  BP  Min: 104/61  Max: 141/88  SpO2  Av.1 %  Min: 91 %  Max: 96 %  Patient Vitals for the past 4 hrs:   BP Temp Temp src Pulse Resp SpO2   24 1201 112/70 98.1 °F (36.7 °C) Oral 88 18 93 %         Intake/Output Summary (Last 24 hours) at 2024 1237  Last data filed at 2024 0950  Gross per 24 hour   Intake 380 ml   Output 1450 ml   Net -1070 ml       Physical Exam:    General appearance:  No apparent distress, appears stated age and cooperative.  HEENT:  Pupils equal, round, and reactive to light. Conjunctivae/corneas clear.  Respiratory:  Normal respiratory effort. Clear to auscultation, bilaterally without Rales/Wheezes/Rhonchi.  Cardiovascular:  Regular rate and rhythm with normal S1/S2 without murmurs, rubs or gallops.  No pedal edema   Abdomen:  Soft, non-tender, non-distended with normal bowel sounds  Neurologic:  Neurovascularly intact without any focal

## 2024-08-11 NOTE — FLOWSHEET NOTE
08/11/24 1634   Vital Signs   Temp 98.4 °F (36.9 °C)   Temp Source Oral   Pulse 80   Heart Rate Source Monitor   Respirations 18   /65   MAP (Calculated) 80   BP Location Right upper arm   Oxygen Therapy   SpO2 97 %   O2 Device Nasal cannula   O2 Flow Rate (L/min) 2 L/min       VS as shown. Pt. denies further needs at this time. Will continue to monitor. Call light is within reach.  Ryanne Pickard RN

## 2024-08-11 NOTE — PROGRESS NOTES
Latest Reference Range & Units 08/11/24 11:03   Sodium 136 - 145 mmol/L 128 (L)   Potassium 3.5 - 5.1 mmol/L 3.2 (L)   (L): Data is abnormally low    Labs as shown above. Dr. Durham made aware. New order for fluid restriction. Pt. Made aware. PRN potassium given per MAR order. Pt. denies further needs at this time. Call light is within reach.  Ryanne Pickard RN

## 2024-08-11 NOTE — CONSULTS
[facility]                                 Pulmonary/Critical care  Consult /Progress Note :                                                                  CC :hip pain     Patient is being seen at the request of [facility] for a consultation for hypotension    HISTORY OF PRESENT ILLNESS:   Patient is a 54-year-old female with previous history of pulmonary embolism not on anticoagulation chronic pancreatitis and past alcohol abuse    She has hip fracture and had surgery few days ago and she came with worsening hip Pain   She also has bilateral leg swelling and this happen after surgery      She presented from home after she got involved in multiple falls in the last few weeks    States that the swelling has been progressive since discharge and then redness started yesterday. When the redness worsened today she came into emergency department for evaluation. Patient was discharged on Eliquis for DVT prophylaxis after hip replacement. Patient denies rest of review of systems.     I was consulted for abnormal CT      PAST MEDICAL HISTORY:  Past Medical History:   Diagnosis Date    A-fib (HCC)     Anemia     Anxiety     Chronic pancreatitis (HCC)     Collapse of right lung     COPD (chronic obstructive pulmonary disease) (HCC)     COPD (chronic obstructive pulmonary disease) (HCC)     Depression     GI bleed     H/O colonoscopy     Hypertension     Pancreatitis     Pulmonary embolism (HCC) 02/2018     PAST SURGICAL HISTORY:  Past Surgical History:   Procedure Laterality Date    HIP SURGERY Right 7/12/2024    RIGHT FEMUR GAMMA NAILING performed by Simba Man MD at [facility] OR    UPPER GASTROINTESTINAL ENDOSCOPY         FAMILY HISTORY:  family history includes Diabetes in her maternal grandfather.    SOCIAL HISTORY:   reports that she has been smoking cigarettes. She started smoking about 29 years ago. She has a 25 pack-year smoking  history. She has been exposed to tobacco smoke. She has never used smokeless tobacco.    Scheduled Meds:   apixaban  2.5 mg Oral BID    atenolol  25 mg Oral Daily    atorvastatin  10 mg Oral Nightly    dilTIAZem  240 mg Oral Daily    gabapentin  300 mg Oral Daily    lipase-protease-amylase  5,000 Units Oral TID WC    pantoprazole  40 mg Oral QAM AC    budesonide-formoterol  2 puff Inhalation BID    tiotropium  2 puff Inhalation Daily RT    sodium chloride flush  5-40 mL IntraVENous 2 times per day     Continuous Infusions:   sodium chloride       PRN Meds:  sodium chloride flush, sodium chloride, potassium chloride **OR** potassium alternative oral replacement **OR** potassium chloride, magnesium sulfate, ondansetron **OR** ondansetron, polyethylene glycol, acetaminophen **OR** acetaminophen    ALLERGIES:  Patient is allergic to sulfa antibiotics.    REVIEW OF SYSTEMS:  Constitutional: Negative for fever  HENT: Negative for sore throat  Eyes: Negative for redness   Respiratory:  dyspnea, cough  Cardiovascular: Negative for chest pain  Gastrointestinal: Negative for vomiting, diarrhea   Genitourinary: Negative for hematuria   Musculoskeletal: Negative for arthralgias   Skin: Negative for rash  Neurological: Negative for syncope  Hematological: Negative for adenopathy  Psychiatric/Behavorial: Negative for anxiety    PHYSICAL EXAM:  Blood pressure 112/70, pulse 88, temperature 98.1 °F (36.7 °C), temperature source Oral, resp. rate 18, height 1.549 m (5' 1\"), weight 34.7 kg (76 lb 6.4 oz), last menstrual period 07/12/2015, SpO2 93%, not currently breastfeeding.' on RA  Gen: No distress.   Eyes: PERRL. No sclera icterus. No conjunctival injection.   ENT: No discharge. Pharynx clear.   Neck: Trachea midline. No obvious mass.    Resp: Rhonchi bilateral   CV: Regular rate. Regular rhythm. No murmur or rub. No edema. Peripheral pulses are 2+.  Capillary refill is less than 3 seconds.  GI: Non-tender. Non-distended. No

## 2024-08-11 NOTE — PROGRESS NOTES
Inpatient Physical Therapy Evaluation & Treatment    Unit: PCU  Date:  8/11/2024  Patient Name:    Bonny Serrano  Admitting diagnosis:  Hypomagnesemia [E83.42]  Hyponatremia [E87.1]  Vasculitis (HCC) [I77.6]  Peripheral edema [R60.0]  Bilateral lower extremity edema [R60.0]  Admit Date:  8/9/2024  Precautions/Restrictions/WB Status/ Lines/ Wounds/ Oxygen: Fall risk, Bed/chair alarm, Lines (IV, Supplemental O2 (2L), and external catheter), Telemetry, and Isolation Precautions: Contact      Pt seen for cotreatment this date due to patient safety, patient endurance, complexity of condition, and acute illness/injury    Treatment Time:  0954 - 1057  Treatment Number:  1   Timed Code Treatment Minutes: 53 minutes  Total Treatment Minutes:  63  minutes    Patient Stated Goals for Therapy: \" to get better \"          Discharge Recommendations: SNF  DME needs for discharge: Defer to facility       Therapy recommendation for EMS Transport: can transport by wheelchair    Therapy recommendations for staff:   Assist of 1 for ambulation with use of rolling walker (RW) and gait belt to/from chair  to/from bathroom  within room  within halls    History of Present Illness:   Per admission H&P:  \"54 y.o. female with past medical history of hypertension, hyperlipidemia, neuropathy, pancreatic insufficiency, GERD, COPD presented to [facility] as transfer from [facility] where she presented for bilateral lower extremity swelling.  Patient states that she recently had right hip replacement at [facility] and shortly after returning home she noticed that she had lower extremity swelling.  States that the swelling has been progressive since discharge and then redness started yesterday.  When the redness worsened today she came into emergency department for evaluation.  Patient was discharged on Eliquis for DVT prophylaxis after hip replacement.  Patient denies rest of review of systems. \"    Pt has been home from rehab for about  2 weeks, see  achieving goals include:    Chronicity of condition, Pain, and Weakness    Plan    To be seen 3-5 x/ week while in acute care setting for therapeutic exercises, bed mobility, transfers, progressive gait training, balance training, and family/patient education.    Signature: Ger Tran PT     If patient discharges from this facility prior to next visit, this note will serve as the Discharge Summary.

## 2024-08-11 NOTE — FLOWSHEET NOTE
08/10/24 2313   Vital Signs   Temp 97.7 °F (36.5 °C)   Temp Source Oral   Pulse 88   Heart Rate Source Monitor   Respirations 18   /65   MAP (Calculated) 80   Oxygen Therapy   SpO2 93 %   O2 Device Nasal cannula   O2 Flow Rate (L/min) 2 L/min     Resting quietly with respirations easy/even on 2L NC.  Call in easy reach.  Continue to monitor closely.  Cheyenne Santos RN

## 2024-08-11 NOTE — FLOWSHEET NOTE
08/11/24 5228   Pain Assessment   Pain Assessment 0-10   Pain Level 6   Pain Location Hip   Pain Orientation Right   Pain Descriptors Sharp       Pt. C/o pain as shown above. States she feels like it is from working with therapy. PRN Tylenol given per MAR order. Pt. denies further needs at this time. Will continue to monitor. Call light is within reach.  Ryanne Pickard RN

## 2024-08-12 ENCOUNTER — APPOINTMENT (OUTPATIENT)
Age: 54
DRG: 813 | End: 2024-08-12
Attending: INTERNAL MEDICINE
Payer: MEDICAID

## 2024-08-12 ENCOUNTER — APPOINTMENT (OUTPATIENT)
Age: 54
DRG: 813 | End: 2024-08-12
Attending: STUDENT IN AN ORGANIZED HEALTH CARE EDUCATION/TRAINING PROGRAM
Payer: MEDICAID

## 2024-08-12 VITALS
TEMPERATURE: 97.5 F | BODY MASS INDEX: 13.41 KG/M2 | HEART RATE: 84 BPM | HEIGHT: 61 IN | SYSTOLIC BLOOD PRESSURE: 96 MMHG | RESPIRATION RATE: 16 BRPM | WEIGHT: 71 LBS | OXYGEN SATURATION: 97 % | DIASTOLIC BLOOD PRESSURE: 63 MMHG

## 2024-08-12 PROBLEM — R93.89 ABNORMAL CT OF THE CHEST: Status: ACTIVE | Noted: 2024-08-12

## 2024-08-12 PROBLEM — T17.998A MUCUS PLUG IN RESPIRATORY TRACT: Status: ACTIVE | Noted: 2024-08-12

## 2024-08-12 PROBLEM — R09.02 HYPOXIA: Status: ACTIVE | Noted: 2024-08-12

## 2024-08-12 PROBLEM — R60.0 LEG EDEMA: Status: ACTIVE | Noted: 2024-08-12

## 2024-08-12 LAB
ANION GAP SERPL CALCULATED.3IONS-SCNC: 7 MMOL/L (ref 3–16)
BUN SERPL-MCNC: 5 MG/DL (ref 7–20)
CALCIUM SERPL-MCNC: 8.1 MG/DL (ref 8.3–10.6)
CHLORIDE SERPL-SCNC: 95 MMOL/L (ref 99–110)
CO2 SERPL-SCNC: 29 MMOL/L (ref 21–32)
CREAT SERPL-MCNC: <0.5 MG/DL (ref 0.6–1.1)
ECHO BSA: 1.18 M2
ECHO BSA: 1.18 M2
ECHO EST RA PRESSURE: 3 MMHG
ECHO RIGHT VENTRICULAR SYSTOLIC PRESSURE (RVSP): 29 MMHG
ECHO TV REGURGITANT MAX VELOCITY: 2.55 M/S
ECHO TV REGURGITANT PEAK GRADIENT: 26 MMHG
GFR SERPLBLD CREATININE-BSD FMLA CKD-EPI: >90 ML/MIN/{1.73_M2}
GLUCOSE SERPL-MCNC: 109 MG/DL (ref 70–99)
MAGNESIUM SERPL-MCNC: 1.1 MG/DL (ref 1.8–2.4)
POTASSIUM SERPL-SCNC: 4.1 MMOL/L (ref 3.5–5.1)
SODIUM SERPL-SCNC: 131 MMOL/L (ref 136–145)

## 2024-08-12 PROCEDURE — 83735 ASSAY OF MAGNESIUM: CPT

## 2024-08-12 PROCEDURE — 94640 AIRWAY INHALATION TREATMENT: CPT

## 2024-08-12 PROCEDURE — 94761 N-INVAS EAR/PLS OXIMETRY MLT: CPT

## 2024-08-12 PROCEDURE — 93308 TTE F-UP OR LMTD: CPT

## 2024-08-12 PROCEDURE — 99238 HOSP IP/OBS DSCHRG MGMT 30/<: CPT | Performed by: INTERNAL MEDICINE

## 2024-08-12 PROCEDURE — 93325 DOPPLER ECHO COLOR FLOW MAPG: CPT | Performed by: INTERNAL MEDICINE

## 2024-08-12 PROCEDURE — 6370000000 HC RX 637 (ALT 250 FOR IP): Performed by: STUDENT IN AN ORGANIZED HEALTH CARE EDUCATION/TRAINING PROGRAM

## 2024-08-12 PROCEDURE — 93321 DOPPLER ECHO F-UP/LMTD STD: CPT | Performed by: INTERNAL MEDICINE

## 2024-08-12 PROCEDURE — 2580000003 HC RX 258: Performed by: STUDENT IN AN ORGANIZED HEALTH CARE EDUCATION/TRAINING PROGRAM

## 2024-08-12 PROCEDURE — 36415 COLL VENOUS BLD VENIPUNCTURE: CPT

## 2024-08-12 PROCEDURE — 6370000000 HC RX 637 (ALT 250 FOR IP): Performed by: INTERNAL MEDICINE

## 2024-08-12 PROCEDURE — 80048 BASIC METABOLIC PNL TOTAL CA: CPT

## 2024-08-12 PROCEDURE — 93970 EXTREMITY STUDY: CPT | Performed by: SURGERY

## 2024-08-12 PROCEDURE — 2700000000 HC OXYGEN THERAPY PER DAY

## 2024-08-12 PROCEDURE — 6360000002 HC RX W HCPCS: Performed by: STUDENT IN AN ORGANIZED HEALTH CARE EDUCATION/TRAINING PROGRAM

## 2024-08-12 PROCEDURE — 93970 EXTREMITY STUDY: CPT

## 2024-08-12 PROCEDURE — 99233 SBSQ HOSP IP/OBS HIGH 50: CPT | Performed by: INTERNAL MEDICINE

## 2024-08-12 PROCEDURE — 93308 TTE F-UP OR LMTD: CPT | Performed by: INTERNAL MEDICINE

## 2024-08-12 RX ORDER — HYDROCODONE BITARTRATE AND ACETAMINOPHEN 5; 325 MG/1; MG/1
1 TABLET ORAL EVERY 6 HOURS PRN
Qty: 12 TABLET | Refills: 0 | Status: SHIPPED | OUTPATIENT
Start: 2024-08-12 | End: 2024-08-15

## 2024-08-12 RX ORDER — HYDROCODONE BITARTRATE AND ACETAMINOPHEN 5; 325 MG/1; MG/1
1 TABLET ORAL EVERY 6 HOURS PRN
Status: DISCONTINUED | OUTPATIENT
Start: 2024-08-12 | End: 2024-08-12 | Stop reason: HOSPADM

## 2024-08-12 RX ADMIN — PANCRELIPASE LIPASE, PANCRELIPASE PROTEASE, PANCRELIPASE AMYLASE 5000 UNITS: 5000; 17000; 24000 CAPSULE, DELAYED RELEASE ORAL at 08:49

## 2024-08-12 RX ADMIN — MAGNESIUM SULFATE HEPTAHYDRATE 2000 MG: 40 INJECTION, SOLUTION INTRAVENOUS at 06:27

## 2024-08-12 RX ADMIN — DILTIAZEM HYDROCHLORIDE 240 MG: 120 CAPSULE, COATED, EXTENDED RELEASE ORAL at 08:50

## 2024-08-12 RX ADMIN — MAGNESIUM SULFATE HEPTAHYDRATE 2000 MG: 40 INJECTION, SOLUTION INTRAVENOUS at 08:55

## 2024-08-12 RX ADMIN — HYDROCODONE BITARTRATE AND ACETAMINOPHEN 1 TABLET: 5; 325 TABLET ORAL at 15:11

## 2024-08-12 RX ADMIN — PANCRELIPASE LIPASE, PANCRELIPASE PROTEASE, PANCRELIPASE AMYLASE 5000 UNITS: 5000; 17000; 24000 CAPSULE, DELAYED RELEASE ORAL at 17:19

## 2024-08-12 RX ADMIN — ATENOLOL 25 MG: 25 TABLET ORAL at 08:50

## 2024-08-12 RX ADMIN — APIXABAN 2.5 MG: 5 TABLET, FILM COATED ORAL at 08:50

## 2024-08-12 RX ADMIN — TIOTROPIUM BROMIDE INHALATION SPRAY 2 PUFF: 3.12 SPRAY, METERED RESPIRATORY (INHALATION) at 08:01

## 2024-08-12 RX ADMIN — PANCRELIPASE LIPASE, PANCRELIPASE PROTEASE, PANCRELIPASE AMYLASE 5000 UNITS: 5000; 17000; 24000 CAPSULE, DELAYED RELEASE ORAL at 12:22

## 2024-08-12 RX ADMIN — BUDESONIDE AND FORMOTEROL FUMARATE DIHYDRATE 2 PUFF: 160; 4.5 AEROSOL RESPIRATORY (INHALATION) at 08:01

## 2024-08-12 RX ADMIN — PANTOPRAZOLE SODIUM 40 MG: 40 TABLET, DELAYED RELEASE ORAL at 06:21

## 2024-08-12 RX ADMIN — ACETAMINOPHEN 650 MG: 325 TABLET ORAL at 14:01

## 2024-08-12 RX ADMIN — Medication 10 ML: at 08:50

## 2024-08-12 RX ADMIN — SODIUM CHLORIDE: 9 INJECTION, SOLUTION INTRAVENOUS at 06:26

## 2024-08-12 RX ADMIN — GABAPENTIN 300 MG: 300 CAPSULE ORAL at 08:50

## 2024-08-12 ASSESSMENT — PAIN - FUNCTIONAL ASSESSMENT
PAIN_FUNCTIONAL_ASSESSMENT: ACTIVITIES ARE NOT PREVENTED
PAIN_FUNCTIONAL_ASSESSMENT: ACTIVITIES ARE NOT PREVENTED

## 2024-08-12 ASSESSMENT — PAIN DESCRIPTION - LOCATION
LOCATION: LEG;HIP
LOCATION: HIP;LEG

## 2024-08-12 ASSESSMENT — PAIN DESCRIPTION - DESCRIPTORS
DESCRIPTORS: ACHING
DESCRIPTORS: ACHING

## 2024-08-12 ASSESSMENT — PAIN DESCRIPTION - ORIENTATION
ORIENTATION: RIGHT
ORIENTATION: RIGHT

## 2024-08-12 ASSESSMENT — PAIN SCALES - GENERAL
PAINLEVEL_OUTOF10: 8
PAINLEVEL_OUTOF10: 3

## 2024-08-12 NOTE — DISCHARGE SUMMARY
Hospital Medicine Discharge Summary    Patient: Bonny Serrano     Gender: female  : 1970   Age: 54 y.o.  MRN: [id number]    Admitting Physician: Gloria Dudley DO  Discharge Physician: Dorina Mariscal DO    Code Status: Full Code     Admit Date: 2024   Discharge Date:  2024     Discharge Diagnoses:    Active Hospital Problems    Diagnosis Date Noted    Peripheral edema [R60.0] 2024    Chronic obstructive pulmonary disease (HCC) [J44.9] 05/10/2020       Condition at Discharge: Stable    Hospital Course:     Bilateral lower extremity edema. She says she has history of DVT/PE  - Status post right hip replacement; was discharged with twice daily Eliquis for DVT prophylaxis - continue until ok to stop per Ortho  - D-dimer elevated, will obtain bilateral lower extremity duplex - negative for clot  Was given iv lasix - recd 3 doses   -Legs look much better.  -Echo shows preserved EF  - compression hose may be helpful     Hyponatremia  1500 ml FR      Abnormal chest CT   She completed treatment for PNA last month  Pulm consulted, she may need bronch at some point, echo obtained      Hypertension  - Diltiazem, stable.     Hyperlipidemia  - Atorvastatin     COPD  Chronic hypoxic respiratory failure  - Symbicort, Spiriva     GERD  - Omeprazole     Neuropathy  Dose gabapentin     Pancreatic insufficiency  - Creon      Additional findings or notes to primary provider:  None at this time    Discharge Medications:   Current Discharge Medication List        START taking these medications    Details   HYDROcodone-acetaminophen (NORCO) 5-325 MG per tablet Take 1 tablet by mouth every 6 hours as needed for Pain for up to 3 days. Max Daily Amount: 4 tablets  Qty: 12 tablet, Refills: 0    Comments: Reduce doses taken as pain becomes manageable  Associated Diagnoses: History of hip fracture           Current Discharge Medication List        CONTINUE these medications which have CHANGED    Details   apixaban (ELIQUIS)  PM    TRIG 65 04/08/2019 09:55 AM     Lab Results   Component Value Date    INR 1.17 (H) 07/12/2024       Radiology:  Vascular duplex lower extremity venous bilateral    Result Date: 8/12/2024    No evidence of deep vein or superficial vein thrombosis in the lower extremities bilaterally.     Echo (TTE) limited (PRN contrast/bubble/strain/3D)    Result Date: 8/12/2024    Left Ventricle: Normal left ventricular systolic function with a visually estimated EF of 55 - 60%. Left ventricle size is normal. Normal wall thickness. Normal wall motion.   Right Ventricle: Right ventricle size is grossly normal. Visually normal systolic function.   Aortic Valve: No regurgitation. No stenosis.   Mitral Valve: Trace regurgitation.   Tricuspid Valve: Mild regurgitation. Normal RVSP. The estimated RVSP is 29 mmHg.   Pericardium: No pericardial effusion.   Extracardiac: Medium sized right pleural effusion.   IVC/SVC: IVC diameter is less than or equal to 21 mm and decreases greater than 50% during inspiration; therefore the estimated right atrial pressure is normal (~3 mmHg). IVC size is normal.     CT CHEST PULMONARY EMBOLISM W CONTRAST    Addendum Date: 8/9/2024    ADDENDUM: Typographical error in the excretion.  Impression 2.  Should state stable appearance of the lungs.     Result Date: 8/9/2024  EXAMINATION: CTA OF THE CHEST 8/9/2024 8:58 pm TECHNIQUE: CTA of the chest was performed after the administration of intravenous contrast.  Multiplanar reformatted images are provided for review.  MIP images are provided for review. Automated exposure control, iterative reconstruction, and/or weight based adjustment of the mA/kV was utilized to reduce the radiation dose to as low as reasonably achievable. COMPARISON: 03/19/2024 without contrast HISTORY: ORDERING SYSTEM PROVIDED HISTORY: Swelling TECHNOLOGIST PROVIDED HISTORY: Reason for exam:->Swelling Additional Contrast?->1 Reason for Exam: leg swelling, FINDINGS: Pulmonary Arteries:  Pulmonary arteries are adequately opacified for evaluation.  No evidence of intraluminal filling defect to suggest pulmonary embolism.  Main pulmonary artery is normal in caliber. Mediastinum: No evidence of mediastinal lymphadenopathy.  The heart and pericardium demonstrate no acute abnormality.  There is no acute abnormality of the thoracic aorta.  Calcified mediastinal lymph nodes. Lungs/pleura: Consolidation in the right lung is still present and is similar in appearance..  Calcified granuloma in this region.  Small adjacent pleural effusion.  This is triangular in shape.  Small nodule in the left lower lobe centrally.  It was noted on the previous study this similar.  Emphysema. Tiny nodules in the medial aspect of the right lower lobe unchanged.  These are sub mm.  Large amount of mucus in the left mainstem bronchus.  Apical scarring.  Probable scarring in the medial aspect of the left lung apex. This is unchanged. Upper Abdomen: Limited images of the upper abdomen are unremarkable. Soft Tissues/Bones: No acute bone or soft tissue abnormality.     1. No pulmonary embolus. 2. The appearance of the lungs.       The patient was seen and examined on day of discharge and this discharge summary is in conjunction with any daily progress note from day of discharge.Time Spent on discharge is less than 30 minutes in the examination, evaluation, counseling and review of medications and discharge plan.          Signed:    Dorina Mariscal DO   8/12/2024      Thank you Rhonda Reyez APRN - CNP for the opportunity to be involved in this patient's care. If you have any questions or concerns please feel free to contact me at [facility].

## 2024-08-12 NOTE — PROGRESS NOTES
Patient up in chair. Reassessment complete. Patient denies needs. Call light in reach. Will monitor.

## 2024-08-12 NOTE — PROGRESS NOTES
08/12/24 1543   Encounter Summary   Encounter Overview/Reason Advance Care Planning   Service Provided For Patient   Referral/Consult From Other    Support System Children;Family members;Friends/neighbors   Last Encounter  08/12/24  ( visited; discussed ACP with patient)   Advance Care Planning   Type ACP conversation  (patient plans to discuss with decision makers and will contact pastoral care when ready to complete)

## 2024-08-12 NOTE — FLOWSHEET NOTE
08/12/24 0717   Handoff   Communication Given Shift Handoff   Handoff Given To SARAH Pappas   Handoff Received From SARAH Em   Handoff Communication Face to Face   Time Handoff Given 0718   End of Shift Check Performed Yes     EOS report given to SARAH Pappas. Pt in bed, call light within reach. Pt is stable, care is transferred.

## 2024-08-12 NOTE — DISCHARGE INSTR - COC
Continuity of Care Form    Patient Name: Bonny Serrano   :  1970  MRN:  [id number]    Admit date:  2024  Discharge date:  2024    Code Status Order: Full Code   Advance Directives:   Advance Care Flowsheet Documentation             Admitting Physician:  Gloria Dudley DO  PCP: Rhonda Reyez APRN - CNP    Discharging Nurse: SARAH Pappas  Discharging Hospital Unit/Room#: /0322-01  Discharging Unit Phone Number: [phone]    Emergency Contact:   Extended Emergency Contact Information  Primary Emergency Contact: TAYLER BEDOLLA  Home Phone: [phone]  Mobile Phone: [phone]  Relation: Child  Secondary Emergency Contact: Tate Serrano  Home Phone: [phone]  Relation: Brother/Sister    Past Surgical History:  Past Surgical History:   Procedure Laterality Date    HIP SURGERY Right 2024    RIGHT FEMUR GAMMA NAILING performed by Simba Man MD at [facility] OR    UPPER GASTROINTESTINAL ENDOSCOPY         Immunization History:   Immunization History   Administered Date(s) Administered    COVID-19, MODERNA BLUE border, Primary or Immunocompromised, (age 12y+), IM, 100 mcg/0.5mL 2021, 09/10/2021    Influenza Vaccine, unspecified formulation 2017, 09/10/2018    Influenza Virus Vaccine 2017, 09/10/2018, 2019    Influenza, AFLURIA (age 3 yrs+), FLUZONE, (age 6 mo+), MDV, 0.5mL 2019    Influenza, FLUARIX, FLULAVAL, FLUZONE (age 6 mo+) AND AFLURIA, (age 3 y+), PF, 0.5mL 09/10/2018    Influenza, FLUBLOK, (age 18 y+), PF, 0.5mL 10/13/2021    Pneumococcal, PPSV23, PNEUMOVAX 23, (age 2y+), SC/IM, 0.5mL 2014    TDaP, ADACEL (age 10y-64y), BOOSTRIX (age 10y+), IM, 0.5mL 2015, 2024    Zoster Recombinant (Shingrix) 2022, 2022       Active Problems:  Patient Active Problem List   Diagnosis Code    Alcoholism (HCC) F10.20    Lung collapse J98.19    Macrocytic anemia D53.9    Paroxysmal atrial fibrillation (HCC) I48.0    Pulmonary embolism (HCC)

## 2024-08-12 NOTE — PROGRESS NOTES
Patient returned from vascular lab. Assisted to BSC, able to have medium BM. Assisted up to chair with belongings in reach.

## 2024-08-12 NOTE — PROGRESS NOTES
Admit: 2024    Name:  Bonny Serrano  Room:  /0322-01  MRN:    [id number]    Daily Progress Note for 2024   Admitted with swelling in LEs     Interval History:     Denies complaints just wants to go to [place] She says she has a history of DVT and PE.     Scheduled Meds:   apixaban  2.5 mg Oral BID    atenolol  25 mg Oral Daily    atorvastatin  10 mg Oral Nightly    dilTIAZem  240 mg Oral Daily    gabapentin  300 mg Oral Daily    lipase-protease-amylase  5,000 Units Oral TID WC    pantoprazole  40 mg Oral QAM AC    budesonide-formoterol  2 puff Inhalation BID    tiotropium  2 puff Inhalation Daily RT    sodium chloride flush  5-40 mL IntraVENous 2 times per day       Continuous Infusions:   sodium chloride 5 mL/hr at 24 0626       PRN Meds:  sodium chloride flush, sodium chloride, potassium chloride **OR** potassium alternative oral replacement **OR** potassium chloride, magnesium sulfate, ondansetron **OR** ondansetron, polyethylene glycol, acetaminophen **OR** acetaminophen       Objective:     Temp  Av.7 °F (36.5 °C)  Min: 97 °F (36.1 °C)  Max: 98.4 °F (36.9 °C)  Pulse  Av.3  Min: 74  Max: 99  BP  Min: 110/65  Max: 126/71  SpO2  Av.8 %  Min: 93 %  Max: 100 %  Patient Vitals for the past 4 hrs:   BP Temp Temp src Pulse Resp SpO2   24 1110 116/68 97.5 °F (36.4 °C) Oral 76 16 97 %         Intake/Output Summary (Last 24 hours) at 2024 1431  Last data filed at 2024 1338  Gross per 24 hour   Intake 1912 ml   Output 400 ml   Net 1512 ml       Physical Exam:    General appearance:  No apparent distress, appears stated age and cooperative.  HEENT:  Pupils equal, round, and reactive to light. Conjunctivae/corneas clear.  Respiratory:  Normal respiratory effort. Clear to auscultation, bilaterally without Rales/Wheezes/Rhonchi.  Cardiovascular:  Regular rate and rhythm with normal S1/S2 without murmurs, rubs or gallops.  No pedal edema   Abdomen:  Soft, non-tender,  non-distended with normal bowel sounds  Neurologic:  Neurovascularly intact without any focal sensory/motor deficits. Cranial nerves: II-XII intact, grossly non-focal.  Psychiatric:  Alert and oriented, thought content appropriate, normal insight  Skin:  Skin color, texture, turgor normal.  No rashes or lesions.    Lab Data:  CBC:   Recent Labs     08/09/24  2013 08/10/24  0759   WBC 6.1 5.6   RBC 3.02* 3.54*   HGB 9.6* 11.2*   HCT 28.8* 33.6*   MCV 95.3 94.9   RDW 16.6* 16.8*   * 532*     BMP:   Recent Labs     08/10/24  0759 08/11/24  1103 08/12/24  0508   * 128* 131*   K 3.8 3.2* 4.1   CL 93* 87* 95*   CO2 32 31 29   BUN 3* 3* 5*   CREATININE <0.5* <0.5* <0.5*     BNP: No results for input(s): \"BNP\" in the last 72 hours.  PT/INR: No results for input(s): \"PROTIME\", \"INR\" in the last 72 hours.  APTT:No results for input(s): \"APTT\" in the last 72 hours.  CARDIAC ENZYMES: No results for input(s): \"CKMB\", \"CKMBINDEX\", \"TROPONINI\" in the last 72 hours.    Invalid input(s): \"CKTOTAL;3\"  FASTING LIPID PANEL:  Lab Results   Component Value Date/Time    CHOL 214 04/08/2019 09:55 AM    HDL 47 04/08/2019 09:55 AM    TRIG 65 04/08/2019 09:55 AM     LIVER PROFILE:   Recent Labs     08/09/24  2013 08/10/24  0759   AST 30 23   ALT 13 11   BILITOT <0.2 <0.2   ALKPHOS 259* 272*         Vascular duplex lower extremity venous bilateral   Final Result      CT CHEST PULMONARY EMBOLISM W CONTRAST   Final Result   Addendum (preliminary) 1 of 1   ADDENDUM:   Typographical error in the excretion.  Impression 2.  Should state stable   appearance of the lungs.         Final   1. No pulmonary embolus.   2. The appearance of the lungs.               Assessment & Plan:     Patient Active Problem List    Diagnosis Date Noted    Peripheral edema 08/09/2024    Intertrochanteric fracture of right femur, closed, initial encounter (formerly [facility]) 07/12/2024    Hypomagnesuria 01/04/2024    Septic shock (formerly [facility]) 01/04/2024    Hypokalemia 01/03/2024

## 2024-08-12 NOTE — FLOWSHEET NOTE
08/11/24 2030   Vital Signs   Temp 97 °F (36.1 °C)   Temp Source Oral   Pulse 82   Heart Rate Source Monitor   Respirations 16   /71   MAP (Calculated) 89   BP Location Right upper arm   BP Method Automatic   Patient Position Semi fowlers     Assessment & vital signs completed. Evening meds given per MAR. Pt denies any further needs at this time. Call light within reach, pt is stable.

## 2024-08-12 NOTE — PROGRESS NOTES
Pulmonary Progress Note    CC: Abnormal CT    Subjective:   2 L O2, no home O2  + Congested        Intake/Output Summary (Last 24 hours) at 8/12/2024 0820  Last data filed at 8/12/2024 0539  Gross per 24 hour   Intake 1380 ml   Output 400 ml   Net 980 ml       Exam:   /63   Pulse 77   Temp 97.8 °F (36.6 °C) (Oral)   Resp 16   Ht 1.549 m (5' 1\")   Wt 32.2 kg (71 lb)   LMP 07/12/2015   SpO2 100%   BMI 13.42 kg/m²  on 2 L  Gen: No distress.    Eyes: PERRL. No sclera icterus. No conjunctival injection.   ENT: No discharge. Pharynx clear.   Neck: Trachea midline. Normal thyroid.   Resp: No accessory muscle use. No crackles. No wheezes.  Few rhonchi. No dullness on percussion.  CV: Regular rate. Regular rhythm. No murmur or rub. No edema.   GI: Non-tender. Non-distended. No masses. No organomegaly. Normal bowel sounds. No hernia.   Skin: Warm and dry. No nodule on exposed extremities. No rash on exposed extremities.   Lymph: No cervical LAD. No supraclavicular LAD.   M/S: No cyanosis. No joint deformity. No clubbing.   Neuro: Awake. Moves all extremities. CN grossly intact.  Psych: Oriented x 3. No anxiety or agitation.     Scheduled Meds:   apixaban  2.5 mg Oral BID    atenolol  25 mg Oral Daily    atorvastatin  10 mg Oral Nightly    dilTIAZem  240 mg Oral Daily    gabapentin  300 mg Oral Daily    lipase-protease-amylase  5,000 Units Oral TID WC    pantoprazole  40 mg Oral QAM AC    budesonide-formoterol  2 puff Inhalation BID    tiotropium  2 puff Inhalation Daily RT    sodium chloride flush  5-40 mL IntraVENous 2 times per day     Continuous Infusions:   sodium chloride 5 mL/hr at 08/12/24 0626     PRN Meds:  sodium chloride flush, sodium chloride, potassium chloride **OR** potassium alternative oral replacement **OR** potassium chloride, magnesium sulfate, ondansetron **OR** ondansetron, polyethylene glycol, acetaminophen **OR** acetaminophen    Labs:  CBC:   Recent Labs     08/09/24  2013 08/10/24  0756    WBC 6.1 5.6   HGB 9.6* 11.2*   HCT 28.8* 33.6*   MCV 95.3 94.9   * 532*     BMP:   Recent Labs     08/10/24  0759 08/11/24  1103 08/12/24  0508   * 128* 131*   K 3.8 3.2* 4.1   CL 93* 87* 95*   CO2 32 31 29   BUN 3* 3* 5*   CREATININE <0.5* <0.5* <0.5*     LIVER PROFILE:   Recent Labs     08/09/24  2013 08/10/24  0759   AST 30 23   ALT 13 11   BILITOT <0.2 <0.2   ALKPHOS 259* 272*     PT/INR: No results for input(s): \"PROTIME\", \"INR\" in the last 72 hours.  APTT: No results for input(s): \"APTT\" in the last 72 hours.  UA:No results for input(s): \"NITRITE\", \"COLORU\", \"PHUR\", \"LABCAST\", \"WBCUA\", \"RBCUA\", \"MUCUS\", \"TRICHOMONAS\", \"YEAST\", \"BACTERIA\", \"CLARITYU\", \"SPECGRAV\", \"LEUKOCYTESUR\", \"UROBILINOGEN\", \"BILIRUBINUR\", \"BLOODU\", \"GLUCOSEU\", \"AMORPHOUS\" in the last 72 hours.    Invalid input(s): \"KETONESU\"  No results for input(s): \"PHART\", \"KTY9ATG\", \"PO2ART\" in the last 72 hours.        Microbiology:  None    Imaging:  CTPA 8/9 imaging was reviewed by me and showed   Pulmonary Arteries: Pulmonary arteries are adequately opacified for   evaluation.  No evidence of intraluminal filling defect to suggest pulmonary embolism.  Main pulmonary artery is normal in caliber.   Mediastinum: No evidence of mediastinal lymphadenopathy.  The heart and pericardium demonstrate no acute abnormality.  There is no acute abnormality of the thoracic aorta.  Calcified mediastinal lymph nodes.   Lungs/pleura: Consolidation in the right lung is still present and is similar in appearance..  Calcified granuloma in this region.  Small adjacent pleural effusion.  This is triangular in shape.  Small nodule in the left lower lobe centrally.  It was noted on the previous study this similar.  Emphysema. Tiny nodules in the medial aspect of the right lower lobe unchanged.  These are sub mm.  Large amount of mucus in the left mainstem bronchus.  Apical scarring.  Probable scarring in the medial aspect of the left lung apex. This is  unchanged.   Upper Abdomen: Limited images of the upper abdomen are unremarkable.   Soft Tissues/Bones: No acute bone or soft tissue abnormality.       ASSESSMENT:  Abnormal CT chest 8/9/2024 right lower lobe consolidation with some mucus plugging   COPD  Hypoxia-secondary to above  Bilateral LE edema-negative Doppler for DVT      PLAN:  Supplemental oxygen to maintain SaO2 >92%; wean as tolerated  Inhaled bronchodilators  Sputum culture  Acapella and Mucinex   Echocardiogram  LE Doppler rule out DVT  Diuresis per internal medicine  Continue Eliquis  Follow-up with Dr. Dickinson as an outpatient in 2 to 4 weeks

## 2024-08-12 NOTE — FLOWSHEET NOTE
08/12/24 0826   Vital Signs   Temp 98 °F (36.7 °C)   Temp Source Oral   Pulse 99   Heart Rate Source Monitor   Respirations 16   /69   MAP (Calculated) 85   BP Location Right upper arm   BP Method Automatic   Patient Position High fowlers   Oxygen Therapy   SpO2 94 %   O2 Device High flow nasal cannula   O2 Flow Rate (L/min) 2 L/min     Patient resting quietly in bed. No s/s of distress noted.  Magnesium replacement infusing.  Shift assessment complete, see flow sheet. Denies needs at this time. Call light in reach. Will monitor.

## 2024-08-13 PROBLEM — Z87.81 HISTORY OF HIP FRACTURE: Status: ACTIVE | Noted: 2024-08-13

## 2024-08-13 NOTE — PROGRESS NOTES
Transport here for patient to take to [facility].   Patient educated on discharge instructions as well as new medications use, dosage, administration and possible side effects.  Patient verified knowledge. IV removed without difficulty and dry dressing in place. Telemetry monitor removed and returned to [facility]. Pt left facility in stable condition to Rehab with all of their personal belongings.

## 2024-08-15 ENCOUNTER — TELEPHONE (OUTPATIENT)
Dept: ORTHOPEDIC SURGERY | Age: 54
End: 2024-08-15

## 2024-08-15 NOTE — TELEPHONE ENCOUNTER
SW patient. Informed her that the facility will take care of her pain medications.     Sx 7/12/24  Informed her that Dr. Man does not typically prescribe pain medication outside of 4 weeks PO. She should try to manage her pain with OTC extra strength tylenol, ice and elevation.

## 2024-08-15 NOTE — TELEPHONE ENCOUNTER
Prescription Refill     Medication Name:  OUT OF PAIN MED BACK AT [facility]   Pharmacy: ??  Patient Contact Number:  [phone]     PLEASE CALL Pt AND LET HER KNOW HOW SHE GETS HER PAIN MEDICATION. SHE WAS HOME, AND NOW, HAD TO GO BACK TO THE FACILITY.     PLEASE CALL TO ADVISE.

## 2024-08-23 ENCOUNTER — TELEPHONE (OUTPATIENT)
Dept: PULMONOLOGY | Age: 54
End: 2024-08-23

## 2024-09-16 NOTE — CARE COORDINATION
Inpatient   Readmission Risk (Low < 19, Mod (19-27), High > 27): Readmission Risk Score: 14.3    Current PCP: Ryanne Gonzalez PA  PCP verified by CM? Yes    Chart Reviewed: Yes      History Provided by: Patient  Patient Orientation: Alert and Oriented, Person, Place    Patient Cognition: Alert    Hospitalization in the last 30 days (Readmission):  No    If yes, Readmission Assessment in  Navigator will be completed.    Advance Directives:      Code Status: Full Code   Patient's Primary Decision Maker is: Legal Next of Kin    Primary Decision Maker: TAYLER BEDOLLA - Child - [phone]    Secondary Decision Maker: BryangregoriomanjinderWaqas - Friend - [phone]    Secondary Decision Maker: Ryanne Hinkle - Friend - [phone]    Discharge Planning:    Patient lives with: Alone (A roommate has 1/2 of the house. Shared kitchen/bath but does not assist in care for patient.) Type of Home: House  Primary Care Giver: Self  Patient Support Systems include: None (Pt states daughter lives in [place] and cannot help her.)   Current Financial resources: Medicaid  Current community resources: None  Current services prior to admission: None            Current DME:              Type of Home Care services:  None    ADLS  Prior functional level: Independent in ADLs/IADLs  Current functional level: Independent in ADLs/IADLs    PT AM-PAC:   /24  OT AM-PAC: 15 /24    Family can provide assistance at DC: No  Would you like Case Management to discuss the discharge plan with any other family members/significant others, and if so, who? No  Plans to Return to Present Housing: Yes  Other Identified Issues/Barriers to RETURNING to current housing: Needs SNF  Potential Assistance needed at discharge: Skilled Nursing Facility, Home Care            Potential DME:    Patient expects to discharge to: House  Plan for transportation at discharge:      Financial    Pay[place]: [place] / Plan: [place]PT [place] / Product Type: *No Product  type* /     Does insurance require precert for SNF: No    Potential assistance Purchasing Medications: No  Meds-to-Beds request: Yes      [facility] [id number] - [place], [place] - [address] - P [phone] - F [phone]  [address]  [place] [address]  Phone: [phone] Fax: [phone]      Notes:    Factors facilitating achievement of predicted outcomes: Friend support    Barriers to discharge: Patient is weak. SNF recommended but patient is requesting home care.    Additional Case Management Notes: Met with the patient at bedside to discuss discharge planning. The patient lives in a house owned by a friend. The patient has a roommate who is not there very often and the roommate does not provide any care for the patient. The patient states she did do her own care but lately she has been too weak to do her own care. The patient is open to home care.    CM attempted to contact the patient's daughter however the number does not work.     CM spoke to Waqas Olea/another friend listed in the chart. Waqas lives in [place]. Waqas advised that the patient was friends with his mother for a few years. Waqas's mother passed away in December and he has allowed the patient to remain in his mother's house and to just pay the utilities and cable etc. Waqas reports the patient's daughter is a drug user and is not helpful to the patient. The patient has a very limited [place] of friends and relatives. JAYESH discussed the possibility of the patient going to rehab for OT/PT and Waqas suggested the patient may not be on board with rehab as she drinks everyday. The patient has her beer delivered and she stays in the house drinking daily. The patient cannot drive because of receiving a DUI. Plan TBD. Following.     The Plan for Transition of Care is related to the following treatment goals of Hypokalemia [E87.6]  Hyponatremia [E87.1]  Prolonged Q-T interval on ECG [R94.31]  Elevated troponin  [No Acute Distress] : in no acute distress [Well developed] : well developed [Well Nourished] : ~L well nourished [Good Hygeine] : demonstrates good hygeine [Oriented x3] : oriented to person, place, and time [Normal Memory] : ~T memory was ~L unimpaired [Normal Mood/Affect] : mood and affect are normal [Warm and Dry] : was warm and dry to touch [Normal Gait] : gait was normal [Vulvar Atrophy] : vulvar atrophy [Labia Majora] : were normal [Labia Minora] : were normal [Atrophy] : atrophy [Erythematous] : erythema [Discharge] : a  ~M vaginal discharge was present [Scant] : scant [Maryanne] : yellow [Thin] : thin [No Bleeding] : there was no active vaginal bleeding [Anxiety] : patient is not anxious

## 2024-09-17 ENCOUNTER — OFFICE VISIT (OUTPATIENT)
Dept: ORTHOPEDIC SURGERY | Age: 54
End: 2024-09-17
Payer: MEDICAID

## 2024-09-17 DIAGNOSIS — S72.141A INTERTROCHANTERIC FRACTURE OF RIGHT FEMUR, CLOSED, INITIAL ENCOUNTER (HCC): Primary | ICD-10-CM

## 2024-09-17 DIAGNOSIS — M16.11 PRIMARY OSTEOARTHRITIS OF RIGHT HIP: ICD-10-CM

## 2024-09-17 DIAGNOSIS — F17.200 CURRENT SMOKER: ICD-10-CM

## 2024-09-17 PROCEDURE — 99214 OFFICE O/P EST MOD 30 MIN: CPT | Performed by: ORTHOPAEDIC SURGERY

## 2024-09-17 PROCEDURE — 20610 DRAIN/INJ JOINT/BURSA W/O US: CPT | Performed by: ORTHOPAEDIC SURGERY

## 2024-09-17 PROCEDURE — 99406 BEHAV CHNG SMOKING 3-10 MIN: CPT | Performed by: ORTHOPAEDIC SURGERY

## 2024-09-17 RX ORDER — TRIAMCINOLONE ACETONIDE 40 MG/ML
40 INJECTION, SUSPENSION INTRA-ARTICULAR; INTRAMUSCULAR ONCE
Status: COMPLETED | OUTPATIENT
Start: 2024-09-17 | End: 2024-09-17

## 2024-09-17 RX ORDER — LIDOCAINE HYDROCHLORIDE 10 MG/ML
4 INJECTION, SOLUTION INFILTRATION; PERINEURAL ONCE
Status: COMPLETED | OUTPATIENT
Start: 2024-09-17 | End: 2024-09-17

## 2024-09-17 RX ADMIN — TRIAMCINOLONE ACETONIDE 40 MG: 40 INJECTION, SUSPENSION INTRA-ARTICULAR; INTRAMUSCULAR at 10:52

## 2024-09-17 RX ADMIN — LIDOCAINE HYDROCHLORIDE 4 ML: 10 INJECTION, SOLUTION INFILTRATION; PERINEURAL at 10:52

## 2024-09-24 ENCOUNTER — HOSPITAL ENCOUNTER (OUTPATIENT)
Dept: PULMONOLOGY | Age: 54
Discharge: HOME | End: 2024-09-24
Attending: INTERNAL MEDICINE
Payer: MEDICAID

## 2024-09-24 DIAGNOSIS — R91.8 LUNG MASS: ICD-10-CM

## 2024-09-24 LAB
DLCO %PRED: 33 %
DLCO PRED: NORMAL
DLCO/VA %PRED: NORMAL
DLCO/VA PRED: NORMAL
DLCO/VA: NORMAL
DLCO: NORMAL
EXPIRATORY TIME-POST: NORMAL
EXPIRATORY TIME: NORMAL
FEF 25-75 %CHNG: NORMAL
FEF 25-75 POST %PRED: NORMAL
FEF 25-75% %PRED-PRE: NORMAL
FEF 25-75% PRED: NORMAL
FEF 25-75-POST: NORMAL
FEF 25-75-PRE: NORMAL
FEV1 %PRED-POST: 27 %
FEV1 %PRED-PRE: 27 %
FEV1 PRED: NORMAL
FEV1-POST: NORMAL
FEV1-PRE: NORMAL
FEV1/FVC %PRED-POST: NORMAL
FEV1/FVC %PRED-PRE: NORMAL
FEV1/FVC PRED: NORMAL
FEV1/FVC-POST: 34 %
FEV1/FVC-PRE: 35 %
FVC %PRED-POST: NORMAL
FVC %PRED-PRE: NORMAL
FVC PRED: NORMAL
FVC-POST: NORMAL
FVC-PRE: NORMAL
GAW %PRED: NORMAL
GAW PRED: NORMAL
GAW: NORMAL
IC PRE %PRED: NORMAL
IC PRED: NORMAL
IC: NORMAL
MEP: NORMAL
MIP: NORMAL
MVV %PRED-PRE: NORMAL
MVV PRED: NORMAL
MVV-PRE: NORMAL
PEF %PRED-POST: NORMAL
PEF %PRED-PRE: NORMAL
PEF PRED: NORMAL
PEF%CHNG: NORMAL
PEF-POST: NORMAL
PEF-PRE: NORMAL
RAW %PRED: NORMAL
RAW PRED: NORMAL
RAW: NORMAL
RV PRE %PRED: NORMAL
RV PRED: NORMAL
RV: NORMAL
SVC %PRED: NORMAL
SVC PRED: NORMAL
SVC: NORMAL
TLC PRE %PRED: 137 %
TLC PRED: NORMAL
TLC: NORMAL
VA %PRED: NORMAL
VA PRED: NORMAL
VA: NORMAL
VTG %PRED: NORMAL
VTG PRED: NORMAL
VTG: NORMAL

## 2024-09-24 PROCEDURE — 94060 EVALUATION OF WHEEZING: CPT

## 2024-09-24 PROCEDURE — 94729 DIFFUSING CAPACITY: CPT

## 2024-09-24 PROCEDURE — 94726 PLETHYSMOGRAPHY LUNG VOLUMES: CPT

## 2024-09-24 PROCEDURE — 94618 PULMONARY STRESS TESTING: CPT

## 2024-09-24 PROCEDURE — 94640 AIRWAY INHALATION TREATMENT: CPT

## 2024-09-24 RX ORDER — ALBUTEROL SULFATE 90 UG/1
4 INHALANT RESPIRATORY (INHALATION) ONCE
Status: DISCONTINUED | OUTPATIENT
Start: 2024-09-24 | End: 2024-09-25 | Stop reason: HOSPADM

## 2024-09-24 ASSESSMENT — PULMONARY FUNCTION TESTS
FEV1_PERCENT_PREDICTED_POST: 27
FEV1/FVC_POST: 34
FEV1/FVC_PRE: 35
FEV1_PERCENT_PREDICTED_PRE: 27

## 2024-09-25 PROBLEM — R91.8 LUNG MASS: Status: ACTIVE | Noted: 2024-09-25

## 2024-10-17 ENCOUNTER — OFFICE VISIT (OUTPATIENT)
Dept: PULMONOLOGY | Age: 54
End: 2024-10-17
Payer: MEDICAID

## 2024-10-17 VITALS
HEART RATE: 130 BPM | DIASTOLIC BLOOD PRESSURE: 70 MMHG | BODY MASS INDEX: 18.65 KG/M2 | SYSTOLIC BLOOD PRESSURE: 122 MMHG | OXYGEN SATURATION: 94 % | HEIGHT: 60 IN | RESPIRATION RATE: 18 BRPM | WEIGHT: 95 LBS

## 2024-10-17 DIAGNOSIS — J44.9 COPD, SEVERE (HCC): Primary | ICD-10-CM

## 2024-10-17 PROCEDURE — 99214 OFFICE O/P EST MOD 30 MIN: CPT | Performed by: INTERNAL MEDICINE

## 2024-10-17 RX ORDER — BUDESONIDE, GLYCOPYRROLATE, AND FORMOTEROL FUMARATE 160; 9; 4.8 UG/1; UG/1; UG/1
2 AEROSOL, METERED RESPIRATORY (INHALATION) 2 TIMES DAILY
Qty: 1 EACH | Refills: 1 | Status: SHIPPED | OUTPATIENT
Start: 2024-10-17

## 2024-10-17 RX ORDER — BUDESONIDE, GLYCOPYRROLATE, AND FORMOTEROL FUMARATE 160; 9; 4.8 UG/1; UG/1; UG/1
2 AEROSOL, METERED RESPIRATORY (INHALATION) 2 TIMES DAILY
Qty: 1 EACH | Refills: 0 | Status: SHIPPED | COMMUNITY
Start: 2024-10-17

## 2024-10-17 NOTE — PROGRESS NOTES
[facility]                                 Pulmonary/Critical care  Consult /Progress Note :                                                                  Follow lung mass\    HPI  Jessica has smoek for 40 yaears and she smoke 1 pack    She was admitted to the hospital in January 2024 with hyponatremia    During that admission CAT scan shows right upper lobe cavitary lesion    She was treated with antibiotic for possible new    She had CAT scan 2022 shows cavitary lesion 1.4 cm    She also mentioned that she had pneumothorax on that side s/p surgery  She used Spiriva and Symbicort        She also has history of DVT/PE of anticoagulant      Today visit  Came for follow up visit   She has been doing great  No Chest pain   No fever or chills      PHYSICAL EXAM:  Blood pressure 122/70, pulse (!) 130, resp. rate 18, height 1.524 m (5'), weight 43.1 kg (95 lb), last menstrual period 07/12/2015, SpO2 94%, not currently breastfeeding.' on RA  Gen: No distress.   Eyes: PERRL. No sclera icterus. No conjunctival injection.   ENT: No discharge. Pharynx clear.   Neck: Trachea midline. No obvious mass.    Resp: Rhonchi bilateral   CV: Regular rate. Regular rhythm. No murmur or rub. No edema. Peripheral pulses are 2+.  Capillary refill is less than 3 seconds.  GI: Non-tender. Non-distended. No hernia.   Skin: Warm and dry. No nodule on exposed extremities.   Lymph: No cervical LAD. No supraclavicular LAD.   M/S: No cyanosis. No joint deformity. No clubbing.   Neuro: Awake. Alert. Moves all four extremities.   Psych: Oriented x 3. No anxiety.     LABS:Microbiology:  Ordered    Imaging:  Chest imaging was reviewed by me and showed RUL   CT scan right upper lobe cavitary              ASSESSMENT:    Right upper lobe cavitary mass/seems chronic ,possible aspergilloma vs other   Smoking   COPD,very severe DEV1 27 %     PLAN:    We compare CT

## 2024-10-22 ENCOUNTER — OFFICE VISIT (OUTPATIENT)
Dept: ORTHOPEDIC SURGERY | Age: 54
End: 2024-10-22
Payer: MEDICAID

## 2024-10-22 VITALS — BODY MASS INDEX: 18.65 KG/M2 | WEIGHT: 95 LBS | HEIGHT: 60 IN

## 2024-10-22 DIAGNOSIS — M16.11 PRIMARY OSTEOARTHRITIS OF RIGHT HIP: ICD-10-CM

## 2024-10-22 DIAGNOSIS — S72.141A INTERTROCHANTERIC FRACTURE OF RIGHT FEMUR, CLOSED, INITIAL ENCOUNTER (HCC): ICD-10-CM

## 2024-10-22 DIAGNOSIS — F17.200 CURRENT SMOKER: ICD-10-CM

## 2024-10-22 DIAGNOSIS — M17.11 ARTHRITIS OF RIGHT KNEE: Primary | ICD-10-CM

## 2024-10-22 PROCEDURE — 99214 OFFICE O/P EST MOD 30 MIN: CPT | Performed by: ORTHOPAEDIC SURGERY

## 2024-10-22 PROCEDURE — 99406 BEHAV CHNG SMOKING 3-10 MIN: CPT | Performed by: ORTHOPAEDIC SURGERY

## 2024-10-22 NOTE — PROGRESS NOTES
CHIEF COMPLAINT:   1- Right knee pain/osteoarthritis.  2- Right intertrochanteric femur comminuted fracture, status post Gamma nail.    DATE OF SURGERY:  7/12/2024 .  HISTORY:  Ms. Serrano 54 y.o.  female presents today for evaluation of right knee pain which started months ago but got worse in Aug 2024. She had right knee cortisone injection on 9/17/2024 with good improvement.  She is complaining of sharp pain.  Pain is increase with standing and walking and decrease with rest. Pain is sharp early in the morning with first few steps, dull achy pain by the end of the day. Alleviating factors: rest. No radiation and no numbness and tingling sensation. No other complaint.  No h/o gout.    She also came in today for 3 months postoperative visit.  The patient denies any significant pain in the right hip. Rates pain a 7/10 VAS moderate, aching, constant and show no change. Aggravating factors walking. Alleviating factors rest. She has been working on ROM and WBAT.  No numbness or tingling sensation. No fever or Chills. She has a h/o heavy alcohol use. She is a smoker. She is in a rehab center working with PT. She is on SSI.       Past Medical History:   Diagnosis Date    A-fib (HCC)     Anemia     Anxiety     Chronic pancreatitis (HCC)     Collapse of right lung     COPD (chronic obstructive pulmonary disease) (HCC)     COPD (chronic obstructive pulmonary disease) (HCC)     Depression     GI bleed     H/O colonoscopy     Hypertension     Pancreatitis     Pulmonary embolism (HCC) 02/2018       Past Surgical History:   Procedure Laterality Date    HIP SURGERY Right 7/12/2024    RIGHT FEMUR GAMMA NAILING performed by Simba Man MD at [facility] OR    UPPER GASTROINTESTINAL ENDOSCOPY         Social History     Socioeconomic History    Marital status: Single     Spouse name: Not on file    Number of children: Not on file    Years of education: Not on file    Highest education level: Not on file   Occupational

## 2024-10-29 ENCOUNTER — TELEPHONE (OUTPATIENT)
Dept: PULMONOLOGY | Age: 54
End: 2024-10-29

## 2024-10-29 NOTE — TELEPHONE ENCOUNTER
CYNTHIA from [facility] called. Breztri is not covered by patient's insurance, but she is also using Symbicort. The pharmacist @ [facility] says she should not be on both inhalers. Can patient either discontinue the Breztri and just use the Symbicort or can a Prior Authorization be done for the Breztri and discontinue the Symbicort?    Please call Cynthia @ [phone]

## 2024-10-29 NOTE — TELEPHONE ENCOUNTER
Will call [facility] to verify her to stop symbicort while using the Breztri, until we know if she can get Breztri     Spoke to Vi, she will have the nursing home pharmacy to try for a PA. If this is not covered, then we will ask for other choices for inhalers

## 2024-11-04 ENCOUNTER — TELEPHONE (OUTPATIENT)
Dept: PULMONOLOGY | Age: 54
End: 2024-11-04

## 2024-11-04 NOTE — TELEPHONE ENCOUNTER
Pt called in stated that she is wanting her O2.   I did not see any orders placed in for Pt for o2   Please advise If pt needs o2 if so what lpm so I can place and [place] [facility] [place]

## 2024-11-08 DIAGNOSIS — J44.9 COPD, SEVERE (HCC): ICD-10-CM

## 2024-11-08 DIAGNOSIS — J44.9 CHRONIC OBSTRUCTIVE PULMONARY DISEASE, UNSPECIFIED COPD TYPE (HCC): Primary | ICD-10-CM

## 2024-11-08 NOTE — TELEPHONE ENCOUNTER
Patient came into office for 6MW Test in a wheelchair when checking o2 stat she was at 81% Patient said that she did have oxygen now. She said that she did before she went into a nursing home and when she came home her oxygen was gone anw she doesn't have oxygen anymore. I told the patient that her Stat where only at 81% on room air and she would need to go to the ED. She then got upset and said that she was not going and got up and left. Patient was getting oxygen through [employer] so I going to send over orders, chart note and testing.

## 2024-11-09 ENCOUNTER — APPOINTMENT (OUTPATIENT)
Dept: GENERAL RADIOLOGY | Age: 54
DRG: 426 | End: 2024-11-09
Payer: MEDICAID

## 2024-11-09 ENCOUNTER — HOSPITAL ENCOUNTER (INPATIENT)
Age: 54
LOS: 3 days | Discharge: HOME | DRG: 426 | End: 2024-11-12
Attending: STUDENT IN AN ORGANIZED HEALTH CARE EDUCATION/TRAINING PROGRAM | Admitting: STUDENT IN AN ORGANIZED HEALTH CARE EDUCATION/TRAINING PROGRAM
Payer: MEDICAID

## 2024-11-09 DIAGNOSIS — R09.02 HYPOXEMIA: ICD-10-CM

## 2024-11-09 DIAGNOSIS — E87.1 HYPONATREMIA: Primary | ICD-10-CM

## 2024-11-09 DIAGNOSIS — J18.9 PNEUMONIA OF RIGHT LUNG DUE TO INFECTIOUS ORGANISM, UNSPECIFIED PART OF LUNG: ICD-10-CM

## 2024-11-09 DIAGNOSIS — R07.89 CHEST DISCOMFORT: ICD-10-CM

## 2024-11-09 LAB
ALBUMIN SERPL-MCNC: 3.7 G/DL (ref 3.4–5)
ALBUMIN/GLOB SERPL: 1.2 {RATIO} (ref 1.1–2.2)
ALP SERPL-CCNC: 162 U/L (ref 40–129)
ALT SERPL-CCNC: 19 U/L (ref 10–40)
ANION GAP SERPL CALCULATED.3IONS-SCNC: 16 MMOL/L (ref 3–16)
ANISOCYTOSIS BLD QL SMEAR: ABNORMAL
AST SERPL-CCNC: 48 U/L (ref 15–37)
BASOPHILS # BLD: 0 K/UL (ref 0–0.2)
BASOPHILS NFR BLD: 0 %
BILIRUB SERPL-MCNC: <0.2 MG/DL (ref 0–1)
BUN SERPL-MCNC: 5 MG/DL (ref 7–20)
CALCIUM SERPL-MCNC: 7.7 MG/DL (ref 8.3–10.6)
CHLORIDE SERPL-SCNC: 77 MMOL/L (ref 99–110)
CO2 SERPL-SCNC: 23 MMOL/L (ref 21–32)
CREAT SERPL-MCNC: 0.3 MG/DL (ref 0.6–1.1)
DEPRECATED RDW RBC AUTO: 18.9 % (ref 12.4–15.4)
EOSINOPHIL # BLD: 0.1 K/UL (ref 0–0.6)
EOSINOPHIL NFR BLD: 2 %
FLUAV RNA RESP QL NAA+PROBE: NOT DETECTED
FLUBV RNA RESP QL NAA+PROBE: NOT DETECTED
GFR SERPLBLD CREATININE-BSD FMLA CKD-EPI: >90 ML/MIN/{1.73_M2}
GLUCOSE SERPL-MCNC: 83 MG/DL (ref 70–99)
HCT VFR BLD AUTO: 29.1 % (ref 36–48)
HGB BLD-MCNC: 9.8 G/DL (ref 12–16)
LYMPHOCYTES # BLD: 2.4 K/UL (ref 1–5.1)
LYMPHOCYTES NFR BLD: 33 %
MCH RBC QN AUTO: 25.5 PG (ref 26–34)
MCHC RBC AUTO-ENTMCNC: 33.7 G/DL (ref 31–36)
MCV RBC AUTO: 75.9 FL (ref 80–100)
MONOCYTES # BLD: 0.4 K/UL (ref 0–1.3)
MONOCYTES NFR BLD: 6 %
NEUTROPHILS # BLD: 4.2 K/UL (ref 1.7–7.7)
NEUTROPHILS NFR BLD: 59 %
PLATELET # BLD AUTO: 287 K/UL (ref 135–450)
PLATELET BLD QL SMEAR: ADEQUATE
PMV BLD AUTO: 6.1 FL (ref 5–10.5)
POIKILOCYTOSIS BLD QL SMEAR: ABNORMAL
POLYCHROMASIA BLD QL SMEAR: ABNORMAL
POTASSIUM SERPL-SCNC: 4.2 MMOL/L (ref 3.5–5.1)
PROT SERPL-MCNC: 6.7 G/DL (ref 6.4–8.2)
RBC # BLD AUTO: 3.83 M/UL (ref 4–5.2)
REASON FOR REJECTION: NORMAL
REJECTED TEST: NORMAL
SARS-COV-2 RNA RESP QL NAA+PROBE: NOT DETECTED
SLIDE REVIEW: ABNORMAL
SODIUM SERPL-SCNC: 116 MMOL/L (ref 136–145)
TARGETS BLD QL SMEAR: ABNORMAL
TROPONIN, HIGH SENSITIVITY: 17 NG/L (ref 0–14)
WBC # BLD AUTO: 7.2 K/UL (ref 4–11)

## 2024-11-09 PROCEDURE — 6360000002 HC RX W HCPCS: Performed by: STUDENT IN AN ORGANIZED HEALTH CARE EDUCATION/TRAINING PROGRAM

## 2024-11-09 PROCEDURE — 93005 ELECTROCARDIOGRAM TRACING: CPT | Performed by: STUDENT IN AN ORGANIZED HEALTH CARE EDUCATION/TRAINING PROGRAM

## 2024-11-09 PROCEDURE — 84484 ASSAY OF TROPONIN QUANT: CPT

## 2024-11-09 PROCEDURE — 6370000000 HC RX 637 (ALT 250 FOR IP): Performed by: STUDENT IN AN ORGANIZED HEALTH CARE EDUCATION/TRAINING PROGRAM

## 2024-11-09 PROCEDURE — 99285 EMERGENCY DEPT VISIT HI MDM: CPT

## 2024-11-09 PROCEDURE — 80053 COMPREHEN METABOLIC PANEL: CPT

## 2024-11-09 PROCEDURE — 2700000000 HC OXYGEN THERAPY PER DAY

## 2024-11-09 PROCEDURE — 2000000000 HC ICU R&B

## 2024-11-09 PROCEDURE — 2580000003 HC RX 258: Performed by: STUDENT IN AN ORGANIZED HEALTH CARE EDUCATION/TRAINING PROGRAM

## 2024-11-09 PROCEDURE — 71046 X-RAY EXAM CHEST 2 VIEWS: CPT

## 2024-11-09 PROCEDURE — 85025 COMPLETE CBC W/AUTO DIFF WBC: CPT

## 2024-11-09 PROCEDURE — 94761 N-INVAS EAR/PLS OXIMETRY MLT: CPT

## 2024-11-09 PROCEDURE — 87636 SARSCOV2 & INF A&B AMP PRB: CPT

## 2024-11-09 RX ORDER — GUAIFENESIN 600 MG/1
1200 TABLET, EXTENDED RELEASE ORAL 2 TIMES DAILY
Status: DISCONTINUED | OUTPATIENT
Start: 2024-11-09 | End: 2024-11-12 | Stop reason: HOSPADM

## 2024-11-09 RX ORDER — ACETAMINOPHEN 650 MG/1
650 SUPPOSITORY RECTAL EVERY 6 HOURS PRN
Status: DISCONTINUED | OUTPATIENT
Start: 2024-11-09 | End: 2024-11-12 | Stop reason: HOSPADM

## 2024-11-09 RX ORDER — NICOTINE 21 MG/24HR
1 PATCH, TRANSDERMAL 24 HOURS TRANSDERMAL DAILY
Status: DISCONTINUED | OUTPATIENT
Start: 2024-11-09 | End: 2024-11-12 | Stop reason: HOSPADM

## 2024-11-09 RX ORDER — IPRATROPIUM BROMIDE AND ALBUTEROL SULFATE 2.5; .5 MG/3ML; MG/3ML
1 SOLUTION RESPIRATORY (INHALATION) ONCE
Status: COMPLETED | OUTPATIENT
Start: 2024-11-09 | End: 2024-11-09

## 2024-11-09 RX ORDER — ONDANSETRON 2 MG/ML
4 INJECTION INTRAMUSCULAR; INTRAVENOUS EVERY 6 HOURS PRN
Status: DISCONTINUED | OUTPATIENT
Start: 2024-11-09 | End: 2024-11-12 | Stop reason: HOSPADM

## 2024-11-09 RX ORDER — IPRATROPIUM BROMIDE AND ALBUTEROL SULFATE 2.5; .5 MG/3ML; MG/3ML
1 SOLUTION RESPIRATORY (INHALATION)
Status: DISCONTINUED | OUTPATIENT
Start: 2024-11-09 | End: 2024-11-12

## 2024-11-09 RX ORDER — ONDANSETRON 4 MG/1
4 TABLET, ORALLY DISINTEGRATING ORAL EVERY 8 HOURS PRN
Status: DISCONTINUED | OUTPATIENT
Start: 2024-11-09 | End: 2024-11-12 | Stop reason: HOSPADM

## 2024-11-09 RX ORDER — POLYETHYLENE GLYCOL 3350 17 G/17G
17 POWDER, FOR SOLUTION ORAL DAILY PRN
Status: DISCONTINUED | OUTPATIENT
Start: 2024-11-09 | End: 2024-11-12 | Stop reason: HOSPADM

## 2024-11-09 RX ORDER — ACETAMINOPHEN 325 MG/1
650 TABLET ORAL EVERY 6 HOURS PRN
Status: DISCONTINUED | OUTPATIENT
Start: 2024-11-09 | End: 2024-11-12 | Stop reason: HOSPADM

## 2024-11-09 RX ORDER — IPRATROPIUM BROMIDE AND ALBUTEROL SULFATE 2.5; .5 MG/3ML; MG/3ML
1 SOLUTION RESPIRATORY (INHALATION)
Status: DISCONTINUED | OUTPATIENT
Start: 2024-11-10 | End: 2024-11-09

## 2024-11-09 RX ORDER — SODIUM CHLORIDE 9 MG/ML
INJECTION, SOLUTION INTRAVENOUS PRN
Status: DISCONTINUED | OUTPATIENT
Start: 2024-11-09 | End: 2024-11-12 | Stop reason: HOSPADM

## 2024-11-09 RX ORDER — SODIUM CHLORIDE 0.9 % (FLUSH) 0.9 %
5-40 SYRINGE (ML) INJECTION EVERY 12 HOURS SCHEDULED
Status: DISCONTINUED | OUTPATIENT
Start: 2024-11-09 | End: 2024-11-12 | Stop reason: HOSPADM

## 2024-11-09 RX ORDER — IPRATROPIUM BROMIDE AND ALBUTEROL SULFATE 2.5; .5 MG/3ML; MG/3ML
1 SOLUTION RESPIRATORY (INHALATION) EVERY 4 HOURS PRN
Status: DISCONTINUED | OUTPATIENT
Start: 2024-11-09 | End: 2024-11-12

## 2024-11-09 RX ORDER — SODIUM CHLORIDE 0.9 % (FLUSH) 0.9 %
5-40 SYRINGE (ML) INJECTION PRN
Status: DISCONTINUED | OUTPATIENT
Start: 2024-11-09 | End: 2024-11-12 | Stop reason: HOSPADM

## 2024-11-09 RX ORDER — ENOXAPARIN SODIUM 100 MG/ML
30 INJECTION SUBCUTANEOUS DAILY
Status: DISCONTINUED | OUTPATIENT
Start: 2024-11-10 | End: 2024-11-12 | Stop reason: HOSPADM

## 2024-11-09 RX ADMIN — WATER 60 MG: 1 INJECTION INTRAMUSCULAR; INTRAVENOUS; SUBCUTANEOUS at 22:13

## 2024-11-09 RX ADMIN — IPRATROPIUM BROMIDE AND ALBUTEROL SULFATE 1 DOSE: 2.5; .5 SOLUTION RESPIRATORY (INHALATION) at 20:55

## 2024-11-09 ASSESSMENT — PAIN DESCRIPTION - DESCRIPTORS: DESCRIPTORS: CRUSHING

## 2024-11-09 ASSESSMENT — PAIN DESCRIPTION - ORIENTATION: ORIENTATION: LEFT

## 2024-11-09 ASSESSMENT — PAIN DESCRIPTION - LOCATION: LOCATION: CHEST

## 2024-11-09 ASSESSMENT — PAIN DESCRIPTION - FREQUENCY: FREQUENCY: INTERMITTENT

## 2024-11-09 ASSESSMENT — PAIN - FUNCTIONAL ASSESSMENT: PAIN_FUNCTIONAL_ASSESSMENT: 0-10

## 2024-11-09 ASSESSMENT — PAIN DESCRIPTION - PAIN TYPE: TYPE: ACUTE PAIN

## 2024-11-09 ASSESSMENT — PAIN SCALES - GENERAL: PAINLEVEL_OUTOF10: 6

## 2024-11-09 ASSESSMENT — LIFESTYLE VARIABLES
HOW OFTEN DO YOU HAVE A DRINK CONTAINING ALCOHOL: MONTHLY OR LESS
HOW MANY STANDARD DRINKS CONTAINING ALCOHOL DO YOU HAVE ON A TYPICAL DAY: 3 OR 4

## 2024-11-10 PROBLEM — J18.9 PNEUMONIA OF RIGHT LUNG DUE TO INFECTIOUS ORGANISM: Status: ACTIVE | Noted: 2024-11-10

## 2024-11-10 LAB
ANION GAP SERPL CALCULATED.3IONS-SCNC: 10 MMOL/L (ref 3–16)
ANION GAP SERPL CALCULATED.3IONS-SCNC: 11 MMOL/L (ref 3–16)
ANION GAP SERPL CALCULATED.3IONS-SCNC: 13 MMOL/L (ref 3–16)
ANION GAP SERPL CALCULATED.3IONS-SCNC: 15 MMOL/L (ref 3–16)
ANION GAP SERPL CALCULATED.3IONS-SCNC: 15 MMOL/L (ref 3–16)
ANION GAP SERPL CALCULATED.3IONS-SCNC: 9 MMOL/L (ref 3–16)
BASOPHILS # BLD: 0.1 K/UL (ref 0–0.2)
BASOPHILS NFR BLD: 1.5 %
BUN SERPL-MCNC: 10 MG/DL (ref 7–20)
BUN SERPL-MCNC: 11 MG/DL (ref 7–20)
BUN SERPL-MCNC: 14 MG/DL (ref 7–20)
BUN SERPL-MCNC: 4 MG/DL (ref 7–20)
BUN SERPL-MCNC: 5 MG/DL (ref 7–20)
BUN SERPL-MCNC: 7 MG/DL (ref 7–20)
CALCIUM SERPL-MCNC: 7.6 MG/DL (ref 8.3–10.6)
CALCIUM SERPL-MCNC: 7.7 MG/DL (ref 8.3–10.6)
CALCIUM SERPL-MCNC: 7.8 MG/DL (ref 8.3–10.6)
CALCIUM SERPL-MCNC: 7.9 MG/DL (ref 8.3–10.6)
CALCIUM SERPL-MCNC: 8 MG/DL (ref 8.3–10.6)
CALCIUM SERPL-MCNC: 8.1 MG/DL (ref 8.3–10.6)
CHLORIDE SERPL-SCNC: 82 MMOL/L (ref 99–110)
CHLORIDE SERPL-SCNC: 84 MMOL/L (ref 99–110)
CHLORIDE SERPL-SCNC: 84 MMOL/L (ref 99–110)
CHLORIDE SERPL-SCNC: 85 MMOL/L (ref 99–110)
CHLORIDE SERPL-SCNC: 86 MMOL/L (ref 99–110)
CHLORIDE SERPL-SCNC: 86 MMOL/L (ref 99–110)
CHLORIDE UR-SCNC: <20 MMOL/L
CO2 SERPL-SCNC: 26 MMOL/L (ref 21–32)
CO2 SERPL-SCNC: 26 MMOL/L (ref 21–32)
CO2 SERPL-SCNC: 27 MMOL/L (ref 21–32)
CO2 SERPL-SCNC: 29 MMOL/L (ref 21–32)
CREAT SERPL-MCNC: 0.3 MG/DL (ref 0.6–1.1)
CREAT SERPL-MCNC: 0.4 MG/DL (ref 0.6–1.1)
CREAT SERPL-MCNC: 0.5 MG/DL (ref 0.6–1.1)
CREAT SERPL-MCNC: <0.2 MG/DL (ref 0.6–1.1)
CREAT UR-MCNC: 14 MG/DL (ref 28–259)
DEPRECATED RDW RBC AUTO: 19 % (ref 12.4–15.4)
EKG ATRIAL RATE: 90 BPM
EKG DIAGNOSIS: NORMAL
EKG P AXIS: 82 DEGREES
EKG P-R INTERVAL: 142 MS
EKG Q-T INTERVAL: 372 MS
EKG QRS DURATION: 88 MS
EKG QTC CALCULATION (BAZETT): 455 MS
EKG R AXIS: 100 DEGREES
EKG T AXIS: 76 DEGREES
EKG VENTRICULAR RATE: 90 BPM
EOSINOPHIL # BLD: 0 K/UL (ref 0–0.6)
EOSINOPHIL NFR BLD: 0.1 %
GFR SERPLBLD CREATININE-BSD FMLA CKD-EPI: >90 ML/MIN/{1.73_M2}
GLUCOSE SERPL-MCNC: 101 MG/DL (ref 70–99)
GLUCOSE SERPL-MCNC: 134 MG/DL (ref 70–99)
GLUCOSE SERPL-MCNC: 219 MG/DL (ref 70–99)
GLUCOSE SERPL-MCNC: 254 MG/DL (ref 70–99)
GLUCOSE SERPL-MCNC: 313 MG/DL (ref 70–99)
GLUCOSE SERPL-MCNC: 87 MG/DL (ref 70–99)
HCT VFR BLD AUTO: 30.5 % (ref 36–48)
HGB BLD-MCNC: 10.1 G/DL (ref 12–16)
LYMPHOCYTES # BLD: 0.1 K/UL (ref 1–5.1)
LYMPHOCYTES NFR BLD: 1.8 %
MCH RBC QN AUTO: 24.9 PG (ref 26–34)
MCHC RBC AUTO-ENTMCNC: 33.3 G/DL (ref 31–36)
MCV RBC AUTO: 74.9 FL (ref 80–100)
MONOCYTES # BLD: 0.2 K/UL (ref 0–1.3)
MONOCYTES NFR BLD: 5.2 %
NEUTROPHILS # BLD: 4 K/UL (ref 1.7–7.7)
NEUTROPHILS NFR BLD: 91.4 %
OSMOLALITY UR: 100 MOSM/KG (ref 390–1070)
PLATELET # BLD AUTO: 328 K/UL (ref 135–450)
PMV BLD AUTO: 6.7 FL (ref 5–10.5)
POTASSIUM SERPL-SCNC: 3.8 MMOL/L (ref 3.5–5.1)
POTASSIUM SERPL-SCNC: 3.9 MMOL/L (ref 3.5–5.1)
POTASSIUM SERPL-SCNC: 4.1 MMOL/L (ref 3.5–5.1)
POTASSIUM SERPL-SCNC: 4.1 MMOL/L (ref 3.5–5.1)
POTASSIUM SERPL-SCNC: 4.4 MMOL/L (ref 3.5–5.1)
POTASSIUM SERPL-SCNC: 4.9 MMOL/L (ref 3.5–5.1)
PROT UR-MCNC: 6 MG/DL
PROT/CREAT UR-RTO: 0.4 MG/DL
RBC # BLD AUTO: 4.07 M/UL (ref 4–5.2)
SODIUM SERPL-SCNC: 122 MMOL/L (ref 136–145)
SODIUM SERPL-SCNC: 123 MMOL/L (ref 136–145)
SODIUM SERPL-SCNC: 124 MMOL/L (ref 136–145)
SODIUM SERPL-SCNC: 125 MMOL/L (ref 136–145)
SODIUM SERPL-SCNC: 126 MMOL/L (ref 136–145)
SODIUM SERPL-SCNC: 126 MMOL/L (ref 136–145)
SODIUM UR-SCNC: <20 MMOL/L
WBC # BLD AUTO: 4.3 K/UL (ref 4–11)

## 2024-11-10 PROCEDURE — 94761 N-INVAS EAR/PLS OXIMETRY MLT: CPT

## 2024-11-10 PROCEDURE — 99233 SBSQ HOSP IP/OBS HIGH 50: CPT | Performed by: INTERNAL MEDICINE

## 2024-11-10 PROCEDURE — 99223 1ST HOSP IP/OBS HIGH 75: CPT | Performed by: INTERNAL MEDICINE

## 2024-11-10 PROCEDURE — 6360000002 HC RX W HCPCS: Performed by: STUDENT IN AN ORGANIZED HEALTH CARE EDUCATION/TRAINING PROGRAM

## 2024-11-10 PROCEDURE — 83935 ASSAY OF URINE OSMOLALITY: CPT

## 2024-11-10 PROCEDURE — 94640 AIRWAY INHALATION TREATMENT: CPT

## 2024-11-10 PROCEDURE — 6360000002 HC RX W HCPCS: Performed by: INTERNAL MEDICINE

## 2024-11-10 PROCEDURE — 2580000003 HC RX 258: Performed by: INTERNAL MEDICINE

## 2024-11-10 PROCEDURE — 6370000000 HC RX 637 (ALT 250 FOR IP): Performed by: STUDENT IN AN ORGANIZED HEALTH CARE EDUCATION/TRAINING PROGRAM

## 2024-11-10 PROCEDURE — 2580000003 HC RX 258: Performed by: STUDENT IN AN ORGANIZED HEALTH CARE EDUCATION/TRAINING PROGRAM

## 2024-11-10 PROCEDURE — 6370000000 HC RX 637 (ALT 250 FOR IP): Performed by: INTERNAL MEDICINE

## 2024-11-10 PROCEDURE — 93010 ELECTROCARDIOGRAM REPORT: CPT | Performed by: INTERNAL MEDICINE

## 2024-11-10 PROCEDURE — 85025 COMPLETE CBC W/AUTO DIFF WBC: CPT

## 2024-11-10 PROCEDURE — 36415 COLL VENOUS BLD VENIPUNCTURE: CPT

## 2024-11-10 PROCEDURE — 82570 ASSAY OF URINE CREATININE: CPT

## 2024-11-10 PROCEDURE — 2060000000 HC ICU INTERMEDIATE R&B

## 2024-11-10 PROCEDURE — 84156 ASSAY OF PROTEIN URINE: CPT

## 2024-11-10 PROCEDURE — 82436 ASSAY OF URINE CHLORIDE: CPT

## 2024-11-10 PROCEDURE — 84300 ASSAY OF URINE SODIUM: CPT

## 2024-11-10 PROCEDURE — 2700000000 HC OXYGEN THERAPY PER DAY

## 2024-11-10 PROCEDURE — 80048 BASIC METABOLIC PNL TOTAL CA: CPT

## 2024-11-10 RX ORDER — BUDESONIDE AND FORMOTEROL FUMARATE DIHYDRATE 160; 4.5 UG/1; UG/1
2 AEROSOL RESPIRATORY (INHALATION)
Status: DISCONTINUED | OUTPATIENT
Start: 2024-11-10 | End: 2024-11-12 | Stop reason: HOSPADM

## 2024-11-10 RX ORDER — 3% SODIUM CHLORIDE 3 G/100ML
25 INJECTION, SOLUTION INTRAVENOUS CONTINUOUS
Status: DISCONTINUED | OUTPATIENT
Start: 2024-11-10 | End: 2024-11-10

## 2024-11-10 RX ORDER — LORAZEPAM 2 MG/1
2 TABLET ORAL
Status: DISCONTINUED | OUTPATIENT
Start: 2024-11-10 | End: 2024-11-12 | Stop reason: HOSPADM

## 2024-11-10 RX ORDER — LORAZEPAM 1 MG/1
1 TABLET ORAL
Status: DISCONTINUED | OUTPATIENT
Start: 2024-11-10 | End: 2024-11-12 | Stop reason: HOSPADM

## 2024-11-10 RX ORDER — PREDNISONE 20 MG/1
40 TABLET ORAL DAILY
Status: DISCONTINUED | OUTPATIENT
Start: 2024-11-11 | End: 2024-11-12 | Stop reason: HOSPADM

## 2024-11-10 RX ORDER — LANOLIN ALCOHOL/MO/W.PET/CERES
100 CREAM (GRAM) TOPICAL DAILY
Status: DISCONTINUED | OUTPATIENT
Start: 2024-11-10 | End: 2024-11-12 | Stop reason: HOSPADM

## 2024-11-10 RX ORDER — CHLORDIAZEPOXIDE HYDROCHLORIDE 10 MG/1
10 CAPSULE, GELATIN COATED ORAL 3 TIMES DAILY
Status: DISCONTINUED | OUTPATIENT
Start: 2024-11-10 | End: 2024-11-12 | Stop reason: HOSPADM

## 2024-11-10 RX ORDER — SODIUM CHLORIDE 9 MG/ML
INJECTION, SOLUTION INTRAVENOUS PRN
Status: DISCONTINUED | OUTPATIENT
Start: 2024-11-10 | End: 2024-11-12 | Stop reason: HOSPADM

## 2024-11-10 RX ORDER — LORAZEPAM 2 MG/ML
1 INJECTION INTRAMUSCULAR
Status: DISCONTINUED | OUTPATIENT
Start: 2024-11-10 | End: 2024-11-12 | Stop reason: HOSPADM

## 2024-11-10 RX ORDER — SODIUM CHLORIDE 0.9 % (FLUSH) 0.9 %
5-40 SYRINGE (ML) INJECTION EVERY 12 HOURS SCHEDULED
Status: DISCONTINUED | OUTPATIENT
Start: 2024-11-10 | End: 2024-11-12 | Stop reason: HOSPADM

## 2024-11-10 RX ORDER — LORAZEPAM 2 MG/ML
3 INJECTION INTRAMUSCULAR
Status: DISCONTINUED | OUTPATIENT
Start: 2024-11-10 | End: 2024-11-12 | Stop reason: HOSPADM

## 2024-11-10 RX ORDER — PANTOPRAZOLE SODIUM 40 MG/1
40 TABLET, DELAYED RELEASE ORAL
Status: DISCONTINUED | OUTPATIENT
Start: 2024-11-10 | End: 2024-11-12 | Stop reason: HOSPADM

## 2024-11-10 RX ORDER — LORAZEPAM 2 MG/ML
4 INJECTION INTRAMUSCULAR
Status: DISCONTINUED | OUTPATIENT
Start: 2024-11-10 | End: 2024-11-12 | Stop reason: HOSPADM

## 2024-11-10 RX ORDER — LORAZEPAM 2 MG/1
4 TABLET ORAL
Status: DISCONTINUED | OUTPATIENT
Start: 2024-11-10 | End: 2024-11-12 | Stop reason: HOSPADM

## 2024-11-10 RX ORDER — SODIUM CHLORIDE 0.9 % (FLUSH) 0.9 %
5-40 SYRINGE (ML) INJECTION PRN
Status: DISCONTINUED | OUTPATIENT
Start: 2024-11-10 | End: 2024-11-12 | Stop reason: HOSPADM

## 2024-11-10 RX ORDER — LORAZEPAM 2 MG/ML
2 INJECTION INTRAMUSCULAR
Status: DISCONTINUED | OUTPATIENT
Start: 2024-11-10 | End: 2024-11-12 | Stop reason: HOSPADM

## 2024-11-10 RX ORDER — DESMOPRESSIN ACETATE 4 UG/ML
2 INJECTION, SOLUTION INTRAVENOUS; SUBCUTANEOUS 2 TIMES DAILY
Status: DISCONTINUED | OUTPATIENT
Start: 2024-11-10 | End: 2024-11-10

## 2024-11-10 RX ORDER — ATORVASTATIN CALCIUM 10 MG/1
10 TABLET, FILM COATED ORAL NIGHTLY
Status: DISCONTINUED | OUTPATIENT
Start: 2024-11-10 | End: 2024-11-12 | Stop reason: HOSPADM

## 2024-11-10 RX ADMIN — PANCRELIPASE LIPASE, PANCRELIPASE PROTEASE, PANCRELIPASE AMYLASE 15000 UNITS: 15000; 47000; 63000 CAPSULE, DELAYED RELEASE ORAL at 13:16

## 2024-11-10 RX ADMIN — CHLORDIAZEPOXIDE HYDROCHLORIDE 10 MG: 10 CAPSULE ORAL at 21:11

## 2024-11-10 RX ADMIN — ATORVASTATIN CALCIUM 10 MG: 10 TABLET, FILM COATED ORAL at 21:11

## 2024-11-10 RX ADMIN — GUAIFENESIN 1200 MG: 600 TABLET, EXTENDED RELEASE ORAL at 21:11

## 2024-11-10 RX ADMIN — WATER 60 MG: 1 INJECTION INTRAMUSCULAR; INTRAVENOUS; SUBCUTANEOUS at 06:16

## 2024-11-10 RX ADMIN — AZITHROMYCIN DIHYDRATE 500 MG: 500 INJECTION, POWDER, LYOPHILIZED, FOR SOLUTION INTRAVENOUS at 00:51

## 2024-11-10 RX ADMIN — PANCRELIPASE LIPASE, PANCRELIPASE PROTEASE, PANCRELIPASE AMYLASE 20000 UNITS: 20000; 63000; 84000 CAPSULE, DELAYED RELEASE ORAL at 16:19

## 2024-11-10 RX ADMIN — PANCRELIPASE LIPASE, PANCRELIPASE PROTEASE, PANCRELIPASE AMYLASE 20000 UNITS: 20000; 63000; 84000 CAPSULE, DELAYED RELEASE ORAL at 13:15

## 2024-11-10 RX ADMIN — SODIUM CHLORIDE 1000 MG: 900 INJECTION INTRAVENOUS at 23:29

## 2024-11-10 RX ADMIN — IPRATROPIUM BROMIDE AND ALBUTEROL SULFATE 1 DOSE: 2.5; .5 SOLUTION RESPIRATORY (INHALATION) at 07:20

## 2024-11-10 RX ADMIN — SODIUM CHLORIDE, PRESERVATIVE FREE 10 ML: 5 INJECTION INTRAVENOUS at 21:13

## 2024-11-10 RX ADMIN — PANTOPRAZOLE SODIUM 40 MG: 40 TABLET, DELAYED RELEASE ORAL at 09:55

## 2024-11-10 RX ADMIN — PANCRELIPASE LIPASE, PANCRELIPASE PROTEASE, PANCRELIPASE AMYLASE 20000 UNITS: 20000; 63000; 84000 CAPSULE, DELAYED RELEASE ORAL at 09:56

## 2024-11-10 RX ADMIN — ENOXAPARIN SODIUM 30 MG: 100 INJECTION SUBCUTANEOUS at 09:55

## 2024-11-10 RX ADMIN — BUDESONIDE AND FORMOTEROL FUMARATE DIHYDRATE 2 PUFF: 160; 4.5 AEROSOL RESPIRATORY (INHALATION) at 20:51

## 2024-11-10 RX ADMIN — PANCRELIPASE LIPASE, PANCRELIPASE PROTEASE, PANCRELIPASE AMYLASE 15000 UNITS: 15000; 47000; 63000 CAPSULE, DELAYED RELEASE ORAL at 09:55

## 2024-11-10 RX ADMIN — ACETAMINOPHEN 650 MG: 325 TABLET ORAL at 18:46

## 2024-11-10 RX ADMIN — PANCRELIPASE LIPASE, PANCRELIPASE PROTEASE, PANCRELIPASE AMYLASE 15000 UNITS: 15000; 47000; 63000 CAPSULE, DELAYED RELEASE ORAL at 16:19

## 2024-11-10 RX ADMIN — SODIUM CHLORIDE 1000 MG: 900 INJECTION INTRAVENOUS at 00:18

## 2024-11-10 RX ADMIN — CHLORDIAZEPOXIDE HYDROCHLORIDE 10 MG: 10 CAPSULE ORAL at 16:16

## 2024-11-10 RX ADMIN — IPRATROPIUM BROMIDE AND ALBUTEROL SULFATE 1 DOSE: 2.5; .5 SOLUTION RESPIRATORY (INHALATION) at 11:13

## 2024-11-10 RX ADMIN — ACETAMINOPHEN 650 MG: 325 TABLET ORAL at 04:26

## 2024-11-10 RX ADMIN — GUAIFENESIN 1200 MG: 600 TABLET, EXTENDED RELEASE ORAL at 09:55

## 2024-11-10 RX ADMIN — TIOTROPIUM BROMIDE INHALATION SPRAY 2 PUFF: 3.12 SPRAY, METERED RESPIRATORY (INHALATION) at 07:22

## 2024-11-10 RX ADMIN — DESMOPRESSIN ACETATE 2 MCG: 4 SOLUTION INTRAVENOUS at 09:55

## 2024-11-10 RX ADMIN — IPRATROPIUM BROMIDE AND ALBUTEROL SULFATE 1 DOSE: 2.5; .5 SOLUTION RESPIRATORY (INHALATION) at 20:51

## 2024-11-10 RX ADMIN — BUDESONIDE AND FORMOTEROL FUMARATE DIHYDRATE 2 PUFF: 160; 4.5 AEROSOL RESPIRATORY (INHALATION) at 07:22

## 2024-11-10 RX ADMIN — IPRATROPIUM BROMIDE AND ALBUTEROL SULFATE 1 DOSE: 2.5; .5 SOLUTION RESPIRATORY (INHALATION) at 15:13

## 2024-11-10 RX ADMIN — Medication 100 MG: at 16:16

## 2024-11-10 ASSESSMENT — PAIN SCALES - GENERAL
PAINLEVEL_OUTOF10: 7
PAINLEVEL_OUTOF10: 3
PAINLEVEL_OUTOF10: 0
PAINLEVEL_OUTOF10: 8

## 2024-11-10 ASSESSMENT — PAIN DESCRIPTION - LOCATION
LOCATION: HEAD
LOCATION: HEAD

## 2024-11-10 ASSESSMENT — PAIN DESCRIPTION - PAIN TYPE: TYPE: ACUTE PAIN

## 2024-11-10 ASSESSMENT — PAIN DESCRIPTION - ORIENTATION: ORIENTATION: ANTERIOR;MID

## 2024-11-10 ASSESSMENT — PAIN DESCRIPTION - DESCRIPTORS
DESCRIPTORS: ACHING
DESCRIPTORS: ACHING

## 2024-11-10 NOTE — H&P
Hospital Medicine History & Physical      Date of Admission: 11/9/2024    Date of Service:  Pt seen/examined on 11/9/2024    [x]Admitted to Inpatient with expected LOS greater than two midnights due to medical therapy.  []Placed in Observation status.    Chief Admission Complaint: Dyspnea    Presenting Admission History:      54 y.o. female with past medical history of EtOH abuse, A-fib, PE, pancreatitis, hip fracture presented to emergency department chief complaint of dyspnea.  Patient states that for the last several days she has had worsening dyspnea.  She also states that she has had a cough that has been minimally productive.  She endorses headache but denies dizziness.  Patient also denies chest pain.  Patient denies nausea/vomiting/diarrhea.  Patient denies any urinary symptoms.    Assessment/Plan:      Current Principal Problem:  Hyponatremia    Hyponatremia  - Acute on chronic  - NA = 116 on presentation; presenting with some confusion and delayed speech  - Nephrology's recommendation was for patient to get hypertonic saline, however, repeat labs before administration of hypertonic saline showed that NA = 123  -Will admit to ICU for close monitoring, nephrology consultation, will hold off on fluids at this time, every 4 hours BMP    Pneumonia  - Rocephin, azithromycin  - Legionella antigen pending    COPD  - Symbicort, Spiriva, Solu-Medrol, DuoNebs, azithromycin, Mucinex    Acute on chronic hypoxic respiratory failure  - Patient states that she wears 2 to 3 L at baseline; requiring 5 L at this time  - Secondary to pneumonia plus COPD  - Continue plan stated above, wean to baseline O2 as tolerated    Pancreatic insufficiency  - Zenpep    Tobacco use  - Nicotine patch    Hyperlipidemia  - Atorvastatin      60 minutes critical care time used for evaluation patient, labs, imaging, and this H&P note.      Discussed management and the need for Hospitalization of the patient w/ the Emergency Department

## 2024-11-10 NOTE — CONSULTS
Reason for referral and CC: SOB and COPD    HISTORY OF PRESENT ILLNESS: 55 yo female with a h/o COPD presented to emergency room yesterday with shortness of breath and chest pressure.  Has been ongoing for several weeks but worse the last 2 days no fever.  No hemoptysis.  Currently does not have any chest pain.  The EKG did not have ischemic changes.  Chest x-ray showed possible pneumonia.  She was also hyponatremic      Past Medical History:   Diagnosis Date    A-fib (HCC)     Anemia     Anxiety     Chronic pancreatitis (HCC)     Collapse of right lung     COPD (chronic obstructive pulmonary disease) (HCC)     COPD (chronic obstructive pulmonary disease) (HCC)     Depression     GI bleed     H/O colonoscopy     Hypertension     Pancreatitis     Pulmonary embolism (HCC) 02/2018     Past Surgical History:   Procedure Laterality Date    HIP SURGERY Right 7/12/2024    RIGHT FEMUR GAMMA NAILING performed by Simba Man MD at [facility] OR    UPPER GASTROINTESTINAL ENDOSCOPY       Family History  family history includes Diabetes in her maternal grandfather.    Social History:  reports that she has been smoking cigarettes. She started smoking about 29 years ago. She has a 25 pack-year smoking history. She has been exposed to tobacco smoke. She has never used smokeless tobacco.   reports current alcohol use.    ALLERGIES:  Patient is allergic to sulfa antibiotics.  Continuous Infusions:   sodium chloride       Scheduled Meds:   atorvastatin  10 mg Oral Nightly    budesonide-formoterol  2 puff Inhalation BID RT    And    tiotropium  2 puff Inhalation Daily RT    pantoprazole  40 mg Oral QAM AC    cefTRIAXone (ROCEPHIN) IV  1,000 mg IntraVENous Q24H    [START ON 11/11/2024] azithromycin  500 mg IntraVENous Q24H    lipase-protease-amylas  15,000 Units Oral TID WC    lipase-protease-amylase  20,000 Units Oral TID WC    DESMOpressin  2 mcg SubCUTAneous BID    sodium chloride flush  5-40 mL IntraVENous 2 times per day    
years (25.0 ttl pk-yrs)     Types: Cigarettes     Start date: 1995     Last attempt to quit: 2020     Years since quittin.5     Passive exposure: Current    Smokeless tobacco: Never    Tobacco comments:     restarted smoking 3 wks ago 20   Vaping Use    Vaping status: Former    Substances: Always   Substance and Sexual Activity    Alcohol use: Yes     Comment: 4 beers 2-3 times a week    Drug use: Yes     Types: Marijuana (Weed)     Comment: occ    Sexual activity: Yes     Partners: Male   Other Topics Concern    Not on file   Social History Narrative    2019 lives with sign other;disability -waiting on it;     Social Determinants of Health     Financial Resource Strain: Not on file   Food Insecurity: Patient Declined (2024)    Hunger Vital Sign     Worried About Running Out of Food in the Last Year: Patient declined     Ran Out of Food in the Last Year: Patient declined   Transportation Needs: Patient Declined (2024)    PRAPARE - Transportation     Lack of Transportation (Medical): Patient declined     Lack of Transportation (Non-Medical): Patient declined   Physical Activity: Not on file   Stress: Not on file   Social Connections: Not on file   Intimate Partner Violence: Unknown (2024)    [facility]    Interpersonal Safety     Feel physically or emotionally unsafe where currently live: Not on file     Harm by anyone: Not on file     Emotionally Harmed: Not on file   Housing Stability: Unknown (2024)    Housing Stability Vital Sign     Unable to Pay for Housing in the Last Year: No     Number of Times Moved in the Last Year: 1     Homeless in the Last Year: Patient declined       Family History:   Family History   Problem Relation Age of Onset    Diabetes Maternal Grandfather     Asthma Neg Hx     Emphysema Neg Hx     Heart Failure Neg Hx     Hypertension Neg Hx     Sleep Apnea Neg Hx

## 2024-11-10 NOTE — ED PROVIDER NOTES
[facility] ICU  EMERGENCY DEPARTMENT ENCOUNTER      Pt Name: Bonny Serrano  MRN: [id number]  Birthdate 1970  Date of evaluation: 11/9/2024  Provider: BRENT GARLAND MD     CHIEF COMPLAINT       Chief Complaint   Patient presents with    Shortness of Breath     Pt brought in by squad due to Shortness of breath for about 3 days         HISTORY OF PRESENT ILLNESS   (Location/Symptom, Timing/Onset, Context/Setting, Quality, Duration, Modifying Factors, Severity) Note limiting factors.   I wore appropriate PPE for the entirety of this encounter.      HPI    Bonny Serrano is a 54 y.o. female who presents to the emergency department for evaluation of shortness of breath and chest discomfort.  Patient states she has had the symptoms for greater than a month however they got worse over the last 2 days.  Patient denies fever/chills, endorses mild nausea earlier but resolved now, denies back pain, abdominal pain, weakness, numbness.  Denies sick contacts or recent trauma.  Patient states that the chest discomfort is her entire chest and feels like she is \"smothering\".  Patient states the symptoms have improved significantly this evening since this morning and denies chest pain or discomfort at this time.  Patient does report a history of COPD, is not on home O2.    Nursing Notes were reviewed.  Limitations to history:  Outside historians:    REVIEW OF SYSTEMS     Review of Systems as documented in HPI above.     PAST MEDICAL HISTORY     Past Medical History:   Diagnosis Date    A-fib (HCC)     Anemia     Anxiety     Chronic pancreatitis (HCC)     Collapse of right lung     COPD (chronic obstructive pulmonary disease) (HCC)     COPD (chronic obstructive pulmonary disease) (HCC)     Depression     GI bleed     H/O colonoscopy     Hypertension     Pancreatitis     Pulmonary embolism (HCC) 02/2018       SURGICAL HISTORY       Past Surgical History:   Procedure Laterality Date    HIP SURGERY Right 7/12/2024    RIGHT FEMUR GAMMA

## 2024-11-11 LAB
ANION GAP SERPL CALCULATED.3IONS-SCNC: 12 MMOL/L (ref 3–16)
ANION GAP SERPL CALCULATED.3IONS-SCNC: 12 MMOL/L (ref 3–16)
ANION GAP SERPL CALCULATED.3IONS-SCNC: 6 MMOL/L (ref 3–16)
BUN SERPL-MCNC: 12 MG/DL (ref 7–20)
BUN SERPL-MCNC: 12 MG/DL (ref 7–20)
BUN SERPL-MCNC: 9 MG/DL (ref 7–20)
CALCIUM SERPL-MCNC: 8.1 MG/DL (ref 8.3–10.6)
CALCIUM SERPL-MCNC: 8.1 MG/DL (ref 8.3–10.6)
CALCIUM SERPL-MCNC: 8.3 MG/DL (ref 8.3–10.6)
CHLORIDE SERPL-SCNC: 87 MMOL/L (ref 99–110)
CHLORIDE SERPL-SCNC: 89 MMOL/L (ref 99–110)
CHLORIDE SERPL-SCNC: 94 MMOL/L (ref 99–110)
CO2 SERPL-SCNC: 27 MMOL/L (ref 21–32)
CO2 SERPL-SCNC: 28 MMOL/L (ref 21–32)
CO2 SERPL-SCNC: 33 MMOL/L (ref 21–32)
CREAT SERPL-MCNC: 0.4 MG/DL (ref 0.6–1.1)
CREAT SERPL-MCNC: 0.4 MG/DL (ref 0.6–1.1)
CREAT SERPL-MCNC: 0.6 MG/DL (ref 0.6–1.1)
GFR SERPLBLD CREATININE-BSD FMLA CKD-EPI: >90 ML/MIN/{1.73_M2}
GLUCOSE SERPL-MCNC: 108 MG/DL (ref 70–99)
GLUCOSE SERPL-MCNC: 143 MG/DL (ref 70–99)
GLUCOSE SERPL-MCNC: 259 MG/DL (ref 70–99)
POTASSIUM SERPL-SCNC: 3.8 MMOL/L (ref 3.5–5.1)
POTASSIUM SERPL-SCNC: 3.8 MMOL/L (ref 3.5–5.1)
POTASSIUM SERPL-SCNC: 4.1 MMOL/L (ref 3.5–5.1)
SODIUM SERPL-SCNC: 127 MMOL/L (ref 136–145)
SODIUM SERPL-SCNC: 128 MMOL/L (ref 136–145)
SODIUM SERPL-SCNC: 133 MMOL/L (ref 136–145)

## 2024-11-11 PROCEDURE — 6370000000 HC RX 637 (ALT 250 FOR IP): Performed by: INTERNAL MEDICINE

## 2024-11-11 PROCEDURE — 99232 SBSQ HOSP IP/OBS MODERATE 35: CPT | Performed by: INTERNAL MEDICINE

## 2024-11-11 PROCEDURE — 99406 BEHAV CHNG SMOKING 3-10 MIN: CPT | Performed by: INTERNAL MEDICINE

## 2024-11-11 PROCEDURE — 6360000002 HC RX W HCPCS: Performed by: STUDENT IN AN ORGANIZED HEALTH CARE EDUCATION/TRAINING PROGRAM

## 2024-11-11 PROCEDURE — 6370000000 HC RX 637 (ALT 250 FOR IP): Performed by: STUDENT IN AN ORGANIZED HEALTH CARE EDUCATION/TRAINING PROGRAM

## 2024-11-11 PROCEDURE — 36415 COLL VENOUS BLD VENIPUNCTURE: CPT

## 2024-11-11 PROCEDURE — 94761 N-INVAS EAR/PLS OXIMETRY MLT: CPT

## 2024-11-11 PROCEDURE — 80048 BASIC METABOLIC PNL TOTAL CA: CPT

## 2024-11-11 PROCEDURE — 2580000003 HC RX 258: Performed by: STUDENT IN AN ORGANIZED HEALTH CARE EDUCATION/TRAINING PROGRAM

## 2024-11-11 PROCEDURE — 94640 AIRWAY INHALATION TREATMENT: CPT

## 2024-11-11 PROCEDURE — 99233 SBSQ HOSP IP/OBS HIGH 50: CPT | Performed by: INTERNAL MEDICINE

## 2024-11-11 PROCEDURE — 2060000000 HC ICU INTERMEDIATE R&B

## 2024-11-11 PROCEDURE — 2700000000 HC OXYGEN THERAPY PER DAY

## 2024-11-11 PROCEDURE — 94010 BREATHING CAPACITY TEST: CPT

## 2024-11-11 PROCEDURE — 2580000003 HC RX 258: Performed by: INTERNAL MEDICINE

## 2024-11-11 RX ORDER — DILTIAZEM HYDROCHLORIDE 120 MG/1
120 CAPSULE, COATED, EXTENDED RELEASE ORAL DAILY
Status: DISCONTINUED | OUTPATIENT
Start: 2024-11-11 | End: 2024-11-12

## 2024-11-11 RX ADMIN — PANCRELIPASE LIPASE, PANCRELIPASE PROTEASE, PANCRELIPASE AMYLASE 20000 UNITS: 20000; 63000; 84000 CAPSULE, DELAYED RELEASE ORAL at 08:34

## 2024-11-11 RX ADMIN — CHLORDIAZEPOXIDE HYDROCHLORIDE 10 MG: 10 CAPSULE ORAL at 15:48

## 2024-11-11 RX ADMIN — DILTIAZEM HYDROCHLORIDE 120 MG: 120 CAPSULE, COATED, EXTENDED RELEASE ORAL at 10:21

## 2024-11-11 RX ADMIN — PREDNISONE 40 MG: 20 TABLET ORAL at 08:33

## 2024-11-11 RX ADMIN — CHLORDIAZEPOXIDE HYDROCHLORIDE 10 MG: 10 CAPSULE ORAL at 08:33

## 2024-11-11 RX ADMIN — SODIUM CHLORIDE, PRESERVATIVE FREE 10 ML: 5 INJECTION INTRAVENOUS at 08:33

## 2024-11-11 RX ADMIN — AZITHROMYCIN MONOHYDRATE 500 MG: 500 INJECTION, POWDER, LYOPHILIZED, FOR SOLUTION INTRAVENOUS at 17:04

## 2024-11-11 RX ADMIN — PANCRELIPASE LIPASE, PANCRELIPASE PROTEASE, PANCRELIPASE AMYLASE 15000 UNITS: 15000; 47000; 63000 CAPSULE, DELAYED RELEASE ORAL at 17:13

## 2024-11-11 RX ADMIN — PANCRELIPASE LIPASE, PANCRELIPASE PROTEASE, PANCRELIPASE AMYLASE 20000 UNITS: 20000; 63000; 84000 CAPSULE, DELAYED RELEASE ORAL at 10:58

## 2024-11-11 RX ADMIN — Medication 100 MG: at 08:33

## 2024-11-11 RX ADMIN — IPRATROPIUM BROMIDE AND ALBUTEROL SULFATE 1 DOSE: 2.5; .5 SOLUTION RESPIRATORY (INHALATION) at 07:54

## 2024-11-11 RX ADMIN — SODIUM CHLORIDE, PRESERVATIVE FREE 10 ML: 5 INJECTION INTRAVENOUS at 19:55

## 2024-11-11 RX ADMIN — ACETAMINOPHEN 650 MG: 325 TABLET ORAL at 10:58

## 2024-11-11 RX ADMIN — PANTOPRAZOLE SODIUM 40 MG: 40 TABLET, DELAYED RELEASE ORAL at 05:32

## 2024-11-11 RX ADMIN — IPRATROPIUM BROMIDE AND ALBUTEROL SULFATE 1 DOSE: 2.5; .5 SOLUTION RESPIRATORY (INHALATION) at 20:00

## 2024-11-11 RX ADMIN — BUDESONIDE AND FORMOTEROL FUMARATE DIHYDRATE 2 PUFF: 160; 4.5 AEROSOL RESPIRATORY (INHALATION) at 20:00

## 2024-11-11 RX ADMIN — PANCRELIPASE LIPASE, PANCRELIPASE PROTEASE, PANCRELIPASE AMYLASE 20000 UNITS: 20000; 63000; 84000 CAPSULE, DELAYED RELEASE ORAL at 17:13

## 2024-11-11 RX ADMIN — ATORVASTATIN CALCIUM 10 MG: 10 TABLET, FILM COATED ORAL at 19:54

## 2024-11-11 RX ADMIN — SODIUM CHLORIDE, PRESERVATIVE FREE 10 ML: 5 INJECTION INTRAVENOUS at 08:35

## 2024-11-11 RX ADMIN — SODIUM CHLORIDE 1000 MG: 900 INJECTION INTRAVENOUS at 23:35

## 2024-11-11 RX ADMIN — TIOTROPIUM BROMIDE INHALATION SPRAY 2 PUFF: 3.12 SPRAY, METERED RESPIRATORY (INHALATION) at 07:54

## 2024-11-11 RX ADMIN — PANCRELIPASE LIPASE, PANCRELIPASE PROTEASE, PANCRELIPASE AMYLASE 15000 UNITS: 15000; 47000; 63000 CAPSULE, DELAYED RELEASE ORAL at 08:33

## 2024-11-11 RX ADMIN — GUAIFENESIN 1200 MG: 600 TABLET, EXTENDED RELEASE ORAL at 19:54

## 2024-11-11 RX ADMIN — IPRATROPIUM BROMIDE AND ALBUTEROL SULFATE 1 DOSE: 2.5; .5 SOLUTION RESPIRATORY (INHALATION) at 15:09

## 2024-11-11 RX ADMIN — CHLORDIAZEPOXIDE HYDROCHLORIDE 10 MG: 10 CAPSULE ORAL at 19:54

## 2024-11-11 RX ADMIN — BUDESONIDE AND FORMOTEROL FUMARATE DIHYDRATE 2 PUFF: 160; 4.5 AEROSOL RESPIRATORY (INHALATION) at 07:54

## 2024-11-11 RX ADMIN — ACETAMINOPHEN 650 MG: 325 TABLET ORAL at 17:06

## 2024-11-11 RX ADMIN — PANCRELIPASE LIPASE, PANCRELIPASE PROTEASE, PANCRELIPASE AMYLASE 15000 UNITS: 15000; 47000; 63000 CAPSULE, DELAYED RELEASE ORAL at 10:59

## 2024-11-11 RX ADMIN — ENOXAPARIN SODIUM 30 MG: 100 INJECTION SUBCUTANEOUS at 08:37

## 2024-11-11 RX ADMIN — IPRATROPIUM BROMIDE AND ALBUTEROL SULFATE 1 DOSE: 2.5; .5 SOLUTION RESPIRATORY (INHALATION) at 11:14

## 2024-11-11 RX ADMIN — GUAIFENESIN 1200 MG: 600 TABLET, EXTENDED RELEASE ORAL at 08:33

## 2024-11-11 ASSESSMENT — COPD QUESTIONNAIRES
QUESTION1_COUGHFREQUENCY: 1
QUESTION2_CHESTPHLEGM: 1
QUESTION5_HOMEACTIVITIES: 4
QUESTION6_LEAVINGHOUSE: 2
QUESTION8_ENERGYLEVEL: 4
CAT_TOTALSCORE: 22
QUESTION4_WALKINCLINE: 5
QUESTION7_SLEEPQUALITY: 1
QUESTION3_CHESTTIGHTNESS: 4

## 2024-11-11 ASSESSMENT — PAIN SCALES - GENERAL
PAINLEVEL_OUTOF10: 8
PAINLEVEL_OUTOF10: 6

## 2024-11-11 ASSESSMENT — PAIN DESCRIPTION - LOCATION
LOCATION: THROAT
LOCATION: HIP

## 2024-11-11 ASSESSMENT — PAIN DESCRIPTION - ORIENTATION: ORIENTATION: RIGHT

## 2024-11-11 ASSESSMENT — PAIN DESCRIPTION - DESCRIPTORS: DESCRIPTORS: ACHING

## 2024-11-11 NOTE — TELEPHONE ENCOUNTER
Patient called in to find out why she was called. Patient is IP at this time. Will not bother her at this time.

## 2024-11-11 NOTE — PLAN OF CARE
Problem: Discharge Planning  Goal: Discharge to home or other facility with appropriate resources  11/11/2024 0832 by Maria Antonia Josue RN  Outcome: Progressing  11/10/2024 2125 by Joan Nuñez RN  Outcome: Progressing     Problem: Pain  Goal: Verbalizes/displays adequate comfort level or baseline comfort level  11/11/2024 0832 by Maria Antonia Josue RN  Outcome: Progressing  11/10/2024 2125 by Joan Nuñez RN  Outcome: Progressing     Problem: Safety - Adult  Goal: Free from fall injury  11/11/2024 0832 by Maria Antonia Josue RN  Outcome: Progressing  11/10/2024 2125 by Joan Nuñez RN  Outcome: Progressing

## 2024-11-11 NOTE — PLAN OF CARE
Problem: Discharge Planning  Goal: Discharge to home or other facility with appropriate resources  11/10/2024 2125 by Joan Nuñez RN  Outcome: Progressing  11/10/2024 1622 by Cecily Persaud RN  Outcome: Progressing     Problem: Pain  Goal: Verbalizes/displays adequate comfort level or baseline comfort level  11/10/2024 2125 by Joan Nuñez RN  Outcome: Progressing  11/10/2024 1622 by Cecily Persaud RN  Outcome: Progressing     Problem: Safety - Adult  Goal: Free from fall injury  11/10/2024 2125 by Joan Nuñez RN  Outcome: Progressing  11/10/2024 1622 by Cecily Persaud RN  Outcome: Progressing

## 2024-11-11 NOTE — FLOWSHEET NOTE
11/11/24 0740   Vital Signs   Temp 98 °F (36.7 °C)   Temp Source Oral   Pulse (!) 142   Heart Rate Source Monitor   Respirations 18   BP (!) 166/110   MAP (Calculated) 129   BP Location Right upper arm   BP Method Automatic   Patient Position Sitting     Assessment & vital signs completed. Morning meds given per MAR. Pt denies any further needs at this time. Call light within reach, pt is stable.

## 2024-11-12 VITALS
TEMPERATURE: 98.2 F | SYSTOLIC BLOOD PRESSURE: 153 MMHG | HEART RATE: 126 BPM | BODY MASS INDEX: 18.98 KG/M2 | RESPIRATION RATE: 18 BRPM | OXYGEN SATURATION: 97 % | WEIGHT: 100.5 LBS | HEIGHT: 61 IN | DIASTOLIC BLOOD PRESSURE: 87 MMHG

## 2024-11-12 LAB
ANION GAP SERPL CALCULATED.3IONS-SCNC: 11 MMOL/L (ref 3–16)
ANION GAP SERPL CALCULATED.3IONS-SCNC: 13 MMOL/L (ref 3–16)
BUN SERPL-MCNC: 9 MG/DL (ref 7–20)
BUN SERPL-MCNC: 9 MG/DL (ref 7–20)
CALCIUM SERPL-MCNC: 8.1 MG/DL (ref 8.3–10.6)
CALCIUM SERPL-MCNC: 8.3 MG/DL (ref 8.3–10.6)
CHLORIDE SERPL-SCNC: 95 MMOL/L (ref 99–110)
CHLORIDE SERPL-SCNC: 96 MMOL/L (ref 99–110)
CO2 SERPL-SCNC: 25 MMOL/L (ref 21–32)
CO2 SERPL-SCNC: 27 MMOL/L (ref 21–32)
CREAT SERPL-MCNC: 0.3 MG/DL (ref 0.6–1.1)
CREAT SERPL-MCNC: <0.2 MG/DL (ref 0.6–1.1)
GFR SERPLBLD CREATININE-BSD FMLA CKD-EPI: >90 ML/MIN/{1.73_M2}
GFR SERPLBLD CREATININE-BSD FMLA CKD-EPI: >90 ML/MIN/{1.73_M2}
GLUCOSE SERPL-MCNC: 104 MG/DL (ref 70–99)
GLUCOSE SERPL-MCNC: 104 MG/DL (ref 70–99)
MAGNESIUM SERPL-MCNC: 1.52 MG/DL (ref 1.8–2.4)
POTASSIUM SERPL-SCNC: 3.6 MMOL/L (ref 3.5–5.1)
POTASSIUM SERPL-SCNC: 3.7 MMOL/L (ref 3.5–5.1)
SODIUM SERPL-SCNC: 133 MMOL/L (ref 136–145)
SODIUM SERPL-SCNC: 134 MMOL/L (ref 136–145)

## 2024-11-12 PROCEDURE — 99238 HOSP IP/OBS DSCHRG MGMT 30/<: CPT | Performed by: INTERNAL MEDICINE

## 2024-11-12 PROCEDURE — 6370000000 HC RX 637 (ALT 250 FOR IP): Performed by: INTERNAL MEDICINE

## 2024-11-12 PROCEDURE — 6360000002 HC RX W HCPCS: Performed by: INTERNAL MEDICINE

## 2024-11-12 PROCEDURE — 2580000003 HC RX 258: Performed by: STUDENT IN AN ORGANIZED HEALTH CARE EDUCATION/TRAINING PROGRAM

## 2024-11-12 PROCEDURE — 80048 BASIC METABOLIC PNL TOTAL CA: CPT

## 2024-11-12 PROCEDURE — 83735 ASSAY OF MAGNESIUM: CPT

## 2024-11-12 PROCEDURE — 36415 COLL VENOUS BLD VENIPUNCTURE: CPT

## 2024-11-12 PROCEDURE — 2580000003 HC RX 258: Performed by: INTERNAL MEDICINE

## 2024-11-12 PROCEDURE — 94640 AIRWAY INHALATION TREATMENT: CPT

## 2024-11-12 PROCEDURE — 6360000002 HC RX W HCPCS: Performed by: STUDENT IN AN ORGANIZED HEALTH CARE EDUCATION/TRAINING PROGRAM

## 2024-11-12 PROCEDURE — 94761 N-INVAS EAR/PLS OXIMETRY MLT: CPT

## 2024-11-12 PROCEDURE — 99233 SBSQ HOSP IP/OBS HIGH 50: CPT | Performed by: INTERNAL MEDICINE

## 2024-11-12 PROCEDURE — 6370000000 HC RX 637 (ALT 250 FOR IP): Performed by: STUDENT IN AN ORGANIZED HEALTH CARE EDUCATION/TRAINING PROGRAM

## 2024-11-12 RX ORDER — CEFDINIR 300 MG/1
300 CAPSULE ORAL 2 TIMES DAILY
Qty: 10 CAPSULE | Refills: 0 | Status: SHIPPED | OUTPATIENT
Start: 2024-11-12 | End: 2024-11-17

## 2024-11-12 RX ORDER — LEVALBUTEROL 1.25 MG/.5ML
1.25 SOLUTION, CONCENTRATE RESPIRATORY (INHALATION) EVERY 8 HOURS PRN
Status: DISCONTINUED | OUTPATIENT
Start: 2024-11-12 | End: 2024-11-12 | Stop reason: HOSPADM

## 2024-11-12 RX ORDER — DILTIAZEM HYDROCHLORIDE 180 MG/1
180 CAPSULE, COATED, EXTENDED RELEASE ORAL DAILY
Status: DISCONTINUED | OUTPATIENT
Start: 2024-11-12 | End: 2024-11-12 | Stop reason: HOSPADM

## 2024-11-12 RX ORDER — LANOLIN ALCOHOL/MO/W.PET/CERES
100 CREAM (GRAM) TOPICAL DAILY
Qty: 30 TABLET | Refills: 0 | Status: SHIPPED | OUTPATIENT
Start: 2024-11-13

## 2024-11-12 RX ORDER — MAGNESIUM SULFATE IN WATER 40 MG/ML
2000 INJECTION, SOLUTION INTRAVENOUS PRN
Status: DISCONTINUED | OUTPATIENT
Start: 2024-11-12 | End: 2024-11-12 | Stop reason: HOSPADM

## 2024-11-12 RX ORDER — AZITHROMYCIN 250 MG/1
250 TABLET, FILM COATED ORAL DAILY
Qty: 3 TABLET | Refills: 0 | Status: SHIPPED | OUTPATIENT
Start: 2024-11-12 | End: 2024-11-15

## 2024-11-12 RX ORDER — PREDNISONE 10 MG/1
TABLET ORAL
Qty: 6 TABLET | Refills: 0 | Status: SHIPPED | OUTPATIENT
Start: 2024-11-13 | End: 2024-11-17

## 2024-11-12 RX ORDER — ATENOLOL 50 MG/1
50 TABLET ORAL DAILY
Status: DISCONTINUED | OUTPATIENT
Start: 2024-11-12 | End: 2024-11-12 | Stop reason: HOSPADM

## 2024-11-12 RX ORDER — NICOTINE 21 MG/24HR
1 PATCH, TRANSDERMAL 24 HOURS TRANSDERMAL DAILY
Qty: 30 PATCH | Refills: 0 | Status: SHIPPED | OUTPATIENT
Start: 2024-11-13

## 2024-11-12 RX ORDER — IPRATROPIUM BROMIDE AND ALBUTEROL SULFATE 2.5; .5 MG/3ML; MG/3ML
1 SOLUTION RESPIRATORY (INHALATION)
Status: DISCONTINUED | OUTPATIENT
Start: 2024-11-12 | End: 2024-11-12

## 2024-11-12 RX ADMIN — TIOTROPIUM BROMIDE INHALATION SPRAY 2 PUFF: 3.12 SPRAY, METERED RESPIRATORY (INHALATION) at 07:50

## 2024-11-12 RX ADMIN — GUAIFENESIN 600 MG: 600 TABLET, EXTENDED RELEASE ORAL at 09:08

## 2024-11-12 RX ADMIN — PANCRELIPASE LIPASE, PANCRELIPASE PROTEASE, PANCRELIPASE AMYLASE 15000 UNITS: 15000; 47000; 63000 CAPSULE, DELAYED RELEASE ORAL at 09:11

## 2024-11-12 RX ADMIN — SODIUM CHLORIDE, PRESERVATIVE FREE 10 ML: 5 INJECTION INTRAVENOUS at 09:09

## 2024-11-12 RX ADMIN — IPRATROPIUM BROMIDE AND ALBUTEROL SULFATE 1 DOSE: 2.5; .5 SOLUTION RESPIRATORY (INHALATION) at 07:50

## 2024-11-12 RX ADMIN — PANCRELIPASE LIPASE, PANCRELIPASE PROTEASE, PANCRELIPASE AMYLASE 15000 UNITS: 15000; 47000; 63000 CAPSULE, DELAYED RELEASE ORAL at 12:36

## 2024-11-12 RX ADMIN — IPRATROPIUM BROMIDE AND ALBUTEROL SULFATE 1 DOSE: 2.5; .5 SOLUTION RESPIRATORY (INHALATION) at 11:21

## 2024-11-12 RX ADMIN — DILTIAZEM HYDROCHLORIDE 180 MG: 180 CAPSULE, COATED, EXTENDED RELEASE ORAL at 09:08

## 2024-11-12 RX ADMIN — PANTOPRAZOLE SODIUM 40 MG: 40 TABLET, DELAYED RELEASE ORAL at 06:40

## 2024-11-12 RX ADMIN — ENOXAPARIN SODIUM 30 MG: 100 INJECTION SUBCUTANEOUS at 09:08

## 2024-11-12 RX ADMIN — PANCRELIPASE LIPASE, PANCRELIPASE PROTEASE, PANCRELIPASE AMYLASE 20000 UNITS: 20000; 63000; 84000 CAPSULE, DELAYED RELEASE ORAL at 12:36

## 2024-11-12 RX ADMIN — BUDESONIDE AND FORMOTEROL FUMARATE DIHYDRATE 2 PUFF: 160; 4.5 AEROSOL RESPIRATORY (INHALATION) at 07:50

## 2024-11-12 RX ADMIN — Medication 100 MG: at 09:08

## 2024-11-12 RX ADMIN — CHLORDIAZEPOXIDE HYDROCHLORIDE 10 MG: 10 CAPSULE ORAL at 09:08

## 2024-11-12 RX ADMIN — PREDNISONE 40 MG: 20 TABLET ORAL at 09:08

## 2024-11-12 RX ADMIN — PANCRELIPASE LIPASE, PANCRELIPASE PROTEASE, PANCRELIPASE AMYLASE 20000 UNITS: 20000; 63000; 84000 CAPSULE, DELAYED RELEASE ORAL at 09:11

## 2024-11-12 RX ADMIN — MAGNESIUM SULFATE HEPTAHYDRATE 2000 MG: 40 INJECTION, SOLUTION INTRAVENOUS at 10:44

## 2024-11-12 NOTE — CARE COORDINATION
DC order noted. Pt to DC home with family. Pt does not qualify for home O2 at this time. Pt will have ELISSA with [facility]. Spoke to Doreen at [facility] to alert her that pt would DC home today. No additional DC or DME needs identified.     IMM-na medicaid    O2 97% RA

## 2024-11-12 NOTE — DISCHARGE SUMMARY
was asked and negative.    Discharge Exam:    BP (!) 153/87   Pulse (!) 126   Temp 98.2 °F (36.8 °C) (Oral)   Resp 18   Ht 1.549 m (5' 1\")   Wt 45.6 kg (100 lb 8 oz)   LMP 07/12/2015   SpO2 97%   BMI 18.99 kg/m²   General appearance:  NAD  HEENT:   Normal cephalic, atraumatic, moist mucous membranes, no oropharyngeal erythema or exudate  Neck: Supple, trachea midline, no anterior cervical or SC LAD  Heart:: Normal s1/s2, RRR, no murmurs, gallops, or rubs. No leg edema  Lungs:  Normal respiratory effort. Clear to auscultation bilaterally, no wheeze, rales or rhonchi.  Abdomen: Soft, non-tender, non-distended, bowel sounds present, no masses  Musculoskeletal:  No clubbing, no cyanosis, or edema  Skin: No lesion or masses  Neurologic:  Neurovascularly intact without any focal sensory/motor deficits. Cranial nerves: II-XII intact, grossly non-focal.  Psychiatric:  Alert and oriented, thought content appropriate    Labs: For convenience and continuity at follow-up the following most recent labs are provided:    Lab Results   Component Value Date/Time    WBC 4.3 11/10/2024 04:08 AM    HGB 10.1 11/10/2024 04:08 AM    HCT 30.5 11/10/2024 04:08 AM    MCV 74.9 11/10/2024 04:08 AM     11/10/2024 04:08 AM     11/12/2024 05:23 AM     11/12/2024 05:23 AM    K 3.6 11/12/2024 05:23 AM    K 3.7 11/12/2024 05:23 AM    K 3.9 11/10/2024 08:09 PM    CL 95 11/12/2024 05:23 AM    CL 96 11/12/2024 05:23 AM    CO2 27 11/12/2024 05:23 AM    CO2 25 11/12/2024 05:23 AM    BUN 9 11/12/2024 05:23 AM    BUN 9 11/12/2024 05:23 AM    CREATININE <0.2 11/12/2024 05:23 AM    CREATININE 0.3 11/12/2024 05:23 AM    CALCIUM 8.1 11/12/2024 05:23 AM    CALCIUM 8.3 11/12/2024 05:23 AM    PHOS 2.8 07/15/2024 05:32 AM    ALKPHOS 162 11/09/2024 07:50 PM    ALT 19 11/09/2024 07:50 PM    AST 48 11/09/2024 07:50 PM    BILITOT <0.2 11/09/2024 07:50 PM    BILIDIR 0.3 07/13/2024 04:51 PM    TRIG 65 04/08/2019 09:55 AM     Lab Results

## 2024-11-12 NOTE — FLOWSHEET NOTE
11/12/24 0745   Vital Signs   Temp 97.7 °F (36.5 °C)   Temp Source Oral   Pulse (!) 128   Heart Rate Source Monitor   Respirations 16   BP (!) 152/95   MAP (Calculated) 114   BP Location Right upper arm   BP Method Automatic   Patient Position Sitting     Assessment & vital signs completed. Morning meds given per MAR. Pt denies any further needs at this time. Call light within reach, pt is stable.

## 2024-11-12 NOTE — CARE COORDINATION
Case Management Assessment  Initial Evaluation    Date/Time of Evaluation: 11/11/2024 7:05 PM  Assessment Completed by: Teresa Boeck, RN    If patient is discharged prior to next notation, then this note serves as note for discharge by case management.    Patient Name: Bonny Serrano                   YOB: 1970  Diagnosis: Hyponatremia [E87.1]                   Date / Time: 11/9/2024  7:14 PM    Patient Admission Status: Inpatient   Readmission Risk (Low < 19, Mod (19-27), High > 27): Readmission Risk Score: 21    Current PCP: Brenda Carrillo APRN - CNP  PCP verified by CM? (P) Yes    Chart Reviewed: Yes      History Provided by: Patient  Patient Orientation: (P) Alert and Oriented, Person, Place, Situation    Patient Cognition: (P) Alert    Hospitalization in the last 30 days (Readmission):  No    If yes, Readmission Assessment in CM Navigator will be completed.    Advance Directives:      Code Status: Full Code   Patient's Primary Decision Maker is: Legal Next of Kin    Primary Decision Maker: TAYLER BEDOLLA - Child - [phone]    Secondary Decision Maker: Tate Serrano - Brother/Sister - [phone]    Secondary Decision Maker: Waqas Olea - Friend - [phone]    Secondary Decision Maker: Ryanne Hinkle - Friend - [phone]    Discharge Planning:    Patient lives with: Friends Type of Home: House  Primary Care Giver: (P) Self  Patient Support Systems include: Friends/Neighbors   Current Financial resources: (P) Medicaid  Current community resources: (P) None  Current services prior to admission: None            Current DME:              Type of Home Care services:  Skilled Therapy    ADLS  Prior functional level: (P) Independent in ADLs/IADLs  Current functional level: (P) Independent in ADLs/IADLs    PT AM-PAC:   /24  OT AM-PAC:   /24    Family can provide assistance at DC: (P) No  Would you like Case Management to discuss the discharge plan with any other family members/significant

## 2024-11-12 NOTE — PLAN OF CARE
Problem: Discharge Planning  Goal: Discharge to home or other facility with appropriate resources  11/12/2024 0920 by Maria Antonia Josue, RN  Outcome: Progressing  11/11/2024 2001 by Dustin Allison, RN  Outcome: Progressing     Problem: Pain  Goal: Verbalizes/displays adequate comfort level or baseline comfort level  11/12/2024 0920 by Maria Antonia Josue RN  Outcome: Progressing  11/11/2024 2001 by Dustin Allison, RN  Outcome: Progressing     Problem: Safety - Adult  Goal: Free from fall injury  Outcome: Progressing

## 2024-11-12 NOTE — FLOWSHEET NOTE
11/11/24 1905   Vital Signs   Temp 98.1 °F (36.7 °C)   Temp Source Oral   Pulse (!) 112   Heart Rate Source Monitor   Respirations 18   BP (!) 149/82   MAP (Calculated) 104   BP Location Right upper arm   BP Method Automatic   Patient Position Sitting   Pain Assessment   Pain Assessment None - Denies Pain   Oxygen Therapy   SpO2 96 %   O2 Device None (Room air)     Shift assessment completed- see flow sheet.  Patient alert and oriented x4 resting in bed watching tv.  96% on RA.  Lung sounds- wheezing.  Call light and bedside table within reach  Pt denies any further needs or wants at this time.  Care continues

## 2024-11-12 NOTE — PLAN OF CARE
Problem: Discharge Planning  Goal: Discharge to home or other facility with appropriate resources  11/11/2024 2001 by Dustin Allison, RN  Outcome: Progressing  11/11/2024 0832 by Maria Antonia Josue, RN  Outcome: Progressing

## 2024-11-12 NOTE — DISCHARGE INSTR - COC
Continuity of Care Form    Patient Name: Bonny Serrano   :  1970  MRN:  [id number]    Admit date:  2024  Discharge date:  ***    Code Status Order: Full Code   Advance Directives:   Advance Care Flowsheet Documentation             Admitting Physician:  Gloria Dudley DO  PCP: Brenda Carrillo APRN - CNP    Discharging Nurse: ***  Discharging Hospital Unit/Room#: /0319-01  Discharging Unit Phone Number: ***    Emergency Contact:   Extended Emergency Contact Information  Primary Emergency Contact: TAYLER BEDOLLA  Home Phone: [phone]  Mobile Phone: [phone]  Relation: Child  Secondary Emergency Contact: Tate Serrano  Home Phone: [phone]  Relation: Brother/Sister    Past Surgical History:  Past Surgical History:   Procedure Laterality Date    HIP SURGERY Right 2024    RIGHT FEMUR GAMMA NAILING performed by Simba Man MD at [facility] OR    UPPER GASTROINTESTINAL ENDOSCOPY         Immunization History:   Immunization History   Administered Date(s) Administered    COVID-19, MODERNA BLUE border, Primary or Immunocompromised, (age 12y+), IM, 100 mcg/0.5mL 2021, 09/10/2021    Influenza Vaccine, unspecified formulation 2017, 09/10/2018    Influenza Virus Vaccine 2017, 09/10/2018, 2019    Influenza, AFLURIA (age 3 y+), FLUZONE, (age 6 mo+), Quadv MDV, 0.5mL 2019    Influenza, FLUARIX, FLULAVAL, FLUZONE (age 6 mo+) and AFLURIA, (age 3 y+), Quadv PF, 0.5mL 09/10/2018    Influenza, FLUBLOK, (age 18 y+), Quadv PF, 0.5mL 10/13/2021    Pneumococcal, PPSV23, PNEUMOVAX 23, (age 2y+), SC/IM, 0.5mL 2014    TDaP, ADACEL (age 10y-64y), BOOSTRIX (age 10y+), IM, 0.5mL 2015, 2024    Zoster Recombinant (Shingrix) 2022, 2022       Active Problems:  Patient Active Problem List   Diagnosis Code    Alcoholism (HCC) F10.20    Lung collapse J98.19    Macrocytic anemia D53.9    Paroxysmal atrial fibrillation (HCC) I48.0    Pulmonary embolism (HCC) I26.99

## 2024-11-12 NOTE — PROGRESS NOTES
11/09/24 2300   RT Protocol   History Pulmonary Disease 2   Respiratory pattern 2   Breath sounds 6   Cough 1   Indications for Bronchodilator Therapy Decreased or absent breath sounds   Bronchodilator Assessment Score 11       
  Internal Medicine ICU Progress Note      Events of Last 24 hours:   Admitted for COPD exacerbation. Not on home oxygen at home. On 3 L  Recently discharged from NH. She was on oxygen at the NH but her equipment was returned.    Her sodium level was low at 116. She runs a low sodium with a baseline of 128-131. She does drink alcohol daily. She averages a six pack a day.    Invasive Lines: PIV        MV:  n/a    No results for input(s): \"PHART\", \"XMA9RDP\", \"PO2ART\" in the last 72 hours.    MV Settings:     / / /     IV:   sodium chloride      sodium chloride         Vitals:  Temp  Av.6 °F (37 °C)  Min: 98 °F (36.7 °C)  Max: 98.8 °F (37.1 °C)  Pulse  Av.6  Min: 88  Max: 119  BP  Min: 110/45  Max: 145/74  SpO2  Av.9 %  Min: 91 %  Max: 100 %  Patient Vitals for the past 4 hrs:   BP Temp Temp src Pulse Resp SpO2   11/10/24 1100 (!) 145/74 98 °F (36.7 °C) Temporal (!) 115 22 98 %   11/10/24 1000 134/66 -- -- (!) 111 18 100 %       CVP:      No intake or output data in the 24 hours ending 11/10/24 1319    EXAM:  General: No distress. Alert.  Eyes: PERRL. No sclera icterus. No conjunctiva injected.  ENT: No discharge. Pharynx clear.  Neck: Trachea midline. Normal thyroid.  Resp: No accessory muscle use. + crackles. Mild  wheezing. No rhonchi. No dullness on percussion.   CV: Tachycardia. Regular rhythm. No mumur or rub. No edema. No JVD. Palpable pedal pulses.   GI: Non-tender. Non-distended. No masses. No organmegaly. Normal bowel sounds. No hernia.  Skin: Warm and dry. No nodule on exposed extremities. No rash on exposed extremities.  Lymph: No cervical LAD. No supraclavicular LAD.   M/S: No cyanosis. No joint deformity. No clubbing.   Neuro: Awake. Follows commands. Positive pupils/gag/corneals. Normal pain response.  Psych: Oriented to person, place, time. No anxiety or agitation.     Medications:  Scheduled Meds:   atorvastatin  10 mg Oral Nightly    budesonide-formoterol  2 puff Inhalation BID RT    
  Internal Medicine Progress Note      HPI:   Admitted for COPD exacerbation. Not on home oxygen at home. On 3 L  Recently discharged from NH. She was on oxygen at the NH but her equipment was returned.    Her sodium level was low at 116. She runs a low sodium with a baseline of 128-131. She does drink alcohol daily. She averages a six pack a day.    Subjective: She has some tachycardia this morning. She is anxious about her sodium level. Normally she is on diltiazem. She thinks she did pretty well with breakfast.      Invasive Lines: PIV        MV:  n/a    No results for input(s): \"PHART\", \"IWB0AQC\", \"PO2ART\" in the last 72 hours.    MV Settings:     / / /     IV:   sodium chloride      sodium chloride         Vitals:  Temp  Av °F (36.7 °C)  Min: 97.9 °F (36.6 °C)  Max: 98 °F (36.7 °C)  Pulse  Av.6  Min: 106  Max: 142  BP  Min: 125/60  Max: 166/110  SpO2  Av.4 %  Min: 95 %  Max: 99 %  Patient Vitals for the past 4 hrs:   BP Temp Temp src Pulse Resp SpO2   24 0755 -- -- -- -- -- 96 %   24 0740 (!) 166/110 98 °F (36.7 °C) Oral (!) 142 18 95 %       CVP:        Intake/Output Summary (Last 24 hours) at 2024 1012  Last data filed at 2024 0815  Gross per 24 hour   Intake 940 ml   Output --   Net 940 ml       EXAM:  General: No distress. Alert.  Eyes: PERRL. No sclera icterus. No conjunctiva injected.  ENT: No discharge. Pharynx clear.  Neck: Trachea midline. Normal thyroid.  Resp: No accessory muscle use. Somewhat diminished but otherwise CTAB  CV: Tachycardia. Regular rhythm. No mumur or rub. No edema. No JVD. Palpable pedal pulses.   GI: Non-tender. Non-distended. No masses. No organmegaly. Normal bowel sounds. No hernia.  Skin: Warm and dry. No nodule on exposed extremities. No rash on exposed extremities.  Lymph: No cervical LAD. No supraclavicular LAD.   M/S: No cyanosis. No joint deformity. No clubbing.   Neuro: Awake. Follows commands. Positive pupils/gag/corneals. Normal 
  Physician Progress Note      PATIENT:               VENUS GE  [facility] #:                  [id number]  :                       1970  ADMIT DATE:       2024 7:14 PM  DISCH DATE:  RESPONDING  PROVIDER #:        Dorina Mariscal DO          QUERY TEXT:    Patient admitted with hyponatremia, copd exacerbation.   Noted documentation   of acute on chronic respiratory failure in the H&P and chronic respiratory   failure in PN 11/10.  If possible, please document in progress notes and discharge summary if you   are evaluating and /or treating any of the following:    The medical record reflects the following:  Risk Factors: 53 yo w/ pna, COPD exacerbation  Clinical Indicators: Per PN  11/10: Chronic respiratory failure.  Did not have   oxygen at home as her equipment was taken away when she was in a nursing   home.  Per the H&P: Acute on chronic hypoxic respiratory failure.  Patient   states that she wears 2 to 3 L at baseline; requiring 5 L at this time. Per   the ED: Normal respiratory pattern without conversational dyspnea or   respiratory distress. Per Pulm 11/10: No accessory muscle usage. respiratory   effort normal. Sats 91 - 100%. RR 16-22.  Treatment: up to 4L oxygen  Options provided:  -- Chronic respiratory failure confirmed and acute on chronic respiratory   failure ruled out  -- Acute on chronic respiratory failure confirmed and chronic respiratory   failure ruled out  -- Other - I will add my own diagnosis  -- Disagree - Not applicable / Not valid  -- Disagree - Clinically unable to determine / Unknown  -- Refer to Clinical Documentation Reviewer    PROVIDER RESPONSE TEXT:    After study, acute on chronic respiratory failure confirmed and chronic   respiratory failure ruled out.    Query created by: Zahira Pimentel on 2024 9:20 AM      Electronically signed by:  Dorina Mariscal DO 2024 10:08 AM          
AM care rounds completed.   
Bedside report and transfer of care given to Shayy Em. Pt currently resting in bed with the call light within reach. Pt denies any other care needs at this time. Pt stable at this time.    
Called [facility] for consult spoke to Fatoumata Electronically signed by Silvina Newell on 11/10/2024 at 6:54 AM  
Called Nephro for sodium correction orders.  
Department of Internal Medicine  Nephrology Progress Note        SUBJECTIVE:    We are following this patient for Hyponatremia.  The patient was seen and examined; she feels well today with no CP, SOB, nausea or vomiting.    ROS: No fever or chills.  Social: No family at bedside.    Physical Exam:    VITALS:  BP (!) 153/87   Pulse (!) 126   Temp 98.2 °F (36.8 °C) (Oral)   Resp 18   Ht 1.549 m (5' 1\")   Wt 45.6 kg (100 lb 8 oz)   LMP 07/12/2015   SpO2 97%   BMI 18.99 kg/m²     General appearance: Seems comfortable, no acute distress.  Neck: Trachea midline, thyroid normal.   Lungs:  Non labored breathing, CTA to anterior auscultation.  Heart:  S1S2 normal, rub or gallop. No peripheral edema.  Abdomen: Soft, non-tender, no organomegaly.   Skin: No lesions or rashes, warm to touch.     DATA:    CBC with Differential:    Lab Results   Component Value Date/Time    WBC 4.3 11/10/2024 04:08 AM    RBC 4.07 11/10/2024 04:08 AM    HGB 10.1 11/10/2024 04:08 AM    HCT 30.5 11/10/2024 04:08 AM     11/10/2024 04:08 AM    MCV 74.9 11/10/2024 04:08 AM    MCH 24.9 11/10/2024 04:08 AM    MCHC 33.3 11/10/2024 04:08 AM    RDW 19.0 11/10/2024 04:08 AM    LYMPHOPCT 1.8 11/10/2024 04:08 AM    MONOPCT 5.2 11/10/2024 04:08 AM    EOSPCT 0.1 11/10/2024 04:08 AM    BASOPCT 1.5 11/10/2024 04:08 AM    MONOSABS 0.2 11/10/2024 04:08 AM    LYMPHSABS 0.1 11/10/2024 04:08 AM    EOSABS 0.0 11/10/2024 04:08 AM    BASOSABS 0.1 11/10/2024 04:08 AM     BMP:    Lab Results   Component Value Date/Time     11/12/2024 05:23 AM     11/12/2024 05:23 AM    K 3.6 11/12/2024 05:23 AM    K 3.7 11/12/2024 05:23 AM    K 3.9 11/10/2024 08:09 PM    CL 95 11/12/2024 05:23 AM    CL 96 11/12/2024 05:23 AM    CO2 27 11/12/2024 05:23 AM    CO2 25 11/12/2024 05:23 AM    BUN 9 11/12/2024 05:23 AM    BUN 9 11/12/2024 05:23 AM    CREATININE <0.2 11/12/2024 05:23 AM    CREATININE 0.3 11/12/2024 05:23 AM    CALCIUM 8.1 11/12/2024 05:23 AM    CALCIUM 8.3 
Department of Internal Medicine  Nephrology Progress Note        SUBJECTIVE:    We are following this patient for Hyponatremia.  The patient was seen and examined; she feels well today with no CP, SOB, nausea or vomiting.    ROS: No fever or chills.  Social: No family at bedside.    Physical Exam:    VITALS:  BP (!) 156/77   Pulse (!) 112   Temp 98.6 °F (37 °C) (Oral)   Resp 18   Ht 1.549 m (5' 1\")   Wt 41.3 kg (91 lb)   LMP 07/12/2015   SpO2 96%   BMI 17.19 kg/m²     General appearance: Seems comfortable, no acute distress.  Neck: Trachea midline, thyroid normal.   Lungs:  Non labored breathing, CTA to anterior auscultation.  Heart:  S1S2 normal, rub or gallop. No peripheral edema.  Abdomen: Soft, non-tender, no organomegaly.   Skin: No lesions or rashes, warm to touch.     DATA:    CBC with Differential:    Lab Results   Component Value Date/Time    WBC 4.3 11/10/2024 04:08 AM    RBC 4.07 11/10/2024 04:08 AM    HGB 10.1 11/10/2024 04:08 AM    HCT 30.5 11/10/2024 04:08 AM     11/10/2024 04:08 AM    MCV 74.9 11/10/2024 04:08 AM    MCH 24.9 11/10/2024 04:08 AM    MCHC 33.3 11/10/2024 04:08 AM    RDW 19.0 11/10/2024 04:08 AM    LYMPHOPCT 1.8 11/10/2024 04:08 AM    MONOPCT 5.2 11/10/2024 04:08 AM    EOSPCT 0.1 11/10/2024 04:08 AM    BASOPCT 1.5 11/10/2024 04:08 AM    MONOSABS 0.2 11/10/2024 04:08 AM    LYMPHSABS 0.1 11/10/2024 04:08 AM    EOSABS 0.0 11/10/2024 04:08 AM    BASOSABS 0.1 11/10/2024 04:08 AM     BMP:    Lab Results   Component Value Date/Time     11/10/2024 08:09 PM    K 3.9 11/10/2024 08:09 PM    CL 86 11/10/2024 08:09 PM    CO2 29 11/10/2024 08:09 PM    BUN 14 11/10/2024 08:09 PM    CREATININE 0.4 11/10/2024 08:09 PM    CALCIUM 7.6 11/10/2024 08:09 PM    GFRAA >60 05/10/2020 05:34 AM    LABGLOM >90 11/10/2024 08:09 PM    LABGLOM >60 01/05/2024 05:10 AM    GLUCOSE 219 11/10/2024 08:09 PM       Assessment/     - Hyponatremia - acute on chronic in setting of beer potomania     Plan/   
During admission assessment pt was unable to remain alert enough to answer medication questions safely.   Pt is on 4L NC; baseline 2L at home; pt states she has not had the oxygen at home since [facility] [place]. She has been home since yesterday. She went home and did not have an oxygen tank, she stated that they came and got it while she was in the nursing home.   Pt also shared that she was in the hospital prior to the nursing home d/t COPD exacerbation. Unsure if this story is correct as pt had to be woke up several times while writer doing the admission. Will revisit once pt is more alert.   VSS at this time. Nephro consult will be called per STAT order that was not complete in ED.   BP (!) 125/55   Pulse 91   Temp 98.7 °F (37.1 °C) (Oral)   Resp 17   Ht 1.549 m (5' 1\")   Wt 41.3 kg (91 lb)   LMP 07/12/2015   SpO2 91%   BMI 17.19 kg/m²       
Patient admitted to room 319 from ICU. Patient oriented to room, call light, bed rails, phone, lights and bathroom. Patient instructed about the schedule of the day including: vital sign frequency, lab draws, possible tests, frequency of MD and staff rounds, daily weights, I &O's and prescribed diet.  Telemetry box in place, patient aware of placement and reason. Bed locked, in lowest position, side rails up 2/4, call light within reach.        Recliner Assessment  Patient is able to demonstrate the ability to move from a reclining position to an upright position within the recliner.       4 Eyes Skin Assessment     NAME:  Bonny Serrano  YOB: 1970  MEDICAL RECORD NUMBER:  [id number]    The patient is being assessed for  Transfer to New Unit    I agree that at least one RN has performed a thorough Head to Toe Skin Assessment on the patient. ALL assessment sites listed below have been assessed.      Areas assessed by both nurses:    Head, Face, Ears, Shoulders, Back, Chest, Arms, Elbows, Hands, Sacrum. Buttock, Coccyx, Ischium, Legs. Feet and Heels, and Under Medical Devices         Does the Patient have a Wound? No noted wound(s)       [place] Prevention initiated by RN: No  Wound Care Orders initiated by RN: No    Pressure Injury (Stage 3,4, Unstageable, DTI, NWPT, and Complex wounds) if present, place Wound referral order by RN under : No    New Ostomies, if present place, Ostomy referral order under : No     Nurse 1 eSignature: Electronically signed by Dustin Alfonso RN on 11/10/24 at 5:42 PM EST    **SHARE this note so that the co-signing nurse can place an eSignature**    Nurse 2 eSignature: Electronically signed by Maria Antonia Josue RN on 11/11/24 at 9:14 AM EST   
Patient educated on discharge instructions as well as new medications use, dosage, administration and possible side effects.  Patient verified knowledge. IV removed without difficulty and dry dressing in place. Telemetry monitor removed and returned to [facility]. Pt left facility in stable condition to Home with all of their personal belongings.     
Patient provided a COPD Educational Folder that includes the following materials:     [x]  [employer] Booklet: Managing your COPD  [x]  ALA: Getting the Most Out of Medication Delivery Devices  [x]  ALA: My COPD Action Plan  [x]  Better Breathers [facility]: [facility]   [x]  Smoking Cessation Classes  [x]  Outpatient Spiritual Care Services  [x]  Magnet: Signs of COPD    PATIENT/CAREGIVER TEACHING:   Level of patient/caregiver understanding able to:   [x] Verbalize understanding   [] Demonstrate understanding       [] Teach back        [] Needs reinforcement     []  Other:     Electronically signed by Luli Baker RN on 11/11/2024 at 4:24 PM   
Patient stable.  Report given to June, RN  
Pulmonary Progress Note  CC: SOB and COPD    Subjective:  on room air at rest now      EXAM: BP (!) 153/87   Pulse (!) 126   Temp 98.2 °F (36.8 °C) (Oral)   Resp 18   Ht 1.549 m (5' 1\")   Wt 45.6 kg (100 lb 8 oz)   LMP 07/12/2015   SpO2 97%   BMI 18.99 kg/m²  on ra  Constitutional:  No acute distress.   Eyes: PERRL. Conjunctivae anicteric.   ENT: Normal nose. Normal tongue.    Neck:  Trachea is midline.   Respiratory: No accessory muscle usage.   decreased breath sounds. No wheezes. No rales. No Rhonchi.  Cardiovascular: Normal S1S2. No digit clubbing. No digit cyanosis. No LE edema.   Psychiatric: No anxiety or Agitation. Alert and Oriented to person, place and time.    Scheduled Meds:   dilTIAZem  180 mg Oral Daily    atenolol  50 mg Oral Daily    atorvastatin  10 mg Oral Nightly    budesonide-formoterol  2 puff Inhalation BID RT    And    tiotropium  2 puff Inhalation Daily RT    pantoprazole  40 mg Oral QAM AC    cefTRIAXone (ROCEPHIN) IV  1,000 mg IntraVENous Q24H    azithromycin  500 mg IntraVENous Q24H    lipase-protease-amylas  15,000 Units Oral TID WC    lipase-protease-amylase  20,000 Units Oral TID WC    predniSONE  40 mg Oral Daily    sodium chloride flush  5-40 mL IntraVENous 2 times per day    thiamine  100 mg Oral Daily    chlordiazePOXIDE  10 mg Oral TID    sodium chloride flush  5-40 mL IntraVENous 2 times per day    enoxaparin  30 mg SubCUTAneous Daily    guaiFENesin  1,200 mg Oral BID    nicotine  1 patch TransDERmal Daily     Continuous Infusions:   sodium chloride      sodium chloride       PRN Meds:  magnesium sulfate, levalbuterol, sodium chloride flush, sodium chloride, LORazepam **OR** LORazepam **OR** LORazepam **OR** LORazepam **OR** LORazepam **OR** LORazepam **OR** LORazepam **OR** LORazepam, sodium chloride flush, sodium chloride, ondansetron **OR** ondansetron, polyethylene glycol, acetaminophen **OR** acetaminophen    Labs:  CBC:   Recent Labs     11/09/24  2017 11/10/24  0408 
RT Inhaler-Nebulizer Bronchodilator Protocol Note    There is a bronchodilator order in the chart from a provider indicating to follow the RT Bronchodilator Protocol and there is an “Initiate RT Inhaler-Nebulizer Bronchodilator Protocol” order as well (see protocol at bottom of note).    CXR Findings:  No results found.    The findings from the last RT Protocol Assessment were as follows:   History Pulmonary Disease: (P) Chronic pulmonary disease  Respiratory Pattern: (P) Dyspnea on exertion or RR 21-25 bpm  Breath Sounds: (P) Intermittent or unilateral wheezes  Cough: (P) Strong, spontaneous, non-productive  Indication for Bronchodilator Therapy: (P) Decreased or absent breath sounds, Wheezing associated with pulm disorder  Bronchodilator Assessment Score: (P) 8    Aerosolized bronchodilator medication orders have been revised according to the RT Inhaler-Nebulizer Bronchodilator Protocol below.    Respiratory Therapist to perform RT Therapy Protocol Assessment initially then follow the protocol.  Repeat RT Therapy Protocol Assessment PRN for score 0-3 or on second treatment, BID, and PRN for scores above 3.    No Indications - adjust the frequency to every 6 hours PRN wheezing or bronchospasm, if no treatments needed after 48 hours then discontinue using Per Protocol order mode.     If indication present, adjust the RT bronchodilator orders based on the Bronchodilator Assessment Score as indicated below.  Use Inhaler orders unless patient has one or more of the following: on home nebulizer, not able to hold breath for 10 seconds, is not alert and oriented, cannot activate and use MDI correctly, or respiratory rate 25 breaths per minute or more, then use the equivalent nebulizer order(s) with same Frequency and PRN reasons based on the score.  If a patient is on this medication at home then do not decrease Frequency below that used at home.    0-3 - enter or revise RT bronchodilator order(s) to equivalent RT 
RT Inhaler-Nebulizer Bronchodilator Protocol Note    There is a bronchodilator order in the chart from a provider indicating to follow the RT Bronchodilator Protocol and there is an “Initiate RT Inhaler-Nebulizer Bronchodilator Protocol” order as well (see protocol at bottom of note).    CXR Findings:  No results found.    The findings from the last RT Protocol Assessment were as follows:   History Pulmonary Disease: (P) Chronic pulmonary disease  Respiratory Pattern: (P) Mild dyspnea at rest, irregular pattern, or RR 21-25 bpm  Breath Sounds: (P) Intermittent or unilateral wheezes  Cough: (P) Strong, productive  Indication for Bronchodilator Therapy: (P) Wheezing associated with pulm disorder  Bronchodilator Assessment Score: (P) 11    Aerosolized bronchodilator medication orders have been revised according to the RT Inhaler-Nebulizer Bronchodilator Protocol below.    Respiratory Therapist to perform RT Therapy Protocol Assessment initially then follow the protocol.  Repeat RT Therapy Protocol Assessment PRN for score 0-3 or on second treatment, BID, and PRN for scores above 3.    No Indications - adjust the frequency to every 6 hours PRN wheezing or bronchospasm, if no treatments needed after 48 hours then discontinue using Per Protocol order mode.     If indication present, adjust the RT bronchodilator orders based on the Bronchodilator Assessment Score as indicated below.  Use Inhaler orders unless patient has one or more of the following: on home nebulizer, not able to hold breath for 10 seconds, is not alert and oriented, cannot activate and use MDI correctly, or respiratory rate 25 breaths per minute or more, then use the equivalent nebulizer order(s) with same Frequency and PRN reasons based on the score.  If a patient is on this medication at home then do not decrease Frequency below that used at home.    0-3 - enter or revise RT bronchodilator order(s) to equivalent RT Bronchodilator order with Frequency 
RT Inhaler-Nebulizer Bronchodilator Protocol Note    There is a bronchodilator order in the chart from a provider indicating to follow the RT Bronchodilator Protocol and there is an “Initiate RT Inhaler-Nebulizer Bronchodilator Protocol” order as well (see protocol at bottom of note).    CXR Findings:  No results found.    The findings from the last RT Protocol Assessment were as follows:   History Pulmonary Disease: Chronic pulmonary disease  Respiratory Pattern: Dyspnea on exertion or RR 21-25 bpm  Breath Sounds: Slightly diminished and/or crackles  Cough: Strong, productive  Indication for Bronchodilator Therapy: Decreased or absent breath sounds  Bronchodilator Assessment Score: 7    Aerosolized bronchodilator medication orders have been revised according to the RT Inhaler-Nebulizer Bronchodilator Protocol below.    Respiratory Therapist to perform RT Therapy Protocol Assessment initially then follow the protocol.  Repeat RT Therapy Protocol Assessment PRN for score 0-3 or on second treatment, BID, and PRN for scores above 3.    No Indications - adjust the frequency to every 6 hours PRN wheezing or bronchospasm, if no treatments needed after 48 hours then discontinue using Per Protocol order mode.     If indication present, adjust the RT bronchodilator orders based on the Bronchodilator Assessment Score as indicated below.  Use Inhaler orders unless patient has one or more of the following: on home nebulizer, not able to hold breath for 10 seconds, is not alert and oriented, cannot activate and use MDI correctly, or respiratory rate 25 breaths per minute or more, then use the equivalent nebulizer order(s) with same Frequency and PRN reasons based on the score.  If a patient is on this medication at home then do not decrease Frequency below that used at home.    0-3 - enter or revise RT bronchodilator order(s) to equivalent RT Bronchodilator order with Frequency of every 4 hours PRN for wheezing or increased work 
Reassessment completed as documented. No changes in assessment at this time. VSS Call light within reach   
Rt prot  
Shift assessment completed as documented on flowsheet. VSS. Pt awake and alert, ox4. Denies pain or discomfort. Lungs  diminished in bases. Call light within reach. Monitoring closely    
Vital taken and shift assessment is complete, see flow sheet. Pt given ice per request. Pt A&OX 4 with call light within reach and bed alarm on.   
  Recent Labs     11/09/24  2017 11/10/24  0408   WBC 7.2 4.3   HGB 9.8* 10.1*   HCT 29.1* 30.5*   MCV 75.9* 74.9*    328     BMP:   Recent Labs     11/10/24  1640 11/10/24  2009 11/11/24  1144   * 125* 127*   K 3.8 3.9 3.8   CL 84* 86* 87*   CO2 29 29 28   BUN 11 14 9   CREATININE 0.4* 0.4* 0.4*       Chest imaging was reviewed by me and showed CXR Lungs are emphysematous.  Irregular pleural and parenchymal disease in the  right upper lobe is stable from 07/11/2024 most consistent with scarring.  Ill-defined ill-defined disease in the lateral right lower lung likely in the  lateral segment of the middle lobe appears to be both acute and chronic.  No  pneumothorax or significant pleural fluid.  Heart size and configuration are  normal.  No acute bone finding.     IMPRESSION:  1. Emphysema with chronic right upper lobe scarring.  2. Ill-defined right lower lung disease appears to be both acute and chronic. Follow-up  recommended.     ASSESSMENT:  Very severe COPD with AE   Hypoxia  RUl cavitary lesion followed closely by Dr. Benson  H/o PE in 2018 associated with hospitalization for pneumonia  Alcohol abuse a  Chronic pancreatitis  Tobacco abuse  PAF  Hyponatremia - improved     PLAN:  Supplemental oxygen to maintain SaO2 >92%; wean as tolerated  Intensive inhaled bronchodilator therapy.   Prednisone taper   Azithromycin and ceftriaxone  Sputum GS&C.  Acapella QID to mobilize respiratory secretions  Smoking cessation counseled > 3 min  Outpatient follow-up CT with Dr. Benson as b scheduled  Nephro following

## 2024-11-12 NOTE — FLOWSHEET NOTE
11/11/24 1938   Handoff   Communication Given Shift Handoff   Handoff Given To SABRINA Chan   Handoff Received From SARAH Em   Handoff Communication Face to Face   Time Handoff Given 1939   End of Shift Check Performed Yes     EOS report given to SARAH Chan. Pt in bed, call light within reach. Pt is stable, care is transferred.

## 2024-11-12 NOTE — PLAN OF CARE
Problem: Discharge Planning  Goal: Discharge to home or other facility with appropriate resources  11/12/2024 1311 by Maria Antonia Josue RN  Outcome: Completed  11/12/2024 0920 by Maria Antonia Josue RN  Outcome: Progressing     Problem: Pain  Goal: Verbalizes/displays adequate comfort level or baseline comfort level  11/12/2024 1311 by Maria Antonia Josue RN  Outcome: Completed  11/12/2024 0920 by Maria Antonia Josue, RN  Outcome: Progressing     Problem: Safety - Adult  Goal: Free from fall injury  11/12/2024 1311 by Maria Antonia Josue, RN  Outcome: Completed  11/12/2024 0920 by Maria Antonia Josue, RN  Outcome: Progressing

## 2024-11-13 ENCOUNTER — TELEPHONE (OUTPATIENT)
Dept: PULMONOLOGY | Age: 54
End: 2024-11-13

## 2024-11-13 DIAGNOSIS — J44.9 CHRONIC OBSTRUCTIVE PULMONARY DISEASE, UNSPECIFIED COPD TYPE (HCC): ICD-10-CM

## 2024-11-13 RX ORDER — GUAIFENESIN 600 MG/1
1200 TABLET, EXTENDED RELEASE ORAL 2 TIMES DAILY PRN
Qty: 60 TABLET | Refills: 0 | Status: SHIPPED | OUTPATIENT
Start: 2024-11-13

## 2024-11-13 NOTE — TELEPHONE ENCOUNTER
Patient called and wants a refill of Mucinex sent to [facility] in [facility].

## 2024-12-12 RX ORDER — LANOLIN ALCOHOL/MO/W.PET/CERES
100 CREAM (GRAM) TOPICAL DAILY
Qty: 30 TABLET | Refills: 0 | OUTPATIENT
Start: 2024-12-12

## 2024-12-20 ENCOUNTER — TELEPHONE (OUTPATIENT)
Dept: PULMONOLOGY | Age: 54
End: 2024-12-20

## 2024-12-20 DIAGNOSIS — J44.9 CHRONIC OBSTRUCTIVE PULMONARY DISEASE, UNSPECIFIED COPD TYPE (HCC): ICD-10-CM

## 2024-12-20 DIAGNOSIS — J44.9 COPD, SEVERE (HCC): Primary | ICD-10-CM

## 2024-12-20 NOTE — TELEPHONE ENCOUNTER
Patient call in to say that she does not know how to set up her concentrator. I tried to talk patient through on how to set up the machine. Patient still did not sound as if she was understanding what I was telling her. I call [employer] who is the patient DME company to see if they could call and give patient so assistance on how to work the concentrator. [employer] said that they will call patient and I told patient I will call her back to check to see if she was ok. I did ask her what her oxygen sat was at and she said 89%. I will F/U with patient in about a hour. Patient also said her home health nurse will be there soon.

## 2024-12-20 NOTE — PROGRESS NOTES
Please sign if appropriate  PT request refill  Requested Prescriptions     Pending Prescriptions Disp Refills    guaiFENesin (MUCINEX) 600 MG extended release tablet 60 tablet 0     Sig: Take 2 tablets by mouth 2 times daily as needed for Congestion

## 2024-12-23 RX ORDER — GUAIFENESIN 600 MG/1
1200 TABLET, EXTENDED RELEASE ORAL 2 TIMES DAILY PRN
Qty: 60 TABLET | Refills: 0 | Status: SHIPPED | OUTPATIENT
Start: 2024-12-23

## 2025-01-03 ENCOUNTER — APPOINTMENT (OUTPATIENT)
Dept: CT IMAGING | Age: 55
End: 2025-01-03
Payer: MEDICAID

## 2025-01-03 ENCOUNTER — APPOINTMENT (OUTPATIENT)
Dept: GENERAL RADIOLOGY | Age: 55
End: 2025-01-03
Payer: MEDICAID

## 2025-01-03 ENCOUNTER — HOSPITAL ENCOUNTER (INPATIENT)
Age: 55
LOS: 1 days | Discharge: OTHER INSTITUTION | End: 2025-01-04
Attending: EMERGENCY MEDICINE | Admitting: STUDENT IN AN ORGANIZED HEALTH CARE EDUCATION/TRAINING PROGRAM
Payer: MEDICAID

## 2025-01-03 DIAGNOSIS — R41.82 ALTERED MENTAL STATUS, UNSPECIFIED ALTERED MENTAL STATUS TYPE: Primary | ICD-10-CM

## 2025-01-03 DIAGNOSIS — R44.3 HALLUCINATIONS: ICD-10-CM

## 2025-01-03 LAB
ANION GAP SERPL CALCULATED.3IONS-SCNC: 17 MMOL/L (ref 3–16)
ANISOCYTOSIS BLD QL SMEAR: ABNORMAL
APAP SERPL-MCNC: <5 UG/ML (ref 10–30)
BASE EXCESS BLDV CALC-SCNC: -2.6 MMOL/L (ref -3–3)
BASOPHILS # BLD: 0 K/UL (ref 0–0.2)
BASOPHILS NFR BLD: 1 %
BUN SERPL-MCNC: 5 MG/DL (ref 7–20)
BURR CELLS BLD QL SMEAR: ABNORMAL
CALCIUM SERPL-MCNC: 7.9 MG/DL (ref 8.3–10.6)
CHLORIDE SERPL-SCNC: 87 MMOL/L (ref 99–110)
CO2 BLDV-SCNC: 22 MMOL/L
CO2 SERPL-SCNC: 24 MMOL/L (ref 21–32)
COHGB MFR BLDV: 5.4 % (ref 0–1.5)
CREAT SERPL-MCNC: <0.2 MG/DL (ref 0.6–1.1)
DEPRECATED RDW RBC AUTO: 24.1 % (ref 12.4–15.4)
EOSINOPHIL # BLD: 0 K/UL (ref 0–0.6)
EOSINOPHIL NFR BLD: 1 %
ETHANOLAMINE SERPL-MCNC: 61 MG/DL (ref 0–0.08)
FLUAV RNA RESP QL NAA+PROBE: NOT DETECTED
FLUBV RNA RESP QL NAA+PROBE: NOT DETECTED
GFR SERPLBLD CREATININE-BSD FMLA CKD-EPI: >90 ML/MIN/{1.73_M2}
GLUCOSE SERPL-MCNC: 100 MG/DL (ref 70–99)
HCO3 BLDV-SCNC: 21.2 MMOL/L (ref 23–29)
HCT VFR BLD AUTO: 34.6 % (ref 36–48)
HGB BLD-MCNC: 11.3 G/DL (ref 12–16)
HYPOCHROMIA BLD QL SMEAR: ABNORMAL
LYMPHOCYTES # BLD: 1.6 K/UL (ref 1–5.1)
LYMPHOCYTES NFR BLD: 30 %
MCH RBC QN AUTO: 25.7 PG (ref 26–34)
MCHC RBC AUTO-ENTMCNC: 32.6 G/DL (ref 31–36)
MCV RBC AUTO: 78.9 FL (ref 80–100)
METHGB MFR BLDV: 0.3 %
MICROCYTES BLD QL SMEAR: ABNORMAL
MONOCYTES # BLD: 0.7 K/UL (ref 0–1.3)
MONOCYTES NFR BLD: 15 %
NEUTROPHILS # BLD: 2.4 K/UL (ref 1.7–7.7)
NEUTROPHILS NFR BLD: 50 %
NEUTS VAC BLD QL SMEAR: PRESENT
O2 CT VFR BLDV CALC: 17 VOL %
O2 THERAPY: ABNORMAL
PCO2 BLDV: 33.6 MMHG (ref 40–50)
PH BLDV: 7.42 [PH] (ref 7.35–7.45)
PLATELET # BLD AUTO: 339 K/UL (ref 135–450)
PLATELET BLD QL SMEAR: ADEQUATE
PMV BLD AUTO: 6.8 FL (ref 5–10.5)
PO2 BLDV: 133.7 MMHG (ref 25–40)
POIKILOCYTOSIS BLD QL SMEAR: ABNORMAL
POTASSIUM SERPL-SCNC: 2.7 MMOL/L (ref 3.5–5.1)
RBC # BLD AUTO: 4.39 M/UL (ref 4–5.2)
SALICYLATES SERPL-MCNC: <0.5 MG/DL (ref 15–30)
SAO2 % BLDV: 99 %
SARS-COV-2 RNA RESP QL NAA+PROBE: NOT DETECTED
SCHISTOCYTES BLD QL SMEAR: ABNORMAL
SLIDE REVIEW: ABNORMAL
SODIUM SERPL-SCNC: 128 MMOL/L (ref 136–145)
TOXIC GRANULES BLD QL SMEAR: PRESENT
VARIANT LYMPHS NFR BLD MANUAL: 3 % (ref 0–6)
WBC # BLD AUTO: 4.7 K/UL (ref 4–11)

## 2025-01-03 PROCEDURE — 87636 SARSCOV2 & INF A&B AMP PRB: CPT

## 2025-01-03 PROCEDURE — 96365 THER/PROPH/DIAG IV INF INIT: CPT

## 2025-01-03 PROCEDURE — 90791 PSYCH DIAGNOSTIC EVALUATION: CPT | Performed by: SOCIAL WORKER

## 2025-01-03 PROCEDURE — 80179 DRUG ASSAY SALICYLATE: CPT

## 2025-01-03 PROCEDURE — 93005 ELECTROCARDIOGRAM TRACING: CPT | Performed by: NURSE PRACTITIONER

## 2025-01-03 PROCEDURE — 6360000002 HC RX W HCPCS: Performed by: NURSE PRACTITIONER

## 2025-01-03 PROCEDURE — 94761 N-INVAS EAR/PLS OXIMETRY MLT: CPT

## 2025-01-03 PROCEDURE — 71045 X-RAY EXAM CHEST 1 VIEW: CPT

## 2025-01-03 PROCEDURE — 85025 COMPLETE CBC W/AUTO DIFF WBC: CPT

## 2025-01-03 PROCEDURE — 6370000000 HC RX 637 (ALT 250 FOR IP): Performed by: NURSE PRACTITIONER

## 2025-01-03 PROCEDURE — 80143 DRUG ASSAY ACETAMINOPHEN: CPT

## 2025-01-03 PROCEDURE — 2700000000 HC OXYGEN THERAPY PER DAY

## 2025-01-03 PROCEDURE — 80048 BASIC METABOLIC PNL TOTAL CA: CPT

## 2025-01-03 PROCEDURE — 84443 ASSAY THYROID STIM HORMONE: CPT

## 2025-01-03 PROCEDURE — 82803 BLOOD GASES ANY COMBINATION: CPT

## 2025-01-03 PROCEDURE — 83735 ASSAY OF MAGNESIUM: CPT

## 2025-01-03 PROCEDURE — 99285 EMERGENCY DEPT VISIT HI MDM: CPT

## 2025-01-03 PROCEDURE — 70450 CT HEAD/BRAIN W/O DYE: CPT

## 2025-01-03 PROCEDURE — 82077 ASSAY SPEC XCP UR&BREATH IA: CPT

## 2025-01-03 RX ORDER — LANOLIN ALCOHOL/MO/W.PET/CERES
100 CREAM (GRAM) TOPICAL ONCE
Status: COMPLETED | OUTPATIENT
Start: 2025-01-03 | End: 2025-01-04

## 2025-01-03 RX ORDER — POTASSIUM CHLORIDE 7.45 MG/ML
10 INJECTION INTRAVENOUS ONCE
Status: COMPLETED | OUTPATIENT
Start: 2025-01-03 | End: 2025-01-03

## 2025-01-03 RX ORDER — POTASSIUM CHLORIDE 1500 MG/1
40 TABLET, EXTENDED RELEASE ORAL ONCE
Status: COMPLETED | OUTPATIENT
Start: 2025-01-03 | End: 2025-01-03

## 2025-01-03 RX ADMIN — POTASSIUM CHLORIDE 10 MEQ: 7.46 INJECTION, SOLUTION INTRAVENOUS at 22:45

## 2025-01-03 RX ADMIN — POTASSIUM CHLORIDE 40 MEQ: 1500 TABLET, EXTENDED RELEASE ORAL at 22:28

## 2025-01-03 ASSESSMENT — PAIN - FUNCTIONAL ASSESSMENT: PAIN_FUNCTIONAL_ASSESSMENT: NONE - DENIES PAIN

## 2025-01-04 ENCOUNTER — HOSPITAL ENCOUNTER (INPATIENT)
Age: 55
LOS: 17 days | Discharge: NURSING, REHAB OR LONG-TERM CARE | End: 2025-01-21
Attending: PSYCHIATRY & NEUROLOGY | Admitting: PSYCHIATRY & NEUROLOGY
Payer: MEDICAID

## 2025-01-04 VITALS
DIASTOLIC BLOOD PRESSURE: 59 MMHG | RESPIRATION RATE: 16 BRPM | TEMPERATURE: 97.9 F | HEART RATE: 81 BPM | BODY MASS INDEX: 15.12 KG/M2 | SYSTOLIC BLOOD PRESSURE: 100 MMHG | WEIGHT: 80 LBS | OXYGEN SATURATION: 100 %

## 2025-01-04 PROBLEM — F29 PSYCHOSIS, UNSPECIFIED PSYCHOSIS TYPE (HCC): Status: ACTIVE | Noted: 2025-01-04

## 2025-01-04 PROBLEM — F10.939 ALCOHOL WITHDRAWAL SYNDROME, WITH UNSPECIFIED COMPLICATION (HCC): Status: ACTIVE | Noted: 2025-01-04

## 2025-01-04 PROBLEM — F29 PSYCHOSIS (HCC): Status: ACTIVE | Noted: 2025-01-04

## 2025-01-04 PROBLEM — F10.10 ALCOHOL ABUSE: Status: ACTIVE | Noted: 2025-01-04

## 2025-01-04 PROBLEM — R41.82 ALTERED MENTAL STATUS: Status: ACTIVE | Noted: 2025-01-04

## 2025-01-04 PROBLEM — K86.1 CHRONIC PANCREATITIS (HCC): Status: ACTIVE | Noted: 2025-01-04

## 2025-01-04 LAB
ALBUMIN SERPL-MCNC: 3 G/DL (ref 3.4–5)
ALBUMIN/GLOB SERPL: 1.3 {RATIO} (ref 1.1–2.2)
ALP SERPL-CCNC: 131 U/L (ref 40–129)
ALT SERPL-CCNC: 12 U/L (ref 10–40)
AMPHETAMINES UR QL SCN>1000 NG/ML: NORMAL
ANION GAP SERPL CALCULATED.3IONS-SCNC: 10 MMOL/L (ref 3–16)
AST SERPL-CCNC: 22 U/L (ref 15–37)
BARBITURATES UR QL SCN>200 NG/ML: NORMAL
BENZODIAZ UR QL SCN>200 NG/ML: NORMAL
BILIRUB SERPL-MCNC: <0.2 MG/DL (ref 0–1)
BILIRUB UR QL STRIP.AUTO: NEGATIVE
BUN SERPL-MCNC: 4 MG/DL (ref 7–20)
CALCIUM SERPL-MCNC: 7.6 MG/DL (ref 8.3–10.6)
CANNABINOIDS UR QL SCN>50 NG/ML: NORMAL
CHLORIDE SERPL-SCNC: 96 MMOL/L (ref 99–110)
CLARITY UR: CLEAR
CO2 SERPL-SCNC: 25 MMOL/L (ref 21–32)
COCAINE UR QL SCN: NORMAL
COLOR UR: YELLOW
CREAT SERPL-MCNC: <0.2 MG/DL (ref 0.6–1.1)
DRUG SCREEN COMMENT UR-IMP: NORMAL
EKG ATRIAL RATE: 86 BPM
EKG DIAGNOSIS: NORMAL
EKG P AXIS: 78 DEGREES
EKG P-R INTERVAL: 126 MS
EKG Q-T INTERVAL: 444 MS
EKG QRS DURATION: 84 MS
EKG QTC CALCULATION (BAZETT): 531 MS
EKG R AXIS: 89 DEGREES
EKG T AXIS: 80 DEGREES
EKG VENTRICULAR RATE: 86 BPM
FENTANYL SCREEN, URINE: NORMAL
GFR SERPLBLD CREATININE-BSD FMLA CKD-EPI: >90 ML/MIN/{1.73_M2}
GLUCOSE SERPL-MCNC: 121 MG/DL (ref 70–99)
GLUCOSE UR STRIP.AUTO-MCNC: NEGATIVE MG/DL
HGB UR QL STRIP.AUTO: NEGATIVE
KETONES UR STRIP.AUTO-MCNC: ABNORMAL MG/DL
LEUKOCYTE ESTERASE UR QL STRIP.AUTO: NEGATIVE
MAGNESIUM SERPL-MCNC: 1.22 MG/DL (ref 1.8–2.4)
MAGNESIUM SERPL-MCNC: 2.31 MG/DL (ref 1.8–2.4)
METHADONE UR QL SCN>300 NG/ML: NORMAL
NITRITE UR QL STRIP.AUTO: NEGATIVE
OPIATES UR QL SCN>300 NG/ML: NORMAL
OXYCODONE UR QL SCN: NORMAL
PCP UR QL SCN>25 NG/ML: NORMAL
PH UR STRIP.AUTO: 6 [PH] (ref 5–8)
PH UR STRIP: 5 [PH]
POTASSIUM SERPL-SCNC: 3.7 MMOL/L (ref 3.5–5.1)
PROT SERPL-MCNC: 5.4 G/DL (ref 6.4–8.2)
PROT UR STRIP.AUTO-MCNC: NEGATIVE MG/DL
SODIUM SERPL-SCNC: 131 MMOL/L (ref 136–145)
SP GR UR STRIP.AUTO: <=1.005 (ref 1–1.03)
TSH SERPL DL<=0.005 MIU/L-ACNC: 0.69 UIU/ML (ref 0.27–4.2)
TSH SERPL DL<=0.005 MIU/L-ACNC: 0.95 UIU/ML (ref 0.27–4.2)
UA COMPLETE W REFLEX CULTURE PNL UR: ABNORMAL
UA DIPSTICK W REFLEX MICRO PNL UR: ABNORMAL
URN SPEC COLLECT METH UR: ABNORMAL
UROBILINOGEN UR STRIP-ACNC: 0.2 E.U./DL

## 2025-01-04 PROCEDURE — 80053 COMPREHEN METABOLIC PANEL: CPT

## 2025-01-04 PROCEDURE — 96367 TX/PROPH/DG ADDL SEQ IV INF: CPT

## 2025-01-04 PROCEDURE — 93010 ELECTROCARDIOGRAM REPORT: CPT | Performed by: INTERNAL MEDICINE

## 2025-01-04 PROCEDURE — 6370000000 HC RX 637 (ALT 250 FOR IP): Performed by: INTERNAL MEDICINE

## 2025-01-04 PROCEDURE — 1200000000 HC SEMI PRIVATE

## 2025-01-04 PROCEDURE — 99222 1ST HOSP IP/OBS MODERATE 55: CPT | Performed by: INTERNAL MEDICINE

## 2025-01-04 PROCEDURE — 94761 N-INVAS EAR/PLS OXIMETRY MLT: CPT

## 2025-01-04 PROCEDURE — 1240000000 HC EMOTIONAL WELLNESS R&B

## 2025-01-04 PROCEDURE — 6370000000 HC RX 637 (ALT 250 FOR IP): Performed by: STUDENT IN AN ORGANIZED HEALTH CARE EDUCATION/TRAINING PROGRAM

## 2025-01-04 PROCEDURE — 80307 DRUG TEST PRSMV CHEM ANLYZR: CPT

## 2025-01-04 PROCEDURE — 2700000000 HC OXYGEN THERAPY PER DAY

## 2025-01-04 PROCEDURE — 81003 URINALYSIS AUTO W/O SCOPE: CPT

## 2025-01-04 PROCEDURE — 99255 IP/OBS CONSLTJ NEW/EST HI 80: CPT | Performed by: NURSE PRACTITIONER

## 2025-01-04 PROCEDURE — 80061 LIPID PANEL: CPT

## 2025-01-04 PROCEDURE — 6370000000 HC RX 637 (ALT 250 FOR IP): Performed by: NURSE PRACTITIONER

## 2025-01-04 PROCEDURE — 36415 COLL VENOUS BLD VENIPUNCTURE: CPT

## 2025-01-04 PROCEDURE — 93005 ELECTROCARDIOGRAM TRACING: CPT | Performed by: NURSE PRACTITIONER

## 2025-01-04 PROCEDURE — 6360000002 HC RX W HCPCS: Performed by: NURSE PRACTITIONER

## 2025-01-04 PROCEDURE — 83735 ASSAY OF MAGNESIUM: CPT

## 2025-01-04 PROCEDURE — 94640 AIRWAY INHALATION TREATMENT: CPT

## 2025-01-04 PROCEDURE — 6360000002 HC RX W HCPCS: Performed by: STUDENT IN AN ORGANIZED HEALTH CARE EDUCATION/TRAINING PROGRAM

## 2025-01-04 PROCEDURE — 83036 HEMOGLOBIN GLYCOSYLATED A1C: CPT

## 2025-01-04 PROCEDURE — 2500000003 HC RX 250 WO HCPCS: Performed by: STUDENT IN AN ORGANIZED HEALTH CARE EDUCATION/TRAINING PROGRAM

## 2025-01-04 PROCEDURE — 84443 ASSAY THYROID STIM HORMONE: CPT

## 2025-01-04 RX ORDER — LORAZEPAM 1 MG/1
1 TABLET ORAL
Status: DISCONTINUED | OUTPATIENT
Start: 2025-01-04 | End: 2025-01-04 | Stop reason: HOSPADM

## 2025-01-04 RX ORDER — LORAZEPAM 2 MG/1
2 TABLET ORAL
Status: DISCONTINUED | OUTPATIENT
Start: 2025-01-04 | End: 2025-01-04 | Stop reason: HOSPADM

## 2025-01-04 RX ORDER — ACETAMINOPHEN 650 MG/1
650 SUPPOSITORY RECTAL EVERY 6 HOURS PRN
Status: DISCONTINUED | OUTPATIENT
Start: 2025-01-04 | End: 2025-01-04 | Stop reason: HOSPADM

## 2025-01-04 RX ORDER — DILTIAZEM HYDROCHLORIDE 240 MG/1
240 CAPSULE, COATED, EXTENDED RELEASE ORAL DAILY
Status: DISCONTINUED | OUTPATIENT
Start: 2025-01-04 | End: 2025-01-04 | Stop reason: HOSPADM

## 2025-01-04 RX ORDER — SODIUM CHLORIDE 0.9 % (FLUSH) 0.9 %
5-40 SYRINGE (ML) INJECTION PRN
Status: DISCONTINUED | OUTPATIENT
Start: 2025-01-04 | End: 2025-01-04 | Stop reason: HOSPADM

## 2025-01-04 RX ORDER — ACETAMINOPHEN 325 MG/1
650 TABLET ORAL EVERY 4 HOURS PRN
Status: DISCONTINUED | OUTPATIENT
Start: 2025-01-04 | End: 2025-01-21 | Stop reason: HOSPADM

## 2025-01-04 RX ORDER — MAGNESIUM SULFATE IN WATER 40 MG/ML
4000 INJECTION, SOLUTION INTRAVENOUS ONCE
Status: COMPLETED | OUTPATIENT
Start: 2025-01-04 | End: 2025-01-04

## 2025-01-04 RX ORDER — LANOLIN ALCOHOL/MO/W.PET/CERES
100 CREAM (GRAM) TOPICAL DAILY
Status: DISCONTINUED | OUTPATIENT
Start: 2025-01-04 | End: 2025-01-04 | Stop reason: HOSPADM

## 2025-01-04 RX ORDER — ACETAMINOPHEN 325 MG/1
650 TABLET ORAL EVERY 6 HOURS PRN
Status: DISCONTINUED | OUTPATIENT
Start: 2025-01-04 | End: 2025-01-04 | Stop reason: HOSPADM

## 2025-01-04 RX ORDER — NICOTINE 21 MG/24HR
1 PATCH, TRANSDERMAL 24 HOURS TRANSDERMAL DAILY
Status: DISCONTINUED | OUTPATIENT
Start: 2025-01-04 | End: 2025-01-14

## 2025-01-04 RX ORDER — ALBUTEROL SULFATE 0.83 MG/ML
2.5 SOLUTION RESPIRATORY (INHALATION) EVERY 4 HOURS PRN
Status: DISCONTINUED | OUTPATIENT
Start: 2025-01-04 | End: 2025-01-04 | Stop reason: HOSPADM

## 2025-01-04 RX ORDER — LORAZEPAM 2 MG/ML
1 INJECTION INTRAMUSCULAR
Status: DISCONTINUED | OUTPATIENT
Start: 2025-01-04 | End: 2025-01-04 | Stop reason: HOSPADM

## 2025-01-04 RX ORDER — GABAPENTIN 300 MG/1
300 CAPSULE ORAL DAILY
Status: DISCONTINUED | OUTPATIENT
Start: 2025-01-04 | End: 2025-01-04 | Stop reason: HOSPADM

## 2025-01-04 RX ORDER — LORAZEPAM 2 MG/ML
4 INJECTION INTRAMUSCULAR
Status: DISCONTINUED | OUTPATIENT
Start: 2025-01-04 | End: 2025-01-04 | Stop reason: HOSPADM

## 2025-01-04 RX ORDER — POLYETHYLENE GLYCOL 3350 17 G/17G
17 POWDER, FOR SOLUTION ORAL DAILY PRN
Status: DISCONTINUED | OUTPATIENT
Start: 2025-01-04 | End: 2025-01-04 | Stop reason: HOSPADM

## 2025-01-04 RX ORDER — SODIUM CHLORIDE 9 MG/ML
INJECTION, SOLUTION INTRAVENOUS PRN
Status: DISCONTINUED | OUTPATIENT
Start: 2025-01-04 | End: 2025-01-04 | Stop reason: HOSPADM

## 2025-01-04 RX ORDER — BENZTROPINE MESYLATE 1 MG/ML
2 INJECTION, SOLUTION INTRAMUSCULAR; INTRAVENOUS 2 TIMES DAILY PRN
Status: DISCONTINUED | OUTPATIENT
Start: 2025-01-04 | End: 2025-01-21 | Stop reason: HOSPADM

## 2025-01-04 RX ORDER — BUDESONIDE AND FORMOTEROL FUMARATE DIHYDRATE 160; 4.5 UG/1; UG/1
2 AEROSOL RESPIRATORY (INHALATION)
Status: DISCONTINUED | OUTPATIENT
Start: 2025-01-04 | End: 2025-01-04 | Stop reason: HOSPADM

## 2025-01-04 RX ORDER — ONDANSETRON 4 MG/1
4 TABLET, ORALLY DISINTEGRATING ORAL EVERY 8 HOURS PRN
Status: DISCONTINUED | OUTPATIENT
Start: 2025-01-04 | End: 2025-01-04 | Stop reason: HOSPADM

## 2025-01-04 RX ORDER — OLANZAPINE 5 MG/1
5 TABLET ORAL EVERY 4 HOURS PRN
Status: DISCONTINUED | OUTPATIENT
Start: 2025-01-04 | End: 2025-01-05

## 2025-01-04 RX ORDER — POLYETHYLENE GLYCOL 3350 17 G
2 POWDER IN PACKET (EA) ORAL
Status: DISCONTINUED | OUTPATIENT
Start: 2025-01-04 | End: 2025-01-21 | Stop reason: HOSPADM

## 2025-01-04 RX ORDER — ENOXAPARIN SODIUM 100 MG/ML
30 INJECTION SUBCUTANEOUS DAILY
Status: DISCONTINUED | OUTPATIENT
Start: 2025-01-04 | End: 2025-01-04 | Stop reason: HOSPADM

## 2025-01-04 RX ORDER — PANTOPRAZOLE SODIUM 40 MG/1
40 TABLET, DELAYED RELEASE ORAL
Status: DISCONTINUED | OUTPATIENT
Start: 2025-01-05 | End: 2025-01-04 | Stop reason: HOSPADM

## 2025-01-04 RX ORDER — LORAZEPAM 2 MG/ML
3 INJECTION INTRAMUSCULAR
Status: DISCONTINUED | OUTPATIENT
Start: 2025-01-04 | End: 2025-01-04 | Stop reason: HOSPADM

## 2025-01-04 RX ORDER — ATENOLOL 25 MG/1
25 TABLET ORAL DAILY
Status: DISCONTINUED | OUTPATIENT
Start: 2025-01-04 | End: 2025-01-04 | Stop reason: HOSPADM

## 2025-01-04 RX ORDER — POTASSIUM CHLORIDE 7.45 MG/ML
10 INJECTION INTRAVENOUS PRN
Status: DISCONTINUED | OUTPATIENT
Start: 2025-01-04 | End: 2025-01-04 | Stop reason: HOSPADM

## 2025-01-04 RX ORDER — TRAZODONE HYDROCHLORIDE 50 MG/1
50 TABLET, FILM COATED ORAL NIGHTLY PRN
Status: DISCONTINUED | OUTPATIENT
Start: 2025-01-04 | End: 2025-01-21 | Stop reason: HOSPADM

## 2025-01-04 RX ORDER — MAGNESIUM SULFATE IN WATER 40 MG/ML
2000 INJECTION, SOLUTION INTRAVENOUS PRN
Status: DISCONTINUED | OUTPATIENT
Start: 2025-01-04 | End: 2025-01-04 | Stop reason: HOSPADM

## 2025-01-04 RX ORDER — ATORVASTATIN CALCIUM 10 MG/1
20 TABLET, FILM COATED ORAL NIGHTLY
Status: DISCONTINUED | OUTPATIENT
Start: 2025-01-04 | End: 2025-01-04 | Stop reason: HOSPADM

## 2025-01-04 RX ORDER — LORAZEPAM 2 MG/1
4 TABLET ORAL
Status: DISCONTINUED | OUTPATIENT
Start: 2025-01-04 | End: 2025-01-04 | Stop reason: HOSPADM

## 2025-01-04 RX ORDER — GUAIFENESIN 600 MG/1
600 TABLET, EXTENDED RELEASE ORAL 2 TIMES DAILY
Status: DISCONTINUED | OUTPATIENT
Start: 2025-01-04 | End: 2025-01-04 | Stop reason: HOSPADM

## 2025-01-04 RX ORDER — MAGNESIUM HYDROXIDE/ALUMINUM HYDROXICE/SIMETHICONE 120; 1200; 1200 MG/30ML; MG/30ML; MG/30ML
30 SUSPENSION ORAL EVERY 6 HOURS PRN
Status: DISCONTINUED | OUTPATIENT
Start: 2025-01-04 | End: 2025-01-21 | Stop reason: HOSPADM

## 2025-01-04 RX ORDER — ONDANSETRON 2 MG/ML
4 INJECTION INTRAMUSCULAR; INTRAVENOUS EVERY 6 HOURS PRN
Status: DISCONTINUED | OUTPATIENT
Start: 2025-01-04 | End: 2025-01-04 | Stop reason: HOSPADM

## 2025-01-04 RX ORDER — SODIUM CHLORIDE 0.9 % (FLUSH) 0.9 %
5-40 SYRINGE (ML) INJECTION EVERY 12 HOURS SCHEDULED
Status: DISCONTINUED | OUTPATIENT
Start: 2025-01-04 | End: 2025-01-04 | Stop reason: HOSPADM

## 2025-01-04 RX ORDER — POTASSIUM CHLORIDE 1500 MG/1
40 TABLET, EXTENDED RELEASE ORAL PRN
Status: DISCONTINUED | OUTPATIENT
Start: 2025-01-04 | End: 2025-01-04 | Stop reason: HOSPADM

## 2025-01-04 RX ORDER — IBUPROFEN 400 MG/1
400 TABLET, FILM COATED ORAL EVERY 6 HOURS PRN
Status: DISCONTINUED | OUTPATIENT
Start: 2025-01-04 | End: 2025-01-21 | Stop reason: HOSPADM

## 2025-01-04 RX ORDER — ALBUTEROL SULFATE 0.83 MG/ML
2.5 SOLUTION RESPIRATORY (INHALATION) PRN
Status: DISCONTINUED | OUTPATIENT
Start: 2025-01-04 | End: 2025-01-04

## 2025-01-04 RX ORDER — HYDROXYZINE HYDROCHLORIDE 50 MG/1
50 TABLET, FILM COATED ORAL 3 TIMES DAILY PRN
Status: DISCONTINUED | OUTPATIENT
Start: 2025-01-04 | End: 2025-01-21 | Stop reason: HOSPADM

## 2025-01-04 RX ORDER — LORAZEPAM 2 MG/ML
2 INJECTION INTRAMUSCULAR
Status: DISCONTINUED | OUTPATIENT
Start: 2025-01-04 | End: 2025-01-04 | Stop reason: HOSPADM

## 2025-01-04 RX ADMIN — PANCRELIPASE LIPASE, PANCRELIPASE PROTEASE, PANCRELIPASE AMYLASE 40000 UNITS: 20000; 63000; 84000 CAPSULE, DELAYED RELEASE ORAL at 12:13

## 2025-01-04 RX ADMIN — BUDESONIDE AND FORMOTEROL FUMARATE DIHYDRATE 2 PUFF: 160; 4.5 AEROSOL RESPIRATORY (INHALATION) at 11:03

## 2025-01-04 RX ADMIN — GUAIFENESIN 600 MG: 600 TABLET, EXTENDED RELEASE ORAL at 12:13

## 2025-01-04 RX ADMIN — TRAZODONE HYDROCHLORIDE 50 MG: 50 TABLET ORAL at 22:42

## 2025-01-04 RX ADMIN — GABAPENTIN 300 MG: 300 CAPSULE ORAL at 12:13

## 2025-01-04 RX ADMIN — TIOTROPIUM BROMIDE INHALATION SPRAY 2 PUFF: 3.12 SPRAY, METERED RESPIRATORY (INHALATION) at 11:03

## 2025-01-04 RX ADMIN — ATENOLOL 25 MG: 25 TABLET ORAL at 12:13

## 2025-01-04 RX ADMIN — ENOXAPARIN SODIUM 30 MG: 100 INJECTION SUBCUTANEOUS at 08:34

## 2025-01-04 RX ADMIN — HYDROXYZINE HYDROCHLORIDE 50 MG: 50 TABLET ORAL at 22:42

## 2025-01-04 RX ADMIN — Medication 10 ML: at 08:31

## 2025-01-04 RX ADMIN — Medication 100 MG: at 00:17

## 2025-01-04 RX ADMIN — ACETAMINOPHEN 650 MG: 325 TABLET ORAL at 22:41

## 2025-01-04 RX ADMIN — MAGNESIUM SULFATE HEPTAHYDRATE 4000 MG: 40 INJECTION, SOLUTION INTRAVENOUS at 00:37

## 2025-01-04 RX ADMIN — PANCRELIPASE LIPASE, PANCRELIPASE PROTEASE, PANCRELIPASE AMYLASE 40000 UNITS: 20000; 63000; 84000 CAPSULE, DELAYED RELEASE ORAL at 17:17

## 2025-01-04 RX ADMIN — DILTIAZEM HYDROCHLORIDE 240 MG: 240 CAPSULE, COATED, EXTENDED RELEASE ORAL at 12:13

## 2025-01-04 RX ADMIN — Medication 100 MG: at 08:30

## 2025-01-04 ASSESSMENT — PAIN SCALES - GENERAL
PAINLEVEL_OUTOF10: 10
PAINLEVEL_OUTOF10: 3

## 2025-01-04 ASSESSMENT — SLEEP AND FATIGUE QUESTIONNAIRES
DO YOU USE A SLEEP AID: NO
AVERAGE NUMBER OF SLEEP HOURS: 5
DO YOU HAVE DIFFICULTY SLEEPING: NO

## 2025-01-04 ASSESSMENT — LIFESTYLE VARIABLES
HOW MANY STANDARD DRINKS CONTAINING ALCOHOL DO YOU HAVE ON A TYPICAL DAY: 5 OR 6
HOW OFTEN DO YOU HAVE A DRINK CONTAINING ALCOHOL: 4 OR MORE TIMES A WEEK

## 2025-01-04 ASSESSMENT — PAIN - FUNCTIONAL ASSESSMENT: PAIN_FUNCTIONAL_ASSESSMENT: ACTIVITIES ARE NOT PREVENTED

## 2025-01-04 ASSESSMENT — PAIN DESCRIPTION - LOCATION: LOCATION: HIP

## 2025-01-04 ASSESSMENT — PAIN DESCRIPTION - ORIENTATION: ORIENTATION: LEFT

## 2025-01-04 ASSESSMENT — PAIN DESCRIPTION - DESCRIPTORS: DESCRIPTORS: ACHING;DULL

## 2025-01-04 NOTE — PROGRESS NOTES
Tele sitter alarmed an patient had gotten out of bed, ran to the corner, pulled out her IV along with pure wick, heart monitor and 02. She is very confused and slightly combative. MD notified. Order for soft wrist restraints are to be put in.

## 2025-01-04 NOTE — FLOWSHEET NOTE
01/04/25 0815   Vital Signs   Pulse 82   /62   MAP (Calculated) 83   BP Location Right upper arm   BP Method Automatic   Patient Position Semi fowlers;Lying left side     AM assessment complete. Pt a&o x3. She is unaware of situation. She is delayed with responses. Asked pt if she drinks alcohol and she reports \"yes.\" Asked if she drinks beer and she responded with \"yes.\" She was unable to tell me how many a day she did drink, but then when asked again she states she drinks \"6 tall cans per day.\" Reports she starts drinking early. No s/s of withdraw. Seizure precautions in place. She is sitting up eating breakfast. Took medications without difficulty. Compliant with care. Call light and bedside table within reach.
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
## 2025-01-04 NOTE — PROGRESS NOTES
VTE Prophylaxis Monitoring    Recent Labs     01/03/25 2048   CREATININE <0.2*     Recent Labs     01/03/25 2048   HGB 11.3*   HCT 34.6*        Estimated Creatinine Clearance: 186 mL/min (based on SCr of 0.2 mg/dL).  Wt Readings from Last 1 Encounters:   01/04/25 36.3 kg (80 lb)       The guidance below is to provide initial recommendations for dosing. If recommended dose does not align well with patient's current clinical picture, communications with the care team will occur to determine most appropriate medication and dose.    Do not exceed enoxaparin 40mg daily or UFH 5000 units SUBQ TID in patients with epidurals, lumbar drains, or external ventricular drains    TABLE 1.  ENOXAPARIN ROUTINE PROPHYLAXIS DOSING (Medically ill, routine surgery)   Patient Weight (kg)     50.9 and below 51 - 100.9 101 - 150.9 151 - 174.9 175 or greater         Estimated CrCl  (ml/min) 30 or greater   30 mg SUBQ daily   40 mg SUBQ daily 30 mg SUBQ BID  40 mg SUBQ BID 60mg SUBQ BID      15-29 UFH 5000 units SUBQ BID   30 mg SUBQ daily 30 mg SUBQ daily 40 mg SUBQ daily   60 mg SUBQ daily      Less than 15 or Dialysis UFH 5000 units SUBQ BID   UFH 5000 units SUBQ TID UFH 7500 units SUBQ TID     Wt = 36.3 at time of order verification.  CrCl = 186 ml/min.  Lovenox dose changed from 40 mg daily to 30 mg daily per above protocol.    Priscilla Sung RPH 1/4/2025 3:01 AM

## 2025-01-04 NOTE — PROGRESS NOTES
Virtual safety companion called and stated that pt was in bed spitting out her food from lunch. This writer entered room and she had vomit on the sheet in front of her and drool hanging from mouth. There was a piece of chicken on the sheet. When asked what pt was doing she stated that she had to spit out her chicken because it was too tough. No evidence of swallowing issues. Pt then took her starry drink and laid back in the bed and put the rest of the fluid from the can in her mouth. She then sat up and let it all fall out of her mouth. She had no explanation why she did this. Psych in to see pt and made her aware of these behaviors. Pt cleaned up by staff.

## 2025-01-04 NOTE — PROGRESS NOTES
Called report to Mima on [facility] unit. She states that patient cannot come over until they get a sitter d/t continuous oxygen use. They will contact Clinical Admin and let this writer know when pt can come over. Salazar charge nurse aware.
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
## 2025-01-04 NOTE — PROGRESS NOTES
Patient admitted to room 312  from ED. Patient oriented to room, call light, bed rails, phone, lights and bathroom. Patient instructed about the schedule of the day including: vital sign frequency, lab draws, possible tests, frequency of MD and staff rounds, daily weights, I &O's and prescribed diet. Yes Telemetry box in place, patient aware of placement and reason. Bed locked, in lowest position, side rails up 2/4, call light within reach.        Recliner Assessment  Patient is not able to demonstrated the ability to move from a reclining position to an upright position within the recliner. Patient is confused, demented and /or unable to follow instruction.        4 Eyes Skin Assessment     NAME:  Bonny Serrano  YOB: 1970  MEDICAL RECORD NUMBER:  [id number]    The patient is being assessed for  Admission    I agree that at least one RN has performed a thorough Head to Toe Skin Assessment on the patient. ALL assessment sites listed below have been assessed.      Areas assessed by both nurses:    Head, Face, Ears, Shoulders, Back, Chest, Arms, Elbows, Hands, Sacrum. Buttock, Coccyx, Ischium, Legs. Feet and Heels, and Under Medical Devices         Does the Patient have a Wound? No noted wound(s)       [place] Prevention initiated by RN: No  Wound Care Orders initiated by RN: No    Pressure Injury (Stage 3,4, Unstageable, DTI, NWPT, and Complex wounds) if present, place Wound referral order by RN under : No    New Ostomies, if present place, Ostomy referral order under : No     Nurse 1 eSignature: Electronically signed by Dustin Allison RN on 1/4/25 at 7:44 AM EST    **SHARE this note so that the co-signing nurse can place an eSignature**    Nurse 2 eSignature: {Esignature:[id number]}

## 2025-01-04 NOTE — PROGRESS NOTES
Attempted along with 2 other RN's to ask patient questions. Pt just stares and says nothing. This RN asked if she was hard of hearing and pt said yes. Then she looked at me and said \"No, I can hear.\"    Will attempt at another time.    When asked if she knew her medications she also just starred at me, and said nothing.

## 2025-01-04 NOTE — PLAN OF CARE
Problem: Safety - Medical Restraint  Goal: Remains free of injury from restraints (Restraint for Interference with Medical Device)  Description: INTERVENTIONS:  1. Determine that other, less restrictive measures have been tried or would not be effective before applying the restraint  2. Evaluate the patient's condition at the time of restraint application  3. Inform patient/family regarding the reason for restraint  4. Q2H: Monitor safety, psychosocial status, comfort, nutrition and hydration  Outcome: Progressing  Flowsheets  Taken 1/4/2025 0600  Remains free of injury from restraints (restraint for interference with medical device): Evaluate the patient's condition at the time of restraint application  Taken 1/4/2025 0449  Remains free of injury from restraints (restraint for interference with medical device): Determine that other, less restrictive measures have been tried or would not be effective before applying the restraint

## 2025-01-04 NOTE — PROGRESS NOTES
Upon getting VS from pt she was crying. When asking pt why she was tearful and crying she stated that it was because she wanted to speak with her daughter. According to pt's brother, pt and daughter have been estranged for awhile now. She continued to cry and then stated that her daughter was dead. This writer advised her that according to her brother pt was not dead. She then started to cry again and stated that her daughter did meth. Spoke with pt about how that was not a fact that could be addressed. Attempted to redirect to lunch and to get pt to take her medications. She did take meds after multiple attempts of getting her to to focus on taking them. After she took medications she was no longer tearful and began eating her lunch. Call light and bedside table within reach.
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
## 2025-01-04 NOTE — H&P
History and Physical        HISTORY OF PRESENT ILLNESS: A 54-year-old female with a history of atrial fibrillation, COPD and chronic respiratory failure, chronic pancreatitis, alcohol abuse presented to the emergency room with bizarre thoughts.  She was apparently having thoughts of her -American daughter being killed by her son.  She was seen by telepsych.  She was advised inpatient psychiatric admission once medically stable.    Patient is allergic to sulfa antibiotics.    Past Medical History:   Diagnosis Date    A-fib (HCC)     Anemia     Anxiety     Chronic pancreatitis (HCC)     Collapse of right lung     COPD (chronic obstructive pulmonary disease) (HCC)     COPD (chronic obstructive pulmonary disease) (HCC)     Depression     GI bleed     H/O colonoscopy     Hypertension     Pancreatitis     Pulmonary embolism (HCC) 02/2018       Past Surgical History:   Procedure Laterality Date    HIP SURGERY Right 7/12/2024    RIGHT FEMUR GAMMA NAILING performed by Simba Man MD at [facility] OR    UPPER GASTROINTESTINAL ENDOSCOPY         Scheduled Meds:   sodium chloride flush  5-40 mL IntraVENous 2 times per day    thiamine  100 mg Oral Daily    enoxaparin  30 mg SubCUTAneous Daily    atenolol  25 mg Oral Daily    atorvastatin  20 mg Oral Nightly    budesonide-formoterol  2 puff Inhalation BID RT    And    tiotropium  2 puff Inhalation Daily RT    dilTIAZem  180 mg Oral Daily    gabapentin  300 mg Oral Daily    guaiFENesin  600 mg Oral BID    lipase-protease-amylase  10,000 Units Oral TID WC    [START ON 1/5/2025] pantoprazole  40 mg Oral QAM AC       Continuous Infusions:   sodium chloride         PRN Meds:  sodium chloride flush, sodium chloride, potassium chloride **OR** potassium alternative oral replacement **OR** potassium chloride, magnesium sulfate, ondansetron **OR** ondansetron, polyethylene glycol, acetaminophen **OR** acetaminophen, LORazepam **OR** LORazepam **OR** LORazepam **OR** LORazepam  **OR** LORazepam **OR** LORazepam **OR** LORazepam **OR** LORazepam, albuterol       reports that she has been smoking cigarettes. She started smoking about 29 years ago. She has a 25 pack-year smoking history. She has been exposed to tobacco smoke. She has never used smokeless tobacco.    Family History   Problem Relation Age of Onset    Diabetes Maternal Grandfather     Asthma Neg Hx     Emphysema Neg Hx     Heart Failure Neg Hx     Hypertension Neg Hx     Sleep Apnea Neg Hx        Social History     Socioeconomic History    Marital status: Single   Tobacco Use    Smoking status: Every Day     Current packs/day: 0.00     Average packs/day: 1 pack/day for 25.0 years (25.0 ttl pk-yrs)     Types: Cigarettes     Start date: 1995     Last attempt to quit: 2020     Years since quittin.6     Passive exposure: Current    Smokeless tobacco: Never    Tobacco comments:     restarted smoking 3 wks ago 20   Vaping Use    Vaping status: Former    Substances: Always   Substance and Sexual Activity    Alcohol use: Yes     Comment: 4 beers 2-3 times a week    Drug use: Yes     Types: Marijuana (Weed)     Comment: occ    Sexual activity: Yes     Partners: Male   Social History Narrative    2019 lives with sign other;disability -waiting on it;     Social Determinants of Health     Food Insecurity: Food Insecurity Present (2024)    Hunger Vital Sign     Worried About Running Out of Food in the Last Year: Often true     Ran Out of Food in the Last Year: Sometimes true   Transportation Needs: Unmet Transportation Needs (2024)    PRAPARE - Transportation     Lack of Transportation (Medical): Yes     Lack of Transportation (Non-Medical): Yes    [facility], [facility]    Interpersonal Safety   Housing Stability: High Risk (2024)    Housing Stability Vital Sign     Unable to Pay for Housing in the Last Year: Yes     Number of  CHEST PORTABLE   Final Result   No acute finding in the chest. COPD changes are present.         CT HEAD WO CONTRAST   Final Result   No acute intracranial abnormality.               ASSESSMENT:      Principal Problem:    Alcohol withdrawal syndrome, with unspecified complication (HCC)  Resolved Problems:    * No resolved hospital problems. *      PLAN:  Alcohol abuse  Bizarre thoughts  Head CT negative  Psych consult  No signs of withdrawal.  IV vitamins     COPD and chronic resp failure  Continue MDI and O2    Chronic pancreatitis  Continue Creaon    H/o A.fib  Continue atenolol    Replace electrolytes    Lovenox,  Full code     Cleared to go to psych       CASEY PASCUAL MD 1/4/2025 9:01 AM

## 2025-01-05 LAB
CHOLEST SERPL-MCNC: 129 MG/DL (ref 0–199)
EKG ATRIAL RATE: 77 BPM
EKG DIAGNOSIS: NORMAL
EKG P AXIS: 71 DEGREES
EKG P-R INTERVAL: 136 MS
EKG Q-T INTERVAL: 430 MS
EKG QRS DURATION: 86 MS
EKG QTC CALCULATION (BAZETT): 486 MS
EKG R AXIS: 91 DEGREES
EKG T AXIS: 81 DEGREES
EKG VENTRICULAR RATE: 77 BPM
HDLC SERPL-MCNC: 43 MG/DL (ref 40–60)
LDLC SERPL CALC-MCNC: 76 MG/DL
TRIGL SERPL-MCNC: 51 MG/DL (ref 0–150)
VLDLC SERPL CALC-MCNC: 10 MG/DL

## 2025-01-05 PROCEDURE — 1240000000 HC EMOTIONAL WELLNESS R&B

## 2025-01-05 PROCEDURE — 94640 AIRWAY INHALATION TREATMENT: CPT

## 2025-01-05 PROCEDURE — 99223 1ST HOSP IP/OBS HIGH 75: CPT | Performed by: NURSE PRACTITIONER

## 2025-01-05 PROCEDURE — 93010 ELECTROCARDIOGRAM REPORT: CPT | Performed by: INTERNAL MEDICINE

## 2025-01-05 PROCEDURE — 6370000000 HC RX 637 (ALT 250 FOR IP): Performed by: NURSE PRACTITIONER

## 2025-01-05 RX ORDER — GABAPENTIN 300 MG/1
300 CAPSULE ORAL DAILY
Status: DISCONTINUED | OUTPATIENT
Start: 2025-01-05 | End: 2025-01-21 | Stop reason: HOSPADM

## 2025-01-05 RX ORDER — LANOLIN ALCOHOL/MO/W.PET/CERES
100 CREAM (GRAM) TOPICAL DAILY
Status: DISCONTINUED | OUTPATIENT
Start: 2025-01-05 | End: 2025-01-21 | Stop reason: HOSPADM

## 2025-01-05 RX ORDER — FUROSEMIDE 20 MG/1
20 TABLET ORAL DAILY PRN
Status: DISCONTINUED | OUTPATIENT
Start: 2025-01-05 | End: 2025-01-21 | Stop reason: HOSPADM

## 2025-01-05 RX ORDER — GUAIFENESIN 600 MG/1
1200 TABLET, EXTENDED RELEASE ORAL 2 TIMES DAILY PRN
Status: DISCONTINUED | OUTPATIENT
Start: 2025-01-05 | End: 2025-01-21 | Stop reason: HOSPADM

## 2025-01-05 RX ORDER — ALBUTEROL SULFATE 0.83 MG/ML
2.5 SOLUTION RESPIRATORY (INHALATION) PRN
Status: DISCONTINUED | OUTPATIENT
Start: 2025-01-05 | End: 2025-01-05

## 2025-01-05 RX ORDER — DILTIAZEM HYDROCHLORIDE 240 MG/1
240 CAPSULE, COATED, EXTENDED RELEASE ORAL DAILY
Status: DISCONTINUED | OUTPATIENT
Start: 2025-01-05 | End: 2025-01-21 | Stop reason: HOSPADM

## 2025-01-05 RX ORDER — BUDESONIDE AND FORMOTEROL FUMARATE DIHYDRATE 160; 4.5 UG/1; UG/1
2 AEROSOL RESPIRATORY (INHALATION)
Status: DISCONTINUED | OUTPATIENT
Start: 2025-01-05 | End: 2025-01-21 | Stop reason: HOSPADM

## 2025-01-05 RX ORDER — LORAZEPAM 0.5 MG/1
0.25 TABLET ORAL EVERY 6 HOURS PRN
Status: DISCONTINUED | OUTPATIENT
Start: 2025-01-05 | End: 2025-01-21 | Stop reason: HOSPADM

## 2025-01-05 RX ORDER — HALOPERIDOL 1 MG/1
0.5 TABLET ORAL EVERY 6 HOURS PRN
Status: DISCONTINUED | OUTPATIENT
Start: 2025-01-05 | End: 2025-01-21 | Stop reason: HOSPADM

## 2025-01-05 RX ORDER — ATENOLOL 25 MG/1
25 TABLET ORAL DAILY
Status: DISCONTINUED | OUTPATIENT
Start: 2025-01-05 | End: 2025-01-21 | Stop reason: HOSPADM

## 2025-01-05 RX ORDER — PANTOPRAZOLE SODIUM 40 MG/1
40 TABLET, DELAYED RELEASE ORAL
Status: DISCONTINUED | OUTPATIENT
Start: 2025-01-06 | End: 2025-01-21 | Stop reason: HOSPADM

## 2025-01-05 RX ORDER — DILTIAZEM HYDROCHLORIDE 120 MG/1
240 CAPSULE, EXTENDED RELEASE ORAL DAILY
Status: DISCONTINUED | OUTPATIENT
Start: 2025-01-05 | End: 2025-01-05 | Stop reason: CLARIF

## 2025-01-05 RX ORDER — ALBUTEROL SULFATE 90 UG/1
2 INHALANT RESPIRATORY (INHALATION) EVERY 4 HOURS PRN
Status: DISCONTINUED | OUTPATIENT
Start: 2025-01-05 | End: 2025-01-21 | Stop reason: HOSPADM

## 2025-01-05 RX ORDER — ATORVASTATIN CALCIUM 10 MG/1
10 TABLET, FILM COATED ORAL NIGHTLY
Status: DISCONTINUED | OUTPATIENT
Start: 2025-01-05 | End: 2025-01-21 | Stop reason: HOSPADM

## 2025-01-05 RX ADMIN — IBUPROFEN 400 MG: 400 TABLET, FILM COATED ORAL at 10:52

## 2025-01-05 RX ADMIN — GABAPENTIN 300 MG: 300 CAPSULE ORAL at 13:55

## 2025-01-05 RX ADMIN — HYDROXYZINE HYDROCHLORIDE 50 MG: 50 TABLET ORAL at 21:20

## 2025-01-05 RX ADMIN — ATORVASTATIN CALCIUM 10 MG: 10 TABLET, FILM COATED ORAL at 21:20

## 2025-01-05 RX ADMIN — IBUPROFEN 400 MG: 400 TABLET, FILM COATED ORAL at 17:32

## 2025-01-05 RX ADMIN — NICOTINE POLACRILEX 2 MG: 2 LOZENGE ORAL at 13:04

## 2025-01-05 RX ADMIN — PANCRELIPASE LIPASE, PANCRELIPASE PROTEASE, PANCRELIPASE AMYLASE 40000 UNITS: 20000; 63000; 84000 CAPSULE, DELAYED RELEASE ORAL at 13:57

## 2025-01-05 RX ADMIN — NICOTINE POLACRILEX 2 MG: 2 LOZENGE ORAL at 15:42

## 2025-01-05 RX ADMIN — DILTIAZEM HYDROCHLORIDE 240 MG: 240 CAPSULE, COATED, EXTENDED RELEASE ORAL at 13:57

## 2025-01-05 RX ADMIN — TRAZODONE HYDROCHLORIDE 50 MG: 50 TABLET ORAL at 21:20

## 2025-01-05 RX ADMIN — ALBUTEROL SULFATE 2 PUFF: 90 AEROSOL, METERED RESPIRATORY (INHALATION) at 19:39

## 2025-01-05 RX ADMIN — ATENOLOL 25 MG: 25 TABLET ORAL at 13:55

## 2025-01-05 RX ADMIN — Medication 100 MG: at 13:57

## 2025-01-05 RX ADMIN — SERTRALINE 25 MG: 50 TABLET, FILM COATED ORAL at 12:34

## 2025-01-05 RX ADMIN — BUDESONIDE AND FORMOTEROL FUMARATE DIHYDRATE 2 PUFF: 160; 4.5 AEROSOL RESPIRATORY (INHALATION) at 19:39

## 2025-01-05 ASSESSMENT — PAIN DESCRIPTION - ORIENTATION
ORIENTATION: RIGHT;LEFT
ORIENTATION: RIGHT
ORIENTATION: RIGHT;LEFT

## 2025-01-05 ASSESSMENT — PAIN DESCRIPTION - DESCRIPTORS
DESCRIPTORS: ACHING

## 2025-01-05 ASSESSMENT — PATIENT HEALTH QUESTIONNAIRE - PHQ9
SUM OF ALL RESPONSES TO PHQ QUESTIONS 1-9: 1
SUM OF ALL RESPONSES TO PHQ QUESTIONS 1-9: 1
SUM OF ALL RESPONSES TO PHQ9 QUESTIONS 1 & 2: 1
1. LITTLE INTEREST OR PLEASURE IN DOING THINGS: NOT AT ALL
2. FEELING DOWN, DEPRESSED OR HOPELESS: SEVERAL DAYS
SUM OF ALL RESPONSES TO PHQ QUESTIONS 1-9: 1
SUM OF ALL RESPONSES TO PHQ QUESTIONS 1-9: 1

## 2025-01-05 ASSESSMENT — PAIN SCALES - WONG BAKER
WONGBAKER_NUMERICALRESPONSE: NO HURT
WONGBAKER_NUMERICALRESPONSE: NO HURT

## 2025-01-05 ASSESSMENT — PAIN - FUNCTIONAL ASSESSMENT
PAIN_FUNCTIONAL_ASSESSMENT: ACTIVITIES ARE NOT PREVENTED
PAIN_FUNCTIONAL_ASSESSMENT: PREVENTS OR INTERFERES SOME ACTIVE ACTIVITIES AND ADLS
PAIN_FUNCTIONAL_ASSESSMENT: ACTIVITIES ARE NOT PREVENTED

## 2025-01-05 ASSESSMENT — PAIN SCALES - GENERAL
PAINLEVEL_OUTOF10: 5
PAINLEVEL_OUTOF10: 8
PAINLEVEL_OUTOF10: 5
PAINLEVEL_OUTOF10: 0

## 2025-01-05 ASSESSMENT — SLEEP AND FATIGUE QUESTIONNAIRES
AVERAGE NUMBER OF SLEEP HOURS: 5
DO YOU HAVE DIFFICULTY SLEEPING: NO
DO YOU USE A SLEEP AID: NO

## 2025-01-05 ASSESSMENT — PAIN DESCRIPTION - LOCATION
LOCATION: HIP

## 2025-01-05 ASSESSMENT — PAIN DESCRIPTION - PAIN TYPE: TYPE: ACUTE PAIN

## 2025-01-05 NOTE — BH NOTE
4 Eyes Skin Assessment     The patient is being assessed for  Transfer to New Unit    I agree that 2 RN's have performed a thorough Head to Toe Skin Assessment on the patient. ALL assessment sites listed below have been assessed.       Areas assessed for pressure by both nurses: yes  [x]   Head, Face, and Ears   [x]   Shoulders, Back, and Chest  [x]   Arms, Elbows, and Hands   [x]   Coccyx, Sacrum, and Ischum  [x]   Legs, Feet, and Heels                                Skin Assessed Under all Medical Devices by both nurses:  O2 device tubing              All Mepilex Borders were peeled back and area peeked at by both nurses:  Yes  Please list where Mepilex Borders are located:  cocyyx                 Does the Patient have Skin Breakdown related to pressure?  Yes LDA WOUND CARE was Initiated documentation include the Jackeline-wound, Wound Assessment, Measurements, Dressing Treatment, Drainage, and Color\",     (Insert Photo here not available)         Milton Prevention initiated:  No   Wound Care Orders initiated:  No      WOC nurse consulted for Pressure Injury (Stage 3,4, Unstageable, DTI, NWPT, Complex wounds)and New or Established Ostomies:  NA        Nurse 1 eSignature: Electronically signed by Marquita Roper RN on 1/4/25 at 7:18 PM EST    **SHARE this note so that the co-signing nurse is able to place an eSignature**    Nurse 2 eSignature: Electronically signed by Katy Alston RN on 1/4/25 at 7:19 PM EST

## 2025-01-05 NOTE — BH NOTE
Patient reports increased left hip pain and requests pain meds and \"sleeping medicine.\" Tylenol 650mg, Trazodone 50mg and Hydroxyzine 50mg given PO at 2242 for 10/10 pain, sleep aid, and anxiety per pt request.

## 2025-01-05 NOTE — BH NOTE
Patient has been labile today, has been confused, asks staff \"did I kill someone?\" And \"am I in trouble?\". Requires a lot of redirection, also has to be re-oriented. Is only oriented to person. Is tearful at times, is anxious and fearful at times. At times, she is able to laugh. Visible on unit. Takes meds whole with some difficulty, has to take her time, and sometimes has to take one medication at a time. Appetite is good. Does not interact with peers.Continues to wear her oxygen at 3lpm Continues with a sitter. Plan of care is ongoing.

## 2025-01-05 NOTE — BH NOTE
Patient arrived on unit at 1900 via [facility] staff and security. Patient passed through security checkpoint an is in safety gown. Vitals obtained, food ordered, hydration offered and tour of unit provided. Oriented to room, shown call light system. Patient is pleasant and cooperative at this time.

## 2025-01-05 NOTE — BH NOTE
Pt resting quietly in bed with eyes closed. Continues on O2 3lpm per NC with no concerns, respirations even and easy. Call light in reach.

## 2025-01-05 NOTE — BH NOTE
Hpi Title: Evaluation of Skin Lesions Patient resting awake in bed and continues with confusion. Patient asks \"am I dead?\" and makes statements like \"I'm ugly because they kill horses here.\" Patient redirected/reoriented at this time. Denies any current needs. Call light in reach.    Year Removed: 1900

## 2025-01-05 NOTE — BH NOTE
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
## 2025-01-05 NOTE — BH NOTE
Patient has been visible on the unit this evening. Currently on O2 3LPM per NC, denies SOB at this time. 1:1sitter.initiated due to patient having oxygen demand. Patient is alert and oriented to self only. Oriented patient to surroundings/room/bathroom. Patient is mostly cooperative with staff. Patient often states \"Am I going to die? I'm going to hell. Am I in skilled nursing? Is my daughter going to die? Is my daughter going to skilled nursing?\" Patient reoriented and redirected as needed. Patient was given turkey sandwich per request and ate it with no concerns. Another sandwich given to patient per request and patient began yelling \"where's the fucking turkey? Give it to the birds!\" Patient tore turkey off the bread into pieces, angrily ate a few bites and then spit it all out into trash bag along with the bread from the sandwich. Patient then began to pull turkey and bread out of and back into trash bag despite discouragement. Patient did not respond when asked what she was doing. Patient paced in room and continued asking \"am I in hell? I'm going to die?\" Patient then reoriented and deescalated. Patient denies any current SI/HI. Patient now resting in bed with eyes closed. Call light in reach.
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
## 2025-01-06 LAB
EST. AVERAGE GLUCOSE BLD GHB EST-MCNC: 128.4 MG/DL
HBA1C MFR BLD: 6.1 %

## 2025-01-06 PROCEDURE — 6370000000 HC RX 637 (ALT 250 FOR IP): Performed by: NURSE PRACTITIONER

## 2025-01-06 PROCEDURE — 94640 AIRWAY INHALATION TREATMENT: CPT

## 2025-01-06 PROCEDURE — 2700000000 HC OXYGEN THERAPY PER DAY

## 2025-01-06 PROCEDURE — 1240000000 HC EMOTIONAL WELLNESS R&B

## 2025-01-06 PROCEDURE — 94761 N-INVAS EAR/PLS OXIMETRY MLT: CPT

## 2025-01-06 RX ADMIN — BUDESONIDE AND FORMOTEROL FUMARATE DIHYDRATE 2 PUFF: 160; 4.5 AEROSOL RESPIRATORY (INHALATION) at 17:54

## 2025-01-06 RX ADMIN — BUDESONIDE AND FORMOTEROL FUMARATE DIHYDRATE 2 PUFF: 160; 4.5 AEROSOL RESPIRATORY (INHALATION) at 08:42

## 2025-01-06 RX ADMIN — GUAIFENESIN 1200 MG: 600 TABLET, EXTENDED RELEASE ORAL at 07:07

## 2025-01-06 RX ADMIN — Medication 100 MG: at 10:05

## 2025-01-06 RX ADMIN — GABAPENTIN 300 MG: 300 CAPSULE ORAL at 10:04

## 2025-01-06 RX ADMIN — PANCRELIPASE LIPASE, PANCRELIPASE PROTEASE, PANCRELIPASE AMYLASE 40000 UNITS: 20000; 63000; 84000 CAPSULE, DELAYED RELEASE ORAL at 10:06

## 2025-01-06 RX ADMIN — ATORVASTATIN CALCIUM 10 MG: 10 TABLET, FILM COATED ORAL at 20:50

## 2025-01-06 RX ADMIN — PANCRELIPASE LIPASE, PANCRELIPASE PROTEASE, PANCRELIPASE AMYLASE 40000 UNITS: 20000; 63000; 84000 CAPSULE, DELAYED RELEASE ORAL at 12:39

## 2025-01-06 RX ADMIN — ALBUTEROL SULFATE 2 PUFF: 90 AEROSOL, METERED RESPIRATORY (INHALATION) at 08:43

## 2025-01-06 RX ADMIN — PANCRELIPASE LIPASE, PANCRELIPASE PROTEASE, PANCRELIPASE AMYLASE 40000 UNITS: 20000; 63000; 84000 CAPSULE, DELAYED RELEASE ORAL at 17:42

## 2025-01-06 RX ADMIN — TRAZODONE HYDROCHLORIDE 50 MG: 50 TABLET ORAL at 20:58

## 2025-01-06 RX ADMIN — ALBUTEROL SULFATE 2 PUFF: 90 AEROSOL, METERED RESPIRATORY (INHALATION) at 17:54

## 2025-01-06 RX ADMIN — PANTOPRAZOLE SODIUM 40 MG: 40 TABLET, DELAYED RELEASE ORAL at 06:47

## 2025-01-06 RX ADMIN — SERTRALINE 25 MG: 50 TABLET, FILM COATED ORAL at 10:04

## 2025-01-06 RX ADMIN — LORAZEPAM 0.25 MG: 0.5 TABLET ORAL at 20:50

## 2025-01-06 RX ADMIN — TIOTROPIUM BROMIDE INHALATION SPRAY 2 PUFF: 3.12 SPRAY, METERED RESPIRATORY (INHALATION) at 08:43

## 2025-01-07 LAB
ANION GAP SERPL CALCULATED.3IONS-SCNC: 9 MMOL/L (ref 3–16)
BUN SERPL-MCNC: 8 MG/DL (ref 7–20)
CALCIUM SERPL-MCNC: 8.2 MG/DL (ref 8.3–10.6)
CHLORIDE SERPL-SCNC: 98 MMOL/L (ref 99–110)
CO2 SERPL-SCNC: 27 MMOL/L (ref 21–32)
CREAT SERPL-MCNC: 0.3 MG/DL (ref 0.6–1.1)
EKG ATRIAL RATE: 115 BPM
EKG DIAGNOSIS: NORMAL
EKG P AXIS: 81 DEGREES
EKG P-R INTERVAL: 124 MS
EKG Q-T INTERVAL: 332 MS
EKG QRS DURATION: 86 MS
EKG QTC CALCULATION (BAZETT): 459 MS
EKG R AXIS: 92 DEGREES
EKG T AXIS: 76 DEGREES
EKG VENTRICULAR RATE: 115 BPM
GFR SERPLBLD CREATININE-BSD FMLA CKD-EPI: >90 ML/MIN/{1.73_M2}
GLUCOSE SERPL-MCNC: 100 MG/DL (ref 70–99)
POTASSIUM SERPL-SCNC: 4.6 MMOL/L (ref 3.5–5.1)
SODIUM SERPL-SCNC: 134 MMOL/L (ref 136–145)
TSH SERPL DL<=0.005 MIU/L-ACNC: 1.1 UIU/ML (ref 0.27–4.2)

## 2025-01-07 PROCEDURE — 6370000000 HC RX 637 (ALT 250 FOR IP): Performed by: NURSE PRACTITIONER

## 2025-01-07 PROCEDURE — 94761 N-INVAS EAR/PLS OXIMETRY MLT: CPT

## 2025-01-07 PROCEDURE — 97530 THERAPEUTIC ACTIVITIES: CPT

## 2025-01-07 PROCEDURE — 97165 OT EVAL LOW COMPLEX 30 MIN: CPT

## 2025-01-07 PROCEDURE — 36415 COLL VENOUS BLD VENIPUNCTURE: CPT

## 2025-01-07 PROCEDURE — 6370000000 HC RX 637 (ALT 250 FOR IP): Performed by: PSYCHIATRY & NEUROLOGY

## 2025-01-07 PROCEDURE — 1240000000 HC EMOTIONAL WELLNESS R&B

## 2025-01-07 PROCEDURE — 94640 AIRWAY INHALATION TREATMENT: CPT

## 2025-01-07 PROCEDURE — 2700000000 HC OXYGEN THERAPY PER DAY

## 2025-01-07 PROCEDURE — 93010 ELECTROCARDIOGRAM REPORT: CPT | Performed by: INTERNAL MEDICINE

## 2025-01-07 PROCEDURE — 84443 ASSAY THYROID STIM HORMONE: CPT

## 2025-01-07 PROCEDURE — 99233 SBSQ HOSP IP/OBS HIGH 50: CPT | Performed by: PSYCHIATRY & NEUROLOGY

## 2025-01-07 PROCEDURE — 93005 ELECTROCARDIOGRAM TRACING: CPT

## 2025-01-07 PROCEDURE — 80048 BASIC METABOLIC PNL TOTAL CA: CPT

## 2025-01-07 RX ORDER — FOLIC ACID 1 MG/1
1 TABLET ORAL DAILY
Status: DISCONTINUED | OUTPATIENT
Start: 2025-01-07 | End: 2025-01-21 | Stop reason: HOSPADM

## 2025-01-07 RX ORDER — MULTIVITAMIN WITH IRON
1 TABLET ORAL DAILY
Status: DISCONTINUED | OUTPATIENT
Start: 2025-01-07 | End: 2025-01-21 | Stop reason: HOSPADM

## 2025-01-07 RX ADMIN — ALBUTEROL SULFATE 2 PUFF: 90 AEROSOL, METERED RESPIRATORY (INHALATION) at 08:31

## 2025-01-07 RX ADMIN — PANTOPRAZOLE SODIUM 40 MG: 40 TABLET, DELAYED RELEASE ORAL at 06:48

## 2025-01-07 RX ADMIN — SERTRALINE 25 MG: 50 TABLET, FILM COATED ORAL at 08:46

## 2025-01-07 RX ADMIN — HYDROXYZINE HYDROCHLORIDE 50 MG: 50 TABLET ORAL at 20:08

## 2025-01-07 RX ADMIN — THERA TABS 1 TABLET: TAB at 15:55

## 2025-01-07 RX ADMIN — TIOTROPIUM BROMIDE INHALATION SPRAY 2 PUFF: 3.12 SPRAY, METERED RESPIRATORY (INHALATION) at 08:29

## 2025-01-07 RX ADMIN — Medication 100 MG: at 08:55

## 2025-01-07 RX ADMIN — ALBUTEROL SULFATE 2 PUFF: 90 AEROSOL, METERED RESPIRATORY (INHALATION) at 22:10

## 2025-01-07 RX ADMIN — FOLIC ACID 1 MG: 1 TABLET ORAL at 15:55

## 2025-01-07 RX ADMIN — ATENOLOL 25 MG: 25 TABLET ORAL at 08:43

## 2025-01-07 RX ADMIN — PANCRELIPASE LIPASE, PANCRELIPASE PROTEASE, PANCRELIPASE AMYLASE 40000 UNITS: 20000; 63000; 84000 CAPSULE, DELAYED RELEASE ORAL at 17:28

## 2025-01-07 RX ADMIN — PANCRELIPASE LIPASE, PANCRELIPASE PROTEASE, PANCRELIPASE AMYLASE 40000 UNITS: 20000; 63000; 84000 CAPSULE, DELAYED RELEASE ORAL at 11:54

## 2025-01-07 RX ADMIN — DILTIAZEM HYDROCHLORIDE 240 MG: 240 CAPSULE, COATED, EXTENDED RELEASE ORAL at 09:06

## 2025-01-07 RX ADMIN — PANCRELIPASE LIPASE, PANCRELIPASE PROTEASE, PANCRELIPASE AMYLASE 40000 UNITS: 20000; 63000; 84000 CAPSULE, DELAYED RELEASE ORAL at 08:54

## 2025-01-07 RX ADMIN — MAGNESIUM HYDROXIDE 30 ML: 400 SUSPENSION ORAL at 06:48

## 2025-01-07 RX ADMIN — BUDESONIDE AND FORMOTEROL FUMARATE DIHYDRATE 2 PUFF: 160; 4.5 AEROSOL RESPIRATORY (INHALATION) at 08:29

## 2025-01-07 RX ADMIN — BUDESONIDE AND FORMOTEROL FUMARATE DIHYDRATE 2 PUFF: 160; 4.5 AEROSOL RESPIRATORY (INHALATION) at 22:10

## 2025-01-07 RX ADMIN — ATORVASTATIN CALCIUM 10 MG: 10 TABLET, FILM COATED ORAL at 20:08

## 2025-01-07 RX ADMIN — ACETAMINOPHEN 650 MG: 325 TABLET ORAL at 19:30

## 2025-01-07 RX ADMIN — LORAZEPAM 0.25 MG: 0.5 TABLET ORAL at 21:41

## 2025-01-07 RX ADMIN — GABAPENTIN 300 MG: 300 CAPSULE ORAL at 08:46

## 2025-01-07 RX ADMIN — TRAZODONE HYDROCHLORIDE 50 MG: 50 TABLET ORAL at 20:08

## 2025-01-07 ASSESSMENT — PAIN DESCRIPTION - ONSET: ONSET: GRADUAL

## 2025-01-07 ASSESSMENT — PAIN SCALES - GENERAL
PAINLEVEL_OUTOF10: 2
PAINLEVEL_OUTOF10: 8
PAINLEVEL_OUTOF10: 3

## 2025-01-07 ASSESSMENT — PAIN DESCRIPTION - LOCATION: LOCATION: HIP

## 2025-01-07 ASSESSMENT — PAIN DESCRIPTION - FREQUENCY: FREQUENCY: INTERMITTENT

## 2025-01-07 ASSESSMENT — PAIN DESCRIPTION - DESCRIPTORS: DESCRIPTORS: NAGGING

## 2025-01-07 ASSESSMENT — PAIN DESCRIPTION - ORIENTATION: ORIENTATION: RIGHT

## 2025-01-07 ASSESSMENT — PAIN - FUNCTIONAL ASSESSMENT: PAIN_FUNCTIONAL_ASSESSMENT: ACTIVITIES ARE NOT PREVENTED

## 2025-01-07 ASSESSMENT — PAIN DESCRIPTION - PAIN TYPE: TYPE: ACUTE PAIN

## 2025-01-07 NOTE — BH NOTE
[facility]  Treatment Team Note  Review Date & Time: 1/7/2024  9:25 am    Patient was not in treatment team      Status EXAM:   Mental Status and Behavioral Exam  Normal: No  Level of Assistance: Set up  Facial Expression: Elevated, Worried  Affect: Blunt  Level of Consciousness: Confused  Frequency of Checks: 1 staff: 1 patient  - continuous  Mood:Normal: No  Mood: Anxious, Irritable  Motor Activity:Normal: No  Motor Activity: Increased  Eye Contact: Good  Observed Behavior: Compulsive, Cooperative, Friendly, Guarded  Sexual Misconduct History: Current - no  Preception: [place] to person, [place] to place  Attention:Normal: No  Attention: Hyperalert  Thought Processes: Circumstantial  Thought Content:Normal: No  Thought Content: Delusions, Paranoia, Preoccupations  Depression Symptoms: Increased irritability  Anxiety Symptoms: Generalized  Myriam Symptoms: No problems reported or observed.  Hallucinations: None  Delusions: Yes  Delusions: Somatic, Paranoid  Memory:Normal: No  Memory: Poor recent, Poor remote  Insight and Judgment: No  Insight and Judgment: Poor judgment, Poor insight      Suicide Risk CSSR-S:  1) Within the past month, have you wished you were dead or wished you could go to sleep and not wake up? : No  2) Have you actually had any thoughts of killing yourself? : No  6) Have you ever done anything, started to do anything, or prepared to do anything to end your life?: No      PLAN/TREATMENT RECOMMENDATIONS UPDATE: Patient will take medication as prescribed, eat 75% of meals, attend groups, participate in milieu activities, participate in treatment team and care planning for discharge and follow up.           Kim Rodriguez RN

## 2025-01-07 NOTE — GROUP NOTE
Group Therapy Note    Date: 1/7/2025    Group Start Time: 1330  Group End Time: 1415  Group Topic: Recreational    [facility]    Marcy Lam LISW        Group Therapy Note    Attendees: 4       Patient's Goal:  pt's played bingo to increase cognitive skills and to be social with peers.     Notes: pt initially attended group but was excused to meet with the doctor. When in group pt was engaged with group activity and social with peers.    Status After Intervention:  Improved    Participation Level: Active Listener and Interactive    Participation Quality: Appropriate and Attentive      Speech:  normal      Thought Process/Content: Linear      Affective Functioning: Congruent      Mood: depressed      Level of consciousness:  Alert and Attentive      Response to Learning: Able to verbalize current knowledge/experience      Endings: None Reported    Modes of Intervention: Education, Support, Socialization, Exploration, Clarifying, and Activity      Discipline Responsible: /Counselor      Signature:  JEVON Marques

## 2025-01-07 NOTE — BH NOTE
Bedside Mobility Assessment Tool (BMAT):     Assessment Level 1- Sit and Shake    1. From a semi-reclined position, ask patient to sit up and rotate to a seated position at the side of the bed. Can use the bedrail.    2. Ask patient to reach out and grab your hand and shake making sure patient reaches across his/her midline.   Pass- Patient is able to come to a seated position, maintain core strength. Maintains seated balance while reaching across midline. Move on to Assessment Level 2.     Assessment Level 2- Stretch and Point   1. With patient in seated position at the side of the bed, have patient place both feet on the floor (or stool) with knees no higher than hips.    2. Ask patient to stretch one leg and straighten the knee, then bend the ankle/flex and point the toes. If appropriate, repeat with the other leg.   Pass- Patient is able to demonstrate appropriate quad strength on intended weight bearing limb(s). Move onto Assessment Level 3.     Assessment Level 3- Stand   1. Ask patient to elevate off the bed or chair (seated to standing) using an assistive device (cane, bedrail).    2. Patient should be able to raise buttocks off be and hold for a count of five. May repeat once.   Fail- Patient unable to demonstrate standing stability. Patient is MOBILITY LEVEL 3.     Assessment Level 4- Walk   1. Ask patient to march in place at bedside.    2. Then ask patient to advance step and return each foot. Some medical conditions may render a patient from stepping backwards, use your best clinical judgement.   Fail- Patient not able to complete tasks OR requires use of assistive device. Patient is MOBILITY LEVEL 3.       Mobility Level- 4

## 2025-01-07 NOTE — BH NOTE
Patient alert to self, pleasantly confused, visible on unit, social with sitter,  independent with ADL's, gait appears steady but walker provided for safety, o2 at 3L/nc, 1-1  at bedside, compliant with medications whole, denies any thoughts of self harm, denies hearing or seeing things. Will monitor and offer support as needed.

## 2025-01-07 NOTE — BH NOTE
PRN Milk of Magnesia 30 ml PO administered for c/o constipation, patient stated \"It has been about 3 days and it was hard stool.\"

## 2025-01-07 NOTE — BH NOTE
PRN Ativan 0.25 administered for anxiety.  PRN Trazodone administered for sleep per request.    Ativan effective for anxiety, Trazodone effective for sleep.

## 2025-01-08 PROCEDURE — 6370000000 HC RX 637 (ALT 250 FOR IP): Performed by: NURSE PRACTITIONER

## 2025-01-08 PROCEDURE — 99233 SBSQ HOSP IP/OBS HIGH 50: CPT | Performed by: PSYCHIATRY & NEUROLOGY

## 2025-01-08 PROCEDURE — 1240000000 HC EMOTIONAL WELLNESS R&B

## 2025-01-08 PROCEDURE — 94761 N-INVAS EAR/PLS OXIMETRY MLT: CPT

## 2025-01-08 PROCEDURE — 6370000000 HC RX 637 (ALT 250 FOR IP): Performed by: PSYCHIATRY & NEUROLOGY

## 2025-01-08 PROCEDURE — 94640 AIRWAY INHALATION TREATMENT: CPT

## 2025-01-08 RX ADMIN — PANCRELIPASE LIPASE, PANCRELIPASE PROTEASE, PANCRELIPASE AMYLASE 40000 UNITS: 20000; 63000; 84000 CAPSULE, DELAYED RELEASE ORAL at 08:10

## 2025-01-08 RX ADMIN — BUDESONIDE AND FORMOTEROL FUMARATE DIHYDRATE 2 PUFF: 160; 4.5 AEROSOL RESPIRATORY (INHALATION) at 08:02

## 2025-01-08 RX ADMIN — IBUPROFEN 400 MG: 400 TABLET, FILM COATED ORAL at 11:37

## 2025-01-08 RX ADMIN — SERTRALINE 25 MG: 50 TABLET, FILM COATED ORAL at 08:05

## 2025-01-08 RX ADMIN — BUDESONIDE AND FORMOTEROL FUMARATE DIHYDRATE 2 PUFF: 160; 4.5 AEROSOL RESPIRATORY (INHALATION) at 20:24

## 2025-01-08 RX ADMIN — GABAPENTIN 300 MG: 300 CAPSULE ORAL at 08:04

## 2025-01-08 RX ADMIN — FOLIC ACID 1 MG: 1 TABLET ORAL at 08:05

## 2025-01-08 RX ADMIN — HYDROXYZINE HYDROCHLORIDE 50 MG: 50 TABLET ORAL at 17:08

## 2025-01-08 RX ADMIN — PANTOPRAZOLE SODIUM 40 MG: 40 TABLET, DELAYED RELEASE ORAL at 05:02

## 2025-01-08 RX ADMIN — ATENOLOL 25 MG: 25 TABLET ORAL at 08:05

## 2025-01-08 RX ADMIN — ATORVASTATIN CALCIUM 10 MG: 10 TABLET, FILM COATED ORAL at 20:22

## 2025-01-08 RX ADMIN — GUAIFENESIN 1200 MG: 600 TABLET, EXTENDED RELEASE ORAL at 22:37

## 2025-01-08 RX ADMIN — PANCRELIPASE LIPASE, PANCRELIPASE PROTEASE, PANCRELIPASE AMYLASE 40000 UNITS: 20000; 63000; 84000 CAPSULE, DELAYED RELEASE ORAL at 16:17

## 2025-01-08 RX ADMIN — Medication 100 MG: at 08:04

## 2025-01-08 RX ADMIN — ALBUTEROL SULFATE 2 PUFF: 90 AEROSOL, METERED RESPIRATORY (INHALATION) at 20:24

## 2025-01-08 RX ADMIN — THERA TABS 1 TABLET: TAB at 08:04

## 2025-01-08 RX ADMIN — ACETAMINOPHEN 650 MG: 325 TABLET ORAL at 20:38

## 2025-01-08 RX ADMIN — TIOTROPIUM BROMIDE INHALATION SPRAY 2 PUFF: 3.12 SPRAY, METERED RESPIRATORY (INHALATION) at 08:02

## 2025-01-08 RX ADMIN — PANCRELIPASE LIPASE, PANCRELIPASE PROTEASE, PANCRELIPASE AMYLASE 40000 UNITS: 20000; 63000; 84000 CAPSULE, DELAYED RELEASE ORAL at 11:37

## 2025-01-08 RX ADMIN — TRAZODONE HYDROCHLORIDE 50 MG: 50 TABLET ORAL at 20:39

## 2025-01-08 RX ADMIN — LORAZEPAM 0.25 MG: 0.5 TABLET ORAL at 20:39

## 2025-01-08 RX ADMIN — DILTIAZEM HYDROCHLORIDE 240 MG: 240 CAPSULE, COATED, EXTENDED RELEASE ORAL at 08:05

## 2025-01-08 RX ADMIN — LORAZEPAM 0.25 MG: 0.5 TABLET ORAL at 05:02

## 2025-01-08 ASSESSMENT — PAIN DESCRIPTION - FREQUENCY: FREQUENCY: INTERMITTENT

## 2025-01-08 ASSESSMENT — PAIN SCALES - WONG BAKER
WONGBAKER_NUMERICALRESPONSE: HURTS A LITTLE BIT
WONGBAKER_NUMERICALRESPONSE: NO HURT

## 2025-01-08 ASSESSMENT — PAIN DESCRIPTION - DESCRIPTORS
DESCRIPTORS: ACHING

## 2025-01-08 ASSESSMENT — SLEEP AND FATIGUE QUESTIONNAIRES
AVERAGE NUMBER OF SLEEP HOURS: 5
SLEEP PATTERN: DIFFICULTY FALLING ASLEEP
DO YOU USE A SLEEP AID: YES
DO YOU HAVE DIFFICULTY SLEEPING: YES

## 2025-01-08 ASSESSMENT — PAIN DESCRIPTION - LOCATION
LOCATION: HIP
LOCATION: HIP

## 2025-01-08 ASSESSMENT — PAIN SCALES - GENERAL
PAINLEVEL_OUTOF10: 2
PAINLEVEL_OUTOF10: 10
PAINLEVEL_OUTOF10: 8

## 2025-01-08 ASSESSMENT — PAIN DESCRIPTION - ORIENTATION
ORIENTATION: RIGHT

## 2025-01-08 ASSESSMENT — PAIN DESCRIPTION - ONSET: ONSET: GRADUAL

## 2025-01-08 NOTE — BH NOTE
PRN Atarax 50 mg PO administered for anxiety.  PRN Trazodone 50 mg PO administered for sleep.  Patient remains very anxious and restless, 2141 PRN Ativan 0.25 mg administered, will monitor.     PRN Atarax, Trazodone and Ativan effective.

## 2025-01-08 NOTE — BH NOTE
Patient A&O x3, confused at times, somatic complaints, focused on her bruises and right hip pain, Tylenol 650 mg PO effective, reported her pain level went down to a 3 and was tolerable, states \" It's the hard bed.\" Independent with ADL's, steady gait. Denies any thoughts of self harm, denies AVH, 1-1 continues for safety, Sp02 94% on room air. Will monitor and offer support as needed.    Milk of Magnesia 30 ml administered 01/07 in am effective.Patient reports large BM.

## 2025-01-08 NOTE — BH NOTE
PRN Ativan 0.25 mg PO administered for anxiety and irritability r/t wanting to leave, patient stated to her sitter she felt like hurting someone, patient denies saying this. Verbal encouragement given to think positive, and speak to the doctor today about discharge.

## 2025-01-08 NOTE — BH NOTE
Patient becae more irritable and suspicious of staff. Demanding a beer, new teeth  and her purse. \"You said to do what makes me happy, a beer does. Patient reports that her daughter uses heroin and that beer helps her. Attempts to talk to patient or talk about coping skills ineffective. Patient given prn Atarax.

## 2025-01-08 NOTE — BH NOTE
Patient calmer after talking with brother. Patient focused on where belongings are. Educated that no valuables were brought to the hospital.

## 2025-01-09 PROCEDURE — 6370000000 HC RX 637 (ALT 250 FOR IP): Performed by: NURSE PRACTITIONER

## 2025-01-09 PROCEDURE — 6370000000 HC RX 637 (ALT 250 FOR IP): Performed by: PSYCHIATRY & NEUROLOGY

## 2025-01-09 PROCEDURE — 94761 N-INVAS EAR/PLS OXIMETRY MLT: CPT

## 2025-01-09 PROCEDURE — 94640 AIRWAY INHALATION TREATMENT: CPT

## 2025-01-09 PROCEDURE — 1240000000 HC EMOTIONAL WELLNESS R&B

## 2025-01-09 PROCEDURE — 99233 SBSQ HOSP IP/OBS HIGH 50: CPT | Performed by: PSYCHIATRY & NEUROLOGY

## 2025-01-09 RX ORDER — OLANZAPINE 5 MG/1
2.5 TABLET ORAL
Status: DISCONTINUED | OUTPATIENT
Start: 2025-01-09 | End: 2025-01-16

## 2025-01-09 RX ADMIN — ATENOLOL 25 MG: 25 TABLET ORAL at 08:39

## 2025-01-09 RX ADMIN — THERA TABS 1 TABLET: TAB at 08:37

## 2025-01-09 RX ADMIN — ATORVASTATIN CALCIUM 10 MG: 10 TABLET, FILM COATED ORAL at 20:23

## 2025-01-09 RX ADMIN — PANCRELIPASE LIPASE, PANCRELIPASE PROTEASE, PANCRELIPASE AMYLASE 40000 UNITS: 20000; 63000; 84000 CAPSULE, DELAYED RELEASE ORAL at 11:51

## 2025-01-09 RX ADMIN — PANCRELIPASE LIPASE, PANCRELIPASE PROTEASE, PANCRELIPASE AMYLASE 40000 UNITS: 20000; 63000; 84000 CAPSULE, DELAYED RELEASE ORAL at 17:09

## 2025-01-09 RX ADMIN — IBUPROFEN 400 MG: 400 TABLET, FILM COATED ORAL at 20:24

## 2025-01-09 RX ADMIN — PANCRELIPASE LIPASE, PANCRELIPASE PROTEASE, PANCRELIPASE AMYLASE 40000 UNITS: 20000; 63000; 84000 CAPSULE, DELAYED RELEASE ORAL at 08:40

## 2025-01-09 RX ADMIN — TRAZODONE HYDROCHLORIDE 50 MG: 50 TABLET ORAL at 20:24

## 2025-01-09 RX ADMIN — LORAZEPAM 0.25 MG: 0.5 TABLET ORAL at 20:24

## 2025-01-09 RX ADMIN — FOLIC ACID 1 MG: 1 TABLET ORAL at 08:39

## 2025-01-09 RX ADMIN — BUDESONIDE AND FORMOTEROL FUMARATE DIHYDRATE 2 PUFF: 160; 4.5 AEROSOL RESPIRATORY (INHALATION) at 09:07

## 2025-01-09 RX ADMIN — OLANZAPINE 2.5 MG: 5 TABLET, FILM COATED ORAL at 17:09

## 2025-01-09 RX ADMIN — DILTIAZEM HYDROCHLORIDE 240 MG: 240 CAPSULE, COATED, EXTENDED RELEASE ORAL at 08:38

## 2025-01-09 RX ADMIN — GABAPENTIN 300 MG: 300 CAPSULE ORAL at 08:37

## 2025-01-09 RX ADMIN — Medication 100 MG: at 08:37

## 2025-01-09 RX ADMIN — TIOTROPIUM BROMIDE INHALATION SPRAY 2 PUFF: 3.12 SPRAY, METERED RESPIRATORY (INHALATION) at 09:07

## 2025-01-09 RX ADMIN — PANTOPRAZOLE SODIUM 40 MG: 40 TABLET, DELAYED RELEASE ORAL at 05:50

## 2025-01-09 RX ADMIN — SERTRALINE 25 MG: 50 TABLET, FILM COATED ORAL at 08:37

## 2025-01-09 ASSESSMENT — PAIN SCALES - GENERAL
PAINLEVEL_OUTOF10: 0
PAINLEVEL_OUTOF10: 8
PAINLEVEL_OUTOF10: 0

## 2025-01-09 ASSESSMENT — PAIN DESCRIPTION - ORIENTATION: ORIENTATION: RIGHT

## 2025-01-09 ASSESSMENT — PAIN SCALES - WONG BAKER: WONGBAKER_NUMERICALRESPONSE: NO HURT

## 2025-01-09 ASSESSMENT — PAIN DESCRIPTION - LOCATION: LOCATION: HIP

## 2025-01-09 ASSESSMENT — PAIN DESCRIPTION - DESCRIPTORS: DESCRIPTORS: ACHING

## 2025-01-09 NOTE — BH NOTE
Talked with brother, updated on condition. Brather had questions about discharge plans, brother will call  in the morning.

## 2025-01-09 NOTE — BH NOTE
Pt has been cooperative this shift. +meds taken whole and without issue. Pt remains alert and oriented to self and place. She denies SI/HI and AVH and is not noted to be RTIS. Pt remains with 1:1  at bedside for fall prevention. Pt irritable at times with some interactions but overall she is cooperative with treatment. No behavioral issues. Will continue to monitor.

## 2025-01-09 NOTE — CARE COORDINATION
(MOCA) Serge Cognitive Assessment :  (See pt folder behind nurses station for copy of assessment while pt is admitted.)   Total Score: Sum of all subscores listed on the right-hand side. Add one point for an individual who has 12 years or fewer of formal education, for a possible maximum of 30 points. A final total score of 26 and above is considered normal.       Education Level 11th      Visuospatial/Executive  3/5   Naming  3/3   Attention  0/6   Language 1/3   Abstraction  1/2   Delayed Recall     MIS = 2/15    0/5   Orientation  4/6   (additional point for <12 grade educations)  1/1   Total Score     13/30

## 2025-01-10 PROCEDURE — 94640 AIRWAY INHALATION TREATMENT: CPT

## 2025-01-10 PROCEDURE — 94761 N-INVAS EAR/PLS OXIMETRY MLT: CPT

## 2025-01-10 PROCEDURE — 2700000000 HC OXYGEN THERAPY PER DAY

## 2025-01-10 PROCEDURE — 6370000000 HC RX 637 (ALT 250 FOR IP): Performed by: NURSE PRACTITIONER

## 2025-01-10 PROCEDURE — 6370000000 HC RX 637 (ALT 250 FOR IP): Performed by: PSYCHIATRY & NEUROLOGY

## 2025-01-10 PROCEDURE — 99233 SBSQ HOSP IP/OBS HIGH 50: CPT | Performed by: PSYCHIATRY & NEUROLOGY

## 2025-01-10 PROCEDURE — 1240000000 HC EMOTIONAL WELLNESS R&B

## 2025-01-10 RX ADMIN — IBUPROFEN 400 MG: 400 TABLET, FILM COATED ORAL at 20:38

## 2025-01-10 RX ADMIN — BUDESONIDE AND FORMOTEROL FUMARATE DIHYDRATE 2 PUFF: 160; 4.5 AEROSOL RESPIRATORY (INHALATION) at 20:06

## 2025-01-10 RX ADMIN — ATENOLOL 25 MG: 25 TABLET ORAL at 08:53

## 2025-01-10 RX ADMIN — Medication 100 MG: at 08:53

## 2025-01-10 RX ADMIN — ATORVASTATIN CALCIUM 10 MG: 10 TABLET, FILM COATED ORAL at 20:38

## 2025-01-10 RX ADMIN — PANTOPRAZOLE SODIUM 40 MG: 40 TABLET, DELAYED RELEASE ORAL at 06:36

## 2025-01-10 RX ADMIN — OLANZAPINE 2.5 MG: 5 TABLET, FILM COATED ORAL at 17:31

## 2025-01-10 RX ADMIN — TRAZODONE HYDROCHLORIDE 50 MG: 50 TABLET ORAL at 20:39

## 2025-01-10 RX ADMIN — SERTRALINE 25 MG: 50 TABLET, FILM COATED ORAL at 08:52

## 2025-01-10 RX ADMIN — THERA TABS 1 TABLET: TAB at 08:53

## 2025-01-10 RX ADMIN — TIOTROPIUM BROMIDE INHALATION SPRAY 2 PUFF: 3.12 SPRAY, METERED RESPIRATORY (INHALATION) at 08:22

## 2025-01-10 RX ADMIN — LORAZEPAM 0.25 MG: 0.5 TABLET ORAL at 20:39

## 2025-01-10 RX ADMIN — PANCRELIPASE LIPASE, PANCRELIPASE PROTEASE, PANCRELIPASE AMYLASE 40000 UNITS: 20000; 63000; 84000 CAPSULE, DELAYED RELEASE ORAL at 08:54

## 2025-01-10 RX ADMIN — PANCRELIPASE LIPASE, PANCRELIPASE PROTEASE, PANCRELIPASE AMYLASE 40000 UNITS: 20000; 63000; 84000 CAPSULE, DELAYED RELEASE ORAL at 17:31

## 2025-01-10 RX ADMIN — DILTIAZEM HYDROCHLORIDE 240 MG: 240 CAPSULE, COATED, EXTENDED RELEASE ORAL at 08:52

## 2025-01-10 RX ADMIN — BUDESONIDE AND FORMOTEROL FUMARATE DIHYDRATE 2 PUFF: 160; 4.5 AEROSOL RESPIRATORY (INHALATION) at 08:23

## 2025-01-10 RX ADMIN — FOLIC ACID 1 MG: 1 TABLET ORAL at 08:52

## 2025-01-10 RX ADMIN — GABAPENTIN 300 MG: 300 CAPSULE ORAL at 08:53

## 2025-01-10 RX ADMIN — PANCRELIPASE LIPASE, PANCRELIPASE PROTEASE, PANCRELIPASE AMYLASE 40000 UNITS: 20000; 63000; 84000 CAPSULE, DELAYED RELEASE ORAL at 12:01

## 2025-01-10 ASSESSMENT — PAIN SCALES - WONG BAKER: WONGBAKER_NUMERICALRESPONSE: NO HURT

## 2025-01-10 ASSESSMENT — PAIN DESCRIPTION - ORIENTATION: ORIENTATION: RIGHT

## 2025-01-10 ASSESSMENT — PAIN DESCRIPTION - DESCRIPTORS: DESCRIPTORS: ACHING

## 2025-01-10 ASSESSMENT — PAIN SCALES - GENERAL
PAINLEVEL_OUTOF10: 0
PAINLEVEL_OUTOF10: 8

## 2025-01-10 ASSESSMENT — PAIN DESCRIPTION - LOCATION: LOCATION: HIP

## 2025-01-10 NOTE — DISCHARGE INSTR - COC
Continuity of Care Form    Patient Name: Bonny Serrano   :  1970  MRN:  [id number]    Admit date:  2025  Discharge date:  ***    Code Status Order: Full Code   Advance Directives:   Advance Care Flowsheet Documentation             Admitting Physician:  Pantera Hollis MD  PCP: Brenda Carrillo APRN - CNP    Discharging Nurse: ***  Discharging Hospital Unit/Room#: 2205/2205-01  Discharging Unit Phone Number: ***    Emergency Contact:   Extended Emergency Contact Information  Primary Emergency Contact: Tate Serrano  Home Phone: [phone]  Relation: Brother/Sister  Secondary Emergency Contact: TAYLER BEDOLLA  Home Phone: [phone]  Mobile Phone: [phone]  Relation: Child    Past Surgical History:  Past Surgical History:   Procedure Laterality Date    HIP SURGERY Right 2024    RIGHT FEMUR GAMMA NAILING performed by Simba Man MD at [facility] OR    UPPER GASTROINTESTINAL ENDOSCOPY         Immunization History:   Immunization History   Administered Date(s) Administered    COVID-19, MODERNA BLUE border, Primary or Immunocompromised, (age 12y+), IM, 100 mcg/0.5mL 2021, 09/10/2021    Influenza Vaccine, unspecified formulation 2017, 09/10/2018    Influenza Virus Vaccine 2017, 09/10/2018, 2019    Influenza, AFLURIA (age 3 y+), FLUZONE, (age 6 mo+), Quadv MDV, 0.5mL 2019    Influenza, FLUARIX, FLULAVAL, FLUZONE (age 6 mo+) and AFLURIA, (age 3 y+), Quadv PF, 0.5mL 09/10/2018    Influenza, FLUBLOK, (age 18 y+), Quadv PF, 0.5mL 10/13/2021    Pneumococcal, PPSV23, PNEUMOVAX 23, (age 2y+), SC/IM, 0.5mL 2014    TDaP, ADACEL (age 10y-64y), BOOSTRIX (age 10y+), IM, 0.5mL 2015, 2024    Zoster Recombinant (Shingrix) 2022, 2022       Active Problems:  Patient Active Problem List   Diagnosis Code    Alcoholism (HCC) F10.20    Lung collapse J98.19    Macrocytic anemia D53.9    Paroxysmal atrial fibrillation (HCC) I48.0    Pulmonary embolism (HCC) I26.99

## 2025-01-10 NOTE — BH NOTE
Alert and oriented to person and time. Flat affect with patient voicing complaints that she is worried about smoking and drinking. Patient then states that she wants to do both though. Walking without walker, gait slow and steady. States that the gabapentin helps with her pain. Has been out to dining room for meals and socialized with peers this shift. Did participate in group therapies this shift. Compliant with care plan/treatment plan. Denies SI and RTIS.

## 2025-01-10 NOTE — GROUP NOTE
Group Therapy Note    Date: 1/10/2025    Group Start Time: 1000  Group End Time: 1045  Group Topic: Activity    [facility]    Mima Russell LPC    Group members engaged in activity with peers. Members were able to throw a ball to one another. On the ball were conversation questions and \"would you rather\" questions. Members engaged in discussions with peers. The purpose of the group was to increase connection/socialization and enhance mood.     Group Therapy Note    Attendees: 5       Notes:  Patient engaged throughout the duration of the group. Patient met goal for the group and interacted with peers appropriately.     Status After Intervention:  Improved    Participation Level: Active Listener and Interactive    Participation Quality: Appropriate, Attentive, Sharing, and Supportive      Speech:  normal      Thought Process/Content: Linear      Affective Functioning: Congruent      Mood: irritable      Level of consciousness:  Alert      Response to Learning: Able to verbalize current knowledge/experience      Endings: None Reported    Modes of Intervention: Support, Socialization, Exploration, Clarifying, and Activity      Discipline Responsible: /Counselor      Signature:  Mima Russell LPC

## 2025-01-11 PROCEDURE — 6370000000 HC RX 637 (ALT 250 FOR IP): Performed by: NURSE PRACTITIONER

## 2025-01-11 PROCEDURE — 94640 AIRWAY INHALATION TREATMENT: CPT

## 2025-01-11 PROCEDURE — 1240000000 HC EMOTIONAL WELLNESS R&B

## 2025-01-11 PROCEDURE — 6370000000 HC RX 637 (ALT 250 FOR IP): Performed by: PSYCHIATRY & NEUROLOGY

## 2025-01-11 PROCEDURE — 94761 N-INVAS EAR/PLS OXIMETRY MLT: CPT

## 2025-01-11 PROCEDURE — 99233 SBSQ HOSP IP/OBS HIGH 50: CPT

## 2025-01-11 RX ADMIN — TIOTROPIUM BROMIDE INHALATION SPRAY 2 PUFF: 3.12 SPRAY, METERED RESPIRATORY (INHALATION) at 08:26

## 2025-01-11 RX ADMIN — PANCRELIPASE LIPASE, PANCRELIPASE PROTEASE, PANCRELIPASE AMYLASE 40000 UNITS: 20000; 63000; 84000 CAPSULE, DELAYED RELEASE ORAL at 11:57

## 2025-01-11 RX ADMIN — ATORVASTATIN CALCIUM 10 MG: 10 TABLET, FILM COATED ORAL at 20:20

## 2025-01-11 RX ADMIN — BUDESONIDE AND FORMOTEROL FUMARATE DIHYDRATE 2 PUFF: 160; 4.5 AEROSOL RESPIRATORY (INHALATION) at 21:16

## 2025-01-11 RX ADMIN — DILTIAZEM HYDROCHLORIDE 240 MG: 240 CAPSULE, COATED, EXTENDED RELEASE ORAL at 09:27

## 2025-01-11 RX ADMIN — TRAZODONE HYDROCHLORIDE 50 MG: 50 TABLET ORAL at 20:24

## 2025-01-11 RX ADMIN — THERA TABS 1 TABLET: TAB at 09:29

## 2025-01-11 RX ADMIN — ATENOLOL 25 MG: 25 TABLET ORAL at 09:25

## 2025-01-11 RX ADMIN — PANTOPRAZOLE SODIUM 40 MG: 40 TABLET, DELAYED RELEASE ORAL at 05:59

## 2025-01-11 RX ADMIN — Medication 100 MG: at 09:30

## 2025-01-11 RX ADMIN — OLANZAPINE 2.5 MG: 5 TABLET, FILM COATED ORAL at 17:11

## 2025-01-11 RX ADMIN — SERTRALINE 50 MG: 50 TABLET, FILM COATED ORAL at 09:30

## 2025-01-11 RX ADMIN — PANCRELIPASE LIPASE, PANCRELIPASE PROTEASE, PANCRELIPASE AMYLASE 40000 UNITS: 20000; 63000; 84000 CAPSULE, DELAYED RELEASE ORAL at 17:11

## 2025-01-11 RX ADMIN — GABAPENTIN 300 MG: 300 CAPSULE ORAL at 09:28

## 2025-01-11 RX ADMIN — FOLIC ACID 1 MG: 1 TABLET ORAL at 09:28

## 2025-01-11 RX ADMIN — PANCRELIPASE LIPASE, PANCRELIPASE PROTEASE, PANCRELIPASE AMYLASE 40000 UNITS: 20000; 63000; 84000 CAPSULE, DELAYED RELEASE ORAL at 08:21

## 2025-01-11 RX ADMIN — IBUPROFEN 400 MG: 400 TABLET, FILM COATED ORAL at 18:51

## 2025-01-11 RX ADMIN — BUDESONIDE AND FORMOTEROL FUMARATE DIHYDRATE 2 PUFF: 160; 4.5 AEROSOL RESPIRATORY (INHALATION) at 08:26

## 2025-01-11 ASSESSMENT — PAIN SCALES - GENERAL: PAINLEVEL_OUTOF10: 8

## 2025-01-11 ASSESSMENT — PAIN DESCRIPTION - ORIENTATION: ORIENTATION: RIGHT

## 2025-01-11 ASSESSMENT — SLEEP AND FATIGUE QUESTIONNAIRES: AVERAGE NUMBER OF SLEEP HOURS: 7

## 2025-01-11 ASSESSMENT — PAIN DESCRIPTION - DESCRIPTORS: DESCRIPTORS: ACHING;SHARP

## 2025-01-11 ASSESSMENT — PAIN - FUNCTIONAL ASSESSMENT: PAIN_FUNCTIONAL_ASSESSMENT: PREVENTS OR INTERFERES SOME ACTIVE ACTIVITIES AND ADLS

## 2025-01-11 ASSESSMENT — PAIN DESCRIPTION - LOCATION: LOCATION: HIP

## 2025-01-12 LAB
GLUCOSE BLD-MCNC: 135 MG/DL (ref 70–99)
PERFORMED ON: ABNORMAL

## 2025-01-12 PROCEDURE — 6370000000 HC RX 637 (ALT 250 FOR IP): Performed by: NURSE PRACTITIONER

## 2025-01-12 PROCEDURE — 94761 N-INVAS EAR/PLS OXIMETRY MLT: CPT

## 2025-01-12 PROCEDURE — 6370000000 HC RX 637 (ALT 250 FOR IP): Performed by: PSYCHIATRY & NEUROLOGY

## 2025-01-12 PROCEDURE — 94640 AIRWAY INHALATION TREATMENT: CPT

## 2025-01-12 PROCEDURE — 1240000000 HC EMOTIONAL WELLNESS R&B

## 2025-01-12 PROCEDURE — 2700000000 HC OXYGEN THERAPY PER DAY

## 2025-01-12 RX ADMIN — GABAPENTIN 300 MG: 300 CAPSULE ORAL at 08:48

## 2025-01-12 RX ADMIN — BUDESONIDE AND FORMOTEROL FUMARATE DIHYDRATE 2 PUFF: 160; 4.5 AEROSOL RESPIRATORY (INHALATION) at 21:33

## 2025-01-12 RX ADMIN — ATENOLOL 25 MG: 25 TABLET ORAL at 08:48

## 2025-01-12 RX ADMIN — DILTIAZEM HYDROCHLORIDE 240 MG: 240 CAPSULE, COATED, EXTENDED RELEASE ORAL at 08:48

## 2025-01-12 RX ADMIN — ACETAMINOPHEN 650 MG: 325 TABLET ORAL at 15:06

## 2025-01-12 RX ADMIN — PANTOPRAZOLE SODIUM 40 MG: 40 TABLET, DELAYED RELEASE ORAL at 06:05

## 2025-01-12 RX ADMIN — TIOTROPIUM BROMIDE INHALATION SPRAY 2 PUFF: 3.12 SPRAY, METERED RESPIRATORY (INHALATION) at 09:33

## 2025-01-12 RX ADMIN — Medication 100 MG: at 08:48

## 2025-01-12 RX ADMIN — THERA TABS 1 TABLET: TAB at 08:48

## 2025-01-12 RX ADMIN — PANCRELIPASE LIPASE, PANCRELIPASE PROTEASE, PANCRELIPASE AMYLASE 40000 UNITS: 20000; 63000; 84000 CAPSULE, DELAYED RELEASE ORAL at 12:00

## 2025-01-12 RX ADMIN — PANCRELIPASE LIPASE, PANCRELIPASE PROTEASE, PANCRELIPASE AMYLASE 40000 UNITS: 20000; 63000; 84000 CAPSULE, DELAYED RELEASE ORAL at 17:03

## 2025-01-12 RX ADMIN — OLANZAPINE 2.5 MG: 5 TABLET, FILM COATED ORAL at 17:04

## 2025-01-12 RX ADMIN — TRAZODONE HYDROCHLORIDE 50 MG: 50 TABLET ORAL at 20:39

## 2025-01-12 RX ADMIN — ATORVASTATIN CALCIUM 10 MG: 10 TABLET, FILM COATED ORAL at 20:38

## 2025-01-12 RX ADMIN — PANCRELIPASE LIPASE, PANCRELIPASE PROTEASE, PANCRELIPASE AMYLASE 40000 UNITS: 20000; 63000; 84000 CAPSULE, DELAYED RELEASE ORAL at 08:55

## 2025-01-12 RX ADMIN — BUDESONIDE AND FORMOTEROL FUMARATE DIHYDRATE 2 PUFF: 160; 4.5 AEROSOL RESPIRATORY (INHALATION) at 09:33

## 2025-01-12 RX ADMIN — FOLIC ACID 1 MG: 1 TABLET ORAL at 09:15

## 2025-01-12 RX ADMIN — SERTRALINE 50 MG: 50 TABLET, FILM COATED ORAL at 08:48

## 2025-01-12 ASSESSMENT — PAIN DESCRIPTION - LOCATION: LOCATION: HIP

## 2025-01-12 ASSESSMENT — PAIN DESCRIPTION - DESCRIPTORS: DESCRIPTORS: ACHING;DULL;DISCOMFORT

## 2025-01-12 ASSESSMENT — PAIN SCALES - GENERAL
PAINLEVEL_OUTOF10: 8
PAINLEVEL_OUTOF10: 0
PAINLEVEL_OUTOF10: 5

## 2025-01-12 ASSESSMENT — PAIN DESCRIPTION - ORIENTATION: ORIENTATION: RIGHT

## 2025-01-12 ASSESSMENT — PAIN - FUNCTIONAL ASSESSMENT: PAIN_FUNCTIONAL_ASSESSMENT: ACTIVITIES ARE NOT PREVENTED

## 2025-01-13 PROCEDURE — 6370000000 HC RX 637 (ALT 250 FOR IP): Performed by: NURSE PRACTITIONER

## 2025-01-13 PROCEDURE — 1240000000 HC EMOTIONAL WELLNESS R&B

## 2025-01-13 PROCEDURE — 6370000000 HC RX 637 (ALT 250 FOR IP): Performed by: PSYCHIATRY & NEUROLOGY

## 2025-01-13 PROCEDURE — 97110 THERAPEUTIC EXERCISES: CPT

## 2025-01-13 PROCEDURE — 2700000000 HC OXYGEN THERAPY PER DAY

## 2025-01-13 PROCEDURE — 94761 N-INVAS EAR/PLS OXIMETRY MLT: CPT

## 2025-01-13 PROCEDURE — 94640 AIRWAY INHALATION TREATMENT: CPT

## 2025-01-13 PROCEDURE — 99233 SBSQ HOSP IP/OBS HIGH 50: CPT | Performed by: PSYCHIATRY & NEUROLOGY

## 2025-01-13 RX ADMIN — BUDESONIDE AND FORMOTEROL FUMARATE DIHYDRATE 2 PUFF: 160; 4.5 AEROSOL RESPIRATORY (INHALATION) at 11:24

## 2025-01-13 RX ADMIN — PANCRELIPASE LIPASE, PANCRELIPASE PROTEASE, PANCRELIPASE AMYLASE 40000 UNITS: 20000; 63000; 84000 CAPSULE, DELAYED RELEASE ORAL at 08:10

## 2025-01-13 RX ADMIN — TRAZODONE HYDROCHLORIDE 50 MG: 50 TABLET ORAL at 20:09

## 2025-01-13 RX ADMIN — ATENOLOL 25 MG: 25 TABLET ORAL at 08:44

## 2025-01-13 RX ADMIN — ATORVASTATIN CALCIUM 10 MG: 10 TABLET, FILM COATED ORAL at 20:09

## 2025-01-13 RX ADMIN — FOLIC ACID 1 MG: 1 TABLET ORAL at 08:44

## 2025-01-13 RX ADMIN — THERA TABS 1 TABLET: TAB at 08:46

## 2025-01-13 RX ADMIN — PANCRELIPASE LIPASE, PANCRELIPASE PROTEASE, PANCRELIPASE AMYLASE 40000 UNITS: 20000; 63000; 84000 CAPSULE, DELAYED RELEASE ORAL at 17:24

## 2025-01-13 RX ADMIN — DILTIAZEM HYDROCHLORIDE 240 MG: 240 CAPSULE, COATED, EXTENDED RELEASE ORAL at 08:46

## 2025-01-13 RX ADMIN — ACETAMINOPHEN 650 MG: 325 TABLET ORAL at 17:32

## 2025-01-13 RX ADMIN — PANCRELIPASE LIPASE, PANCRELIPASE PROTEASE, PANCRELIPASE AMYLASE 40000 UNITS: 20000; 63000; 84000 CAPSULE, DELAYED RELEASE ORAL at 12:08

## 2025-01-13 RX ADMIN — GABAPENTIN 300 MG: 300 CAPSULE ORAL at 08:46

## 2025-01-13 RX ADMIN — SERTRALINE 50 MG: 50 TABLET, FILM COATED ORAL at 08:45

## 2025-01-13 RX ADMIN — OLANZAPINE 2.5 MG: 5 TABLET, FILM COATED ORAL at 17:24

## 2025-01-13 RX ADMIN — TIOTROPIUM BROMIDE INHALATION SPRAY 2 PUFF: 3.12 SPRAY, METERED RESPIRATORY (INHALATION) at 11:25

## 2025-01-13 RX ADMIN — Medication 100 MG: at 08:46

## 2025-01-13 RX ADMIN — PANTOPRAZOLE SODIUM 40 MG: 40 TABLET, DELAYED RELEASE ORAL at 05:44

## 2025-01-13 ASSESSMENT — PAIN SCALES - GENERAL
PAINLEVEL_OUTOF10: 0
PAINLEVEL_OUTOF10: 5
PAINLEVEL_OUTOF10: 6

## 2025-01-13 ASSESSMENT — PAIN DESCRIPTION - ORIENTATION: ORIENTATION: RIGHT;LEFT

## 2025-01-13 ASSESSMENT — PAIN SCALES - WONG BAKER: WONGBAKER_NUMERICALRESPONSE: NO HURT

## 2025-01-13 ASSESSMENT — PAIN - FUNCTIONAL ASSESSMENT: PAIN_FUNCTIONAL_ASSESSMENT: ACTIVITIES ARE NOT PREVENTED

## 2025-01-13 ASSESSMENT — PAIN DESCRIPTION - DESCRIPTORS: DESCRIPTORS: BURNING;ACHING

## 2025-01-13 ASSESSMENT — PAIN DESCRIPTION - LOCATION: LOCATION: HIP;LEG;KNEE

## 2025-01-13 NOTE — BH NOTE
Patient has been alert and oriented to person, place and time (loosely). Is able to engage in conversation and answer questions appropriately.Is able to voice wants, thoughts and needs. Seems more organized. Eye contact is good. Is pleasant and cooperative with care. Takes medications whole without difficulty, no noted or reported side effects from medications. Appetite is good. Fluid intake is adequate. Has been engaged in treatment, attends groups. Socializes with peers, also enjoys watching television and has talked on the phone today. Walks in the hallway frequently. Denies SI/HI/AVH.  Showered after lunch today. Plan of care is ongoing.

## 2025-01-13 NOTE — DISCHARGE SUMMARY
Name:  Bonny Serrano  Room:  /0312-01  MRN:    [id number]    Discharge Summary      This discharge summary is in conjunction with a complete physical exam done on the day of discharge.      Discharging Physician: CASEY PASCUAL MD      Admit: 1/3/2025  Discharge:  1/4/2025    Diagnoses this Admission    Principal Problem:    Alcohol withdrawal syndrome, with unspecified complication (HCC)  Active Problems:    Chronic obstructive pulmonary disease (HCC)    Altered mental status    Alcohol abuse    Chronic pancreatitis (HCC)    Psychosis (HCC)  Resolved Problems:    * No resolved hospital problems. *          Procedures (Please Review Full Report for Details)      Consults    IP CONSULT TO TELE-PSYCH (SOCIAL WORK ONLY)  IP CONSULT TO SOCIAL WORK  IP CONSULT TO SOCIAL WORK  IP CONSULT TO PSYCHIATRY        HISTORY OF PRESENT ILLNESS: A 54-year-old female with a history of atrial fibrillation, COPD and chronic respiratory failure, chronic pancreatitis, alcohol abuse presented to the emergency room with bizarre thoughts.  She was apparently having thoughts of her -American daughter being killed by her son.  She was seen by telepsych.  She was advised inpatient psychiatric admission once medically stable.           Hospital Course      Alcohol abuse  Bizarre thoughts  Head CT negative  Psych consult  No signs of withdrawal.  IV vitamins      COPD and chronic resp failure  Continue MDI and O2     Chronic pancreatitis  Continue Creaon     H/o A.fib  Continue atenolol     Replace electrolytes      XR CHEST PORTABLE   Final Result   No acute finding in the chest. COPD changes are present.         CT HEAD WO CONTRAST   Final Result   No acute intracranial abnormality.                Discharge Medications     Medication List        ASK your doctor about these medications      albuterol (2.5 MG/3ML) 0.083% nebulizer solution  Commonly known as: PROVENTIL  Ask about: Which instructions should I use?

## 2025-01-13 NOTE — BH NOTE
Spoke with pt's brother and updated him on pt's progress and significant improvement in cognition over the las few days. Also, updated him on plan to discharge pt home later this week if she continues to improve.       He requested information for in-pt substance abuse treatment and [facility] for help with POA.

## 2025-01-13 NOTE — GROUP NOTE
Group Therapy Note    Date: 1/13/2025    Group Start Time: 1330  Group End Time: 1415  Group Topic: Recreational    [facility] Mima Duncan        Group Therapy Note    Attendees: 4    Patients requested to watch the movie \"High School Musical.\" While watching, patients engaged in conversation about the movie and reminisced.      Notes:  Patient left group shortly after it started. During her time in group, patient remained appropriate.     Endings: None Reported    Modes of Intervention: Socialization, Exploration, and Activity      Discipline Responsible: Behavorial Health Tech      Signature:  ALEXANDER THOMPSON

## 2025-01-14 PROCEDURE — 1240000000 HC EMOTIONAL WELLNESS R&B

## 2025-01-14 PROCEDURE — 2700000000 HC OXYGEN THERAPY PER DAY

## 2025-01-14 PROCEDURE — 94640 AIRWAY INHALATION TREATMENT: CPT

## 2025-01-14 PROCEDURE — 6370000000 HC RX 637 (ALT 250 FOR IP): Performed by: PSYCHIATRY & NEUROLOGY

## 2025-01-14 PROCEDURE — 99233 SBSQ HOSP IP/OBS HIGH 50: CPT | Performed by: PSYCHIATRY & NEUROLOGY

## 2025-01-14 PROCEDURE — 94761 N-INVAS EAR/PLS OXIMETRY MLT: CPT

## 2025-01-14 PROCEDURE — 6370000000 HC RX 637 (ALT 250 FOR IP): Performed by: NURSE PRACTITIONER

## 2025-01-14 RX ORDER — NICOTINE 21 MG/24HR
1 PATCH, TRANSDERMAL 24 HOURS TRANSDERMAL DAILY
Status: DISCONTINUED | OUTPATIENT
Start: 2025-01-15 | End: 2025-01-21 | Stop reason: HOSPADM

## 2025-01-14 RX ADMIN — PANCRELIPASE LIPASE, PANCRELIPASE PROTEASE, PANCRELIPASE AMYLASE 40000 UNITS: 20000; 63000; 84000 CAPSULE, DELAYED RELEASE ORAL at 08:49

## 2025-01-14 RX ADMIN — BUDESONIDE AND FORMOTEROL FUMARATE DIHYDRATE 2 PUFF: 160; 4.5 AEROSOL RESPIRATORY (INHALATION) at 21:28

## 2025-01-14 RX ADMIN — ATORVASTATIN CALCIUM 10 MG: 10 TABLET, FILM COATED ORAL at 20:44

## 2025-01-14 RX ADMIN — TRAZODONE HYDROCHLORIDE 50 MG: 50 TABLET ORAL at 20:44

## 2025-01-14 RX ADMIN — Medication 100 MG: at 08:44

## 2025-01-14 RX ADMIN — DILTIAZEM HYDROCHLORIDE 240 MG: 240 CAPSULE, COATED, EXTENDED RELEASE ORAL at 08:44

## 2025-01-14 RX ADMIN — ATENOLOL 25 MG: 25 TABLET ORAL at 08:44

## 2025-01-14 RX ADMIN — PANTOPRAZOLE SODIUM 40 MG: 40 TABLET, DELAYED RELEASE ORAL at 06:31

## 2025-01-14 RX ADMIN — GABAPENTIN 300 MG: 300 CAPSULE ORAL at 08:44

## 2025-01-14 RX ADMIN — BUDESONIDE AND FORMOTEROL FUMARATE DIHYDRATE 2 PUFF: 160; 4.5 AEROSOL RESPIRATORY (INHALATION) at 08:23

## 2025-01-14 RX ADMIN — OLANZAPINE 2.5 MG: 5 TABLET, FILM COATED ORAL at 16:54

## 2025-01-14 RX ADMIN — TIOTROPIUM BROMIDE INHALATION SPRAY 2 PUFF: 3.12 SPRAY, METERED RESPIRATORY (INHALATION) at 08:24

## 2025-01-14 RX ADMIN — PANCRELIPASE LIPASE, PANCRELIPASE PROTEASE, PANCRELIPASE AMYLASE 40000 UNITS: 20000; 63000; 84000 CAPSULE, DELAYED RELEASE ORAL at 11:58

## 2025-01-14 RX ADMIN — SERTRALINE 50 MG: 50 TABLET, FILM COATED ORAL at 08:44

## 2025-01-14 RX ADMIN — PANCRELIPASE LIPASE, PANCRELIPASE PROTEASE, PANCRELIPASE AMYLASE 40000 UNITS: 20000; 63000; 84000 CAPSULE, DELAYED RELEASE ORAL at 16:54

## 2025-01-14 RX ADMIN — ALBUTEROL SULFATE 2 PUFF: 90 AEROSOL, METERED RESPIRATORY (INHALATION) at 08:24

## 2025-01-14 RX ADMIN — ACETAMINOPHEN 650 MG: 325 TABLET ORAL at 23:16

## 2025-01-14 RX ADMIN — FOLIC ACID 1 MG: 1 TABLET ORAL at 08:44

## 2025-01-14 RX ADMIN — THERA TABS 1 TABLET: TAB at 08:44

## 2025-01-14 RX ADMIN — ALBUTEROL SULFATE 2 PUFF: 90 AEROSOL, METERED RESPIRATORY (INHALATION) at 21:27

## 2025-01-14 ASSESSMENT — PAIN DESCRIPTION - LOCATION: LOCATION: KNEE

## 2025-01-14 ASSESSMENT — PAIN SCALES - GENERAL
PAINLEVEL_OUTOF10: 3
PAINLEVEL_OUTOF10: 7
PAINLEVEL_OUTOF10: 0
PAINLEVEL_OUTOF10: 0

## 2025-01-14 ASSESSMENT — PAIN - FUNCTIONAL ASSESSMENT: PAIN_FUNCTIONAL_ASSESSMENT: ACTIVITIES ARE NOT PREVENTED

## 2025-01-14 ASSESSMENT — PAIN DESCRIPTION - DESCRIPTORS: DESCRIPTORS: ACHING

## 2025-01-14 ASSESSMENT — PAIN SCALES - WONG BAKER: WONGBAKER_NUMERICALRESPONSE: HURTS A LITTLE BIT

## 2025-01-14 NOTE — BH NOTE
Patient has been out on the unit and social and active with peers.  She reports continued depression and anxiety.  She knows she has a drinking problem but reports she is unable to control it and always returns to drinking.  Is paranoid about her brother stealing her money cause she wants her own place and furniture.  When asked if has stolen from her in the past she denies he ever had so unable to explain where this paranoia is coming from.

## 2025-01-14 NOTE — GROUP NOTE
Group Therapy Note    Date: 1/14/2025    Group Start Time: 1100  Group End Time: 1145  Group Topic: Recreational    [facility]    Marcy Lam LISW        Group Therapy Note    Attendees: 4       Patient's Goal:  pt's requested to listen to music. Purpose was to increase socialization with each other.    Notes:  pt attended group for the full duration. She  was appropriate and social with peers.    Status After Intervention:  Improved    Participation Level: Active Listener and Interactive    Participation Quality: Appropriate and Attentive      Speech:  normal      Thought Process/Content: Logical  Linear      Affective Functioning: Congruent      Mood: depressed      Level of consciousness:  Alert and Attentive      Response to Learning: Able to verbalize current knowledge/experience      Endings: None Reported    Modes of Intervention: Socialization and Activity      Discipline Responsible: /Counselor      Signature:  JEVON Marques

## 2025-01-14 NOTE — GROUP NOTE
Group Therapy Note    Date: 1/14/2025    Group Start Time: 1300  Group End Time: 1345  Group Topic: Recreational    [facility]    Marcy Lam LISW        Group Therapy Note    Attendees: 3       Patient's Goal:  pt's engaged in playing a game to increase cognitive skills and socialization with others.    Notes:  pt attended group for the full duration. She was active in playing a game and social with peers.      Signature:  JEVON Marques

## 2025-01-14 NOTE — GROUP NOTE
Group Therapy Note    Date: 1/14/2025    Group Start Time: 1000  Group End Time: 1045  Group Topic: Cognitive Skills    [facility]    Clara Fink BSW    Group members were able to socialize with one another discussing favorite foods and restaurants. Group members also learned a little about art when the SW discussed a view pieces of art works and artists from an art book.     Group Therapy Note    Attendees: 5       Notes:  Patient attended group for the full duration. Patient remained engaged and interacted approrpiately with her peers.     Status After Intervention:  Unchanged    Participation Level: Active Listener    Participation Quality: Appropriate      Speech:  normal      Thought Process/Content: Logical      Affective Functioning: Congruent      Mood: euthymic      Level of consciousness:  Alert      Response to Learning: Progressing to goal      Endings: None Reported    Modes of Intervention: Socialization      Discipline Responsible: /Counselor      Signature:  TIGRE Hobbs, LSW

## 2025-01-15 PROCEDURE — 2700000000 HC OXYGEN THERAPY PER DAY

## 2025-01-15 PROCEDURE — 6370000000 HC RX 637 (ALT 250 FOR IP): Performed by: NURSE PRACTITIONER

## 2025-01-15 PROCEDURE — 1240000000 HC EMOTIONAL WELLNESS R&B

## 2025-01-15 PROCEDURE — 94761 N-INVAS EAR/PLS OXIMETRY MLT: CPT

## 2025-01-15 PROCEDURE — 99233 SBSQ HOSP IP/OBS HIGH 50: CPT | Performed by: PSYCHIATRY & NEUROLOGY

## 2025-01-15 PROCEDURE — 94640 AIRWAY INHALATION TREATMENT: CPT

## 2025-01-15 PROCEDURE — 6370000000 HC RX 637 (ALT 250 FOR IP): Performed by: PSYCHIATRY & NEUROLOGY

## 2025-01-15 RX ADMIN — PANTOPRAZOLE SODIUM 40 MG: 40 TABLET, DELAYED RELEASE ORAL at 06:08

## 2025-01-15 RX ADMIN — PANCRELIPASE LIPASE, PANCRELIPASE PROTEASE, PANCRELIPASE AMYLASE 40000 UNITS: 20000; 63000; 84000 CAPSULE, DELAYED RELEASE ORAL at 17:11

## 2025-01-15 RX ADMIN — TIOTROPIUM BROMIDE INHALATION SPRAY 2 PUFF: 3.12 SPRAY, METERED RESPIRATORY (INHALATION) at 08:25

## 2025-01-15 RX ADMIN — PANCRELIPASE LIPASE, PANCRELIPASE PROTEASE, PANCRELIPASE AMYLASE 40000 UNITS: 20000; 63000; 84000 CAPSULE, DELAYED RELEASE ORAL at 08:52

## 2025-01-15 RX ADMIN — ACETAMINOPHEN 650 MG: 325 TABLET ORAL at 09:13

## 2025-01-15 RX ADMIN — BUDESONIDE AND FORMOTEROL FUMARATE DIHYDRATE 2 PUFF: 160; 4.5 AEROSOL RESPIRATORY (INHALATION) at 08:25

## 2025-01-15 RX ADMIN — Medication 100 MG: at 08:39

## 2025-01-15 RX ADMIN — FOLIC ACID 1 MG: 1 TABLET ORAL at 08:40

## 2025-01-15 RX ADMIN — ATENOLOL 25 MG: 25 TABLET ORAL at 08:40

## 2025-01-15 RX ADMIN — BUDESONIDE AND FORMOTEROL FUMARATE DIHYDRATE 2 PUFF: 160; 4.5 AEROSOL RESPIRATORY (INHALATION) at 21:16

## 2025-01-15 RX ADMIN — GABAPENTIN 300 MG: 300 CAPSULE ORAL at 08:40

## 2025-01-15 RX ADMIN — ALBUTEROL SULFATE 2 PUFF: 90 AEROSOL, METERED RESPIRATORY (INHALATION) at 21:16

## 2025-01-15 RX ADMIN — ATORVASTATIN CALCIUM 10 MG: 10 TABLET, FILM COATED ORAL at 20:30

## 2025-01-15 RX ADMIN — TRAZODONE HYDROCHLORIDE 50 MG: 50 TABLET ORAL at 20:30

## 2025-01-15 RX ADMIN — ALBUTEROL SULFATE 2 PUFF: 90 AEROSOL, METERED RESPIRATORY (INHALATION) at 08:25

## 2025-01-15 RX ADMIN — OLANZAPINE 2.5 MG: 5 TABLET, FILM COATED ORAL at 17:07

## 2025-01-15 RX ADMIN — THERA TABS 1 TABLET: TAB at 08:39

## 2025-01-15 RX ADMIN — PANCRELIPASE LIPASE, PANCRELIPASE PROTEASE, PANCRELIPASE AMYLASE 40000 UNITS: 20000; 63000; 84000 CAPSULE, DELAYED RELEASE ORAL at 12:01

## 2025-01-15 RX ADMIN — SERTRALINE 50 MG: 50 TABLET, FILM COATED ORAL at 08:40

## 2025-01-15 RX ADMIN — DILTIAZEM HYDROCHLORIDE 240 MG: 240 CAPSULE, COATED, EXTENDED RELEASE ORAL at 08:40

## 2025-01-15 ASSESSMENT — PAIN DESCRIPTION - LOCATION: LOCATION: HIP

## 2025-01-15 ASSESSMENT — PAIN DESCRIPTION - DESCRIPTORS: DESCRIPTORS: ACHING

## 2025-01-15 ASSESSMENT — PAIN SCALES - WONG BAKER: WONGBAKER_NUMERICALRESPONSE: HURTS A LITTLE BIT

## 2025-01-15 ASSESSMENT — PAIN SCALES - GENERAL
PAINLEVEL_OUTOF10: 0
PAINLEVEL_OUTOF10: 5
PAINLEVEL_OUTOF10: 8

## 2025-01-15 ASSESSMENT — PAIN - FUNCTIONAL ASSESSMENT: PAIN_FUNCTIONAL_ASSESSMENT: ACTIVITIES ARE NOT PREVENTED

## 2025-01-15 ASSESSMENT — PAIN DESCRIPTION - ORIENTATION: ORIENTATION: RIGHT

## 2025-01-15 NOTE — GROUP NOTE
Group Therapy Note    Date: 1/15/2025    Group Start Time: 1000  Group End Time: 1045  Group Topic: Activity    [facility] [place]    Mima Russell LPC    Group members engaged in activity to watch a documentary on \"Seabiscuit\". Members used time to relax and reminisce on past memories.     Group Therapy Note    Attendees: 4       Notes:  Patient engaged throughout the group and interacted with peers appropriately.     Status After Intervention:  Improved    Participation Level: Active Listener and Interactive    Participation Quality: Appropriate, Attentive, Sharing, and Supportive      Speech:  normal      Thought Process/Content: Linear      Affective Functioning: Congruent      Mood: euthymic      Level of consciousness:  Alert      Response to Learning: Able to verbalize current knowledge/experience and Able to verbalize/acknowledge new learning      Endings: None Reported    Modes of Intervention: Support, Socialization, Exploration, and Activity      Discipline Responsible: /Counselor      Signature:  Mima Russell LPC

## 2025-01-15 NOTE — BH NOTE
Met with pt and assisted her in contacting [facility], [facility], and [facility].          [facility] ([phone]): Referral clinical faxed to [phone] at 17:00    [facility]: Can not accommodate pt's need for oxygen    [facility]: Left vm message requesting return call with referral contact info.

## 2025-01-15 NOTE — GROUP NOTE
Group Therapy Note    Date: 1/15/2025    Group Start Time: 1330  Group End Time: 1415  Group Topic: Recreational    [facility] [place] [facility]    Mima Walden        Group Therapy Note    Attendees: 3    Group members continued watching the movie \"Seabiscuit.\" The goal of group was to provide relaxation.     Notes:  Patient attended group for part of the time. During her time in group patient remained engaged and interacted approrpiately.     Status After Intervention:  Improved    Participation Level: Active Listener and Interactive    Participation Quality: Appropriate, Attentive, and Sharing      Speech:  normal      Thought Process/Content: Logical      Affective Functioning: Congruent      Mood: euthymic      Level of consciousness:  Alert      Response to Learning: Able to verbalize current knowledge/experience      Endings: None Reported    Modes of Intervention: Socialization, Exploration, and Activity      Discipline Responsible: Behavorial Health Tech      Signature:  ALEXANDER THOMPSON

## 2025-01-15 NOTE — BH NOTE
Patient is alert and oriented x 4 today. Denies SI/HI/AVH. Eye contact is good, answers questions appropriately. Is able to voice wants and needs.Is engaged in treatment, seems motivated, attends groups and activities.Takes medications whole, denies medication side effects and no side effects noted. Ambulates independently, is continent of bowel and bladder. Visible on unit. Socializes with peers, is supportive to peers. Enjoys watching television. Appetite is good. Fluid intake is adequate. Plan of care is ongoing.

## 2025-01-15 NOTE — GROUP NOTE
Group Therapy Note    Date: 1/15/2025    Group Start Time: 1100  Group End Time: 1145  Group Topic: Recreational    [facility] [place] [facility]    Marcy Lam LISW        Group Therapy Note    Attendees: 3       Patient's Goal:  pt's requested to watch the movie \"Seabiscuit.\"  The goal of the group was to provide relaxation.     Notes:  pt attended group for the full duration. She was engaged and interacted appropriately.    Status After Intervention:  Improved    Participation Level: Active Listener and Interactive    Participation Quality: Appropriate, Attentive, and Sharing      Speech:  normal      Thought Process/Content: Logical      Affective Functioning: Congruent      Mood: euthymic      Level of consciousness:  Alert      Response to Learning: Able to verbalize current knowledge/experience      Endings: None Reported    Modes of Intervention: Socialization, Exploration, and Activity      Discipline Responsible: /Counselor      Signature:  JEVON Marques

## 2025-01-16 PROCEDURE — 94761 N-INVAS EAR/PLS OXIMETRY MLT: CPT

## 2025-01-16 PROCEDURE — 6370000000 HC RX 637 (ALT 250 FOR IP): Performed by: NURSE PRACTITIONER

## 2025-01-16 PROCEDURE — 99233 SBSQ HOSP IP/OBS HIGH 50: CPT | Performed by: PSYCHIATRY & NEUROLOGY

## 2025-01-16 PROCEDURE — 2700000000 HC OXYGEN THERAPY PER DAY

## 2025-01-16 PROCEDURE — 1240000000 HC EMOTIONAL WELLNESS R&B

## 2025-01-16 PROCEDURE — 6370000000 HC RX 637 (ALT 250 FOR IP): Performed by: PSYCHIATRY & NEUROLOGY

## 2025-01-16 PROCEDURE — 94640 AIRWAY INHALATION TREATMENT: CPT

## 2025-01-16 RX ORDER — SERTRALINE HYDROCHLORIDE 100 MG/1
100 TABLET, FILM COATED ORAL DAILY
Status: DISCONTINUED | OUTPATIENT
Start: 2025-01-17 | End: 2025-01-21 | Stop reason: HOSPADM

## 2025-01-16 RX ADMIN — PANTOPRAZOLE SODIUM 40 MG: 40 TABLET, DELAYED RELEASE ORAL at 05:38

## 2025-01-16 RX ADMIN — BUDESONIDE AND FORMOTEROL FUMARATE DIHYDRATE 2 PUFF: 160; 4.5 AEROSOL RESPIRATORY (INHALATION) at 08:03

## 2025-01-16 RX ADMIN — BUDESONIDE AND FORMOTEROL FUMARATE DIHYDRATE 2 PUFF: 160; 4.5 AEROSOL RESPIRATORY (INHALATION) at 20:24

## 2025-01-16 RX ADMIN — TIOTROPIUM BROMIDE INHALATION SPRAY 2 PUFF: 3.12 SPRAY, METERED RESPIRATORY (INHALATION) at 08:03

## 2025-01-16 RX ADMIN — ATENOLOL 25 MG: 25 TABLET ORAL at 08:37

## 2025-01-16 RX ADMIN — GABAPENTIN 300 MG: 300 CAPSULE ORAL at 08:37

## 2025-01-16 RX ADMIN — THERA TABS 1 TABLET: TAB at 08:37

## 2025-01-16 RX ADMIN — FOLIC ACID 1 MG: 1 TABLET ORAL at 08:36

## 2025-01-16 RX ADMIN — PANCRELIPASE LIPASE, PANCRELIPASE PROTEASE, PANCRELIPASE AMYLASE 40000 UNITS: 20000; 63000; 84000 CAPSULE, DELAYED RELEASE ORAL at 08:34

## 2025-01-16 RX ADMIN — ACETAMINOPHEN 650 MG: 325 TABLET ORAL at 20:35

## 2025-01-16 RX ADMIN — ALBUTEROL SULFATE 2 PUFF: 90 AEROSOL, METERED RESPIRATORY (INHALATION) at 08:03

## 2025-01-16 RX ADMIN — DILTIAZEM HYDROCHLORIDE 240 MG: 240 CAPSULE, COATED, EXTENDED RELEASE ORAL at 08:37

## 2025-01-16 RX ADMIN — PANCRELIPASE LIPASE, PANCRELIPASE PROTEASE, PANCRELIPASE AMYLASE 40000 UNITS: 20000; 63000; 84000 CAPSULE, DELAYED RELEASE ORAL at 12:02

## 2025-01-16 RX ADMIN — SERTRALINE 50 MG: 50 TABLET, FILM COATED ORAL at 08:36

## 2025-01-16 RX ADMIN — PANCRELIPASE LIPASE, PANCRELIPASE PROTEASE, PANCRELIPASE AMYLASE 40000 UNITS: 20000; 63000; 84000 CAPSULE, DELAYED RELEASE ORAL at 16:33

## 2025-01-16 RX ADMIN — TRAZODONE HYDROCHLORIDE 50 MG: 50 TABLET ORAL at 20:35

## 2025-01-16 RX ADMIN — ALBUTEROL SULFATE 2 PUFF: 90 AEROSOL, METERED RESPIRATORY (INHALATION) at 20:25

## 2025-01-16 RX ADMIN — Medication 100 MG: at 08:36

## 2025-01-16 RX ADMIN — ACETAMINOPHEN 650 MG: 325 TABLET ORAL at 17:21

## 2025-01-16 RX ADMIN — ATORVASTATIN CALCIUM 10 MG: 10 TABLET, FILM COATED ORAL at 20:35

## 2025-01-16 ASSESSMENT — PAIN SCALES - GENERAL
PAINLEVEL_OUTOF10: 6
PAINLEVEL_OUTOF10: 0
PAINLEVEL_OUTOF10: 8
PAINLEVEL_OUTOF10: 5
PAINLEVEL_OUTOF10: 3
PAINLEVEL_OUTOF10: 0
PAINLEVEL_OUTOF10: 0

## 2025-01-16 ASSESSMENT — PAIN - FUNCTIONAL ASSESSMENT
PAIN_FUNCTIONAL_ASSESSMENT: ACTIVITIES ARE NOT PREVENTED

## 2025-01-16 ASSESSMENT — PAIN DESCRIPTION - DESCRIPTORS
DESCRIPTORS: ACHING
DESCRIPTORS: ACHING;DISCOMFORT
DESCRIPTORS: ACHING

## 2025-01-16 ASSESSMENT — PAIN SCALES - WONG BAKER: WONGBAKER_NUMERICALRESPONSE: HURTS A LITTLE BIT

## 2025-01-16 ASSESSMENT — PAIN DESCRIPTION - LOCATION
LOCATION: LEG
LOCATION: HIP;LEG
LOCATION: LEG

## 2025-01-16 ASSESSMENT — PAIN DESCRIPTION - ORIENTATION
ORIENTATION: RIGHT

## 2025-01-16 NOTE — GROUP NOTE
Group Therapy Note    Date: 1/16/2025    Group Start Time: 1330  Group End Time: 1430  Group Topic: Activity    [facility]    Clara Fink BSW    Group members participated in the game Match-Up-Music. They were able to match up a verse with the title of the song. SW played each song after they matched it so they could reminisce about the song.    Group Therapy Note    Attendees: 6       Notes:   Patient was present during the entire duration of group. Patient participated in group activity and interacted with peers.    Status After Intervention:  Unchanged    Participation Level: Active Listener and Interactive    Participation Quality: Appropriate and Sharing      Speech:  normal      Thought Process/Content: Logical      Affective Functioning: Congruent      Mood: euthymic      Level of consciousness:  Alert and Attentive      Response to Learning: Progressing to goal      Endings: None Reported    Modes of Intervention: Socialization and Activity      Discipline Responsible: /Counselor      Signature:  TIGRE Hobbs, YOSELIN

## 2025-01-17 PROCEDURE — 1240000000 HC EMOTIONAL WELLNESS R&B

## 2025-01-17 PROCEDURE — 99233 SBSQ HOSP IP/OBS HIGH 50: CPT | Performed by: PSYCHIATRY & NEUROLOGY

## 2025-01-17 PROCEDURE — 6370000000 HC RX 637 (ALT 250 FOR IP): Performed by: NURSE PRACTITIONER

## 2025-01-17 PROCEDURE — 94761 N-INVAS EAR/PLS OXIMETRY MLT: CPT

## 2025-01-17 PROCEDURE — 6370000000 HC RX 637 (ALT 250 FOR IP): Performed by: PSYCHIATRY & NEUROLOGY

## 2025-01-17 PROCEDURE — 94640 AIRWAY INHALATION TREATMENT: CPT

## 2025-01-17 RX ADMIN — PANTOPRAZOLE SODIUM 40 MG: 40 TABLET, DELAYED RELEASE ORAL at 07:11

## 2025-01-17 RX ADMIN — ACETAMINOPHEN 650 MG: 325 TABLET ORAL at 20:32

## 2025-01-17 RX ADMIN — DILTIAZEM HYDROCHLORIDE 240 MG: 240 CAPSULE, COATED, EXTENDED RELEASE ORAL at 08:19

## 2025-01-17 RX ADMIN — ATORVASTATIN CALCIUM 10 MG: 10 TABLET, FILM COATED ORAL at 20:33

## 2025-01-17 RX ADMIN — TRAZODONE HYDROCHLORIDE 50 MG: 50 TABLET ORAL at 20:33

## 2025-01-17 RX ADMIN — THERA TABS 1 TABLET: TAB at 08:21

## 2025-01-17 RX ADMIN — FOLIC ACID 1 MG: 1 TABLET ORAL at 08:19

## 2025-01-17 RX ADMIN — BUDESONIDE AND FORMOTEROL FUMARATE DIHYDRATE 2 PUFF: 160; 4.5 AEROSOL RESPIRATORY (INHALATION) at 08:28

## 2025-01-17 RX ADMIN — ALBUTEROL SULFATE 2 PUFF: 90 AEROSOL, METERED RESPIRATORY (INHALATION) at 20:23

## 2025-01-17 RX ADMIN — ACETAMINOPHEN 650 MG: 325 TABLET ORAL at 11:28

## 2025-01-17 RX ADMIN — PANCRELIPASE LIPASE, PANCRELIPASE PROTEASE, PANCRELIPASE AMYLASE 40000 UNITS: 20000; 63000; 84000 CAPSULE, DELAYED RELEASE ORAL at 11:32

## 2025-01-17 RX ADMIN — BUDESONIDE AND FORMOTEROL FUMARATE DIHYDRATE 2 PUFF: 160; 4.5 AEROSOL RESPIRATORY (INHALATION) at 20:23

## 2025-01-17 RX ADMIN — TIOTROPIUM BROMIDE INHALATION SPRAY 2 PUFF: 3.12 SPRAY, METERED RESPIRATORY (INHALATION) at 08:28

## 2025-01-17 RX ADMIN — PANCRELIPASE LIPASE, PANCRELIPASE PROTEASE, PANCRELIPASE AMYLASE 40000 UNITS: 20000; 63000; 84000 CAPSULE, DELAYED RELEASE ORAL at 08:22

## 2025-01-17 RX ADMIN — PANCRELIPASE LIPASE, PANCRELIPASE PROTEASE, PANCRELIPASE AMYLASE 40000 UNITS: 20000; 63000; 84000 CAPSULE, DELAYED RELEASE ORAL at 16:30

## 2025-01-17 RX ADMIN — Medication 100 MG: at 08:21

## 2025-01-17 RX ADMIN — SERTRALINE 100 MG: 100 TABLET, FILM COATED ORAL at 08:21

## 2025-01-17 RX ADMIN — ATENOLOL 25 MG: 25 TABLET ORAL at 08:18

## 2025-01-17 RX ADMIN — GABAPENTIN 300 MG: 300 CAPSULE ORAL at 08:19

## 2025-01-17 ASSESSMENT — PAIN DESCRIPTION - DESCRIPTORS
DESCRIPTORS: ACHING

## 2025-01-17 ASSESSMENT — PAIN DESCRIPTION - ORIENTATION
ORIENTATION: RIGHT;LEFT
ORIENTATION: RIGHT;LEFT
ORIENTATION: RIGHT
ORIENTATION: RIGHT

## 2025-01-17 ASSESSMENT — PAIN SCALES - GENERAL
PAINLEVEL_OUTOF10: 6
PAINLEVEL_OUTOF10: 3
PAINLEVEL_OUTOF10: 5
PAINLEVEL_OUTOF10: 8

## 2025-01-17 ASSESSMENT — PAIN - FUNCTIONAL ASSESSMENT
PAIN_FUNCTIONAL_ASSESSMENT: ACTIVITIES ARE NOT PREVENTED

## 2025-01-17 ASSESSMENT — PAIN DESCRIPTION - LOCATION
LOCATION: HIP
LOCATION: LEG
LOCATION: HIP
LOCATION: LEG

## 2025-01-17 ASSESSMENT — PAIN DESCRIPTION - ONSET
ONSET: ON-GOING
ONSET: ON-GOING

## 2025-01-17 ASSESSMENT — PAIN DESCRIPTION - FREQUENCY
FREQUENCY: CONTINUOUS
FREQUENCY: CONTINUOUS

## 2025-01-17 ASSESSMENT — PAIN SCALES - WONG BAKER: WONGBAKER_NUMERICALRESPONSE: HURTS A LITTLE BIT

## 2025-01-17 ASSESSMENT — PAIN DESCRIPTION - PAIN TYPE
TYPE: CHRONIC PAIN
TYPE: CHRONIC PAIN

## 2025-01-18 PROCEDURE — 6370000000 HC RX 637 (ALT 250 FOR IP): Performed by: NURSE PRACTITIONER

## 2025-01-18 PROCEDURE — 6370000000 HC RX 637 (ALT 250 FOR IP): Performed by: PSYCHIATRY & NEUROLOGY

## 2025-01-18 PROCEDURE — 1240000000 HC EMOTIONAL WELLNESS R&B

## 2025-01-18 PROCEDURE — 99233 SBSQ HOSP IP/OBS HIGH 50: CPT | Performed by: PSYCHIATRY & NEUROLOGY

## 2025-01-18 PROCEDURE — 94761 N-INVAS EAR/PLS OXIMETRY MLT: CPT

## 2025-01-18 PROCEDURE — 94640 AIRWAY INHALATION TREATMENT: CPT

## 2025-01-18 RX ADMIN — ALBUTEROL SULFATE 2 PUFF: 90 AEROSOL, METERED RESPIRATORY (INHALATION) at 08:25

## 2025-01-18 RX ADMIN — GABAPENTIN 300 MG: 300 CAPSULE ORAL at 08:41

## 2025-01-18 RX ADMIN — ACETAMINOPHEN 650 MG: 325 TABLET ORAL at 07:05

## 2025-01-18 RX ADMIN — Medication 100 MG: at 08:41

## 2025-01-18 RX ADMIN — TIOTROPIUM BROMIDE INHALATION SPRAY 2 PUFF: 3.12 SPRAY, METERED RESPIRATORY (INHALATION) at 08:25

## 2025-01-18 RX ADMIN — PANCRELIPASE LIPASE, PANCRELIPASE PROTEASE, PANCRELIPASE AMYLASE 40000 UNITS: 20000; 63000; 84000 CAPSULE, DELAYED RELEASE ORAL at 11:37

## 2025-01-18 RX ADMIN — FOLIC ACID 1 MG: 1 TABLET ORAL at 08:41

## 2025-01-18 RX ADMIN — ATENOLOL 25 MG: 25 TABLET ORAL at 08:41

## 2025-01-18 RX ADMIN — PANCRELIPASE LIPASE, PANCRELIPASE PROTEASE, PANCRELIPASE AMYLASE 40000 UNITS: 20000; 63000; 84000 CAPSULE, DELAYED RELEASE ORAL at 17:04

## 2025-01-18 RX ADMIN — SERTRALINE 100 MG: 100 TABLET, FILM COATED ORAL at 08:41

## 2025-01-18 RX ADMIN — THERA TABS 1 TABLET: TAB at 08:41

## 2025-01-18 RX ADMIN — ATORVASTATIN CALCIUM 10 MG: 10 TABLET, FILM COATED ORAL at 20:21

## 2025-01-18 RX ADMIN — DILTIAZEM HYDROCHLORIDE 240 MG: 240 CAPSULE, COATED, EXTENDED RELEASE ORAL at 08:41

## 2025-01-18 RX ADMIN — TRAZODONE HYDROCHLORIDE 50 MG: 50 TABLET ORAL at 20:21

## 2025-01-18 RX ADMIN — BUDESONIDE AND FORMOTEROL FUMARATE DIHYDRATE 2 PUFF: 160; 4.5 AEROSOL RESPIRATORY (INHALATION) at 08:25

## 2025-01-18 RX ADMIN — PANTOPRAZOLE SODIUM 40 MG: 40 TABLET, DELAYED RELEASE ORAL at 06:46

## 2025-01-18 RX ADMIN — PANCRELIPASE LIPASE, PANCRELIPASE PROTEASE, PANCRELIPASE AMYLASE 40000 UNITS: 20000; 63000; 84000 CAPSULE, DELAYED RELEASE ORAL at 08:43

## 2025-01-18 RX ADMIN — ALBUTEROL SULFATE 2 PUFF: 90 AEROSOL, METERED RESPIRATORY (INHALATION) at 20:32

## 2025-01-18 RX ADMIN — BUDESONIDE AND FORMOTEROL FUMARATE DIHYDRATE 2 PUFF: 160; 4.5 AEROSOL RESPIRATORY (INHALATION) at 20:31

## 2025-01-18 ASSESSMENT — PAIN SCALES - GENERAL
PAINLEVEL_OUTOF10: 3
PAINLEVEL_OUTOF10: 3
PAINLEVEL_OUTOF10: 0
PAINLEVEL_OUTOF10: 8

## 2025-01-18 ASSESSMENT — PAIN SCALES - WONG BAKER: WONGBAKER_NUMERICALRESPONSE: NO HURT

## 2025-01-18 ASSESSMENT — PAIN DESCRIPTION - DESCRIPTORS: DESCRIPTORS: ACHING

## 2025-01-18 ASSESSMENT — PAIN - FUNCTIONAL ASSESSMENT: PAIN_FUNCTIONAL_ASSESSMENT: ACTIVITIES ARE NOT PREVENTED

## 2025-01-18 ASSESSMENT — PAIN DESCRIPTION - ORIENTATION: ORIENTATION: RIGHT

## 2025-01-18 ASSESSMENT — PAIN DESCRIPTION - LOCATION: LOCATION: LEG

## 2025-01-18 ASSESSMENT — SLEEP AND FATIGUE QUESTIONNAIRES: AVERAGE NUMBER OF SLEEP HOURS: 7

## 2025-01-18 NOTE — BH NOTE
[facility]  Treatment Team Note  Review Date & Time: 1/18/2025  14:10    Patient was not in treatment team      Status EXAM:   Mental Status and Behavioral Exam  Normal: No  Level of Assistance: Independent/Self  Facial Expression: Flat  Affect: Congruent  Level of Consciousness: Alert  Frequency of Checks: 4 times per hour, close  Mood:Normal: No  Mood: Anxious  Motor Activity:Normal: Yes  Motor Activity: Increased  Eye Contact: Good  Observed Behavior: Cooperative, Friendly  Sexual Misconduct History: Current - no  Preception: [place] to person, [place] to time, [place] to place, [place] to situation  Attention:Normal: No  Attention: Distractible  Thought Processes: Circumstantial  Thought Content:Normal: No  Thought Content: Poverty of content  Depression Symptoms: No problems reported or observed.  Anxiety Symptoms: Generalized  Myriam Symptoms: No problems reported or observed.  Hallucinations: None  Delusions: No  Delusions: Paranoid  Memory:Normal: No  Memory: Poor remote, Poor recent  Insight and Judgment: No  Insight and Judgment: Poor judgment, Poor insight      Suicide Risk CSSR-S:  1) Within the past month, have you wished you were dead or wished you could go to sleep and not wake up? : No  2) Have you actually had any thoughts of killing yourself? : No  6) Have you ever done anything, started to do anything, or prepared to do anything to end your life?: No      PLAN/TREATMENT RECOMMENDATIONS UPDATE: Patient will take medication as prescribed, eat 75% of meals, attend groups, participate in milieu activities, participate in treatment team and care planning for discharge and follow up.           Kim Rodriguez RN

## 2025-01-19 PROCEDURE — 94761 N-INVAS EAR/PLS OXIMETRY MLT: CPT

## 2025-01-19 PROCEDURE — 6370000000 HC RX 637 (ALT 250 FOR IP): Performed by: NURSE PRACTITIONER

## 2025-01-19 PROCEDURE — 6370000000 HC RX 637 (ALT 250 FOR IP): Performed by: PSYCHIATRY & NEUROLOGY

## 2025-01-19 PROCEDURE — 1240000000 HC EMOTIONAL WELLNESS R&B

## 2025-01-19 PROCEDURE — 94640 AIRWAY INHALATION TREATMENT: CPT

## 2025-01-19 RX ADMIN — BUDESONIDE AND FORMOTEROL FUMARATE DIHYDRATE 2 PUFF: 160; 4.5 AEROSOL RESPIRATORY (INHALATION) at 20:46

## 2025-01-19 RX ADMIN — THERA TABS 1 TABLET: TAB at 08:33

## 2025-01-19 RX ADMIN — PANCRELIPASE LIPASE, PANCRELIPASE PROTEASE, PANCRELIPASE AMYLASE 40000 UNITS: 20000; 63000; 84000 CAPSULE, DELAYED RELEASE ORAL at 15:39

## 2025-01-19 RX ADMIN — PANCRELIPASE LIPASE, PANCRELIPASE PROTEASE, PANCRELIPASE AMYLASE 40000 UNITS: 20000; 63000; 84000 CAPSULE, DELAYED RELEASE ORAL at 12:26

## 2025-01-19 RX ADMIN — SERTRALINE 100 MG: 100 TABLET, FILM COATED ORAL at 08:33

## 2025-01-19 RX ADMIN — PANCRELIPASE LIPASE, PANCRELIPASE PROTEASE, PANCRELIPASE AMYLASE 40000 UNITS: 20000; 63000; 84000 CAPSULE, DELAYED RELEASE ORAL at 08:34

## 2025-01-19 RX ADMIN — ACETAMINOPHEN 650 MG: 325 TABLET ORAL at 15:38

## 2025-01-19 RX ADMIN — GABAPENTIN 300 MG: 300 CAPSULE ORAL at 08:33

## 2025-01-19 RX ADMIN — TRAZODONE HYDROCHLORIDE 50 MG: 50 TABLET ORAL at 20:06

## 2025-01-19 RX ADMIN — BUDESONIDE AND FORMOTEROL FUMARATE DIHYDRATE 2 PUFF: 160; 4.5 AEROSOL RESPIRATORY (INHALATION) at 08:56

## 2025-01-19 RX ADMIN — DILTIAZEM HYDROCHLORIDE 240 MG: 240 CAPSULE, COATED, EXTENDED RELEASE ORAL at 08:33

## 2025-01-19 RX ADMIN — ATENOLOL 25 MG: 25 TABLET ORAL at 08:32

## 2025-01-19 RX ADMIN — FOLIC ACID 1 MG: 1 TABLET ORAL at 08:33

## 2025-01-19 RX ADMIN — TIOTROPIUM BROMIDE INHALATION SPRAY 2 PUFF: 3.12 SPRAY, METERED RESPIRATORY (INHALATION) at 08:56

## 2025-01-19 RX ADMIN — ALBUTEROL SULFATE 2 PUFF: 90 AEROSOL, METERED RESPIRATORY (INHALATION) at 20:46

## 2025-01-19 RX ADMIN — ALBUTEROL SULFATE 2 PUFF: 90 AEROSOL, METERED RESPIRATORY (INHALATION) at 08:56

## 2025-01-19 RX ADMIN — Medication 100 MG: at 08:33

## 2025-01-19 RX ADMIN — PANTOPRAZOLE SODIUM 40 MG: 40 TABLET, DELAYED RELEASE ORAL at 06:02

## 2025-01-19 RX ADMIN — ATORVASTATIN CALCIUM 10 MG: 10 TABLET, FILM COATED ORAL at 20:06

## 2025-01-19 ASSESSMENT — PAIN SCALES - GENERAL
PAINLEVEL_OUTOF10: 6
PAINLEVEL_OUTOF10: 0

## 2025-01-19 ASSESSMENT — PAIN DESCRIPTION - LOCATION: LOCATION: HIP

## 2025-01-19 ASSESSMENT — PAIN DESCRIPTION - DESCRIPTORS: DESCRIPTORS: ACHING

## 2025-01-19 ASSESSMENT — PAIN DESCRIPTION - ORIENTATION: ORIENTATION: RIGHT

## 2025-01-19 ASSESSMENT — PAIN - FUNCTIONAL ASSESSMENT: PAIN_FUNCTIONAL_ASSESSMENT: ACTIVITIES ARE NOT PREVENTED

## 2025-01-20 PROCEDURE — 6370000000 HC RX 637 (ALT 250 FOR IP): Performed by: NURSE PRACTITIONER

## 2025-01-20 PROCEDURE — 94640 AIRWAY INHALATION TREATMENT: CPT

## 2025-01-20 PROCEDURE — 6370000000 HC RX 637 (ALT 250 FOR IP): Performed by: PSYCHIATRY & NEUROLOGY

## 2025-01-20 PROCEDURE — 94761 N-INVAS EAR/PLS OXIMETRY MLT: CPT

## 2025-01-20 PROCEDURE — 1240000000 HC EMOTIONAL WELLNESS R&B

## 2025-01-20 PROCEDURE — 99233 SBSQ HOSP IP/OBS HIGH 50: CPT | Performed by: PSYCHIATRY & NEUROLOGY

## 2025-01-20 RX ORDER — ATENOLOL 25 MG/1
25 TABLET ORAL DAILY
Qty: 30 TABLET | Refills: 3 | Status: SHIPPED | OUTPATIENT
Start: 2025-01-21

## 2025-01-20 RX ORDER — DILTIAZEM HYDROCHLORIDE 240 MG/1
240 CAPSULE, COATED, EXTENDED RELEASE ORAL DAILY
Qty: 30 CAPSULE | Refills: 0 | Status: SHIPPED | OUTPATIENT
Start: 2025-01-21

## 2025-01-20 RX ORDER — SERTRALINE HYDROCHLORIDE 100 MG/1
100 TABLET, FILM COATED ORAL DAILY
Qty: 30 TABLET | Refills: 0 | Status: SHIPPED | OUTPATIENT
Start: 2025-01-21

## 2025-01-20 RX ORDER — FOLIC ACID 1 MG/1
1 TABLET ORAL DAILY
Qty: 30 TABLET | Refills: 0 | Status: SHIPPED | OUTPATIENT
Start: 2025-01-21

## 2025-01-20 RX ORDER — LANOLIN ALCOHOL/MO/W.PET/CERES
100 CREAM (GRAM) TOPICAL DAILY
Qty: 30 TABLET | Refills: 0 | Status: SHIPPED | OUTPATIENT
Start: 2025-01-20

## 2025-01-20 RX ORDER — GABAPENTIN 300 MG/1
300 CAPSULE ORAL DAILY
Qty: 30 CAPSULE | Refills: 0 | Status: SHIPPED | OUTPATIENT
Start: 2025-01-20 | End: 2025-02-19

## 2025-01-20 RX ORDER — GUAIFENESIN 600 MG/1
1200 TABLET, EXTENDED RELEASE ORAL 2 TIMES DAILY PRN
Qty: 60 TABLET | Refills: 0 | Status: SHIPPED | OUTPATIENT
Start: 2025-01-20

## 2025-01-20 RX ORDER — ATORVASTATIN CALCIUM 10 MG/1
10 TABLET, FILM COATED ORAL NIGHTLY
Qty: 30 TABLET | Refills: 3 | Status: SHIPPED | OUTPATIENT
Start: 2025-01-20

## 2025-01-20 RX ORDER — BUDESONIDE AND FORMOTEROL FUMARATE DIHYDRATE 160; 4.5 UG/1; UG/1
2 AEROSOL RESPIRATORY (INHALATION)
Qty: 10.2 G | Refills: 3 | Status: SHIPPED | OUTPATIENT
Start: 2025-01-20

## 2025-01-20 RX ORDER — MULTIVITAMIN WITH IRON
1 TABLET ORAL DAILY
Qty: 30 TABLET | Refills: 0 | Status: SHIPPED | OUTPATIENT
Start: 2025-01-21

## 2025-01-20 RX ORDER — PANTOPRAZOLE SODIUM 40 MG/1
40 TABLET, DELAYED RELEASE ORAL
Qty: 30 TABLET | Refills: 0 | Status: SHIPPED | OUTPATIENT
Start: 2025-01-21

## 2025-01-20 RX ADMIN — TIOTROPIUM BROMIDE INHALATION SPRAY 2 PUFF: 3.12 SPRAY, METERED RESPIRATORY (INHALATION) at 07:31

## 2025-01-20 RX ADMIN — ATENOLOL 25 MG: 25 TABLET ORAL at 08:17

## 2025-01-20 RX ADMIN — SERTRALINE 100 MG: 100 TABLET, FILM COATED ORAL at 08:16

## 2025-01-20 RX ADMIN — Medication 100 MG: at 08:16

## 2025-01-20 RX ADMIN — DILTIAZEM HYDROCHLORIDE 240 MG: 240 CAPSULE, COATED, EXTENDED RELEASE ORAL at 08:15

## 2025-01-20 RX ADMIN — PANTOPRAZOLE SODIUM 40 MG: 40 TABLET, DELAYED RELEASE ORAL at 06:10

## 2025-01-20 RX ADMIN — ATORVASTATIN CALCIUM 10 MG: 10 TABLET, FILM COATED ORAL at 19:56

## 2025-01-20 RX ADMIN — ACETAMINOPHEN 650 MG: 325 TABLET ORAL at 06:15

## 2025-01-20 RX ADMIN — PANCRELIPASE LIPASE, PANCRELIPASE PROTEASE, PANCRELIPASE AMYLASE 40000 UNITS: 20000; 63000; 84000 CAPSULE, DELAYED RELEASE ORAL at 08:18

## 2025-01-20 RX ADMIN — THERA TABS 1 TABLET: TAB at 08:15

## 2025-01-20 RX ADMIN — HYDROXYZINE HYDROCHLORIDE 50 MG: 50 TABLET ORAL at 19:56

## 2025-01-20 RX ADMIN — PANCRELIPASE LIPASE, PANCRELIPASE PROTEASE, PANCRELIPASE AMYLASE 40000 UNITS: 20000; 63000; 84000 CAPSULE, DELAYED RELEASE ORAL at 10:55

## 2025-01-20 RX ADMIN — BUDESONIDE AND FORMOTEROL FUMARATE DIHYDRATE 2 PUFF: 160; 4.5 AEROSOL RESPIRATORY (INHALATION) at 07:30

## 2025-01-20 RX ADMIN — BUDESONIDE AND FORMOTEROL FUMARATE DIHYDRATE 2 PUFF: 160; 4.5 AEROSOL RESPIRATORY (INHALATION) at 20:39

## 2025-01-20 RX ADMIN — TRAZODONE HYDROCHLORIDE 50 MG: 50 TABLET ORAL at 19:56

## 2025-01-20 RX ADMIN — GABAPENTIN 300 MG: 300 CAPSULE ORAL at 08:15

## 2025-01-20 RX ADMIN — PANCRELIPASE LIPASE, PANCRELIPASE PROTEASE, PANCRELIPASE AMYLASE 40000 UNITS: 20000; 63000; 84000 CAPSULE, DELAYED RELEASE ORAL at 17:48

## 2025-01-20 RX ADMIN — FOLIC ACID 1 MG: 1 TABLET ORAL at 08:16

## 2025-01-20 RX ADMIN — ACETAMINOPHEN 650 MG: 325 TABLET ORAL at 13:32

## 2025-01-20 ASSESSMENT — PAIN DESCRIPTION - LOCATION
LOCATION: HIP
LOCATION: HIP

## 2025-01-20 ASSESSMENT — PAIN SCALES - GENERAL
PAINLEVEL_OUTOF10: 0
PAINLEVEL_OUTOF10: 2
PAINLEVEL_OUTOF10: 0
PAINLEVEL_OUTOF10: 5

## 2025-01-20 ASSESSMENT — PAIN DESCRIPTION - DESCRIPTORS
DESCRIPTORS: ACHING
DESCRIPTORS: ACHING

## 2025-01-20 ASSESSMENT — PAIN SCALES - WONG BAKER
WONGBAKER_NUMERICALRESPONSE: HURTS A LITTLE BIT
WONGBAKER_NUMERICALRESPONSE: NO HURT
WONGBAKER_NUMERICALRESPONSE: NO HURT

## 2025-01-20 ASSESSMENT — PAIN DESCRIPTION - ORIENTATION
ORIENTATION: RIGHT
ORIENTATION: RIGHT

## 2025-01-20 NOTE — GROUP NOTE
Group Therapy Note    Date: 1/20/2025    Group Start Time: 1330  Group End Time: 1415  Group Topic: Recreational    [facility]    Mima Walden        Group Therapy Note    Attendees: 4    Group members requested to watch \"Seabiscuit.\" While watching the movie group members engaged in conversation about the movie.      Notes:  Patient attended group for part of the time. Patient remained engaged and interacted approrpiately.     Status After Intervention:  Improved    Participation Level: Active Listener    Participation Quality: Appropriate      Speech:  normal      Thought Process/Content: Logical      Affective Functioning: Congruent      Mood: euthymic      Level of consciousness:  Alert      Response to Learning: Able to verbalize current knowledge/experience      Endings: None Reported    Modes of Intervention: Socialization and Activity      Discipline Responsible: Behavorial Health Tech      Signature:  ALEXANDER THOMPSON

## 2025-01-20 NOTE — TRANSITION OF CARE
does not have a surrogate or Medical Advance Directive AND Psychiatric Advance Directive, the patient was offered information on these advance directives Patient will complete at a later time    Patient Instructions: Please continue all medications until otherwise directed by physician.      Tobacco Cessation Discharge Plan:   Is the patient a tobacco user  and needs referral for tobacco cessation? Yes  Patient referred to the following for tobacco cessation with an appointment? Patient refused  Patient was offered medication to assist with tobacco cessation at discharge? Patient refused    Alcohol/Substance Abuse Discharge Plan:   Does the patient have a history of substance/alcohol abuse and requires a referral for treatment? Yes  Patient referred to the following for substance/alcohol abuse treatment with an appointment? Yes  Patient was offered medication to assist with substance/alcohol abuse cessation at discharge? No      Patient discharged to: Inpatient facility; 24-hour/7-day contact information (including physician for emergencies related to inpatient stay), contact information for pending studies, plan for follow-up care, and primary physician, or other healthcare professional, or site designated for follow up care discussed with receiving inpatient facility. and Transition record discussed with patient/caregiver and provided this record in hard copy or electronically

## 2025-01-21 VITALS
OXYGEN SATURATION: 98 % | RESPIRATION RATE: 16 BRPM | HEIGHT: 61 IN | TEMPERATURE: 97.6 F | WEIGHT: 88 LBS | SYSTOLIC BLOOD PRESSURE: 129 MMHG | DIASTOLIC BLOOD PRESSURE: 77 MMHG | HEART RATE: 100 BPM | BODY MASS INDEX: 16.62 KG/M2

## 2025-01-21 PROCEDURE — 6370000000 HC RX 637 (ALT 250 FOR IP): Performed by: NURSE PRACTITIONER

## 2025-01-21 PROCEDURE — 94640 AIRWAY INHALATION TREATMENT: CPT

## 2025-01-21 PROCEDURE — 5130000000 HC BRIDGE APPOINTMENT

## 2025-01-21 PROCEDURE — 94761 N-INVAS EAR/PLS OXIMETRY MLT: CPT

## 2025-01-21 PROCEDURE — 99239 HOSP IP/OBS DSCHRG MGMT >30: CPT | Performed by: PSYCHIATRY & NEUROLOGY

## 2025-01-21 PROCEDURE — 6370000000 HC RX 637 (ALT 250 FOR IP): Performed by: PSYCHIATRY & NEUROLOGY

## 2025-01-21 RX ADMIN — ATENOLOL 25 MG: 25 TABLET ORAL at 08:16

## 2025-01-21 RX ADMIN — GABAPENTIN 300 MG: 300 CAPSULE ORAL at 08:15

## 2025-01-21 RX ADMIN — FOLIC ACID 1 MG: 1 TABLET ORAL at 08:16

## 2025-01-21 RX ADMIN — DILTIAZEM HYDROCHLORIDE 240 MG: 240 CAPSULE, COATED, EXTENDED RELEASE ORAL at 08:16

## 2025-01-21 RX ADMIN — TIOTROPIUM BROMIDE INHALATION SPRAY 2 PUFF: 3.12 SPRAY, METERED RESPIRATORY (INHALATION) at 07:39

## 2025-01-21 RX ADMIN — BUDESONIDE AND FORMOTEROL FUMARATE DIHYDRATE 2 PUFF: 160; 4.5 AEROSOL RESPIRATORY (INHALATION) at 07:39

## 2025-01-21 RX ADMIN — Medication 100 MG: at 08:15

## 2025-01-21 RX ADMIN — PANCRELIPASE LIPASE, PANCRELIPASE PROTEASE, PANCRELIPASE AMYLASE 40000 UNITS: 20000; 63000; 84000 CAPSULE, DELAYED RELEASE ORAL at 08:17

## 2025-01-21 RX ADMIN — SERTRALINE 100 MG: 100 TABLET, FILM COATED ORAL at 08:27

## 2025-01-21 RX ADMIN — THERA TABS 1 TABLET: TAB at 08:16

## 2025-01-21 RX ADMIN — PANTOPRAZOLE SODIUM 40 MG: 40 TABLET, DELAYED RELEASE ORAL at 06:45

## 2025-01-21 ASSESSMENT — PAIN SCALES - WONG BAKER
WONGBAKER_NUMERICALRESPONSE: NO HURT
WONGBAKER_NUMERICALRESPONSE: NO HURT

## 2025-01-21 ASSESSMENT — SLEEP AND FATIGUE QUESTIONNAIRES: AVERAGE NUMBER OF SLEEP HOURS: 5

## 2025-01-21 ASSESSMENT — PAIN SCALES - GENERAL
PAINLEVEL_OUTOF10: 0
PAINLEVEL_OUTOF10: 0

## 2025-01-21 NOTE — PROGRESS NOTES
Group Therapy Note    Date: 1/16/2025  Start Time: 1000  End Time:  1100  Number of Participants: 6    Type of Group: Music and Spirituality    Patient's Goal:  Participation    Notes:   facilitated discussion on spirituality, focusing on connection, meaning, and hope, through the medium of music. Pt actively participated by making song selections, singing along to music, and sharing reflections on songs.    Participation Level: Active Listener and Interactive    Participation Quality: Appropriate, Attentive, and Sharing      Speech:  normal    Affective Functioning: Congruent      Endings: None Reported    Modes of Intervention: Support, Socialization, Exploration, Activity, and Media      Discipline Responsible: Spiritual Care      Signature:  Delmar Bauer    
      Behavioral Services                                              Medicare Re-Certification    I certify that the inpatient psychiatric hospital services furnished since the previous certification/re-certification were, and continue to be, medically necessary for;    [x] (1) Treatment which could reasonably be expected to improve the patient's condition,    [x] (2) Or for diagnostic study.    Estimated length of stay/service 2 d    Plan for post-hospital care rehab    This patient continues to need, on a daily basis, active treatment furnished directly by or requiring the supervision of inpatient psychiatric personnel.    Electronically signed by HUGO KUHN MD on 1/20/2025 at 3:12 PM   
      Behavioral Services                                              Medicare Re-Certification    I certify that the inpatient psychiatric hospital services furnished since the previous certification/re-certification were, and continue to be, medically necessary for;    [x] (1) Treatment which could reasonably be expected to improve the patient's condition,    [x] (2) Or for diagnostic study.    Estimated length of stay/service 2-5 days    Plan for post-hospital care SNF/outpatient support    This patient continues to need, on a daily basis, active treatment furnished directly by or requiring the supervision of inpatient psychiatric personnel.    Electronically signed by PEEWEE Domran CNP on 1/11/2025 at 5:13 PM   
      Behavioral Services  Medicare Certification Upon Admission    I certify that this patient's inpatient psychiatric hospital admission is medically necessary for:    [x] (1) Treatment which could reasonably be expected to improve this patient's condition,       [x] (2) Or for diagnostic study;     AND     [x](2) The inpatient psychiatric services are provided while the individual is under the care of a physician and are included in the individualized plan of care.    Estimated length of stay/service 5d    Plan for post-hospital care ECF    Electronically signed by PEEWEE Rajput CNP on 1/5/2025 at 11:56 AM      
.Department of Psychiatry  AttendingProgress Note  Chief Complaint: Dementia    Pt was eager to talk with me today. She is feeling depressed today due to her moms death over 20 years ago, her current situation with her daughters lack of communication/drug use, and her ex boyfriend. She seems t be worrying more today but mentating well nonetheless. She is recalling events from all aspects of her hospital stay. When discussing substance use rehab, she thinks this could be a good idea. We also discussed potential outpatient plans when she is discharged. Pt mentioned she is next on a list of applicants for a new apartment.  Patient's chart was reviewed and collaborated with  about the treatment plan.  SUBJECTIVE:    Patient is feeling better. Suicidal ideation:  denies suicidal ideation.  Patient does not have medication side effects.    ROS: Patient has new complaints: no  Sleeping adequately:  Yes   Appetite adequate: Yes  Attending groups: Yes  Visitors:No    OBJECTIVE    Physical  VITALS:  /61   Pulse 82   Temp 97.5 °F (36.4 °C) (Temporal)   Resp 16   Ht 1.549 m (5' 0.98\")   Wt 39.9 kg (88 lb)   LMP 07/12/2015   SpO2 95%   BMI 16.64 kg/m²     Mental Status Examination:  Patients appearance was well-appearing, street clothes, in chair, good grooming, and good hygiene. Thoughts are [place] and Logical. Homicidal ideations none.  No abnormal movements, tics or mannerisms.  Memory intact Aims 0. Concentration Fair.   Alert and oriented X 4. Insight and Judgement impaired insight. Patient was cooperative. Patient gait normal. Mood anxious and depressed, affect depressed affect Hallucinations Absent, suicidal ideations no specific plan to harm self Speech normal pitch and normal volume  Data  Labs:   Admission on 01/04/2025   Component Date Value Ref Range Status    Cholesterol, Total 01/04/2025 129  0 - 199 mg/dL Final    Triglycerides 01/04/2025 51  0 - 150 mg/dL Final    HDL 01/04/2025 43  
0351: pt up and assisted to use the bathroom.  
0707: A PRN dose of MUCINEX, 1,200mg PO given for congestion.  
2120: PRN doses of ATARAX, 50mg PO and Trazodone, 50mg PO given for anxiety and sleep, respectively. Will monitor pt for meds effectiveness, and give care as necessary.  
@9628 Pt c/o right leg pain 8/10. RN administered Tylenol 650 mg PO per PRN order. Medication outcome is pending. Plan of care is ongoing.   
Bedside Mobility Assessment Tool (BMAT):     Assessment Level 1- Sit and Shake    1. From a semi-reclined position, ask patient to sit up and rotate to a seated position at the side of the bed. Can use the bedrail.    2. Ask patient to reach out and grab your hand and shake making sure patient reaches across his/her midline.   Pass- Patient is able to come to a seated position, maintain core strength. Maintains seated balance while reaching across midline. Move on to Assessment Level 2.     Assessment Level 2- Stretch and Point   1. With patient in seated position at the side of the bed, have patient place both feet on the floor (or stool) with knees no higher than hips.    2. Ask patient to stretch one leg and straighten the knee, then bend the ankle/flex and point the toes. If appropriate, repeat with the other leg.   Pass- Patient is able to demonstrate appropriate quad strength on intended weight bearing limb(s). Move onto Assessment Level 3.     Assessment Level 3- Stand   1. Ask patient to elevate off the bed or chair (seated to standing) using an assistive device (cane, bedrail).    2. Patient should be able to raise buttocks off be and hold for a count of five. May repeat once.   Pass- Patient maintains standing stability for at least 5 seconds, proceed to assessment level 4.    Assessment Level 4- Walk   1. Ask patient to march in place at bedside.    2. Then ask patient to advance step and return each foot. Some medical conditions may render a patient from stepping backwards, use your best clinical judgement.   Fail- Patient not able to complete tasks OR requires use of assistive device. Patient is MOBILITY LEVEL 3.       Mobility Level- 3   
Bedside Mobility Assessment Tool (BMAT):     Assessment Level 1- Sit and Shake    1. From a semi-reclined position, ask patient to sit up and rotate to a seated position at the side of the bed. Can use the bedrail.    2. Ask patient to reach out and grab your hand and shake making sure patient reaches across his/her midline.   Pass- Patient is able to come to a seated position, maintain core strength. Maintains seated balance while reaching across midline. Move on to Assessment Level 2.     Assessment Level 2- Stretch and Point   1. With patient in seated position at the side of the bed, have patient place both feet on the floor (or stool) with knees no higher than hips.    2. Ask patient to stretch one leg and straighten the knee, then bend the ankle/flex and point the toes. If appropriate, repeat with the other leg.   Pass- Patient is able to demonstrate appropriate quad strength on intended weight bearing limb(s). Move onto Assessment Level 3.     Assessment Level 3- Stand   1. Ask patient to elevate off the bed or chair (seated to standing) using an assistive device (cane, bedrail).    2. Patient should be able to raise buttocks off be and hold for a count of five. May repeat once.   Pass- Patient maintains standing stability for at least 5 seconds, proceed to assessment level 4.    Assessment Level 4- Walk   1. Ask patient to march in place at bedside.    2. Then ask patient to advance step and return each foot. Some medical conditions may render a patient from stepping backwards, use your best clinical judgement.   Pass- Patient demonstrates balance while shifting weight and ability to step, takes independent steps, does not use assistive device patient is MOBILITY LEVEL 4.      Mobility Level- 4  
[facility]  Treatment Team Note  Review Date & Time: 1/11/2025 @ 1638      Patient was not in treatment team      Status EXAM:   Mental Status and Behavioral Exam  Normal: No  Level of Assistance: Independent/Self  Facial Expression: Flat, Worried  Affect: Blunt  Level of Consciousness: Alert  Frequency of Checks: 1 staff: 1 patient  - continuous  Mood:Normal: No  Mood: Labile, Irritable  Motor Activity:Normal: Yes  Motor Activity: Increased  Eye Contact: Fair  Observed Behavior: Cooperative  Sexual Misconduct History: Current - no  Preception: [place] to person, [place] to place  Attention:Normal: No  Attention: Distractible  Thought Processes: Circumstantial  Thought Content:Normal: No  Thought Content: Preoccupations  Depression Symptoms: Impaired concentration  Anxiety Symptoms: Generalized  Myriam Symptoms: Labile  Hallucinations: None  Delusions: No  Delusions: Paranoid, Persecutory  Memory:Normal: No  Memory: Poor recent, Poor remote  Insight and Judgment: No  Insight and Judgment: Poor judgment, Poor insight      Suicide Risk CSSR-S:  1) Within the past month, have you wished you were dead or wished you could go to sleep and not wake up? : No  2) Have you actually had any thoughts of killing yourself? : No  6) Have you ever done anything, started to do anything, or prepared to do anything to end your life?: No      PLAN/TREATMENT RECOMMENDATIONS UPDATE:   Pt will continue with current and recommended medication regimen. Pt will continue to attend and participate in group activities. Pt will follow-up with out patient appointments.         DIANDRA CHOUDHARY RN  
[facility]  Discharge Note    Pt discharged with followings belongings:   Dental Appliances: None  Vision - Corrective Lenses: Eyeglasses  Hearing Aid: None  Jewelry: None  Body Piercings Removed: No  Clothing: Jacket/Coat, Socks, Pants, Shirt  Other Valuables: Other (Comment) (walker belongs to hospital)   Valuables sent home with patient or returned to patient. Patient educated on aftercare instructions: yes  [facility]  by Kuldip .Patient verbalize understanding of AVS:  yes.    Status EXAM upon discharge:  Mental Status and Behavioral Exam  Normal: No  Level of Assistance: Independent/Self  Facial Expression: Brightened  Affect: Congruent  Level of Consciousness: Alert  Frequency of Checks: 4 times per hour, close  Mood:Normal: No  Mood: Anxious  Motor Activity:Normal: Yes  Motor Activity: Increased  Eye Contact: Good  Observed Behavior: Friendly, Cooperative  Sexual Misconduct History: Current - no  Preception: [place] to person  Attention:Normal: No  Attention: Distractible  Thought Processes: Circumstantial  Thought Content:Normal: No  Thought Content: Poverty of content  Depression Symptoms: No problems reported or observed.  Anxiety Symptoms: Generalized  Myriam Symptoms: No problems reported or observed.  Hallucinations: None  Delusions: No  Delusions: Paranoid  Memory:Normal: No  Memory: Poor recent, Poor remote  Insight and Judgment: No  Insight and Judgment: Poor judgment, Poor insight    Tobacco Screening:  Practical Counseling, on admission, brandin X, if applicable and completed (first 3 are required if patient doesn't refuse)yes:            ( ) Recognizing danger situations (included triggers and roadblocks)                    ( ) Coping skills (new ways to manage stress,relaxation techniques, changing routine, distraction)                                                           (x ) Basic information about quitting (benefits of quitting, techniques in how to quit, 
Bridge Appointment completed: Reviewed Discharge Instructions with patient.    Patient verbalizes understanding and agreement with the discharge plan using the teachback method.       Vaccinations (brandin X if applicable and completed):  ( ) Patient states already received influenza vaccine elsewhere  ( ) Patient received influenza vaccine during this hospitalization  ( x) Patient refused influenza vaccine at this time  ( ) Not offered   
Called Pharmacy about patient's Zenpep in medication bin. Earlier had the 1700 dose, now it is the 1/6/25 dose. They will send up the 1700 dose. Package for 1315 dose kept and verified with charge nurse Mima JIMENES  
Department of Psychiatry  AttendingProgress Note  Chief Complaint: DEISY Draper appears easily confused. She thought today was 12/25/2024. I corrected her and she was able to remember the actual date. She continues to wonder why she is here and asked if she hurt someone or tried to hang herself.   She appeared more alert today and able to make better eye contact.   I supposed to have O2 at hs and not in daytime.   She shows elevated anxiety  Gave history of how she came here form home and had loved in [place] for 2 years  with a bf that was using drugs.   She struggles to find words at times.  MOCA pending  Not tremulous nor appearing agitated   Patient's chart was reviewed and collaborated with  about the treatment plan.  SUBJECTIVE:    Patient is feeling better. Suicidal ideation:  denies suicidal ideation.  Patient does not have medication side effects.    ROS: Patient has new complaints: no  Sleeping adequately:  Yes   Appetite adequate: Yes  Attending groups: Yes  Visitors:No    OBJECTIVE    Physical  VITALS:  /80   Pulse 84   Temp 97.8 °F (36.6 °C) (Temporal)   Resp 18   Ht 1.549 m (5' 1\")   Wt 36.2 kg (79 lb 12.8 oz)   LMP 07/12/2015   SpO2 96%   BMI 15.08 kg/m²     Mental Status Examination:  Patients appearance was ill-appearing. Thoughts are [place] and Illogical. Homicidal ideations none.  No abnormal movements, tics or mannerisms.  Memory ikmpaired Aims 0. Concentration Fair.   Alert and oriented X 4. Insight and Judgement impaired insight. Patient was cooperative. Patient gait abnormal. Mood constricted, affect flat affect Hallucinations Absent, suicidal ideations no specific plan to harm self Speech soft  Data  Labs:   Admission on 01/04/2025   Component Date Value Ref Range Status    Cholesterol, Total 01/04/2025 129  0 - 199 mg/dL Final    Triglycerides 01/04/2025 51  0 - 150 mg/dL Final    HDL 01/04/2025 43  40 - 60 mg/dL Final    Comment: An HDL cholesterol less 
Department of Psychiatry  AttendingProgress Note  Chief Complaint: Depression    Pt is interacting in the common area with other pt. She shares her anxieties about leaving the hospital and going to her rehab facility. She is engaging appropriately and has improved drastically since her admission. Her concerns  regarding the intricacies of her future living plans post ARC conveys her level of cognitive ability, future oriented     ARC 1/21     Patient's chart was reviewed and collaborated with  about the treatment plan.  SUBJECTIVE:    Patient is feeling unchanged. Suicidal ideation:  denies suicidal ideation.  Patient does not have medication side effects.    ROS: Patient has new complaints: no  Sleeping adequately:  Yes   Appetite adequate: Yes  Attending groups: Yes  Visitors:No    OBJECTIVE    Physical  VITALS:  /74   Pulse 98   Temp 98.1 °F (36.7 °C) (Oral)   Resp 14   Ht 1.549 m (5' 0.98\")   Wt 39.9 kg (88 lb)   LMP 07/12/2015   SpO2 96%   BMI 16.64 kg/m²     Mental Status Examination:  Patients appearance was street clothes. Thoughts are Goal directed. Homicidal ideations none.  No abnormal movements, tics or mannerisms.  Memory intact Aims 0. Concentration Fair.   Alert and oriented X 4. Insight and Judgement impaired insight. Patient was cooperative. Patient gait normal. Mood anxious, constricted, decreased range, and depressed, affect depressed affect Hallucinations Absent, suicidal ideations no specific plan to harm self Speech normal volume  Labs:   Admission on 01/04/2025   Component Date Value Ref Range Status    Cholesterol, Total 01/04/2025 129  0 - 199 mg/dL Final    Triglycerides 01/04/2025 51  0 - 150 mg/dL Final    HDL 01/04/2025 43  40 - 60 mg/dL Final    Comment: An HDL cholesterol less than 40 mg/dL is low and  constitutes a coronary heart disease risk factor.  An HDL cholesterol greater than 60 mg/dL is a  negative risk factor for coronary heart disease.      LDL 
Department of Psychiatry  AttendingProgress Note  Chief Complaint: confusion  Bonny appeared constricted today . She sat on edge of bed and would have periods of drifting off and not responding to questions. She would later respond. She would ask questions such as , \"Am I sick, do I have medical  problems, am I in trouble.\" Because she had a sitter for safety.   She was oriented to age, , lcation, month, year and president. She thought it was Saturday whenit's Tuesday. She admitted to drinking a 6 pack of tall boys(24 Oz) at 8% alcohol daily.   Her last drink was when she was admitted to medical 1/3/2025.   She said that she lives with daughter and brother in [place].  Not clear if this is form of withdrawal of alcohol although she mc not appear agitated nor tremulous. Thiss may be wernicke encephalopathy    CT head 1.3.2025     There is  prominence of the ventricles and sulci due to global parenchymal volume loss.  There are nonspecific areas of hypoattenuation within the periventricular and  subcortical white matter, which likely represent chronic microvascular  ischemic change.  Patient's chart was reviewed and collaborated with  about the treatment plan.  SUBJECTIVE:    Patient is feeling unchanged. Suicidal ideation:  denies suicidal ideation.  Patient does not have medication side effects.    ROS: Patient has new complaints: no  Sleeping adequately:  Yes   Appetite adequate: Yes  Attending groups: Yes  Visitors:No    OBJECTIVE    Physical  VITALS:  /68   Pulse 100   Temp 97.8 °F (36.6 °C) (Oral)   Resp 20   Ht 1.549 m (5' 1\")   Wt 36.2 kg (79 lb 12.8 oz)   LMP 2015   SpO2 100%   BMI 15.08 kg/m²     Mental Status Examination:  Patients appearance was ill-appearing. Thoughts are Illogical. Homicidal ideations none.  No abnormal movements, tics or mannerisms.  Memory intact Aims 0. Concentration Poor.   Alert and oriented X 4. Insight and Judgement impaired insight. Patient was 
Department of Psychiatry  AttendingProgress Note  Chief Complaint: dementia     Bonny appears markedly improved emotionally. She is able to express herself more appropriately and more organized thoughts. She is not drifting from a conversation and quickly responds to questions. She is oriented to place, date.   Would like to move into her own apt at [place] in [facility].  Repeat MOCA  26/30 on 13/30 after an initial MOCA of 13/30 on 1/19     Patient's chart was reviewed and collaborated with  about the treatment plan.  SUBJECTIVE:    Patient is feeling better. Suicidal ideation:  denies suicidal ideation.  Patient does not have medication side effects.    ROS: Patient has new complaints: no  Sleeping adequately:  Yes   Appetite adequate: Yes  Attending groups: Yes  Visitors:No    OBJECTIVE    Physical  VITALS:  /76   Pulse 90   Temp 97.5 °F (36.4 °C) (Temporal)   Resp 16   Ht 1.549 m (5' 0.98\")   Wt 39.9 kg (88 lb)   LMP 07/12/2015   SpO2 96%   BMI 16.64 kg/m²     Mental Status Examination:  Patients appearance was street clothes. Thoughts are Goal directed. Homicidal ideations none.  No abnormal movements, tics or mannerisms.  Memory intact Aims 0. Concentration Fair.   Alert and oriented X 4. Insight and Judgement impaired insight. Patient was cooperative. Patient gait normal. Mood constricted, affect flat affect Hallucinations Absent, suicidal ideations no specific plan to harm self Speech normal volume  Data  Labs:   Admission on 01/04/2025   Component Date Value Ref Range Status    Cholesterol, Total 01/04/2025 129  0 - 199 mg/dL Final    Triglycerides 01/04/2025 51  0 - 150 mg/dL Final    HDL 01/04/2025 43  40 - 60 mg/dL Final    Comment: An HDL cholesterol less than 40 mg/dL is low and  constitutes a coronary heart disease risk factor.  An HDL cholesterol greater than 60 mg/dL is a  negative risk factor for coronary heart disease.      LDL Cholesterol 01/04/2025 76  <100 mg/dL 
Department of Psychiatry  AttendingProgress Note  Chief Complaint: dementia     Patient's chart was reviewed and collaborated with  about the treatment plan.    SUBJECTIVE:    Pt up on the unit today.  1:1 for 02 use.  Pt confused, tearful, asking if she was in [facility], asked if she needed to stay here forever.  Pt states she is depressed, knows she needs to stop drinking/smoking, but also enjoys it.  Pt reports little social support.  Pt admits that she may need more support.  Blunted affect, poor eye contact.  Tolerating increased Zoloft dose.      Patient is feeling better. Suicidal ideation:  denies suicidal ideation.  Patient does not have medication side effects.    ROS: Patient has new complaints: no  Sleeping adequately:  Yes   Appetite adequate: Yes  Attending groups: Yes  Visitors:No    OBJECTIVE    Physical  VITALS:  /64   Pulse 79   Temp 98.2 °F (36.8 °C) (Oral)   Resp 16   Ht 1.549 m (5' 1\")   Wt 36.2 kg (79 lb 12.8 oz)   LMP 07/12/2015   SpO2 96%   BMI 15.08 kg/m²     Mental Status Examination:  Patients appearance was ill-appearing. Thoughts are Illogical. Homicidal ideations none.  No abnormal movements, tics or mannerisms.  Memory impaire Aims 0. Concentration Fair.   Alert and oriented X 4. Insight and Judgement impaired insight. Patient was cooperative. Patient gait normal. Mood constricted, affect depressed affect Hallucinations Absent, suicidal ideations no specific plan to harm self Speech soft  Data  Labs:   Admission on 01/04/2025   Component Date Value Ref Range Status    Cholesterol, Total 01/04/2025 129  0 - 199 mg/dL Final    Triglycerides 01/04/2025 51  0 - 150 mg/dL Final    HDL 01/04/2025 43  40 - 60 mg/dL Final    Comment: An HDL cholesterol less than 40 mg/dL is low and  constitutes a coronary heart disease risk factor.  An HDL cholesterol greater than 60 mg/dL is a  negative risk factor for coronary heart disease.      LDL Cholesterol 01/04/2025 76  <100 
Department of Psychiatry  AttendingProgress Note  Chief Complaint: dementia   Bonny has been walking well and will dc walker as she stated that she doesn't use one normally . Continue with sitter due to O2 in room.   We discussed her alcohol abuse and how that has affected her brain function. Her parents both  young from alcohol related issues.   She started crying when she talked about her alcohol use. She state that she is depressed and drinks to cope with it.   Carri discussed NH placement as she currently lives with a parulisi Robertson that has 2 children.   She questioned whether or not she cold drink in the NH?  Will increase her Zoloft 50 mg QD.   MOCA  on .      Patient's chart was reviewed and collaborated with  about the treatment plan.  SUBJECTIVE:    Patient is feeling better. Suicidal ideation:  denies suicidal ideation.  Patient does not have medication side effects.    ROS: Patient has new complaints: no  Sleeping adequately:  Yes   Appetite adequate: Yes  Attending groups: Yes  Visitors:No    OBJECTIVE    Physical  VITALS:  /80   Pulse 82   Temp 97.1 °F (36.2 °C) (Axillary)   Resp 18   Ht 1.549 m (5' 1\")   Wt 36.2 kg (79 lb 12.8 oz)   LMP 2015   SpO2 99%   BMI 15.08 kg/m²     Mental Status Examination:  Patients appearance was ill-appearing. Thoughts are Illogical. Homicidal ideations none.  No abnormal movements, tics or mannerisms.  Memory impaire Aims 0. Concentration Fair.   Alert and oriented X 4. Insight and Judgement impaired insight. Patient was cooperative. Patient gait normal. Mood constricted, affect depressed affect Hallucinations Absent, suicidal ideations no specific plan to harm self Speech soft  Data  Labs:   Admission on 2025   Component Date Value Ref Range Status    Cholesterol, Total 2025 129  0 - 199 mg/dL Final    Triglycerides 2025 51  0 - 150 mg/dL Final    HDL 2025 43  40 - 60 mg/dL Final    Comment: An HDL cholesterol 
Department of Psychiatry  AttendingProgress Note  Chief Complaint: depression   Bonny is working on going to [facility] for alcohol tx. She is worried about going to rehab and being the oldest person. She is willing to go and has stabilized well.   DC Zyprexa as she does not require an antipsychotic at this time.   Patient's chart was reviewed and collaborated with  about the treatment plan.  SUBJECTIVE:    Patient is feeling better. Suicidal ideation:  denies suicidal ideation.  Patient does not have medication side effects.    ROS: Patient has new complaints: no  Sleeping adequately:  Yes   Appetite adequate: Yes  Attending groups: Yes  Visitors:No    OBJECTIVE    Physical  VITALS:  /74   Pulse 99   Temp 98 °F (36.7 °C) (Temporal)   Resp 16   Ht 1.549 m (5' 0.98\")   Wt 39.9 kg (88 lb)   LMP 07/12/2015   SpO2 96%   BMI 16.64 kg/m²     Mental Status Examination:  Patients appearance was street clothes. Thoughts are Goal directed. Homicidal ideations none.  No abnormal movements, tics or mannerisms.  Memory intact Aims 0. Concentration Fair.   Alert and oriented X 4. Insight and Judgement impaired insight. Patient was cooperative. Patient gait abnormal. Mood constricted, affect depressed affect Hallucinations Absent, suicidal ideations no specific plan to harm self Speech normal volume  Data  Labs:   Admission on 01/04/2025   Component Date Value Ref Range Status    Cholesterol, Total 01/04/2025 129  0 - 199 mg/dL Final    Triglycerides 01/04/2025 51  0 - 150 mg/dL Final    HDL 01/04/2025 43  40 - 60 mg/dL Final    Comment: An HDL cholesterol less than 40 mg/dL is low and  constitutes a coronary heart disease risk factor.  An HDL cholesterol greater than 60 mg/dL is a  negative risk factor for coronary heart disease.      LDL Cholesterol 01/04/2025 76  <100 mg/dL Final    VLDL Cholesterol Calculated 01/04/2025 10  Not Established mg/dL Final    Hemoglobin A1C 01/04/2025 6.1  See comment % Final    
Department of Psychiatry  AttendingProgress Note  Chief Complaint: depression  Bonny has been doing relatively well overall.She is active in groups and in the community. We discussed potential placement at [facility].     Patient's chart was reviewed and collaborated with  about the treatment plan.  SUBJECTIVE:    Patient is feeling better. Suicidal ideation:  denies suicidal ideation.  Patient does not have medication side effects.    ROS: Patient has new complaints: no  Sleeping adequately:  Yes   Appetite adequate: Yes  Attending groups: Yes  Visitors:No    OBJECTIVE    Physical  VITALS:  /78   Pulse 73   Temp 97.2 °F (36.2 °C) (Temporal)   Resp 18   Ht 1.549 m (5' 0.98\")   Wt 39.9 kg (88 lb)   LMP 07/12/2015   SpO2 96%   BMI 16.64 kg/m²     Mental Status Examination:  Patients appearance was street clothes. Thoughts are Goal directed. Homicidal ideations none.  No abnormal movements, tics or mannerisms.  Memory intact Aims 0. Concentration Fair.   Alert and oriented X 4. Insight and Judgement impaired insight. Patient was cooperative. Patient gait normal. Mood constricted, affect depressed affect Hallucinations Absent, suicidal ideations no specific plan to harm self Speech normal volume  Data  Labs:   Admission on 01/04/2025   Component Date Value Ref Range Status    Cholesterol, Total 01/04/2025 129  0 - 199 mg/dL Final    Triglycerides 01/04/2025 51  0 - 150 mg/dL Final    HDL 01/04/2025 43  40 - 60 mg/dL Final    Comment: An HDL cholesterol less than 40 mg/dL is low and  constitutes a coronary heart disease risk factor.  An HDL cholesterol greater than 60 mg/dL is a  negative risk factor for coronary heart disease.      LDL Cholesterol 01/04/2025 76  <100 mg/dL Final    VLDL Cholesterol Calculated 01/04/2025 10  Not Established mg/dL Final    Hemoglobin A1C 01/04/2025 6.1  See comment % Final    Comment: Comment:  Diagnosis of Diabetes: > or = 6.5%  Increased risk of diabetes 
Department of Psychiatry  AttendingProgress Note  Chief Complaint: depression  Bonny is doing well but anxious about her future rehab placement next week.  She is concerned about her medication getting her O2.   Overall she is improved and cognitively intact.   Plan dc to [facility] 1/21.  Patient's chart was reviewed and collaborated with  about the treatment plan.  SUBJECTIVE:    Patient is feeling better. Suicidal ideation:  denies suicidal ideation.  Patient does not have medication side effects.    ROS: Patient has new complaints: no  Sleeping adequately:  Yes   Appetite adequate: Yes  Attending groups: Yes  Visitors:No    OBJECTIVE    Physical  VITALS:  /66   Pulse 89   Temp 97.8 °F (36.6 °C) (Oral)   Resp 16   Ht 1.549 m (5' 0.98\")   Wt 39.9 kg (88 lb)   LMP 07/12/2015   SpO2 99%   BMI 16.64 kg/m²     Mental Status Examination:  Patients appearance was street clothes. Thoughts are Goal directed. Homicidal ideations none.  No abnormal movements, tics or mannerisms.  Memory intact Aims 0. Concentration Fair.   Alert and oriented X 4. Insight and Judgement impaired insight. Patient was cooperative. Patient gait normal. Mood anxious, constricted, decreased range, and depressed, affect depressed affect Hallucinations Absent, suicidal ideations no specific plan to harm self Speech normal volume  Data  Labs:   Admission on 01/04/2025   Component Date Value Ref Range Status    Cholesterol, Total 01/04/2025 129  0 - 199 mg/dL Final    Triglycerides 01/04/2025 51  0 - 150 mg/dL Final    HDL 01/04/2025 43  40 - 60 mg/dL Final    Comment: An HDL cholesterol less than 40 mg/dL is low and  constitutes a coronary heart disease risk factor.  An HDL cholesterol greater than 60 mg/dL is a  negative risk factor for coronary heart disease.      LDL Cholesterol 01/04/2025 76  <100 mg/dL Final    VLDL Cholesterol Calculated 01/04/2025 10  Not Established mg/dL Final    Hemoglobin A1C 01/04/2025 6.1  See comment % 
Inpatient Occupational Therapy Treatment    Unit: [facility]  Date:  1/13/2025  Patient Name:    Bonny Serrano  Admitting diagnosis:  Psychosis, unspecified psychosis type (HCC) [F29]  Admit Date:  1/4/2025  Precautions/Restrictions/WB Status/ Lines/ Wounds/ Oxygen: [facility] Precautions    Treatment Time:  16:05-16:30  Treatment Number:  2  Timed Code Treatment Minutes: 25 minutes  Total Treatment Minutes: 25 minutes    Patient Goals for Therapy: \"go home\"          Discharge Recommendations: Home with PRN assist and Home OT  DME needs for discharge: needs met       Therapy recommendations for staff:   Independent for ambulation with use of No AD within community room    History of Present Illness: per TRINITY Blankenship NP H&P 1/5/25:  \"Patient seen in room on Adult Behavioral Unit.   Patient is a 54 y.o. female who presents with altered mental status. Pt was initially admitted to medical unit and was seen on consult basis yesterday. She was transferred to geriatric psych unit and seen today.      Preadmission Environment: per OT sonja 8/11/24, unable to update this date due to severe confusion  Pt. Lives                                              Alone  Home environment:                            one story home with basement  Steps to enter first floor:                     3 steps to enter with no HR  Steps to second floor/basement:        Full flight of 12-13 with HRs  Laundry:                                              Basement  Bathroom:                                           walk in shower with built in shower seat (patient is afraid to sit on tile seat. Patient has been sponge bathing)   Pt sleeps in a:                                     Couch  Equipment owned:                              none     Preadmission Status:  Pt. Able to drive:                                         No  Pt. Fully independent with ADLs:       No - friend helps her get into the shower, occasionally with toileting.  Pt. Required 
Inpatient Occupational Therapy Treatment    Unit: [facility]  Date:  1/7/2025  Patient Name:    Bonny Serrano  Admitting diagnosis:  Psychosis, unspecified psychosis type (HCC) [F29]  Admit Date:  1/4/2025  Precautions/Restrictions/WB Status/ Lines/ Wounds/ Oxygen: [facility] Precautions    Treatment Time:  6197-6998  Treatment Number:  1  Timed Code Treatment Minutes: 10 minutes  Total Treatment Minutes:  20  minutes    Patient Goals for Therapy: \"go home\"          Discharge Recommendations: Home with PRN assist and Home OT  DME needs for discharge: RW?       Therapy recommendations for staff:   Independent for ambulation with use of rolling walker (RW) within community room-will likely need assist at night if using nighttime O2 as per RN report    History of Present Illness: per TRINITY Blankenship NP H&P 1/5/25:  \"Patient seen in room on Adult Behavioral Unit.   Patient is a 54 y.o. female who presents with altered mental status. Pt was initially admitted to medical unit and was seen on consult basis yesterday. She was transferred to geriatric psych unit and seen today.      Per consult note:     Bonny Serrano is a 54 y.o. female who presents to the emergency department today with feeling that someone had killed her daughter.  States that her son had shot and killed her -American daughter because of her skin color.  States that she is concerned of this occurrence.  She is not suicidal but reports that she does want to kill her son because he did these things.  After speaking with the patient's brother apparently she does not even have a son.  It appears that she has been confused over the last 3 days.  She has had issues with alcohol intoxication but to his knowledge has never been confused like this.      Just request admission for altered mental status, acute psychosis, hypokalemia, hypomagnesemia, hyponatremia.  Patient is an alcoholic who presents here with confusion for the last 3 days.  She drinks on a daily 
Nutrition Assessment     Type and Reason for Visit: LOS    Nutrition Recommendations/Plan:   Continue Regular Diet      Malnutrition Assessment:  Malnutrition Status: At risk for malnutrition    Nutrition Assessment:  Pt. adequately nourished on admit AEB wt within UBW and appetite adequate at admit .  Not At risk for further nutrition compromise duringths admission .  Will not follow, consult RD if needs change.       Nutrition Related Findings:   pt long hx of ETOH abuse; per Psych notes appetite is adequate at this time      Current Nutrition Therapies:    ADULT DIET; Regular    Anthropometric Measures:  Height: 154.9 cm (5' 0.98\")  Current Body Wt: 39.9 kg (88 lb)   BMI: 16.6          Radha Stein RD, LD  Contact: [id number]    
PRN medication were effective. Pt slept well. For discharge today at 0900.   
Patient was awake & assisted to the bathroom, with assist of writer. Patient's gait was wobbly. Patient's nasal cannula was not in place when patient woke up & was replaced. Patient voided large amount of clear yellow urine & returned to bed. Patient asked when Bonny was going to arrive. \"Bonny is my horse.\" Patient was instructed that she was in the hospital & no horses were allowed. Priscilla Brooks R.N.  
Provided pair of shoes from [employer] for Pt to nurses.    Delmar Connor  
Pt A+Ox3, visible on unit, social, cooperative and medication compliant.  She denies SI/HI/AVH and no RTIS noted. She is currently resting in her room with 2L O2 and sitter at bedside.  She denies any needs at this time.  
Pt currently resting in bed. Pt is currently wearing oxygen. Staff at bedside   
RT Inhaler-Nebulizer Bronchodilator Protocol Note    There is a bronchodilator order in the chart from a provider indicating to follow the RT Bronchodilator Protocol and there is an “Initiate RT Inhaler-Nebulizer Bronchodilator Protocol” order as well (see protocol at bottom of note).    CXR Findings:  No results found.    The findings from the last RT Protocol Assessment were as follows:   History Pulmonary Disease: (P) Chronic pulmonary disease  Respiratory Pattern: (P) Regular pattern and RR 12-20 bpm  Breath Sounds: (P) Clear breath sounds  Cough: (P) Strong, spontaneous, non-productive  Indication for Bronchodilator Therapy: (P) Decreased or absent breath sounds  Bronchodilator Assessment Score: (P) 2    Aerosolized bronchodilator medication orders have been revised according to the RT Inhaler-Nebulizer Bronchodilator Protocol below.    Respiratory Therapist to perform RT Therapy Protocol Assessment initially then follow the protocol.  Repeat RT Therapy Protocol Assessment PRN for score 0-3 or on second treatment, BID, and PRN for scores above 3.    No Indications - adjust the frequency to every 6 hours PRN wheezing or bronchospasm, if no treatments needed after 48 hours then discontinue using Per Protocol order mode.     If indication present, adjust the RT bronchodilator orders based on the Bronchodilator Assessment Score as indicated below.  Use Inhaler orders unless patient has one or more of the following: on home nebulizer, not able to hold breath for 10 seconds, is not alert and oriented, cannot activate and use MDI correctly, or respiratory rate 25 breaths per minute or more, then use the equivalent nebulizer order(s) with same Frequency and PRN reasons based on the score.  If a patient is on this medication at home then do not decrease Frequency below that used at home.    0-3 - enter or revise RT bronchodilator order(s) to equivalent RT Bronchodilator order with Frequency of every 4 hours PRN for 
RT Inhaler-Nebulizer Bronchodilator Protocol Note    There is a bronchodilator order in the chart from a provider indicating to follow the RT Bronchodilator Protocol and there is an “Initiate RT Inhaler-Nebulizer Bronchodilator Protocol” order as well (see protocol at bottom of note).    CXR Findings:  No results found.    The findings from the last RT Protocol Assessment were as follows:   History Pulmonary Disease: (P) Chronic pulmonary disease  Respiratory Pattern: (P) Regular pattern and RR 12-20 bpm  Breath Sounds: (P) Clear breath sounds  Cough: (P) Strong, spontaneous, non-productive  Indication for Bronchodilator Therapy: (P) Decreased or absent breath sounds  Bronchodilator Assessment Score: (P) 2    Aerosolized bronchodilator medication orders have been revised according to the RT Inhaler-Nebulizer Bronchodilator Protocol below.    Respiratory Therapist to perform RT Therapy Protocol Assessment initially then follow the protocol.  Repeat RT Therapy Protocol Assessment PRN for score 0-3 or on second treatment, BID, and PRN for scores above 3.    No Indications - adjust the frequency to every 6 hours PRN wheezing or bronchospasm, if no treatments needed after 48 hours then discontinue using Per Protocol order mode.     If indication present, adjust the RT bronchodilator orders based on the Bronchodilator Assessment Score as indicated below.  Use Inhaler orders unless patient has one or more of the following: on home nebulizer, not able to hold breath for 10 seconds, is not alert and oriented, cannot activate and use MDI correctly, or respiratory rate 25 breaths per minute or more, then use the equivalent nebulizer order(s) with same Frequency and PRN reasons based on the score.  If a patient is on this medication at home then do not decrease Frequency below that used at home.    0-3 - enter or revise RT bronchodilator order(s) to equivalent RT Bronchodilator order with Frequency of every 4 hours PRN for 
RT Inhaler-Nebulizer Bronchodilator Protocol Note    There is a bronchodilator order in the chart from a provider indicating to follow the RT Bronchodilator Protocol and there is an “Initiate RT Inhaler-Nebulizer Bronchodilator Protocol” order as well (see protocol at bottom of note).    CXR Findings:  No results found.    The findings from the last RT Protocol Assessment were as follows:   History Pulmonary Disease: Chronic pulmonary disease  Respiratory Pattern: Regular pattern and RR 12-20 bpm  Breath Sounds: Intermittent or unilateral wheezes  Cough: Strong, spontaneous, non-productive  Indication for Bronchodilator Therapy: Wheezing associated with pulm disorder  Bronchodilator Assessment Score: 6    Aerosolized bronchodilator medication orders have been revised according to the RT Inhaler-Nebulizer Bronchodilator Protocol below.    Respiratory Therapist to perform RT Therapy Protocol Assessment initially then follow the protocol.  Repeat RT Therapy Protocol Assessment PRN for score 0-3 or on second treatment, BID, and PRN for scores above 3.    No Indications - adjust the frequency to every 6 hours PRN wheezing or bronchospasm, if no treatments needed after 48 hours then discontinue using Per Protocol order mode.     If indication present, adjust the RT bronchodilator orders based on the Bronchodilator Assessment Score as indicated below.  Use Inhaler orders unless patient has one or more of the following: on home nebulizer, not able to hold breath for 10 seconds, is not alert and oriented, cannot activate and use MDI correctly, or respiratory rate 25 breaths per minute or more, then use the equivalent nebulizer order(s) with same Frequency and PRN reasons based on the score.  If a patient is on this medication at home then do not decrease Frequency below that used at home.    0-3 - enter or revise RT bronchodilator order(s) to equivalent RT Bronchodilator order with Frequency of every 4 hours PRN for wheezing 
Spoke with patient upon morning assessment and patient was very confused and scared stating, \"I know why I am here, I killed my daughter.\" I replied, \"Bonny, do you think you killed your daughter?\" She replied, \"What? No, she is in school right now.\" I replied, \"Bonny, your daughter is in her 30's, she isn't in school.\" She replied, \"I know, I am here because I was drunk.\" Patient stayed confused and scared throughout morning and had intermittent times of exhaustion. Heart rate was noted upon morning assessment to be 150, 140, 130, 120, and back up to 130. Cardizem given with provider informed notification and EKG performed. Blood work ordered and collected. Patient denies AH/VH/SI/HI. No further complaints noted by patient. 1:1 sitter continued per order.   
Upon morning assessment patient was calm and friendly, patient had a flat affect and paced the floors after breakfast. Patient denies all, took all medication and finished breakfast. Patient is visible in milieu.   
patient denies all upon morning assessment. Took all medication whole. Ate in milieu and was social all day. Helpful with other patients and wanting to head to rehab.   
mg/dL Final    Hemoglobin A1C 01/04/2025 6.1  See comment % Final    Comment: Comment:  Diagnosis of Diabetes: > or = 6.5%  Increased risk of diabetes (Prediabetes): 5.7-6.4%  Glycemic Control: Nonpregnant Adults: <7.0%                    Pregnant: <6.0%        Estimated Avg Glucose 01/04/2025 128.4  mg/dL Final    TSH 01/04/2025 0.69  0.27 - 4.20 uIU/mL Final    Ventricular Rate 01/04/2025 77  BPM Final    Atrial Rate 01/04/2025 77  BPM Final    P-R Interval 01/04/2025 136  ms Final    QRS Duration 01/04/2025 86  ms Final    Q-T Interval 01/04/2025 430  ms Final    QTc Calculation (Bazett) 01/04/2025 486  ms Final    P Axis 01/04/2025 71  degrees Final    R Axis 01/04/2025 91  degrees Final    T [place] 01/04/2025 81  degrees Final    Diagnosis 01/04/2025    Final                    Value:Normal sinus rhythmRightward axisProlonged QTBorderline criteria for peaked T wavesAbnormal ECGWhen compared with ECG of 03-JAN-2025 22:02,Nonspecific T wave abnormality no longer evident in Anterolateral leadsAxis has shifted rightwardConfirmed by TRINITY Lin ([id number]) on 1/5/2025 11:54:55 AM      Ventricular Rate 01/07/2025 115  BPM Final    Atrial Rate 01/07/2025 115  BPM Final    P-R Interval 01/07/2025 124  ms Final    QRS Duration 01/07/2025 86  ms Final    Q-T Interval 01/07/2025 332  ms Final    QTc Calculation (Bazett) 01/07/2025 459  ms Final    P Axis 01/07/2025 81  degrees Final    R Axis 01/07/2025 92  degrees Final    T [place] 01/07/2025 76  degrees Final    Diagnosis 01/07/2025 Sinus tachycardiaRightward axisBorderline ECGNo previous ECGs availableConfirmed by HA GAMEZ MD ([id number]) on 1/7/2025 11:54:38 AM   Final    TSH 01/07/2025 1.10  0.27 - 4.20 uIU/mL Final    Sodium 01/07/2025 134 (L)  136 - 145 mmol/L Final    Potassium reflex Magnesium 01/07/2025 4.6  3.5 - 5.1 mmol/L Final    Chloride 01/07/2025 98 (L)  99 - 110 mmol/L Final    CO2 01/07/2025 27  21 - 32 mmol/L Final    Anion Gap 01/07/2025 9  3 - 16 Final 
mmol/L Final    Anion Gap 01/07/2025 9  3 - 16 Final    Glucose 01/07/2025 100 (H)  70 - 99 mg/dL Final    BUN 01/07/2025 8  7 - 20 mg/dL Final    Creatinine 01/07/2025 0.3 (L)  0.6 - 1.1 mg/dL Final    Est, Glom Filt Rate 01/07/2025 >90  >60 Final    Comment: Pediatric calculator link  https://www.kidney.org/professionals/kdoqi/gfr_calculatorped  Effective Oct 3, 2022  These results are not intended for use in patients  <18 years of age.  eGFR results are calculated without  a race factor using the 2021 CKD-EPI equation.  Careful  clinical correlation is recommended, particularly when  comparing to results calculated using previous equations.  The CKD-EPI equation is less accurate in patients with  extremes of muscle mass, extra-renal metabolism of  creatinine, excessive creatinine ingestion, or following  therapy that affects renal tubular secretion.      Calcium 01/07/2025 8.2 (L)  8.3 - 10.6 mg/dL Final            Medications  Current Facility-Administered Medications: multivitamin 1 tablet, 1 tablet, Oral, Daily  folic acid (FOLVITE) tablet 1 mg, 1 mg, Oral, Daily  sertraline (ZOLOFT) tablet 25 mg, 25 mg, Oral, Daily  LORazepam (ATIVAN) tablet 0.25 mg, 0.25 mg, Oral, Q6H PRN  haloperidol (HALDOL) tablet 0.5 mg, 0.5 mg, Oral, Q6H PRN  atenolol (TENORMIN) tablet 25 mg, 25 mg, Oral, Daily  atorvastatin (LIPITOR) tablet 10 mg, 10 mg, Oral, Nightly  budesonide-formoterol (SYMBICORT) 160-4.5 MCG/ACT inhaler 2 puff, 2 puff, Inhalation, BID RT **AND** tiotropium (SPIRIVA RESPIMAT) 2.5 MCG/ACT inhaler 2 puff, 2 puff, Inhalation, Daily RT  furosemide (LASIX) tablet 20 mg, 20 mg, Oral, Daily PRN  gabapentin (NEURONTIN) capsule 300 mg, 300 mg, Oral, Daily  guaiFENesin (MUCINEX) extended release tablet 1,200 mg, 1,200 mg, Oral, BID PRN  pantoprazole (PROTONIX) tablet 40 mg, 40 mg, Oral, QAM AC  thiamine tablet 100 mg, 100 mg, Oral, Daily  lipase-protease-amylase (ZENPEP) 57890-02615 units delayed release capsule 40,000

## 2025-01-21 NOTE — PLAN OF CARE
Problem: Confusion  Goal: Confusion, delirium, dementia, or psychosis is improved or at baseline  Description: INTERVENTIONS:  1. Assess for possible contributors to thought disturbance, including medications, impaired vision or hearing, underlying metabolic abnormalities, dehydration, psychiatric diagnoses, and notify attending LIP  2. [place] high risk fall precautions, as indicated  3. Provide frequent short contacts to provide reality reorientation, refocusing and direction  4. Decrease environmental stimuli, including noise as appropriate  5. Monitor and intervene to maintain adequate nutrition, hydration, elimination, sleep and activity  6. If unable to ensure safety without constant attention obtain sitter and review sitter guidelines with assigned personnel  7. Initiate Psychosocial CNS and Spiritual Care consult, as indicated  1/5/2025 1146 by Katy Alston RN  Outcome: Not Progressing  1/4/2025 2308 by Lily Sandy LPN  Outcome: Not Progressing     Problem: Drug Abuse/Detox  Goal: Will have no detox symptoms and will verbalize plan for changing drug-related behavior  Description: INTERVENTIONS:  1. Administer medication as ordered  2. Monitor physical status  3. Provide emotional support with 1:1 interaction with staff  4. Encourage  recovery focused treatment   Outcome: Progressing     Problem: Risk for Elopement  Goal: Patient will not exit the unit/facility without proper excort  1/4/2025 2308 by Lily Sandy LPN  Outcome: Progressing  Flowsheets (Taken 1/4/2025 2158)  Nursing Interventions for Elopement Risk:   Assist with personal care needs such as toileting, eating, dressing, as needed to reduce the risk of wandering   Make sure patient has all necessary personal care items     Problem: Confusion  Goal: Confusion, delirium, dementia, or psychosis is improved or at baseline  Description: INTERVENTIONS:  1. Assess for possible contributors to thought disturbance, including medications, 
  Problem: Confusion  Goal: Confusion, delirium, dementia, or psychosis is improved or at baseline  Description: INTERVENTIONS:  1. Assess for possible contributors to thought disturbance, including medications, impaired vision or hearing, underlying metabolic abnormalities, dehydration, psychiatric diagnoses, and notify attending LIP  2. [place] high risk fall precautions, as indicated  3. Provide frequent short contacts to provide reality reorientation, refocusing and direction  4. Decrease environmental stimuli, including noise as appropriate  5. Monitor and intervene to maintain adequate nutrition, hydration, elimination, sleep and activity  6. If unable to ensure safety without constant attention obtain sitter and review sitter guidelines with assigned personnel  7. Initiate Psychosocial CNS and Spiritual Care consult, as indicated  Outcome: Not Progressing     Problem: Behavior  Goal: Pt/Family maintain appropriate behavior and adhere to behavioral management agreement, if implemented  Description: INTERVENTIONS:  1. Assess patient/family's coping skills and  non-compliant behavior (including use of illegal substances)  2. Notify security of behavior or suspected illegal substances which indicate the need for search of the family and/or belongings  3. Encourage verbalization of thoughts and concerns in a socially appropriate manner  4. Utilize positive, consistent limit setting strategies supporting safety of patient, staff and others  5. Encourage participation in the decision making process about the behavioral management agreement  6. If a visitor's behavior poses a threat to safety call refer to organization policy.  7. Initiate consult with , Psychosocial CNS, Spiritual Care as appropriate  Outcome: Progressing     Problem: Risk for Elopement  Goal: Patient will not exit the unit/facility without proper excort  Outcome: Progressing  Flowsheets (Taken 1/4/2025 6493)  Nursing Interventions 
  Problem: Confusion  Goal: Confusion, delirium, dementia, or psychosis is improved or at baseline  Description: INTERVENTIONS:  1. Assess for possible contributors to thought disturbance, including medications, impaired vision or hearing, underlying metabolic abnormalities, dehydration, psychiatric diagnoses, and notify attending LIP  2. [place] high risk fall precautions, as indicated  3. Provide frequent short contacts to provide reality reorientation, refocusing and direction  4. Decrease environmental stimuli, including noise as appropriate  5. Monitor and intervene to maintain adequate nutrition, hydration, elimination, sleep and activity  6. If unable to ensure safety without constant attention obtain sitter and review sitter guidelines with assigned personnel  7. Initiate Psychosocial CNS and Spiritual Care consult, as indicated  Outcome: Not Progressing     Pt remains paranoid and delusional. One minute she believes her daughter is dead and the next minute she believes her daughter is in senior care. Pt believes someone is trying to murder pt. Pt states she is sad and wants to cry all of the time. Denies SI. States she would like to have a beer, cigarette and some sauerkraut. Good PO intake noted this shift. Remains 1:1 for safety.  
  Problem: Coping  Goal: Pt/Family able to verbalize concerns and demonstrate effective coping strategies  Outcome: Progressing     Problem: Confusion  Goal: Confusion, delirium, dementia, or psychosis is improved or at baseline  Outcome: Progressing     Problem: Depression/Self Harm  Goal: Effect of psychiatric condition will be minimized and patient will be protected from self harm  Outcome: Progressing  Flowsheets  Taken 1/8/2025 0718 by Kim Rodriguez RN  Effect of psychiatric condition will be minimized and patient will be protected from self harm: Assess impact of patient’s symptoms on level of functioning, self care needs and offer support as indicated  Taken 1/8/2025 0139 by Елена Cristina LPN  Effect of psychiatric condition will be minimized and patient will be protected from self harm:   Assess impact of patient’s symptoms on level of functioning, self care needs and offer support as indicated   Provide emotional support, presence and reassurance   Assess for suicidal thoughts or ideation. If patient expresses suicidal thoughts or statements do not leave alone, initiate Suicide Precautions, move near nurse station, obtain sitter     Problem: Pain  Goal: Verbalizes/displays adequate comfort level or baseline comfort level  1/8/2025 0107 by Елена Cristina LPN  Outcome: Progressing    Patient oriented x3 but forgetful. Patient gets worried and suspicious about why she is in the hospital and asks if people are being killed. Medication compliant. Denies SI/HI/AVH. Slept for short time this morning with o2 on. Patient reports bad dreams and poor sleep MD aware.Patient reports that she sleeps with oxygen on Medication compliant. Tylenol x1 for pain. Watching TV Will continue to monitor.     
  Problem: Drug Abuse/Detox  Goal: Will have no detox symptoms and will verbalize plan for changing drug-related behavior  Description: INTERVENTIONS:  1. Administer medication as ordered  2. Monitor physical status  3. Provide emotional support with 1:1 interaction with staff  4. Encourage  recovery focused treatment   1/13/2025 2042 by Jenna Alston LPN  Outcome: Progressing     Problem: Risk for Elopement  Goal: Patient will not exit the unit/facility without proper excort  1/13/2025 2042 by Jenna Alston LPN  Outcome: Progressing  Flowsheets (Taken 1/13/2025 2038)  Nursing Interventions for Elopement Risk: Assist with personal care needs such as toileting, eating, dressing, as needed to reduce the risk of wandering     Problem: Safety - Adult  Goal: Free from fall injury  Outcome: Progressing     Problem: ABCDS Injury Assessment  Goal: Absence of physical injury  Outcome: Progressing     
  Problem: Drug Abuse/Detox  Goal: Will have no detox symptoms and will verbalize plan for changing drug-related behavior  Description: INTERVENTIONS:  1. Administer medication as ordered  2. Monitor physical status  3. Provide emotional support with 1:1 interaction with staff  4. Encourage  recovery focused treatment   1/14/2025 2015 by Jenna Alston LPN  Outcome: Progressing     Problem: Risk for Elopement  Goal: Patient will not exit the unit/facility without proper excort  1/14/2025 2015 by Jenna Alston LPN  Outcome: Progressing  Flowsheets (Taken 1/14/2025 2011)  Nursing Interventions for Elopement Risk: Assist with personal care needs such as toileting, eating, dressing, as needed to reduce the risk of wandering     Problem: Safety - Adult  Goal: Free from fall injury  Outcome: Progressing     Problem: ABCDS Injury Assessment  Goal: Absence of physical injury  Outcome: Progressing     
  Problem: Drug Abuse/Detox  Goal: Will have no detox symptoms and will verbalize plan for changing drug-related behavior  Description: INTERVENTIONS:  1. Administer medication as ordered  2. Monitor physical status  3. Provide emotional support with 1:1 interaction with staff  4. Encourage  recovery focused treatment   1/15/2025 1953 by Jenna Alston LPN  Outcome: Progressing     Problem: Risk for Elopement  Goal: Patient will not exit the unit/facility without proper excort  1/15/2025 1953 by Jenna Alston LPN  Outcome: Progressing  Flowsheets (Taken 1/15/2025 1950)  Nursing Interventions for Elopement Risk: Assist with personal care needs such as toileting, eating, dressing, as needed to reduce the risk of wandering     Problem: Safety - Adult  Goal: Free from fall injury  Outcome: Progressing     Problem: ABCDS Injury Assessment  Goal: Absence of physical injury  Outcome: Progressing     
  Problem: Drug Abuse/Detox  Goal: Will have no detox symptoms and will verbalize plan for changing drug-related behavior  Description: INTERVENTIONS:  1. Administer medication as ordered  2. Monitor physical status  3. Provide emotional support with 1:1 interaction with staff  4. Encourage  recovery focused treatment   1/18/2025 1943 by Jenna Alston LPN  Outcome: Progressing  Flowsheets (Taken 1/18/2025 1124 by Bonny Logan, RN)  Will have no detox symptoms and will verbalize plan for changing drug-related behavior:   Administer medication as ordered   Monitor physical status   Provide emotional support with 1:1 interaction with staff   Encourage recovery focused treatment     Problem: Risk for Elopement  Goal: Patient will not exit the unit/facility without proper excort  1/18/2025 1943 by Jenna Alston LPN  Outcome: Progressing  Flowsheets  Taken 1/18/2025 1938 by Jenna Alston LPN  Nursing Interventions for Elopement Risk: Assist with personal care needs such as toileting, eating, dressing, as needed to reduce the risk of wandering  Taken 1/18/2025 1124 by Bonny Logan, RN  Nursing Interventions for Elopement Risk: Assist with personal care needs such as toileting, eating, dressing, as needed to reduce the risk of wandering     Problem: Safety - Adult  Goal: Free from fall injury  1/18/2025 1943 by Jenna Alston LPN  Outcome: Progressing     Problem: ABCDS Injury Assessment  Goal: Absence of physical injury  1/18/2025 1943 by Jenna Alston LPN  Outcome: Progressing     
  Problem: Drug Abuse/Detox  Goal: Will have no detox symptoms and will verbalize plan for changing drug-related behavior  Description: INTERVENTIONS:  1. Administer medication as ordered  2. Monitor physical status  3. Provide emotional support with 1:1 interaction with staff  4. Encourage  recovery focused treatment   1/19/2025 1911 by Jenna Alston LPN  Outcome: Progressing     Problem: Risk for Elopement  Goal: Patient will not exit the unit/facility without proper excort  1/19/2025 1911 by Jenna Alston LPN  Outcome: Progressing  Flowsheets (Taken 1/19/2025 1906)  Nursing Interventions for Elopement Risk: Assist with personal care needs such as toileting, eating, dressing, as needed to reduce the risk of wandering     Problem: Safety - Adult  Goal: Free from fall injury  Outcome: Progressing     Problem: ABCDS Injury Assessment  Goal: Absence of physical injury  Outcome: Progressing     
  Problem: Drug Abuse/Detox  Goal: Will have no detox symptoms and will verbalize plan for changing drug-related behavior  Description: INTERVENTIONS:  1. Administer medication as ordered  2. Monitor physical status  3. Provide emotional support with 1:1 interaction with staff  4. Encourage  recovery focused treatment   1/20/2025 0905 by Bonny Logan, RN  Outcome: Progressing  Flowsheets (Taken 1/20/2025 0858)  Will have no detox symptoms and will verbalize plan for changing drug-related behavior:   Administer medication as ordered   Monitor physical status   Provide emotional support with 1:1 interaction with staff   Encourage recovery focused treatment  1/19/2025 1911 by Jenna Alston LPN  Outcome: Progressing     Problem: Risk for Elopement  Goal: Patient will not exit the unit/facility without proper excort  1/20/2025 0905 by Bonny Logan RN  Outcome: Progressing  Flowsheets (Taken 1/20/2025 0858)  Nursing Interventions for Elopement Risk: Assist with personal care needs such as toileting, eating, dressing, as needed to reduce the risk of wandering  1/19/2025 1911 by Jenna Alston LPN  Outcome: Progressing  Flowsheets (Taken 1/19/2025 1906)  Nursing Interventions for Elopement Risk: Assist with personal care needs such as toileting, eating, dressing, as needed to reduce the risk of wandering     Problem: Safety - Adult  Goal: Free from fall injury  1/20/2025 0905 by Bonny Logan, RN  Outcome: Progressing  1/19/2025 1911 by Jenna Alston LPN  Outcome: Progressing     Problem: ABCDS Injury Assessment  Goal: Absence of physical injury  1/20/2025 0905 by Bonny Logan RN  Outcome: Progressing  1/19/2025 1911 by Jenna Alston LPN  Outcome: Progressing     Problem: Skin/Tissue Integrity  Goal: Absence of new skin breakdown  Description: 1.  Monitor for areas of redness and/or skin breakdown  2.  Assess vascular access sites hourly  3.  Every 4-6 hours minimum:  Change oxygen saturation probe 
  Problem: Drug Abuse/Detox  Goal: Will have no detox symptoms and will verbalize plan for changing drug-related behavior  Description: INTERVENTIONS:  1. Administer medication as ordered  2. Monitor physical status  3. Provide emotional support with 1:1 interaction with staff  4. Encourage  recovery focused treatment   1/6/2025 2320 by Елена Cristina LPN  Outcome: Progressing  Flowsheets (Taken 1/6/2025 2142)  Will have no detox symptoms and will verbalize plan for changing drug-related behavior:   Administer medication as ordered   Monitor physical status   Provide emotional support with 1:1 interaction with staff     Problem: Risk for Elopement  Goal: Patient will not exit the unit/facility without proper excort  1/6/2025 2320 by Елена Cristina LPN  Outcome: Progressing  Flowsheets (Taken 1/6/2025 2142)  Nursing Interventions for Elopement Risk: Assist with personal care needs such as toileting, eating, dressing, as needed to reduce the risk of wandering     Problem: Safety - Adult  Goal: Free from fall injury  1/6/2025 2320 by Елена Cristina LPN  Outcome: Progressing     Problem: ABCDS Injury Assessment  Goal: Absence of physical injury  1/6/2025 2320 by Елена Cristina LPN  Outcome: Progressing     Problem: Skin/Tissue Integrity  Goal: Absence of new skin breakdown  Description: 1.  Monitor for areas of redness and/or skin breakdown  2.  Assess vascular access sites hourly  3.  Every 4-6 hours minimum:  Change oxygen saturation probe site  4.  Every 4-6 hours:  If on nasal continuous positive airway pressure, respiratory therapy assess nares and determine need for appliance change or resting period.  1/6/2025 2320 by Елена Cristina LPN  Outcome: Progressing     Problem: Pain  Goal: Verbalizes/displays adequate comfort level or baseline comfort level  1/6/2025 2320 by Елена Cristina LPN  Outcome: Progressing     Problem: Confusion  Goal: Confusion, delirium, dementia, or psychosis is improved or at 
  Problem: Drug Abuse/Detox  Goal: Will have no detox symptoms and will verbalize plan for changing drug-related behavior  Description: INTERVENTIONS:  1. Administer medication as ordered  2. Monitor physical status  3. Provide emotional support with 1:1 interaction with staff  4. Encourage  recovery focused treatment   1/7/2025 1014 by Bonny Logan RN  Outcome: Progressing  1/6/2025 2320 by Елена Cristina LPN  Outcome: Progressing  Flowsheets (Taken 1/6/2025 2142)  Will have no detox symptoms and will verbalize plan for changing drug-related behavior:   Administer medication as ordered   Monitor physical status   Provide emotional support with 1:1 interaction with staff     Problem: Risk for Elopement  Goal: Patient will not exit the unit/facility without proper excort  1/7/2025 1014 by Bonny Logan RN  Outcome: Progressing  1/6/2025 2320 by Елена Cristina LPN  Outcome: Progressing  Flowsheets (Taken 1/6/2025 2142)  Nursing Interventions for Elopement Risk: Assist with personal care needs such as toileting, eating, dressing, as needed to reduce the risk of wandering     Problem: Safety - Adult  Goal: Free from fall injury  1/7/2025 1014 by Bonny Logan RN  Outcome: Progressing  1/6/2025 2320 by Елена Cristina LPN  Outcome: Progressing     Problem: ABCDS Injury Assessment  Goal: Absence of physical injury  1/7/2025 1014 by Bonny Logan RN  Outcome: Progressing  1/6/2025 2320 by Елена Cristina LPN  Outcome: Progressing     Problem: Skin/Tissue Integrity  Goal: Absence of new skin breakdown  Description: 1.  Monitor for areas of redness and/or skin breakdown  2.  Assess vascular access sites hourly  3.  Every 4-6 hours minimum:  Change oxygen saturation probe site  4.  Every 4-6 hours:  If on nasal continuous positive airway pressure, respiratory therapy assess nares and determine need for appliance change or resting period.  1/7/2025 1014 by Bonny Logan RN  Outcome: Progressing  1/6/2025 2320 by Елена Cristina 
  Problem: Drug Abuse/Detox  Goal: Will have no detox symptoms and will verbalize plan for changing drug-related behavior  Description: INTERVENTIONS:  1. Administer medication as ordered  2. Monitor physical status  3. Provide emotional support with 1:1 interaction with staff  4. Encourage  recovery focused treatment   Outcome: Progressing     Problem: Risk for Elopement  Goal: Patient will not exit the unit/facility without proper excort  Outcome: Progressing     
  Problem: Drug Abuse/Detox  Goal: Will have no detox symptoms and will verbalize plan for changing drug-related behavior  Description: INTERVENTIONS:  1. Administer medication as ordered  2. Monitor physical status  3. Provide emotional support with 1:1 interaction with staff  4. Encourage  recovery focused treatment   Outcome: Progressing     Problem: Risk for Elopement  Goal: Patient will not exit the unit/facility without proper excort  Outcome: Progressing     Problem: Safety - Adult  Goal: Free from fall injury  1/13/2025 0446 by Елена Cristina LPN  Outcome: Progressing     Problem: Skin/Tissue Integrity  Goal: Absence of new skin breakdown  Description: 1.  Monitor for areas of redness and/or skin breakdown  2.  Assess vascular access sites hourly  3.  Every 4-6 hours minimum:  Change oxygen saturation probe site  4.  Every 4-6 hours:  If on nasal continuous positive airway pressure, respiratory therapy assess nares and determine need for appliance change or resting period.  1/13/2025 0948 by Katy Alston RN  Outcome: Progressing  1/13/2025 0446 by Елена Cristina LPN  Outcome: Progressing     Problem: Coping  Goal: Pt/Family able to verbalize concerns and demonstrate effective coping strategies  Description: INTERVENTIONS:  1. Assist patient/family to identify coping skills, available support systems and cultural and spiritual values  2. Provide emotional support, including active listening and acknowledgement of concerns of patient and caregivers  3. Reduce environmental stimuli, as able  4. Instruct patient/family in relaxation techniques, as appropriate  5. Assess for spiritual pain/suffering and initiate Spiritual Care, Psychosocial Clinical Specialist consults as needed  Outcome: Progressing     Problem: Decision Making  Goal: Pt/Family able to effectively weigh alternatives and participate in decision making related to treatment and care  Description: INTERVENTIONS:  1. Determine when there are 
  Problem: Drug Abuse/Detox  Goal: Will have no detox symptoms and will verbalize plan for changing drug-related behavior  Description: INTERVENTIONS:  1. Administer medication as ordered  2. Monitor physical status  3. Provide emotional support with 1:1 interaction with staff  4. Encourage  recovery focused treatment   Outcome: Progressing     Problem: Risk for Elopement  Goal: Patient will not exit the unit/facility without proper excort  Outcome: Progressing     Problem: Safety - Adult  Goal: Free from fall injury  Outcome: Progressing     Problem: ABCDS Injury Assessment  Goal: Absence of physical injury  Outcome: Progressing     Problem: Skin/Tissue Integrity  Goal: Absence of new skin breakdown  Description: 1.  Monitor for areas of redness and/or skin breakdown  2.  Assess vascular access sites hourly  3.  Every 4-6 hours minimum:  Change oxygen saturation probe site  4.  Every 4-6 hours:  If on nasal continuous positive airway pressure, respiratory therapy assess nares and determine need for appliance change or resting period.  Outcome: Progressing     Problem: Coping  Goal: Pt/Family able to verbalize concerns and demonstrate effective coping strategies  Description: INTERVENTIONS:  1. Assist patient/family to identify coping skills, available support systems and cultural and spiritual values  2. Provide emotional support, including active listening and acknowledgement of concerns of patient and caregivers  3. Reduce environmental stimuli, as able  4. Instruct patient/family in relaxation techniques, as appropriate  5. Assess for spiritual pain/suffering and initiate Spiritual Care, Psychosocial Clinical Specialist consults as needed  Outcome: Progressing     Problem: Decision Making  Goal: Pt/Family able to effectively weigh alternatives and participate in decision making related to treatment and care  Description: INTERVENTIONS:  1. Determine when there are differences between patient's view, family's 
  Problem: Drug Abuse/Detox  Goal: Will have no detox symptoms and will verbalize plan for changing drug-related behavior  Description: INTERVENTIONS:  1. Administer medication as ordered  2. Monitor physical status  3. Provide emotional support with 1:1 interaction with staff  4. Encourage  recovery focused treatment   Outcome: Progressing     Problem: Risk for Elopement  Goal: Patient will not exit the unit/facility without proper excort  Outcome: Progressing     Problem: Safety - Adult  Goal: Free from fall injury  Outcome: Progressing     Problem: ABCDS Injury Assessment  Goal: Absence of physical injury  Outcome: Progressing     Problem: Skin/Tissue Integrity  Goal: Absence of new skin breakdown  Description: 1.  Monitor for areas of redness and/or skin breakdown  2.  Assess vascular access sites hourly  3.  Every 4-6 hours minimum:  Change oxygen saturation probe site  4.  Every 4-6 hours:  If on nasal continuous positive airway pressure, respiratory therapy assess nares and determine need for appliance change or resting period.  Outcome: Progressing     Problem: Coping  Goal: Pt/Family able to verbalize concerns and demonstrate effective coping strategies  Description: INTERVENTIONS:  1. Assist patient/family to identify coping skills, available support systems and cultural and spiritual values  2. Provide emotional support, including active listening and acknowledgement of concerns of patient and caregivers  3. Reduce environmental stimuli, as able  4. Instruct patient/family in relaxation techniques, as appropriate  5. Assess for spiritual pain/suffering and initiate Spiritual Care, Psychosocial Clinical Specialist consults as needed  Outcome: Progressing     Problem: Depression/Self Harm  Goal: Effect of psychiatric condition will be minimized and patient will be protected from self harm  Description: INTERVENTIONS:  1. Assess impact of patient's symptoms on level of functioning, self care needs and offer 
  Problem: Drug Abuse/Detox  Goal: Will have no detox symptoms and will verbalize plan for changing drug-related behavior  Description: INTERVENTIONS:  1. Administer medication as ordered  2. Monitor physical status  3. Provide emotional support with 1:1 interaction with staff  4. Encourage  recovery focused treatment   Outcome: Progressing     Problem: Risk for Elopement  Goal: Patient will not exit the unit/facility without proper excort  Outcome: Progressing     Problem: Safety - Adult  Goal: Free from fall injury  Outcome: Progressing     Problem: Skin/Tissue Integrity  Goal: Absence of new skin breakdown  Description: 1.  Monitor for areas of redness and/or skin breakdown  2.  Assess vascular access sites hourly  3.  Every 4-6 hours minimum:  Change oxygen saturation probe site  4.  Every 4-6 hours:  If on nasal continuous positive airway pressure, respiratory therapy assess nares and determine need for appliance change or resting period.  Outcome: Progressing     Problem: Coping  Goal: Pt/Family able to verbalize concerns and demonstrate effective coping strategies  Description: INTERVENTIONS:  1. Assist patient/family to identify coping skills, available support systems and cultural and spiritual values  2. Provide emotional support, including active listening and acknowledgement of concerns of patient and caregivers  3. Reduce environmental stimuli, as able  4. Instruct patient/family in relaxation techniques, as appropriate  5. Assess for spiritual pain/suffering and initiate Spiritual Care, Psychosocial Clinical Specialist consults as needed  1/8/2025 2123 by Deann Sims RN  Outcome: Progressing     Problem: Depression/Self Harm  Goal: Effect of psychiatric condition will be minimized and patient will be protected from self harm  Description: INTERVENTIONS:  1. Assess impact of patient's symptoms on level of functioning, self care needs and offer support as indicated  2. Assess patient/family 
  Problem: Drug Abuse/Detox  Goal: Will have no detox symptoms and will verbalize plan for changing drug-related behavior  Description: INTERVENTIONS:  1. Administer medication as ordered  2. Monitor physical status  3. Provide emotional support with 1:1 interaction with staff  4. Encourage  recovery focused treatment   Outcome: Progressing     Problem: Risk for Elopement  Goal: Patient will not exit the unit/facility without proper excort  Outcome: Progressing     Problem: Skin/Tissue Integrity  Goal: Absence of new skin breakdown  Description: 1.  Monitor for areas of redness and/or skin breakdown  2.  Assess vascular access sites hourly  3.  Every 4-6 hours minimum:  Change oxygen saturation probe site  4.  Every 4-6 hours:  If on nasal continuous positive airway pressure, respiratory therapy assess nares and determine need for appliance change or resting period.  Outcome: Progressing     Problem: Confusion  Goal: Confusion, delirium, dementia, or psychosis is improved or at baseline  Description: INTERVENTIONS:  1. Assess for possible contributors to thought disturbance, including medications, impaired vision or hearing, underlying metabolic abnormalities, dehydration, psychiatric diagnoses, and notify attending LIP  2. [place] high risk fall precautions, as indicated  3. Provide frequent short contacts to provide reality reorientation, refocusing and direction  4. Decrease environmental stimuli, including noise as appropriate  5. Monitor and intervene to maintain adequate nutrition, hydration, elimination, sleep and activity  6. If unable to ensure safety without constant attention obtain sitter and review sitter guidelines with assigned personnel  7. Initiate Psychosocial CNS and Spiritual Care consult, as indicated  Outcome: Progressing     Problem: Depression/Self Harm  Goal: Effect of psychiatric condition will be minimized and patient will be protected from self harm  Description: INTERVENTIONS:  1. 
  Problem: Drug Abuse/Detox  Goal: Will have no detox symptoms and will verbalize plan for changing drug-related behavior  Description: INTERVENTIONS:  1. Administer medication as ordered  2. Monitor physical status  3. Provide emotional support with 1:1 interaction with staff  4. Encourage  recovery focused treatment   Outcome: Progressing     Problem: Risk for Elopement  Goal: Patient will not exit the unit/facility without proper excort  Outcome: Progressing    Pt has been visible on the unit and social with staff and with peers. She has been medication compliant. She denies SI/HI/AVH and no RTIS noted. She is hopeful to go to rehab. Denies needs currently      
  Problem: Drug Abuse/Detox  Goal: Will have no detox symptoms and will verbalize plan for changing drug-related behavior  Description: INTERVENTIONS:  1. Administer medication as ordered  2. Monitor physical status  3. Provide emotional support with 1:1 interaction with staff  4. Encourage  recovery focused treatment   Outcome: Progressing  Flowsheets (Taken 1/7/2025 2303 by Елена Cristina LPN)  Will have no detox symptoms and will verbalize plan for changing drug-related behavior:   Administer medication as ordered   Monitor physical status   Provide emotional support with 1:1 interaction with staff  Note: Patient will continue to have no detox symptoms.     Problem: Safety - Adult  Goal: Free from fall injury  Outcome: Progressing  Flowsheets (Taken 1/10/2025 1549)  Free From Fall Injury: Instruct family/caregiver on patient safety  Note: Patient will remain free of falls. Fall precautions intact,gripper socks intact to bilateral feet and sitter at bedside for safety.     Problem: Safety - Adult  Goal: Free from fall injury  Outcome: Progressing  Flowsheets (Taken 1/10/2025 1549)  Free From Fall Injury: Instruct family/caregiver on patient safety  Note: Patient will remain free of falls. Fall precautions intact,gripper socks intact to bilateral feet and sitter at bedside for safety.     Problem: Safety - Adult  Goal: Free from fall injury  Outcome: Progressing  Flowsheets (Taken 1/10/2025 1549)  Free From Fall Injury: Instruct family/caregiver on patient safety  Note: Patient will remain free of falls. Fall precautions intact,gripper socks intact to bilateral feet and sitter at bedside for safety.     Problem: Pain  Goal: Verbalizes/displays adequate comfort level or baseline comfort level  Outcome: Progressing  Flowsheets (Taken 1/10/2025 0822)  Verbalizes/displays adequate comfort level or baseline comfort level:   Encourage patient to monitor pain and request assistance   Assess pain using appropriate pain 
  Problem: Pain  Goal: Verbalizes/displays adequate comfort level or baseline comfort level  Outcome: Progressing     
  Problem: Pain  Goal: Verbalizes/displays adequate comfort level or baseline comfort level  Outcome: Progressing     
  Problem: Risk for Elopement  Goal: Patient will not exit the unit/facility without proper excort  1/9/2025 2117 by Deann Sims RN  Outcome: Progressing     Problem: Safety - Adult  Goal: Free from fall injury  1/9/2025 2117 by Deann Sims RN  Outcome: Progressing     Problem: ABCDS Injury Assessment  Goal: Absence of physical injury  1/9/2025 2117 by Deann Sims RN  Outcome: Progressing     Problem: Coping  Goal: Pt/Family able to verbalize concerns and demonstrate effective coping strategies  Description: INTERVENTIONS:  1. Assist patient/family to identify coping skills, available support systems and cultural and spiritual values  2. Provide emotional support, including active listening and acknowledgement of concerns of patient and caregivers  3. Reduce environmental stimuli, as able  4. Instruct patient/family in relaxation techniques, as appropriate  5. Assess for spiritual pain/suffering and initiate Spiritual Care, Psychosocial Clinical Specialist consults as needed  1/9/2025 2117 by Deann Sims RN  Outcome: Progressing     Problem: Depression/Self Harm  Goal: Effect of psychiatric condition will be minimized and patient will be protected from self harm  Description: INTERVENTIONS:  1. Assess impact of patient's symptoms on level of functioning, self care needs and offer support as indicated  2. Assess patient/family knowledge of depression, impact on illness and need for teaching  3. Provide emotional support, presence and reassurance  4. Assess for possible suicidal thoughts or ideation. If patient expresses suicidal thoughts or statements do not leave alone, initiate Suicide Precautions, move to a room close to the nursing station and obtain sitter  5. Initiate consults as appropriate with Mental Health Professional, Spiritual Care, Psychosocial CNS, and consider a recommendation to the LIP for a Psychiatric Consultation  1/9/2025 2117 by Deann Sims RN  Outcome: 
  Problem: Safety - Adult  Goal: Free from fall injury  Outcome: Progressing     Problem: ABCDS Injury Assessment  Goal: Absence of physical injury  Outcome: Progressing     Problem: Skin/Tissue Integrity  Goal: Absence of new skin breakdown  Description: 1.  Monitor for areas of redness and/or skin breakdown  2.  Assess vascular access sites hourly  3.  Every 4-6 hours minimum:  Change oxygen saturation probe site  4.  Every 4-6 hours:  If on nasal continuous positive airway pressure, respiratory therapy assess nares and determine need for appliance change or resting period.  Outcome: Progressing     Problem: Pain  Goal: Verbalizes/displays adequate comfort level or baseline comfort level  Outcome: Progressing     
  Problem: Safety - Adult  Goal: Free from fall injury  Outcome: Progressing     Problem: ABCDS Injury Assessment  Goal: Absence of physical injury  Outcome: Progressing     Problem: Skin/Tissue Integrity  Goal: Absence of new skin breakdown  Description: 1.  Monitor for areas of redness and/or skin breakdown  2.  Assess vascular access sites hourly  3.  Every 4-6 hours minimum:  Change oxygen saturation probe site  4.  Every 4-6 hours:  If on nasal continuous positive airway pressure, respiratory therapy assess nares and determine need for appliance change or resting period.  Outcome: Progressing     Problem: Pain  Goal: Verbalizes/displays adequate comfort level or baseline comfort level  Outcome: Progressing     
Assumed care @1930. Pt is calm and cooperative with care, A/Ox4, med compliant, and denies SI/HI/AVH. Rates her depression 5/10  and anxiety 5/10. Pt has remained free from behaviors of self harm and is currently able to contract for safety. Pt c/o right leg pain 5/10. @ RN administered Tylenol 650 mg PO. Upon reassessment pt rates her pain 3/10. Plan of care is ongoing.      Problem: Depression/Self Harm  Goal: Effect of psychiatric condition will be minimized and patient will be protected from self harm  Description: INTERVENTIONS:  1. Assess impact of patient's symptoms on level of functioning, self care needs and offer support as indicated  2. Assess patient/family knowledge of depression, impact on illness and need for teaching  3. Provide emotional support, presence and reassurance  4. Assess for possible suicidal thoughts or ideation. If patient expresses suicidal thoughts or statements do not leave alone, initiate Suicide Precautions, move to a room close to the nursing station and obtain sitter  5. Initiate consults as appropriate with Mental Health Professional, Spiritual Care, Psychosocial CNS, and consider a recommendation to the LIP for a Psychiatric Consultation  Outcome: Progressing     Problem: Pain  Goal: Verbalizes/displays adequate comfort level or baseline comfort level  Outcome: Progressing     
Bonny has been out in the day room and social with peers this shift. Patient denies current SI/HI and A/VH. She endorses depression. Bonny took her nightly medications whole with water. She is alert and oriented x 4. PRN Trazodone administered for sleep.   Problem: Coping  Goal: Pt/Family able to verbalize concerns and demonstrate effective coping strategies  Description: INTERVENTIONS:  1. Assist patient/family to identify coping skills, available support systems and cultural and spiritual values  2. Provide emotional support, including active listening and acknowledgement of concerns of patient and caregivers  3. Reduce environmental stimuli, as able  4. Instruct patient/family in relaxation techniques, as appropriate  5. Assess for spiritual pain/suffering and initiate Spiritual Care, Psychosocial Clinical Specialist consults as needed  1/11/2025 2137 by Iva Laird, RN  Outcome: Progressing     Problem: Confusion  Goal: Confusion, delirium, dementia, or psychosis is improved or at baseline  Description: INTERVENTIONS:  1. Assess for possible contributors to thought disturbance, including medications, impaired vision or hearing, underlying metabolic abnormalities, dehydration, psychiatric diagnoses, and notify attending LIP  2. [place] high risk fall precautions, as indicated  3. Provide frequent short contacts to provide reality reorientation, refocusing and direction  4. Decrease environmental stimuli, including noise as appropriate  5. Monitor and intervene to maintain adequate nutrition, hydration, elimination, sleep and activity  6. If unable to ensure safety without constant attention obtain sitter and review sitter guidelines with assigned personnel  7. Initiate Psychosocial CNS and Spiritual Care consult, as indicated  1/11/2025 2137 by Iva Laird, RN  Outcome: Progressing     Problem: Depression/Self Harm  Goal: Effect of psychiatric condition will be minimized and patient will be 
Care of pt was assumed at 1930. Pt was anxious, and tearful, but cooperative with care; allowing shift assessments, and taking her HS meds with no issues. She denied SI, HI, pain, and AVH, but endorsed anxiety and depression. PRN doses of ATARAX, 50mg PO and Trazodone, 50mg PO were given for anxiety and sleep at 2120. Meds were effective in calming pt, and helping her sleep. She slept for the most part of this shift. Concerning issues noted  include pt's occasional tearfulness, bizarre thoughts, and isolative behavior.  Problem: Confusion  Goal: Confusion, delirium, dementia, or psychosis is improved or at baseline  Description: INTERVENTIONS:  1. Assess for possible contributors to thought disturbance, including medications, impaired vision or hearing, underlying metabolic abnormalities, dehydration, psychiatric diagnoses, and notify attending LIP  2. [place] high risk fall precautions, as indicated  3. Provide frequent short contacts to provide reality reorientation, refocusing and direction  4. Decrease environmental stimuli, including noise as appropriate  5. Monitor and intervene to maintain adequate nutrition, hydration, elimination, sleep and activity  6. If unable to ensure safety without constant attention obtain sitter and review sitter guidelines with assigned personnel  7. Initiate Psychosocial CNS and Spiritual Care consult, as indicated  Outcome: Not Progressing    Problem: Drug Abuse/Detox  Goal: Will have no detox symptoms and will verbalize plan for changing drug-related behavior  Description: INTERVENTIONS:  1. Administer medication as ordered  2. Monitor physical status  3. Provide emotional support with 1:1 interaction with staff  4. Encourage  recovery focused treatment   Outcome: Progressing    Problem: Risk for Elopement  Goal: Patient will not exit the unit/facility without proper excort  Outcome: Progressing    Problem: Safety - Adult  Goal: Free from fall injury  Outcome: Progressing   
Patient is alert and oriented x1. Disoriented to time, place and situation. 1:1 safety sitter at bedside because of 02 use. Fall precautions intact. Uses no assistive device. Takes medications whole with water and without issue.  Patient is independent and continent. Cooperative with staff and care. Is visible on unit, sitting at table in dayroom.  Interacting with peers appropriately. Denies AH/VH/SI/HI and no RTIS noted. Eating meals in dayroom.  No signs of aggression. Patient denies pain at this time.  Increasing irritability and anxiety this AM. Confused as why she is here or how she got to the hospital. Unhappy with having to wear the non skid socks- wanted to wear the socks she came to the hospital in but they are not sufficiently non skid.  Pt became argumentative with staff about not being able to wear her shoes (because they have laces) and having to wear hospital issue socks over her personal socks. Patient in a better mood after she talked with her brother on the phone.     Problem: Coping  Goal: Pt/Family able to verbalize concerns and demonstrate effective coping strategies  Description: INTERVENTIONS:  1. Assist patient/family to identify coping skills, available support systems and cultural and spiritual values  2. Provide emotional support, including active listening and acknowledgement of concerns of patient and caregivers  3. Reduce environmental stimuli, as able  4. Instruct patient/family in relaxation techniques, as appropriate  5. Assess for spiritual pain/suffering and initiate Spiritual Care, Psychosocial Clinical Specialist consults as needed  1/11/2025 2137 by Iva Laird RN  Outcome: Progressing     Problem: Depression/Self Harm  Goal: Effect of psychiatric condition will be minimized and patient will be protected from self harm  Description: INTERVENTIONS:  1. Assess impact of patient's symptoms on level of functioning, self care needs and offer support as indicated  2. Assess 
Patient is alert and oriented x3. Takes medications whole with water and without issue. Answers questions appropriately. Is able to voice wants and needs. Attends groups. Patient is independent and continent. Calm, friendly and cooperative with staff and care. Is visible on unit.  Interacting with peers appropriately. Denies AH/VH/SI/HI and no RTIS noted. Eating meals in dayroom. No signs or symptoms of distress. No signs of aggression. Patient denies pain at this time.    Problem: Risk for Elopement  Goal: Patient will not exit the unit/facility without proper excort  Outcome: Progressing     Problem: Safety - Adult  Goal: Free from fall injury  Outcome: Progressing     Problem: Coping  Goal: Pt/Family able to verbalize concerns and demonstrate effective coping strategies  Description: INTERVENTIONS:  1. Assist patient/family to identify coping skills, available support systems and cultural and spiritual values  2. Provide emotional support, including active listening and acknowledgement of concerns of patient and caregivers  3. Reduce environmental stimuli, as able  4. Instruct patient/family in relaxation techniques, as appropriate  5. Assess for spiritual pain/suffering and initiate Spiritual Care, Psychosocial Clinical Specialist consults as needed  Outcome: Progressing     
Pt is friendly, cooperative with care, and A&O x4. Med compliant, denies SI/HI/AVH. Rates depression 0/10, anxiety 8/10. Pt has remained free from self-harm behaviors and is able to contract for safety. Complains of bilateral leg pain 5/10 and difficulty sleeping throughout the night. @2033, RN administered 650 mg Tylenol PO and 50 mg Trazodone PO. Upon reassessment, pt reports pain 5/10. Plan of care ongoing.    Problem: Depression/Self Harm  Goal: Effect of psychiatric condition will be minimized and patient will be protected from self harm  Description: INTERVENTIONS:  1. Assess impact of patient's symptoms on level of functioning, self care needs and offer support as indicated  2. Assess patient/family knowledge of depression, impact on illness and need for teaching  3. Provide emotional support, presence and reassurance  4. Assess for possible suicidal thoughts or ideation. If patient expresses suicidal thoughts or statements do not leave alone, initiate Suicide Precautions, move to a room close to the nursing station and obtain sitter  5. Initiate consults as appropriate with Mental Health Professional, Spiritual Care, Psychosocial CNS, and consider a recommendation to the LIP for a Psychiatric Consultation  Outcome: Progressing     Problem: Pain  Goal: Verbalizes/displays adequate comfort level or baseline comfort level  Outcome: Progressing  Flowsheets (Taken 1/17/2025 2130)  Verbalizes/displays adequate comfort level or baseline comfort level: Assess pain using appropriate pain scale     
Pt is labile. No detox symptoms noted. No exit seeking behaviors. Confused at times. No self harming behaviors noted. Isai 1:1.  
Pt is labile. No detox symptoms noted. No exit seeking behaviors. Confused at times. No self harming behaviors noted. Remains 1:1.   
Pt seen socializing well in dayroom. Pt talked her and pt responded well with bouts of anxiety. She denies SI, Avh and delusion but states that she's anxious about leaving tomorrow. Acknowledged feelings and concerned. Discussed about some proper coping skill and importance of taking medication. Pt understood simple instructions. Took medication willingly and added atarax and trazodone as requested. Pt uses O2 1 liter at night. Settled on bed at 2145. Needs attended. Kept monitored.   Problem: Confusion  Goal: Confusion, delirium, dementia, or psychosis is improved or at baseline  Outcome: Progressing  Flowsheets (Taken 1/20/2025 2344)  Effect of thought disturbance (confusion, delirium, dementia, or psychosis) are managed with adequate functional status:   Assess for contributors to thought disturbance, including medications, impaired vision or hearing, underlying metabolic abnormalities, dehydration, psychiatric diagnoses, notify LIP   Provide frequent short contacts to provide reality reorientation, refocusing and direction   [place] high risk fall precautions, as indicated   If unable to ensure safety without constant attention obtain sitter and review sitter guidelines with assigned personnel   Monitor and intervene to maintain adequate nutrition, hydration, elimination, sleep and activity   Decrease environmental stimuli, including noise as appropriate     Problem: Depression/Self Harm  Goal: Effect of psychiatric condition will be minimized and patient will be protected from self harm  Outcome: Progressing  Flowsheets (Taken 1/20/2025 2344)  Effect of psychiatric condition will be minimized and patient will be protected from self harm:   Provide emotional support, presence and reassurance   Assess impact of patient’s symptoms on level of functioning, self care needs and offer support as indicated   Assess patient/family knowledge of depression, impact on illness and need for teaching   Assess for suicidal 
thought disturbance, including medications, impaired vision or hearing, underlying metabolic abnormalities, dehydration, psychiatric diagnoses, and notify attending LIP  2. [place] high risk fall precautions, as indicated  3. Provide frequent short contacts to provide reality reorientation, refocusing and direction  4. Decrease environmental stimuli, including noise as appropriate  5. Monitor and intervene to maintain adequate nutrition, hydration, elimination, sleep and activity  6. If unable to ensure safety without constant attention obtain sitter and review sitter guidelines with assigned personnel  7. Initiate Psychosocial CNS and Spiritual Care consult, as indicated  Outcome: Progressing     Problem: Depression/Self Harm  Goal: Effect of psychiatric condition will be minimized and patient will be protected from self harm  Description: INTERVENTIONS:  1. Assess impact of patient's symptoms on level of functioning, self care needs and offer support as indicated  2. Assess patient/family knowledge of depression, impact on illness and need for teaching  3. Provide emotional support, presence and reassurance  4. Assess for possible suicidal thoughts or ideation. If patient expresses suicidal thoughts or statements do not leave alone, initiate Suicide Precautions, move to a room close to the nursing station and obtain sitter  5. Initiate consults as appropriate with Mental Health Professional, Spiritual Care, Psychosocial CNS, and consider a recommendation to the LIP for a Psychiatric Consultation  Outcome: Progressing     Problem: Involuntary Admit  Goal: Will cooperate with staff recommendations and doctor's orders and will demonstrate appropriate behavior  Description: INTERVENTIONS:  1. Treat underlying conditions and offer medication as ordered  2. Educate regarding involuntary admission procedures and rules  3. Contain excessive/inappropriate behavior per unit and hospital policies  Outcome: Progressing   
Assess for suicidal thoughts or ideation. If patient expresses suicidal thoughts or statements do not leave alone, initiate Suicide Precautions, move near nurse station, obtain sitter   Initiate consults as appropriate with Mental Health Professional, Spiritual Care, Psychosocial CNS, and consider a recommendation to the [facility]IP for a Psychiatric Consultation  1/20/2025 2344 by Aryan Campos, RN  Outcome: Progressing  Flowsheets (Taken 1/20/2025 2344)  Effect of psychiatric condition will be minimized and patient will be protected from self harm:   Provide emotional support, presence and reassurance   Assess impact of patient’s symptoms on level of functioning, self care needs and offer support as indicated   Assess patient/family knowledge of depression, impact on illness and need for teaching   Assess for suicidal thoughts or ideation. If patient expresses suicidal thoughts or statements do not leave alone, initiate Suicide Precautions, move near nurse station, obtain sitter   Initiate consults as appropriate with Mental Health Professional, Spiritual Care, Psychosocial CNS, and consider a recommendation to the [facility]IP for a Psychiatric Consultation     Problem: Involuntary Admit  Goal: Will cooperate with staff recommendations and doctor's orders and will demonstrate appropriate behavior  Description: INTERVENTIONS:  1. Treat underlying conditions and offer medication as ordered  2. Educate regarding involuntary admission procedures and rules  3. Contain excessive/inappropriate behavior per unit and hospital policies  1/21/2025 0916 by Bonny Logan, RN  Outcome: Adequate for Discharge  Flowsheets (Taken 1/21/2025 5139)  Will cooperate with staff recommendations and doctor's orders and will demonstrate appropriate behavior:   Treat underlying conditions and offer medication as ordered   Educate regarding involuntary admission procedures and rules   Contain excessive/inappropriate behavior per unit and hospital

## 2025-01-22 NOTE — DISCHARGE SUMMARY
and labs (see medical H&PE)  Labs:    Admission on 01/04/2025, Discharged on 01/21/2025   Component Date Value Ref Range Status    Cholesterol, Total 01/04/2025 129  0 - 199 mg/dL Final    Triglycerides 01/04/2025 51  0 - 150 mg/dL Final    HDL 01/04/2025 43  40 - 60 mg/dL Final    Comment: An HDL cholesterol less than 40 mg/dL is low and  constitutes a coronary heart disease risk factor.  An HDL cholesterol greater than 60 mg/dL is a  negative risk factor for coronary heart disease.      LDL Cholesterol 01/04/2025 76  <100 mg/dL Final    VLDL Cholesterol Calculated 01/04/2025 10  Not Established mg/dL Final    Hemoglobin A1C 01/04/2025 6.1  See comment % Final    Comment: Comment:  Diagnosis of Diabetes: > or = 6.5%  Increased risk of diabetes (Prediabetes): 5.7-6.4%  Glycemic Control: Nonpregnant Adults: <7.0%                    Pregnant: <6.0%        Estimated Avg Glucose 01/04/2025 128.4  mg/dL Final    TSH 01/04/2025 0.69  0.27 - 4.20 uIU/mL Final    Ventricular Rate 01/04/2025 77  BPM Final    Atrial Rate 01/04/2025 77  BPM Final    P-R Interval 01/04/2025 136  ms Final    QRS Duration 01/04/2025 86  ms Final    Q-T Interval 01/04/2025 430  ms Final    QTc Calculation (Bazett) 01/04/2025 486  ms Final    P Axis 01/04/2025 71  degrees Final    R Axis 01/04/2025 91  degrees Final    T Perry 01/04/2025 81  degrees Final    Diagnosis 01/04/2025    Final                    Value:Normal sinus rhythmRightward axisProlonged QTBorderline criteria for peaked T wavesAbnormal ECGWhen compared with ECG of 03-JAN-2025 22:02,Nonspecific T wave abnormality no longer evident in Anterolateral leadsAxis has shifted rightwardConfirmed by TRINITY Lin ([id number]) on 1/5/2025 11:54:55 AM      Ventricular Rate 01/07/2025 115  BPM Final    Atrial Rate 01/07/2025 115  BPM Final    P-R Interval 01/07/2025 124  ms Final    QRS Duration 01/07/2025 86  ms Final    Q-T Interval 01/07/2025 332  ms Final    QTc Calculation (Bazett) 01/07/2025

## 2025-01-24 ENCOUNTER — FOLLOWUP TELEPHONE ENCOUNTER (OUTPATIENT)
Dept: PSYCHIATRY | Age: 55
End: 2025-01-24

## 2025-01-29 ENCOUNTER — APPOINTMENT (OUTPATIENT)
Dept: GENERAL RADIOLOGY | Age: 55
End: 2025-01-29
Payer: MEDICAID

## 2025-01-29 ENCOUNTER — HOSPITAL ENCOUNTER (INPATIENT)
Age: 55
LOS: 3 days | Discharge: NURSING, REHAB OR LONG-TERM CARE | End: 2025-02-01
Attending: STUDENT IN AN ORGANIZED HEALTH CARE EDUCATION/TRAINING PROGRAM | Admitting: STUDENT IN AN ORGANIZED HEALTH CARE EDUCATION/TRAINING PROGRAM
Payer: MEDICAID

## 2025-01-29 DIAGNOSIS — J44.1 COPD EXACERBATION (HCC): Primary | ICD-10-CM

## 2025-01-29 DIAGNOSIS — J96.21 ACUTE ON CHRONIC HYPOXIC RESPIRATORY FAILURE: ICD-10-CM

## 2025-01-29 DIAGNOSIS — J10.1 INFLUENZA A: ICD-10-CM

## 2025-01-29 DIAGNOSIS — E87.1 HYPONATREMIA: ICD-10-CM

## 2025-01-29 DIAGNOSIS — E83.42 HYPOMAGNESEMIA: ICD-10-CM

## 2025-01-29 LAB
ANION GAP SERPL CALCULATED.3IONS-SCNC: 13 MMOL/L (ref 9–17)
ARTERIAL PATENCY WRIST A: NO
BASOPHILS # BLD: 0.02 K/UL
BASOPHILS NFR BLD: 0 % (ref 0–1)
BNP SERPL-MCNC: 168 PG/ML (ref 0–125)
BODY TEMPERATURE: 37
BUN SERPL-MCNC: 11 MG/DL (ref 7–20)
CALCIUM SERPL-MCNC: 8.8 MG/DL (ref 8.3–10.6)
CHLORIDE SERPL-SCNC: 88 MMOL/L (ref 99–110)
CO2 SERPL-SCNC: 24 MMOL/L (ref 21–32)
COHGB MFR BLD: 5.1 % (ref 0.5–1.5)
CREAT SERPL-MCNC: 0.4 MG/DL (ref 0.6–1.1)
EOSINOPHIL # BLD: 0 K/UL
EOSINOPHILS RELATIVE PERCENT: 0 % (ref 0–3)
ERYTHROCYTE [DISTWIDTH] IN BLOOD BY AUTOMATED COUNT: 20.6 % (ref 11.7–14.9)
ETHANOLAMINE SERPL-MCNC: <10 MG/DL (ref 0–0.08)
GFR, ESTIMATED: >90 ML/MIN/1.73M2
GLUCOSE SERPL-MCNC: 132 MG/DL (ref 74–99)
HCO3 VENOUS: 27.9 MMOL/L (ref 22–29)
HCT VFR BLD AUTO: 30.5 % (ref 37–47)
HGB BLD-MCNC: 9.6 G/DL (ref 12.5–16)
IMM GRANULOCYTES # BLD AUTO: 0.03 K/UL
IMM GRANULOCYTES NFR BLD: 0 %
INFLUENZA A BY PCR: DETECTED
INFLUENZA B BY PCR: NOT DETECTED
LACTATE BLDV-SCNC: 0.8 MMOL/L (ref 0.4–2)
LYMPHOCYTES NFR BLD: 0.46 K/UL
LYMPHOCYTES RELATIVE PERCENT: 5 % (ref 24–44)
MAGNESIUM SERPL-MCNC: 1.4 MG/DL (ref 1.8–2.4)
MCH RBC QN AUTO: 25.9 PG (ref 27–31)
MCHC RBC AUTO-ENTMCNC: 31.5 G/DL (ref 32–36)
MCV RBC AUTO: 82.4 FL (ref 78–100)
METHEMOGLOBIN: 0.3 % (ref 0.5–1.5)
MONOCYTES NFR BLD: 0.25 K/UL
MONOCYTES NFR BLD: 3 % (ref 0–4)
NEUTROPHILS NFR BLD: 92 % (ref 36–66)
NEUTS SEG NFR BLD: 9 K/UL
OXYHGB MFR BLD: 83.6 %
PCO2 VENOUS: 50.2 MM HG (ref 38–54)
PH VENOUS: 7.36 (ref 7.32–7.43)
PLATELET # BLD AUTO: 338 K/UL (ref 140–440)
PMV BLD AUTO: 8.9 FL (ref 7.5–11.1)
PO2 VENOUS: 59.2 MM HG (ref 23–48)
POSITIVE BASE EXCESS, VEN: 1.7 MMOL/L (ref 0–3)
POTASSIUM SERPL-SCNC: 5.5 MMOL/L (ref 3.5–5.1)
PROCALCITONIN SERPL-MCNC: 0.04 NG/ML
RBC # BLD AUTO: 3.7 M/UL (ref 4.2–5.4)
SARS-COV-2 RDRP RESP QL NAA+PROBE: NOT DETECTED
SODIUM SERPL-SCNC: 125 MMOL/L (ref 136–145)
SPECIMEN DESCRIPTION: NORMAL
TROPONIN I SERPL HS-MCNC: 11 NG/L (ref 0–14)
TROPONIN I SERPL HS-MCNC: 12 NG/L (ref 0–14)
WBC OTHER # BLD: 9.8 K/UL (ref 4–10.5)

## 2025-01-29 PROCEDURE — G0480 DRUG TEST DEF 1-7 CLASSES: HCPCS

## 2025-01-29 PROCEDURE — 6370000000 HC RX 637 (ALT 250 FOR IP): Performed by: STUDENT IN AN ORGANIZED HEALTH CARE EDUCATION/TRAINING PROGRAM

## 2025-01-29 PROCEDURE — 83880 ASSAY OF NATRIURETIC PEPTIDE: CPT

## 2025-01-29 PROCEDURE — 85025 COMPLETE CBC W/AUTO DIFF WBC: CPT

## 2025-01-29 PROCEDURE — 84145 PROCALCITONIN (PCT): CPT

## 2025-01-29 PROCEDURE — 82805 BLOOD GASES W/O2 SATURATION: CPT

## 2025-01-29 PROCEDURE — 93005 ELECTROCARDIOGRAM TRACING: CPT | Performed by: STUDENT IN AN ORGANIZED HEALTH CARE EDUCATION/TRAINING PROGRAM

## 2025-01-29 PROCEDURE — 36415 COLL VENOUS BLD VENIPUNCTURE: CPT

## 2025-01-29 PROCEDURE — 96365 THER/PROPH/DIAG IV INF INIT: CPT

## 2025-01-29 PROCEDURE — 71045 X-RAY EXAM CHEST 1 VIEW: CPT

## 2025-01-29 PROCEDURE — 2580000003 HC RX 258: Performed by: STUDENT IN AN ORGANIZED HEALTH CARE EDUCATION/TRAINING PROGRAM

## 2025-01-29 PROCEDURE — 2700000000 HC OXYGEN THERAPY PER DAY

## 2025-01-29 PROCEDURE — 87502 INFLUENZA DNA AMP PROBE: CPT

## 2025-01-29 PROCEDURE — 94640 AIRWAY INHALATION TREATMENT: CPT

## 2025-01-29 PROCEDURE — 87635 SARS-COV-2 COVID-19 AMP PRB: CPT

## 2025-01-29 PROCEDURE — 87040 BLOOD CULTURE FOR BACTERIA: CPT

## 2025-01-29 PROCEDURE — 83605 ASSAY OF LACTIC ACID: CPT

## 2025-01-29 PROCEDURE — 2140000000 HC CCU INTERMEDIATE R&B

## 2025-01-29 PROCEDURE — 94761 N-INVAS EAR/PLS OXIMETRY MLT: CPT

## 2025-01-29 PROCEDURE — 6360000002 HC RX W HCPCS: Performed by: STUDENT IN AN ORGANIZED HEALTH CARE EDUCATION/TRAINING PROGRAM

## 2025-01-29 PROCEDURE — 80048 BASIC METABOLIC PNL TOTAL CA: CPT

## 2025-01-29 PROCEDURE — 84484 ASSAY OF TROPONIN QUANT: CPT

## 2025-01-29 PROCEDURE — 99285 EMERGENCY DEPT VISIT HI MDM: CPT

## 2025-01-29 PROCEDURE — 94664 DEMO&/EVAL PT USE INHALER: CPT

## 2025-01-29 PROCEDURE — 83735 ASSAY OF MAGNESIUM: CPT

## 2025-01-29 RX ORDER — MAGNESIUM SULFATE IN WATER 40 MG/ML
2000 INJECTION, SOLUTION INTRAVENOUS PRN
Status: DISCONTINUED | OUTPATIENT
Start: 2025-01-29 | End: 2025-02-01 | Stop reason: HOSPADM

## 2025-01-29 RX ORDER — NICOTINE 21 MG/24HR
1 PATCH, TRANSDERMAL 24 HOURS TRANSDERMAL DAILY PRN
Status: DISCONTINUED | OUTPATIENT
Start: 2025-01-29 | End: 2025-02-01 | Stop reason: HOSPADM

## 2025-01-29 RX ORDER — ACETAMINOPHEN 650 MG/1
650 SUPPOSITORY RECTAL EVERY 6 HOURS PRN
Status: DISCONTINUED | OUTPATIENT
Start: 2025-01-29 | End: 2025-02-01 | Stop reason: HOSPADM

## 2025-01-29 RX ORDER — IPRATROPIUM BROMIDE AND ALBUTEROL SULFATE 2.5; .5 MG/3ML; MG/3ML
1 SOLUTION RESPIRATORY (INHALATION) EVERY 4 HOURS PRN
Status: DISCONTINUED | OUTPATIENT
Start: 2025-01-29 | End: 2025-02-01 | Stop reason: HOSPADM

## 2025-01-29 RX ORDER — ASPIRIN 81 MG/1
324 TABLET, CHEWABLE ORAL ONCE
Status: COMPLETED | OUTPATIENT
Start: 2025-01-29 | End: 2025-01-29

## 2025-01-29 RX ORDER — GUAIFENESIN 600 MG/1
600 TABLET, EXTENDED RELEASE ORAL 2 TIMES DAILY
Status: DISCONTINUED | OUTPATIENT
Start: 2025-01-29 | End: 2025-02-01 | Stop reason: HOSPADM

## 2025-01-29 RX ORDER — ALBUTEROL SULFATE 90 UG/1
2 INHALANT RESPIRATORY (INHALATION)
Status: DISCONTINUED | OUTPATIENT
Start: 2025-01-30 | End: 2025-02-01 | Stop reason: HOSPADM

## 2025-01-29 RX ORDER — SODIUM CHLORIDE 9 MG/ML
INJECTION, SOLUTION INTRAVENOUS PRN
Status: DISCONTINUED | OUTPATIENT
Start: 2025-01-29 | End: 2025-02-01 | Stop reason: HOSPADM

## 2025-01-29 RX ORDER — 0.9 % SODIUM CHLORIDE 0.9 %
30 INTRAVENOUS SOLUTION INTRAVENOUS ONCE
Status: COMPLETED | OUTPATIENT
Start: 2025-01-29 | End: 2025-01-30

## 2025-01-29 RX ORDER — IPRATROPIUM BROMIDE AND ALBUTEROL SULFATE 2.5; .5 MG/3ML; MG/3ML
1 SOLUTION RESPIRATORY (INHALATION)
Status: DISCONTINUED | OUTPATIENT
Start: 2025-01-29 | End: 2025-01-29

## 2025-01-29 RX ORDER — PANTOPRAZOLE SODIUM 40 MG/1
40 TABLET, DELAYED RELEASE ORAL
Status: DISCONTINUED | OUTPATIENT
Start: 2025-01-30 | End: 2025-02-01 | Stop reason: HOSPADM

## 2025-01-29 RX ORDER — GABAPENTIN 300 MG/1
300 CAPSULE ORAL DAILY
Status: DISCONTINUED | OUTPATIENT
Start: 2025-01-30 | End: 2025-02-01 | Stop reason: HOSPADM

## 2025-01-29 RX ORDER — OSELTAMIVIR PHOSPHATE 75 MG/1
75 CAPSULE ORAL 2 TIMES DAILY
Status: DISCONTINUED | OUTPATIENT
Start: 2025-01-29 | End: 2025-02-01 | Stop reason: HOSPADM

## 2025-01-29 RX ORDER — POTASSIUM CHLORIDE 7.45 MG/ML
10 INJECTION INTRAVENOUS PRN
Status: DISCONTINUED | OUTPATIENT
Start: 2025-01-29 | End: 2025-02-01 | Stop reason: HOSPADM

## 2025-01-29 RX ORDER — PREDNISONE 20 MG/1
40 TABLET ORAL DAILY
Status: DISCONTINUED | OUTPATIENT
Start: 2025-01-29 | End: 2025-01-29

## 2025-01-29 RX ORDER — ATORVASTATIN CALCIUM 10 MG/1
10 TABLET, FILM COATED ORAL NIGHTLY
Status: DISCONTINUED | OUTPATIENT
Start: 2025-01-30 | End: 2025-02-01 | Stop reason: HOSPADM

## 2025-01-29 RX ORDER — ONDANSETRON 4 MG/1
4 TABLET, ORALLY DISINTEGRATING ORAL EVERY 8 HOURS PRN
Status: DISCONTINUED | OUTPATIENT
Start: 2025-01-29 | End: 2025-02-01 | Stop reason: HOSPADM

## 2025-01-29 RX ORDER — LANOLIN ALCOHOL/MO/W.PET/CERES
100 CREAM (GRAM) TOPICAL DAILY
Status: DISCONTINUED | OUTPATIENT
Start: 2025-01-30 | End: 2025-02-01 | Stop reason: HOSPADM

## 2025-01-29 RX ORDER — PREDNISONE 20 MG/1
40 TABLET ORAL DAILY
Status: DISCONTINUED | OUTPATIENT
Start: 2025-01-30 | End: 2025-02-01 | Stop reason: HOSPADM

## 2025-01-29 RX ORDER — FOLIC ACID 1 MG/1
1 TABLET ORAL DAILY
Status: DISCONTINUED | OUTPATIENT
Start: 2025-01-30 | End: 2025-02-01 | Stop reason: HOSPADM

## 2025-01-29 RX ORDER — IPRATROPIUM BROMIDE AND ALBUTEROL SULFATE 2.5; .5 MG/3ML; MG/3ML
2 SOLUTION RESPIRATORY (INHALATION) ONCE
Status: COMPLETED | OUTPATIENT
Start: 2025-01-29 | End: 2025-01-29

## 2025-01-29 RX ORDER — OSELTAMIVIR PHOSPHATE 75 MG/1
75 CAPSULE ORAL ONCE
Status: DISCONTINUED | OUTPATIENT
Start: 2025-01-29 | End: 2025-01-29

## 2025-01-29 RX ORDER — SODIUM CHLORIDE 0.9 % (FLUSH) 0.9 %
5-40 SYRINGE (ML) INJECTION PRN
Status: DISCONTINUED | OUTPATIENT
Start: 2025-01-29 | End: 2025-02-01 | Stop reason: HOSPADM

## 2025-01-29 RX ORDER — SODIUM CHLORIDE 0.9 % (FLUSH) 0.9 %
5-40 SYRINGE (ML) INJECTION 2 TIMES DAILY
Status: DISCONTINUED | OUTPATIENT
Start: 2025-01-30 | End: 2025-02-01 | Stop reason: HOSPADM

## 2025-01-29 RX ORDER — POLYETHYLENE GLYCOL 3350 17 G/17G
17 POWDER, FOR SOLUTION ORAL DAILY PRN
Status: DISCONTINUED | OUTPATIENT
Start: 2025-01-29 | End: 2025-02-01 | Stop reason: HOSPADM

## 2025-01-29 RX ORDER — MAGNESIUM SULFATE IN WATER 40 MG/ML
2000 INJECTION, SOLUTION INTRAVENOUS ONCE
Status: COMPLETED | OUTPATIENT
Start: 2025-01-29 | End: 2025-01-29

## 2025-01-29 RX ORDER — DILTIAZEM HYDROCHLORIDE 240 MG/1
240 CAPSULE, COATED, EXTENDED RELEASE ORAL DAILY
Status: DISCONTINUED | OUTPATIENT
Start: 2025-01-30 | End: 2025-02-01 | Stop reason: HOSPADM

## 2025-01-29 RX ORDER — ATENOLOL 25 MG/1
25 TABLET ORAL DAILY
Status: DISCONTINUED | OUTPATIENT
Start: 2025-01-30 | End: 2025-02-01 | Stop reason: HOSPADM

## 2025-01-29 RX ORDER — ENOXAPARIN SODIUM 100 MG/ML
30 INJECTION SUBCUTANEOUS EVERY EVENING
Status: DISCONTINUED | OUTPATIENT
Start: 2025-01-30 | End: 2025-02-01 | Stop reason: HOSPADM

## 2025-01-29 RX ORDER — SODIUM CHLORIDE, SODIUM LACTATE, POTASSIUM CHLORIDE, CALCIUM CHLORIDE 600; 310; 30; 20 MG/100ML; MG/100ML; MG/100ML; MG/100ML
INJECTION, SOLUTION INTRAVENOUS CONTINUOUS
Status: DISCONTINUED | OUTPATIENT
Start: 2025-01-29 | End: 2025-01-29

## 2025-01-29 RX ORDER — ONDANSETRON 2 MG/ML
4 INJECTION INTRAMUSCULAR; INTRAVENOUS EVERY 6 HOURS PRN
Status: DISCONTINUED | OUTPATIENT
Start: 2025-01-29 | End: 2025-02-01 | Stop reason: HOSPADM

## 2025-01-29 RX ORDER — ACETAMINOPHEN 325 MG/1
650 TABLET ORAL EVERY 6 HOURS PRN
Status: DISCONTINUED | OUTPATIENT
Start: 2025-01-29 | End: 2025-02-01 | Stop reason: HOSPADM

## 2025-01-29 RX ORDER — BUDESONIDE AND FORMOTEROL FUMARATE DIHYDRATE 160; 4.5 UG/1; UG/1
2 AEROSOL RESPIRATORY (INHALATION)
Status: DISCONTINUED | OUTPATIENT
Start: 2025-01-29 | End: 2025-02-01 | Stop reason: HOSPADM

## 2025-01-29 RX ADMIN — ASPIRIN 324 MG: 81 TABLET, CHEWABLE ORAL at 18:29

## 2025-01-29 RX ADMIN — BUDESONIDE AND FORMOTEROL FUMARATE DIHYDRATE 2 PUFF: 160; 4.5 AEROSOL RESPIRATORY (INHALATION) at 22:06

## 2025-01-29 RX ADMIN — SODIUM CHLORIDE 1563 ML: 9 INJECTION, SOLUTION INTRAVENOUS at 18:32

## 2025-01-29 RX ADMIN — MAGNESIUM SULFATE HEPTAHYDRATE 2000 MG: 40 INJECTION, SOLUTION INTRAVENOUS at 18:51

## 2025-01-29 RX ADMIN — IPRATROPIUM BROMIDE AND ALBUTEROL SULFATE 2 DOSE: .5; 2.5 SOLUTION RESPIRATORY (INHALATION) at 17:08

## 2025-01-29 NOTE — ED PROVIDER NOTES
Shared Decision Making, Pt Expectation of Test or Tx.):     History from: see HPI & ED course  Clinical information obtained from an independent historian: Yes  Limitations to history : None        The patient was seen and examined unless otherwise specified. Appropriate diagnostic testing was performed and results reviewed with the patient.  My independent review and interpretation of data, imaging, labs and diagnostic evaluations are as above/below and in the ED Course.  Previous patient encounter documents & history available on EMR were reviewed  Case discussed with consulting clinician as above/below or per ED Course: [none if left blank]  admitting hospitalist  Shared Decision-Making was performed and disposition discussed with the Patient/Family and questions answered.   Social determinants of health impacting treatment or disposition: Considered and not applicable      PROCEDURES: (None if blank)  Procedures:       CRITICAL CARE: (None if blank)  Critical Care:    Upon my evaluation, this patient had a high probability of imminent or life-threatening deterioration due to sepsis alert; acute on chronic hypoxic respiratory failure, which required my direct attention, intervention, and personal management.    I have personally provided 35 minutes of critical care time exclusive of time spent on separately billable procedures. Time includes review of laboratory data, radiology results, discussion with consultants, and monitoring for potential decompensation. Interventions were performed as documented above.      Andrei Mcgrath MD, 01/29/25, 7:36 PM      MDM  Number of Diagnoses or Management Options  Acute on chronic hypoxic respiratory failure: new, needed workup  COPD exacerbation (HCC): new, needed workup  Hypomagnesemia: new, needed workup  Influenza A: new, needed workup     Amount and/or Complexity of Data Reviewed  Clinical lab tests: ordered and reviewed  Tests in the radiology section of CPT®:  capsule 75 mg (has no administration in time range)   sodium chloride 0.9 % bolus 1,563 mL (1,563 mLs IntraVENous New Bag 1/29/25 1832)   ipratropium 0.5 mg-albuterol 2.5 mg (DUONEB) nebulizer solution 2 Dose (2 Doses Inhalation Given 1/29/25 1708)   aspirin chewable tablet 324 mg (324 mg Oral Given 1/29/25 1829)   magnesium sulfate 2000 mg in 50 mL IVPB premix (0 mg IntraVENous Stopped 1/29/25 1924)       DISCHARGE PRESCRIPTIONS: (None if blank)  New Prescriptions    No medications on file       I have reviewed and interpreted all of the currently available lab results from this visit (if applicable):    Radiographs (if obtained):  Radiologist's Report Reviewed:  XR CHEST PORTABLE   Final Result          LABS: (none if blank)  Labs Reviewed   INFLUENZA A + B, PCR - Abnormal; Notable for the following components:       Result Value    Influenza A by PCR DETECTED (*)     All other components within normal limits   CBC WITH AUTO DIFFERENTIAL - Abnormal; Notable for the following components:    RBC 3.70 (*)     Hemoglobin 9.6 (*)     Hematocrit 30.5 (*)     MCH 25.9 (*)     MCHC 31.5 (*)     RDW 20.6 (*)     Neutrophils % 92 (*)     Lymphocytes % 5 (*)     All other components within normal limits   BASIC METABOLIC PANEL - Abnormal; Notable for the following components:    Sodium 125 (*)     Potassium 5.5 (*)     Chloride 88 (*)     Glucose 132 (*)     Creatinine 0.4 (*)     All other components within normal limits   BRAIN NATRIURETIC PEPTIDE - Abnormal; Notable for the following components:    NT Pro- (*)     All other components within normal limits   MAGNESIUM - Abnormal; Notable for the following components:    Magnesium 1.4 (*)     All other components within normal limits   BLOOD GAS, VENOUS - Abnormal; Notable for the following components:    PO2, Tommy 59.2 (*)     Carboxyhemoglobin 5.1 (*)     Methemoglobin 0.3 (*)     All other components within normal limits   COVID-19, RAPID   CULTURE, BLOOD 1

## 2025-01-29 NOTE — H&P
Concern: Food Insecurity - Food Insecurity Present (11/11/2024)    Hunger Vital Sign     Worried About Running Out of Food in the Last Year: Often true     Ran Out of Food in the Last Year: Sometimes true   Transportation Needs: Patient Unable To Answer (1/4/2025)    PRAPARE - Transportation     Lack of Transportation (Medical): Patient unable to answer     Lack of Transportation (Non-Medical): Patient unable to answer   Recent Concern: Transportation Needs - Unmet Transportation Needs (11/11/2024)    PRAPARE - Transportation     Lack of Transportation (Medical): Yes     Lack of Transportation (Non-Medical): Yes    [facility] from [facility]    Interpersonal Safety   Housing Stability: Patient Unable To Answer (1/4/2025)    Housing Stability Vital Sign     Unable to Pay for Housing in the Last Year: Patient unable to answer     Number of Times Moved in the Last Year: 0     Homeless in the Last Year: Patient unable to answer   Recent Concern: Housing Stability - High Risk (11/11/2024)    Housing Stability Vital Sign     Unable to Pay for Housing in the Last Year: Yes     Number of Times Moved in the Last Year: 2     Homeless in the Last Year: No       Medications Prior to Admission     Current Outpatient Medications   Medication Instructions    atenolol (TENORMIN) 25 mg, Oral, DAILY    atorvastatin (LIPITOR) 10 mg, Oral, NIGHTLY    budesonide-formoterol (SYMBICORT) 160-4.5 MCG/ACT AERO 2 puffs, Inhalation, 2 TIMES DAILY RESP    dilTIAZem (CARDIZEM CD) 240 mg, Oral, DAILY    folic acid (FOLVITE) 1 mg, Oral, DAILY    gabapentin (NEURONTIN) 300 mg, Oral, DAILY    guaiFENesin (MUCINEX) 1,200 mg, Oral, 2 TIMES DAILY PRN    lipase-protease-amylase (ZENPEP) 87726-92129 units CPEP delayed release capsule 20,000 Units, Oral, 3 TIMES DAILY WITH MEALS    Multiple Vitamin (MULTIVITAMIN) TABS tablet 1 tablet, Oral, DAILY    pantoprazole (PROTONIX) 40 mg, Oral,  DAILY BEFORE BREAKFAST    sertraline (ZOLOFT) 100 mg, Oral, DAILY    thiamine 100 mg, Oral, DAILY    tiotropium (SPIRIVA RESPIMAT) 2.5 MCG/ACT AERS inhaler 2 puffs, Inhalation, DAILY RESP       Medications:     Medications:    sodium chloride  30 mL/kg (Ideal) IntraVENous Once    predniSONE  50 mg Oral Once    magnesium sulfate  2,000 mg IntraVENous Once        Infusions:       PRN Meds:        Data:     CBC:   Recent Labs     01/29/25  1719   WBC 9.8   HGB 9.6*      MCV 82.4   RDW 20.6*   LYMPHOPCT 5*   MONOPCT 3   EOSPCT 0   BASOPCT 0   MONOSABS 0.25   LYMPHSABS 0.46   EOSABS 0.00   BASOSABS 0.02     CMP:    Recent Labs     01/29/25  1719   *   K 5.5*   CL 88*   CO2 24   BUN 11   CREATININE 0.4*   GLUCOSE 132*   CALCIUM 8.8     Lipids:   Lab Results   Component Value Date/Time    CHOL 129 01/04/2025 05:13 AM    HDL 43 01/04/2025 05:13 AM    TRIG 51 01/04/2025 05:13 AM     Hemoglobin A1C:   Lab Results   Component Value Date/Time    LABA1C 6.1 01/04/2025 05:13 AM     TSH:   Lab Results   Component Value Date/Time    TSH 1.10 01/07/2025 11:32 AM     Troponin: No results found for: \"TROPONINT\"  BNP:   Recent Labs     01/29/25  1719   PROBNP 168*     Lactic Acid: No results for input(s): \"LACTA\" in the last 72 hours.  UA:  Lab Results   Component Value Date/Time    NITRU Negative 01/04/2025 12:19 AM    COLORU Yellow 01/04/2025 12:19 AM    PHUR 6.0 01/04/2025 12:19 AM    PHUR 5.0 01/04/2025 12:19 AM    PHUR 6.5 01/02/2024 03:06 PM    PHUR 6.5 01/02/2024 03:06 PM    WBCUA 0-2 07/12/2015 01:16 PM    RBCUA 0-2 07/12/2015 01:16 PM    BACTERIA Rare 07/12/2015 01:16 PM    CLARITYU Clear 01/04/2025 12:19 AM    LEUKOCYTESUR Negative 01/04/2025 12:19 AM    UROBILINOGEN 0.2 01/04/2025 12:19 AM    BILIRUBINUR Negative 01/04/2025 12:19 AM    BLOODU Negative 01/04/2025 12:19 AM    GLUCOSEU Negative 01/04/2025 12:19 AM    KETUA TRACE 01/04/2025 12:19 AM     Urine Cultures: No results found for: \"LABURIN\"  Blood

## 2025-01-29 NOTE — ED TRIAGE NOTES
From [place], SOB since last night. O2 86% on arrival. Pt placed on 2LNC, wears o2 at home as needed, typically 1.5-2L.     When arrived to room 89% on 2LNC, placed on 4LNC, o2 to 93%

## 2025-01-30 LAB
ALBUMIN SERPL-MCNC: 3.5 G/DL (ref 3.4–5)
ALBUMIN/GLOB SERPL: 1.2 {RATIO} (ref 1.1–2.2)
ALP SERPL-CCNC: 89 U/L (ref 40–129)
ALT SERPL-CCNC: 12 U/L (ref 10–40)
AMPHET UR QL SCN: NEGATIVE
ANION GAP SERPL CALCULATED.3IONS-SCNC: 8 MMOL/L (ref 9–17)
ARTERIAL PATENCY WRIST A: ABNORMAL
ARTERIAL PATENCY WRIST A: ABNORMAL
AST SERPL-CCNC: 20 U/L (ref 15–37)
BARBITURATES UR QL SCN: NEGATIVE
BASOPHILS # BLD: 0.01 K/UL
BASOPHILS NFR BLD: 0 % (ref 0–1)
BENZODIAZ UR QL: NEGATIVE
BILIRUB SERPL-MCNC: <0.2 MG/DL (ref 0–1)
BODY TEMPERATURE: 37
BODY TEMPERATURE: 37
BUN SERPL-MCNC: 6 MG/DL (ref 7–20)
CALCIUM SERPL-MCNC: 8.6 MG/DL (ref 8.3–10.6)
CANNABINOIDS UR QL SCN: NEGATIVE
CHLORIDE SERPL-SCNC: 98 MMOL/L (ref 99–110)
CO2 SERPL-SCNC: 27 MMOL/L (ref 21–32)
COCAINE UR QL SCN: NEGATIVE
COHGB MFR BLD: 0.3 % (ref 0.5–1.5)
COHGB MFR BLD: 0.3 % (ref 0.5–1.5)
CREAT SERPL-MCNC: 0.3 MG/DL (ref 0.6–1.1)
EKG ATRIAL RATE: 113 BPM
EKG DIAGNOSIS: NORMAL
EKG P AXIS: 77 DEGREES
EKG P-R INTERVAL: 124 MS
EKG Q-T INTERVAL: 362 MS
EKG QRS DURATION: 80 MS
EKG QTC CALCULATION (BAZETT): 496 MS
EKG R AXIS: 100 DEGREES
EKG T AXIS: 75 DEGREES
EKG VENTRICULAR RATE: 113 BPM
EOSINOPHIL # BLD: 0 K/UL
EOSINOPHILS RELATIVE PERCENT: 0 % (ref 0–3)
ERYTHROCYTE [DISTWIDTH] IN BLOOD BY AUTOMATED COUNT: 20.6 % (ref 11.7–14.9)
EST. AVERAGE GLUCOSE BLD GHB EST-MCNC: 146 MG/DL
FENTANYL UR QL: NEGATIVE
GFR, ESTIMATED: >90 ML/MIN/1.73M2
GLUCOSE SERPL-MCNC: 96 MG/DL (ref 74–99)
HBA1C MFR BLD: 6.7 % (ref 4.2–6.3)
HCO3 VENOUS: 29.7 MMOL/L (ref 22–29)
HCO3 VENOUS: 30 MMOL/L (ref 22–29)
HCT VFR BLD AUTO: 27.3 % (ref 37–47)
HGB BLD-MCNC: 8.5 G/DL (ref 12.5–16)
IMM GRANULOCYTES # BLD AUTO: 0.02 K/UL
IMM GRANULOCYTES NFR BLD: 0 %
INR PPP: 1
LYMPHOCYTES NFR BLD: 1.34 K/UL
LYMPHOCYTES RELATIVE PERCENT: 19 % (ref 24–44)
MCH RBC QN AUTO: 26.1 PG (ref 27–31)
MCHC RBC AUTO-ENTMCNC: 31.1 G/DL (ref 32–36)
MCV RBC AUTO: 83.7 FL (ref 78–100)
METHEMOGLOBIN: 0.5 % (ref 0.5–1.5)
METHEMOGLOBIN: 0.6 % (ref 0.5–1.5)
MONOCYTES NFR BLD: 0.59 K/UL
MONOCYTES NFR BLD: 8 % (ref 0–4)
NEUTROPHILS NFR BLD: 73 % (ref 36–66)
NEUTS SEG NFR BLD: 5.3 K/UL
OPIATES UR QL SCN: NEGATIVE
OXYCODONE UR QL SCN: NEGATIVE
OXYHGB MFR BLD: 76.4 %
OXYHGB MFR BLD: 89 %
PCO2 VENOUS: 56.3 MM HG (ref 38–54)
PCO2 VENOUS: 60.5 MM HG (ref 38–54)
PH VENOUS: 7.31 (ref 7.32–7.43)
PH VENOUS: 7.34 (ref 7.32–7.43)
PLATELET # BLD AUTO: 282 K/UL (ref 140–440)
PMV BLD AUTO: 8.7 FL (ref 7.5–11.1)
PO2 VENOUS: 45.7 MM HG (ref 23–48)
PO2 VENOUS: 66.4 MM HG (ref 23–48)
POSITIVE BASE EXCESS, VEN: 3 MMOL/L (ref 0–3)
POSITIVE BASE EXCESS, VEN: 3 MMOL/L (ref 0–3)
POTASSIUM SERPL-SCNC: 4.3 MMOL/L (ref 3.5–5.1)
PROT SERPL-MCNC: 6.5 G/DL (ref 6.4–8.2)
PROTHROMBIN TIME: 13.2 SEC (ref 11.7–14.5)
RBC # BLD AUTO: 3.26 M/UL (ref 4.2–5.4)
SODIUM SERPL-SCNC: 134 MMOL/L (ref 136–145)
TEST INFORMATION: NORMAL
TEXT FOR RESPIRATORY: ABNORMAL
TSH SERPL DL<=0.05 MIU/L-ACNC: 1.03 UIU/ML (ref 0.27–4.2)
WBC OTHER # BLD: 7.3 K/UL (ref 4–10.5)

## 2025-01-30 PROCEDURE — 80307 DRUG TEST PRSMV CHEM ANLYZR: CPT

## 2025-01-30 PROCEDURE — 6370000000 HC RX 637 (ALT 250 FOR IP): Performed by: STUDENT IN AN ORGANIZED HEALTH CARE EDUCATION/TRAINING PROGRAM

## 2025-01-30 PROCEDURE — 2580000003 HC RX 258: Performed by: STUDENT IN AN ORGANIZED HEALTH CARE EDUCATION/TRAINING PROGRAM

## 2025-01-30 PROCEDURE — 2700000000 HC OXYGEN THERAPY PER DAY

## 2025-01-30 PROCEDURE — 82805 BLOOD GASES W/O2 SATURATION: CPT

## 2025-01-30 PROCEDURE — 84443 ASSAY THYROID STIM HORMONE: CPT

## 2025-01-30 PROCEDURE — 85025 COMPLETE CBC W/AUTO DIFF WBC: CPT

## 2025-01-30 PROCEDURE — 2500000003 HC RX 250 WO HCPCS: Performed by: STUDENT IN AN ORGANIZED HEALTH CARE EDUCATION/TRAINING PROGRAM

## 2025-01-30 PROCEDURE — 80053 COMPREHEN METABOLIC PANEL: CPT

## 2025-01-30 PROCEDURE — 36415 COLL VENOUS BLD VENIPUNCTURE: CPT

## 2025-01-30 PROCEDURE — 6360000002 HC RX W HCPCS: Performed by: STUDENT IN AN ORGANIZED HEALTH CARE EDUCATION/TRAINING PROGRAM

## 2025-01-30 PROCEDURE — 83036 HEMOGLOBIN GLYCOSYLATED A1C: CPT

## 2025-01-30 PROCEDURE — 94640 AIRWAY INHALATION TREATMENT: CPT

## 2025-01-30 PROCEDURE — 85610 PROTHROMBIN TIME: CPT

## 2025-01-30 PROCEDURE — 93010 ELECTROCARDIOGRAM REPORT: CPT | Performed by: INTERNAL MEDICINE

## 2025-01-30 PROCEDURE — 2140000000 HC CCU INTERMEDIATE R&B

## 2025-01-30 PROCEDURE — 94761 N-INVAS EAR/PLS OXIMETRY MLT: CPT

## 2025-01-30 RX ADMIN — PREDNISONE 40 MG: 20 TABLET ORAL at 08:42

## 2025-01-30 RX ADMIN — PANCRELIPASE LIPASE, PANCRELIPASE PROTEASE, PANCRELIPASE AMYLASE 20000 UNITS: 5000; 17000; 24000 CAPSULE, DELAYED RELEASE ORAL at 08:41

## 2025-01-30 RX ADMIN — ATENOLOL 25 MG: 25 TABLET ORAL at 08:42

## 2025-01-30 RX ADMIN — PANCRELIPASE LIPASE, PANCRELIPASE PROTEASE, PANCRELIPASE AMYLASE 20000 UNITS: 5000; 17000; 24000 CAPSULE, DELAYED RELEASE ORAL at 16:59

## 2025-01-30 RX ADMIN — PANCRELIPASE LIPASE, PANCRELIPASE PROTEASE, PANCRELIPASE AMYLASE 20000 UNITS: 5000; 17000; 24000 CAPSULE, DELAYED RELEASE ORAL at 12:10

## 2025-01-30 RX ADMIN — BUDESONIDE AND FORMOTEROL FUMARATE DIHYDRATE 2 PUFF: 160; 4.5 AEROSOL RESPIRATORY (INHALATION) at 09:17

## 2025-01-30 RX ADMIN — GUAIFENESIN 600 MG: 600 TABLET ORAL at 19:47

## 2025-01-30 RX ADMIN — PANTOPRAZOLE SODIUM 40 MG: 40 TABLET, DELAYED RELEASE ORAL at 05:52

## 2025-01-30 RX ADMIN — SODIUM CHLORIDE, PRESERVATIVE FREE 10 ML: 5 INJECTION INTRAVENOUS at 02:03

## 2025-01-30 RX ADMIN — ATORVASTATIN CALCIUM 10 MG: 10 TABLET, FILM COATED ORAL at 19:47

## 2025-01-30 RX ADMIN — AZITHROMYCIN MONOHYDRATE 500 MG: 500 INJECTION, POWDER, LYOPHILIZED, FOR SOLUTION INTRAVENOUS at 02:09

## 2025-01-30 RX ADMIN — OSELTAMIVIR PHOSPHATE 75 MG: 75 CAPSULE ORAL at 02:13

## 2025-01-30 RX ADMIN — OSELTAMIVIR PHOSPHATE 75 MG: 75 CAPSULE ORAL at 19:47

## 2025-01-30 RX ADMIN — ACETAMINOPHEN 650 MG: 325 TABLET ORAL at 18:33

## 2025-01-30 RX ADMIN — SERTRALINE HYDROCHLORIDE 100 MG: 50 TABLET ORAL at 08:43

## 2025-01-30 RX ADMIN — DILTIAZEM HYDROCHLORIDE 240 MG: 240 CAPSULE, COATED, EXTENDED RELEASE ORAL at 08:43

## 2025-01-30 RX ADMIN — Medication 100 MG: at 08:42

## 2025-01-30 RX ADMIN — ALBUTEROL SULFATE 2 PUFF: 90 AEROSOL, METERED RESPIRATORY (INHALATION) at 17:21

## 2025-01-30 RX ADMIN — ONDANSETRON 4 MG: 2 INJECTION INTRAMUSCULAR; INTRAVENOUS at 02:20

## 2025-01-30 RX ADMIN — GUAIFENESIN 600 MG: 600 TABLET ORAL at 02:03

## 2025-01-30 RX ADMIN — ALBUTEROL SULFATE 2 PUFF: 90 AEROSOL, METERED RESPIRATORY (INHALATION) at 09:16

## 2025-01-30 RX ADMIN — SODIUM CHLORIDE, PRESERVATIVE FREE 10 ML: 5 INJECTION INTRAVENOUS at 08:48

## 2025-01-30 RX ADMIN — FOLIC ACID 1 MG: 1 TABLET ORAL at 08:43

## 2025-01-30 RX ADMIN — OSELTAMIVIR PHOSPHATE 75 MG: 75 CAPSULE ORAL at 08:40

## 2025-01-30 RX ADMIN — ACETAMINOPHEN 650 MG: 325 TABLET ORAL at 08:42

## 2025-01-30 RX ADMIN — ENOXAPARIN SODIUM 30 MG: 100 INJECTION SUBCUTANEOUS at 16:59

## 2025-01-30 RX ADMIN — SODIUM CHLORIDE, PRESERVATIVE FREE 10 ML: 5 INJECTION INTRAVENOUS at 19:47

## 2025-01-30 RX ADMIN — GABAPENTIN 300 MG: 300 CAPSULE ORAL at 08:42

## 2025-01-30 RX ADMIN — GUAIFENESIN 600 MG: 600 TABLET ORAL at 08:43

## 2025-01-30 ASSESSMENT — PAIN SCALES - GENERAL
PAINLEVEL_OUTOF10: 5
PAINLEVEL_OUTOF10: 8

## 2025-01-30 ASSESSMENT — PAIN DESCRIPTION - LOCATION: LOCATION: GENERALIZED

## 2025-01-30 NOTE — PROGRESS NOTES
mg/dL    Creatinine 0.4 (L) 0.6 - 1.1 mg/dL    Est, Glom Filt Rate >90 >60 mL/min/1.73m2    Calcium 8.8 8.3 - 10.6 mg/dL   Troponin Now and Q1Hr    Collection Time: 01/29/25  5:19 PM   Result Value Ref Range    Troponin, High Sensitivity 12 0 - 14 ng/L   Brain Natriuretic Peptide    Collection Time: 01/29/25  5:19 PM   Result Value Ref Range    NT Pro- (H) 0 - 125 pg/mL   Procalcitonin    Collection Time: 01/29/25  5:19 PM   Result Value Ref Range    Procalcitonin 0.04 ng/mL   Lactate, Sepsis    Collection Time: 01/29/25  5:19 PM   Result Value Ref Range    Lactic Acid, Sepsis 0.8 0.4 - 2.0 mmol/L   Magnesium    Collection Time: 01/29/25  5:19 PM   Result Value Ref Range    Magnesium 1.4 (L) 1.8 - 2.4 mg/dL   ETOH    Collection Time: 01/29/25  5:19 PM   Result Value Ref Range    Ethanol Lvl <10 <10 mg/dL   Blood Gas, Venous    Collection Time: 01/29/25  5:38 PM   Result Value Ref Range    pH, Tommy 7.363 7.320 - 7.430    pCO2, Tommy 50.2 38 - 54 mm Hg    PO2, Tommy 59.2 (H) 23 - 48 mm Hg    HCO3, Venous 27.9 22 - 29 mmol/L    Positive Base Excess, Tommy 1.7 0 - 3 mmol/L    Oxyhemoglobin 83.6 %    Carboxyhemoglobin 5.1 (H) 0.5 - 1.5 %    Methemoglobin 0.3 (L) 0.5 - 1.5 %    Pt Temp 37.0     Cam Test NO    EKG 12 Lead    Collection Time: 01/29/25  6:17 PM   Result Value Ref Range    Ventricular Rate 113 BPM    Atrial Rate 113 BPM    P-R Interval 124 ms    QRS Duration 80 ms    Q-T Interval 362 ms    QTc Calculation (Bazett) 496 ms    P Axis 77 degrees    R Axis 100 degrees    T Axis 75 degrees    Diagnosis       Sinus tachycardia  Rightward axis  Borderline ECG  No previous ECGs available     Troponin Now and Q1Hr    Collection Time: 01/29/25  6:25 PM   Result Value Ref Range    Troponin, High Sensitivity 11 0 - 14 ng/L   COVID-19, Rapid    Collection Time: 01/29/25  6:28 PM    Specimen: Nasopharyngeal Swab   Result Value Ref Range    Specimen Description .NASOPHARYNGEAL SWAB     SARS-CoV-2, Rapid Not Detected Not  Detected   Influenza A+B, PCR    Collection Time: 01/29/25  6:28 PM    Specimen: Nasal   Result Value Ref Range    Influenza A by PCR DETECTED (A) Not Detected    Influenza B by PCR Not Detected Not Detected   Comprehensive Metabolic Panel w/ Reflex to MG    Collection Time: 01/30/25  5:11 AM   Result Value Ref Range    Sodium 134 (L) 136 - 145 mmol/L    Potassium 4.3 3.5 - 5.1 mmol/L    Chloride 98 (L) 99 - 110 mmol/L    CO2 27 21 - 32 mmol/L    Anion Gap 8 (L) 9 - 17 mmol/L    Glucose 96 74 - 99 mg/dL    BUN 6 (L) 7 - 20 mg/dL    Creatinine 0.3 (L) 0.6 - 1.1 mg/dL    Est, Glom Filt Rate >90 >60 mL/min/1.73m2    Calcium 8.6 8.3 - 10.6 mg/dL    Total Protein 6.5 6.4 - 8.2 g/dL    Albumin 3.5 3.4 - 5.0 g/dL    Albumin/Globulin Ratio 1.2 1.1 - 2.2    Total Bilirubin <0.2 0.0 - 1.0 mg/dL    Alkaline Phosphatase 89 40 - 129 U/L    ALT 12 10 - 40 U/L    AST 20 15 - 37 U/L   CBC with Auto Differential    Collection Time: 01/30/25  5:11 AM   Result Value Ref Range    WBC 7.3 4.0 - 10.5 k/uL    RBC 3.26 (L) 4.20 - 5.40 m/uL    Hemoglobin 8.5 (L) 12.5 - 16.0 g/dL    Hematocrit 27.3 (L) 37.0 - 47.0 %    MCV 83.7 78.0 - 100.0 fL    MCH 26.1 (L) 27.0 - 31.0 pg    MCHC 31.1 (L) 32.0 - 36.0 g/dL    RDW 20.6 (H) 11.7 - 14.9 %    Platelets 282 140 - 440 k/uL    MPV 8.7 7.5 - 11.1 fL    Neutrophils % 73 (H) 36 - 66 %    Lymphocytes % 19 (L) 24 - 44 %    Monocytes % 8 (H) 0 - 4 %    Eosinophils % 0 0 - 3 %    Basophils % 0 0 - 1 %    Immature Granulocytes % 0 0 %    Neutrophils Absolute 5.30 k/uL    Lymphocytes Absolute 1.34 k/uL    Monocytes Absolute 0.59 k/uL    Eosinophils Absolute 0.00 k/uL    Basophils Absolute 0.01 k/uL    Immature Granulocytes Absolute 0.02 k/uL   Protime-INR    Collection Time: 01/30/25  5:11 AM   Result Value Ref Range    Protime 13.2 11.7 - 14.5 sec    INR 1.0    TSH reflex to FT4    Collection Time: 01/30/25  5:11 AM   Result Value Ref Range    TSH 1.03 0.27 - 4.20 uIU/mL   Blood Gas, Venous    Collection

## 2025-01-30 NOTE — PLAN OF CARE
Problem: Discharge Planning  Goal: Discharge to home or other facility with appropriate resources  1/30/2025 1055 by Pushpa Retana, RN  Outcome: Progressing  1/30/2025 0346 by Yana Serrano RN  Outcome: Progressing     Problem: Safety - Adult  Goal: Free from fall injury  Outcome: Progressing     Problem: Pain  Goal: Verbalizes/displays adequate comfort level or baseline comfort level  Outcome: Progressing

## 2025-01-30 NOTE — ED NOTES
ED TO INPATIENT SBAR HANDOFF    Patient Name: Bonny Serrano   :  1970  54 y.o.   Preferred Name  Bonny  Family/Caregiver Present no   Restraints no   C-SSRS:    Sitter no   Sepsis Risk Score        Situation  Chief Complaint   Patient presents with    Shortness of Breath     Brief Description of Patient's Condition: c/o SOB, O2 in 80's on arrival. 4LNC 96%.  Hx afib, COPD, wear supplemental oxygen at home, usually 1.5-2L if o2 less than 90. From [facility] does own ADLs  Mental Status: oriented, alert, coherent, logical, thought processes intact, and able to concentrate and follow conversation  Arrived from: [facility]    Imaging:   XR CHEST PORTABLE   Final Result        Abnormal labs:   Abnormal Labs Reviewed   INFLUENZA A + B, PCR - Abnormal; Notable for the following components:       Result Value    Influenza A by PCR DETECTED (*)     All other components within normal limits   CBC WITH AUTO DIFFERENTIAL - Abnormal; Notable for the following components:    RBC 3.70 (*)     Hemoglobin 9.6 (*)     Hematocrit 30.5 (*)     MCH 25.9 (*)     MCHC 31.5 (*)     RDW 20.6 (*)     Neutrophils % 92 (*)     Lymphocytes % 5 (*)     All other components within normal limits   BASIC METABOLIC PANEL - Abnormal; Notable for the following components:    Sodium 125 (*)     Potassium 5.5 (*)     Chloride 88 (*)     Glucose 132 (*)     Creatinine 0.4 (*)     All other components within normal limits   BRAIN NATRIURETIC PEPTIDE - Abnormal; Notable for the following components:    NT Pro- (*)     All other components within normal limits   MAGNESIUM - Abnormal; Notable for the following components:    Magnesium 1.4 (*)     All other components within normal limits   BLOOD GAS, VENOUS - Abnormal; Notable for the following components:    PO2, Tommy 59.2 (*)     Carboxyhemoglobin 5.1 (*)     Methemoglobin 0.3 (*)     All other components within normal limits        Background  History:   Past Medical History:

## 2025-01-30 NOTE — PROGRESS NOTES
4 Eyes Skin Assessment     NAME:  Bonny Serrano  YOB: 1970  MEDICAL RECORD NUMBER:  [id number]    The patient is being assessed for  Admission    I agree that at least one RN has performed a thorough Head to Toe Skin Assessment on the patient. ALL assessment sites listed below have been assessed.      Areas assessed by both nurses:    Head, Face, Ears, Shoulders, Back, Chest, Arms, Elbows, Hands, Sacrum. Buttock, Coccyx, Ischium, and Legs. Feet and Heels        Does the Patient have a Wound? No noted wound(s)       [place] Prevention initiated by RN: No  Wound Care Orders initiated by RN: No    Pressure Injury (Stage 3,4, Unstageable, DTI, NWPT, and Complex wounds) if present, place Wound referral order by RN under : No    New Ostomies, if present place, Ostomy referral order under : No     Nurse 1 eSignature: Electronically signed by Yana Serrano RN on 1/30/25 at 1:08 AM EST    **SHARE this note so that the co-signing nurse can place an eSignature**    Nurse 2 eSignature: Electronically signed by Katherine Mohan RN on 1/30/25 at 6:30 AM EST

## 2025-01-30 NOTE — RT PROTOCOL NOTE
RT Inhaler-Nebulizer Bronchodilator Protocol Note    There is a bronchodilator order in the chart from a provider indicating to follow the RT Bronchodilator Protocol and there is an “Initiate RT Inhaler-Nebulizer Bronchodilator Protocol” order as well (see protocol at bottom of note).    CXR Findings:  No results found.    The findings from the last RT Protocol Assessment were as follows:   History Pulmonary Disease: Chronic pulmonary disease  Respiratory Pattern: Dyspnea on exertion or RR 21-25 bpm  Breath Sounds: Intermittent or unilateral wheezes  Cough: Strong, spontaneous, non-productive  Indication for Bronchodilator Therapy:    Bronchodilator Assessment Score: 8    Aerosolized bronchodilator medication orders have been revised according to the RT Inhaler-Nebulizer Bronchodilator Protocol below.    Respiratory Therapist to perform RT Therapy Protocol Assessment initially then follow the protocol.  Repeat RT Therapy Protocol Assessment PRN for score 0-3 or on second treatment, BID, and PRN for scores above 3.    No Indications - adjust the frequency to every 6 hours PRN wheezing or bronchospasm, if no treatments needed after 48 hours then discontinue using Per Protocol order mode.     If indication present, adjust the RT bronchodilator orders based on the Bronchodilator Assessment Score as indicated below.  Use Inhaler orders unless patient has one or more of the following: on home nebulizer, not able to hold breath for 10 seconds, is not alert and oriented, cannot activate and use MDI correctly, or respiratory rate 25 breaths per minute or more, then use the equivalent nebulizer order(s) with same Frequency and PRN reasons based on the score.  If a patient is on this medication at home then do not decrease Frequency below that used at home.    0-3 - enter or revise RT bronchodilator order(s) to equivalent RT Bronchodilator order with Frequency of every 4 hours PRN for wheezing or increased work of breathing

## 2025-01-30 NOTE — CARE COORDINATION
01/30/25 1044   Service Assessment   Patient Orientation Alert and Oriented   Cognition Alert   History Provided By Patient   Primary Caregiver Self   Support Systems Family Members   Patient's Healthcare Decision Maker is: Named in Scanned ACP Document   PCP Verified by CM Yes   Prior Functional Level Independent in ADLs/IADLs   Current Functional Level Independent in ADLs/IADLs   Can patient return to prior living arrangement Unknown at present   Ability to make needs known: Good   Family able to assist with home care needs: No   Would you like for me to discuss the discharge plan with any other family members/significant others, and if so, who? No   Financial Resources Medicaid Community Resources None     CM in to see Pt to initiate discharge planning.  Pt is from [facility] and plans to return at discharge.     CM call [facility] [facility] [phone] to verify any needs for Pt to return, left  for return call.

## 2025-01-31 PROCEDURE — 94640 AIRWAY INHALATION TREATMENT: CPT

## 2025-01-31 PROCEDURE — 2580000003 HC RX 258: Performed by: STUDENT IN AN ORGANIZED HEALTH CARE EDUCATION/TRAINING PROGRAM

## 2025-01-31 PROCEDURE — 94761 N-INVAS EAR/PLS OXIMETRY MLT: CPT

## 2025-01-31 PROCEDURE — 2140000000 HC CCU INTERMEDIATE R&B

## 2025-01-31 PROCEDURE — 6360000002 HC RX W HCPCS: Performed by: STUDENT IN AN ORGANIZED HEALTH CARE EDUCATION/TRAINING PROGRAM

## 2025-01-31 PROCEDURE — 6370000000 HC RX 637 (ALT 250 FOR IP): Performed by: STUDENT IN AN ORGANIZED HEALTH CARE EDUCATION/TRAINING PROGRAM

## 2025-01-31 PROCEDURE — 2500000003 HC RX 250 WO HCPCS: Performed by: STUDENT IN AN ORGANIZED HEALTH CARE EDUCATION/TRAINING PROGRAM

## 2025-01-31 PROCEDURE — 2700000000 HC OXYGEN THERAPY PER DAY

## 2025-01-31 RX ADMIN — ALBUTEROL SULFATE 2 PUFF: 90 AEROSOL, METERED RESPIRATORY (INHALATION) at 15:16

## 2025-01-31 RX ADMIN — SODIUM CHLORIDE, PRESERVATIVE FREE 10 ML: 5 INJECTION INTRAVENOUS at 09:50

## 2025-01-31 RX ADMIN — ACETAMINOPHEN 650 MG: 325 TABLET ORAL at 02:46

## 2025-01-31 RX ADMIN — FOLIC ACID 1 MG: 1 TABLET ORAL at 09:49

## 2025-01-31 RX ADMIN — PANTOPRAZOLE SODIUM 40 MG: 40 TABLET, DELAYED RELEASE ORAL at 06:18

## 2025-01-31 RX ADMIN — Medication 100 MG: at 09:49

## 2025-01-31 RX ADMIN — AZITHROMYCIN MONOHYDRATE 500 MG: 500 INJECTION, POWDER, LYOPHILIZED, FOR SOLUTION INTRAVENOUS at 02:43

## 2025-01-31 RX ADMIN — SERTRALINE HYDROCHLORIDE 100 MG: 50 TABLET ORAL at 09:50

## 2025-01-31 RX ADMIN — SODIUM CHLORIDE, PRESERVATIVE FREE 10 ML: 5 INJECTION INTRAVENOUS at 20:43

## 2025-01-31 RX ADMIN — ALBUTEROL SULFATE 2 PUFF: 90 AEROSOL, METERED RESPIRATORY (INHALATION) at 22:55

## 2025-01-31 RX ADMIN — OSELTAMIVIR PHOSPHATE 75 MG: 75 CAPSULE ORAL at 20:43

## 2025-01-31 RX ADMIN — GUAIFENESIN 600 MG: 600 TABLET ORAL at 09:49

## 2025-01-31 RX ADMIN — PANCRELIPASE LIPASE, PANCRELIPASE PROTEASE, PANCRELIPASE AMYLASE 20000 UNITS: 5000; 17000; 24000 CAPSULE, DELAYED RELEASE ORAL at 09:49

## 2025-01-31 RX ADMIN — ACETAMINOPHEN 650 MG: 325 TABLET ORAL at 15:12

## 2025-01-31 RX ADMIN — DILTIAZEM HYDROCHLORIDE 240 MG: 240 CAPSULE, COATED, EXTENDED RELEASE ORAL at 09:49

## 2025-01-31 RX ADMIN — ALBUTEROL SULFATE 2 PUFF: 90 AEROSOL, METERED RESPIRATORY (INHALATION) at 08:51

## 2025-01-31 RX ADMIN — BUDESONIDE AND FORMOTEROL FUMARATE DIHYDRATE 2 PUFF: 160; 4.5 AEROSOL RESPIRATORY (INHALATION) at 22:56

## 2025-01-31 RX ADMIN — ATENOLOL 25 MG: 25 TABLET ORAL at 09:49

## 2025-01-31 RX ADMIN — ATORVASTATIN CALCIUM 10 MG: 10 TABLET, FILM COATED ORAL at 20:43

## 2025-01-31 RX ADMIN — GUAIFENESIN 600 MG: 600 TABLET ORAL at 20:43

## 2025-01-31 RX ADMIN — PANCRELIPASE LIPASE, PANCRELIPASE PROTEASE, PANCRELIPASE AMYLASE 20000 UNITS: 5000; 17000; 24000 CAPSULE, DELAYED RELEASE ORAL at 17:11

## 2025-01-31 RX ADMIN — OSELTAMIVIR PHOSPHATE 75 MG: 75 CAPSULE ORAL at 09:48

## 2025-01-31 RX ADMIN — ENOXAPARIN SODIUM 30 MG: 100 INJECTION SUBCUTANEOUS at 17:11

## 2025-01-31 RX ADMIN — PREDNISONE 40 MG: 20 TABLET ORAL at 09:49

## 2025-01-31 RX ADMIN — GABAPENTIN 300 MG: 300 CAPSULE ORAL at 09:49

## 2025-01-31 RX ADMIN — BUDESONIDE AND FORMOTEROL FUMARATE DIHYDRATE 2 PUFF: 160; 4.5 AEROSOL RESPIRATORY (INHALATION) at 08:51

## 2025-01-31 RX ADMIN — PANCRELIPASE LIPASE, PANCRELIPASE PROTEASE, PANCRELIPASE AMYLASE 20000 UNITS: 5000; 17000; 24000 CAPSULE, DELAYED RELEASE ORAL at 11:41

## 2025-01-31 ASSESSMENT — PAIN SCALES - GENERAL
PAINLEVEL_OUTOF10: 6
PAINLEVEL_OUTOF10: 3
PAINLEVEL_OUTOF10: 3
PAINLEVEL_OUTOF10: 5
PAINLEVEL_OUTOF10: 3

## 2025-01-31 ASSESSMENT — PAIN DESCRIPTION - DESCRIPTORS
DESCRIPTORS: ACHING

## 2025-01-31 ASSESSMENT — PAIN SCALES - WONG BAKER: WONGBAKER_NUMERICALRESPONSE: NO HURT

## 2025-01-31 ASSESSMENT — PAIN DESCRIPTION - LOCATION
LOCATION: GENERALIZED
LOCATION: HEAD;GENERALIZED
LOCATION: HEAD
LOCATION: HEAD

## 2025-01-31 ASSESSMENT — PAIN DESCRIPTION - PAIN TYPE: TYPE: ACUTE PAIN

## 2025-01-31 ASSESSMENT — PAIN DESCRIPTION - FREQUENCY: FREQUENCY: INTERMITTENT

## 2025-01-31 ASSESSMENT — PAIN DESCRIPTION - ORIENTATION: ORIENTATION: RIGHT;LEFT

## 2025-01-31 ASSESSMENT — PAIN DESCRIPTION - ONSET: ONSET: ON-GOING

## 2025-01-31 NOTE — PROGRESS NOTES
V2.0  [facility] [place] Hospitalist Progress Note      Name:  Bonny Serrano /Age/Sex: 1970  (54 y.o. female)   MRN & CSN:  [id number] & [id number] Encounter Date/Time: 2025 6:46 AM EST    [facility]  [facility] PCP: Bredna Carrillo APRN - CNP       Hospital Day: 3    Assessment and Plan:   Bonny Serrano is a 54 y.o. female with pmh of COPD, alcohol use disorder, HLD, GERD, mood disorder who presents with COPD exacerbation (HCC)      Plan:  # Acute exacerbation of severe COPD in setting of influenza A infection  # Acute on chronic hypoxemic hypercapnic respiratory failure  - Reported worsening SOB with increased productive cough of green sputum for 3-4 days. Continues to smoke. Compliant with inhalers. On 2L NC qhs/prn baseline. PFTs in 2020 with severe obstruction.   - weaned down to baseline oxygen VBG 7.36/50 (expected baseline CO2 of 48-52), afebrile, no leukocytosis, LA normal, PCT normal, CXR non-acute. Flu A positive.    - Continue Tamiflu, steroids, Azithro, Symbicort and DuoNebs.Goal SpO2 of 88-92%.     # Hyponatremia, acute on chronic  - resolved      # Hx of alcohol abuse  - Drinks beer daily for many years. Allegedly last drink . Currently in [facility] of [facility] rehab.  - DELIA 0%.    - Monitor for sxs of withdrawal.     # Right cavitary lung lesion, stable  - Continues to smoke. Followed by pulmonology outpatient.  - CT stable when compared to imaging from 2024.     # Hyperkalemia  - Resolved     # Hypomagnesemia  - Mild, repleted.  - Repeat in AM     # Hyperglycemia  - HgbA1c 6.7, will start metformin on discharge     # Sinus tachycardia  - On diltiazem and atenolol.  - Superimposed component of anxiety.     # Mixed hyperlipidemia  - Continue Lipitor.     # Normocytic anemia  - Labs overall stable, follow-up repeat labs.     # GERD  - Continue PPI.     # Depression and anxiety  - Continue Zoloft.     # Tobacco abuse  - Smokes 1 PPD for over 25 years.  - Advised on importance of complete

## 2025-01-31 NOTE — CARE COORDINATION
CM in to see Pt to follow up on discharge planning.  Pt no longer wishes to return to [facility].      Discharge plan is home.  Pt states her brother will provide transportation at discharge.    Pt denies any needs.  CM following

## 2025-02-01 VITALS
OXYGEN SATURATION: 94 % | SYSTOLIC BLOOD PRESSURE: 159 MMHG | HEIGHT: 61 IN | BODY MASS INDEX: 17.44 KG/M2 | WEIGHT: 92.37 LBS | TEMPERATURE: 98.4 F | HEART RATE: 101 BPM | RESPIRATION RATE: 25 BRPM | DIASTOLIC BLOOD PRESSURE: 83 MMHG

## 2025-02-01 PROCEDURE — 6370000000 HC RX 637 (ALT 250 FOR IP): Performed by: STUDENT IN AN ORGANIZED HEALTH CARE EDUCATION/TRAINING PROGRAM

## 2025-02-01 PROCEDURE — 94761 N-INVAS EAR/PLS OXIMETRY MLT: CPT

## 2025-02-01 PROCEDURE — 2700000000 HC OXYGEN THERAPY PER DAY

## 2025-02-01 PROCEDURE — 94640 AIRWAY INHALATION TREATMENT: CPT

## 2025-02-01 PROCEDURE — 2500000003 HC RX 250 WO HCPCS: Performed by: STUDENT IN AN ORGANIZED HEALTH CARE EDUCATION/TRAINING PROGRAM

## 2025-02-01 PROCEDURE — 6360000002 HC RX W HCPCS: Performed by: STUDENT IN AN ORGANIZED HEALTH CARE EDUCATION/TRAINING PROGRAM

## 2025-02-01 PROCEDURE — 6370000000 HC RX 637 (ALT 250 FOR IP): Performed by: FAMILY MEDICINE

## 2025-02-01 PROCEDURE — 2580000003 HC RX 258: Performed by: STUDENT IN AN ORGANIZED HEALTH CARE EDUCATION/TRAINING PROGRAM

## 2025-02-01 RX ORDER — OSELTAMIVIR PHOSPHATE 75 MG/1
75 CAPSULE ORAL 2 TIMES DAILY
Qty: 4 CAPSULE | Refills: 0 | Status: SHIPPED | OUTPATIENT
Start: 2025-02-01 | End: 2025-02-01

## 2025-02-01 RX ORDER — PREDNISONE 20 MG/1
40 TABLET ORAL DAILY
Qty: 2 TABLET | Refills: 0 | Status: SHIPPED | OUTPATIENT
Start: 2025-02-02 | End: 2025-02-01

## 2025-02-01 RX ORDER — BENZONATATE 100 MG/1
100 CAPSULE ORAL 3 TIMES DAILY PRN
Status: DISCONTINUED | OUTPATIENT
Start: 2025-02-01 | End: 2025-02-01 | Stop reason: HOSPADM

## 2025-02-01 RX ORDER — PREDNISONE 20 MG/1
40 TABLET ORAL DAILY
Qty: 2 TABLET | Refills: 0 | Status: SHIPPED | OUTPATIENT
Start: 2025-02-02 | End: 2025-02-03

## 2025-02-01 RX ORDER — OSELTAMIVIR PHOSPHATE 75 MG/1
75 CAPSULE ORAL 2 TIMES DAILY
Qty: 4 CAPSULE | Refills: 0 | Status: SHIPPED | OUTPATIENT
Start: 2025-02-01 | End: 2025-02-03

## 2025-02-01 RX ADMIN — ATENOLOL 25 MG: 25 TABLET ORAL at 11:06

## 2025-02-01 RX ADMIN — GUAIFENESIN 600 MG: 600 TABLET ORAL at 11:05

## 2025-02-01 RX ADMIN — GABAPENTIN 300 MG: 300 CAPSULE ORAL at 11:06

## 2025-02-01 RX ADMIN — OSELTAMIVIR PHOSPHATE 75 MG: 75 CAPSULE ORAL at 11:06

## 2025-02-01 RX ADMIN — ALBUTEROL SULFATE 2 PUFF: 90 AEROSOL, METERED RESPIRATORY (INHALATION) at 09:36

## 2025-02-01 RX ADMIN — SERTRALINE HYDROCHLORIDE 100 MG: 50 TABLET ORAL at 11:05

## 2025-02-01 RX ADMIN — FOLIC ACID 1 MG: 1 TABLET ORAL at 11:06

## 2025-02-01 RX ADMIN — PREDNISONE 40 MG: 20 TABLET ORAL at 11:05

## 2025-02-01 RX ADMIN — BENZONATATE 100 MG: 100 CAPSULE ORAL at 02:59

## 2025-02-01 RX ADMIN — AZITHROMYCIN MONOHYDRATE 500 MG: 500 INJECTION, POWDER, LYOPHILIZED, FOR SOLUTION INTRAVENOUS at 02:58

## 2025-02-01 RX ADMIN — PANTOPRAZOLE SODIUM 40 MG: 40 TABLET, DELAYED RELEASE ORAL at 05:37

## 2025-02-01 RX ADMIN — Medication 100 MG: at 11:06

## 2025-02-01 RX ADMIN — SODIUM CHLORIDE, PRESERVATIVE FREE 10 ML: 5 INJECTION INTRAVENOUS at 11:10

## 2025-02-01 RX ADMIN — PANCRELIPASE LIPASE, PANCRELIPASE PROTEASE, PANCRELIPASE AMYLASE 20000 UNITS: 5000; 17000; 24000 CAPSULE, DELAYED RELEASE ORAL at 11:06

## 2025-02-01 RX ADMIN — BUDESONIDE AND FORMOTEROL FUMARATE DIHYDRATE 2 PUFF: 160; 4.5 AEROSOL RESPIRATORY (INHALATION) at 09:36

## 2025-02-01 RX ADMIN — DILTIAZEM HYDROCHLORIDE 240 MG: 240 CAPSULE, COATED, EXTENDED RELEASE ORAL at 11:06

## 2025-02-01 NOTE — PROGRESS NOTES
[employer] ride set up via [employer]. Faxed scripts and d/c summary to them as well. Pt assisted to front lobby at this time

## 2025-02-01 NOTE — PLAN OF CARE
Problem: Discharge Planning  Goal: Discharge to home or other facility with appropriate resources  2/1/2025 0001 by Cruz Hull RN  Outcome: Progressing  1/31/2025 1254 by Luma Jhonson RN  Outcome: Progressing     Problem: Safety - Adult  Goal: Free from fall injury  2/1/2025 0001 by Cruz Hull RN  Outcome: Progressing  1/31/2025 1254 by Luma Johnson RN  Outcome: Progressing     Problem: Pain  Goal: Verbalizes/displays adequate comfort level or baseline comfort level  2/1/2025 0001 by Cruz Hull RN  Outcome: Progressing  Flowsheets (Taken 1/31/2025 2042)  Verbalizes/displays adequate comfort level or baseline comfort level:   Encourage patient to monitor pain and request assistance   Assess pain using appropriate pain scale   Implement non-pharmacological measures as appropriate and evaluate response  1/31/2025 1254 by Luma Johnson RN  Outcome: Progressing

## 2025-02-01 NOTE — PROGRESS NOTES
Pt called out asking what plan was- stated Dr was in and said she could leave. Pt asked if she was going back to [facility] from here. This nurse looked at last CM note from yesterday (1/31) stating pt wanted to d/c to home and not go back to rehab. When talking with pt this morning she stated she would like to go back. Called CM and spoke with her r/t pt now saying she wants to return to [facility]. Sent perfect serv to Dr Marely Martin r/t plans and when possible d/c

## 2025-02-01 NOTE — PROGRESS NOTES
JAYESH received call from pt's RNLuma, ext. [phone].  RN shared that the pt has decided that she would like to return to [facility].     JAYESH contacted David with [facility], [phone] to inquire if the pt can return.  David requested JAYESH to contact [facility] Admission at [phone] option 1.  JAYESH was informed by admissions that they have bed availability and the pt may return pending phone assessment by the pt.  Noted once pt completes phone assessment, the assessment is only good for 48 hours.  JAYESH made Luma SMITH aware and provided the telephone number for the pt to call to complete phone assessment.     CM remains available as needed.     China Londono, MSSW, LSW

## 2025-02-02 LAB
MICROORGANISM SPEC CULT: NORMAL
MICROORGANISM SPEC CULT: NORMAL
SERVICE CMNT-IMP: NORMAL
SERVICE CMNT-IMP: NORMAL
SPECIMEN DESCRIPTION: NORMAL
SPECIMEN DESCRIPTION: NORMAL

## 2025-02-05 NOTE — DISCHARGE SUMMARY
V2.0  Discharge Summary    Name:  Bonny Serrano /Age/Sex: 1970 (54 y.o. female)   Admit Date: 2025  Discharge Date: 25    MRN & CSN:  [id number] & [id number] Encounter Date and Time 25 2:23 PM EST    Attending:  No att. providers found Discharging Provider: Marely Martin MD       Hospital Course:     Brief HPI: Patient is a 54 y.o. female with a PMHx as above who presented to the ED with worsening SOB with increased productive cough of green sputum for 3-4 days. Continues to smoke 1 PPD for over 25 years. Compliant with inhalers. On 2L NC qhs/prn baseline. Denied any fevers, chills, CP, N/V, abdominal pain, C/D or urinary changes.     Brief Problem Based Course:   # Acute exacerbation of severe COPD in setting of influenza A infection  # Acute on chronic hypoxemic hypercapnic respiratory failure  - Reported worsening SOB with increased productive cough of green sputum for 3-4 days. Continues to smoke. Compliant with inhalers. On 2L NC qhs/prn baseline. PFTs in 2020 with severe obstruction.   - weaned down to baseline oxygen VBG 7.36/50 (expected baseline CO2 of 48-52), afebrile, no leukocytosis, LA normal, PCT normal, CXR non-acute. Flu A positive.    -Finished 5-day course of Tamiflu and prednisone 40 mg p.o. daily     # Hyponatremia, acute on chronic  - resolved      # Hx of alcohol abuse  -Patient discharged to [facility] in stable condition for alcohol rehabilitation     # Right cavitary lung lesion, stable  - Continues to smoke. Followed by pulmonology outpatient.  - CT stable when compared to imaging from 2024.     # Hyperkalemia  - Resolved     # Sinus tachycardia  - On diltiazem and atenolol.     # Mixed hyperlipidemia  - Continue Lipitor.       # GERD  - Continue PPI.     # Depression and anxiety  - Continue Zoloft.         The patient expressed appropriate understanding of, and agreement with the discharge recommendations, medications, and plan.     Consults this

## 2025-03-13 ENCOUNTER — HOSPITAL ENCOUNTER (INPATIENT)
Age: 55
LOS: 3 days | Discharge: HOME | DRG: 426 | End: 2025-03-16
Attending: STUDENT IN AN ORGANIZED HEALTH CARE EDUCATION/TRAINING PROGRAM | Admitting: INTERNAL MEDICINE
Payer: MEDICAID

## 2025-03-13 ENCOUNTER — APPOINTMENT (OUTPATIENT)
Dept: GENERAL RADIOLOGY | Age: 55
DRG: 426 | End: 2025-03-13
Payer: MEDICAID

## 2025-03-13 DIAGNOSIS — E87.1 HYPONATREMIA: Primary | ICD-10-CM

## 2025-03-13 PROBLEM — R09.02 HYPOXIA: Status: ACTIVE | Noted: 2025-03-13

## 2025-03-13 LAB
ALBUMIN SERPL-MCNC: 3.5 G/DL (ref 3.4–5)
ALBUMIN/GLOB SERPL: 1.1 {RATIO} (ref 1.1–2.2)
ALP SERPL-CCNC: 153 U/L (ref 40–129)
ALT SERPL-CCNC: 26 U/L (ref 10–40)
AMPHETAMINES UR QL SCN>1000 NG/ML: POSITIVE
ANION GAP SERPL CALCULATED.3IONS-SCNC: 11 MMOL/L (ref 3–16)
ANION GAP SERPL CALCULATED.3IONS-SCNC: 9 MMOL/L (ref 3–16)
ANION GAP SERPL CALCULATED.3IONS-SCNC: 9 MMOL/L (ref 3–16)
AST SERPL-CCNC: 24 U/L (ref 15–37)
BACTERIA URNS QL MICRO: ABNORMAL /HPF
BARBITURATES UR QL SCN>200 NG/ML: ABNORMAL
BASOPHILS # BLD: 0.1 K/UL (ref 0–0.2)
BASOPHILS NFR BLD: 0.7 %
BENZODIAZ UR QL SCN>200 NG/ML: ABNORMAL
BILIRUB SERPL-MCNC: 0.3 MG/DL (ref 0–1)
BILIRUB UR QL STRIP.AUTO: ABNORMAL
BUN SERPL-MCNC: 4 MG/DL (ref 7–20)
BUN SERPL-MCNC: 4 MG/DL (ref 7–20)
BUN SERPL-MCNC: 5 MG/DL (ref 7–20)
CALCIUM SERPL-MCNC: 8.1 MG/DL (ref 8.3–10.6)
CALCIUM SERPL-MCNC: 8.3 MG/DL (ref 8.3–10.6)
CALCIUM SERPL-MCNC: 8.4 MG/DL (ref 8.3–10.6)
CANNABINOIDS UR QL SCN>50 NG/ML: POSITIVE
CHLORIDE SERPL-SCNC: 73 MMOL/L (ref 99–110)
CHLORIDE SERPL-SCNC: 79 MMOL/L (ref 99–110)
CHLORIDE SERPL-SCNC: 79 MMOL/L (ref 99–110)
CK SERPL-CCNC: 73 U/L (ref 26–192)
CLARITY UR: CLEAR
CO2 SERPL-SCNC: 30 MMOL/L (ref 21–32)
CO2 SERPL-SCNC: 30 MMOL/L (ref 21–32)
CO2 SERPL-SCNC: 31 MMOL/L (ref 21–32)
COCAINE UR QL SCN: ABNORMAL
COLOR UR: YELLOW
CREAT SERPL-MCNC: 0.3 MG/DL (ref 0.6–1.1)
CREAT SERPL-MCNC: <0.2 MG/DL (ref 0.6–1.1)
CREAT SERPL-MCNC: <0.2 MG/DL (ref 0.6–1.1)
DEPRECATED RDW RBC AUTO: 17.9 % (ref 12.4–15.4)
DRUG SCREEN COMMENT UR-IMP: ABNORMAL
EKG ATRIAL RATE: 121 BPM
EKG DIAGNOSIS: NORMAL
EKG P AXIS: 83 DEGREES
EKG P-R INTERVAL: 136 MS
EKG Q-T INTERVAL: 324 MS
EKG QRS DURATION: 84 MS
EKG QTC CALCULATION (BAZETT): 460 MS
EKG R AXIS: 91 DEGREES
EKG T AXIS: 78 DEGREES
EKG VENTRICULAR RATE: 121 BPM
EOSINOPHIL # BLD: 0 K/UL (ref 0–0.6)
EOSINOPHIL NFR BLD: 0.5 %
EPI CELLS #/AREA URNS HPF: ABNORMAL /HPF (ref 0–5)
ETHANOLAMINE SERPL-MCNC: NORMAL MG/DL (ref 0–0.08)
FENTANYL SCREEN, URINE: ABNORMAL
FLUAV RNA RESP QL NAA+PROBE: NOT DETECTED
FLUBV RNA RESP QL NAA+PROBE: NOT DETECTED
GFR SERPLBLD CREATININE-BSD FMLA CKD-EPI: >90 ML/MIN/{1.73_M2}
GLUCOSE BLD-MCNC: 101 MG/DL (ref 70–99)
GLUCOSE SERPL-MCNC: 84 MG/DL (ref 70–99)
GLUCOSE SERPL-MCNC: 92 MG/DL (ref 70–99)
GLUCOSE SERPL-MCNC: 98 MG/DL (ref 70–99)
GLUCOSE UR STRIP.AUTO-MCNC: NEGATIVE MG/DL
HCT VFR BLD AUTO: 33.2 % (ref 36–48)
HGB BLD-MCNC: 11 G/DL (ref 12–16)
HGB UR QL STRIP.AUTO: NEGATIVE
HYALINE CASTS #/AREA URNS LPF: ABNORMAL /LPF (ref 0–2)
KETONES UR STRIP.AUTO-MCNC: >=80 MG/DL
LACTATE BLDV-SCNC: 0.5 MMOL/L (ref 0.4–1.9)
LEUKOCYTE ESTERASE UR QL STRIP.AUTO: NEGATIVE
LIPASE SERPL-CCNC: 10 U/L (ref 13–60)
LYMPHOCYTES # BLD: 1.4 K/UL (ref 1–5.1)
LYMPHOCYTES NFR BLD: 15.4 %
MCH RBC QN AUTO: 25.4 PG (ref 26–34)
MCHC RBC AUTO-ENTMCNC: 33.1 G/DL (ref 31–36)
MCV RBC AUTO: 76.9 FL (ref 80–100)
METHADONE UR QL SCN>300 NG/ML: ABNORMAL
MONOCYTES # BLD: 0.7 K/UL (ref 0–1.3)
MONOCYTES NFR BLD: 7.5 %
MUCOUS THREADS #/AREA URNS LPF: ABNORMAL /LPF
NEUTROPHILS # BLD: 6.9 K/UL (ref 1.7–7.7)
NEUTROPHILS NFR BLD: 75.9 %
NITRITE UR QL STRIP.AUTO: NEGATIVE
OPIATES UR QL SCN>300 NG/ML: ABNORMAL
OXYCODONE UR QL SCN: ABNORMAL
PCP UR QL SCN>25 NG/ML: ABNORMAL
PERFORMED ON: ABNORMAL
PH UR STRIP.AUTO: 6.5 [PH] (ref 5–8)
PH UR STRIP: 6.5 [PH]
PLATELET # BLD AUTO: 464 K/UL (ref 135–450)
PMV BLD AUTO: 6.2 FL (ref 5–10.5)
POTASSIUM SERPL-SCNC: 3.6 MMOL/L (ref 3.5–5.1)
POTASSIUM SERPL-SCNC: 3.6 MMOL/L (ref 3.5–5.1)
POTASSIUM SERPL-SCNC: 4 MMOL/L (ref 3.5–5.1)
PROT SERPL-MCNC: 6.8 G/DL (ref 6.4–8.2)
PROT UR STRIP.AUTO-MCNC: ABNORMAL MG/DL
RBC # BLD AUTO: 4.32 M/UL (ref 4–5.2)
RBC #/AREA URNS HPF: ABNORMAL /HPF (ref 0–4)
SARS-COV-2 RNA RESP QL NAA+PROBE: NOT DETECTED
SODIUM SERPL-SCNC: 115 MMOL/L (ref 136–145)
SODIUM SERPL-SCNC: 118 MMOL/L (ref 136–145)
SODIUM SERPL-SCNC: 118 MMOL/L (ref 136–145)
SODIUM UR-SCNC: <20 MMOL/L
SP GR UR STRIP.AUTO: 1.02 (ref 1–1.03)
TROPONIN, HIGH SENSITIVITY: 12 NG/L (ref 0–14)
TROPONIN, HIGH SENSITIVITY: 13 NG/L (ref 0–14)
TSH SERPL DL<=0.005 MIU/L-ACNC: 0.57 UIU/ML (ref 0.27–4.2)
UA DIPSTICK W REFLEX MICRO PNL UR: YES
URATE SERPL-MCNC: 3 MG/DL (ref 2.6–6)
URN SPEC COLLECT METH UR: ABNORMAL
UROBILINOGEN UR STRIP-ACNC: 0.2 E.U./DL
WBC # BLD AUTO: 9.1 K/UL (ref 4–11)
WBC #/AREA URNS HPF: ABNORMAL /HPF (ref 0–5)

## 2025-03-13 PROCEDURE — 85025 COMPLETE CBC W/AUTO DIFF WBC: CPT

## 2025-03-13 PROCEDURE — 81001 URINALYSIS AUTO W/SCOPE: CPT

## 2025-03-13 PROCEDURE — 99285 EMERGENCY DEPT VISIT HI MDM: CPT

## 2025-03-13 PROCEDURE — 2500000003 HC RX 250 WO HCPCS: Performed by: INTERNAL MEDICINE

## 2025-03-13 PROCEDURE — 71045 X-RAY EXAM CHEST 1 VIEW: CPT

## 2025-03-13 PROCEDURE — 87636 SARSCOV2 & INF A&B AMP PRB: CPT

## 2025-03-13 PROCEDURE — 2580000003 HC RX 258: Performed by: STUDENT IN AN ORGANIZED HEALTH CARE EDUCATION/TRAINING PROGRAM

## 2025-03-13 PROCEDURE — 36415 COLL VENOUS BLD VENIPUNCTURE: CPT

## 2025-03-13 PROCEDURE — 6370000000 HC RX 637 (ALT 250 FOR IP): Performed by: STUDENT IN AN ORGANIZED HEALTH CARE EDUCATION/TRAINING PROGRAM

## 2025-03-13 PROCEDURE — 2700000000 HC OXYGEN THERAPY PER DAY

## 2025-03-13 PROCEDURE — 6370000000 HC RX 637 (ALT 250 FOR IP): Performed by: INTERNAL MEDICINE

## 2025-03-13 PROCEDURE — 84443 ASSAY THYROID STIM HORMONE: CPT

## 2025-03-13 PROCEDURE — 2000000000 HC ICU R&B

## 2025-03-13 PROCEDURE — 6360000002 HC RX W HCPCS: Performed by: INTERNAL MEDICINE

## 2025-03-13 PROCEDURE — 83605 ASSAY OF LACTIC ACID: CPT

## 2025-03-13 PROCEDURE — 84550 ASSAY OF BLOOD/URIC ACID: CPT

## 2025-03-13 PROCEDURE — 82077 ASSAY SPEC XCP UR&BREATH IA: CPT

## 2025-03-13 PROCEDURE — 83690 ASSAY OF LIPASE: CPT

## 2025-03-13 PROCEDURE — 83935 ASSAY OF URINE OSMOLALITY: CPT

## 2025-03-13 PROCEDURE — 94640 AIRWAY INHALATION TREATMENT: CPT

## 2025-03-13 PROCEDURE — 6360000002 HC RX W HCPCS: Performed by: STUDENT IN AN ORGANIZED HEALTH CARE EDUCATION/TRAINING PROGRAM

## 2025-03-13 PROCEDURE — 94761 N-INVAS EAR/PLS OXIMETRY MLT: CPT

## 2025-03-13 PROCEDURE — 96361 HYDRATE IV INFUSION ADD-ON: CPT

## 2025-03-13 PROCEDURE — 80053 COMPREHEN METABOLIC PANEL: CPT

## 2025-03-13 PROCEDURE — 93005 ELECTROCARDIOGRAM TRACING: CPT | Performed by: STUDENT IN AN ORGANIZED HEALTH CARE EDUCATION/TRAINING PROGRAM

## 2025-03-13 PROCEDURE — 96375 TX/PRO/DX INJ NEW DRUG ADDON: CPT

## 2025-03-13 PROCEDURE — 96374 THER/PROPH/DIAG INJ IV PUSH: CPT

## 2025-03-13 PROCEDURE — 80307 DRUG TEST PRSMV CHEM ANLYZR: CPT

## 2025-03-13 PROCEDURE — 84484 ASSAY OF TROPONIN QUANT: CPT

## 2025-03-13 PROCEDURE — 84300 ASSAY OF URINE SODIUM: CPT

## 2025-03-13 PROCEDURE — 93010 ELECTROCARDIOGRAM REPORT: CPT | Performed by: INTERNAL MEDICINE

## 2025-03-13 PROCEDURE — 99255 IP/OBS CONSLTJ NEW/EST HI 80: CPT | Performed by: INTERNAL MEDICINE

## 2025-03-13 PROCEDURE — 82550 ASSAY OF CK (CPK): CPT

## 2025-03-13 RX ORDER — SODIUM CHLORIDE 0.9 % (FLUSH) 0.9 %
5-40 SYRINGE (ML) INJECTION EVERY 12 HOURS SCHEDULED
Status: DISCONTINUED | OUTPATIENT
Start: 2025-03-13 | End: 2025-03-13 | Stop reason: SDUPTHER

## 2025-03-13 RX ORDER — KETOROLAC TROMETHAMINE 15 MG/ML
15 INJECTION, SOLUTION INTRAMUSCULAR; INTRAVENOUS ONCE
Status: COMPLETED | OUTPATIENT
Start: 2025-03-13 | End: 2025-03-13

## 2025-03-13 RX ORDER — DILTIAZEM HYDROCHLORIDE 240 MG/1
240 CAPSULE, COATED, EXTENDED RELEASE ORAL DAILY
Status: DISCONTINUED | OUTPATIENT
Start: 2025-03-14 | End: 2025-03-16 | Stop reason: HOSPADM

## 2025-03-13 RX ORDER — FOLIC ACID 1 MG/1
1 TABLET ORAL DAILY
Status: DISCONTINUED | OUTPATIENT
Start: 2025-03-13 | End: 2025-03-16 | Stop reason: HOSPADM

## 2025-03-13 RX ORDER — SODIUM CHLORIDE 9 MG/ML
INJECTION, SOLUTION INTRAVENOUS PRN
Status: DISCONTINUED | OUTPATIENT
Start: 2025-03-13 | End: 2025-03-13

## 2025-03-13 RX ORDER — LORAZEPAM 1 MG/1
1 TABLET ORAL
Status: DISCONTINUED | OUTPATIENT
Start: 2025-03-13 | End: 2025-03-16 | Stop reason: HOSPADM

## 2025-03-13 RX ORDER — LORAZEPAM 2 MG/ML
3 INJECTION INTRAMUSCULAR
Status: DISCONTINUED | OUTPATIENT
Start: 2025-03-13 | End: 2025-03-16 | Stop reason: HOSPADM

## 2025-03-13 RX ORDER — MULTIVITAMIN WITH IRON
1 TABLET ORAL DAILY
Status: DISCONTINUED | OUTPATIENT
Start: 2025-03-13 | End: 2025-03-16 | Stop reason: HOSPADM

## 2025-03-13 RX ORDER — POTASSIUM CHLORIDE 29.8 MG/ML
20 INJECTION INTRAVENOUS PRN
Status: DISCONTINUED | OUTPATIENT
Start: 2025-03-13 | End: 2025-03-16 | Stop reason: HOSPADM

## 2025-03-13 RX ORDER — MAGNESIUM SULFATE IN WATER 40 MG/ML
2000 INJECTION, SOLUTION INTRAVENOUS PRN
Status: DISCONTINUED | OUTPATIENT
Start: 2025-03-13 | End: 2025-03-16 | Stop reason: HOSPADM

## 2025-03-13 RX ORDER — 0.9 % SODIUM CHLORIDE 0.9 %
1000 INTRAVENOUS SOLUTION INTRAVENOUS ONCE
Status: COMPLETED | OUTPATIENT
Start: 2025-03-13 | End: 2025-03-13

## 2025-03-13 RX ORDER — IPRATROPIUM BROMIDE AND ALBUTEROL SULFATE 2.5; .5 MG/3ML; MG/3ML
1 SOLUTION RESPIRATORY (INHALATION)
Status: DISCONTINUED | OUTPATIENT
Start: 2025-03-14 | End: 2025-03-14

## 2025-03-13 RX ORDER — ACETAMINOPHEN 325 MG/1
650 TABLET ORAL EVERY 6 HOURS PRN
Status: DISCONTINUED | OUTPATIENT
Start: 2025-03-13 | End: 2025-03-16 | Stop reason: HOSPADM

## 2025-03-13 RX ORDER — LORAZEPAM 2 MG/1
2 TABLET ORAL
Status: DISCONTINUED | OUTPATIENT
Start: 2025-03-13 | End: 2025-03-16 | Stop reason: HOSPADM

## 2025-03-13 RX ORDER — ONDANSETRON 2 MG/ML
4 INJECTION INTRAMUSCULAR; INTRAVENOUS EVERY 6 HOURS PRN
Status: DISCONTINUED | OUTPATIENT
Start: 2025-03-13 | End: 2025-03-16 | Stop reason: HOSPADM

## 2025-03-13 RX ORDER — ONDANSETRON 4 MG/1
4 TABLET, ORALLY DISINTEGRATING ORAL EVERY 8 HOURS PRN
Status: DISCONTINUED | OUTPATIENT
Start: 2025-03-13 | End: 2025-03-16 | Stop reason: HOSPADM

## 2025-03-13 RX ORDER — ONDANSETRON 2 MG/ML
4 INJECTION INTRAMUSCULAR; INTRAVENOUS ONCE
Status: COMPLETED | OUTPATIENT
Start: 2025-03-13 | End: 2025-03-13

## 2025-03-13 RX ORDER — ATENOLOL 25 MG/1
25 TABLET ORAL DAILY
Status: DISCONTINUED | OUTPATIENT
Start: 2025-03-13 | End: 2025-03-16 | Stop reason: HOSPADM

## 2025-03-13 RX ORDER — ACETAMINOPHEN 650 MG/1
650 SUPPOSITORY RECTAL EVERY 6 HOURS PRN
Status: DISCONTINUED | OUTPATIENT
Start: 2025-03-13 | End: 2025-03-16 | Stop reason: HOSPADM

## 2025-03-13 RX ORDER — LORAZEPAM 2 MG/ML
2 INJECTION INTRAMUSCULAR
Status: DISCONTINUED | OUTPATIENT
Start: 2025-03-13 | End: 2025-03-16 | Stop reason: HOSPADM

## 2025-03-13 RX ORDER — ENOXAPARIN SODIUM 100 MG/ML
30 INJECTION SUBCUTANEOUS DAILY
Status: DISCONTINUED | OUTPATIENT
Start: 2025-03-13 | End: 2025-03-16 | Stop reason: HOSPADM

## 2025-03-13 RX ORDER — SODIUM CHLORIDE 0.9 % (FLUSH) 0.9 %
5-40 SYRINGE (ML) INJECTION PRN
Status: DISCONTINUED | OUTPATIENT
Start: 2025-03-13 | End: 2025-03-13 | Stop reason: SDUPTHER

## 2025-03-13 RX ORDER — BUDESONIDE AND FORMOTEROL FUMARATE DIHYDRATE 160; 4.5 UG/1; UG/1
2 AEROSOL RESPIRATORY (INHALATION)
Status: DISCONTINUED | OUTPATIENT
Start: 2025-03-13 | End: 2025-03-16 | Stop reason: HOSPADM

## 2025-03-13 RX ORDER — LANOLIN ALCOHOL/MO/W.PET/CERES
100 CREAM (GRAM) TOPICAL DAILY
Status: DISCONTINUED | OUTPATIENT
Start: 2025-03-13 | End: 2025-03-16 | Stop reason: HOSPADM

## 2025-03-13 RX ORDER — SODIUM CHLORIDE 0.9 % (FLUSH) 0.9 %
5-40 SYRINGE (ML) INJECTION EVERY 12 HOURS SCHEDULED
Status: DISCONTINUED | OUTPATIENT
Start: 2025-03-13 | End: 2025-03-16 | Stop reason: HOSPADM

## 2025-03-13 RX ORDER — LORAZEPAM 2 MG/ML
1 INJECTION INTRAMUSCULAR
Status: DISCONTINUED | OUTPATIENT
Start: 2025-03-13 | End: 2025-03-16 | Stop reason: HOSPADM

## 2025-03-13 RX ORDER — LORAZEPAM 2 MG/ML
4 INJECTION INTRAMUSCULAR
Status: DISCONTINUED | OUTPATIENT
Start: 2025-03-13 | End: 2025-03-16 | Stop reason: HOSPADM

## 2025-03-13 RX ORDER — SODIUM CHLORIDE 9 MG/ML
INJECTION, SOLUTION INTRAVENOUS PRN
Status: DISCONTINUED | OUTPATIENT
Start: 2025-03-13 | End: 2025-03-16 | Stop reason: HOSPADM

## 2025-03-13 RX ORDER — POTASSIUM CHLORIDE 7.45 MG/ML
10 INJECTION INTRAVENOUS PRN
Status: DISCONTINUED | OUTPATIENT
Start: 2025-03-13 | End: 2025-03-16 | Stop reason: HOSPADM

## 2025-03-13 RX ORDER — POLYETHYLENE GLYCOL 3350 17 G/17G
17 POWDER, FOR SOLUTION ORAL DAILY PRN
Status: DISCONTINUED | OUTPATIENT
Start: 2025-03-13 | End: 2025-03-16 | Stop reason: HOSPADM

## 2025-03-13 RX ORDER — IPRATROPIUM BROMIDE AND ALBUTEROL SULFATE 2.5; .5 MG/3ML; MG/3ML
1 SOLUTION RESPIRATORY (INHALATION)
Status: DISCONTINUED | OUTPATIENT
Start: 2025-03-13 | End: 2025-03-13

## 2025-03-13 RX ORDER — SODIUM CHLORIDE 0.9 % (FLUSH) 0.9 %
5-40 SYRINGE (ML) INJECTION PRN
Status: DISCONTINUED | OUTPATIENT
Start: 2025-03-13 | End: 2025-03-16 | Stop reason: HOSPADM

## 2025-03-13 RX ORDER — LORAZEPAM 2 MG/1
4 TABLET ORAL
Status: DISCONTINUED | OUTPATIENT
Start: 2025-03-13 | End: 2025-03-16 | Stop reason: HOSPADM

## 2025-03-13 RX ADMIN — Medication 40 MG: at 20:25

## 2025-03-13 RX ADMIN — LORAZEPAM 1 MG: 2 INJECTION INTRAMUSCULAR; INTRAVENOUS at 15:58

## 2025-03-13 RX ADMIN — SODIUM CHLORIDE, PRESERVATIVE FREE 10 ML: 5 INJECTION INTRAVENOUS at 20:25

## 2025-03-13 RX ADMIN — BUDESONIDE AND FORMOTEROL FUMARATE DIHYDRATE 2 PUFF: 160; 4.5 AEROSOL RESPIRATORY (INHALATION) at 19:46

## 2025-03-13 RX ADMIN — SODIUM CHLORIDE 1000 ML: 0.9 INJECTION, SOLUTION INTRAVENOUS at 15:37

## 2025-03-13 RX ADMIN — Medication 100 MG: at 16:26

## 2025-03-13 RX ADMIN — METHYLPREDNISOLONE SODIUM SUCCINATE 40 MG: 40 INJECTION, POWDER, LYOPHILIZED, FOR SOLUTION INTRAMUSCULAR; INTRAVENOUS at 18:23

## 2025-03-13 RX ADMIN — KETOROLAC TROMETHAMINE 15 MG: 15 INJECTION, SOLUTION INTRAMUSCULAR; INTRAVENOUS at 15:37

## 2025-03-13 RX ADMIN — IPRATROPIUM BROMIDE AND ALBUTEROL SULFATE 1 DOSE: 2.5; .5 SOLUTION RESPIRATORY (INHALATION) at 19:46

## 2025-03-13 RX ADMIN — ATENOLOL 25 MG: 25 TABLET ORAL at 20:24

## 2025-03-13 RX ADMIN — ENOXAPARIN SODIUM 30 MG: 100 INJECTION SUBCUTANEOUS at 20:26

## 2025-03-13 RX ADMIN — ONDANSETRON 4 MG: 2 INJECTION, SOLUTION INTRAMUSCULAR; INTRAVENOUS at 15:36

## 2025-03-13 RX ADMIN — FOLIC ACID 1 MG: 1 TABLET ORAL at 16:26

## 2025-03-13 RX ADMIN — THERA TABS 1 TABLET: TAB at 16:26

## 2025-03-13 ASSESSMENT — PAIN SCALES - GENERAL
PAINLEVEL_OUTOF10: 0
PAINLEVEL_OUTOF10: 2

## 2025-03-13 ASSESSMENT — PAIN DESCRIPTION - LOCATION
LOCATION: CHEST
LOCATION: CHEST

## 2025-03-13 ASSESSMENT — PAIN DESCRIPTION - DESCRIPTORS
DESCRIPTORS: TIGHTNESS
DESCRIPTORS: TIGHTNESS

## 2025-03-13 ASSESSMENT — PAIN - FUNCTIONAL ASSESSMENT: PAIN_FUNCTIONAL_ASSESSMENT: 0-10

## 2025-03-13 ASSESSMENT — PAIN DESCRIPTION - PAIN TYPE: TYPE: ACUTE PAIN

## 2025-03-13 ASSESSMENT — PAIN DESCRIPTION - FREQUENCY: FREQUENCY: CONTINUOUS

## 2025-03-13 NOTE — ED PROVIDER NOTES
normal activity  Orientation and Clouding of Sensorium: oriented and can do serial additions  CIWA-Ar Total: 1                  PHYSICAL EXAM :  ED Triage Vitals [03/13/25 1452]   BP Systolic BP Percentile Diastolic BP Percentile Temp Temp Source Pulse Respirations SpO2   (!) 176/93 -- -- 98.4 °F (36.9 °C) Oral (!) 120 22 97 %      Height Weight - Scale         1.549 m (5' 1\") 41.3 kg (91 lb)            GENERAL APPEARANCE: Awake and alert. Cooperative. No acute distress.  Appears chronically ill  HEAD: Normocephalic. Atraumatic.  EYES: PERRL. EOM's grossly intact.   ENT: Mucous membranes are moist.   NECK: Supple, trachea midline.   HEART: RRR. Normal S1, S2. No murmurs, rubs or gallops.   LUNGS: Respirations unlabored.  Breath sounds diminished bilaterally with mild expiratory wheezing.  Speaking comfortably in full sentences.   ABDOMEN: Soft. Non-distended.  Tenderness to palpation epigastrium. No guarding or rebound.   EXTREMITIES: No peripheral edema. No acute deformities.  SKIN: Warm and dry. No acute rashes.   NEUROLOGICAL: Alert and oriented    DIAGNOSTIC RESULTS     LABS:   Labs Reviewed   CBC WITH AUTO DIFFERENTIAL - Abnormal; Notable for the following components:       Result Value    Hemoglobin 11.0 (*)     Hematocrit 33.2 (*)     MCV 76.9 (*)     MCH 25.4 (*)     RDW 17.9 (*)     Platelets 464 (*)     All other components within normal limits   COMPREHENSIVE METABOLIC PANEL W/ REFLEX TO MG FOR LOW K - Abnormal; Notable for the following components:    Sodium 115 (*)     Chloride 73 (*)     BUN 5 (*)     Creatinine 0.3 (*)     Calcium 8.1 (*)     Alkaline Phosphatase 153 (*)     All other components within normal limits    Narrative:     CALL  [facility]  [facility] tel. [phone],  Chemistry results called to and read back by Mima Lal RN, 03/13/2025  16:06, by CHAN   LIPASE - Abnormal; Notable for the following components:    Lipase 10.0 (*)     All other components within normal limits    Narrative:

## 2025-03-13 NOTE — PLAN OF CARE
Patient with severe hyponatremia-being admitted to ICU.  Seen by intensivist and nephrologist.    54-year-old female with history of alcohol abuse presenting with increasing weakness and malaise, nausea and vomiting for 2 days.  Not had any alcohol in 2 days.      Sodium 115.  1 L normal saline given in the ED. seen by nephrology.  Plan is fluid restriction and hold off on further fluid IV fluids .  Check BMP every 4 hours.  Placed on CIWA protocol  Patient was hypertensive.  Resume home meds

## 2025-03-14 PROBLEM — J44.1 COPD EXACERBATION (HCC): Status: ACTIVE | Noted: 2025-03-14

## 2025-03-14 LAB
ANION GAP SERPL CALCULATED.3IONS-SCNC: 10 MMOL/L (ref 3–16)
ANION GAP SERPL CALCULATED.3IONS-SCNC: 12 MMOL/L (ref 3–16)
ANION GAP SERPL CALCULATED.3IONS-SCNC: 13 MMOL/L (ref 3–16)
ANION GAP SERPL CALCULATED.3IONS-SCNC: 5 MMOL/L (ref 3–16)
ANION GAP SERPL CALCULATED.3IONS-SCNC: 6 MMOL/L (ref 3–16)
ANION GAP SERPL CALCULATED.3IONS-SCNC: 7 MMOL/L (ref 3–16)
BASOPHILS # BLD: 0 K/UL (ref 0–0.2)
BASOPHILS NFR BLD: 0.7 %
BUN SERPL-MCNC: 3 MG/DL (ref 7–20)
BUN SERPL-MCNC: 3 MG/DL (ref 7–20)
BUN SERPL-MCNC: 4 MG/DL (ref 7–20)
BUN SERPL-MCNC: 5 MG/DL (ref 7–20)
BUN SERPL-MCNC: 5 MG/DL (ref 7–20)
BUN SERPL-MCNC: 6 MG/DL (ref 7–20)
CALCIUM SERPL-MCNC: 7.6 MG/DL (ref 8.3–10.6)
CALCIUM SERPL-MCNC: 7.9 MG/DL (ref 8.3–10.6)
CALCIUM SERPL-MCNC: 8 MG/DL (ref 8.3–10.6)
CALCIUM SERPL-MCNC: 8.5 MG/DL (ref 8.3–10.6)
CALCIUM SERPL-MCNC: 8.6 MG/DL (ref 8.3–10.6)
CALCIUM SERPL-MCNC: 8.8 MG/DL (ref 8.3–10.6)
CHLORIDE SERPL-SCNC: 80 MMOL/L (ref 99–110)
CHLORIDE SERPL-SCNC: 81 MMOL/L (ref 99–110)
CHLORIDE SERPL-SCNC: 82 MMOL/L (ref 99–110)
CHLORIDE SERPL-SCNC: 90 MMOL/L (ref 99–110)
CO2 SERPL-SCNC: 28 MMOL/L (ref 21–32)
CO2 SERPL-SCNC: 30 MMOL/L (ref 21–32)
CO2 SERPL-SCNC: 31 MMOL/L (ref 21–32)
CO2 SERPL-SCNC: 32 MMOL/L (ref 21–32)
CO2 SERPL-SCNC: 33 MMOL/L (ref 21–32)
CO2 SERPL-SCNC: 35 MMOL/L (ref 21–32)
CREAT SERPL-MCNC: 0.3 MG/DL (ref 0.6–1.1)
CREAT SERPL-MCNC: 0.4 MG/DL (ref 0.6–1.1)
CREAT SERPL-MCNC: <0.2 MG/DL (ref 0.6–1.1)
DEPRECATED RDW RBC AUTO: 17.7 % (ref 12.4–15.4)
EOSINOPHIL # BLD: 0 K/UL (ref 0–0.6)
EOSINOPHIL NFR BLD: 0.1 %
GFR SERPLBLD CREATININE-BSD FMLA CKD-EPI: >90 ML/MIN/{1.73_M2}
GLUCOSE SERPL-MCNC: 133 MG/DL (ref 70–99)
GLUCOSE SERPL-MCNC: 176 MG/DL (ref 70–99)
GLUCOSE SERPL-MCNC: 249 MG/DL (ref 70–99)
GLUCOSE SERPL-MCNC: 337 MG/DL (ref 70–99)
GLUCOSE SERPL-MCNC: 79 MG/DL (ref 70–99)
GLUCOSE SERPL-MCNC: 93 MG/DL (ref 70–99)
HCT VFR BLD AUTO: 30.4 % (ref 36–48)
HGB BLD-MCNC: 10.1 G/DL (ref 12–16)
LYMPHOCYTES # BLD: 1.3 K/UL (ref 1–5.1)
LYMPHOCYTES NFR BLD: 24.3 %
MCH RBC QN AUTO: 26.3 PG (ref 26–34)
MCHC RBC AUTO-ENTMCNC: 33.3 G/DL (ref 31–36)
MCV RBC AUTO: 78.9 FL (ref 80–100)
MONOCYTES # BLD: 0.4 K/UL (ref 0–1.3)
MONOCYTES NFR BLD: 6.8 %
NEUTROPHILS # BLD: 3.6 K/UL (ref 1.7–7.7)
NEUTROPHILS NFR BLD: 68.1 %
OSMOLALITY UR: 448 MOSM/KG (ref 390–1070)
PHOSPHATE SERPL-MCNC: 2.7 MG/DL (ref 2.5–4.9)
PLATELET # BLD AUTO: 432 K/UL (ref 135–450)
PMV BLD AUTO: 6.1 FL (ref 5–10.5)
POTASSIUM SERPL-SCNC: 3.5 MMOL/L (ref 3.5–5.1)
POTASSIUM SERPL-SCNC: 3.8 MMOL/L (ref 3.5–5.1)
POTASSIUM SERPL-SCNC: 4 MMOL/L (ref 3.5–5.1)
POTASSIUM SERPL-SCNC: 4.1 MMOL/L (ref 3.5–5.1)
POTASSIUM SERPL-SCNC: 4.1 MMOL/L (ref 3.5–5.1)
POTASSIUM SERPL-SCNC: 4.2 MMOL/L (ref 3.5–5.1)
RBC # BLD AUTO: 3.85 M/UL (ref 4–5.2)
SODIUM SERPL-SCNC: 120 MMOL/L (ref 136–145)
SODIUM SERPL-SCNC: 121 MMOL/L (ref 136–145)
SODIUM SERPL-SCNC: 121 MMOL/L (ref 136–145)
SODIUM SERPL-SCNC: 123 MMOL/L (ref 136–145)
SODIUM SERPL-SCNC: 124 MMOL/L (ref 136–145)
SODIUM SERPL-SCNC: 130 MMOL/L (ref 136–145)
SODIUM UR-SCNC: <20 MMOL/L
WBC # BLD AUTO: 5.2 K/UL (ref 4–11)

## 2025-03-14 PROCEDURE — 2580000003 HC RX 258: Performed by: INTERNAL MEDICINE

## 2025-03-14 PROCEDURE — 6360000002 HC RX W HCPCS: Performed by: INTERNAL MEDICINE

## 2025-03-14 PROCEDURE — 94640 AIRWAY INHALATION TREATMENT: CPT

## 2025-03-14 PROCEDURE — 84100 ASSAY OF PHOSPHORUS: CPT

## 2025-03-14 PROCEDURE — 99233 SBSQ HOSP IP/OBS HIGH 50: CPT | Performed by: INTERNAL MEDICINE

## 2025-03-14 PROCEDURE — 6370000000 HC RX 637 (ALT 250 FOR IP): Performed by: INTERNAL MEDICINE

## 2025-03-14 PROCEDURE — 36415 COLL VENOUS BLD VENIPUNCTURE: CPT

## 2025-03-14 PROCEDURE — 80048 BASIC METABOLIC PNL TOTAL CA: CPT

## 2025-03-14 PROCEDURE — 94761 N-INVAS EAR/PLS OXIMETRY MLT: CPT

## 2025-03-14 PROCEDURE — 85025 COMPLETE CBC W/AUTO DIFF WBC: CPT

## 2025-03-14 PROCEDURE — 2500000003 HC RX 250 WO HCPCS: Performed by: INTERNAL MEDICINE

## 2025-03-14 PROCEDURE — 84300 ASSAY OF URINE SODIUM: CPT

## 2025-03-14 PROCEDURE — 2060000000 HC ICU INTERMEDIATE R&B

## 2025-03-14 PROCEDURE — 2700000000 HC OXYGEN THERAPY PER DAY

## 2025-03-14 RX ORDER — DEXTROSE MONOHYDRATE 50 MG/ML
INJECTION, SOLUTION INTRAVENOUS CONTINUOUS
Status: DISCONTINUED | OUTPATIENT
Start: 2025-03-14 | End: 2025-03-14

## 2025-03-14 RX ORDER — MUPIROCIN 20 MG/G
OINTMENT TOPICAL 2 TIMES DAILY
Status: DISCONTINUED | OUTPATIENT
Start: 2025-03-14 | End: 2025-03-16 | Stop reason: HOSPADM

## 2025-03-14 RX ORDER — GUAIFENESIN 600 MG/1
1200 TABLET, EXTENDED RELEASE ORAL 2 TIMES DAILY PRN
Status: DISCONTINUED | OUTPATIENT
Start: 2025-03-14 | End: 2025-03-16 | Stop reason: HOSPADM

## 2025-03-14 RX ORDER — GABAPENTIN 300 MG/1
300 CAPSULE ORAL DAILY
Status: DISCONTINUED | OUTPATIENT
Start: 2025-03-14 | End: 2025-03-16 | Stop reason: HOSPADM

## 2025-03-14 RX ORDER — PANTOPRAZOLE SODIUM 40 MG/1
40 TABLET, DELAYED RELEASE ORAL
Status: DISCONTINUED | OUTPATIENT
Start: 2025-03-14 | End: 2025-03-16 | Stop reason: HOSPADM

## 2025-03-14 RX ORDER — PREDNISONE 20 MG/1
40 TABLET ORAL DAILY
Status: DISCONTINUED | OUTPATIENT
Start: 2025-03-15 | End: 2025-03-16 | Stop reason: HOSPADM

## 2025-03-14 RX ORDER — IPRATROPIUM BROMIDE AND ALBUTEROL SULFATE 2.5; .5 MG/3ML; MG/3ML
1 SOLUTION RESPIRATORY (INHALATION)
Status: DISCONTINUED | OUTPATIENT
Start: 2025-03-14 | End: 2025-03-16 | Stop reason: HOSPADM

## 2025-03-14 RX ORDER — SERTRALINE HYDROCHLORIDE 100 MG/1
100 TABLET, FILM COATED ORAL DAILY
Status: DISCONTINUED | OUTPATIENT
Start: 2025-03-14 | End: 2025-03-16 | Stop reason: HOSPADM

## 2025-03-14 RX ORDER — 0.9 % SODIUM CHLORIDE 0.9 %
500 INTRAVENOUS SOLUTION INTRAVENOUS ONCE
Status: COMPLETED | OUTPATIENT
Start: 2025-03-14 | End: 2025-03-14

## 2025-03-14 RX ADMIN — Medication 100 MG: at 08:45

## 2025-03-14 RX ADMIN — PANTOPRAZOLE SODIUM 40 MG: 20 TABLET, DELAYED RELEASE ORAL at 05:38

## 2025-03-14 RX ADMIN — METHYLPREDNISOLONE SODIUM SUCCINATE 40 MG: 40 INJECTION, POWDER, LYOPHILIZED, FOR SOLUTION INTRAMUSCULAR; INTRAVENOUS at 05:38

## 2025-03-14 RX ADMIN — TIOTROPIUM BROMIDE INHALATION SPRAY 2 PUFF: 3.12 SPRAY, METERED RESPIRATORY (INHALATION) at 07:35

## 2025-03-14 RX ADMIN — GUAIFENESIN 1200 MG: 600 TABLET, EXTENDED RELEASE ORAL at 18:35

## 2025-03-14 RX ADMIN — THERA TABS 1 TABLET: TAB at 08:45

## 2025-03-14 RX ADMIN — DEXTROSE MONOHYDRATE: 50 INJECTION, SOLUTION INTRAVENOUS at 08:58

## 2025-03-14 RX ADMIN — IPRATROPIUM BROMIDE AND ALBUTEROL SULFATE 1 DOSE: 2.5; .5 SOLUTION RESPIRATORY (INHALATION) at 21:40

## 2025-03-14 RX ADMIN — ATENOLOL 25 MG: 25 TABLET ORAL at 08:45

## 2025-03-14 RX ADMIN — BUDESONIDE AND FORMOTEROL FUMARATE DIHYDRATE 2 PUFF: 160; 4.5 AEROSOL RESPIRATORY (INHALATION) at 07:35

## 2025-03-14 RX ADMIN — DILTIAZEM HYDROCHLORIDE 240 MG: 240 CAPSULE, COATED, EXTENDED RELEASE ORAL at 09:01

## 2025-03-14 RX ADMIN — ENOXAPARIN SODIUM 30 MG: 100 INJECTION SUBCUTANEOUS at 08:45

## 2025-03-14 RX ADMIN — BUDESONIDE AND FORMOTEROL FUMARATE DIHYDRATE 2 PUFF: 160; 4.5 AEROSOL RESPIRATORY (INHALATION) at 21:40

## 2025-03-14 RX ADMIN — MUPIROCIN: 20 OINTMENT TOPICAL at 13:33

## 2025-03-14 RX ADMIN — FOLIC ACID 1 MG: 1 TABLET ORAL at 08:45

## 2025-03-14 RX ADMIN — SODIUM CHLORIDE 500 ML: 0.9 INJECTION, SOLUTION INTRAVENOUS at 18:32

## 2025-03-14 RX ADMIN — IPRATROPIUM BROMIDE AND ALBUTEROL SULFATE 1 DOSE: 2.5; .5 SOLUTION RESPIRATORY (INHALATION) at 07:35

## 2025-03-14 RX ADMIN — GABAPENTIN 300 MG: 300 CAPSULE ORAL at 11:42

## 2025-03-14 RX ADMIN — SODIUM CHLORIDE, PRESERVATIVE FREE 10 ML: 5 INJECTION INTRAVENOUS at 08:50

## 2025-03-14 RX ADMIN — SERTRALINE 100 MG: 100 TABLET, FILM COATED ORAL at 08:44

## 2025-03-14 ASSESSMENT — PAIN SCALES - GENERAL
PAINLEVEL_OUTOF10: 0

## 2025-03-14 NOTE — PLAN OF CARE
Problem: Neurosensory - Adult  Goal: Achieves stable or improved neurological status  Outcome: Progressing  Flowsheets (Taken 3/14/2025 0059)  Achieves stable or improved neurological status: Assess for and report changes in neurological status     Problem: Respiratory - Adult  Goal: Achieves optimal ventilation and oxygenation  Outcome: Progressing  Flowsheets (Taken 3/14/2025 0059)  Achieves optimal ventilation and oxygenation:   Assess for changes in respiratory status   Assess for changes in mentation and behavior   Position to facilitate oxygenation and minimize respiratory effort   Oxygen supplementation based on oxygen saturation or arterial blood gases

## 2025-03-14 NOTE — CONSULTS
Plz refer to the note from 3/13/25  
Pulmonary & Critical Care Consultation Note    Patient is being seen at the request of Gricel Crowe MD  for a consultation for hyponatremia    HISTORY OF PRESENT ILLNESS:   54 years old presented with vomiting for 3 days, 2 times a day.  Associate with diarrhea.  Some shortness of breath and cough.  Clear sputum.  Continues to drink 6 packs beers a day.  Continues to smoke 1 pack/day.  No home O2.  No syncope or seizure.  Labs revealed severe hyponatremia sodium 115.  V for 3 days 2 timies a day      PAST MEDICAL HISTORY:  Past Medical History:   Diagnosis Date    A-fib (HCC)     Anemia     Anxiety     Chronic pancreatitis (HCC)     Collapse of right lung     COPD (chronic obstructive pulmonary disease) (HCC)     Depression     GI bleed     H/O colonoscopy     Hypertension     Pancreatitis     Pulmonary embolism (HCC) 02/2018     PAST SURGICAL HISTORY:  Past Surgical History:   Procedure Laterality Date    HIP SURGERY Right 7/12/2024    RIGHT FEMUR GAMMA NAILING performed by Simba Man MD at [facility] OR    UPPER GASTROINTESTINAL ENDOSCOPY         FAMILY HISTORY:  family history includes Diabetes in her maternal grandfather.    SOCIAL HISTORY:   reports that she has been smoking cigarettes. She started smoking about 29 years ago. She has a 25 pack-year smoking history. She has been exposed to tobacco smoke. She has never used smokeless tobacco.    Scheduled Meds:   sodium chloride flush  5-40 mL IntraVENous 2 times per day    thiamine  100 mg Oral Daily    multivitamin  1 tablet Oral Daily    folic acid  1 mg Oral Daily    sodium chloride  1,000 mL IntraVENous Once     Continuous Infusions:   sodium chloride       PRN Meds:  sodium chloride flush, sodium chloride, LORazepam **OR** LORazepam **OR** LORazepam **OR** LORazepam **OR** LORazepam **OR** LORazepam **OR** LORazepam **OR** LORazepam    ALLERGIES:  Patient is allergic to bee venom and sulfa antibiotics.    REVIEW OF SYSTEMS:  Constitutional: Negative 
HPI. All other 10 systems were reviewed and were negative.     Physical exam:   Constitutional:  VITALS:  BP (!) 173/90   Pulse (!) 120   Temp 98.4 °F (36.9 °C) (Oral)   Resp 20   Ht 1.549 m (5' 1\")   Wt 41.3 kg (91 lb)   LMP 07/12/2015   SpO2 99%   BMI 17.19 kg/m²   Gen: alert, awake  Neck: No JVD  Skin: Unremarkable  Cardiovascular:  S1, S2 without m/r/g   Respiratory: CTA B without w/r/r; respiratory effort normal  Abdomen:  soft, nt, nd,   Extremities: no lower extremity edema  Neuro/Psy: AAoriented times 3 ; moves all 4 ext    Data/  Recent Labs     03/13/25  1530   WBC 9.1   HGB 11.0*   HCT 33.2*   MCV 76.9*   *     Recent Labs     03/13/25  1530   *   K 3.6   CL 73*   CO2 31   GLUCOSE 98   BUN 5*   CREATININE 0.3*   LABGLOM >90     TSH in January 2025 was 1.03  Assessment  -Hyponatremia in the setting of beer potomania   Past urine studies showed urine osmolality of 66 -100   She drinks heavily; also had vomiting.  Received 1 L normal saline bolus in the ED on 3/13   Sodium at 1530 on 3/13 was 115      -COPD    -Alcohol abuse    Plan  -As the patient has received 1 L of normal saline already, most likely patient will correct rapidly  -Call nephrology for sodium less than 115 and or more than 120  -Goal sodium of 122-123 by 1530 on 3/14  -urine Na, osmolality  -serum osm, uric acid  -BMP q4h  -Fluid restriction 1200 ml/day if within the goal otherwise if the patient corrects rapidly will need to give D5W  -strict intake and out put        Thank you for the consultation.  Please do not hesitate to call with questions.    Liza Vick MD  Office: [phone]  Fax:    [phone]  [place]

## 2025-03-14 NOTE — PLAN OF CARE
Problem: Discharge Planning  Goal: Discharge to home or other facility with appropriate resources  3/14/2025 1231 by Valerie Cartwright RN  Outcome: Progressing  Flowsheets (Taken 3/14/2025 1231)  Discharge to home or other facility with appropriate resources:   Identify barriers to discharge with patient and caregiver   Arrange for needed discharge resources and transportation as appropriate   Identify discharge learning needs (meds, wound care, etc)  3/14/2025 0059 by Juan Santos RN  Outcome: Progressing     Problem: Pain  Goal: Verbalizes/displays adequate comfort level or baseline comfort level  3/14/2025 1231 by Valerie Cartwright RN  Outcome: Progressing  Flowsheets  Taken 3/14/2025 1231  Verbalizes/displays adequate comfort level or baseline comfort level: Assess pain using appropriate pain scale  Taken 3/14/2025 1200  Verbalizes/displays adequate comfort level or baseline comfort level: Assess pain using appropriate pain scale  3/14/2025 0059 by Juan Santos RN  Outcome: Progressing  Flowsheets (Taken 3/13/2025 2000)  Verbalizes/displays adequate comfort level or baseline comfort level:   Assess pain using appropriate pain scale   Implement non-pharmacological measures as appropriate and evaluate response     Problem: Neurosensory - Adult  Goal: Achieves stable or improved neurological status  Recent Flowsheet Documentation  Taken 3/14/2025 1200 by Valerie Cartwright RN  Achieves stable or improved neurological status: Assess for and report changes in neurological status  3/14/2025 0059 by Juan Santos RN  Outcome: Progressing  Flowsheets (Taken 3/14/2025 0059)  Achieves stable or improved neurological status: Assess for and report changes in neurological status     Problem: Respiratory - Adult  Goal: Achieves optimal ventilation and oxygenation  3/14/2025 1231 by Valerie Cartwright RN  Outcome: Progressing  Flowsheets (Taken 3/14/2025 0059 by Juan Santos RN)  Achieves optimal ventilation and

## 2025-03-14 NOTE — H&P
x-ray and results no acute pulmonary infiltrate      History from:       History was obtained from the patient, electronic medical record    Chief Complaint:     Chief Complaint   Patient presents with    Vomiting     Pt states she has emesis on Thursday c/o fever/ nausea; daily alcohol user; last drink 2 days ago        History of Present Illness:     Bonny Serrano is 54 y.o. female with history of alcohol abuse, tobacco use, A-fib  She now presents to the ER with complaint of nausea, vomiting and diarrhea for the past 2 days  She also has some abdominal pain  She has increasing weakness, and malaise  She drinks alcohol daily but not in the past 2 days because of feeling ill  She denies chest pain or shortness of breath  In the ED her sodium was found to 115  Therefore she is admitted to the ICU     Review of Systems:        Pertinent positives and negatives discussed in HPI     Objective:     Intake/Output Summary (Last 24 hours) at 3/13/2025 2321  Last data filed at 3/13/2025 1643  Gross per 24 hour   Intake 1000 ml   Output --   Net 1000 ml      Vitals:   Vitals:    03/13/25 2000 03/13/25 2100 03/13/25 2200 03/13/25 2300   BP: (!) 157/82 137/70 117/69 111/62   Pulse: (!) 119 (!) 106 90 88   Resp: 17 18 15 18   Temp: 97.7 °F (36.5 °C)      TempSrc: Temporal      SpO2: 97% 99% 100% 97%   Weight:       Height:           Medications Prior to Admission     Prior to Admission medications    Medication Sig Start Date End Date Taking? Authorizing Provider   budesonide-formoterol (SYMBICORT) 160-4.5 MCG/ACT AERO Inhale 2 puffs into the lungs in the morning and 2 puffs in the evening. 1/20/25  Yes Pantera Hollis MD   tiotropium (SPIRIVA RESPIMAT) 2.5 MCG/ACT AERS inhaler Inhale 2 puffs into the lungs daily 1/21/25  Yes Pantera Hollis MD   gabapentin (NEURONTIN) 300 MG capsule Take 1 capsule by mouth daily for 30 days. 1/20/25 3/13/25 Yes Pantera Hollis MD   sertraline (ZOLOFT) 100 MG tablet Take 1 tablet by

## 2025-03-15 LAB
ANION GAP SERPL CALCULATED.3IONS-SCNC: 10 MMOL/L (ref 3–16)
ANION GAP SERPL CALCULATED.3IONS-SCNC: 7 MMOL/L (ref 3–16)
ANION GAP SERPL CALCULATED.3IONS-SCNC: 8 MMOL/L (ref 3–16)
ANION GAP SERPL CALCULATED.3IONS-SCNC: 8 MMOL/L (ref 3–16)
ANION GAP SERPL CALCULATED.3IONS-SCNC: 9 MMOL/L (ref 3–16)
BUN SERPL-MCNC: 4 MG/DL (ref 7–20)
BUN SERPL-MCNC: 5 MG/DL (ref 7–20)
BUN SERPL-MCNC: 6 MG/DL (ref 7–20)
CALCIUM SERPL-MCNC: 7.9 MG/DL (ref 8.3–10.6)
CALCIUM SERPL-MCNC: 8.1 MG/DL (ref 8.3–10.6)
CALCIUM SERPL-MCNC: 8.1 MG/DL (ref 8.3–10.6)
CALCIUM SERPL-MCNC: 8.3 MG/DL (ref 8.3–10.6)
CALCIUM SERPL-MCNC: 8.9 MG/DL (ref 8.3–10.6)
CHLORIDE SERPL-SCNC: 87 MMOL/L (ref 99–110)
CHLORIDE SERPL-SCNC: 88 MMOL/L (ref 99–110)
CHLORIDE SERPL-SCNC: 91 MMOL/L (ref 99–110)
CO2 SERPL-SCNC: 31 MMOL/L (ref 21–32)
CO2 SERPL-SCNC: 32 MMOL/L (ref 21–32)
CO2 SERPL-SCNC: 34 MMOL/L (ref 21–32)
CREAT SERPL-MCNC: 0.3 MG/DL (ref 0.6–1.1)
CREAT SERPL-MCNC: 0.3 MG/DL (ref 0.6–1.1)
CREAT SERPL-MCNC: 0.5 MG/DL (ref 0.6–1.1)
CREAT SERPL-MCNC: 0.5 MG/DL (ref 0.6–1.1)
CREAT SERPL-MCNC: <0.2 MG/DL (ref 0.6–1.1)
GFR SERPLBLD CREATININE-BSD FMLA CKD-EPI: >90 ML/MIN/{1.73_M2}
GLUCOSE SERPL-MCNC: 123 MG/DL (ref 70–99)
GLUCOSE SERPL-MCNC: 201 MG/DL (ref 70–99)
GLUCOSE SERPL-MCNC: 211 MG/DL (ref 70–99)
GLUCOSE SERPL-MCNC: 219 MG/DL (ref 70–99)
GLUCOSE SERPL-MCNC: 249 MG/DL (ref 70–99)
POTASSIUM SERPL-SCNC: 3.2 MMOL/L (ref 3.5–5.1)
POTASSIUM SERPL-SCNC: 3.6 MMOL/L (ref 3.5–5.1)
POTASSIUM SERPL-SCNC: 4.2 MMOL/L (ref 3.5–5.1)
POTASSIUM SERPL-SCNC: 4.3 MMOL/L (ref 3.5–5.1)
POTASSIUM SERPL-SCNC: 4.3 MMOL/L (ref 3.5–5.1)
SODIUM SERPL-SCNC: 126 MMOL/L (ref 136–145)
SODIUM SERPL-SCNC: 127 MMOL/L (ref 136–145)
SODIUM SERPL-SCNC: 128 MMOL/L (ref 136–145)
SODIUM SERPL-SCNC: 129 MMOL/L (ref 136–145)
SODIUM SERPL-SCNC: 133 MMOL/L (ref 136–145)

## 2025-03-15 PROCEDURE — 2500000003 HC RX 250 WO HCPCS: Performed by: INTERNAL MEDICINE

## 2025-03-15 PROCEDURE — 6370000000 HC RX 637 (ALT 250 FOR IP): Performed by: INTERNAL MEDICINE

## 2025-03-15 PROCEDURE — 2700000000 HC OXYGEN THERAPY PER DAY

## 2025-03-15 PROCEDURE — 99233 SBSQ HOSP IP/OBS HIGH 50: CPT | Performed by: INTERNAL MEDICINE

## 2025-03-15 PROCEDURE — 36415 COLL VENOUS BLD VENIPUNCTURE: CPT

## 2025-03-15 PROCEDURE — 6360000002 HC RX W HCPCS: Performed by: INTERNAL MEDICINE

## 2025-03-15 PROCEDURE — 2580000003 HC RX 258: Performed by: INTERNAL MEDICINE

## 2025-03-15 PROCEDURE — 80048 BASIC METABOLIC PNL TOTAL CA: CPT

## 2025-03-15 PROCEDURE — 94640 AIRWAY INHALATION TREATMENT: CPT

## 2025-03-15 PROCEDURE — 94761 N-INVAS EAR/PLS OXIMETRY MLT: CPT

## 2025-03-15 PROCEDURE — 99232 SBSQ HOSP IP/OBS MODERATE 35: CPT | Performed by: INTERNAL MEDICINE

## 2025-03-15 PROCEDURE — 2060000000 HC ICU INTERMEDIATE R&B

## 2025-03-15 RX ORDER — POTASSIUM CHLORIDE 1500 MG/1
40 TABLET, EXTENDED RELEASE ORAL DAILY PRN
Status: DISCONTINUED | OUTPATIENT
Start: 2025-03-15 | End: 2025-03-16 | Stop reason: HOSPADM

## 2025-03-15 RX ORDER — DEXTROSE MONOHYDRATE 50 MG/ML
INJECTION, SOLUTION INTRAVENOUS CONTINUOUS
Status: ACTIVE | OUTPATIENT
Start: 2025-03-15 | End: 2025-03-15

## 2025-03-15 RX ORDER — MECOBALAMIN 5000 MCG
5 TABLET,DISINTEGRATING ORAL NIGHTLY
Status: DISCONTINUED | OUTPATIENT
Start: 2025-03-15 | End: 2025-03-15

## 2025-03-15 RX ORDER — DEXTROSE AND SODIUM CHLORIDE 5; .2 G/100ML; G/100ML
INJECTION, SOLUTION INTRAVENOUS CONTINUOUS
Status: DISCONTINUED | OUTPATIENT
Start: 2025-03-15 | End: 2025-03-15

## 2025-03-15 RX ORDER — MECOBALAMIN 5000 MCG
5 TABLET,DISINTEGRATING ORAL NIGHTLY PRN
Status: DISCONTINUED | OUTPATIENT
Start: 2025-03-15 | End: 2025-03-16 | Stop reason: HOSPADM

## 2025-03-15 RX ADMIN — ATENOLOL 25 MG: 25 TABLET ORAL at 08:38

## 2025-03-15 RX ADMIN — FOLIC ACID 1 MG: 1 TABLET ORAL at 08:38

## 2025-03-15 RX ADMIN — GABAPENTIN 300 MG: 300 CAPSULE ORAL at 08:38

## 2025-03-15 RX ADMIN — IPRATROPIUM BROMIDE AND ALBUTEROL SULFATE 1 DOSE: 2.5; .5 SOLUTION RESPIRATORY (INHALATION) at 07:32

## 2025-03-15 RX ADMIN — POTASSIUM CHLORIDE 40 MEQ: 1500 TABLET, EXTENDED RELEASE ORAL at 05:24

## 2025-03-15 RX ADMIN — DILTIAZEM HYDROCHLORIDE 240 MG: 240 CAPSULE, COATED, EXTENDED RELEASE ORAL at 08:38

## 2025-03-15 RX ADMIN — MUPIROCIN: 20 OINTMENT TOPICAL at 00:00

## 2025-03-15 RX ADMIN — BUDESONIDE AND FORMOTEROL FUMARATE DIHYDRATE 2 PUFF: 160; 4.5 AEROSOL RESPIRATORY (INHALATION) at 20:53

## 2025-03-15 RX ADMIN — ACETAMINOPHEN 650 MG: 325 TABLET ORAL at 21:30

## 2025-03-15 RX ADMIN — ENOXAPARIN SODIUM 30 MG: 100 INJECTION SUBCUTANEOUS at 08:38

## 2025-03-15 RX ADMIN — MUPIROCIN: 20 OINTMENT TOPICAL at 21:30

## 2025-03-15 RX ADMIN — IPRATROPIUM BROMIDE AND ALBUTEROL SULFATE 1 DOSE: 2.5; .5 SOLUTION RESPIRATORY (INHALATION) at 14:37

## 2025-03-15 RX ADMIN — PREDNISONE 40 MG: 20 TABLET ORAL at 08:38

## 2025-03-15 RX ADMIN — Medication 100 MG: at 08:38

## 2025-03-15 RX ADMIN — IPRATROPIUM BROMIDE AND ALBUTEROL SULFATE 1 DOSE: 2.5; .5 SOLUTION RESPIRATORY (INHALATION) at 20:53

## 2025-03-15 RX ADMIN — SODIUM CHLORIDE, PRESERVATIVE FREE 10 ML: 5 INJECTION INTRAVENOUS at 08:40

## 2025-03-15 RX ADMIN — DEXTROSE MONOHYDRATE: 50 INJECTION, SOLUTION INTRAVENOUS at 08:11

## 2025-03-15 RX ADMIN — BUDESONIDE AND FORMOTEROL FUMARATE DIHYDRATE 2 PUFF: 160; 4.5 AEROSOL RESPIRATORY (INHALATION) at 07:32

## 2025-03-15 RX ADMIN — SODIUM CHLORIDE, PRESERVATIVE FREE 10 ML: 5 INJECTION INTRAVENOUS at 21:32

## 2025-03-15 RX ADMIN — PANTOPRAZOLE SODIUM 40 MG: 20 TABLET, DELAYED RELEASE ORAL at 05:24

## 2025-03-15 RX ADMIN — SERTRALINE 100 MG: 100 TABLET, FILM COATED ORAL at 08:38

## 2025-03-15 RX ADMIN — THERA TABS 1 TABLET: TAB at 08:38

## 2025-03-15 ASSESSMENT — PAIN DESCRIPTION - LOCATION: LOCATION: HEAD

## 2025-03-15 ASSESSMENT — PAIN SCALES - GENERAL
PAINLEVEL_OUTOF10: 5
PAINLEVEL_OUTOF10: 0

## 2025-03-15 ASSESSMENT — PAIN DESCRIPTION - DESCRIPTORS: DESCRIPTORS: ACHING

## 2025-03-15 ASSESSMENT — PAIN DESCRIPTION - ORIENTATION: ORIENTATION: MID

## 2025-03-15 NOTE — PLAN OF CARE
Problem: Pain  Goal: Verbalizes/displays adequate comfort level or baseline comfort level  Outcome: Progressing      194

## 2025-03-15 NOTE — FLOWSHEET NOTE
03/15/25 0515   Vital Signs   Temp 98 °F (36.7 °C)   Temp Source Oral   Pulse (!) 111   Respirations 18   BP (!) 154/68   MAP (Calculated) 97   BP Location Left upper arm   BP Method Automatic   Patient Position Semi fowlers   Oxygen Therapy   SpO2 94 %   O2 Device Nasal cannula   O2 Flow Rate (L/min) 2 L/min     Pt in bed K low replacement given  per order call light within reach no distress

## 2025-03-15 NOTE — FLOWSHEET NOTE
03/14/25 1935   Vital Signs   Temp 97.8 °F (36.6 °C)   Temp Source Oral   Pulse 98   Heart Rate Source Monitor   Respirations 21   /72   MAP (Calculated) 94   BP Location Left upper arm   BP Method Automatic   Patient Position Semi fowlers   Oxygen Therapy   SpO2 93 %   O2 Device Nasal cannula   O2 Flow Rate (L/min) 2 L/min     Pt came over from ICU no active skin issues noted, VSS call light within reach.

## 2025-03-16 VITALS
HEIGHT: 61 IN | OXYGEN SATURATION: 91 % | BODY MASS INDEX: 16.9 KG/M2 | WEIGHT: 89.5 LBS | HEART RATE: 104 BPM | TEMPERATURE: 98 F | RESPIRATION RATE: 20 BRPM | DIASTOLIC BLOOD PRESSURE: 77 MMHG | SYSTOLIC BLOOD PRESSURE: 134 MMHG

## 2025-03-16 LAB
ALBUMIN SERPL-MCNC: 3.2 G/DL (ref 3.4–5)
ANION GAP SERPL CALCULATED.3IONS-SCNC: 7 MMOL/L (ref 3–16)
BUN SERPL-MCNC: 4 MG/DL (ref 7–20)
CALCIUM SERPL-MCNC: 8.4 MG/DL (ref 8.3–10.6)
CHLORIDE SERPL-SCNC: 90 MMOL/L (ref 99–110)
CO2 SERPL-SCNC: 36 MMOL/L (ref 21–32)
CREAT SERPL-MCNC: 0.3 MG/DL (ref 0.6–1.1)
GFR SERPLBLD CREATININE-BSD FMLA CKD-EPI: >90 ML/MIN/{1.73_M2}
GLUCOSE SERPL-MCNC: 113 MG/DL (ref 70–99)
PHOSPHATE SERPL-MCNC: 2.8 MG/DL (ref 2.5–4.9)
POTASSIUM SERPL-SCNC: 3.5 MMOL/L (ref 3.5–5.1)
SODIUM SERPL-SCNC: 133 MMOL/L (ref 136–145)

## 2025-03-16 PROCEDURE — 2700000000 HC OXYGEN THERAPY PER DAY

## 2025-03-16 PROCEDURE — 80069 RENAL FUNCTION PANEL: CPT

## 2025-03-16 PROCEDURE — 6370000000 HC RX 637 (ALT 250 FOR IP): Performed by: INTERNAL MEDICINE

## 2025-03-16 PROCEDURE — 99238 HOSP IP/OBS DSCHRG MGMT 30/<: CPT | Performed by: INTERNAL MEDICINE

## 2025-03-16 PROCEDURE — 94640 AIRWAY INHALATION TREATMENT: CPT

## 2025-03-16 PROCEDURE — 99232 SBSQ HOSP IP/OBS MODERATE 35: CPT | Performed by: INTERNAL MEDICINE

## 2025-03-16 PROCEDURE — 36415 COLL VENOUS BLD VENIPUNCTURE: CPT

## 2025-03-16 PROCEDURE — 2500000003 HC RX 250 WO HCPCS: Performed by: INTERNAL MEDICINE

## 2025-03-16 PROCEDURE — 6360000002 HC RX W HCPCS: Performed by: INTERNAL MEDICINE

## 2025-03-16 PROCEDURE — 94761 N-INVAS EAR/PLS OXIMETRY MLT: CPT

## 2025-03-16 RX ORDER — PREDNISONE 20 MG/1
40 TABLET ORAL DAILY
Qty: 6 TABLET | Refills: 0 | Status: SHIPPED | OUTPATIENT
Start: 2025-03-17 | End: 2025-03-20

## 2025-03-16 RX ADMIN — Medication 100 MG: at 08:47

## 2025-03-16 RX ADMIN — PANTOPRAZOLE SODIUM 40 MG: 20 TABLET, DELAYED RELEASE ORAL at 05:01

## 2025-03-16 RX ADMIN — THERA TABS 1 TABLET: TAB at 08:47

## 2025-03-16 RX ADMIN — BUDESONIDE AND FORMOTEROL FUMARATE DIHYDRATE 2 PUFF: 160; 4.5 AEROSOL RESPIRATORY (INHALATION) at 07:48

## 2025-03-16 RX ADMIN — ACETAMINOPHEN 650 MG: 325 TABLET ORAL at 08:57

## 2025-03-16 RX ADMIN — GABAPENTIN 300 MG: 300 CAPSULE ORAL at 08:48

## 2025-03-16 RX ADMIN — IPRATROPIUM BROMIDE AND ALBUTEROL SULFATE 1 DOSE: 2.5; .5 SOLUTION RESPIRATORY (INHALATION) at 07:48

## 2025-03-16 RX ADMIN — SERTRALINE 100 MG: 100 TABLET, FILM COATED ORAL at 08:47

## 2025-03-16 RX ADMIN — SODIUM CHLORIDE, PRESERVATIVE FREE 10 ML: 5 INJECTION INTRAVENOUS at 08:48

## 2025-03-16 RX ADMIN — FOLIC ACID 1 MG: 1 TABLET ORAL at 08:47

## 2025-03-16 RX ADMIN — DILTIAZEM HYDROCHLORIDE 240 MG: 240 CAPSULE, COATED, EXTENDED RELEASE ORAL at 08:48

## 2025-03-16 RX ADMIN — ENOXAPARIN SODIUM 30 MG: 100 INJECTION SUBCUTANEOUS at 08:48

## 2025-03-16 RX ADMIN — GUAIFENESIN 1200 MG: 600 TABLET, EXTENDED RELEASE ORAL at 08:57

## 2025-03-16 RX ADMIN — ATENOLOL 25 MG: 25 TABLET ORAL at 08:47

## 2025-03-16 RX ADMIN — PREDNISONE 40 MG: 20 TABLET ORAL at 08:47

## 2025-03-16 ASSESSMENT — PAIN DESCRIPTION - LOCATION: LOCATION: HIP

## 2025-03-16 ASSESSMENT — PAIN DESCRIPTION - DESCRIPTORS: DESCRIPTORS: ACHING

## 2025-03-16 ASSESSMENT — PAIN SCALES - GENERAL: PAINLEVEL_OUTOF10: 7

## 2025-03-16 ASSESSMENT — PAIN DESCRIPTION - ORIENTATION: ORIENTATION: RIGHT

## 2025-03-16 ASSESSMENT — PAIN - FUNCTIONAL ASSESSMENT: PAIN_FUNCTIONAL_ASSESSMENT: ACTIVITIES ARE NOT PREVENTED

## 2025-03-16 NOTE — PLAN OF CARE
Problem: Discharge Planning  Goal: Discharge to home or other facility with appropriate resources  3/16/2025 1027 by Mitra Chaney, RN  Outcome: Adequate for Discharge  Flowsheets (Taken 3/16/2025 0837)  Discharge to home or other facility with appropriate resources: Identify barriers to discharge with patient and caregiver  3/16/2025 0330 by Pricila Olsen, RN  Outcome: Progressing

## 2025-03-16 NOTE — PROGRESS NOTES
[facility]  Nephrology follow-up note           Reason for Consult: Hyponatremia  Requesting Physician:  Dr. Crowe    Sub/interval history  Feels better than yesterday  Sodium corrected rapidly and had to be placed on D5W briefly but has improved    ROS: No chest pain/shortness of breath/fever/nausea/vomiting  PSFH: No visitor    Scheduled Meds:   pantoprazole  40 mg Oral QAM AC    sertraline  100 mg Oral Daily    tiotropium  2 puff Inhalation Daily RT    ipratropium 0.5 mg-albuterol 2.5 mg  1 Dose Inhalation TID RT    mupirocin   Each Nostril BID    [START ON 3/15/2025] predniSONE  40 mg Oral Daily    gabapentin  300 mg Oral Daily    thiamine  100 mg Oral Daily    multivitamin  1 tablet Oral Daily    folic acid  1 mg Oral Daily    atenolol  25 mg Oral Daily    budesonide-formoterol  2 puff Inhalation BID RT    dilTIAZem  240 mg Oral Daily    sodium chloride flush  5-40 mL IntraVENous 2 times per day    enoxaparin  30 mg SubCUTAneous Daily     Continuous Infusions:   sodium chloride       PRN Meds:.guaiFENesin, LORazepam **OR** LORazepam **OR** LORazepam **OR** LORazepam **OR** LORazepam **OR** LORazepam **OR** LORazepam **OR** LORazepam, sodium chloride flush, sodium chloride, potassium chloride **OR** potassium chloride, magnesium sulfate, ondansetron **OR** ondansetron, polyethylene glycol, acetaminophen **OR** acetaminophen    History of Present Illness on 3/13/2025:    54 y.o. yo female with PMH of COPD, anxiety, A-fib, hypertension, PE who is admitted for hyponatremia  Patient presented to the emergency room with complaints of vomiting.  She drinks alcohol heavily and last drink was 2 days ago  Blood work showed sodium of 120.  Patient received 1 L of normal saline bolus in the emergency room  She received iv ativan and is sleepy when I rounded  Physical exam:   Constitutional:  VITALS:  BP (!) 114/48   Pulse 95   Temp 98.2 °F (36.8 °C) (Temporal)   Resp 17   Ht 1.549 m (5' 0.98\")   Wt 41.3 kg (91 
    [facility]  Nephrology follow-up note           Reason for Consult: Hyponatremia  Requesting Physician:  Dr. Crowe    Sub/interval history  She is feeling better than yesterday  Sodium increased rapidly overnight and D5W was given this morning.  I was not informed about increased sodium overnight    ROS: No chest pain/shortness of breath/fever/nausea/vomiting  PSFH: No visitor    Scheduled Meds:   pantoprazole  40 mg Oral QAM AC    sertraline  100 mg Oral Daily    tiotropium  2 puff Inhalation Daily RT    ipratropium 0.5 mg-albuterol 2.5 mg  1 Dose Inhalation TID RT    mupirocin   Each Nostril BID    predniSONE  40 mg Oral Daily    gabapentin  300 mg Oral Daily    thiamine  100 mg Oral Daily    multivitamin  1 tablet Oral Daily    folic acid  1 mg Oral Daily    atenolol  25 mg Oral Daily    budesonide-formoterol  2 puff Inhalation BID RT    dilTIAZem  240 mg Oral Daily    sodium chloride flush  5-40 mL IntraVENous 2 times per day    enoxaparin  30 mg SubCUTAneous Daily     Continuous Infusions:   sodium chloride       PRN Meds:.potassium chloride, guaiFENesin, LORazepam **OR** LORazepam **OR** LORazepam **OR** LORazepam **OR** LORazepam **OR** LORazepam **OR** LORazepam **OR** LORazepam, sodium chloride flush, sodium chloride, potassium chloride **OR** potassium chloride, magnesium sulfate, ondansetron **OR** ondansetron, polyethylene glycol, acetaminophen **OR** acetaminophen    History of Present Illness on 3/13/2025:    54 y.o. yo female with PMH of COPD, anxiety, A-fib, hypertension, PE who is admitted for hyponatremia  Patient presented to the emergency room with complaints of vomiting.  She drinks alcohol heavily and last drink was 2 days ago  Blood work showed sodium of 120.  Patient received 1 L of normal saline bolus in the emergency room  She received iv ativan and is sleepy when I rounded  Physical exam:   Constitutional:  VITALS:  /72   Pulse 81   Temp 98.5 °F (36.9 °C) (Oral)   Resp 18  
   03/14/25 2100   RT Protocol   History Pulmonary Disease 2   Respiratory pattern 0   Breath sounds 4   Cough 1   Indications for Bronchodilator Therapy Wheezing associated with pulm disorder   Bronchodilator Assessment Score 7     RT Inhaler-Nebulizer Bronchodilator Protocol Note    There is a bronchodilator order in the chart from a provider indicating to follow the RT Bronchodilator Protocol and there is an “Initiate RT Inhaler-Nebulizer Bronchodilator Protocol” order as well (see protocol at bottom of note).    CXR Findings:  XR CHEST PORTABLE  Result Date: 3/13/2025  Patchy bilateral pulmonary scarring. Right basal pleuroparenchymal scarring. No acute infiltrate noted.       The findings from the last RT Protocol Assessment were as follows:   History Pulmonary Disease: Chronic pulmonary disease  Respiratory Pattern: Regular pattern and RR 12-20 bpm  Breath Sounds: Intermittent or unilateral wheezes  Cough: Strong, productive  Indication for Bronchodilator Therapy: Wheezing associated with pulm disorder  Bronchodilator Assessment Score: 7    Aerosolized bronchodilator medication orders have been revised according to the RT Inhaler-Nebulizer Bronchodilator Protocol below.    Respiratory Therapist to perform RT Therapy Protocol Assessment initially then follow the protocol.  Repeat RT Therapy Protocol Assessment PRN for score 0-3 or on second treatment, BID, and PRN for scores above 3.    No Indications - adjust the frequency to every 6 hours PRN wheezing or bronchospasm, if no treatments needed after 48 hours then discontinue using Per Protocol order mode.     If indication present, adjust the RT bronchodilator orders based on the Bronchodilator Assessment Score as indicated below.  Use Inhaler orders unless patient has one or more of the following: on home nebulizer, not able to hold breath for 10 seconds, is not alert and oriented, cannot activate and use MDI correctly, or respiratory rate 25 breaths per 
   03/15/25 0735   RT Protocol   History Pulmonary Disease 2   Respiratory pattern 0   Breath sounds 2   Cough 0   Indications for Bronchodilator Therapy On home bronchodilators   Bronchodilator Assessment Score 4       
   03/15/25 2000   RT Protocol   History Pulmonary Disease 2   Respiratory pattern 0   Breath sounds 2   Cough 0   Indications for Bronchodilator Therapy On home bronchodilators   Bronchodilator Assessment Score 4     RT Inhaler-Nebulizer Bronchodilator Protocol Note    There is a bronchodilator order in the chart from a provider indicating to follow the RT Bronchodilator Protocol and there is an “Initiate RT Inhaler-Nebulizer Bronchodilator Protocol” order as well (see protocol at bottom of note).    CXR Findings:  No results found.    The findings from the last RT Protocol Assessment were as follows:   History Pulmonary Disease: Chronic pulmonary disease  Respiratory Pattern: Regular pattern and RR 12-20 bpm  Breath Sounds: Slightly diminished and/or crackles  Cough: Strong, spontaneous, non-productive  Indication for Bronchodilator Therapy: On home bronchodilators  Bronchodilator Assessment Score: 4    Aerosolized bronchodilator medication orders have been revised according to the RT Inhaler-Nebulizer Bronchodilator Protocol below.    Respiratory Therapist to perform RT Therapy Protocol Assessment initially then follow the protocol.  Repeat RT Therapy Protocol Assessment PRN for score 0-3 or on second treatment, BID, and PRN for scores above 3.    No Indications - adjust the frequency to every 6 hours PRN wheezing or bronchospasm, if no treatments needed after 48 hours then discontinue using Per Protocol order mode.     If indication present, adjust the RT bronchodilator orders based on the Bronchodilator Assessment Score as indicated below.  Use Inhaler orders unless patient has one or more of the following: on home nebulizer, not able to hold breath for 10 seconds, is not alert and oriented, cannot activate and use MDI correctly, or respiratory rate 25 breaths per minute or more, then use the equivalent nebulizer order(s) with same Frequency and PRN reasons based on the score.  If a patient is on this 
   03/16/25 0753   RT Protocol   History Pulmonary Disease 2   Respiratory pattern 0   Breath sounds 2   Cough 3   Indications for Bronchodilator Therapy On home bronchodilators   Bronchodilator Assessment Score 7       
   03/16/25 0753   RT Protocol   History Pulmonary Disease 2   Respiratory pattern 0   Breath sounds 4   Cough 1   Indications for Bronchodilator Therapy On home bronchodilators   Bronchodilator Assessment Score 7       
 at 1810- within nephro's parameters.  
4 Eyes Skin Assessment     NAME:  Bonny Serrano  YOB: 1970  MEDICAL RECORD NUMBER:  [id number]    The patient is being assessed for  Admission    I agree that at least one RN has performed a thorough Head to Toe Skin Assessment on the patient. ALL assessment sites listed below have been assessed.      Areas assessed by both nurses:    Head, Face, Ears, Shoulders, Back, Chest, Arms, Elbows, Hands, Sacrum. Buttock, Coccyx, Ischium, Legs. Feet and Heels, and Under Medical Devices         Does the Patient have a Wound? No noted wound(s)      Slight redness on bilat heelsn and buttocks         [place] Prevention initiated by RN: Yes  Wound Care Orders initiated by RN: No    Pressure Injury (Stage 3,4, Unstageable, DTI, NWPT, and Complex wounds) if present, place Wound referral order by RN under : No    New Ostomies, if present place, Ostomy referral order under : No     Nurse 1 eSignature: Electronically signed by Juan Santos RN on 3/13/25 at 11:50 PM EDT    **SHARE this note so that the co-signing nurse can place an eSignature**    Nurse 2 eSignature: Electronically signed by Brenda Lopez RN on 3/14/25 at 2:20 AM EDT    
Admit: 3/13/2025    Name:  Bonny Serrano  Room:  [address] [place] [address]  MRN:    [id number]    Critical Care Daily Progress Note for 3/14/2025   Patient with h/o alcohol abuse admitted with hyponatremia    Interval History:   Awake oriented   Scheduled Meds:   pantoprazole  40 mg Oral QAM AC    sertraline  100 mg Oral Daily    tiotropium  2 puff Inhalation Daily RT    ipratropium 0.5 mg-albuterol 2.5 mg  1 Dose Inhalation TID RT    thiamine  100 mg Oral Daily    multivitamin  1 tablet Oral Daily    folic acid  1 mg Oral Daily    atenolol  25 mg Oral Daily    budesonide-formoterol  2 puff Inhalation BID RT    dilTIAZem  240 mg Oral Daily    sodium chloride flush  5-40 mL IntraVENous 2 times per day    enoxaparin  30 mg SubCUTAneous Daily    methylPREDNISolone  40 mg IntraVENous Q12H       Continuous Infusions:   sodium chloride         PRN Meds:  guaiFENesin, LORazepam **OR** LORazepam **OR** LORazepam **OR** LORazepam **OR** LORazepam **OR** LORazepam **OR** LORazepam **OR** LORazepam, sodium chloride flush, sodium chloride, potassium chloride **OR** potassium chloride, magnesium sulfate, ondansetron **OR** ondansetron, polyethylene glycol, acetaminophen **OR** acetaminophen                  Objective:     Temp  Av.9 °F (36.6 °C)  Min: 97.3 °F (36.3 °C)  Max: 98.4 °F (36.9 °C)  Pulse  Av.6  Min: 88  Max: 131  BP  Min: 85/54  Max: 176/93  SpO2  Av.6 %  Min: 89 %  Max: 100 %  Patient Vitals for the past 4 hrs:   BP Temp Temp src Pulse Resp SpO2   25 0728 -- -- -- (!) 104 22 94 %   25 0700 (!) 159/89 -- -- 99 16 97 %   25 0600 138/70 -- -- 98 18 96 %   25 0500 121/70 -- -- 91 (!) 31 99 %   25 0400 125/73 97.3 °F (36.3 °C) Temporal 95 16 100 %         Intake/Output Summary (Last 24 hours) at 3/14/2025 0742  Last data filed at 3/13/2025 1643  Gross per 24 hour   Intake 1000 ml   Output --   Net 1000 ml       Physical Exam:  General:  Awake, alert and oriented. Appears to be not in any 
Blood pressure 134/77, pulse (!) 104, temperature 98 °F (36.7 °C), temperature source Oral, resp. rate 20, height 1.549 m (5' 0.98\"), weight 40.6 kg (89 lb 8 oz), last menstrual period 07/12/2015, SpO2 91%, not currently breastfeeding.    Shift assessment completed see flow sheet. Patient in bed alert and oriented x4. Patient on 2L O2, 88%, now  91% showing no signs of distress. Morning medications given per order. Prn tylenol and mucinex given per mar. Patient has no other needs at this time. Standard safety measures in place.    
Dr. Vick in to see pt. Labs reviewed.  Orders received to stop D5 infusion.  IV stopped.   Pt awakens to verbal. Denies pain.    
Hand off report given to  SARAH Oviedo.   Patient is stable showing no signs of distress and has no current needs at this time.   Call light is in reach and bed is in lowest position.    Care is transferred at this time.   
ICU rounds completed. Meds and labs reviewed. O2 decreaqsed to 2L/NC.    
Lab called about repeat Sodium draw.  Will be up shortly.   
Messaged Dr. Vick for Sodium result of 124.    
Portable o2 tank at bedside. Cab called. Voucher provided.    Patient educated on discharge instructions as well as new medications use, dosage, administration and possible side effects.  Patient verified knowledge. IV removed without difficulty and dry dressing in place. Telemetry monitor removed and returned to [facility]. Pt left facility in stable condition to Home with all of their personal belongings.    
Pt brought to ICU 4  Pt is A&O to self. Pt drowsy.    VSS  Respirations are easy, even and unlabored. Pt is on 4L (wears 2L at baseline)    Purewick in place.   Plan of care and goals reviewed. Call light within reach.  Bed in lowest position with wheels locked. No needs expressed at this time.   
Pt coughing persistently.  Mucinex given as ordered. Pt sitting up in bed.   
Pulmonary & Critical Care Medicine ICU Progress Note    CC: Hyponatremia    Events of Last 24 hours:   D5W   3L- uses 2L at home     Vascular lines: IV: PIVs         / / /   No results for input(s): \"PHART\", \"ZHZ8VJB\", \"PO2ART\" in the last 72 hours.    IV:   sodium chloride         Vitals:  Blood pressure (!) 159/89, pulse (!) 104, temperature 97.3 °F (36.3 °C), temperature source Temporal, resp. rate 22, height 1.549 m (5' 1\"), weight 41.3 kg (91 lb), last menstrual period 2015, SpO2 94%, not currently breastfeeding.  on 3L  Temp  Av.9 °F (36.6 °C)  Min: 97.3 °F (36.3 °C)  Max: 98.4 °F (36.9 °C)    Intake/Output Summary (Last 24 hours) at 3/14/2025 0734  Last data filed at 3/13/2025 1643  Gross per 24 hour   Intake 1000 ml   Output --   Net 1000 ml     Gen: No distress.  Ill-appearing  Eyes: No sclera icterus. No conjunctival injection.   Neck: Trachea midline. No obvious mass.    Resp: No accessory muscle use.  Few crackles.  Minimal wheezes. No rhonchi. No dullness on percussion.  CV: Regular rate. Regular rhythm. No murmur or rub. No edema.  GI: Non-tender.  Mild distended. No hernia.   Skin: Warm and dry. No nodule on exposed extremities.   Lymph: No cervical LAD. No supraclavicular LAD.   M/S: No cyanosis. No joint deformity. No clubbing.   Neuro: Awake. Alert. Moves all four extremities.   Psych: Oriented. No anxiety.       Scheduled Meds:   pantoprazole  40 mg Oral QAM AC    sertraline  100 mg Oral Daily    tiotropium  2 puff Inhalation Daily RT    thiamine  100 mg Oral Daily    multivitamin  1 tablet Oral Daily    folic acid  1 mg Oral Daily    atenolol  25 mg Oral Daily    budesonide-formoterol  2 puff Inhalation BID RT    dilTIAZem  240 mg Oral Daily    sodium chloride flush  5-40 mL IntraVENous 2 times per day    enoxaparin  30 mg SubCUTAneous Daily    methylPREDNISolone  40 mg IntraVENous Q12H    ipratropium 0.5 mg-albuterol 2.5 mg  1 Dose Inhalation 4x Daily RT       Data:  CBC:   Recent 
Pulmonary & Critical Care Medicine ICU Progress Note    CC: Hyponatremia    Events of Last 24 hours:   Feels better today  Minimal cough  3--->2L- uses 2L at home     Vascular lines: IV: PIVs         / / /   No results for input(s): \"PHART\", \"QCK0RGX\", \"PO2ART\" in the last 72 hours.    IV:   sodium chloride         Vitals:  Blood pressure 139/66, pulse (!) 103, temperature 97.7 °F (36.5 °C), temperature source Oral, resp. rate 18, height 1.549 m (5' 0.98\"), weight 40.6 kg (89 lb 8 oz), last menstrual period 2015, SpO2 92%, not currently breastfeeding.  on 2L  Temp  Av.8 °F (36.6 °C)  Min: 97.4 °F (36.3 °C)  Max: 98.5 °F (36.9 °C)    Intake/Output Summary (Last 24 hours) at 3/16/2025 0755  Last data filed at 3/15/2025 1559  Gross per 24 hour   Intake 960 ml   Output 225 ml   Net 735 ml     Constitutional:  No acute distress. .   HEENT: no scleral icterus  Neck: No tracheal deviation present.    Cardiovascular: Normal heart sounds.   Pulmonary/Chest: Few wheezes. No rhonchi.  rales. No decreased breath sounds.  No accessory muscle usage or stridor.   Abdominal: Soft.   Musculoskeletal: No cyanosis. No clubbing.  Skin: Skin is warm and dry.         Scheduled Meds:   pantoprazole  40 mg Oral QAM AC    sertraline  100 mg Oral Daily    tiotropium  2 puff Inhalation Daily RT    ipratropium 0.5 mg-albuterol 2.5 mg  1 Dose Inhalation TID RT    mupirocin   Each Nostril BID    predniSONE  40 mg Oral Daily    gabapentin  300 mg Oral Daily    thiamine  100 mg Oral Daily    multivitamin  1 tablet Oral Daily    folic acid  1 mg Oral Daily    atenolol  25 mg Oral Daily    budesonide-formoterol  2 puff Inhalation BID RT    dilTIAZem  240 mg Oral Daily    sodium chloride flush  5-40 mL IntraVENous 2 times per day    enoxaparin  30 mg SubCUTAneous Daily       Data:  CBC:   Recent Labs     25  1530 25  0539   WBC 9.1 5.2   HGB 11.0* 10.1*   HCT 33.2* 30.4*   MCV 76.9* 78.9*   * 432     BMP:   Recent Labs 
RT Inhaler-Nebulizer Bronchodilator Protocol Note    There is a bronchodilator order in the chart from a provider indicating to follow the RT Bronchodilator Protocol and there is an “Initiate RT Inhaler-Nebulizer Bronchodilator Protocol” order as well (see protocol at bottom of note).    CXR Findings:  XR CHEST PORTABLE  Result Date: 3/13/2025  Patchy bilateral pulmonary scarring. Right basal pleuroparenchymal scarring. No acute infiltrate noted.       The findings from the last RT Protocol Assessment were as follows:   History Pulmonary Disease: (P) Chronic pulmonary disease  Respiratory Pattern: (P) Regular pattern and RR 12-20 bpm  Breath Sounds: (P) Intermittent or unilateral wheezes  Cough: (P) Strong, spontaneous, non-productive  Indication for Bronchodilator Therapy: (P) Wheezing associated with pulm disorder  Bronchodilator Assessment Score: (P) 6    Aerosolized bronchodilator medication orders have been revised according to the RT Inhaler-Nebulizer Bronchodilator Protocol below.    Respiratory Therapist to perform RT Therapy Protocol Assessment initially then follow the protocol.  Repeat RT Therapy Protocol Assessment PRN for score 0-3 or on second treatment, BID, and PRN for scores above 3.    No Indications - adjust the frequency to every 6 hours PRN wheezing or bronchospasm, if no treatments needed after 48 hours then discontinue using Per Protocol order mode.     If indication present, adjust the RT bronchodilator orders based on the Bronchodilator Assessment Score as indicated below.  Use Inhaler orders unless patient has one or more of the following: on home nebulizer, not able to hold breath for 10 seconds, is not alert and oriented, cannot activate and use MDI correctly, or respiratory rate 25 breaths per minute or more, then use the equivalent nebulizer order(s) with same Frequency and PRN reasons based on the score.  If a patient is on this medication at home then do not decrease Frequency below 
Report given to Karin SMITH for Pt to be transported to room 314.  Pt placed on Tele monitor.   
Shift assessment completed. Pt drowsy, easily awakens. Denies pain.   Pt sinus rhythm/mild sinus tach.  O2 @3L/NC.  Pt oriented x 4. PIV's x 2.  Saline lock.  Call light in reach. Bed exit on.     
Shift report given to Pricila SMITH  -  care transferred.  
 5-40 mL IntraVENous 2 times per day    enoxaparin  30 mg SubCUTAneous Daily       Data:  CBC:   Recent Labs     03/13/25  1530 03/14/25  0539   WBC 9.1 5.2   HGB 11.0* 10.1*   HCT 33.2* 30.4*   MCV 76.9* 78.9*   * 432     BMP:   Recent Labs     03/14/25  0931 03/14/25  1244 03/14/25  1522 03/14/25  2113 03/15/25  0336   *   < > 121* 130* 133*   K 4.1   < > 4.1 3.5 3.2*   CL 81*   < > 82* 90* 91*   CO2 30   < > 32 35* 34*   PHOS 2.7  --   --   --   --    BUN 3*   < > 6* 5* 4*   CREATININE 0.3*   < > 0.3* 0.3* <0.2*    < > = values in this interval not displayed.     LIVER PROFILE:   Recent Labs     03/13/25  1530   AST 24   ALT 26   LIPASE 10.0*   BILITOT 0.3   ALKPHOS 153*         Microbiology:  3/13 COVID-19 influenza not detected     Imaging:  Chest x-ray 3/13 images independently reviewed by me and showed:  No acute infiltrates  Pleural-parenchymal scarring        ASSESSMENT:  Very severe COPD with mild exacerbation  History of cavitary lung lesion  Hypoxia-secondary to above  Acute hyponatremia  Alcohol abuse  History of paroxysmal A-fib  Tobacco abuse     PLAN:  Supplemental oxygen to maintain SaO2 >92%; wean as tolerated  Prednisone taper  Inhaled bronchodilators  Ativan CIWA scale as first-line therapy  Rally pack   Fluid management per nephrology  DVT prophylaxis: Lovenox    
Frequency below that used at home.    0-3 - enter or revise RT bronchodilator order(s) to equivalent RT Bronchodilator order with Frequency of every 4 hours PRN for wheezing or increased work of breathing using Per Protocol order mode.        4-6 - enter or revise RT Bronchodilator order(s) to two equivalent RT bronchodilator orders with one order with BID Frequency and one order with Frequency of every 4 hours PRN wheezing or increased work of breathing using Per Protocol order mode.        7-10 - enter or revise RT Bronchodilator order(s) to two equivalent RT bronchodilator orders with one order with TID Frequency and one order with Frequency of every 4 hours PRN wheezing or increased work of breathing using Per Protocol order mode.       11-13 - enter or revise RT Bronchodilator order(s) to one equivalent RT bronchodilator order with QID Frequency and an Albuterol order with Frequency of every 4 hours PRN wheezing or increased work of breathing using Per Protocol order mode.      Greater than 13 - enter or revise RT Bronchodilator order(s) to one equivalent RT bronchodilator order with every 4 hours Frequency and an Albuterol order with Frequency of every 2 hours PRN wheezing or increased work of breathing using Per Protocol order mode.         Electronically signed by Yoan Celeste RCP on 3/14/2025 at 7:38 AM  
Oral Supplement    Anthropometric Measures:  Height: 154.9 cm (5' 0.98\")  Ideal Body Weight (IBW): 105 lbs (48 kg)    Admission Body Weight: 41.3 kg (91 lb) (obtained on 3/13/25; actual weight)  Current Body Weight: 41.3 kg (91 lb) (obtained on 3/13/25; actual weight), 86.7 % IBW. Weight Source: Not specified  Current BMI (kg/m2): 17.2  Usual Body Weight: 36.3 kg (80 lb 0.4 oz) (obtained on 1/4/25; actual weight)     % Weight Change (Calculated): 13.7  Weight Adjustment For: No Adjustment                 BMI Categories: Underweight (BMI less than 18.5)    Estimated Daily Nutrient Needs:  Energy Requirements Based On: Kcal/kg  Weight Used for Energy Requirements: Current  Energy (kcal/day): 1238 - 1321 kcals based on 30-32 kcals/kg/CBW  Weight Used for Protein Requirements: Current  Protein (g/day): 62 - 66 g protein based on 1.5-1.6 g/kg/CBW  Method Used for Fluid Requirements: 1 ml/kcal  Fluid (ml/day): 1238 - 1321 ml    Nutrition Diagnosis:   Severe malnutrition related to inadequate protein-energy intake, psychological cause or life stress, altered GI function as evidenced by poor intake prior to admission, intake 26-50%, loss of subcutaneous fat, muscle loss, lab values, BMI, patient reported barriers    Nutrition Interventions:   Food and/or Nutrient Delivery: Continue Current Diet, Start Oral Nutrition Supplement  Nutrition Education/Counseling: No recommendation at this time  Coordination of Nutrition Care: Continue to monitor while inpatient, Coordination of Care, Interdisciplinary Rounds  Plan of Care discussed with: ICU Team    Goals:  Goals: Meet at least 75% of estimated needs  Type of Goal: New goal  Previous Goal Met: New Goal    Nutrition Monitoring and Evaluation:   Behavioral-Environmental Outcomes: Readiness for Change  Food/Nutrient Intake Outcomes: Food and Nutrient Intake, Supplement Intake  Physical Signs/Symptoms Outcomes: Biochemical Data, GI Status, Hemodynamic Status, Meal Time Behavior, 
status 01/04/2025    Alcohol abuse 01/04/2025    Chronic pancreatitis (Formerly [facility]) 01/04/2025    Psychosis (Formerly [facility]) 01/04/2025    Psychosis, unspecified psychosis type (Formerly [facility]) 01/04/2025    Pneumonia of right lung due to infectious organism 11/10/2024    Arthritis of right knee 10/22/2024    Lung mass 09/25/2024    History of hip fracture 08/13/2024    Abnormal CT of the chest 08/12/2024    Mucus plug in respiratory tract 08/12/2024    Leg edema 08/12/2024    Hypoxemia 08/12/2024    Peripheral edema 08/09/2024    Intertrochanteric fracture of right femur, closed, initial encounter (Formerly [facility]) 07/12/2024    Hypomagnesuria 01/04/2024    Septic shock (Formerly [facility]) 01/04/2024    Hypokalemia 01/03/2024    Hypotension 01/03/2024    Mass of upper lobe of right lung 01/03/2024    Falls 01/03/2024    Severe protein-calorie malnutrition 01/03/2024    Cigarette smoker 09/02/2020    Hyponatremia     Chronic obstructive pulmonary disease (Formerly [facility]) 05/10/2020    AVM (arteriovenous malformation) of small bowel, 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    Pulmonary embolism (Formerly [facility]) 03/14/2018    Paroxysmal atrial fibrillation ([facility]) 01/31/2018    Macrocytic anemia 01/28/2018    Alcohol-induced chronic pancreatitis (Formerly [facility]) 07/07/2017    Alcoholism (Formerly [facility]) 06/21/2016    Lung collapse 03/31/2014    Tobacco abuse 03/31/2014    Chronic hyponatremia 03/31/2014       Acute on chronic hyponatremia  Beer potomania  Nephro consult  Recd 1 L IV NS in ED   Now on 1200 ml FR   Monitor Na levels closely   Now on D5W as Na corrected too quickly    Severe COPD with exacerbation  Chronic hypoxic resp failure   Solumedrol,IBD   Sputum cultures    Alcohol abuse  Withdrawal.  Start librium  CIWA scale with ativan    PAF  Continue cardizem    Severe PCM  Nutrition consult    Full code  Gen diet  Lovenox       CASEY PASCUAL MD

## 2025-03-16 NOTE — PLAN OF CARE
Problem: Discharge Planning  Goal: Discharge to home or other facility with appropriate resources  Outcome: Progressing     Problem: Pain  Goal: Verbalizes/displays adequate comfort level or baseline comfort level  3/16/2025 0330 by Pricila Olsen, RN  Outcome: Progressing  3/15/2025 1600 by Mitra Chaney, RN  Outcome: Progressing     Problem: Neurosensory - Adult  Goal: Achieves stable or improved neurological status  Outcome: Progressing     Problem: Respiratory - Adult  Goal: Achieves optimal ventilation and oxygenation  Outcome: Progressing     Problem: Safety - Adult  Goal: Free from fall injury  Outcome: Progressing     Problem: Nutrition Deficit:  Goal: Optimize nutritional status  Outcome: Progressing

## 2025-03-25 ENCOUNTER — APPOINTMENT (OUTPATIENT)
Dept: CT IMAGING | Age: 55
DRG: 140 | End: 2025-03-25
Payer: MEDICAID

## 2025-03-25 ENCOUNTER — HOSPITAL ENCOUNTER (INPATIENT)
Age: 55
LOS: 3 days | Discharge: HOME | DRG: 140 | End: 2025-03-28
Attending: STUDENT IN AN ORGANIZED HEALTH CARE EDUCATION/TRAINING PROGRAM | Admitting: INTERNAL MEDICINE
Payer: MEDICAID

## 2025-03-25 ENCOUNTER — APPOINTMENT (OUTPATIENT)
Dept: GENERAL RADIOLOGY | Age: 55
DRG: 140 | End: 2025-03-25
Payer: MEDICAID

## 2025-03-25 DIAGNOSIS — J44.9 CHRONIC OBSTRUCTIVE PULMONARY DISEASE, UNSPECIFIED COPD TYPE (HCC): ICD-10-CM

## 2025-03-25 DIAGNOSIS — J96.21 ACUTE ON CHRONIC RESPIRATORY FAILURE WITH HYPOXIA: ICD-10-CM

## 2025-03-25 DIAGNOSIS — Z59.819 HOUSING INSTABILITY: Primary | ICD-10-CM

## 2025-03-25 DIAGNOSIS — J44.1 COPD EXACERBATION (HCC): ICD-10-CM

## 2025-03-25 DIAGNOSIS — E87.1 HYPONATREMIA: ICD-10-CM

## 2025-03-25 LAB
ALBUMIN SERPL-MCNC: 3.5 G/DL (ref 3.4–5)
ALBUMIN/GLOB SERPL: 1.2 {RATIO} (ref 1.1–2.2)
ALP SERPL-CCNC: 160 U/L (ref 40–129)
ALT SERPL-CCNC: 24 U/L (ref 10–40)
ANION GAP SERPL CALCULATED.3IONS-SCNC: 5 MMOL/L (ref 3–16)
ANION GAP SERPL CALCULATED.3IONS-SCNC: 5 MMOL/L (ref 3–16)
ANION GAP SERPL CALCULATED.3IONS-SCNC: 7 MMOL/L (ref 3–16)
ANION GAP SERPL CALCULATED.3IONS-SCNC: 8 MMOL/L (ref 3–16)
ANION GAP SERPL CALCULATED.3IONS-SCNC: 8 MMOL/L (ref 3–16)
AST SERPL-CCNC: 28 U/L (ref 15–37)
BACTERIA URNS QL MICRO: ABNORMAL /HPF
BASE EXCESS BLDV CALC-SCNC: 3.1 MMOL/L (ref -3–3)
BASE EXCESS BLDV CALC-SCNC: 6.2 MMOL/L (ref -3–3)
BASOPHILS # BLD: 0.1 K/UL (ref 0–0.2)
BASOPHILS NFR BLD: 0.7 %
BILIRUB SERPL-MCNC: <0.2 MG/DL (ref 0–1)
BILIRUB UR QL STRIP.AUTO: NEGATIVE
BUN SERPL-MCNC: 3 MG/DL (ref 7–20)
BUN SERPL-MCNC: 3 MG/DL (ref 7–20)
BUN SERPL-MCNC: 4 MG/DL (ref 7–20)
CALCIUM SERPL-MCNC: 7.7 MG/DL (ref 8.3–10.6)
CALCIUM SERPL-MCNC: 7.8 MG/DL (ref 8.3–10.6)
CALCIUM SERPL-MCNC: 8.1 MG/DL (ref 8.3–10.6)
CALCIUM SERPL-MCNC: 8.3 MG/DL (ref 8.3–10.6)
CALCIUM SERPL-MCNC: 8.3 MG/DL (ref 8.3–10.6)
CHLORIDE SERPL-SCNC: 67 MMOL/L (ref 99–110)
CHLORIDE SERPL-SCNC: 70 MMOL/L (ref 99–110)
CHLORIDE SERPL-SCNC: 77 MMOL/L (ref 99–110)
CHLORIDE SERPL-SCNC: 80 MMOL/L (ref 99–110)
CHLORIDE SERPL-SCNC: 82 MMOL/L (ref 99–110)
CLARITY UR: CLEAR
CO2 BLDV-SCNC: 33 MMOL/L
CO2 BLDV-SCNC: 36 MMOL/L
CO2 SERPL-SCNC: 31 MMOL/L (ref 21–32)
CO2 SERPL-SCNC: 33 MMOL/L (ref 21–32)
CO2 SERPL-SCNC: 34 MMOL/L (ref 21–32)
CO2 SERPL-SCNC: 34 MMOL/L (ref 21–32)
CO2 SERPL-SCNC: 35 MMOL/L (ref 21–32)
COHGB MFR BLDV: 12.1 % (ref 0–1.5)
COHGB MFR BLDV: 9.9 % (ref 0–1.5)
COLOR UR: YELLOW
CREAT SERPL-MCNC: 0.3 MG/DL (ref 0.6–1.1)
CREAT SERPL-MCNC: <0.2 MG/DL (ref 0.6–1.1)
DEPRECATED RDW RBC AUTO: 18.7 % (ref 12.4–15.4)
EKG ATRIAL RATE: 86 BPM
EKG ATRIAL RATE: 93 BPM
EKG DIAGNOSIS: NORMAL
EKG DIAGNOSIS: NORMAL
EKG P AXIS: 80 DEGREES
EKG P AXIS: 81 DEGREES
EKG P-R INTERVAL: 116 MS
EKG P-R INTERVAL: 132 MS
EKG Q-T INTERVAL: 370 MS
EKG Q-T INTERVAL: 402 MS
EKG QRS DURATION: 88 MS
EKG QRS DURATION: 88 MS
EKG QTC CALCULATION (BAZETT): 460 MS
EKG QTC CALCULATION (BAZETT): 481 MS
EKG R AXIS: 89 DEGREES
EKG R AXIS: 92 DEGREES
EKG T AXIS: 75 DEGREES
EKG T AXIS: 84 DEGREES
EKG VENTRICULAR RATE: 86 BPM
EKG VENTRICULAR RATE: 93 BPM
EOSINOPHIL # BLD: 0.1 K/UL (ref 0–0.6)
EOSINOPHIL NFR BLD: 0.6 %
EPI CELLS #/AREA URNS HPF: ABNORMAL /HPF (ref 0–5)
ETHANOLAMINE SERPL-MCNC: 16 MG/DL (ref 0–0.08)
FLUAV RNA RESP QL NAA+PROBE: NOT DETECTED
FLUBV RNA RESP QL NAA+PROBE: NOT DETECTED
GFR SERPLBLD CREATININE-BSD FMLA CKD-EPI: >90 ML/MIN/{1.73_M2}
GLUCOSE SERPL-MCNC: 105 MG/DL (ref 70–99)
GLUCOSE SERPL-MCNC: 119 MG/DL (ref 70–99)
GLUCOSE SERPL-MCNC: 163 MG/DL (ref 70–99)
GLUCOSE SERPL-MCNC: 189 MG/DL (ref 70–99)
GLUCOSE SERPL-MCNC: 214 MG/DL (ref 70–99)
GLUCOSE UR STRIP.AUTO-MCNC: NEGATIVE MG/DL
HCO3 BLDV-SCNC: 31.1 MMOL/L (ref 23–29)
HCO3 BLDV-SCNC: 33.6 MMOL/L (ref 23–29)
HCT VFR BLD AUTO: 30.9 % (ref 36–48)
HGB BLD-MCNC: 10.3 G/DL (ref 12–16)
HGB UR QL STRIP.AUTO: NEGATIVE
KETONES UR STRIP.AUTO-MCNC: 40 MG/DL
LACTATE BLDV-SCNC: 2 MMOL/L (ref 0.4–2)
LEUKOCYTE ESTERASE UR QL STRIP.AUTO: ABNORMAL
LIPASE SERPL-CCNC: 12 U/L (ref 13–60)
LYMPHOCYTES # BLD: 1.4 K/UL (ref 1–5.1)
LYMPHOCYTES NFR BLD: 12.9 %
MCH RBC QN AUTO: 26.7 PG (ref 26–34)
MCHC RBC AUTO-ENTMCNC: 33.3 G/DL (ref 31–36)
MCV RBC AUTO: 80 FL (ref 80–100)
METHGB MFR BLDV: 0.3 %
METHGB MFR BLDV: 0.3 %
MONOCYTES # BLD: 0.6 K/UL (ref 0–1.3)
MONOCYTES NFR BLD: 5.3 %
NEUTROPHILS # BLD: 8.5 K/UL (ref 1.7–7.7)
NEUTROPHILS NFR BLD: 80.5 %
NITRITE UR QL STRIP.AUTO: NEGATIVE
NT-PROBNP SERPL-MCNC: 177 PG/ML (ref 0–124)
O2 CT VFR BLDV CALC: 14 VOL %
O2 CT VFR BLDV CALC: 14 VOL %
O2 THERAPY: ABNORMAL
O2 THERAPY: ABNORMAL
OSMOLALITY SERPL: 245 MOSM/KG (ref 275–295)
PCO2 BLDV: 62.9 MMHG (ref 40–50)
PCO2 BLDV: 65.5 MMHG (ref 40–50)
PH BLDV: 7.29 [PH] (ref 7.35–7.45)
PH BLDV: 7.34 [PH] (ref 7.35–7.45)
PH UR STRIP.AUTO: 6.5 [PH] (ref 5–8)
PLATELET # BLD AUTO: 527 K/UL (ref 135–450)
PMV BLD AUTO: 6.1 FL (ref 5–10.5)
PO2 BLDV: 71.6 MMHG (ref 25–40)
PO2 BLDV: 91.2 MMHG (ref 25–40)
POTASSIUM SERPL-SCNC: 3.8 MMOL/L (ref 3.5–5.1)
POTASSIUM SERPL-SCNC: 4.4 MMOL/L (ref 3.5–5.1)
POTASSIUM SERPL-SCNC: 4.5 MMOL/L (ref 3.5–5.1)
PROT SERPL-MCNC: 6.5 G/DL (ref 6.4–8.2)
PROT UR STRIP.AUTO-MCNC: NEGATIVE MG/DL
RBC # BLD AUTO: 3.86 M/UL (ref 4–5.2)
RBC #/AREA URNS HPF: ABNORMAL /HPF (ref 0–4)
REASON FOR REJECTION: NORMAL
REJECTED TEST: NORMAL
SAO2 % BLDV: 93 %
SAO2 % BLDV: 96 %
SARS-COV-2 RNA RESP QL NAA+PROBE: NOT DETECTED
SODIUM SERPL-SCNC: 106 MMOL/L (ref 136–145)
SODIUM SERPL-SCNC: 112 MMOL/L (ref 136–145)
SODIUM SERPL-SCNC: 114 MMOL/L (ref 136–145)
SODIUM SERPL-SCNC: 116 MMOL/L (ref 136–145)
SODIUM SERPL-SCNC: 120 MMOL/L (ref 136–145)
SODIUM SERPL-SCNC: 122 MMOL/L (ref 136–145)
SODIUM UR-SCNC: <20 MMOL/L
SP GR UR STRIP.AUTO: <=1.005 (ref 1–1.03)
TROPONIN, HIGH SENSITIVITY: 12 NG/L (ref 0–14)
TROPONIN, HIGH SENSITIVITY: 14 NG/L (ref 0–14)
TROPONIN, HIGH SENSITIVITY: 16 NG/L (ref 0–14)
UA DIPSTICK W REFLEX MICRO PNL UR: YES
URN SPEC COLLECT METH UR: ABNORMAL
UROBILINOGEN UR STRIP-ACNC: 0.2 E.U./DL
WBC # BLD AUTO: 10.6 K/UL (ref 4–11)
WBC #/AREA URNS HPF: ABNORMAL /HPF (ref 0–5)

## 2025-03-25 PROCEDURE — 99285 EMERGENCY DEPT VISIT HI MDM: CPT

## 2025-03-25 PROCEDURE — 82803 BLOOD GASES ANY COMBINATION: CPT

## 2025-03-25 PROCEDURE — 93010 ELECTROCARDIOGRAM REPORT: CPT | Performed by: INTERNAL MEDICINE

## 2025-03-25 PROCEDURE — 84132 ASSAY OF SERUM POTASSIUM: CPT

## 2025-03-25 PROCEDURE — 84300 ASSAY OF URINE SODIUM: CPT

## 2025-03-25 PROCEDURE — 82077 ASSAY SPEC XCP UR&BREATH IA: CPT

## 2025-03-25 PROCEDURE — 83605 ASSAY OF LACTIC ACID: CPT

## 2025-03-25 PROCEDURE — 84295 ASSAY OF SERUM SODIUM: CPT

## 2025-03-25 PROCEDURE — 2500000003 HC RX 250 WO HCPCS

## 2025-03-25 PROCEDURE — 2580000003 HC RX 258

## 2025-03-25 PROCEDURE — 6370000000 HC RX 637 (ALT 250 FOR IP)

## 2025-03-25 PROCEDURE — 6360000004 HC RX CONTRAST MEDICATION: Performed by: STUDENT IN AN ORGANIZED HEALTH CARE EDUCATION/TRAINING PROGRAM

## 2025-03-25 PROCEDURE — 83930 ASSAY OF BLOOD OSMOLALITY: CPT

## 2025-03-25 PROCEDURE — 93005 ELECTROCARDIOGRAM TRACING: CPT | Performed by: STUDENT IN AN ORGANIZED HEALTH CARE EDUCATION/TRAINING PROGRAM

## 2025-03-25 PROCEDURE — 6370000000 HC RX 637 (ALT 250 FOR IP): Performed by: INTERNAL MEDICINE

## 2025-03-25 PROCEDURE — 94640 AIRWAY INHALATION TREATMENT: CPT

## 2025-03-25 PROCEDURE — 94761 N-INVAS EAR/PLS OXIMETRY MLT: CPT

## 2025-03-25 PROCEDURE — 99291 CRITICAL CARE FIRST HOUR: CPT | Performed by: INTERNAL MEDICINE

## 2025-03-25 PROCEDURE — 83880 ASSAY OF NATRIURETIC PEPTIDE: CPT

## 2025-03-25 PROCEDURE — 74177 CT ABD & PELVIS W/CONTRAST: CPT

## 2025-03-25 PROCEDURE — 6370000000 HC RX 637 (ALT 250 FOR IP): Performed by: STUDENT IN AN ORGANIZED HEALTH CARE EDUCATION/TRAINING PROGRAM

## 2025-03-25 PROCEDURE — 2580000003 HC RX 258: Performed by: STUDENT IN AN ORGANIZED HEALTH CARE EDUCATION/TRAINING PROGRAM

## 2025-03-25 PROCEDURE — 6360000002 HC RX W HCPCS

## 2025-03-25 PROCEDURE — 36415 COLL VENOUS BLD VENIPUNCTURE: CPT

## 2025-03-25 PROCEDURE — 2000000000 HC ICU R&B

## 2025-03-25 PROCEDURE — 2500000003 HC RX 250 WO HCPCS: Performed by: STUDENT IN AN ORGANIZED HEALTH CARE EDUCATION/TRAINING PROGRAM

## 2025-03-25 PROCEDURE — 81001 URINALYSIS AUTO W/SCOPE: CPT

## 2025-03-25 PROCEDURE — 85025 COMPLETE CBC W/AUTO DIFF WBC: CPT

## 2025-03-25 PROCEDURE — 99223 1ST HOSP IP/OBS HIGH 75: CPT | Performed by: INTERNAL MEDICINE

## 2025-03-25 PROCEDURE — 96374 THER/PROPH/DIAG INJ IV PUSH: CPT

## 2025-03-25 PROCEDURE — 87636 SARSCOV2 & INF A&B AMP PRB: CPT

## 2025-03-25 PROCEDURE — 6360000002 HC RX W HCPCS: Performed by: STUDENT IN AN ORGANIZED HEALTH CARE EDUCATION/TRAINING PROGRAM

## 2025-03-25 PROCEDURE — 80053 COMPREHEN METABOLIC PANEL: CPT

## 2025-03-25 PROCEDURE — 2700000000 HC OXYGEN THERAPY PER DAY

## 2025-03-25 PROCEDURE — 83935 ASSAY OF URINE OSMOLALITY: CPT

## 2025-03-25 PROCEDURE — 71045 X-RAY EXAM CHEST 1 VIEW: CPT

## 2025-03-25 PROCEDURE — 71260 CT THORAX DX C+: CPT

## 2025-03-25 PROCEDURE — 2580000003 HC RX 258: Performed by: INTERNAL MEDICINE

## 2025-03-25 PROCEDURE — 84484 ASSAY OF TROPONIN QUANT: CPT

## 2025-03-25 PROCEDURE — 83690 ASSAY OF LIPASE: CPT

## 2025-03-25 RX ORDER — FOLIC ACID 1 MG/1
1 TABLET ORAL DAILY
Status: DISCONTINUED | OUTPATIENT
Start: 2025-03-25 | End: 2025-03-28 | Stop reason: HOSPADM

## 2025-03-25 RX ORDER — IPRATROPIUM BROMIDE AND ALBUTEROL SULFATE 2.5; .5 MG/3ML; MG/3ML
2 SOLUTION RESPIRATORY (INHALATION) ONCE
Status: COMPLETED | OUTPATIENT
Start: 2025-03-25 | End: 2025-03-25

## 2025-03-25 RX ORDER — LORAZEPAM 2 MG/ML
2 INJECTION INTRAMUSCULAR
Status: DISCONTINUED | OUTPATIENT
Start: 2025-03-25 | End: 2025-03-28 | Stop reason: HOSPADM

## 2025-03-25 RX ORDER — SERTRALINE HYDROCHLORIDE 100 MG/1
100 TABLET, FILM COATED ORAL DAILY
Status: DISCONTINUED | OUTPATIENT
Start: 2025-03-25 | End: 2025-03-28 | Stop reason: HOSPADM

## 2025-03-25 RX ORDER — SODIUM CHLORIDE 9 MG/ML
1000 INJECTION, SOLUTION INTRAVENOUS CONTINUOUS
Status: DISCONTINUED | OUTPATIENT
Start: 2025-03-25 | End: 2025-03-25

## 2025-03-25 RX ORDER — SODIUM CHLORIDE 9 MG/ML
INJECTION, SOLUTION INTRAVENOUS PRN
Status: DISCONTINUED | OUTPATIENT
Start: 2025-03-25 | End: 2025-03-28 | Stop reason: HOSPADM

## 2025-03-25 RX ORDER — DULOXETIN HYDROCHLORIDE 60 MG/1
60 CAPSULE, DELAYED RELEASE ORAL DAILY
Status: DISCONTINUED | OUTPATIENT
Start: 2025-03-25 | End: 2025-03-28 | Stop reason: HOSPADM

## 2025-03-25 RX ORDER — DULOXETIN HYDROCHLORIDE 60 MG/1
60 CAPSULE, DELAYED RELEASE ORAL DAILY
COMMUNITY

## 2025-03-25 RX ORDER — 3% SODIUM CHLORIDE 3 G/100ML
25 INJECTION, SOLUTION INTRAVENOUS CONTINUOUS
Status: DISCONTINUED | OUTPATIENT
Start: 2025-03-25 | End: 2025-03-25

## 2025-03-25 RX ORDER — CALCIUM CARBONATE 500 MG/1
500 TABLET, CHEWABLE ORAL 3 TIMES DAILY PRN
Status: DISCONTINUED | OUTPATIENT
Start: 2025-03-25 | End: 2025-03-28 | Stop reason: HOSPADM

## 2025-03-25 RX ORDER — ATENOLOL 25 MG/1
25 TABLET ORAL DAILY
Status: DISCONTINUED | OUTPATIENT
Start: 2025-03-25 | End: 2025-03-28 | Stop reason: HOSPADM

## 2025-03-25 RX ORDER — LORAZEPAM 2 MG/ML
1 INJECTION INTRAMUSCULAR
Status: DISCONTINUED | OUTPATIENT
Start: 2025-03-25 | End: 2025-03-28 | Stop reason: HOSPADM

## 2025-03-25 RX ORDER — IOPAMIDOL 755 MG/ML
85 INJECTION, SOLUTION INTRAVASCULAR
Status: COMPLETED | OUTPATIENT
Start: 2025-03-25 | End: 2025-03-25

## 2025-03-25 RX ORDER — LORAZEPAM 2 MG/1
2 TABLET ORAL
Status: DISCONTINUED | OUTPATIENT
Start: 2025-03-25 | End: 2025-03-28 | Stop reason: HOSPADM

## 2025-03-25 RX ORDER — IPRATROPIUM BROMIDE AND ALBUTEROL SULFATE 2.5; .5 MG/3ML; MG/3ML
1 SOLUTION RESPIRATORY (INHALATION)
Status: DISCONTINUED | OUTPATIENT
Start: 2025-03-25 | End: 2025-03-25

## 2025-03-25 RX ORDER — LORAZEPAM 2 MG/1
4 TABLET ORAL
Status: DISCONTINUED | OUTPATIENT
Start: 2025-03-25 | End: 2025-03-28 | Stop reason: HOSPADM

## 2025-03-25 RX ORDER — METHYLPREDNISOLONE SODIUM SUCCINATE 125 MG/2ML
INJECTION INTRAMUSCULAR; INTRAVENOUS
Status: DISPENSED
Start: 2025-03-25 | End: 2025-03-25

## 2025-03-25 RX ORDER — POTASSIUM CHLORIDE 750 MG/1
CAPSULE, EXTENDED RELEASE ORAL DAILY
Status: ON HOLD | COMMUNITY
End: 2025-03-28 | Stop reason: HOSPADM

## 2025-03-25 RX ORDER — ACETAMINOPHEN 650 MG/1
650 SUPPOSITORY RECTAL EVERY 6 HOURS PRN
Status: DISCONTINUED | OUTPATIENT
Start: 2025-03-25 | End: 2025-03-28 | Stop reason: HOSPADM

## 2025-03-25 RX ORDER — LORAZEPAM 2 MG/ML
3 INJECTION INTRAMUSCULAR
Status: DISCONTINUED | OUTPATIENT
Start: 2025-03-25 | End: 2025-03-28 | Stop reason: HOSPADM

## 2025-03-25 RX ORDER — LANOLIN ALCOHOL/MO/W.PET/CERES
100 CREAM (GRAM) TOPICAL DAILY
Status: DISCONTINUED | OUTPATIENT
Start: 2025-03-25 | End: 2025-03-28 | Stop reason: HOSPADM

## 2025-03-25 RX ORDER — IPRATROPIUM BROMIDE AND ALBUTEROL SULFATE 2.5; .5 MG/3ML; MG/3ML
1 SOLUTION RESPIRATORY (INHALATION)
Status: DISCONTINUED | OUTPATIENT
Start: 2025-03-25 | End: 2025-03-28 | Stop reason: HOSPADM

## 2025-03-25 RX ORDER — ACETAMINOPHEN 325 MG/1
650 TABLET ORAL EVERY 6 HOURS PRN
Status: DISCONTINUED | OUTPATIENT
Start: 2025-03-25 | End: 2025-03-28 | Stop reason: HOSPADM

## 2025-03-25 RX ORDER — MULTIVITAMIN WITH IRON
1 TABLET ORAL DAILY
Status: DISCONTINUED | OUTPATIENT
Start: 2025-03-25 | End: 2025-03-28 | Stop reason: HOSPADM

## 2025-03-25 RX ORDER — POTASSIUM CHLORIDE 750 MG/1
40 TABLET, EXTENDED RELEASE ORAL PRN
Status: DISCONTINUED | OUTPATIENT
Start: 2025-03-25 | End: 2025-03-28 | Stop reason: HOSPADM

## 2025-03-25 RX ORDER — ONDANSETRON 2 MG/ML
4 INJECTION INTRAMUSCULAR; INTRAVENOUS EVERY 6 HOURS PRN
Status: DISCONTINUED | OUTPATIENT
Start: 2025-03-25 | End: 2025-03-28 | Stop reason: HOSPADM

## 2025-03-25 RX ORDER — IPRATROPIUM BROMIDE AND ALBUTEROL SULFATE 2.5; .5 MG/3ML; MG/3ML
1 SOLUTION RESPIRATORY (INHALATION) EVERY 4 HOURS PRN
Status: DISCONTINUED | OUTPATIENT
Start: 2025-03-25 | End: 2025-03-28 | Stop reason: HOSPADM

## 2025-03-25 RX ORDER — SODIUM CHLORIDE 9 MG/ML
INJECTION, SOLUTION INTRAVENOUS CONTINUOUS
Status: DISCONTINUED | OUTPATIENT
Start: 2025-03-25 | End: 2025-03-26

## 2025-03-25 RX ORDER — LORAZEPAM 2 MG/ML
4 INJECTION INTRAMUSCULAR
Status: DISCONTINUED | OUTPATIENT
Start: 2025-03-25 | End: 2025-03-28 | Stop reason: HOSPADM

## 2025-03-25 RX ORDER — LORAZEPAM 1 MG/1
1 TABLET ORAL
Status: DISCONTINUED | OUTPATIENT
Start: 2025-03-25 | End: 2025-03-28 | Stop reason: HOSPADM

## 2025-03-25 RX ORDER — ENOXAPARIN SODIUM 100 MG/ML
30 INJECTION SUBCUTANEOUS DAILY
Status: DISCONTINUED | OUTPATIENT
Start: 2025-03-25 | End: 2025-03-28 | Stop reason: HOSPADM

## 2025-03-25 RX ORDER — IPRATROPIUM BROMIDE AND ALBUTEROL SULFATE 2.5; .5 MG/3ML; MG/3ML
1 SOLUTION RESPIRATORY (INHALATION) ONCE
Status: COMPLETED | OUTPATIENT
Start: 2025-03-25 | End: 2025-03-25

## 2025-03-25 RX ORDER — OMEPRAZOLE 20 MG/1
20 CAPSULE, DELAYED RELEASE ORAL DAILY
COMMUNITY

## 2025-03-25 RX ORDER — POTASSIUM CHLORIDE 7.45 MG/ML
10 INJECTION INTRAVENOUS PRN
Status: DISCONTINUED | OUTPATIENT
Start: 2025-03-25 | End: 2025-03-28 | Stop reason: HOSPADM

## 2025-03-25 RX ORDER — MAGNESIUM SULFATE IN WATER 40 MG/ML
2000 INJECTION, SOLUTION INTRAVENOUS PRN
Status: DISCONTINUED | OUTPATIENT
Start: 2025-03-25 | End: 2025-03-28 | Stop reason: HOSPADM

## 2025-03-25 RX ORDER — POLYETHYLENE GLYCOL 3350 17 G/17G
17 POWDER, FOR SOLUTION ORAL DAILY PRN
Status: DISCONTINUED | OUTPATIENT
Start: 2025-03-25 | End: 2025-03-28 | Stop reason: HOSPADM

## 2025-03-25 RX ORDER — DILTIAZEM HYDROCHLORIDE 240 MG/1
240 CAPSULE, COATED, EXTENDED RELEASE ORAL DAILY
Status: DISCONTINUED | OUTPATIENT
Start: 2025-03-25 | End: 2025-03-28 | Stop reason: HOSPADM

## 2025-03-25 RX ORDER — ATORVASTATIN CALCIUM 10 MG/1
10 TABLET, FILM COATED ORAL NIGHTLY
COMMUNITY

## 2025-03-25 RX ORDER — GUAIFENESIN 600 MG/1
600 TABLET, EXTENDED RELEASE ORAL 2 TIMES DAILY
Status: DISCONTINUED | OUTPATIENT
Start: 2025-03-25 | End: 2025-03-28 | Stop reason: HOSPADM

## 2025-03-25 RX ORDER — ATORVASTATIN CALCIUM 10 MG/1
10 TABLET, FILM COATED ORAL DAILY
Status: DISCONTINUED | OUTPATIENT
Start: 2025-03-25 | End: 2025-03-26

## 2025-03-25 RX ORDER — ONDANSETRON 4 MG/1
4 TABLET, ORALLY DISINTEGRATING ORAL EVERY 8 HOURS PRN
Status: DISCONTINUED | OUTPATIENT
Start: 2025-03-25 | End: 2025-03-28 | Stop reason: HOSPADM

## 2025-03-25 RX ORDER — SODIUM CHLORIDE 0.9 % (FLUSH) 0.9 %
5-40 SYRINGE (ML) INJECTION PRN
Status: DISCONTINUED | OUTPATIENT
Start: 2025-03-25 | End: 2025-03-28 | Stop reason: HOSPADM

## 2025-03-25 RX ORDER — SODIUM CHLORIDE 0.9 % (FLUSH) 0.9 %
5-40 SYRINGE (ML) INJECTION EVERY 12 HOURS SCHEDULED
Status: DISCONTINUED | OUTPATIENT
Start: 2025-03-25 | End: 2025-03-28 | Stop reason: HOSPADM

## 2025-03-25 RX ADMIN — FOLIC ACID 1 MG: 1 TABLET ORAL at 10:54

## 2025-03-25 RX ADMIN — IPRATROPIUM BROMIDE AND ALBUTEROL SULFATE 1 DOSE: 2.5; .5 SOLUTION RESPIRATORY (INHALATION) at 06:04

## 2025-03-25 RX ADMIN — Medication 10 ML: at 10:55

## 2025-03-25 RX ADMIN — SODIUM CHLORIDE 1000 ML: 0.9 INJECTION, SOLUTION INTRAVENOUS at 06:04

## 2025-03-25 RX ADMIN — IPRATROPIUM BROMIDE AND ALBUTEROL SULFATE 2 DOSE: 2.5; .5 SOLUTION RESPIRATORY (INHALATION) at 05:20

## 2025-03-25 RX ADMIN — IPRATROPIUM BROMIDE AND ALBUTEROL SULFATE 1 DOSE: 2.5; .5 SOLUTION RESPIRATORY (INHALATION) at 11:49

## 2025-03-25 RX ADMIN — AZITHROMYCIN MONOHYDRATE 500 MG: 500 INJECTION, POWDER, LYOPHILIZED, FOR SOLUTION INTRAVENOUS at 11:12

## 2025-03-25 RX ADMIN — THERA TABS 1 TABLET: TAB at 10:54

## 2025-03-25 RX ADMIN — PANTOPRAZOLE SODIUM 40 MG: 40 INJECTION, POWDER, LYOPHILIZED, FOR SOLUTION INTRAVENOUS at 10:55

## 2025-03-25 RX ADMIN — IOPAMIDOL 85 ML: 755 INJECTION, SOLUTION INTRAVENOUS at 06:33

## 2025-03-25 RX ADMIN — ATENOLOL 25 MG: 25 TABLET ORAL at 10:54

## 2025-03-25 RX ADMIN — ATORVASTATIN CALCIUM 10 MG: 10 TABLET, FILM COATED ORAL at 10:54

## 2025-03-25 RX ADMIN — Medication 100 MG: at 10:54

## 2025-03-25 RX ADMIN — WATER 80 MG: 1 INJECTION INTRAMUSCULAR; INTRAVENOUS; SUBCUTANEOUS at 05:25

## 2025-03-25 RX ADMIN — CALCIUM CARBONATE 500 MG: 500 TABLET, CHEWABLE ORAL at 12:26

## 2025-03-25 RX ADMIN — GUAIFENESIN 600 MG: 600 TABLET, EXTENDED RELEASE ORAL at 10:54

## 2025-03-25 RX ADMIN — DILTIAZEM HYDROCHLORIDE 240 MG: 120 CAPSULE, COATED, EXTENDED RELEASE ORAL at 10:55

## 2025-03-25 RX ADMIN — GUAIFENESIN 600 MG: 600 TABLET, EXTENDED RELEASE ORAL at 20:02

## 2025-03-25 RX ADMIN — METHYLPREDNISOLONE SODIUM SUCCINATE 40 MG: 40 INJECTION, POWDER, LYOPHILIZED, FOR SOLUTION INTRAMUSCULAR; INTRAVENOUS at 10:55

## 2025-03-25 RX ADMIN — SODIUM CHLORIDE: 0.9 INJECTION, SOLUTION INTRAVENOUS at 10:56

## 2025-03-25 RX ADMIN — ENOXAPARIN SODIUM 30 MG: 100 INJECTION SUBCUTANEOUS at 10:54

## 2025-03-25 RX ADMIN — IPRATROPIUM BROMIDE AND ALBUTEROL SULFATE 1 DOSE: 2.5; .5 SOLUTION RESPIRATORY (INHALATION) at 19:48

## 2025-03-25 RX ADMIN — METHYLPREDNISOLONE SODIUM SUCCINATE 40 MG: 40 INJECTION, POWDER, LYOPHILIZED, FOR SOLUTION INTRAMUSCULAR; INTRAVENOUS at 20:02

## 2025-03-25 SDOH — ECONOMIC STABILITY - HOUSING INSECURITY: HOUSING INSTABILITY UNSPECIFIED: Z59.819

## 2025-03-25 ASSESSMENT — PAIN DESCRIPTION - LOCATION: LOCATION: ABDOMEN

## 2025-03-25 ASSESSMENT — LIFESTYLE VARIABLES
HOW OFTEN DO YOU HAVE A DRINK CONTAINING ALCOHOL: 4 OR MORE TIMES A WEEK
HOW OFTEN DO YOU HAVE A DRINK CONTAINING ALCOHOL: 4 OR MORE TIMES A WEEK
HOW MANY STANDARD DRINKS CONTAINING ALCOHOL DO YOU HAVE ON A TYPICAL DAY: 5 OR 6
HOW MANY STANDARD DRINKS CONTAINING ALCOHOL DO YOU HAVE ON A TYPICAL DAY: 3 OR 4

## 2025-03-25 ASSESSMENT — PAIN SCALES - GENERAL
PAINLEVEL_OUTOF10: 0
PAINLEVEL_OUTOF10: 5
PAINLEVEL_OUTOF10: 0

## 2025-03-25 ASSESSMENT — PAIN DESCRIPTION - PAIN TYPE: TYPE: ACUTE PAIN

## 2025-03-25 ASSESSMENT — PAIN - FUNCTIONAL ASSESSMENT: PAIN_FUNCTIONAL_ASSESSMENT: 0-10

## 2025-03-25 NOTE — PROGRESS NOTES
Pt admitted into ICU 6 from ED.     Shift assessment, completed, see flow sheet. Pt is lethargic, oriented x 4. Following commands.     SR 99, /57 (77) , SpO2 90% on 2L/NC. Respirations are easy, even, and unlabored. Bilateral lung sounds diminished. 1 PIV, WNL with NS 50 ml/hr infusing.     External catheter in place, WNL to CLWS    Call light within reach. Bed locked in lowest position. Bed alarm turned on. Chlorhexadine bath given.     Patient is able to demonstrate the ability to move from a reclining position to an upright position within the recliner.      4 Eyes Skin Assessment     NAME:  Bonny Serrano  YOB: 1970  MEDICAL RECORD NUMBER:  [id number]    The patient is being assessed for  Admission    I agree that at least one RN has performed a thorough Head to Toe Skin Assessment on the patient. ALL assessment sites listed below have been assessed.      Areas assessed by both nurses:    Head, Face, Ears, Shoulders, Back, Chest, Arms, Elbows, Hands, Sacrum. Buttock, Coccyx, Ischium, Legs. Feet and Heels, and Under Medical Devices     Blanchable redness on coccyx        Does the Patient have a Wound? No noted wound(s)       [place] Prevention initiated by RN: Yes  Wound Care Orders initiated by RN: No    Pressure Injury (Stage 3,4, Unstageable, DTI, NWPT, and Complex wounds) if present, place Wound referral order by RN under : No    New Ostomies, if present place, Ostomy referral order under : No     Nurse 1 eSignature: Electronically signed by Nisha Denny RN on 3/25/25 at 11:27 AM EDT    **SHARE this note so that the co-signing nurse can place an eSignature**    Nurse 2 eSignature: Electronically signed by Luma Molina RN on 3/25/25 at 9:02 AM EDT

## 2025-03-25 NOTE — PLAN OF CARE
Problem: Pain  Goal: Verbalizes/displays adequate comfort level or baseline comfort level  Outcome: Progressing  Flowsheets (Taken 3/25/2025 1428)  Verbalizes/displays adequate comfort level or baseline comfort level:   Encourage patient to monitor pain and request assistance   Assess pain using appropriate pain scale  Note: Pt currently denies pain at this time s/p tums.     Problem: Safety - Adult  Goal: Free from fall injury  Outcome: Progressing  Flowsheets (Taken 3/25/2025 1428)  Free From Fall Injury: Instruct family/caregiver on patient safety  Note: Pt bed locked in lowest position. Bed alarm on      Problem: Skin/Tissue Integrity  Goal: Skin integrity remains intact  Description: 1.  Monitor for areas of redness and/or skin breakdown  2.  Assess vascular access sites hourly  3.  Every 4-6 hours minimum:  Change oxygen saturation probe site  4.  Every 4-6 hours:  If on nasal continuous positive airway pressure, respiratory therapy assess nares and determine need for appliance change or resting period  Outcome: Progressing  Flowsheets (Taken 3/25/2025 1428)  Skin Integrity Remains Intact:   Monitor for areas of redness and/or skin breakdown   Assess vascular access sites hourly     Problem: Tobacco, E-cig, Vaping Use  Goal: Risk control - tobacco,smoking, e-cig, vapor usage  Description: Pt will verbalize understanding of health benefits of abstaining from tobacco, smoking and usage of e-cigs and vaping  by visit 2.  Outcome: Progressing     Problem: Metabolic/Fluid and Electrolytes - Adult  Goal: Electrolytes maintained within normal limits  Outcome: Progressing  Flowsheets (Taken 3/25/2025 1428)  Electrolytes maintained within normal limits:   Monitor labs and assess patient for signs and symptoms of electrolyte imbalances   Administer electrolyte replacement as ordered   Monitor response to electrolyte replacements, including repeat lab results as appropriate  Note: NA now 120 from 114, NS stopped per

## 2025-03-25 NOTE — PROGRESS NOTES

## 2025-03-25 NOTE — ED PROVIDER NOTES
Emergency Department Encounter    Patient: Bonny Serrano  MRN: [id number]  : 1970  Date of Evaluation: 3/25/2025  ED Provider:  Raghav Stephen MD    Triage Chief Complaint:   Chest Pain (Chest pain, indigestion,nausea vomiting that started a couple of hours ago )    [place]:  Bonny Serrano is a 54 y.o. female with history seen below presenting with complaints of chest discomfort, shortness of breath, upper abdominal pain nausea vomiting patient states her symptoms started about 2 hours ago.  States she began having shortness of breath, increased cough and chest discomfort.  She states the chest pain almost feels like heartburn.  States about the same time she began having nausea vomiting which was nonbilious nonbloody as well as upper abdominal pain.  States her pain is mild to moderate, constant, stabbing, worse with palpation without relieving factors.  Patient does have a history of COPD and wears 2 L nasal cannula baseline.  Denies lower extremity edema, orthopnea.  Denies recent falls or trauma.  Denies headache, blurred vision, focal deficits, motor or sensory changes.  States she does have a history of pulmonary embolism in the past.  Denies anticoagulation currently.  Denies change in bowel habits, change in urination.  Patient does have a history of alcoholism and continues to drink.    ROS - see HPI, below listed is current ROS at time of my eval:  At least 14 systems reviewed, negative other than HPI    Past Medical History:   Diagnosis Date    A-fib (HCC)     Anemia     Anxiety     Chronic pancreatitis (HCC)     Collapse of right lung     COPD (chronic obstructive pulmonary disease) (HCC)     Depression     GI bleed     H/O colonoscopy     Hypertension     Pancreatitis     Pulmonary embolism (HCC) 2018     Past Surgical History:   Procedure Laterality Date    HIP SURGERY Right 2024    RIGHT FEMUR GAMMA NAILING performed by Simba Man MD at [facility] OR    UPPER GASTROINTESTINAL      Lack of Transportation (Medical): Yes     Lack of Transportation (Non-Medical): Yes   Physical Activity: Not on file   Stress: Not on file   Social Connections: Not on file   Intimate Partner Violence: Unknown (1/24/2024)    [facility]    Interpersonal Safety     Feel physically or emotionally unsafe where currently live: Not on file     Harm by anyone: Not on file     Emotionally Harmed: Not on file   Housing Stability: Low Risk  (1/30/2025)    Housing Stability Vital Sign     Unable to Pay for Housing in the Last Year: No     Number of Times Moved in the Last Year: 1     Homeless in the Last Year: No   Recent Concern: Housing Stability - High Risk (11/11/2024)    Housing Stability Vital Sign     Unable to Pay for Housing in the Last Year: Yes     Number of Times Moved in the Last Year: 2     Homeless in the Last Year: No     No current facility-administered medications for this encounter.     Current Outpatient Medications   Medication Sig Dispense Refill    budesonide-formoterol (SYMBICORT) 160-4.5 MCG/ACT AERO Inhale 2 puffs into the lungs in the morning and 2 puffs in the evening. 10.2 g 3    tiotropium (SPIRIVA RESPIMAT) 2.5 MCG/ACT AERS inhaler Inhale 2 puffs into the lungs daily 1 each 0    gabapentin (NEURONTIN) 300 MG capsule Take 1 capsule by mouth daily for 30 days. 30 capsule 0    sertraline (ZOLOFT) 100 MG tablet Take 1 tablet by mouth daily 30 tablet 0    atenolol (TENORMIN) 25 MG tablet Take 1 tablet by mouth daily 30 tablet 3    dilTIAZem (CARDIZEM CD) 240 MG extended release capsule Take 1 capsule by mouth daily 30 capsule 0    guaiFENesin (MUCINEX) 600 MG extended release tablet Take 2 tablets by mouth 2 times daily as needed for Congestion 60 tablet 0    folic acid (FOLVITE) 1 MG tablet Take 1 tablet by mouth daily 30 tablet 0    Multiple Vitamin (MULTIVITAMIN) TABS tablet Take 1 tablet by mouth daily 30

## 2025-03-25 NOTE — CARE COORDINATION
Met with patient at bedside.     Patient rents a home from a friend. Friend/owner [place] out of state.   Owner/friend is selling the house. Patient will need to find new housing by April 2nd.    Spoke with daughter who has tried to help the patient. Daughter lives 1 hour away. Daughter is encouraging the patient to contact [employer] for assistance with housing. Daughter reports patient has drank all of the daughter's life except for a couple of months when the patient went to inpatient rehab. Patient has been in rehab a few times. The patient resumed drinking each time she left the rehab facility.     Resource brochure provided to the patient.

## 2025-03-25 NOTE — ED NOTES
0730 - Second call placed to Nephrology for consult requested by Dr. Stephen     0738 - Call placed to Pulmonology for critical care consult     0802 - Dr. Benson called and spoke with Dr. Stephen at this time    0832 - Dr. Smith called and was transferred to Dr. Stephen

## 2025-03-25 NOTE — CONSULTS
[facility]                                 Pulmonary/Critical care  Consult /Progress Note :                                                                  CC :SOB     Patient is being seen at the request of [place] for a consultation for hypotension    HISTORY OF PRESENT ILLNESS:   Patient is a 54-year-old female with previous history of pulmonary embolism not on anticoagulation chronic pancreatitis and past alcohol abuse  She drink s 4-6 beers daily   She presented with abdominal pain ,nausea and vomiting and indigestion     She has some CP and SOB and FRANCISCO with some cough   Na 112 and she was started NS at 50 and repeat 114  She is awake and alert ,and can be awaken easily       PAST MEDICAL HISTORY:  Past Medical History:   Diagnosis Date    A-fib (HCC)     Anemia     Anxiety     Chronic pancreatitis (HCC)     Collapse of right lung     COPD (chronic obstructive pulmonary disease) (HCC)     Depression     GI bleed     H/O colonoscopy     Hypertension     Pancreatitis     Pulmonary embolism (HCC) 02/2018     PAST SURGICAL HISTORY:  Past Surgical History:   Procedure Laterality Date    HIP SURGERY Right 7/12/2024    RIGHT FEMUR GAMMA NAILING performed by Simba Man MD at [facility] OR    UPPER GASTROINTESTINAL ENDOSCOPY         FAMILY HISTORY:  family history includes Diabetes in her maternal grandfather.    SOCIAL HISTORY:   reports that she has been smoking cigarettes. She started smoking about 29 years ago. She has a 25 pack-year smoking history. She has been exposed to tobacco smoke. She has never used smokeless tobacco.    Scheduled Meds:   methylPREDNISolone sodium succ        atenolol  25 mg Oral Daily    atorvastatin  10 mg Oral Daily    dilTIAZem  240 mg Oral Daily    [Held by provider] DULoxetine  60 mg Oral Daily    folic acid  1 mg Oral Daily    [Held by provider] sertraline  100 mg Oral Daily    sodium chloride

## 2025-03-25 NOTE — PLAN OF CARE
Problem: Discharge Planning  Goal: Discharge to home or other facility with appropriate resources  Outcome: Progressing     Problem: Pain  Goal: Verbalizes/displays adequate comfort level or baseline comfort level  3/25/2025 1938 by Vitaliy Leavitt RN  Outcome: Progressing  3/25/2025 1428 by Nisha Denny RN  Outcome: Progressing  Flowsheets (Taken 3/25/2025 1428)  Verbalizes/displays adequate comfort level or baseline comfort level:   Encourage patient to monitor pain and request assistance   Assess pain using appropriate pain scale  Note: Pt currently denies pain at this time s/p tums.     Problem: Safety - Adult  Goal: Free from fall injury  3/25/2025 1938 by Vitaliy Leavitt RN  Outcome: Progressing  3/25/2025 1428 by Nisha Denny RN  Outcome: Progressing  Flowsheets (Taken 3/25/2025 1428)  Free From Fall Injury: Instruct family/caregiver on patient safety  Note: Pt bed locked in lowest position. Bed alarm on      Problem: Skin/Tissue Integrity  Goal: Skin integrity remains intact  Description: 1.  Monitor for areas of redness and/or skin breakdown  2.  Assess vascular access sites hourly  3.  Every 4-6 hours minimum:  Change oxygen saturation probe site  4.  Every 4-6 hours:  If on nasal continuous positive airway pressure, respiratory therapy assess nares and determine need for appliance change or resting period  3/25/2025 1938 by Vitaliy Leavitt RN  Outcome: Progressing  3/25/2025 1428 by Nisha Denny RN  Outcome: Progressing  Flowsheets (Taken 3/25/2025 1428)  Skin Integrity Remains Intact:   Monitor for areas of redness and/or skin breakdown   Assess vascular access sites hourly     Problem: Tobacco, E-cig, Vaping Use  Goal: Risk control - tobacco,smoking, e-cig, vapor usage  Description: Pt will verbalize understanding of health benefits of abstaining from tobacco, smoking and usage of e-cigs and vaping  by visit 2.  3/25/2025 1938 by Vitaliy Leavitt RN  Outcome:  Progressing  3/25/2025 1430 by Nisha Denny, RN  Outcome: Progressing     Problem: Metabolic/Fluid and Electrolytes - Adult  Goal: Electrolytes maintained within normal limits  3/25/2025 1938 by Vitaliy Leavitt, RN  Outcome: Progressing  3/25/2025 1428 by Nisha Denny, RN  Outcome: Progressing  Flowsheets (Taken 3/25/2025 1428)  Electrolytes maintained within normal limits:   Monitor labs and assess patient for signs and symptoms of electrolyte imbalances   Administer electrolyte replacement as ordered   Monitor response to electrolyte replacements, including repeat lab results as appropriate  Note: NA now 120 from 114, NS stopped per order.   Goal: Hemodynamic stability and optimal renal function maintained  Outcome: Progressing  Goal: Glucose maintained within prescribed range  Outcome: Progressing

## 2025-03-25 NOTE — PROGRESS NOTES
Reassessment completed, see flowsheets. VSS. Pt more alert.   NS remains off at this time.     All ICU Lines and monitoring remain in place. Bed locked in lowest position. Call light within reach.

## 2025-03-25 NOTE — CONSULTS
[facility] Consult Note           Reason for Consult:  Hyponatremia  Requesting Physician:  Dr. Stephen    Chief Complaint:  Vomiting, Diarrhea  History Obtained From:  patient, electronic medical record    History of Present Ilness:    54 year old female with ETOH/tobacco use, Atrial fibrillation who presents with vomiting, diarrhea.  We have been asked to assist in further mgmt of her Hyponatremia.    SNa 112 on admission, recheck at 106 with initial IVF's though hemolyzed, recheck at 114, prior 133 from earlier this month.  Last drink ~2 days ago, has been having n/v/d & decreased intake for ~2 days.  +weak, no confusion, tremors, or fevers.    Past Medical History:        Diagnosis Date    A-fib (HCC)     Anemia     Anxiety     Chronic pancreatitis (HCC)     Collapse of right lung     COPD (chronic obstructive pulmonary disease) (HCC)     Depression     GI bleed     H/O colonoscopy     Hypertension     Pancreatitis     Pulmonary embolism (HCC) 02/2018       Past Surgical History:        Procedure Laterality Date    HIP SURGERY Right 7/12/2024    RIGHT FEMUR GAMMA NAILING performed by Simba Man MD at [facility] OR    UPPER GASTROINTESTINAL ENDOSCOPY         Home Medications:    No current facility-administered medications on file prior to encounter.     Current Outpatient Medications on File Prior to Encounter   Medication Sig Dispense Refill    atorvastatin (LIPITOR) 10 MG tablet Take 1 tablet by mouth daily      DULoxetine (CYMBALTA) 60 MG extended release capsule Take 1 capsule by mouth daily      omeprazole (PRILOSEC) 20 MG delayed release capsule Take 1 capsule by mouth daily      potassium chloride (MICRO-K) 10 MEQ extended release capsule Take by mouth daily Pt unsure of dose \"one pill a day\"      budesonide-formoterol (SYMBICORT) 160-4.5 MCG/ACT AERO Inhale 2 puffs into the lungs in the morning and 2 puffs in the evening. 10.2 g 3    sertraline (ZOLOFT) 100

## 2025-03-25 NOTE — PROGRESS NOTES
RT Inhaler-Nebulizer Bronchodilator Protocol Note    There is a bronchodilator order in the chart from a provider indicating to follow the RT Bronchodilator Protocol and there is an “Initiate RT Inhaler-Nebulizer Bronchodilator Protocol” order as well (see protocol at bottom of note).    CXR Findings:  XR CHEST PORTABLE  Result Date: 3/25/2025  Chronic findings in the chest without acute airspace disease identified.       The findings from the last RT Protocol Assessment were as follows:   History Pulmonary Disease: (P) Chronic pulmonary disease  Respiratory Pattern: (P) Dyspnea on exertion or RR 21-25 bpm  Breath Sounds: (P) Slightly diminished and/or crackles  Cough: (P) Strong, spontaneous, non-productive  Indication for Bronchodilator Therapy: (P) Decreased or absent breath sounds  Bronchodilator Assessment Score: (P) 6    Aerosolized bronchodilator medication orders have been revised according to the RT Inhaler-Nebulizer Bronchodilator Protocol below.    Respiratory Therapist to perform RT Therapy Protocol Assessment initially then follow the protocol.  Repeat RT Therapy Protocol Assessment PRN for score 0-3 or on second treatment, BID, and PRN for scores above 3.    No Indications - adjust the frequency to every 6 hours PRN wheezing or bronchospasm, if no treatments needed after 48 hours then discontinue using Per Protocol order mode.     If indication present, adjust the RT bronchodilator orders based on the Bronchodilator Assessment Score as indicated below.  Use Inhaler orders unless patient has one or more of the following: on home nebulizer, not able to hold breath for 10 seconds, is not alert and oriented, cannot activate and use MDI correctly, or respiratory rate 25 breaths per minute or more, then use the equivalent nebulizer order(s) with same Frequency and PRN reasons based on the score.  If a patient is on this medication at home then do not decrease Frequency below that used at home.    0-3 - enter  5

## 2025-03-25 NOTE — PROGRESS NOTES
Care rounds completed with Dr. Benson and multidisciplinary team. Reviewed labs, meds, VS, assessment, & plan of care for today. See dictated note and new orders for details.       Okay for regular diet  Potential biopsy this admission   Agree with nephrology plan

## 2025-03-25 NOTE — PROGRESS NOTES
Reassessment completed, see flowsheets, unchanged from prior. VSS. Pt currently on 3L/NC.   NS 50 ml/hr and azithromycin infusing.     All ICU Lines and monitoring remain in place. Bed locked in lowest position. Call light within reach.

## 2025-03-25 NOTE — H&P
History and Physical        HISTORY OF PRESENT ILLNESS: A 54-year-old female with a history of alcohol abuse, atrial fibrillation, chronic pancreatitis, COPD presented to the emergency room with nausea and vomiting of 2 to 3 days duration.  Denies any diarrhea.  No abdominal pain.  Admits to having chest pain which lasted for couple hours.  There is no radiation of pain.  No relationship to exertion.  Admits to having a chronic cough.  She is currently not having any chest pain.    On evaluation in the emergency room she was found to have a sodium of 112.  Admitted to the ICU.    Patient is allergic to bee venom and sulfa antibiotics.    Past Medical History:   Diagnosis Date    A-fib (HCC)     Anemia     Anxiety     Chronic pancreatitis (HCC)     Collapse of right lung     COPD (chronic obstructive pulmonary disease) (HCC)     Depression     GI bleed     H/O colonoscopy     Hypertension     Pancreatitis     Pulmonary embolism (HCC) 02/2018       Past Surgical History:   Procedure Laterality Date    HIP SURGERY Right 7/12/2024    RIGHT FEMUR GAMMA NAILING performed by Simba Man MD at [facility] OR    UPPER GASTROINTESTINAL ENDOSCOPY         Scheduled Meds:   methylPREDNISolone sodium succ        atenolol  25 mg Oral Daily    atorvastatin  10 mg Oral Daily    dilTIAZem  240 mg Oral Daily    [Held by provider] DULoxetine  60 mg Oral Daily    folic acid  1 mg Oral Daily    [Held by provider] sertraline  100 mg Oral Daily    sodium chloride flush  5-40 mL IntraVENous 2 times per day    enoxaparin  30 mg SubCUTAneous Daily    multivitamin  1 tablet Oral Daily    methylPREDNISolone  40 mg IntraVENous Q12H    azithromycin  500 mg IntraVENous Q24H    ipratropium 0.5 mg-albuterol 2.5 mg  1 Dose Inhalation Q4H WA RT    guaiFENesin  600 mg Oral BID    thiamine  100 mg Oral Daily    pantoprazole (PROTONIX) 40 mg in sodium chloride (PF) 0.9 % 10 mL injection  40 mg IntraVENous Daily       Continuous Infusions:   sodium

## 2025-03-25 NOTE — CARE COORDINATION
Case Management Assessment  Initial Evaluation    Date/Time of Evaluation: 3/25/2025 17:00 PM  Assessment Completed by: Teresa Boeck, RN    If patient is discharged prior to next notation, then this note serves as note for discharge by case management.    Patient Name: Bonny Serrano                   YOB: 1970  Diagnosis: Hyponatremia [E87.1]                   Date / Time: 3/25/2025  4:41 AM    Patient Admission Status: Inpatient   Readmission Risk (Low < 19, Mod (19-27), High > 27): Readmission Risk Score: 37.9    Current PCP: Brenda Carrillo APRN - CNP  PCP verified by CM? (P) Yes    Chart Reviewed: Yes      History Provided by: (P) Child/Family, Other (see comment) (Met with patient. Spoke to daughter.)  Patient Orientation: (P) Alert and Oriented, Person, Place, Situation    Patient Cognition: (P) Alert    Hospitalization in the last 30 days (Readmission):  Yes    If yes, Readmission Assessment in  Navigator will be completed.    Advance Directives:      Code Status: Full Code   Patient's Primary Decision Maker is: (P) Legal Next of Kin    Primary Decision Maker: TAYLER BEDOLLA - [place] - [phone]    Secondary Decision Maker: Tate Serrano - Brother/Sister - [phone]    Discharge Planning:    Patient lives with: (P) Alone, Other (Comment) (another renter is in the home but separate from patient. Does not assist patient.) Type of Home: (P) House  Primary Care Giver: (P) Self  Patient Support Systems include: (P) Children   Current Financial resources: (P) Medicaid  Current community resources: (P) None  Current services prior to admission: (P) Oxygen Therapy (2L baseline. States she has [employer]. [employer] refutes.)            Current DME:              Type of Home Care services:  (P) None    ADLS  Prior functional level: (P) Independent in ADLs/IADLs  Current functional level: (P) Independent in ADLs/IADLs    PT AM-PAC:   /24  OT AM-PAC:   /24    Family can provide assistance at DC: (P) No  Would you

## 2025-03-25 NOTE — PROGRESS NOTES
Pt c/o indegestion. Perfect serve sent to Dr. Durham and new order for PRN Tums.       Since 1140 pt t waves elevated. ECG and troponin ordered. Pt K 4.5 2 hours again, and awaiting repeat BMP

## 2025-03-25 NOTE — PROGRESS NOTES
Second PIV placed in RFA via ultrasound. Blood drawn from PIV to recheck K and NA as potassium was hemolyzed and Na dropped from 112 to 106 with no change in intervention.     pt tolerated well.     Dr. Smith updated on fluctuation of NA (112 to 106 to 114). Orders to keep NS 50 ml/hr, q4h bmp. Once NA equal or greater 118 then stop IVF and recheck BMP in 4 hours.

## 2025-03-26 LAB
ALBUMIN SERPL-MCNC: 3.2 G/DL (ref 3.4–5)
ALP SERPL-CCNC: 137 U/L (ref 40–129)
ALT SERPL-CCNC: 14 U/L (ref 10–40)
ANION GAP SERPL CALCULATED.3IONS-SCNC: 10 MMOL/L (ref 3–16)
ANION GAP SERPL CALCULATED.3IONS-SCNC: 5 MMOL/L (ref 3–16)
ANION GAP SERPL CALCULATED.3IONS-SCNC: 6 MMOL/L (ref 3–16)
ANION GAP SERPL CALCULATED.3IONS-SCNC: 8 MMOL/L (ref 3–16)
ANION GAP SERPL CALCULATED.3IONS-SCNC: 9 MMOL/L (ref 3–16)
ANION GAP SERPL CALCULATED.3IONS-SCNC: 9 MMOL/L (ref 3–16)
AST SERPL-CCNC: 15 U/L (ref 15–37)
BASOPHILS # BLD: 0 K/UL (ref 0–0.2)
BASOPHILS NFR BLD: 0 %
BILIRUB DIRECT SERPL-MCNC: <0.1 MG/DL (ref 0–0.3)
BILIRUB INDIRECT SERPL-MCNC: ABNORMAL MG/DL (ref 0–1)
BILIRUB SERPL-MCNC: <0.2 MG/DL (ref 0–1)
BUN SERPL-MCNC: 3 MG/DL (ref 7–20)
BUN SERPL-MCNC: 3 MG/DL (ref 7–20)
BUN SERPL-MCNC: 5 MG/DL (ref 7–20)
BUN SERPL-MCNC: 7 MG/DL (ref 7–20)
BUN SERPL-MCNC: 8 MG/DL (ref 7–20)
BUN SERPL-MCNC: 8 MG/DL (ref 7–20)
CALCIUM SERPL-MCNC: 8.1 MG/DL (ref 8.3–10.6)
CALCIUM SERPL-MCNC: 8.1 MG/DL (ref 8.3–10.6)
CALCIUM SERPL-MCNC: 8.2 MG/DL (ref 8.3–10.6)
CALCIUM SERPL-MCNC: 8.5 MG/DL (ref 8.3–10.6)
CALCIUM SERPL-MCNC: 8.7 MG/DL (ref 8.3–10.6)
CALCIUM SERPL-MCNC: 9 MG/DL (ref 8.3–10.6)
CHLORIDE SERPL-SCNC: 80 MMOL/L (ref 99–110)
CHLORIDE SERPL-SCNC: 81 MMOL/L (ref 99–110)
CHLORIDE SERPL-SCNC: 82 MMOL/L (ref 99–110)
CHLORIDE SERPL-SCNC: 83 MMOL/L (ref 99–110)
CHLORIDE SERPL-SCNC: 84 MMOL/L (ref 99–110)
CHLORIDE SERPL-SCNC: 85 MMOL/L (ref 99–110)
CO2 SERPL-SCNC: 29 MMOL/L (ref 21–32)
CO2 SERPL-SCNC: 31 MMOL/L (ref 21–32)
CO2 SERPL-SCNC: 32 MMOL/L (ref 21–32)
CO2 SERPL-SCNC: 32 MMOL/L (ref 21–32)
CO2 SERPL-SCNC: 33 MMOL/L (ref 21–32)
CO2 SERPL-SCNC: 35 MMOL/L (ref 21–32)
CREAT SERPL-MCNC: 0.3 MG/DL (ref 0.6–1.1)
CREAT SERPL-MCNC: 0.3 MG/DL (ref 0.6–1.1)
CREAT SERPL-MCNC: 0.4 MG/DL (ref 0.6–1.1)
CREAT SERPL-MCNC: 0.5 MG/DL (ref 0.6–1.1)
CREAT SERPL-MCNC: 0.6 MG/DL (ref 0.6–1.1)
CREAT SERPL-MCNC: <0.2 MG/DL (ref 0.6–1.1)
DEPRECATED RDW RBC AUTO: 18.8 % (ref 12.4–15.4)
EOSINOPHIL # BLD: 0 K/UL (ref 0–0.6)
EOSINOPHIL NFR BLD: 0 %
GFR SERPLBLD CREATININE-BSD FMLA CKD-EPI: >90 ML/MIN/{1.73_M2}
GLUCOSE SERPL-MCNC: 142 MG/DL (ref 70–99)
GLUCOSE SERPL-MCNC: 165 MG/DL (ref 70–99)
GLUCOSE SERPL-MCNC: 188 MG/DL (ref 70–99)
GLUCOSE SERPL-MCNC: 211 MG/DL (ref 70–99)
GLUCOSE SERPL-MCNC: 216 MG/DL (ref 70–99)
GLUCOSE SERPL-MCNC: 260 MG/DL (ref 70–99)
HCT VFR BLD AUTO: 28.4 % (ref 36–48)
HGB BLD-MCNC: 9.1 G/DL (ref 12–16)
LYMPHOCYTES # BLD: 0.2 K/UL (ref 1–5.1)
LYMPHOCYTES NFR BLD: 6 %
MCH RBC QN AUTO: 25.9 PG (ref 26–34)
MCHC RBC AUTO-ENTMCNC: 32.2 G/DL (ref 31–36)
MCV RBC AUTO: 80.3 FL (ref 80–100)
MONOCYTES # BLD: 0.2 K/UL (ref 0–1.3)
MONOCYTES NFR BLD: 6.1 %
NEUTROPHILS # BLD: 2.8 K/UL (ref 1.7–7.7)
NEUTROPHILS NFR BLD: 87.9 %
OSMOLALITY UR: 124 MOSM/KG (ref 390–1070)
PLATELET # BLD AUTO: 513 K/UL (ref 135–450)
PMV BLD AUTO: 6.3 FL (ref 5–10.5)
POTASSIUM SERPL-SCNC: 4.3 MMOL/L (ref 3.5–5.1)
POTASSIUM SERPL-SCNC: 4.3 MMOL/L (ref 3.5–5.1)
POTASSIUM SERPL-SCNC: 4.7 MMOL/L (ref 3.5–5.1)
POTASSIUM SERPL-SCNC: 4.7 MMOL/L (ref 3.5–5.1)
POTASSIUM SERPL-SCNC: 4.9 MMOL/L (ref 3.5–5.1)
POTASSIUM SERPL-SCNC: 5.2 MMOL/L (ref 3.5–5.1)
PROT SERPL-MCNC: 5.9 G/DL (ref 6.4–8.2)
RBC # BLD AUTO: 3.53 M/UL (ref 4–5.2)
SODIUM SERPL-SCNC: 118 MMOL/L (ref 136–145)
SODIUM SERPL-SCNC: 120 MMOL/L (ref 136–145)
SODIUM SERPL-SCNC: 121 MMOL/L (ref 136–145)
SODIUM SERPL-SCNC: 124 MMOL/L (ref 136–145)
SODIUM SERPL-SCNC: 125 MMOL/L (ref 136–145)
SODIUM SERPL-SCNC: 126 MMOL/L (ref 136–145)
WBC # BLD AUTO: 3.2 K/UL (ref 4–11)

## 2025-03-26 PROCEDURE — 36415 COLL VENOUS BLD VENIPUNCTURE: CPT

## 2025-03-26 PROCEDURE — 94640 AIRWAY INHALATION TREATMENT: CPT

## 2025-03-26 PROCEDURE — 99232 SBSQ HOSP IP/OBS MODERATE 35: CPT | Performed by: INTERNAL MEDICINE

## 2025-03-26 PROCEDURE — 6370000000 HC RX 637 (ALT 250 FOR IP): Performed by: INTERNAL MEDICINE

## 2025-03-26 PROCEDURE — 87077 CULTURE AEROBIC IDENTIFY: CPT

## 2025-03-26 PROCEDURE — 2500000003 HC RX 250 WO HCPCS

## 2025-03-26 PROCEDURE — 94761 N-INVAS EAR/PLS OXIMETRY MLT: CPT

## 2025-03-26 PROCEDURE — 2580000003 HC RX 258: Performed by: INTERNAL MEDICINE

## 2025-03-26 PROCEDURE — 6360000002 HC RX W HCPCS

## 2025-03-26 PROCEDURE — 87205 SMEAR GRAM STAIN: CPT

## 2025-03-26 PROCEDURE — 2700000000 HC OXYGEN THERAPY PER DAY

## 2025-03-26 PROCEDURE — 2580000003 HC RX 258

## 2025-03-26 PROCEDURE — 80048 BASIC METABOLIC PNL TOTAL CA: CPT

## 2025-03-26 PROCEDURE — 1200000000 HC SEMI PRIVATE

## 2025-03-26 PROCEDURE — 2500000003 HC RX 250 WO HCPCS: Performed by: INTERNAL MEDICINE

## 2025-03-26 PROCEDURE — 6370000000 HC RX 637 (ALT 250 FOR IP)

## 2025-03-26 PROCEDURE — 87070 CULTURE OTHR SPECIMN AEROBIC: CPT

## 2025-03-26 PROCEDURE — 87186 SC STD MICRODIL/AGAR DIL: CPT

## 2025-03-26 PROCEDURE — 99233 SBSQ HOSP IP/OBS HIGH 50: CPT | Performed by: INTERNAL MEDICINE

## 2025-03-26 PROCEDURE — 6360000002 HC RX W HCPCS: Performed by: INTERNAL MEDICINE

## 2025-03-26 PROCEDURE — 85025 COMPLETE CBC W/AUTO DIFF WBC: CPT

## 2025-03-26 PROCEDURE — 80076 HEPATIC FUNCTION PANEL: CPT

## 2025-03-26 RX ORDER — PANTOPRAZOLE SODIUM 40 MG/1
40 TABLET, DELAYED RELEASE ORAL
Status: DISCONTINUED | OUTPATIENT
Start: 2025-03-27 | End: 2025-03-28 | Stop reason: HOSPADM

## 2025-03-26 RX ORDER — MUPIROCIN 20 MG/G
OINTMENT TOPICAL 2 TIMES DAILY
Status: DISCONTINUED | OUTPATIENT
Start: 2025-03-26 | End: 2025-03-28 | Stop reason: HOSPADM

## 2025-03-26 RX ORDER — DEXTROSE MONOHYDRATE 50 MG/ML
INJECTION, SOLUTION INTRAVENOUS CONTINUOUS
Status: DISCONTINUED | OUTPATIENT
Start: 2025-03-26 | End: 2025-03-26

## 2025-03-26 RX ORDER — GABAPENTIN 300 MG/1
300 CAPSULE ORAL EVERY MORNING
Status: DISCONTINUED | OUTPATIENT
Start: 2025-03-26 | End: 2025-03-28 | Stop reason: HOSPADM

## 2025-03-26 RX ORDER — ATORVASTATIN CALCIUM 10 MG/1
10 TABLET, FILM COATED ORAL NIGHTLY
Status: DISCONTINUED | OUTPATIENT
Start: 2025-03-26 | End: 2025-03-28 | Stop reason: HOSPADM

## 2025-03-26 RX ORDER — AZITHROMYCIN 250 MG/1
500 TABLET, FILM COATED ORAL DAILY
Status: COMPLETED | OUTPATIENT
Start: 2025-03-26 | End: 2025-03-27

## 2025-03-26 RX ADMIN — GUAIFENESIN 600 MG: 600 TABLET, EXTENDED RELEASE ORAL at 20:19

## 2025-03-26 RX ADMIN — Medication 100 MG: at 08:38

## 2025-03-26 RX ADMIN — MUPIROCIN: 20 OINTMENT TOPICAL at 20:19

## 2025-03-26 RX ADMIN — ENOXAPARIN SODIUM 30 MG: 100 INJECTION SUBCUTANEOUS at 08:37

## 2025-03-26 RX ADMIN — GABAPENTIN 300 MG: 300 CAPSULE ORAL at 10:20

## 2025-03-26 RX ADMIN — IPRATROPIUM BROMIDE AND ALBUTEROL SULFATE 1 DOSE: 2.5; .5 SOLUTION RESPIRATORY (INHALATION) at 08:43

## 2025-03-26 RX ADMIN — ACETAMINOPHEN 650 MG: 325 TABLET ORAL at 20:18

## 2025-03-26 RX ADMIN — MUPIROCIN: 20 OINTMENT TOPICAL at 08:37

## 2025-03-26 RX ADMIN — Medication 10 ML: at 08:46

## 2025-03-26 RX ADMIN — DILTIAZEM HYDROCHLORIDE 240 MG: 120 CAPSULE, COATED, EXTENDED RELEASE ORAL at 08:37

## 2025-03-26 RX ADMIN — METHYLPREDNISOLONE SODIUM SUCCINATE 40 MG: 40 INJECTION, POWDER, LYOPHILIZED, FOR SOLUTION INTRAMUSCULAR; INTRAVENOUS at 20:19

## 2025-03-26 RX ADMIN — GUAIFENESIN 600 MG: 600 TABLET, EXTENDED RELEASE ORAL at 08:38

## 2025-03-26 RX ADMIN — FOLIC ACID 1 MG: 1 TABLET ORAL at 08:38

## 2025-03-26 RX ADMIN — AZITHROMYCIN DIHYDRATE 500 MG: 250 TABLET ORAL at 10:20

## 2025-03-26 RX ADMIN — ATENOLOL 25 MG: 25 TABLET ORAL at 08:38

## 2025-03-26 RX ADMIN — Medication 10 ML: at 20:19

## 2025-03-26 RX ADMIN — PANTOPRAZOLE SODIUM 40 MG: 40 INJECTION, POWDER, LYOPHILIZED, FOR SOLUTION INTRAVENOUS at 08:38

## 2025-03-26 RX ADMIN — IPRATROPIUM BROMIDE AND ALBUTEROL SULFATE 1 DOSE: 2.5; .5 SOLUTION RESPIRATORY (INHALATION) at 19:23

## 2025-03-26 RX ADMIN — THERA TABS 1 TABLET: TAB at 08:37

## 2025-03-26 RX ADMIN — METHYLPREDNISOLONE SODIUM SUCCINATE 40 MG: 40 INJECTION, POWDER, LYOPHILIZED, FOR SOLUTION INTRAMUSCULAR; INTRAVENOUS at 08:38

## 2025-03-26 RX ADMIN — DEXTROSE MONOHYDRATE: 50 INJECTION, SOLUTION INTRAVENOUS at 07:37

## 2025-03-26 RX ADMIN — DEXTROSE MONOHYDRATE: 50 INJECTION, SOLUTION INTRAVENOUS at 18:53

## 2025-03-26 RX ADMIN — ATORVASTATIN CALCIUM 10 MG: 10 TABLET, FILM COATED ORAL at 20:19

## 2025-03-26 ASSESSMENT — PAIN DESCRIPTION - DESCRIPTORS: DESCRIPTORS: ACHING

## 2025-03-26 ASSESSMENT — PAIN DESCRIPTION - LOCATION: LOCATION: HEAD

## 2025-03-26 ASSESSMENT — PAIN SCALES - GENERAL
PAINLEVEL_OUTOF10: 7
PAINLEVEL_OUTOF10: 5
PAINLEVEL_OUTOF10: 0

## 2025-03-26 ASSESSMENT — PAIN - FUNCTIONAL ASSESSMENT: PAIN_FUNCTIONAL_ASSESSMENT: ACTIVITIES ARE NOT PREVENTED

## 2025-03-26 NOTE — PROGRESS NOTES
[facility] Progress Note           Subjective/   54 y.o. year old female who we are seeing in consultation for Hyponatremia.     HPI:  Sodium correcting with IVF's with NS.  Denies any shortness of breath, on 3 L NC.    ROS:  +weak, intake better.    Objective/   GEN:  Chronically ill, BP (!) 148/67   Pulse (!) 106   Temp 97.4 °F (36.3 °C) (Temporal)   Resp 21   Ht 1.549 m (5' 1\")   Wt 40.1 kg (88 lb 6.4 oz)   LMP 07/12/2015   SpO2 100%   BMI 16.70 kg/m²   HEENT: non-icteric, no JVD  CV: S1, S2 without m/r/g; no LE edema  RESP: CTA B without w/r/r; breathing wnl  ABD: +bs, soft, nt, no hsm  SKIN: warm, no rashes    Data/  Recent Labs     03/25/25  0459 03/26/25  0545   WBC 10.6 3.2*   HGB 10.3* 9.1*   HCT 30.9* 28.4*   MCV 80.0 80.3   * 513*     Recent Labs     03/25/25  2209 03/26/25  0203 03/26/25  0545   * 121* 125*   K 4.4 4.3 4.3   CL 82* 81* 84*   CO2 35* 35* 33*   GLUCOSE 189* 188* 260*   BUN 3* 3* 3*   CREATININE <0.2* <0.2* 0.3*   LABGLOM >90 >90 >90     Urine sodium <20    Assessment/     - Hyponatremia - acute, in setting of low solute intake from ETOH with superimposed dehydration   SNa 112 on admission, better with NS       Plan/     - Start D5W 75 ml/hour given rate of correction, goal correction 6-8 meq/24 hours, can stop once repeat sodium 123 or lower  - Sodium monitoring every 4 hours, f/u urine osmolality    ____________________________________  Wilber Smith MD  [facility]  [email or web address]  Office: [phone]

## 2025-03-26 NOTE — PROGRESS NOTES
03/26/25 0800   RT Protocol   History Pulmonary Disease 2   Respiratory pattern 2   Breath sounds 2   Cough 1   Indications for Bronchodilator Therapy Decreased or absent breath sounds   Bronchodilator Assessment Score 7

## 2025-03-26 NOTE — PROGRESS NOTES
[facility]                                 Pulmonary/Critical care  Consult /Progress Note :                                                                  CC :SOB     HPI   Doing better  Na 125   On 2 L  Now on D% some over corrections  Nephrology managing fluid     Subjective PHYSICAL EXAM:  Blood pressure (!) 147/80, pulse (!) 113, temperature 97.3 °F (36.3 °C), temperature source Temporal, resp. rate 24, height 1.549 m (5' 1\"), weight 40.1 kg (88 lb 6.4 oz), last menstrual period 07/12/2015, SpO2 97%, not currently breastfeeding.' on RA  Gen: No distress.   Eyes: PERRL. No sclera icterus. No conjunctival injection.   ENT: No discharge. Pharynx clear.   Neck: Trachea midline. No obvious mass.    Resp: Rhonchi bilateral   CV: Regular rate. Regular rhythm. No murmur or rub. No edema. Peripheral pulses are 2+.  Capillary refill is less than 3 seconds.  GI: Non-tender. Non-distended. No hernia.   Skin: Warm and dry. No nodule on exposed extremities.   Lymph: No cervical LAD. No supraclavicular LAD.   M/S: hip tenderenss  Neuro: Awake. Alert. Moves all four extremities.   Psych: Oriented x 3. No anxiety.     LABS:  CBC:   Recent Labs     03/25/25  0459 03/26/25  0545   WBC 10.6 3.2*   HGB 10.3* 9.1*   HCT 30.9* 28.4*   MCV 80.0 80.3   * 513*     BMP:   Recent Labs     03/25/25  2209 03/26/25  0203 03/26/25  0545   * 121* 125*   K 4.4 4.3 4.3   CL 82* 81* 84*   CO2 35* 35* 33*   BUN 3* 3* 3*   CREATININE <0.2* <0.2* 0.3*     LIVER PROFILE:   Recent Labs     03/25/25  0459 03/26/25  0545   AST 28 15   ALT 24 14   LIPASE 12.0*  --    BILIDIR  --  <0.1   BILITOT <0.2 <0.2   ALKPHOS 160* 137*     PT/INR: No results for input(s): \"PROTIME\", \"INR\" in the last 72 hours.  APTT: No results for input(s): \"APTT\" in the last 72 hours.    Microbiology:  Ordered    Imaging:  Chest imaging was reviewed by me and showed RUL

## 2025-03-26 NOTE — PROGRESS NOTES
RT Inhaler-Nebulizer Bronchodilator Protocol Note    There is a bronchodilator order in the chart from a provider indicating to follow the RT Bronchodilator Protocol and there is an “Initiate RT Inhaler-Nebulizer Bronchodilator Protocol” order as well (see protocol at bottom of note).    CXR Findings:  XR CHEST PORTABLE  Result Date: 3/25/2025  Chronic findings in the chest without acute airspace disease identified.       The findings from the last RT Protocol Assessment were as follows:   History Pulmonary Disease: Chronic pulmonary disease  Respiratory Pattern: Dyspnea on exertion or RR 21-25 bpm  Breath Sounds: Slightly diminished and/or crackles  Cough: Strong, spontaneous, non-productive  Indication for Bronchodilator Therapy: Decreased or absent breath sounds  Bronchodilator Assessment Score: 6    Aerosolized bronchodilator medication orders have been revised according to the RT Inhaler-Nebulizer Bronchodilator Protocol below.    Respiratory Therapist to perform RT Therapy Protocol Assessment initially then follow the protocol.  Repeat RT Therapy Protocol Assessment PRN for score 0-3 or on second treatment, BID, and PRN for scores above 3.    No Indications - adjust the frequency to every 6 hours PRN wheezing or bronchospasm, if no treatments needed after 48 hours then discontinue using Per Protocol order mode.     If indication present, adjust the RT bronchodilator orders based on the Bronchodilator Assessment Score as indicated below.  Use Inhaler orders unless patient has one or more of the following: on home nebulizer, not able to hold breath for 10 seconds, is not alert and oriented, cannot activate and use MDI correctly, or respiratory rate 25 breaths per minute or more, then use the equivalent nebulizer order(s) with same Frequency and PRN reasons based on the score.  If a patient is on this medication at home then do not decrease Frequency below that used at home.    0-3 - enter or revise RT  bronchodilator order(s) to equivalent RT Bronchodilator order with Frequency of every 4 hours PRN for wheezing or increased work of breathing using Per Protocol order mode.        4-6 - enter or revise RT Bronchodilator order(s) to two equivalent RT bronchodilator orders with one order with BID Frequency and one order with Frequency of every 4 hours PRN wheezing or increased work of breathing using Per Protocol order mode.        7-10 - enter or revise RT Bronchodilator order(s) to two equivalent RT bronchodilator orders with one order with TID Frequency and one order with Frequency of every 4 hours PRN wheezing or increased work of breathing using Per Protocol order mode.       11-13 - enter or revise RT Bronchodilator order(s) to one equivalent RT bronchodilator order with QID Frequency and an Albuterol order with Frequency of every 4 hours PRN wheezing or increased work of breathing using Per Protocol order mode.      Greater than 13 - enter or revise RT Bronchodilator order(s) to one equivalent RT bronchodilator order with every 4 hours Frequency and an Albuterol order with Frequency of every 2 hours PRN wheezing or increased work of breathing using Per Protocol order mode.         Electronically signed by Mariam Menezes RCP on 3/26/2025 at 7:28 PM

## 2025-03-26 NOTE — PROGRESS NOTES
Shift assessment, completed, see flow sheet. Pt is  alert and oriented x 4. Following commands.      , /80 (98) , SpO2 97% on 3L/NC. Respirations are easy, even, and unlabored. Bilateral lung sounds diminished. Pt with productive cough. 2 PIV, WNL with D5 75 ml/hr infusing.      External catheter in place, WNL to CLWS     Call light within reach. Bed locked in lowest position. Bed alarm turned on.

## 2025-03-26 NOTE — PROGRESS NOTES
Bedside report given to Marcy SMITH.   Pt taken down to 235 via transport in wheelchair on 2L/NC with all belongings (clothes, phone, -no block, purse, cigarettes, inhalers, brush, glasses). POC transferred. SARAH Metcalf

## 2025-03-26 NOTE — PLAN OF CARE
Problem: Safety - Adult  Goal: Free from fall injury  Outcome: Progressing     Problem: Skin/Tissue Integrity  Goal: Skin integrity remains intact  Description: 1.  Monitor for areas of redness and/or skin breakdown  2.  Assess vascular access sites hourly  3.  Every 4-6 hours minimum:  Change oxygen saturation probe site  4.  Every 4-6 hours:  If on nasal continuous positive airway pressure, respiratory therapy assess nares and determine need for appliance change or resting period  Outcome: Progressing     Problem: Metabolic/Fluid and Electrolytes - Adult  Goal: Electrolytes maintained within normal limits  Outcome: Progressing  Flowsheets (Taken 3/26/2025 1151)  Electrolytes maintained within normal limits:   Monitor labs and assess patient for signs and symptoms of electrolyte imbalances   Administer electrolyte replacement as ordered   Monitor response to electrolyte replacements, including repeat lab results as appropriate  Note: Checking labs q4h

## 2025-03-26 NOTE — PROGRESS NOTES
Transferred down via wheelchair to room 235-2 from ICU in stable condition. Alert and oriented. Lungs with exp wheezes and on 2 liter oxygen which is baseline. Pt sitting up in bed eating dinner. Oriented to room and call light. Call light in reach. Bed alarm in place and turned on.    Bedside Mobility Assessment Tool (BMAT):     Assessment Level 1- Sit and Shake    1. From a semi-reclined position, ask patient to sit up and rotate to a seated position at the side of the bed. Can use the bedrail.    2. Ask patient to reach out and grab your hand and shake making sure patient reaches across his/her midline.   Pass- Patient is able to come to a seated position, maintain core strength. Maintains seated balance while reaching across midline. Move on to Assessment Level 2.     Assessment Level 2- Stretch and Point   1. With patient in seated position at the side of the bed, have patient place both feet on the floor (or stool) with knees no higher than hips.    2. Ask patient to stretch one leg and straighten the knee, then bend the ankle/flex and point the toes. If appropriate, repeat with the other leg.   Pass- Patient is able to demonstrate appropriate quad strength on intended weight bearing limb(s). Move onto Assessment Level 3.     Assessment Level 3- Stand   1. Ask patient to elevate off the bed or chair (seated to standing) using an assistive device (cane, bedrail).    2. Patient should be able to raise buttocks off be and hold for a count of five. May repeat once.   Pass- Patient maintains standing stability for at least 5 seconds, proceed to assessment level 4.    Assessment Level 4- Walk   1. Ask patient to march in place at bedside.    2. Then ask patient to advance step and return each foot. Some medical conditions may render a patient from stepping backwards, use your best clinical judgement.   Pass- Patient demonstrates balance while shifting weight and ability to step, takes independent steps, does not use

## 2025-03-26 NOTE — PROGRESS NOTES
Dr. Smith at the bedside to examine pt. See progress note for details.     Start D5 75 ml/hr. Goal is , once  or less stop IVF.

## 2025-03-26 NOTE — PROGRESS NOTES
4 Eyes Skin Assessment     NAME:  Bonny Serrano  YOB: 1970  MEDICAL RECORD NUMBER:  [id number]    The patient is being assessed for  Transfer to New Unit    I agree that at least one RN has performed a thorough Head to Toe Skin Assessment on the patient. ALL assessment sites listed below have been assessed.      Areas assessed by both nurses:    Head, Face, Ears, Shoulders, Back, Chest, Arms, Elbows, Hands, Sacrum. Buttock, Coccyx, Ischium, Legs. Feet and Heels, and Under Medical Devices         Does the Patient have a Wound? No noted wound(s), blanchable readness to coccyx. Scattered abrasions        [place] Prevention initiated by RN: Yes  Wound Care Orders initiated by RN: No    Pressure Injury (Stage 3,4, Unstageable, DTI, NWPT, and Complex wounds) if present, place Wound referral order by RN under : No    New Ostomies, if present place, Ostomy referral order under : No     Nurse 1 eSignature: Electronically signed by Nisha Denny RN on 3/26/25 at 4:52 PM EDT    **SHARE this note so that the co-signing nurse can place an eSignature**    Nurse 2 eSignature: Electronically signed by Marcy Gomez RN on 3/26/25 at 5:10 PM EDT

## 2025-03-26 NOTE — PROGRESS NOTES
Admit: 3/25/2025    Name:  Bonny Serrano  Room:  [address] [place] [address]  MRN:    [id number]    Critical Care Daily Progress Note for 3/26/2025   Patient wit h/o alcohol abuse, admitted with hyponatremia    Interval History:     Feels better   Now on D5W as Na corrected too quickly   Scheduled Meds:   atenolol  25 mg Oral Daily    atorvastatin  10 mg Oral Daily    dilTIAZem  240 mg Oral Daily    [Held by provider] DULoxetine  60 mg Oral Daily    folic acid  1 mg Oral Daily    [Held by provider] sertraline  100 mg Oral Daily    sodium chloride flush  5-40 mL IntraVENous 2 times per day    enoxaparin  30 mg SubCUTAneous Daily    multivitamin  1 tablet Oral Daily    methylPREDNISolone  40 mg IntraVENous Q12H    azithromycin  500 mg IntraVENous Q24H    guaiFENesin  600 mg Oral BID    thiamine  100 mg Oral Daily    pantoprazole (PROTONIX) 40 mg in sodium chloride (PF) 0.9 % 10 mL injection  40 mg IntraVENous Daily    ipratropium 0.5 mg-albuterol 2.5 mg  1 Dose Inhalation BID RT       Continuous Infusions:   dextrose 75 mL/hr at 25 0738    sodium chloride         PRN Meds:  sodium chloride flush, sodium chloride, potassium chloride **OR** potassium alternative oral replacement **OR** potassium chloride, magnesium sulfate, ondansetron **OR** ondansetron, polyethylene glycol, acetaminophen **OR** acetaminophen, LORazepam **OR** LORazepam **OR** LORazepam **OR** LORazepam **OR** LORazepam **OR** LORazepam **OR** LORazepam **OR** LORazepam, ipratropium 0.5 mg-albuterol 2.5 mg, calcium carbonate                  Objective:     Temp  Av.5 °F (36.4 °C)  Min: 97.4 °F (36.3 °C)  Max: 97.7 °F (36.5 °C)  Pulse  Av.9  Min: 85  Max: 106  BP  Min: 95/82  Max: 148/67  SpO2  Av.3 %  Min: 90 %  Max: 100 %  Patient Vitals for the past 4 hrs:   BP Temp Temp src Pulse Resp SpO2 Weight   25 0600 (!) 148/67 -- -- (!) 106 21 100 % --   25 0500 (!) 115/59 -- -- (!) 102 16 97 % --   25 0400 114/67 97.4 °F (36.3 °C)  08/13/2024    Abnormal CT of the chest 08/12/2024    Mucus plug in respiratory tract 08/12/2024    Leg edema 08/12/2024    Hypoxemia 08/12/2024    Peripheral edema 08/09/2024    Intertrochanteric fracture of right femur, closed, initial encounter ([facility]) 07/12/2024    Hypomagnesuria 01/04/2024    Septic shock ([facility]) 01/04/2024    Hypokalemia 01/03/2024    Hypotension 01/03/2024    Mass of upper lobe of right lung 01/03/2024    Falls 01/03/2024    Severe protein-calorie malnutrition 01/03/2024    Cigarette smoker 09/02/2020    Hyponatremia     Chronic obstructive pulmonary disease ([facility]) 05/10/2020    Acute on chronic respiratory failure with hypoxia ([facility])     AVM (arteriovenous malformation) of small bowel, 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  Repeated admissions for the same.  She claims to be drinking about 4 beers every day.  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  Nephrology and critical care consult.  Currently on IV normal saline.  Sodium being followed very closely every 6 hours.  Seizure precautions.  Hold Cymbalta  Now on D5W as Na corrected too quickly      Severe COPD with acute exacerbation  Acute on chronic hypoxic respiratory failure  Imaging reviewed.  Showing stable cavitary lesion in the superior segment of the right lower lobe.  Also showing slight progression of groundglass component in the left lower lobe.  Adenocarcinoma difficult to exclude.  Mucous plugging present.  Currently requiring 4 L of oxygen  Continue Solu-Medrol  Continue azithromycin.  Sputum cultures.  Inhaled bronchodilators.  Might need bronchoscopy.     Paroxysmal atrial fibrillation  Continue atenolol

## 2025-03-26 NOTE — CARE COORDINATION
Spoke to [employer] liaison regarding recent interaction with [employer].     [employer] representative advised CM at latest discharge that the patient does not have [employer] for oxygen.     CM had [employer] check again today and CM was advised the patient does have [employer] for oxygen.    [employer] aware that the patient comes to the hospital in a squad with no family to bring oxygen to the patient at discharge.    [employer] will have  drop a tank off to the patient tomorrow.

## 2025-03-26 NOTE — PROGRESS NOTES
Care rounds completed with Dr. Benson and multidisciplinary team. Reviewed labs, meds, VS, assessment, & plan of care for today. See dictated note and new orders for details.     Restart home Neurontin   Switch to PO azithromycin   Okay to move out of ICU

## 2025-03-27 LAB
ANION GAP SERPL CALCULATED.3IONS-SCNC: 7 MMOL/L (ref 3–16)
ANISOCYTOSIS BLD QL SMEAR: ABNORMAL
BASOPHILS # BLD: 0 K/UL (ref 0–0.2)
BASOPHILS NFR BLD: 0 %
BUN SERPL-MCNC: 7 MG/DL (ref 7–20)
CALCIUM SERPL-MCNC: 8.1 MG/DL (ref 8.3–10.6)
CHLORIDE SERPL-SCNC: 86 MMOL/L (ref 99–110)
CO2 SERPL-SCNC: 34 MMOL/L (ref 21–32)
CREAT SERPL-MCNC: 0.3 MG/DL (ref 0.6–1.1)
DEPRECATED RDW RBC AUTO: 19.8 % (ref 12.4–15.4)
EOSINOPHIL # BLD: 0 K/UL (ref 0–0.6)
EOSINOPHIL NFR BLD: 0 %
GFR SERPLBLD CREATININE-BSD FMLA CKD-EPI: >90 ML/MIN/{1.73_M2}
GLUCOSE SERPL-MCNC: 147 MG/DL (ref 70–99)
HCT VFR BLD AUTO: 28.4 % (ref 36–48)
HGB BLD-MCNC: 9.3 G/DL (ref 12–16)
HYPOCHROMIA BLD QL SMEAR: ABNORMAL
LYMPHOCYTES # BLD: 0.8 K/UL (ref 1–5.1)
LYMPHOCYTES NFR BLD: 13 %
MCH RBC QN AUTO: 26.6 PG (ref 26–34)
MCHC RBC AUTO-ENTMCNC: 32.9 G/DL (ref 31–36)
MCV RBC AUTO: 80.8 FL (ref 80–100)
MONOCYTES # BLD: 0.4 K/UL (ref 0–1.3)
MONOCYTES NFR BLD: 7 %
NEUTROPHILS # BLD: 5 K/UL (ref 1.7–7.7)
NEUTROPHILS NFR BLD: 80 %
PLATELET # BLD AUTO: 483 K/UL (ref 135–450)
PLATELET BLD QL SMEAR: ABNORMAL
PMV BLD AUTO: 5.9 FL (ref 5–10.5)
POIKILOCYTOSIS BLD QL SMEAR: ABNORMAL
POTASSIUM SERPL-SCNC: 4.2 MMOL/L (ref 3.5–5.1)
RBC # BLD AUTO: 3.51 M/UL (ref 4–5.2)
SLIDE REVIEW: ABNORMAL
SODIUM SERPL-SCNC: 127 MMOL/L (ref 136–145)
STOMATOCYTES BLD QL SMEAR: ABNORMAL
TARGETS BLD QL SMEAR: ABNORMAL
WBC # BLD AUTO: 6.3 K/UL (ref 4–11)

## 2025-03-27 PROCEDURE — 94761 N-INVAS EAR/PLS OXIMETRY MLT: CPT

## 2025-03-27 PROCEDURE — 2500000003 HC RX 250 WO HCPCS: Performed by: INTERNAL MEDICINE

## 2025-03-27 PROCEDURE — 99232 SBSQ HOSP IP/OBS MODERATE 35: CPT

## 2025-03-27 PROCEDURE — 94640 AIRWAY INHALATION TREATMENT: CPT

## 2025-03-27 PROCEDURE — 6370000000 HC RX 637 (ALT 250 FOR IP): Performed by: INTERNAL MEDICINE

## 2025-03-27 PROCEDURE — 6370000000 HC RX 637 (ALT 250 FOR IP): Performed by: STUDENT IN AN ORGANIZED HEALTH CARE EDUCATION/TRAINING PROGRAM

## 2025-03-27 PROCEDURE — 85025 COMPLETE CBC W/AUTO DIFF WBC: CPT

## 2025-03-27 PROCEDURE — 1200000000 HC SEMI PRIVATE

## 2025-03-27 PROCEDURE — 36415 COLL VENOUS BLD VENIPUNCTURE: CPT

## 2025-03-27 PROCEDURE — 99232 SBSQ HOSP IP/OBS MODERATE 35: CPT | Performed by: INTERNAL MEDICINE

## 2025-03-27 PROCEDURE — 80048 BASIC METABOLIC PNL TOTAL CA: CPT

## 2025-03-27 PROCEDURE — 2700000000 HC OXYGEN THERAPY PER DAY

## 2025-03-27 PROCEDURE — 6360000002 HC RX W HCPCS: Performed by: INTERNAL MEDICINE

## 2025-03-27 RX ORDER — MECOBALAMIN 5000 MCG
10 TABLET,DISINTEGRATING ORAL NIGHTLY
Status: DISCONTINUED | OUTPATIENT
Start: 2025-03-27 | End: 2025-03-28 | Stop reason: HOSPADM

## 2025-03-27 RX ADMIN — AZITHROMYCIN DIHYDRATE 500 MG: 250 TABLET ORAL at 08:56

## 2025-03-27 RX ADMIN — IPRATROPIUM BROMIDE AND ALBUTEROL SULFATE 1 DOSE: 2.5; .5 SOLUTION RESPIRATORY (INHALATION) at 17:34

## 2025-03-27 RX ADMIN — GUAIFENESIN 600 MG: 600 TABLET, EXTENDED RELEASE ORAL at 08:56

## 2025-03-27 RX ADMIN — Medication 100 MG: at 08:56

## 2025-03-27 RX ADMIN — IPRATROPIUM BROMIDE AND ALBUTEROL SULFATE 1 DOSE: 2.5; .5 SOLUTION RESPIRATORY (INHALATION) at 07:49

## 2025-03-27 RX ADMIN — PANTOPRAZOLE SODIUM 40 MG: 40 TABLET, DELAYED RELEASE ORAL at 05:59

## 2025-03-27 RX ADMIN — Medication 10 MG: at 20:34

## 2025-03-27 RX ADMIN — Medication 10 ML: at 20:28

## 2025-03-27 RX ADMIN — DILTIAZEM HYDROCHLORIDE 240 MG: 120 CAPSULE, COATED, EXTENDED RELEASE ORAL at 08:56

## 2025-03-27 RX ADMIN — ATENOLOL 25 MG: 25 TABLET ORAL at 08:56

## 2025-03-27 RX ADMIN — METHYLPREDNISOLONE SODIUM SUCCINATE 40 MG: 40 INJECTION, POWDER, LYOPHILIZED, FOR SOLUTION INTRAMUSCULAR; INTRAVENOUS at 20:28

## 2025-03-27 RX ADMIN — GABAPENTIN 300 MG: 300 CAPSULE ORAL at 08:56

## 2025-03-27 RX ADMIN — FOLIC ACID 1 MG: 1 TABLET ORAL at 08:56

## 2025-03-27 RX ADMIN — Medication 10 ML: at 08:54

## 2025-03-27 RX ADMIN — METHYLPREDNISOLONE SODIUM SUCCINATE 40 MG: 40 INJECTION, POWDER, LYOPHILIZED, FOR SOLUTION INTRAMUSCULAR; INTRAVENOUS at 09:00

## 2025-03-27 RX ADMIN — ATORVASTATIN CALCIUM 10 MG: 10 TABLET, FILM COATED ORAL at 20:28

## 2025-03-27 RX ADMIN — ENOXAPARIN SODIUM 30 MG: 100 INJECTION SUBCUTANEOUS at 08:54

## 2025-03-27 RX ADMIN — MUPIROCIN: 20 OINTMENT TOPICAL at 08:54

## 2025-03-27 RX ADMIN — GUAIFENESIN 600 MG: 600 TABLET, EXTENDED RELEASE ORAL at 20:28

## 2025-03-27 RX ADMIN — THERA TABS 1 TABLET: TAB at 08:56

## 2025-03-27 ASSESSMENT — PAIN SCALES - GENERAL
PAINLEVEL_OUTOF10: 0
PAINLEVEL_OUTOF10: 0

## 2025-03-27 NOTE — PROGRESS NOTES
[facility]                                 Pulmonary/Critical care  Consult /Progress Note :                                                                  CC :SOB     HPI   Doing better  SOB has imp   On 2 L  Nephrology managing fluid     Subjective PHYSICAL EXAM:  Blood pressure 115/64, pulse 83, temperature 98.6 °F (37 °C), temperature source Oral, resp. rate 14, height 1.549 m (5' 1\"), weight 40.1 kg (88 lb 6.4 oz), last menstrual period 07/12/2015, SpO2 95%, not currently breastfeeding.' on RA  Gen: No distress.   Eyes: PERRL. No sclera icterus. No conjunctival injection.   ENT: No discharge. Pharynx clear.   Neck: Trachea midline. No obvious mass.    Resp: Rhonchi bilateral   CV: Regular rate. Regular rhythm. No murmur or rub. No edema. Peripheral pulses are 2+.  Capillary refill is less than 3 seconds.  GI: Non-tender. Non-distended. No hernia.   Skin: Warm and dry. No nodule on exposed extremities.   Lymph: No cervical LAD. No supraclavicular LAD.   M/S: hip tenderenss  Neuro: Awake. Alert. Moves all four extremities.   Psych: Oriented x 3. No anxiety.     LABS:  CBC:   Recent Labs     03/25/25  0459 03/26/25  0545   WBC 10.6 3.2*   HGB 10.3* 9.1*   HCT 30.9* 28.4*   MCV 80.0 80.3   * 513*     BMP:   Recent Labs     03/26/25  1803 03/26/25  2203 03/27/25  0623   * 124* 127*   K 4.7 4.7 4.2   CL 85* 83* 86*   CO2 31 32 34*   BUN 8 8 7   CREATININE 0.5* 0.4* 0.3*     LIVER PROFILE:   Recent Labs     03/25/25  0459 03/26/25  0545   AST 28 15   ALT 24 14   LIPASE 12.0*  --    BILIDIR  --  <0.1   BILITOT <0.2 <0.2   ALKPHOS 160* 137*     PT/INR: No results for input(s): \"PROTIME\", \"INR\" in the last 72 hours.  APTT: No results for input(s): \"APTT\" in the last 72 hours.    Microbiology:  Ordered    Imaging:  Chest imaging was reviewed by me and showed RUL         ASSESSMENT:  Severe Hyponateremia   Acute COPD

## 2025-03-27 NOTE — PROGRESS NOTES
Spiritual Health History and Assessment/Progress Note  [facility]    (P) Spiritual/Emotional Needs, (P) Emotional distress, (P) Life Adjustments, Adjustment to illness, Grief and loss, Other (Comment) (Shared housing instability and loss of apartment at end of April. Lives in [facility]      Name: Bonny Serrano MRN: [id number]    Age: 54 y.o.     Sex: female   Language: English   Sabianism: None   Hyponatremia     Date: 3/27/2025            Total Time Calculated: (P) 41 min              Spiritual Assessment began in [address] MEDICAL-SURGICAL        Referral/Consult From: (P) Nurse   Encounter Overview/Reason: (P) Spiritual/Emotional Needs  Service Provided For: (P) Patient    Winifred, Belief, Meaning:   Patient identifies as spiritual and Other: Bonny shared desire to become Muslim and looking into it  Family/Friends No family/friends present      Importance and Influence:  Patient has spiritual/personal beliefs that influence decisions regarding their health and Other: prays  Family/Friends No family/friends present    Community:  Patient expresses feelings of isolation: disconnected from family/friends and Other: Bonny archuleta is having tough time since disabled and has difficulty getting around.  Family/Friends No family/friends present    Assessment and Plan of Care:     Patient Interventions include: Facilitated expression of thoughts and feelings, Explored spiritual coping/struggle/distress, Affirmed coping skills/support systems, Provided sacramental/Restoration ritual, and Other: Bonny accepted offer to be lifted up in 's daily prayers.  Family/Friends Interventions include: No family/friends present    Patient Plan of Care: Spiritual Care available upon further referral  Family/Friends Plan of Care: No family/friends present    Electronically signed by Chaplain Jose on 3/27/2025 at 12:41 PM

## 2025-03-27 NOTE — PLAN OF CARE
Problem: Discharge Planning  Goal: Discharge to home or other facility with appropriate resources  3/26/2025 2014 by Susan Mckeon RN  Outcome: Progressing  3/26/2025 2014 by Susna Mckeon RN  Outcome: Progressing  Flowsheets (Taken 3/26/2025 1700 by Marcy Gomez RN)  Discharge to home or other facility with appropriate resources: Identify barriers to discharge with patient and caregiver     Problem: Pain  Goal: Verbalizes/displays adequate comfort level or baseline comfort level  3/26/2025 2014 by Susan Mckeon RN  Outcome: Progressing  3/26/2025 2014 by Susan Mckeon RN  Outcome: Progressing     Problem: Safety - Adult  Goal: Free from fall injury  3/26/2025 2014 by Susan Mckeon RN  Outcome: Progressing  3/26/2025 2014 by Susan Mckeon RN  Outcome: Progressing  3/26/2025 1159 by Nisha Denny RN  Outcome: Progressing     Problem: Skin/Tissue Integrity  Goal: Skin integrity remains intact  3/26/2025 2014 by Susan Mckeon RN  Outcome: Progressing  3/26/2025 2014 by Susan Mckeon RN  Outcome: Progressing  Flowsheets (Taken 3/26/2025 2014)  Skin Integrity Remains Intact: Monitor for areas of redness and/or skin breakdown  3/26/2025 1159 by Nisha Denny RN  Outcome: Progressing     Problem: Tobacco, E-cig, Vaping Use  Goal: Risk control - tobacco,smoking, e-cig, vapor usage  3/26/2025 2014 by Susan Mckoen RN  Outcome: Progressing  3/26/2025 2014 by Susan Mckeon RN  Outcome: Progressing     Problem: Metabolic/Fluid and Electrolytes - Adult  Goal: Electrolytes maintained within normal limits  3/26/2025 2014 by Susan Mckeon RN  Outcome: Progressing  3/26/2025 2014 by Susan Mckeon RN  Outcome: Progressing  Flowsheets (Taken 3/26/2025 1700 by Marcy Gomez RN)  Electrolytes maintained within normal limits: Monitor labs and assess patient for signs and symptoms of electrolyte imbalances  3/26/2025 1159 by Nisha Denny RN  Outcome:

## 2025-03-27 NOTE — PROGRESS NOTES
Sitting up in bed awake. No complaints or needs at present time. Call light in reach. Bed alarm in place and turned on.

## 2025-03-27 NOTE — PROGRESS NOTES
Helped up to bathroom and back to chair. Pt sitting up with chair alarm in place and turned on. Call light in reach.

## 2025-03-27 NOTE — PROGRESS NOTES
Progress Note    Admit Date:  3/25/2025    Subjective:  Ms. Serrano sitting upright in chair.  Feeling much better, no longer weak.  Still SOB with intermittent productive cough.    Objective:   BP (!) 164/90   Pulse (!) 102   Temp 98.1 °F (36.7 °C) (Oral)   Resp 18   Ht 1.549 m (5' 1\")   Wt 40.1 kg (88 lb 6.4 oz)   LMP 07/12/2015   SpO2 (!) 88%   BMI 16.70 kg/m²        Intake/Output Summary (Last 24 hours) at 3/27/2025 1139  Last data filed at 3/27/2025 0857  Gross per 24 hour   Intake 600 ml   Output --   Net 600 ml       Physical Exam:   Gen: No distress. Alert. Chronically ill-appearing, thin.  Eyes: PERRL. No sclera icterus. No conjunctival injection.   ENT: No discharge. Pharynx clear.   Neck: No JVD.  No Carotid Bruit. Trachea midline.  Resp:  On 2L O2 via NC. Expiratory wheezes bilaterally. No accessory muscle use. No crackles. No rhonchi.   CV: Regular rate. Regular rhythm. No murmur.  No rub. No edema.   Capillary Refill: Brisk,< 3 seconds   Peripheral Pulses: +2 palpable, equal bilaterally   GI: Non-tender. Non-distended. No masses. No organomegaly. Normal bowel sounds. No hernia.   Skin: Warm and dry. No nodule on exposed extremities. No rash on exposed extremities.   M/S: No cyanosis. No joint deformity. No clubbing.   Neuro: Awake. Grossly nonfocal    Psych: Oriented x 3. No anxiety or agitation.         Medications:  mupirocin, , BID  atorvastatin, 10 mg, Nightly  gabapentin, 300 mg, QAM  pantoprazole, 40 mg, QAM AC  atenolol, 25 mg, Daily  dilTIAZem, 240 mg, Daily  [Held by provider] DULoxetine, 60 mg, Daily  folic acid, 1 mg, Daily  [Held by provider] sertraline, 100 mg, Daily  sodium chloride flush, 5-40 mL, 2 times per day  enoxaparin, 30 mg, Daily  multivitamin, 1 tablet, Daily  methylPREDNISolone, 40 mg, Q12H  guaiFENesin, 600 mg, BID  thiamine, 100 mg, Daily  ipratropium 0.5 mg-albuterol 2.5 mg, 1 Dose, BID RT      PRN Medications:  sodium chloride flush, 5-40 mL, PRN  sodium chloride,  followed very closely every 6 hours.  Seizure precautions.  Hold Cymbalta  Now on D5W as Na corrected too quickly   Na 127 today, off IVF.     Severe COPD with acute exacerbation  Acute on chronic hypoxic respiratory failure  Imaging reviewed.  Showing stable cavitary lesion in the superior segment of the right lower lobe.  Also showing slight progression of groundglass component in the left lower lobe.  Adenocarcinoma difficult to exclude.  Mucous plugging present.  Currently requiring 4 L of oxygen  Continue Solu-Medrol  Continue azithromycin, finished this morning.    Sputum cultures pending.  Inhaled bronchodilators.  Might need bronchoscopy.  Down to 2L which is baseline.     Paroxysmal atrial fibrillation  Continue atenolol and Cardizem    Hyperlipidemia.   -continue statin.      H/o alcohol abuse  Watch for signs of withdrawal  Counseled to stop drinking alcohol.     Thrombocytosis  Acute on chronic normocytic anemia.  -getting better, platelets 483, hemoglobin 9.3.  -monitor with daily CBC.    Severe protein-calorie malnutrition.  -Body mass index is 16.7 kg/m².   -encourage oral intake.    Note above makes patient higher risk for morbidity and mortality requiring testing and treatment.     DVT Prophylaxis: lovenox  Diet: ADULT DIET; Regular  Code Status: Full Code    Elvia Donaldson PA-C 3/27/2025 11:39 AM

## 2025-03-27 NOTE — PROGRESS NOTES
[facility] Progress Note           Subjective/   54 y.o. year old female who we are seeing in consultation for Hyponatremia.     HPI:  Sodium correcting with IVF's, now off NS & D5W.  Denies any shortness of breath, on RA now.    ROS:  +weak, intake better.    Objective/   GEN:  Chronically ill, /69   Pulse 92   Temp 98.4 °F (36.9 °C) (Oral)   Resp 14   Ht 1.549 m (5' 1\")   Wt 40.1 kg (88 lb 6.4 oz)   LMP 07/12/2015   SpO2 97%   BMI 16.70 kg/m²   HEENT: non-icteric, no JVD  CV: S1, S2 without m/r/g; no LE edema  RESP: CTA B without w/r/r; breathing wnl  ABD: +bs, soft, nt, no hsm  SKIN: warm, no rashes    Data/  Recent Labs     03/25/25  0459 03/26/25  0545 03/27/25  0623   WBC 10.6 3.2* 6.3   HGB 10.3* 9.1* 9.3*   HCT 30.9* 28.4* 28.4*   MCV 80.0 80.3 80.8   * 513* 483*     Recent Labs     03/26/25  1803 03/26/25  2203 03/27/25  0623   * 124* 127*   K 4.7 4.7 4.2   CL 85* 83* 86*   CO2 31 32 34*   GLUCOSE 216* 142* 147*   BUN 8 8 7   CREATININE 0.5* 0.4* 0.3*   LABGLOM >90 >90 >90     Urine sodium <20  Urine osmolality 124    Assessment/     - Hyponatremia - acute, in setting of low solute intake from ETOH with superimposed dehydration   SNa 112 on admission, better with NS, required D5W with rate of correction   Baseline low 130 range       Plan/     - Monitor sodium levels with oral intake  - Encouraged protein intake, start protein supplements  - Trend labs, bp's, weights, & urine output    Okay for discharge planning as long as sodium levels continue to trend upward by AM    ____________________________________  Wilber Smith MD  [facility]  [email or web address]  Office: [phone]

## 2025-03-27 NOTE — FLOWSHEET NOTE
03/26/25 2000   Vital Signs   Temp 98.8 °F (37.1 °C)   Temp Source Oral   Pulse (!) 101   Heart Rate Source Monitor   Respirations 16   /73   MAP (Calculated) 94   Pain Assessment   Pain Assessment None - Denies Pain   Oxygen Therapy   SpO2 91 %   O2 Device Nasal cannula   O2 Flow Rate (L/min) 2 L/min     PM assessment complete. Lung sounds with rhonchi and exp wheezes, on 2L o2, congested cough, at times productive per pt. Headache, tylenol given. Medications given as ordered. Vital signs stable. Plan of care reviewed. No other needs identified at this time.

## 2025-03-27 NOTE — PROGRESS NOTES
Resting in bed awake. No complaints or needs at present time. Am assessment complete. Alert and oriented. Call light in reach.     Bedside Mobility Assessment Tool (BMAT):     Assessment Level 1- Sit and Shake    1. From a semi-reclined position, ask patient to sit up and rotate to a seated position at the side of the bed. Can use the bedrail.    2. Ask patient to reach out and grab your hand and shake making sure patient reaches across his/her midline.   Pass- Patient is able to come to a seated position, maintain core strength. Maintains seated balance while reaching across midline. Move on to Assessment Level 2.     Assessment Level 2- Stretch and Point   1. With patient in seated position at the side of the bed, have patient place both feet on the floor (or stool) with knees no higher than hips.    2. Ask patient to stretch one leg and straighten the knee, then bend the ankle/flex and point the toes. If appropriate, repeat with the other leg.   Pass- Patient is able to demonstrate appropriate quad strength on intended weight bearing limb(s). Move onto Assessment Level 3.     Assessment Level 3- Stand   1. Ask patient to elevate off the bed or chair (seated to standing) using an assistive device (cane, bedrail).    2. Patient should be able to raise buttocks off be and hold for a count of five. May repeat once.   Pass- Patient maintains standing stability for at least 5 seconds, proceed to assessment level 4.    Assessment Level 4- Walk   1. Ask patient to march in place at bedside.    2. Then ask patient to advance step and return each foot. Some medical conditions may render a patient from stepping backwards, use your best clinical judgement.   Pass- Patient demonstrates balance while shifting weight and ability to step, takes independent steps, does not use assistive device patient is MOBILITY LEVEL 4.      Mobility Level- 4

## 2025-03-27 NOTE — PROGRESS NOTES
Up in hallway with standby assist. Walked entire hallway without difficulty. Back to bed and bed alarm in place and turned on. Call light in reach.

## 2025-03-27 NOTE — PROGRESS NOTES
Called pastoral care regarding clothes for patient. They will look for clothes and bring to patient.

## 2025-03-27 NOTE — PROGRESS NOTES
03/27/25 0752   RT Protocol   History Pulmonary Disease 2   Respiratory pattern 0   Breath sounds 2   Cough 1   Indications for Bronchodilator Therapy On home bronchodilators   Bronchodilator Assessment Score 5

## 2025-03-27 NOTE — DISCHARGE INSTRUCTIONS
Your information:  Name: Bonny Serrano  : 1970    Your instructions:    Follow-up with PCP in 1 week  Follow up with Dr. Dickinson in 2 weeks    What to do after you leave the hospital:    Recommended diet: regular diet    Recommended activity: activity as tolerated        The following personal items were collected during your admission and were returned to you:    Belongings  Dental Appliances: None  Vision - Corrective Lenses: Eyeglasses  Hearing Aid: None  Clothing: Footwear, Jacket/Coat, Pants, Shirt, Socks, Undergarments  Jewelry: None  Body Piercings Removed: No  Electronic Devices: Cell Phone,   Weapons (Notify Protective Services/Security): None  Other Valuables: Cigarettes, Wallet (in pt closet)  Home Medications: Other (Comment) (inhalers)  Valuables Given To: Patient (phone and )  Provide Name(s) of Who Valuable(s) Were Given To: inhalers/cigs in purse in pt closet, pt has phone and  at bedside  Patient approves for provider to speak to responsible person post operatively: Yes    Information obtained by:  By signing below, I understand that if any problems occur once I leave the hospital I am to contact PCP.  I understand and acknowledge receipt of the instructions indicated above.

## 2025-03-28 VITALS
SYSTOLIC BLOOD PRESSURE: 142 MMHG | BODY MASS INDEX: 16.69 KG/M2 | OXYGEN SATURATION: 98 % | HEIGHT: 61 IN | RESPIRATION RATE: 18 BRPM | WEIGHT: 88.4 LBS | DIASTOLIC BLOOD PRESSURE: 75 MMHG | HEART RATE: 100 BPM | TEMPERATURE: 98.7 F

## 2025-03-28 PROBLEM — J18.9 PNEUMONIA DUE TO INFECTIOUS ORGANISM: Status: ACTIVE | Noted: 2025-03-28

## 2025-03-28 LAB
ANION GAP SERPL CALCULATED.3IONS-SCNC: 7 MMOL/L (ref 3–16)
ANISOCYTOSIS BLD QL SMEAR: ABNORMAL
BACTERIA SPEC RESP CULT: ABNORMAL
BACTERIA SPEC RESP CULT: ABNORMAL
BASO STIPL BLD QL SMEAR: ABNORMAL
BASOPHILS # BLD: 0 K/UL (ref 0–0.2)
BASOPHILS NFR BLD: 0 %
BUN SERPL-MCNC: 8 MG/DL (ref 7–20)
CALCIUM SERPL-MCNC: 9 MG/DL (ref 8.3–10.6)
CHLORIDE SERPL-SCNC: 88 MMOL/L (ref 99–110)
CO2 SERPL-SCNC: 35 MMOL/L (ref 21–32)
CREAT SERPL-MCNC: <0.2 MG/DL (ref 0.6–1.1)
DEPRECATED RDW RBC AUTO: 19.6 % (ref 12.4–15.4)
EOSINOPHIL # BLD: 0 K/UL (ref 0–0.6)
EOSINOPHIL NFR BLD: 0 %
GFR SERPLBLD CREATININE-BSD FMLA CKD-EPI: >90 ML/MIN/{1.73_M2}
GLUCOSE SERPL-MCNC: 147 MG/DL (ref 70–99)
GRAM STN SPEC: ABNORMAL
HCT VFR BLD AUTO: 28.5 % (ref 36–48)
HGB BLD-MCNC: 9.1 G/DL (ref 12–16)
HYPOCHROMIA BLD QL SMEAR: ABNORMAL
LYMPHOCYTES # BLD: 0.6 K/UL (ref 1–5.1)
LYMPHOCYTES NFR BLD: 10 %
MCH RBC QN AUTO: 25.8 PG (ref 26–34)
MCHC RBC AUTO-ENTMCNC: 31.9 G/DL (ref 31–36)
MCV RBC AUTO: 80.7 FL (ref 80–100)
MONOCYTES # BLD: 0.4 K/UL (ref 0–1.3)
MONOCYTES NFR BLD: 8 %
NEUTROPHILS # BLD: 4.3 K/UL (ref 1.7–7.7)
NEUTROPHILS NFR BLD: 80 %
ORGANISM: ABNORMAL
PLATELET # BLD AUTO: 518 K/UL (ref 135–450)
PLATELET BLD QL SMEAR: ABNORMAL
PMV BLD AUTO: 6.2 FL (ref 5–10.5)
POIKILOCYTOSIS BLD QL SMEAR: ABNORMAL
POLYCHROMASIA BLD QL SMEAR: ABNORMAL
POTASSIUM SERPL-SCNC: 4.2 MMOL/L (ref 3.5–5.1)
RBC # BLD AUTO: 3.53 M/UL (ref 4–5.2)
SLIDE REVIEW: ABNORMAL
SODIUM SERPL-SCNC: 130 MMOL/L (ref 136–145)
STOMATOCYTES BLD QL SMEAR: ABNORMAL
TARGETS BLD QL SMEAR: ABNORMAL
VARIANT LYMPHS NFR BLD MANUAL: 2 % (ref 0–6)
WBC # BLD AUTO: 5.4 K/UL (ref 4–11)

## 2025-03-28 PROCEDURE — 99239 HOSP IP/OBS DSCHRG MGMT >30: CPT | Performed by: INTERNAL MEDICINE

## 2025-03-28 PROCEDURE — 94640 AIRWAY INHALATION TREATMENT: CPT

## 2025-03-28 PROCEDURE — 6370000000 HC RX 637 (ALT 250 FOR IP): Performed by: INTERNAL MEDICINE

## 2025-03-28 PROCEDURE — 2500000003 HC RX 250 WO HCPCS: Performed by: INTERNAL MEDICINE

## 2025-03-28 PROCEDURE — 94761 N-INVAS EAR/PLS OXIMETRY MLT: CPT

## 2025-03-28 PROCEDURE — 36415 COLL VENOUS BLD VENIPUNCTURE: CPT

## 2025-03-28 PROCEDURE — 80048 BASIC METABOLIC PNL TOTAL CA: CPT

## 2025-03-28 PROCEDURE — 85025 COMPLETE CBC W/AUTO DIFF WBC: CPT

## 2025-03-28 PROCEDURE — 2700000000 HC OXYGEN THERAPY PER DAY

## 2025-03-28 PROCEDURE — 6360000002 HC RX W HCPCS: Performed by: INTERNAL MEDICINE

## 2025-03-28 RX ORDER — PREDNISONE 10 MG/1
TABLET ORAL
Qty: 18 TABLET | Refills: 0 | Status: SHIPPED | OUTPATIENT
Start: 2025-03-28

## 2025-03-28 RX ORDER — LACTOBACILLUS RHAMNOSUS GG 10B CELL
1 CAPSULE ORAL DAILY
Qty: 30 CAPSULE | Refills: 0 | Status: SHIPPED | OUTPATIENT
Start: 2025-03-28

## 2025-03-28 RX ORDER — LEVOFLOXACIN 750 MG/1
750 TABLET, FILM COATED ORAL DAILY
Qty: 7 TABLET | Refills: 0 | Status: SHIPPED | OUTPATIENT
Start: 2025-03-28 | End: 2025-04-04

## 2025-03-28 RX ADMIN — Medication 100 MG: at 09:04

## 2025-03-28 RX ADMIN — GUAIFENESIN 600 MG: 600 TABLET, EXTENDED RELEASE ORAL at 09:04

## 2025-03-28 RX ADMIN — GABAPENTIN 300 MG: 300 CAPSULE ORAL at 09:04

## 2025-03-28 RX ADMIN — FOLIC ACID 1 MG: 1 TABLET ORAL at 09:04

## 2025-03-28 RX ADMIN — IPRATROPIUM BROMIDE AND ALBUTEROL SULFATE 1 DOSE: 2.5; .5 SOLUTION RESPIRATORY (INHALATION) at 08:22

## 2025-03-28 RX ADMIN — THERA TABS 1 TABLET: TAB at 09:04

## 2025-03-28 RX ADMIN — PANTOPRAZOLE SODIUM 40 MG: 40 TABLET, DELAYED RELEASE ORAL at 05:01

## 2025-03-28 RX ADMIN — ATENOLOL 25 MG: 25 TABLET ORAL at 09:04

## 2025-03-28 RX ADMIN — DILTIAZEM HYDROCHLORIDE 240 MG: 120 CAPSULE, COATED, EXTENDED RELEASE ORAL at 09:04

## 2025-03-28 RX ADMIN — Medication 10 ML: at 09:10

## 2025-03-28 RX ADMIN — METHYLPREDNISOLONE SODIUM SUCCINATE 40 MG: 40 INJECTION, POWDER, LYOPHILIZED, FOR SOLUTION INTRAMUSCULAR; INTRAVENOUS at 09:05

## 2025-03-28 NOTE — PLAN OF CARE
Problem: Discharge Planning  Goal: Discharge to home or other facility with appropriate resources  3/28/2025 1028 by Berkley Del Cid RN  Outcome: Progressing  3/27/2025 2221 by Goldie Berman RN  Outcome: Progressing  Flowsheets (Taken 3/27/2025 2030)  Discharge to home or other facility with appropriate resources: Identify barriers to discharge with patient and caregiver     Problem: Pain  Goal: Verbalizes/displays adequate comfort level or baseline comfort level  3/28/2025 1028 by Berkley Del Cid RN  Outcome: Progressing  3/27/2025 2221 by Goldie Berman RN  Outcome: Progressing  Flowsheets (Taken 3/27/2025 2026)  Verbalizes/displays adequate comfort level or baseline comfort level: Encourage patient to monitor pain and request assistance     Problem: Safety - Adult  Goal: Free from fall injury  3/28/2025 1028 by Berkley Del Cid RN  Outcome: Progressing  3/27/2025 2221 by Goldie Berman RN  Outcome: Progressing     Problem: Skin/Tissue Integrity  Goal: Skin integrity remains intact  Description: 1.  Monitor for areas of redness and/or skin breakdown  2.  Assess vascular access sites hourly  3.  Every 4-6 hours minimum:  Change oxygen saturation probe site  4.  Every 4-6 hours:  If on nasal continuous positive airway pressure, respiratory therapy assess nares and determine need for appliance change or resting period  3/28/2025 1028 by Berkley Del Cid RN  Outcome: Progressing  3/27/2025 2221 by Goldie Berman RN  Outcome: Progressing  Flowsheets (Taken 3/27/2025 2030)  Skin Integrity Remains Intact: Monitor for areas of redness and/or skin breakdown     Problem: Tobacco, E-cig, Vaping Use  Goal: Risk control - tobacco,smoking, e-cig, vapor usage  Description: Pt will verbalize understanding of health benefits of abstaining from tobacco, smoking and usage of e-cigs and vaping  by visit 2.  3/28/2025 1028 by Berkley Del Cid RN  Outcome: Progressing  3/27/2025 2221 by Goldie Berman RN  Outcome: Progressing

## 2025-03-28 NOTE — PROGRESS NOTES
Pt on portable O2 tank, reviewed and demonstrated use with patient. Pt acknowledge verbal understanding. Pt given written and verbal discharge instructions with prescriptions from meds to beds. Pt indicated understanding and received prescription and home medication instructions. Pt packed own belongings. Pt taken down to family car via wheelchair to Taxi.

## 2025-03-28 NOTE — CARE COORDINATION
Met with pt at bedside. Pt to dc home today. Pt needs cab voucher. Dennise Crenshaw RN aware. Pt has [employer] portable oxygen tank in room to dc home on. [employer] delivered to pt room yesterday. No further DC or DME needs identified.     IMM- na    O2 - 98% 2L

## 2025-03-28 NOTE — PROGRESS NOTES
AM assessment completed, see flow sheet. Pt is alert and oriented. Vital signs are WNL. Respirations are even & easy. No complaints voiced. Pt denies needs at this time. SR up x 2, and bed in low position. Call light is within reach.    Patient is able to demonstrate the ability to move from a reclining position to an upright position within the recliner.    Bedside Mobility Assessment Tool (BMAT):     Assessment Level 1- Sit and Shake    1. From a semi-reclined position, ask patient to sit up and rotate to a seated position at the side of the bed. Can use the bedrail.    2. Ask patient to reach out and grab your hand and shake making sure patient reaches across his/her midline.   Pass- Patient is able to come to a seated position, maintain core strength. Maintains seated balance while reaching across midline. Move on to Assessment Level 2.     Assessment Level 2- Stretch and Point   1. With patient in seated position at the side of the bed, have patient place both feet on the floor (or stool) with knees no higher than hips.    2. Ask patient to stretch one leg and straighten the knee, then bend the ankle/flex and point the toes. If appropriate, repeat with the other leg.   Pass- Patient is able to demonstrate appropriate quad strength on intended weight bearing limb(s). Move onto Assessment Level 3.     Assessment Level 3- Stand   1. Ask patient to elevate off the bed or chair (seated to standing) using an assistive device (cane, bedrail).    2. Patient should be able to raise buttocks off be and hold for a count of five. May repeat once.   Pass- Patient maintains standing stability for at least 5 seconds, proceed to assessment level 4.    Assessment Level 4- Walk   1. Ask patient to march in place at bedside.    2. Then ask patient to advance step and return each foot. Some medical conditions may render a patient from stepping backwards, use your best clinical judgement.   Pass- Patient demonstrates balance while  shifting weight and ability to step, takes independent steps, does not use assistive device patient is MOBILITY LEVEL 4.      Mobility Level- 4

## 2025-03-28 NOTE — DISCHARGE SUMMARY
Name:  Bonny Serrano  Room:  0235/0235-02  MRN:    [id number]    Discharge Summary      This discharge summary is in conjunction with a complete physical exam done on the day of discharge.    Discharging Physician: Dr. Dill      Admit: 3/25/2025  Discharge:  3/28/2025    HPI taken from admission H&P:     : A 54-year-old female with a history of alcohol abuse, atrial fibrillation, chronic pancreatitis, COPD presented to the emergency room with nausea and vomiting of 2 to 3 days duration.  Denies any diarrhea.  No abdominal pain.  Admits to having chest pain which lasted for couple hours.  There is no radiation of pain.  No relationship to exertion.  Admits to having a chronic cough.  She is currently not having any chest pain.     On evaluation in the emergency room she was found to have a sodium of 112.  Admitted to the ICU.     Patient is allergic to bee venom and sulfa antibiotics    Diagnoses this Admission and Hospital Course   Acute on chronic hyponatremia in a patient with alcohol abuse.  Repeated admissions for the same.  She claims to be drinking about 4 beers every day.  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  Nephrology and critical care consult.  Currently on IV normal saline.  Sodium being followed very closely every 6 hours.  Seizure precautions.  Holding  Cymbalta and zoloft.  Zoloft has now been discontinued.  Sodium levels now improved will resume Cymbalta alone on discharge   Now on D5W as Na corrected too quickly   Na 127 today, off IVF.   ->   Sodium levels have improved and up to 130 today.  Patient is stable and wishes to be discharged today.  Discussed with patient about avoiding alcohol use     Severe COPD with acute exacerbation  Acute on chronic hypoxic respiratory failure  Right lower lobe pneumonia  Cavitary lung lesion  -Patient seen by pulmonology  Imaging reviewed.  Showing stable cavitary lesion in the superior segment of the right lower lobe.  Also showing slight progression of groundglass component       piperacillin-tazobactam <=8 mcg/mL Sensitive      tobramycin <=2 mcg/mL Sensitive                           COVID-19 & Influenza Combo [[id number]] Collected: 03/25/25 0501    Order Status: Completed Specimen: Nasopharyngeal Swab Updated: 03/25/25 0531     SARS-CoV-2 RNA, RT PCR NOT DETECTED     Comment: Not Detected results do not preclude SARS-CoV-2 infection and  should not be used as the sole basis for patient management  decisions.  Results must be combined with clinical observations,  patient history, and epidemiological information.  Testing was performed using RUFINO ROBERTO SARS-CoV-2, Influenza A/B  and Respiratory Syncytial Virus nucleic acid assay. This  test is a multiplex Real-Time Reverse Transcriptase Polymerase Chain  Reaction (RT-PCR)-based in vitro diagnostic test intended for the  qualitative detection of nucleic acids from SARS-CoV-2,  influenza A,influenza B and Respiratory Syncytial Virus  in nasopharyngeal and nasal swab specimens.    Patient Fact Sheet:  https://www.fda.gov/media/620948/download  Provider Fact Sheet: https://www.fda.gov/media/996020/download  EUA: https://www.fda.gov/media/116964/download  IFU: https://www.fda.gov/media/575858/download    Methodology:  RT-PCR          Influenza A NOT DETECTED     Influenza B NOT DETECTED             RADIOLOGY  CT ABDOMEN PELVIS W IV CONTRAST Additional Contrast? None   Final Result   1. No evidence for pulmonary embolism.   2. No acute inflammatory obstructive process seen within the abdomen or   pelvis.   3. Stable appearance of cavitary lesion seen in the superior segment the   right lower lobe with adjacent scarring and bronchiectasis.   4. Stable size of cystic/ground-glass lesion seen in the superior segment the   left lower lobe although slight progression of ground-glass component.  Slow   growing adenocarcinoma difficult to exclude.  Recommend continued follow-up.   5. Mucous plugging seen in the lower lobes.         CT CHEST

## 2025-03-28 NOTE — PROGRESS NOTES
Pt resting. Resp e/e. Shift assessment completed and charted. No needs. Will monitor. Goldie Berman RN      awake/alert/ALTERED LOC

## 2025-03-28 NOTE — PLAN OF CARE
Problem: Discharge Planning  Goal: Discharge to home or other facility with appropriate resources  Outcome: Progressing  Flowsheets (Taken 3/27/2025 2030)  Discharge to home or other facility with appropriate resources: Identify barriers to discharge with patient and caregiver     Problem: Pain  Goal: Verbalizes/displays adequate comfort level or baseline comfort level  Outcome: Progressing  Flowsheets (Taken 3/27/2025 2026)  Verbalizes/displays adequate comfort level or baseline comfort level: Encourage patient to monitor pain and request assistance     Problem: Safety - Adult  Goal: Free from fall injury  Outcome: Progressing     Problem: Skin/Tissue Integrity  Goal: Skin integrity remains intact  Description: 1.  Monitor for areas of redness and/or skin breakdown  2.  Assess vascular access sites hourly  3.  Every 4-6 hours minimum:  Change oxygen saturation probe site  4.  Every 4-6 hours:  If on nasal continuous positive airway pressure, respiratory therapy assess nares and determine need for appliance change or resting period  Outcome: Progressing  Flowsheets (Taken 3/27/2025 2030)  Skin Integrity Remains Intact: Monitor for areas of redness and/or skin breakdown     Problem: Tobacco, E-cig, Vaping Use  Goal: Risk control - tobacco,smoking, e-cig, vapor usage  Description: Pt will verbalize understanding of health benefits of abstaining from tobacco, smoking and usage of e-cigs and vaping  by visit 2.  Outcome: Progressing     Problem: Metabolic/Fluid and Electrolytes - Adult  Goal: Electrolytes maintained within normal limits  Outcome: Progressing  Flowsheets (Taken 3/27/2025 2030)  Electrolytes maintained within normal limits: Monitor labs and assess patient for signs and symptoms of electrolyte imbalances  Goal: Hemodynamic stability and optimal renal function maintained  Outcome: Progressing  Flowsheets (Taken 3/27/2025 2030)  Hemodynamic stability and optimal renal function maintained: Monitor labs and

## 2025-04-04 ENCOUNTER — COMMUNITY OUTREACH (OUTPATIENT)
Dept: OTHER | Age: 55
End: 2025-04-04

## 2025-04-04 NOTE — PROGRESS NOTES
[facility]--CHW    CHW made 1st attempt to reach patient. A woman answered the phone, but did not speak English.    CHW will reach out to Nurse Coordinator to try to get the correct contact number for patient, who will be deferred for 7 days.

## 2025-04-10 ENCOUNTER — HOSPITAL ENCOUNTER (INPATIENT)
Age: 55
LOS: 5 days | Discharge: NURSING, REHAB OR LONG-TERM CARE | DRG: 207 | End: 2025-04-15
Attending: EMERGENCY MEDICINE | Admitting: INTERNAL MEDICINE
Payer: MEDICAID

## 2025-04-10 ENCOUNTER — APPOINTMENT (OUTPATIENT)
Dept: GENERAL RADIOLOGY | Age: 55
DRG: 207 | End: 2025-04-10
Payer: MEDICAID

## 2025-04-10 DIAGNOSIS — E87.1 HYPONATREMIA: Primary | ICD-10-CM

## 2025-04-10 DIAGNOSIS — E87.6 HYPOKALEMIA: ICD-10-CM

## 2025-04-10 DIAGNOSIS — R07.9 CHEST PAIN, UNSPECIFIED TYPE: ICD-10-CM

## 2025-04-10 DIAGNOSIS — E83.42 HYPOMAGNESEMIA: ICD-10-CM

## 2025-04-10 DIAGNOSIS — D64.9 ANEMIA, UNSPECIFIED TYPE: ICD-10-CM

## 2025-04-10 DIAGNOSIS — R06.02 SHORTNESS OF BREATH: ICD-10-CM

## 2025-04-10 DIAGNOSIS — J44.9 CHRONIC OBSTRUCTIVE PULMONARY DISEASE, UNSPECIFIED COPD TYPE (HCC): ICD-10-CM

## 2025-04-10 PROBLEM — R06.00 DYSPNEA AND RESPIRATORY ABNORMALITIES: Status: ACTIVE | Noted: 2025-04-10

## 2025-04-10 PROBLEM — R06.89 DYSPNEA AND RESPIRATORY ABNORMALITIES: Status: ACTIVE | Noted: 2025-04-10

## 2025-04-10 LAB
ALBUMIN SERPL-MCNC: 3.5 G/DL (ref 3.4–5)
ALBUMIN/GLOB SERPL: 1.2 {RATIO} (ref 1.1–2.2)
ALP SERPL-CCNC: 143 U/L (ref 40–129)
ALT SERPL-CCNC: 10 U/L (ref 10–40)
ANION GAP SERPL CALCULATED.3IONS-SCNC: 12 MMOL/L (ref 3–16)
ANISOCYTOSIS BLD QL SMEAR: ABNORMAL
AST SERPL-CCNC: 19 U/L (ref 15–37)
BACTERIA URNS QL MICRO: ABNORMAL /HPF
BASE EXCESS BLDV CALC-SCNC: 9.2 MMOL/L (ref -3–3)
BASOPHILS # BLD: 0.1 K/UL (ref 0–0.2)
BASOPHILS NFR BLD: 1 %
BILIRUB SERPL-MCNC: <0.2 MG/DL (ref 0–1)
BILIRUB UR QL STRIP.AUTO: NEGATIVE
BUN SERPL-MCNC: 6 MG/DL (ref 7–20)
CALCIUM SERPL-MCNC: 8 MG/DL (ref 8.3–10.6)
CHLORIDE SERPL-SCNC: 79 MMOL/L (ref 99–110)
CHLORIDE UR-SCNC: 26 MMOL/L
CHOLEST SERPL-MCNC: 178 MG/DL (ref 0–199)
CLARITY UR: ABNORMAL
CO2 BLDV-SCNC: 38 MMOL/L
CO2 SERPL-SCNC: 35 MMOL/L (ref 21–32)
COHGB MFR BLDV: 12.9 % (ref 0–1.5)
COLOR UR: YELLOW
CREAT SERPL-MCNC: 0.3 MG/DL (ref 0.6–1.1)
CREAT UR-MCNC: 25.2 MG/DL (ref 28–259)
DEPRECATED RDW RBC AUTO: 19.1 % (ref 12.4–15.4)
EKG ATRIAL RATE: 90 BPM
EKG DIAGNOSIS: NORMAL
EKG P AXIS: 85 DEGREES
EKG P-R INTERVAL: 128 MS
EKG Q-T INTERVAL: 360 MS
EKG QRS DURATION: 82 MS
EKG QTC CALCULATION (BAZETT): 440 MS
EKG R AXIS: 88 DEGREES
EKG T AXIS: 81 DEGREES
EKG VENTRICULAR RATE: 90 BPM
EOSINOPHIL # BLD: 0.1 K/UL (ref 0–0.6)
EOSINOPHIL NFR BLD: 1 %
EPI CELLS #/AREA URNS HPF: ABNORMAL /HPF (ref 0–5)
ETHANOLAMINE SERPL-MCNC: 143 MG/DL (ref 0–0.08)
FLUAV RNA RESP QL NAA+PROBE: NOT DETECTED
FLUBV RNA RESP QL NAA+PROBE: NOT DETECTED
GFR SERPLBLD CREATININE-BSD FMLA CKD-EPI: >90 ML/MIN/{1.73_M2}
GLUCOSE BLD-MCNC: 233 MG/DL (ref 70–99)
GLUCOSE SERPL-MCNC: 192 MG/DL (ref 70–99)
GLUCOSE UR STRIP.AUTO-MCNC: NEGATIVE MG/DL
HCO3 BLDV-SCNC: 35.9 MMOL/L (ref 23–29)
HCT VFR BLD AUTO: 32 % (ref 36–48)
HDLC SERPL-MCNC: 78 MG/DL (ref 40–60)
HGB BLD-MCNC: 10.4 G/DL (ref 12–16)
HGB UR QL STRIP.AUTO: ABNORMAL
HYPOCHROMIA BLD QL SMEAR: ABNORMAL
INR PPP: 1 (ref 0.85–1.15)
KETONES UR STRIP.AUTO-MCNC: NEGATIVE MG/DL
LACTATE BLDV-SCNC: 0.7 MMOL/L (ref 0.4–1.9)
LACTATE BLDV-SCNC: 1.5 MMOL/L (ref 0.4–1.9)
LDLC SERPL CALC-MCNC: 88 MG/DL
LEUKOCYTE ESTERASE UR QL STRIP.AUTO: ABNORMAL
LYMPHOCYTES # BLD: 1.7 K/UL (ref 1–5.1)
LYMPHOCYTES NFR BLD: 18 %
MAGNESIUM SERPL-MCNC: 1.37 MG/DL (ref 1.8–2.4)
MCH RBC QN AUTO: 25.9 PG (ref 26–34)
MCHC RBC AUTO-ENTMCNC: 32.5 G/DL (ref 31–36)
MCV RBC AUTO: 79.6 FL (ref 80–100)
METHGB MFR BLDV: 0.3 %
MONOCYTES # BLD: 0.6 K/UL (ref 0–1.3)
MONOCYTES NFR BLD: 6 %
MUCOUS THREADS #/AREA URNS LPF: ABNORMAL /LPF
NEUTROPHILS # BLD: 7 K/UL (ref 1.7–7.7)
NEUTROPHILS NFR BLD: 74 %
NITRITE UR QL STRIP.AUTO: NEGATIVE
O2 CT VFR BLDV CALC: 8 VOL %
O2 THERAPY: ABNORMAL
PCO2 BLDV: 59.9 MMHG (ref 40–50)
PERFORMED ON: ABNORMAL
PH BLDV: 7.39 [PH] (ref 7.35–7.45)
PH UR STRIP.AUTO: 8 [PH] (ref 5–8)
PLATELET # BLD AUTO: 335 K/UL (ref 135–450)
PLATELET BLD QL SMEAR: ADEQUATE
PMV BLD AUTO: 6 FL (ref 5–10.5)
PO2 BLDV: 28.6 MMHG (ref 25–40)
POLYCHROMASIA BLD QL SMEAR: ABNORMAL
POTASSIUM SERPL-SCNC: 3.3 MMOL/L (ref 3.5–5.1)
PROT SERPL-MCNC: 6.5 G/DL (ref 6.4–8.2)
PROT UR STRIP.AUTO-MCNC: NEGATIVE MG/DL
PROTHROMBIN TIME: 13.4 SEC (ref 11.9–14.9)
RBC # BLD AUTO: 4.02 M/UL (ref 4–5.2)
RBC #/AREA URNS HPF: ABNORMAL /HPF (ref 0–4)
REASON FOR REJECTION: NORMAL
REJECTED TEST: NORMAL
SAO2 % BLDV: 52 %
SARS-COV-2 RNA RESP QL NAA+PROBE: NOT DETECTED
SLIDE REVIEW: ABNORMAL
SODIUM SERPL-SCNC: 126 MMOL/L (ref 136–145)
SODIUM UR-SCNC: 43 MMOL/L
SP GR UR STRIP.AUTO: 1.01 (ref 1–1.03)
STOMATOCYTES BLD QL SMEAR: ABNORMAL
TARGETS BLD QL SMEAR: ABNORMAL
TRIGL SERPL-MCNC: 60 MG/DL (ref 0–150)
TROPONIN, HIGH SENSITIVITY: 15 NG/L (ref 0–14)
TROPONIN, HIGH SENSITIVITY: 17 NG/L (ref 0–14)
TROPONIN, HIGH SENSITIVITY: 17 NG/L (ref 0–14)
UA DIPSTICK W REFLEX MICRO PNL UR: YES
URN SPEC COLLECT METH UR: ABNORMAL
UROBILINOGEN UR STRIP-ACNC: 1 E.U./DL
VLDLC SERPL CALC-MCNC: 12 MG/DL
WBC # BLD AUTO: 9.5 K/UL (ref 4–11)
WBC #/AREA URNS HPF: ABNORMAL /HPF (ref 0–5)

## 2025-04-10 PROCEDURE — 82570 ASSAY OF URINE CREATININE: CPT

## 2025-04-10 PROCEDURE — 6360000002 HC RX W HCPCS: Performed by: STUDENT IN AN ORGANIZED HEALTH CARE EDUCATION/TRAINING PROGRAM

## 2025-04-10 PROCEDURE — 6370000000 HC RX 637 (ALT 250 FOR IP): Performed by: INTERNAL MEDICINE

## 2025-04-10 PROCEDURE — 82436 ASSAY OF URINE CHLORIDE: CPT

## 2025-04-10 PROCEDURE — 80061 LIPID PANEL: CPT

## 2025-04-10 PROCEDURE — 80053 COMPREHEN METABOLIC PANEL: CPT

## 2025-04-10 PROCEDURE — 93005 ELECTROCARDIOGRAM TRACING: CPT | Performed by: STUDENT IN AN ORGANIZED HEALTH CARE EDUCATION/TRAINING PROGRAM

## 2025-04-10 PROCEDURE — 83935 ASSAY OF URINE OSMOLALITY: CPT

## 2025-04-10 PROCEDURE — 6370000000 HC RX 637 (ALT 250 FOR IP): Performed by: EMERGENCY MEDICINE

## 2025-04-10 PROCEDURE — 6370000000 HC RX 637 (ALT 250 FOR IP): Performed by: STUDENT IN AN ORGANIZED HEALTH CARE EDUCATION/TRAINING PROGRAM

## 2025-04-10 PROCEDURE — 87040 BLOOD CULTURE FOR BACTERIA: CPT

## 2025-04-10 PROCEDURE — 82803 BLOOD GASES ANY COMBINATION: CPT

## 2025-04-10 PROCEDURE — 83605 ASSAY OF LACTIC ACID: CPT

## 2025-04-10 PROCEDURE — 2500000003 HC RX 250 WO HCPCS: Performed by: INTERNAL MEDICINE

## 2025-04-10 PROCEDURE — 2060000000 HC ICU INTERMEDIATE R&B

## 2025-04-10 PROCEDURE — 85610 PROTHROMBIN TIME: CPT

## 2025-04-10 PROCEDURE — 87636 SARSCOV2 & INF A&B AMP PRB: CPT

## 2025-04-10 PROCEDURE — 87086 URINE CULTURE/COLONY COUNT: CPT

## 2025-04-10 PROCEDURE — 83735 ASSAY OF MAGNESIUM: CPT

## 2025-04-10 PROCEDURE — 84484 ASSAY OF TROPONIN QUANT: CPT

## 2025-04-10 PROCEDURE — 6360000002 HC RX W HCPCS: Performed by: EMERGENCY MEDICINE

## 2025-04-10 PROCEDURE — 87077 CULTURE AEROBIC IDENTIFY: CPT

## 2025-04-10 PROCEDURE — 2700000000 HC OXYGEN THERAPY PER DAY

## 2025-04-10 PROCEDURE — 84460 ALANINE AMINO (ALT) (SGPT): CPT

## 2025-04-10 PROCEDURE — 96374 THER/PROPH/DIAG INJ IV PUSH: CPT

## 2025-04-10 PROCEDURE — 84300 ASSAY OF URINE SODIUM: CPT

## 2025-04-10 PROCEDURE — 83036 HEMOGLOBIN GLYCOSYLATED A1C: CPT

## 2025-04-10 PROCEDURE — 87186 SC STD MICRODIL/AGAR DIL: CPT

## 2025-04-10 PROCEDURE — 83930 ASSAY OF BLOOD OSMOLALITY: CPT

## 2025-04-10 PROCEDURE — 36415 COLL VENOUS BLD VENIPUNCTURE: CPT

## 2025-04-10 PROCEDURE — 2580000003 HC RX 258: Performed by: STUDENT IN AN ORGANIZED HEALTH CARE EDUCATION/TRAINING PROGRAM

## 2025-04-10 PROCEDURE — 84132 ASSAY OF SERUM POTASSIUM: CPT

## 2025-04-10 PROCEDURE — 82077 ASSAY SPEC XCP UR&BREATH IA: CPT

## 2025-04-10 PROCEDURE — 99285 EMERGENCY DEPT VISIT HI MDM: CPT

## 2025-04-10 PROCEDURE — 94761 N-INVAS EAR/PLS OXIMETRY MLT: CPT

## 2025-04-10 PROCEDURE — 2580000003 HC RX 258: Performed by: EMERGENCY MEDICINE

## 2025-04-10 PROCEDURE — 93010 ELECTROCARDIOGRAM REPORT: CPT | Performed by: INTERNAL MEDICINE

## 2025-04-10 PROCEDURE — 94640 AIRWAY INHALATION TREATMENT: CPT

## 2025-04-10 PROCEDURE — 84450 TRANSFERASE (AST) (SGOT): CPT

## 2025-04-10 PROCEDURE — 85025 COMPLETE CBC W/AUTO DIFF WBC: CPT

## 2025-04-10 PROCEDURE — 71045 X-RAY EXAM CHEST 1 VIEW: CPT

## 2025-04-10 PROCEDURE — 81001 URINALYSIS AUTO W/SCOPE: CPT

## 2025-04-10 RX ORDER — PANTOPRAZOLE SODIUM 40 MG/1
40 TABLET, DELAYED RELEASE ORAL
Status: DISCONTINUED | OUTPATIENT
Start: 2025-04-11 | End: 2025-04-15 | Stop reason: HOSPADM

## 2025-04-10 RX ORDER — LANOLIN ALCOHOL/MO/W.PET/CERES
400 CREAM (GRAM) TOPICAL DAILY
Status: DISCONTINUED | OUTPATIENT
Start: 2025-04-11 | End: 2025-04-15 | Stop reason: HOSPADM

## 2025-04-10 RX ORDER — 0.9 % SODIUM CHLORIDE 0.9 %
1000 INTRAVENOUS SOLUTION INTRAVENOUS ONCE
Status: COMPLETED | OUTPATIENT
Start: 2025-04-10 | End: 2025-04-10

## 2025-04-10 RX ORDER — LANOLIN ALCOHOL/MO/W.PET/CERES
100 CREAM (GRAM) TOPICAL DAILY
Status: DISCONTINUED | OUTPATIENT
Start: 2025-04-11 | End: 2025-04-10 | Stop reason: SDUPTHER

## 2025-04-10 RX ORDER — BUDESONIDE AND FORMOTEROL FUMARATE DIHYDRATE 160; 4.5 UG/1; UG/1
2 AEROSOL RESPIRATORY (INHALATION)
Status: DISCONTINUED | OUTPATIENT
Start: 2025-04-10 | End: 2025-04-10

## 2025-04-10 RX ORDER — SODIUM CHLORIDE 0.9 % (FLUSH) 0.9 %
5-40 SYRINGE (ML) INJECTION EVERY 12 HOURS SCHEDULED
Status: DISCONTINUED | OUTPATIENT
Start: 2025-04-10 | End: 2025-04-10 | Stop reason: SDUPTHER

## 2025-04-10 RX ORDER — ATORVASTATIN CALCIUM 10 MG/1
10 TABLET, FILM COATED ORAL NIGHTLY
Status: DISCONTINUED | OUTPATIENT
Start: 2025-04-10 | End: 2025-04-13

## 2025-04-10 RX ORDER — ACETAMINOPHEN 650 MG/1
650 SUPPOSITORY RECTAL EVERY 6 HOURS PRN
Status: DISCONTINUED | OUTPATIENT
Start: 2025-04-10 | End: 2025-04-15 | Stop reason: HOSPADM

## 2025-04-10 RX ORDER — SODIUM CHLORIDE 0.9 % (FLUSH) 0.9 %
5-40 SYRINGE (ML) INJECTION EVERY 12 HOURS SCHEDULED
Status: DISCONTINUED | OUTPATIENT
Start: 2025-04-10 | End: 2025-04-15 | Stop reason: HOSPADM

## 2025-04-10 RX ORDER — IPRATROPIUM BROMIDE AND ALBUTEROL SULFATE 2.5; .5 MG/3ML; MG/3ML
1 SOLUTION RESPIRATORY (INHALATION)
Status: DISCONTINUED | OUTPATIENT
Start: 2025-04-11 | End: 2025-04-10

## 2025-04-10 RX ORDER — GLUCAGON 1 MG/ML
1 KIT INJECTION PRN
Status: DISCONTINUED | OUTPATIENT
Start: 2025-04-10 | End: 2025-04-15 | Stop reason: HOSPADM

## 2025-04-10 RX ORDER — ATENOLOL 25 MG/1
25 TABLET ORAL DAILY
Status: DISCONTINUED | OUTPATIENT
Start: 2025-04-11 | End: 2025-04-15 | Stop reason: HOSPADM

## 2025-04-10 RX ORDER — POLYETHYLENE GLYCOL 3350 17 G/17G
17 POWDER, FOR SOLUTION ORAL DAILY PRN
Status: DISCONTINUED | OUTPATIENT
Start: 2025-04-10 | End: 2025-04-15 | Stop reason: HOSPADM

## 2025-04-10 RX ORDER — FOLIC ACID 1 MG/1
1 TABLET ORAL DAILY
Status: DISCONTINUED | OUTPATIENT
Start: 2025-04-11 | End: 2025-04-15 | Stop reason: HOSPADM

## 2025-04-10 RX ORDER — LANOLIN ALCOHOL/MO/W.PET/CERES
100 CREAM (GRAM) TOPICAL DAILY
Status: DISCONTINUED | OUTPATIENT
Start: 2025-04-11 | End: 2025-04-15 | Stop reason: HOSPADM

## 2025-04-10 RX ORDER — DILTIAZEM HYDROCHLORIDE 240 MG/1
240 CAPSULE, COATED, EXTENDED RELEASE ORAL DAILY
Status: DISCONTINUED | OUTPATIENT
Start: 2025-04-11 | End: 2025-04-15 | Stop reason: HOSPADM

## 2025-04-10 RX ORDER — BUDESONIDE AND FORMOTEROL FUMARATE DIHYDRATE 160; 4.5 UG/1; UG/1
2 AEROSOL RESPIRATORY (INHALATION)
Status: DISCONTINUED | OUTPATIENT
Start: 2025-04-11 | End: 2025-04-15 | Stop reason: HOSPADM

## 2025-04-10 RX ORDER — POTASSIUM CHLORIDE 7.45 MG/ML
10 INJECTION INTRAVENOUS PRN
Status: DISCONTINUED | OUTPATIENT
Start: 2025-04-10 | End: 2025-04-15 | Stop reason: HOSPADM

## 2025-04-10 RX ORDER — SODIUM CHLORIDE 9 MG/ML
INJECTION, SOLUTION INTRAVENOUS PRN
Status: DISCONTINUED | OUTPATIENT
Start: 2025-04-10 | End: 2025-04-10 | Stop reason: SDUPTHER

## 2025-04-10 RX ORDER — SODIUM CHLORIDE 9 MG/ML
INJECTION, SOLUTION INTRAVENOUS PRN
Status: DISCONTINUED | OUTPATIENT
Start: 2025-04-10 | End: 2025-04-15 | Stop reason: HOSPADM

## 2025-04-10 RX ORDER — GUAIFENESIN 600 MG/1
1200 TABLET, EXTENDED RELEASE ORAL 2 TIMES DAILY PRN
Status: DISCONTINUED | OUTPATIENT
Start: 2025-04-10 | End: 2025-04-11

## 2025-04-10 RX ORDER — GABAPENTIN 300 MG/1
300 CAPSULE ORAL DAILY
Status: DISCONTINUED | OUTPATIENT
Start: 2025-04-11 | End: 2025-04-15 | Stop reason: HOSPADM

## 2025-04-10 RX ORDER — ENOXAPARIN SODIUM 100 MG/ML
30 INJECTION SUBCUTANEOUS DAILY
Status: DISCONTINUED | OUTPATIENT
Start: 2025-04-11 | End: 2025-04-15 | Stop reason: HOSPADM

## 2025-04-10 RX ORDER — ASPIRIN 81 MG/1
81 TABLET, CHEWABLE ORAL DAILY
Status: DISCONTINUED | OUTPATIENT
Start: 2025-04-11 | End: 2025-04-15 | Stop reason: HOSPADM

## 2025-04-10 RX ORDER — MULTIVITAMIN WITH IRON
1 TABLET ORAL DAILY
Status: DISCONTINUED | OUTPATIENT
Start: 2025-04-11 | End: 2025-04-15 | Stop reason: HOSPADM

## 2025-04-10 RX ORDER — POTASSIUM CHLORIDE 1500 MG/1
40 TABLET, EXTENDED RELEASE ORAL PRN
Status: DISCONTINUED | OUTPATIENT
Start: 2025-04-10 | End: 2025-04-15 | Stop reason: HOSPADM

## 2025-04-10 RX ORDER — POTASSIUM CHLORIDE 1500 MG/1
20 TABLET, EXTENDED RELEASE ORAL
Status: DISCONTINUED | OUTPATIENT
Start: 2025-04-11 | End: 2025-04-15 | Stop reason: HOSPADM

## 2025-04-10 RX ORDER — SODIUM CHLORIDE 0.9 % (FLUSH) 0.9 %
5-40 SYRINGE (ML) INJECTION PRN
Status: DISCONTINUED | OUTPATIENT
Start: 2025-04-10 | End: 2025-04-10 | Stop reason: SDUPTHER

## 2025-04-10 RX ORDER — DEXTROSE MONOHYDRATE 100 MG/ML
INJECTION, SOLUTION INTRAVENOUS CONTINUOUS PRN
Status: DISCONTINUED | OUTPATIENT
Start: 2025-04-10 | End: 2025-04-15 | Stop reason: HOSPADM

## 2025-04-10 RX ORDER — LACTOBACILLUS RHAMNOSUS GG 10B CELL
1 CAPSULE ORAL DAILY
Status: DISCONTINUED | OUTPATIENT
Start: 2025-04-11 | End: 2025-04-15 | Stop reason: HOSPADM

## 2025-04-10 RX ORDER — ONDANSETRON 2 MG/ML
4 INJECTION INTRAMUSCULAR; INTRAVENOUS EVERY 6 HOURS PRN
Status: DISCONTINUED | OUTPATIENT
Start: 2025-04-10 | End: 2025-04-15 | Stop reason: HOSPADM

## 2025-04-10 RX ORDER — IPRATROPIUM BROMIDE AND ALBUTEROL SULFATE 2.5; .5 MG/3ML; MG/3ML
1 SOLUTION RESPIRATORY (INHALATION)
Status: DISCONTINUED | OUTPATIENT
Start: 2025-04-10 | End: 2025-04-15 | Stop reason: HOSPADM

## 2025-04-10 RX ORDER — PHENOBARBITAL 32.4 MG/1
32.4 TABLET ORAL 2 TIMES DAILY PRN
Status: DISCONTINUED | OUTPATIENT
Start: 2025-04-10 | End: 2025-04-15 | Stop reason: HOSPADM

## 2025-04-10 RX ORDER — SODIUM CHLORIDE 9 MG/ML
INJECTION, SOLUTION INTRAVENOUS CONTINUOUS
Status: DISCONTINUED | OUTPATIENT
Start: 2025-04-10 | End: 2025-04-11

## 2025-04-10 RX ORDER — ONDANSETRON 4 MG/1
4 TABLET, ORALLY DISINTEGRATING ORAL EVERY 8 HOURS PRN
Status: DISCONTINUED | OUTPATIENT
Start: 2025-04-10 | End: 2025-04-15 | Stop reason: HOSPADM

## 2025-04-10 RX ORDER — DULOXETIN HYDROCHLORIDE 60 MG/1
60 CAPSULE, DELAYED RELEASE ORAL DAILY
Status: DISCONTINUED | OUTPATIENT
Start: 2025-04-11 | End: 2025-04-15 | Stop reason: HOSPADM

## 2025-04-10 RX ORDER — ACETAMINOPHEN 325 MG/1
650 TABLET ORAL EVERY 6 HOURS PRN
Status: DISCONTINUED | OUTPATIENT
Start: 2025-04-10 | End: 2025-04-15 | Stop reason: HOSPADM

## 2025-04-10 RX ORDER — MAGNESIUM SULFATE IN WATER 40 MG/ML
2000 INJECTION, SOLUTION INTRAVENOUS PRN
Status: DISCONTINUED | OUTPATIENT
Start: 2025-04-10 | End: 2025-04-15 | Stop reason: HOSPADM

## 2025-04-10 RX ORDER — MAGNESIUM SULFATE IN WATER 40 MG/ML
2000 INJECTION, SOLUTION INTRAVENOUS ONCE
Status: COMPLETED | OUTPATIENT
Start: 2025-04-10 | End: 2025-04-10

## 2025-04-10 RX ORDER — SODIUM CHLORIDE 0.9 % (FLUSH) 0.9 %
5-40 SYRINGE (ML) INJECTION PRN
Status: DISCONTINUED | OUTPATIENT
Start: 2025-04-10 | End: 2025-04-15 | Stop reason: HOSPADM

## 2025-04-10 RX ADMIN — ATORVASTATIN CALCIUM 10 MG: 10 TABLET, FILM COATED ORAL at 21:58

## 2025-04-10 RX ADMIN — SODIUM CHLORIDE, PRESERVATIVE FREE 10 ML: 5 INJECTION INTRAVENOUS at 21:53

## 2025-04-10 RX ADMIN — IPRATROPIUM BROMIDE AND ALBUTEROL SULFATE 1 DOSE: 2.5; .5 SOLUTION RESPIRATORY (INHALATION) at 22:30

## 2025-04-10 RX ADMIN — SODIUM CHLORIDE: 0.9 INJECTION, SOLUTION INTRAVENOUS at 21:51

## 2025-04-10 RX ADMIN — MAGNESIUM SULFATE HEPTAHYDRATE 2000 MG: 40 INJECTION, SOLUTION INTRAVENOUS at 16:28

## 2025-04-10 RX ADMIN — SODIUM CHLORIDE 1000 ML: 0.9 INJECTION, SOLUTION INTRAVENOUS at 16:26

## 2025-04-10 RX ADMIN — BUDESONIDE AND FORMOTEROL FUMARATE DIHYDRATE 2 PUFF: 160; 4.5 AEROSOL RESPIRATORY (INHALATION) at 22:30

## 2025-04-10 RX ADMIN — POTASSIUM BICARBONATE 20 MEQ: 782 TABLET, EFFERVESCENT ORAL at 16:26

## 2025-04-10 RX ADMIN — AZITHROMYCIN MONOHYDRATE 500 MG: 500 INJECTION, POWDER, LYOPHILIZED, FOR SOLUTION INTRAVENOUS at 21:52

## 2025-04-10 NOTE — H&P
V2.0  History and Physical      Name:  Bonny Serrano /Age/Sex: 1970  (55 y.o. female)   MRN & CSN:  [id number] & [id number] Encounter Date/Time: 4/10/2025 7:36 PM EDT   Location:   PCP: Brenda Carrillo APRN - CNP       Hospital Day: 1    Assessment and Plan:   Bonny Serrano is a 55 y.o. female  who presents with Dyspnea and respiratory abnormalities    Hospital Problems           Last Modified POA    * (Principal) Dyspnea and respiratory abnormalities 4/10/2025 Yes     Assessment and Plan:  Generalized weakness with debility in the setting of underlying alcohol use disorder with hypomagnesemia, hypokalemia, hyponatremia and hypochloremia.  Will order serum osmolarity, urine osmolality, urine sodium, urine chloride, urine creatinine.  Nephrology consult to be placed  Atypical chest pain.  Trend EKG and troponin.  More likely COPD related with pleuritic pattern of chest pain associated with shortness of breath and wheezing.  Currently troponin is stable at 17.  EMS EKG said ST elevations?  Similar ST changes seen in EKG from 3/25/2025.  Will still continue to trend EKGs.  Denies any typical chest pain symptoms at the moment.  Concerns for pericarditis?  Cardiology to be consulted while trending troponin and EKG  Concerns for COPD exacerbation.  Recent admission with right lower lobe pneumonia and cavitary lung lesion.  X-ray showed scarred areas with no cavitary lesion at the moment.  Pulmonology to be consulted start the patient on azithromycin Solu-Medrol DuoNebs.  May need bronchoscopy  Chronic hypoxic respiratory failure in the setting of COPD  Alcohol use disorder with hypomagnesemia, hyponatremia, hypokalemia and hypochloremia.  Start the patient on normal saline hydration nephrology has been consulted monitor electrolytes every 4 hours for the first 24 hours.  Underlying history of marijuana use as well highly recommend in favor of cessation  Alcohol use disorder.  Reports regular drinking.

## 2025-04-10 NOTE — ED PROVIDER NOTES
[facility] EMERGENCY DEPARTMENT  EMERGENCY DEPARTMENT ENCOUNTER        Pt Name: Bonny Serrano  MRN: [id number]  Birthdate 1970  Date of evaluation: 4/10/2025  Provider: Gerardo Nix DO  PCP: Brenda Carrillo APRN - CNP  Note Started: 3:09 PM EDT 4/10/25    CHIEF COMPLAINT      Shortness of Breath, Fatigue    HISTORY OF PRESENT ILLNESS: 1 or more Elements     Chief Complaint   Patient presents with    Chest Pain     CP, pressure bewteen shoulder blades. EMS states alwys SOB but worse than normal. 2L NC at baseline. EMS states STEMI on their EKG machine     History from : Patient and EMS  Limitations to history : None    Bonny Serrano is a 55 y.o. female who presents to the emergency department secondary to concern for shortness of breath and chest pressure. Reports that she has baseline symptoms like this, however is worse than normal. She uses 2 L of oxygen nasal cannula as needed at baseline, however states that she has been requiring it all the time lately.    Past medical history noted below. Aside from what is stated above denies any other symptoms or modifying factors.   reports that she has been smoking cigarettes. She started smoking about 29 years ago. She has a 25 pack-year smoking history. She has been exposed to tobacco smoke. She has never used smokeless tobacco. She reports current alcohol use. She reports current drug use. Drug: Marijuana (Weed).  Nursing Notes were all reviewed and agreed with or any disagreements addressed in HPI/MDM.  REVIEW OF SYSTEMS :    Review of Systems   Constitutional:  Positive for fatigue. Negative for chills and fever.   HENT:  Negative for congestion and rhinorrhea.    Eyes:  Negative for pain and redness.   Respiratory:  Positive for shortness of breath. Negative for cough.    Cardiovascular:  Positive for chest pain. Negative for leg swelling.   Gastrointestinal:  Negative for abdominal pain, constipation, diarrhea, nausea and vomiting.   Genitourinary:

## 2025-04-10 NOTE — PLAN OF CARE
Hx of alcohol use, copd, cavitory lesion on recent abx coming for dyspnea, hyponatremia, low k ,low mag which all are chronic

## 2025-04-11 ENCOUNTER — APPOINTMENT (OUTPATIENT)
Dept: NUCLEAR MEDICINE | Age: 55
DRG: 207 | End: 2025-04-11
Attending: INTERNAL MEDICINE
Payer: MEDICAID

## 2025-04-11 ENCOUNTER — APPOINTMENT (OUTPATIENT)
Dept: CT IMAGING | Age: 55
DRG: 207 | End: 2025-04-11
Payer: MEDICAID

## 2025-04-11 ENCOUNTER — APPOINTMENT (OUTPATIENT)
Age: 55
DRG: 207 | End: 2025-04-11
Attending: INTERNAL MEDICINE
Payer: MEDICAID

## 2025-04-11 PROBLEM — R06.02 SHORTNESS OF BREATH: Status: ACTIVE | Noted: 2025-04-10

## 2025-04-11 PROBLEM — E83.42 HYPOMAGNESEMIA: Status: ACTIVE | Noted: 2025-04-11

## 2025-04-11 PROBLEM — J98.4 CAVITARY LUNG DISEASE: Status: ACTIVE | Noted: 2025-04-11

## 2025-04-11 PROBLEM — J96.11 CHRONIC RESPIRATORY FAILURE WITH HYPOXIA (HCC): Status: ACTIVE | Noted: 2025-04-11

## 2025-04-11 PROBLEM — R07.9 CHEST PAIN: Status: ACTIVE | Noted: 2025-04-11

## 2025-04-11 LAB
ANION GAP SERPL CALCULATED.3IONS-SCNC: 10 MMOL/L (ref 3–16)
ANION GAP SERPL CALCULATED.3IONS-SCNC: 8 MMOL/L (ref 3–16)
ANISOCYTOSIS BLD QL SMEAR: ABNORMAL
ANISOCYTOSIS BLD QL SMEAR: ABNORMAL
B-HCG SERPL EIA 3RD IS-ACNC: <5 MIU/ML
BASOPHILS # BLD: 0 K/UL (ref 0–0.2)
BASOPHILS # BLD: 0.1 K/UL (ref 0–0.2)
BASOPHILS NFR BLD: 0 %
BASOPHILS NFR BLD: 1 %
BUN SERPL-MCNC: 3 MG/DL (ref 7–20)
BUN SERPL-MCNC: 3 MG/DL (ref 7–20)
CALCIUM SERPL-MCNC: 7.3 MG/DL (ref 8.3–10.6)
CALCIUM SERPL-MCNC: 7.4 MG/DL (ref 8.3–10.6)
CHLORIDE SERPL-SCNC: 94 MMOL/L (ref 99–110)
CHLORIDE SERPL-SCNC: 94 MMOL/L (ref 99–110)
CO2 SERPL-SCNC: 33 MMOL/L (ref 21–32)
CO2 SERPL-SCNC: 35 MMOL/L (ref 21–32)
CREAT SERPL-MCNC: <0.2 MG/DL (ref 0.6–1.1)
CREAT SERPL-MCNC: <0.2 MG/DL (ref 0.6–1.1)
DEPRECATED RDW RBC AUTO: 19.6 % (ref 12.4–15.4)
DEPRECATED RDW RBC AUTO: 19.6 % (ref 12.4–15.4)
ECHO BSA: 1.28 M2
EKG ATRIAL RATE: 84 BPM
EKG ATRIAL RATE: 88 BPM
EKG ATRIAL RATE: 96 BPM
EKG DIAGNOSIS: NORMAL
EKG P AXIS: 79 DEGREES
EKG P AXIS: 80 DEGREES
EKG P AXIS: 82 DEGREES
EKG P-R INTERVAL: 120 MS
EKG P-R INTERVAL: 124 MS
EKG P-R INTERVAL: 128 MS
EKG Q-T INTERVAL: 370 MS
EKG Q-T INTERVAL: 400 MS
EKG Q-T INTERVAL: 402 MS
EKG QRS DURATION: 84 MS
EKG QRS DURATION: 84 MS
EKG QRS DURATION: 86 MS
EKG QTC CALCULATION (BAZETT): 467 MS
EKG QTC CALCULATION (BAZETT): 475 MS
EKG QTC CALCULATION (BAZETT): 484 MS
EKG R AXIS: 83 DEGREES
EKG R AXIS: 84 DEGREES
EKG R AXIS: 93 DEGREES
EKG T AXIS: 72 DEGREES
EKG T AXIS: 73 DEGREES
EKG T AXIS: 80 DEGREES
EKG VENTRICULAR RATE: 84 BPM
EKG VENTRICULAR RATE: 88 BPM
EKG VENTRICULAR RATE: 96 BPM
EOSINOPHIL # BLD: 0.1 K/UL (ref 0–0.6)
EOSINOPHIL # BLD: 0.1 K/UL (ref 0–0.6)
EOSINOPHIL NFR BLD: 1 %
EOSINOPHIL NFR BLD: 2 %
EST. AVERAGE GLUCOSE BLD GHB EST-MCNC: 128.4 MG/DL
GFR SERPLBLD CREATININE-BSD FMLA CKD-EPI: >90 ML/MIN/{1.73_M2}
GFR SERPLBLD CREATININE-BSD FMLA CKD-EPI: >90 ML/MIN/{1.73_M2}
GLUCOSE SERPL-MCNC: 109 MG/DL (ref 70–99)
GLUCOSE SERPL-MCNC: 110 MG/DL (ref 70–99)
HBA1C MFR BLD: 6.1 %
HCT VFR BLD AUTO: 25.2 % (ref 36–48)
HCT VFR BLD AUTO: 27.9 % (ref 36–48)
HGB BLD-MCNC: 7.9 G/DL (ref 12–16)
HGB BLD-MCNC: 8.8 G/DL (ref 12–16)
HYPOCHROMIA BLD QL SMEAR: ABNORMAL
HYPOCHROMIA BLD QL SMEAR: ABNORMAL
LYMPHOCYTES # BLD: 1 K/UL (ref 1–5.1)
LYMPHOCYTES # BLD: 1.2 K/UL (ref 1–5.1)
LYMPHOCYTES NFR BLD: 17 %
LYMPHOCYTES NFR BLD: 22 %
MAGNESIUM SERPL-MCNC: 1.42 MG/DL (ref 1.8–2.4)
MCH RBC QN AUTO: 24.8 PG (ref 26–34)
MCH RBC QN AUTO: 25.1 PG (ref 26–34)
MCHC RBC AUTO-ENTMCNC: 31.4 G/DL (ref 31–36)
MCHC RBC AUTO-ENTMCNC: 31.5 G/DL (ref 31–36)
MCV RBC AUTO: 79 FL (ref 80–100)
MCV RBC AUTO: 79.7 FL (ref 80–100)
MICROCYTES BLD QL SMEAR: ABNORMAL
MICROCYTES BLD QL SMEAR: ABNORMAL
MONOCYTES # BLD: 0.3 K/UL (ref 0–1.3)
MONOCYTES # BLD: 0.4 K/UL (ref 0–1.3)
MONOCYTES NFR BLD: 5 %
MONOCYTES NFR BLD: 8 %
NEUTROPHILS # BLD: 3.7 K/UL (ref 1.7–7.7)
NEUTROPHILS # BLD: 4.3 K/UL (ref 1.7–7.7)
NEUTROPHILS NFR BLD: 68 %
NEUTROPHILS NFR BLD: 76 %
NUC STRESS EJECTION FRACTION: 69 %
NUC STRESS LV EDV: 64 ML (ref 56–104)
NUC STRESS LV ESV: 20 ML (ref 19–49)
NUC STRESS LV MASS: 108 G
OSMOLALITY SERPL: 280 MOSM/KG (ref 275–295)
OSMOLALITY UR: 199 MOSM/KG (ref 390–1070)
PLATELET # BLD AUTO: 265 K/UL (ref 135–450)
PLATELET # BLD AUTO: 285 K/UL (ref 135–450)
PLATELET BLD QL SMEAR: ADEQUATE
PLATELET BLD QL SMEAR: ADEQUATE
PMV BLD AUTO: 6.1 FL (ref 5–10.5)
PMV BLD AUTO: 6.1 FL (ref 5–10.5)
POLYCHROMASIA BLD QL SMEAR: ABNORMAL
POLYCHROMASIA BLD QL SMEAR: ABNORMAL
POTASSIUM SERPL-SCNC: 3.2 MMOL/L (ref 3.5–5.1)
POTASSIUM SERPL-SCNC: 3.6 MMOL/L (ref 3.5–5.1)
RBC # BLD AUTO: 3.2 M/UL (ref 4–5.2)
RBC # BLD AUTO: 3.5 M/UL (ref 4–5.2)
REASON FOR REJECTION: NORMAL
REJECTED TEST: NORMAL
SLIDE REVIEW: ABNORMAL
SLIDE REVIEW: ABNORMAL
SODIUM SERPL-SCNC: 137 MMOL/L (ref 136–145)
SODIUM SERPL-SCNC: 137 MMOL/L (ref 136–145)
STOMATOCYTES BLD QL SMEAR: ABNORMAL
STOMATOCYTES BLD QL SMEAR: ABNORMAL
STRESS BASELINE DIAS BP: 71 MMHG
STRESS BASELINE HR: 83 BPM
STRESS BASELINE SYS BP: 128 MMHG
STRESS ESTIMATED WORKLOAD: 1 METS
STRESS PEAK DIAS BP: 69 MMHG
STRESS PEAK SYS BP: 133 MMHG
STRESS PERCENT HR ACHIEVED: 62 %
STRESS POST PEAK HR: 103 BPM
STRESS RATE PRESSURE PRODUCT: NORMAL BPM*MMHG
STRESS TARGET HR: 166 BPM
TARGETS BLD QL SMEAR: ABNORMAL
TARGETS BLD QL SMEAR: ABNORMAL
TROPONIN, HIGH SENSITIVITY: 15 NG/L (ref 0–14)
TROPONIN, HIGH SENSITIVITY: 17 NG/L (ref 0–14)
WBC # BLD AUTO: 5.5 K/UL (ref 4–11)
WBC # BLD AUTO: 5.6 K/UL (ref 4–11)

## 2025-04-11 PROCEDURE — 82746 ASSAY OF FOLIC ACID SERUM: CPT

## 2025-04-11 PROCEDURE — 85025 COMPLETE CBC W/AUTO DIFF WBC: CPT

## 2025-04-11 PROCEDURE — 97530 THERAPEUTIC ACTIVITIES: CPT

## 2025-04-11 PROCEDURE — 99223 1ST HOSP IP/OBS HIGH 75: CPT | Performed by: INTERNAL MEDICINE

## 2025-04-11 PROCEDURE — 2500000003 HC RX 250 WO HCPCS: Performed by: STUDENT IN AN ORGANIZED HEALTH CARE EDUCATION/TRAINING PROGRAM

## 2025-04-11 PROCEDURE — 93010 ELECTROCARDIOGRAM REPORT: CPT | Performed by: INTERNAL MEDICINE

## 2025-04-11 PROCEDURE — 2580000003 HC RX 258: Performed by: STUDENT IN AN ORGANIZED HEALTH CARE EDUCATION/TRAINING PROGRAM

## 2025-04-11 PROCEDURE — 97165 OT EVAL LOW COMPLEX 30 MIN: CPT

## 2025-04-11 PROCEDURE — 36415 COLL VENOUS BLD VENIPUNCTURE: CPT

## 2025-04-11 PROCEDURE — 93018 CV STRESS TEST I&R ONLY: CPT | Performed by: INTERNAL MEDICINE

## 2025-04-11 PROCEDURE — 2060000000 HC ICU INTERMEDIATE R&B

## 2025-04-11 PROCEDURE — 80048 BASIC METABOLIC PNL TOTAL CA: CPT

## 2025-04-11 PROCEDURE — 97161 PT EVAL LOW COMPLEX 20 MIN: CPT

## 2025-04-11 PROCEDURE — 99255 IP/OBS CONSLTJ NEW/EST HI 80: CPT | Performed by: INTERNAL MEDICINE

## 2025-04-11 PROCEDURE — 6360000002 HC RX W HCPCS: Performed by: INTERNAL MEDICINE

## 2025-04-11 PROCEDURE — 84702 CHORIONIC GONADOTROPIN TEST: CPT

## 2025-04-11 PROCEDURE — 94761 N-INVAS EAR/PLS OXIMETRY MLT: CPT

## 2025-04-11 PROCEDURE — 2580000003 HC RX 258

## 2025-04-11 PROCEDURE — 6370000000 HC RX 637 (ALT 250 FOR IP): Performed by: STUDENT IN AN ORGANIZED HEALTH CARE EDUCATION/TRAINING PROGRAM

## 2025-04-11 PROCEDURE — 2700000000 HC OXYGEN THERAPY PER DAY

## 2025-04-11 PROCEDURE — 94640 AIRWAY INHALATION TREATMENT: CPT

## 2025-04-11 PROCEDURE — 97116 GAIT TRAINING THERAPY: CPT

## 2025-04-11 PROCEDURE — 83735 ASSAY OF MAGNESIUM: CPT

## 2025-04-11 PROCEDURE — A9502 TC99M TETROFOSMIN: HCPCS | Performed by: INTERNAL MEDICINE

## 2025-04-11 PROCEDURE — 94669 MECHANICAL CHEST WALL OSCILL: CPT

## 2025-04-11 PROCEDURE — 3430000000 HC RX DIAGNOSTIC RADIOPHARMACEUTICAL: Performed by: INTERNAL MEDICINE

## 2025-04-11 PROCEDURE — 84484 ASSAY OF TROPONIN QUANT: CPT

## 2025-04-11 PROCEDURE — 6370000000 HC RX 637 (ALT 250 FOR IP): Performed by: INTERNAL MEDICINE

## 2025-04-11 PROCEDURE — 93016 CV STRESS TEST SUPVJ ONLY: CPT | Performed by: INTERNAL MEDICINE

## 2025-04-11 PROCEDURE — 99232 SBSQ HOSP IP/OBS MODERATE 35: CPT

## 2025-04-11 PROCEDURE — 97535 SELF CARE MNGMENT TRAINING: CPT

## 2025-04-11 PROCEDURE — 2500000003 HC RX 250 WO HCPCS: Performed by: INTERNAL MEDICINE

## 2025-04-11 PROCEDURE — 70450 CT HEAD/BRAIN W/O DYE: CPT

## 2025-04-11 PROCEDURE — 78452 HT MUSCLE IMAGE SPECT MULT: CPT | Performed by: INTERNAL MEDICINE

## 2025-04-11 PROCEDURE — 82607 VITAMIN B-12: CPT

## 2025-04-11 PROCEDURE — 6360000002 HC RX W HCPCS: Performed by: STUDENT IN AN ORGANIZED HEALTH CARE EDUCATION/TRAINING PROGRAM

## 2025-04-11 PROCEDURE — 6360000002 HC RX W HCPCS

## 2025-04-11 PROCEDURE — 78452 HT MUSCLE IMAGE SPECT MULT: CPT

## 2025-04-11 PROCEDURE — 93017 CV STRESS TEST TRACING ONLY: CPT

## 2025-04-11 RX ORDER — REGADENOSON 0.08 MG/ML
0.4 INJECTION, SOLUTION INTRAVENOUS
Status: COMPLETED | OUTPATIENT
Start: 2025-04-11 | End: 2025-04-11

## 2025-04-11 RX ORDER — SODIUM CHLORIDE 9 MG/ML
INJECTION, SOLUTION INTRAVENOUS CONTINUOUS
Status: DISCONTINUED | OUTPATIENT
Start: 2025-04-11 | End: 2025-04-12

## 2025-04-11 RX ORDER — DOXYCYCLINE HYCLATE 100 MG
100 TABLET ORAL EVERY 12 HOURS SCHEDULED
Status: DISCONTINUED | OUTPATIENT
Start: 2025-04-11 | End: 2025-04-15 | Stop reason: HOSPADM

## 2025-04-11 RX ORDER — TRAZODONE HYDROCHLORIDE 50 MG/1
50 TABLET ORAL NIGHTLY PRN
Status: DISCONTINUED | OUTPATIENT
Start: 2025-04-11 | End: 2025-04-15 | Stop reason: HOSPADM

## 2025-04-11 RX ORDER — GUAIFENESIN 600 MG/1
600 TABLET, EXTENDED RELEASE ORAL 2 TIMES DAILY
Status: DISCONTINUED | OUTPATIENT
Start: 2025-04-11 | End: 2025-04-15 | Stop reason: HOSPADM

## 2025-04-11 RX ADMIN — SODIUM CHLORIDE, PRESERVATIVE FREE 10 ML: 5 INJECTION INTRAVENOUS at 09:45

## 2025-04-11 RX ADMIN — SODIUM CHLORIDE 1000 MG: 9 INJECTION, SOLUTION INTRAVENOUS at 09:44

## 2025-04-11 RX ADMIN — ASPIRIN 81 MG: 81 TABLET, CHEWABLE ORAL at 09:28

## 2025-04-11 RX ADMIN — GABAPENTIN 300 MG: 300 CAPSULE ORAL at 09:29

## 2025-04-11 RX ADMIN — AZITHROMYCIN MONOHYDRATE 500 MG: 500 INJECTION, POWDER, LYOPHILIZED, FOR SOLUTION INTRAVENOUS at 21:19

## 2025-04-11 RX ADMIN — ATORVASTATIN CALCIUM 10 MG: 10 TABLET, FILM COATED ORAL at 21:12

## 2025-04-11 RX ADMIN — MULTIVITAMIN TABLET 1 TABLET: TABLET at 09:29

## 2025-04-11 RX ADMIN — Medication 400 MG: at 09:29

## 2025-04-11 RX ADMIN — Medication 100 MG: at 09:29

## 2025-04-11 RX ADMIN — ENOXAPARIN SODIUM 30 MG: 100 INJECTION SUBCUTANEOUS at 09:28

## 2025-04-11 RX ADMIN — PANTOPRAZOLE SODIUM 40 MG: 40 TABLET, DELAYED RELEASE ORAL at 05:41

## 2025-04-11 RX ADMIN — TETROFOSMIN 10.7 MILLICURIE: 1.38 INJECTION, POWDER, LYOPHILIZED, FOR SOLUTION INTRAVENOUS at 11:56

## 2025-04-11 RX ADMIN — POTASSIUM CHLORIDE 20 MEQ: 20 TABLET, EXTENDED RELEASE ORAL at 09:29

## 2025-04-11 RX ADMIN — TETROFOSMIN 30 MILLICURIE: 1.38 INJECTION, POWDER, LYOPHILIZED, FOR SOLUTION INTRAVENOUS at 13:30

## 2025-04-11 RX ADMIN — TRAZODONE HYDROCHLORIDE 50 MG: 50 TABLET ORAL at 22:42

## 2025-04-11 RX ADMIN — SODIUM CHLORIDE, PRESERVATIVE FREE 10 ML: 5 INJECTION INTRAVENOUS at 21:12

## 2025-04-11 RX ADMIN — BUDESONIDE AND FORMOTEROL FUMARATE DIHYDRATE 2 PUFF: 160; 4.5 AEROSOL RESPIRATORY (INHALATION) at 19:08

## 2025-04-11 RX ADMIN — FOLIC ACID 1 MG: 1 TABLET ORAL at 09:29

## 2025-04-11 RX ADMIN — BUDESONIDE AND FORMOTEROL FUMARATE DIHYDRATE 2 PUFF: 160; 4.5 AEROSOL RESPIRATORY (INHALATION) at 07:15

## 2025-04-11 RX ADMIN — IPRATROPIUM BROMIDE AND ALBUTEROL SULFATE 1 DOSE: 2.5; .5 SOLUTION RESPIRATORY (INHALATION) at 07:14

## 2025-04-11 RX ADMIN — Medication 1 CAPSULE: at 09:28

## 2025-04-11 RX ADMIN — IPRATROPIUM BROMIDE AND ALBUTEROL SULFATE 1 DOSE: 2.5; .5 SOLUTION RESPIRATORY (INHALATION) at 15:37

## 2025-04-11 RX ADMIN — REGADENOSON 0.4 MG: 0.08 INJECTION, SOLUTION INTRAVENOUS at 13:30

## 2025-04-11 RX ADMIN — GUAIFENESIN 600 MG: 600 TABLET, EXTENDED RELEASE ORAL at 15:07

## 2025-04-11 RX ADMIN — SODIUM CHLORIDE: 0.9 INJECTION, SOLUTION INTRAVENOUS at 07:41

## 2025-04-11 RX ADMIN — DOXYCYCLINE HYCLATE 100 MG: 100 TABLET, COATED ORAL at 21:12

## 2025-04-11 RX ADMIN — ATENOLOL 25 MG: 25 TABLET ORAL at 09:29

## 2025-04-11 RX ADMIN — DILTIAZEM HYDROCHLORIDE 240 MG: 240 CAPSULE, COATED, EXTENDED RELEASE ORAL at 09:29

## 2025-04-11 RX ADMIN — DULOXETINE 60 MG: 60 CAPSULE, DELAYED RELEASE ORAL at 09:29

## 2025-04-11 RX ADMIN — METHYLPREDNISOLONE SODIUM SUCCINATE 40 MG: 125 INJECTION, POWDER, LYOPHILIZED, FOR SOLUTION INTRAMUSCULAR; INTRAVENOUS at 09:32

## 2025-04-11 RX ADMIN — GUAIFENESIN 600 MG: 600 TABLET, EXTENDED RELEASE ORAL at 21:12

## 2025-04-11 RX ADMIN — IPRATROPIUM BROMIDE AND ALBUTEROL SULFATE 1 DOSE: 2.5; .5 SOLUTION RESPIRATORY (INHALATION) at 19:08

## 2025-04-11 ASSESSMENT — PAIN SCALES - GENERAL: PAINLEVEL_OUTOF10: 0

## 2025-04-11 NOTE — PROGRESS NOTES
Inpatient Physical Therapy Evaluation & Treatment    Unit: PCU  Date:  4/11/2025  Patient Name:    Bonny Serrano  Admitting diagnosis:  Hypokalemia [E87.6]  Hypomagnesemia [E83.42]  Hyponatremia [E87.1]  Dyspnea and respiratory abnormalities [R06.00, R06.89]  Admit Date:  4/10/2025  Precautions/Restrictions/WB Status/ Lines/ Wounds/ Oxygen: Fall risk, Bed/chair alarm, Lines (IV, Supplemental O2 (2L), and external catheter), Telemetry, and Continuous pulse oximetry      Treatment Time:  1037- 1110  Treatment Number:  1   Timed Code Treatment Minutes: 23 minutes  Total Treatment Minutes:  33  minutes    Patient Stated Goals for Therapy: none stated          Discharge Recommendations: Home with PRN assistance and Home with initial  24 hr supervision  DME needs for discharge: Needs Met       Therapy recommendation for EMS Transport: can transport by wheelchair    Therapy recommendations for staff:   Assist of 1 for ambulation with use of No AD and gait belt to/from chair  to/from bathroom    History of Present Illness:   Per Dr Wong H&P:  Pt is a 55 y.o. female who presents with chest pain and shortness of breath.  Patient reports that she has these kind of symptoms at baseline.  Sharp chest pain is more with deep breath but happens constantly for several months.  Patient felt that the shortness of breath was worse than before uses 2 L oxygen at baseline.  Reported history of tobacco use, alcohol use and marijuana use.  Denies any nausea vomiting diarrhea constipation dizziness lightheadedness leg pain or leg swelling.  Patient does have wheezing.  To be admitted for the management of severe electrolyte deficiencies and on top of the generalized weakness with debility management.  Cardiology and pulmonology consulted     Preadmission Environment:   Pt lives with    alone  Home environment:    one story home with basement  Steps to enter first floor:   2 steps to enter and Handrail unilateral  Steps to second  stand:   CGA  Stand to sit:   CGA  Bed to/from Chair:  Not Tested   Comments: with RW and gait belt     Gait gait completed as indicated below  Distance:      50 ft  Deviations (firm surface/linoleum):  quickened alfonso and deviated path  Assistive Device Used:    No AD and gait belt  Level of Assist:    CGA   Comment: Pt demonstrates decreased safety awareness with decreased awareness for O2 line management.    Stair Training deferred, pt unsafe/ not appropriate to complete stairs at this time    Therapeutic Exercises Initiated  deferred secondary to treatment focus on functional mobility    Positioning Needs   Pt in bed and alarm set  Call light provided and all needs within reach  RN aware of pt position/status    Other Activities  none    Patient/Family Education   Pt educated on role of inpatient PT, POC, importance of continued activity, DC recommendations, functional transfer/mobility safety, transfer techniques, calling for assist with mobility, and oxygen tubing line management.    Assessment  Pt seen today for physical therapy Evaluation & Treatment. Pt demonstrated decreased Activity tolerance, Balance, Safety, and Strength as well as decreased independence with Ambulation, Bed Mobility , and Transfers. Pt is a 54 y/o female who presents close to her functional baseline. Pt with decreased safety awareness and decreased insight into deficits. Pt requires CGA for ambulation due to impulsivity. Pt would continue to benefit from skilled PT services to promote increased strength, balance and functional activity tolerance. Recommend continued skilled PT services upon discharge.       Recommending Home initial 24 hr supervision and PRN assist upon discharge as patient functioning close to baseline level    Goals :   To be met in 3 visits:  1). Independent with LE Ex x 10 reps  2). Sit to/from stand: Independent  3). Bed to chair: Independent    To be met in 6 visits:  1).  Supine to/from sit: Independent  2).

## 2025-04-11 NOTE — PROGRESS NOTES
04/11/25 0720   RT Protocol   History Pulmonary Disease 2   Respiratory pattern 0   Breath sounds 6   Cough 1   Indications for Bronchodilator Therapy On home bronchodilators   Bronchodilator Assessment Score 9

## 2025-04-11 NOTE — PROGRESS NOTES
4 Eyes Skin Assessment     NAME:  Bonny Serrano  YOB: 1970  MEDICAL RECORD NUMBER:  [id number]    The patient is being assessed for  Admission    I agree that at least one RN has performed a thorough Head to Toe Skin Assessment on the patient. ALL assessment sites listed below have been assessed.      Areas assessed by both nurses:    Head, Face, Ears, Shoulders, Back, Chest, Arms, Elbows, Hands, Sacrum. Buttock, Coccyx, Ischium, Legs. Feet and Heels, and Under Medical Devices         Scab left shin, blanchable redness bilateral knee and bilateral heel.     Does the Patient have a Wound? No noted wound(s)       Milton Prevention initiated by RN: No  Wound Care Orders initiated by RN: No    Pressure Injury (Stage 3,4, Unstageable, DTI, NWPT, and Complex wounds) if present, place Wound referral order by RN under : No    New Ostomies, if present place, Ostomy referral order under : No     Nurse 1 eSignature: Electronically signed by Kim Dial RN on 4/10/25 at 10:51 PM EDT    **SHARE this note so that the co-signing nurse can place an eSignature**    Nurse 2 eSignature: Electronically signed by Pushpa Castillo RN on 4/10/25 at 10:59 PM EDT

## 2025-04-11 NOTE — PROGRESS NOTES
Transferred care to SARAH Wolfe. Face to face bedside report given, no need voiced at this time. Pt awake in bed.   No signs of distress noted.  Call light within reach.   Denies needs.

## 2025-04-11 NOTE — NURSING NOTE
A Lexiscan stress test was completed on this patient as ordered. The patient tolerated the procedure well.  Awaiting stress imaging at this time.

## 2025-04-11 NOTE — PROGRESS NOTES
RT Inhaler-Nebulizer Bronchodilator Protocol Note    There is a bronchodilator order in the chart from a provider indicating to follow the RT Bronchodilator Protocol and there is an “Initiate RT Inhaler-Nebulizer Bronchodilator Protocol” order as well (see protocol at bottom of note).    CXR Findings:  XR CHEST PORTABLE  Result Date: 4/10/2025  1. No acute cardiopulmonary process. 2. Right apical pleuroparenchymal scarring with calcified right hilar nodes.       The findings from the last RT Protocol Assessment were as follows:   History Pulmonary Disease: Chronic pulmonary disease  Respiratory Pattern: Dyspnea on exertion or RR 21-25 bpm  Breath Sounds: Intermittent or unilateral wheezes  Cough: Strong, spontaneous, non-productive  Indication for Bronchodilator Therapy: Wheezing associated with pulm disorder  Bronchodilator Assessment Score: 8    Aerosolized bronchodilator medication orders have been revised according to the RT Inhaler-Nebulizer Bronchodilator Protocol below.    Respiratory Therapist to perform RT Therapy Protocol Assessment initially then follow the protocol.  Repeat RT Therapy Protocol Assessment PRN for score 0-3 or on second treatment, BID, and PRN for scores above 3.    No Indications - adjust the frequency to every 6 hours PRN wheezing or bronchospasm, if no treatments needed after 48 hours then discontinue using Per Protocol order mode.     If indication present, adjust the RT bronchodilator orders based on the Bronchodilator Assessment Score as indicated below.  Use Inhaler orders unless patient has one or more of the following: on home nebulizer, not able to hold breath for 10 seconds, is not alert and oriented, cannot activate and use MDI correctly, or respiratory rate 25 breaths per minute or more, then use the equivalent nebulizer order(s) with same Frequency and PRN reasons based on the score.  If a patient is on this medication at home then do not decrease Frequency below that used at

## 2025-04-11 NOTE — PROGRESS NOTES
Shift assessment complete, morning medications given, patient resting with no complaints of pain, patient complains of hunger but is NPO for possible stress testing later this morning, ice chips provided, will continue to monitor.  Yaneth Matta RN

## 2025-04-11 NOTE — PROGRESS NOTES
Patient continues to be in stress lab for testing.  No calls placed for any complications at this time. Yaneth Matta RN

## 2025-04-11 NOTE — DISCHARGE INSTR - COC
Continuity of Care Form    Patient Name: Bonny Serrano   :  1970  MRN:  [id number]    Admit date:  4/10/2025  Discharge date:  4/15/2025    Code Status Order: Full Code   Advance Directives:     Admitting Physician:  Timmy Lim MD  PCP: Brenda Carrillo APRN - CNP    Discharging Nurse: SARAH Kenny  Discharging Hospital Unit/Room#: /0304-01  Discharging Unit Phone Number: [phone]    Emergency Contact:   Extended Emergency Contact Information  Primary Emergency Contact: TAYLER BEDOLLA  Home Phone: [phone]  Mobile Phone: [phone]  Relation: Child  Secondary Emergency Contact: Tate Serrano  Home Phone: [phone]  Relation: Brother/Sister    Past Surgical History:  Past Surgical History:   Procedure Laterality Date    HIP SURGERY Right 2024    RIGHT FEMUR GAMMA NAILING performed by Simba Man MD at [facility] OR    UPPER GASTROINTESTINAL ENDOSCOPY         Immunization History:   Immunization History   Administered Date(s) Administered    COVID-19, MODERNA BLUE border, Primary or Immunocompromised, (age 12y+), IM, 100 mcg/0.5mL 2021, 09/10/2021    Influenza Vaccine, unspecified formulation 2017, 09/10/2018    Influenza Virus Vaccine 2017, 09/10/2018, 2019    Influenza, AFLURIA (age 3 y+), FLUZONE, (age 6 mo+), Quadv MDV, 0.5mL 2019    Influenza, FLUARIX, FLULAVAL, FLUZONE (age 6 mo+) and AFLURIA, (age 3 y+), Quadv PF, 0.5mL 09/10/2018    Influenza, FLUBLOK, (age 18 y+), Quadv PF, 0.5mL 10/13/2021    Pneumococcal, PPSV23, PNEUMOVAX 23, (age 2y+), SC/IM, 0.5mL 2014    TDaP, ADACEL (age 10y-64y), BOOSTRIX (age 10y+), IM, 0.5mL 2015, 2024    Zoster Recombinant (Shingrix) 2022, 2022       Active Problems:  Patient Active Problem List   Diagnosis Code    Alcoholism (HCC) F10.20    Lung collapse J98.19    Macrocytic anemia D53.9    Paroxysmal atrial fibrillation (HCC) I48.0    Pulmonary embolism I26.99    Tobacco abuse Z72.0    Chronic

## 2025-04-11 NOTE — DISCHARGE INSTRUCTIONS
[employer] MONITOR PATCH PATIENT INSTRUCTIONS    Remove Patch in 1 week from application.  Recalibrate and apply next patch.  Call  [employer] CUSTOMER SUPPORT: [phone] and they will assist if needed.  Wear for two weeks total.      o PIN: [id number]    o Keep the phone with you as much as possible. Be sure to charge the phone overnight and when the battery gets low. If the phone dies completely, be sure to restart the phone and enter the PIN number to confirm the patch is connected to your phone.    o If you are away from the phone for more than 8 hours, please stay within a 30 foot range of the phone for 3 hours to ensure your data fully uploads.    o If you go somewhere with no cellphone service, still keep the phone with you and charged. Your data will upload when you reach cellphone service. You can stay outside of cellphone range for up to 10 days.    o This phone cannot make calls, take pictures, etc. It is solely for medical use and will only Bluetooth connect to your patch.    · Patch Instructions:    o Wait to exercise or shower for 8 hours after application.    o Shower with the water stream to your back and avoid putting the patch directly under the water stream.    o Do not bathe, swim or fully submerge the patch.    o If the patch starts to lift off after showering or sweating, dry the patch with a towel and allow the adhesive to dry. It should re-adhere to your skin. If it continues to lift, use the adhesive overlay to re-adhere the patch. Video instructions for the adhesive overlay are on the phone under the Menu Tab in the right hand corner - “Help- [employer] Adhesive Overlay”    o One patch lasts for up to 7 days. If you need to wear an additional patch or replace a patch, utilize the included application instructions or the video instructions - “Menu - Help - [employer] Application”    · End of Wear - Returning the Phone    o You are responsible for returning the phone. You will be

## 2025-04-11 NOTE — PROGRESS NOTES
Progress Note    Admit Date:  4/10/2025    Presented with Chest pressure, SOB, and fatigue  Admitted for electrolyte abnormalities and chest pain     Subjective:  Ms. Serrano is resting in bed.   She reports an episode of chest pain yesterday that she thinks may be from anxiety or panic attacks.  She also reports an episode of possible syncope a few days ago while standing in her kitchen. She thinks she \"fell asleep\" while standing. She hit her head. She is denying vision changes and headache. She does not feel dizzy currently but has been the past few days.   She is on 2L- denying current dyspnea and cp. She is coughing up clear sputum.  She denies urinary symptoms.   She denies sign or symptoms of withdrawal.     Objective:   BP (!) 151/79   Pulse (!) 104   Temp 97.3 °F (36.3 °C) (Oral)   Resp 22   Ht 1.549 m (5' 1\")   Wt 38 kg (83 lb 12.8 oz)   LMP 07/12/2015   SpO2 94%   BMI 15.83 kg/m²        Intake/Output Summary (Last 24 hours) at 4/11/2025 0805  Last data filed at 4/11/2025 0600  Gross per 24 hour   Intake --   Output 900 ml   Net -900 ml       Physical Exam:   Gen: No distress. Alert. Chronically ill appearing.   Eyes: PERRL. No sclera icterus. No conjunctival injection.   ENT: No discharge. Pharynx clear.   Neck: No JVD.  No Carotid Bruit. Trachea midline.  Resp: No accessory muscle use. No crackles. No rhonchi. Distant breath sounds.   CV: Regular rate. Regular rhythm. No murmur.  No rub. No edema.   Capillary Refill: Brisk,< 3 seconds   Peripheral Pulses: +2 palpable, equal bilaterally   GI: Non-tender. Non-distended. No masses. No organomegaly. Normal bowel sounds. No hernia.   Skin: Warm and dry. No nodule on exposed extremities. No rash on exposed extremities.   M/S: No cyanosis. No joint deformity. No clubbing.   Neuro: Awake. Grossly nonfocal    Psych: Oriented x 3. No anxiety or agitation.         Medications:  atenolol, 25 mg, Daily  atorvastatin, 10 mg, Nightly  dilTIAZem, 240 mg,

## 2025-04-11 NOTE — PROGRESS NOTES
Shift report given to nurse Freedman at bedside. Patient care handed off in stable condition at this time. Yaneth Matta RN

## 2025-04-11 NOTE — PROGRESS NOTES
Patient admitted to room 304 from ED. Patient is alert and oriented X4 with  Telemetry box in place, patient aware of placement and reason. With oxygen at 2 lpm via nasal cannula. Patient instructed about the schedule of the day including: vital sign frequency, lab draws, possible tests, frequency of MD and staff rounds, daily weights, I &O's and prescribed diet. Bed locked, in lowest position, side rails up 2/4, padded, call light within reach.  Care plans and education updated, CHG wipes done at time of admission.     /64   Pulse 83   Temp 97.2 °F (36.2 °C) (Oral)   Resp 22   Wt 38 kg (83 lb 12.8 oz)   LMP 07/12/2015   SpO2 95%   BMI 15.83 kg/m²     Most recent set of vitals as shown.     Patient has no LDA that  require CHG wipes, including possible a surgery incision, ashton catheter, or a central line.         Bedside Mobility Assessment Tool (BMAT):     Assessment Level 1- Sit and Shake    1. From a semi-reclined position, ask patient to sit up and rotate to a seated position at the side of the bed. Can use the bedrail.    2. Ask patient to reach out and grab your hand and shake making sure patient reaches across his/her midline.   Pass- Patient is able to come to a seated position, maintain core strength. Maintains seated balance while reaching across midline. Move on to Assessment Level 2.     Assessment Level 2- Stretch and Point   1. With patient in seated position at the side of the bed, have patient place both feet on the floor (or stool) with knees no higher than hips.    2. Ask patient to stretch one leg and straighten the knee, then bend the ankle/flex and point the toes. If appropriate, repeat with the other leg.   Pass- Patient is able to demonstrate appropriate quad strength on intended weight bearing limb(s). Move onto Assessment Level 3.     Assessment Level 3- Stand   1. Ask patient to elevate off the bed or chair (seated to standing) using an assistive device (cane, bedrail).    2.

## 2025-04-11 NOTE — PLAN OF CARE
Problem: Discharge Planning  Goal: Discharge to home or other facility with appropriate resources  Outcome: Progressing  Flowsheets (Taken 4/10/2025 2221)  Discharge to home or other facility with appropriate resources: Identify barriers to discharge with patient and caregiver     Problem: Safety - Adult  Goal: Free from fall injury  Outcome: Progressing  Flowsheets (Taken 4/10/2025 2155)  Free From Fall Injury: Instruct family/caregiver on patient safety     Problem: Respiratory - Adult  Goal: Achieves optimal ventilation and oxygenation  Outcome: Progressing  Flowsheets (Taken 4/10/2025 2100)  Achieves optimal ventilation and oxygenation:   Assess for changes in respiratory status   Assess for changes in mentation and behavior   Position to facilitate oxygenation and minimize respiratory effort   Oxygen supplementation based on oxygen saturation or arterial blood gases   Assess and instruct to report shortness of breath or any respiratory difficulty     Problem: Cardiovascular - Adult  Goal: Maintains optimal cardiac output and hemodynamic stability  Outcome: Progressing  Flowsheets (Taken 4/10/2025 2100)  Maintains optimal cardiac output and hemodynamic stability:   Monitor blood pressure and heart rate   Monitor urine output and notify Licensed Independent Practitioner for values outside of normal range  Goal: Absence of cardiac dysrhythmias or at baseline  Outcome: Progressing  Flowsheets (Taken 4/10/2025 2100)  Absence of cardiac dysrhythmias or at baseline: Monitor cardiac rate and rhythm

## 2025-04-11 NOTE — CONSULTS
CARDIOLOGY CONSULTATION        Patient Name: Bonny Serrano  Date of admission: 4/10/2025  2:35 PM  Admission Dx: Hypokalemia [E87.6]  Hypomagnesemia [E83.42]  Hyponatremia [E87.1]  Dyspnea and respiratory abnormalities [R06.00, R06.89]  Requesting Physician: Timmy Lim MD  Primary Care physician: Brenda Carrillo, PEEWEE - CNP    Reason for Consultation/Chief Complaint: Weakness/atypical chest pain/syncope    History of Present Illness:     Bonny Serrano is a 55 y.o. patient with a prior medical history notable for hyperlipidemia, hypertension, coronary artery disease by CT, COPD with chronic hypoxic respiratory failure 2L, cavitary lung disease, history of PE, possible atrial fibrillation, alcohol use disorder who presented to the hospital with complaints of weakness and chest discomfort.    Prior she was seeing Dr. Martin. Possible atrial fibrillation documented. She notes diagnosed at [facility]. Per OSH cardio notes last 2020, plan was to continue ASA and no AC. On review of 2015 EKGs from [facility] show NSR.    ED course/Vital signs on presentation: Initially noted with low-normal blood pressure.  Blood pressure however moderately elevated 150 systolic overnight and she is tachycardic on last vitals.  EKG showed sinus rhythm, T wave inversions anterior leads, poor R wave progression that is likely due to lead malposition compared with her first EKG which was normal.  High-sensitivity troponin 17/17/15/15.  Sodium level 126 and appears chronically low.  CBC reviewed.  Chest x-ray showed no acute cardiopulmonary process.     Patient evaluated today. Difficult to focus for history. She notes yesterday she had an episode. She was having chest pain on couch prior to going to bathroom. She notes pressure anterior chest. No radiation. Nauseous/diaphoretic with heat intolerance. \"Having a hard time with her breathing\" and felt like she had a panic attack. This occurred yesterday AM in the bathroom. Alyssa

## 2025-04-11 NOTE — FLOWSHEET NOTE
04/11/25 1445   Vital Signs   Temp 97.3 °F (36.3 °C)   Temp Source Oral   Pulse 83   Heart Rate Source Monitor   Respirations 18   /71   MAP (Calculated) 90   BP Location Left upper arm   BP Method Automatic   Patient Position Turns self;Sitting   Pain Assessment   Pain Assessment None - Denies Pain   Pain Level 0   Oxygen Therapy   SpO2 97 %   Pulse Oximetry Type Continuous   Pulse Oximeter Device Mode Continuous   Pulse Oximeter Device Location Finger   O2 Device Nasal cannula   O2 Flow Rate (L/min) 2 L/min     RN released diet and waiting for case management to offer housing options for discharge.  No additional complaints at this time. Yaneth Matta, RN

## 2025-04-11 NOTE — CONSULTS
Pulmonary & Critical Care Consultation Note    Patient is being seen at the request of Luna Wong MD  for a consultation for consideration for bronchoscopy    HISTORY OF PRESENT ILLNESS:   55 years old with history of COPD presented with shortness of breath for few days.  Cough with clear sputum.  Wheezes.  No hemoptysis.  Continues to smoke 1 pack/day.  No alcohol for 2 days, normally 6 packs of beers daily.  Smokes marijuana occasionally.  Currently denies any chest pain.  Normally uses 2 L at home.    PAST MEDICAL HISTORY:  Past Medical History:   Diagnosis Date    A-fib (HCC)     Anemia     Anxiety     Chronic pancreatitis (HCC)     Collapse of right lung     COPD (chronic obstructive pulmonary disease) (HCC)     Depression     GI bleed     H/O colonoscopy     Hypertension     Pancreatitis     Pulmonary embolism (HCC) 02/2018     PAST SURGICAL HISTORY:  Past Surgical History:   Procedure Laterality Date    HIP SURGERY Right 7/12/2024    RIGHT FEMUR GAMMA NAILING performed by Simba Man MD at [facility] OR    UPPER GASTROINTESTINAL ENDOSCOPY         FAMILY HISTORY:  family history includes Diabetes in her maternal grandfather.    SOCIAL HISTORY:   reports that she has been smoking cigarettes. She started smoking about 29 years ago. She has a 25 pack-year smoking history. She has been exposed to tobacco smoke. She has never used smokeless tobacco.    Scheduled Meds:   atenolol  25 mg Oral Daily    atorvastatin  10 mg Oral Nightly    dilTIAZem  240 mg Oral Daily    DULoxetine  60 mg Oral Daily    folic acid  1 mg Oral Daily    gabapentin  300 mg Oral Daily    lactobacillus  1 capsule Oral Daily    multivitamin  1 tablet Oral Daily    pantoprazole  40 mg Oral QAM AC    thiamine  100 mg Oral Daily    sodium chloride flush  5-40 mL IntraVENous 2 times per day    enoxaparin  30 mg SubCUTAneous Daily    potassium chloride  20 mEq Oral Daily with breakfast    magnesium oxide  400 mg Oral Daily    azithromycin  500 mg

## 2025-04-11 NOTE — PROGRESS NOTES
04/11/25 1900   RT Protocol   History Pulmonary Disease 2   Respiratory pattern 0   Breath sounds 4   Cough 1   Indications for Bronchodilator Therapy On home bronchodilators   Bronchodilator Assessment Score 7     RT Inhaler-Nebulizer Bronchodilator Protocol Note    There is a bronchodilator order in the chart from a provider indicating to follow the RT Bronchodilator Protocol and there is an “Initiate RT Inhaler-Nebulizer Bronchodilator Protocol” order as well (see protocol at bottom of note).    CXR Findings:  XR CHEST PORTABLE  Result Date: 4/10/2025  1. No acute cardiopulmonary process. 2. Right apical pleuroparenchymal scarring with calcified right hilar nodes.       The findings from the last RT Protocol Assessment were as follows:   History Pulmonary Disease: Chronic pulmonary disease  Respiratory Pattern: Regular pattern and RR 12-20 bpm  Breath Sounds: Intermittent or unilateral wheezes  Cough: Strong, productive  Indication for Bronchodilator Therapy: On home bronchodilators  Bronchodilator Assessment Score: 7    Aerosolized bronchodilator medication orders have been revised according to the RT Inhaler-Nebulizer Bronchodilator Protocol below.    Respiratory Therapist to perform RT Therapy Protocol Assessment initially then follow the protocol.  Repeat RT Therapy Protocol Assessment PRN for score 0-3 or on second treatment, BID, and PRN for scores above 3.    No Indications - adjust the frequency to every 6 hours PRN wheezing or bronchospasm, if no treatments needed after 48 hours then discontinue using Per Protocol order mode.     If indication present, adjust the RT bronchodilator orders based on the Bronchodilator Assessment Score as indicated below.  Use Inhaler orders unless patient has one or more of the following: on home nebulizer, not able to hold breath for 10 seconds, is not alert and oriented, cannot activate and use MDI correctly, or respiratory rate 25 breaths per minute or more, then use

## 2025-04-11 NOTE — NURSING NOTE
Pt educated on cardiac stress testing. Pt verbalizes understanding to cardiac stress testing. Questions and concerns addressed. Pt is agreeable to proceed with stress testing.    vss afeb  neuro intact  Leg pain resolved  Pt ot  Dc home fu 2 weeks

## 2025-04-11 NOTE — PROGRESS NOTES
Consult has been called to Cardio Dr. Martinez,  left with HIPAA compliance.   Consult for Pulmonology called.

## 2025-04-11 NOTE — PROGRESS NOTES
Inpatient Occupational Therapy Evaluation & Treatment    Unit: PCU  Date:  4/11/2025  Patient Name:    Bonny Serrano  Admitting diagnosis:  Hypokalemia [E87.6]  Hypomagnesemia [E83.42]  Hyponatremia [E87.1]  Dyspnea and respiratory abnormalities [R06.00, R06.89]  Admit Date:  4/10/2025  Precautions/Restrictions/WB Status/ Lines/ Wounds/ Oxygen: Fall risk, Bed/chair alarm, Lines (IV, Supplemental O2 (2L), and external catheter), Telemetry, and Continuous pulse oximetry    Treatment Time:  1559-1633  Treatment Number:  1  Timed Code Treatment Minutes: 24 minutes  Total Treatment Minutes:  34  minutes    Patient Goals for Therapy: none stated          Discharge Recommendations: Home with initial 24/7 supervision; per discussion with RN and CM, pt will be discharged to [place] secondary to recent homelessness  DME needs for discharge: Defer to facility       Therapy recommendations for staff:   Stand by assist for ambulation with use of No AD and gait belt to/from chair  to/from bathroom    History of Present Illness: Per Dr Wong H&P:  Pt is a 55 y.o. female who presents with chest pain and shortness of breath.  Patient reports that she has these kind of symptoms at baseline.  Sharp chest pain is more with deep breath but happens constantly for several months.  Patient felt that the shortness of breath was worse than before uses 2 L oxygen at baseline.  Reported history of tobacco use, alcohol use and marijuana use.  Denies any nausea vomiting diarrhea constipation dizziness lightheadedness leg pain or leg swelling.  Patient does have wheezing.  To be admitted for the management of severe electrolyte deficiencies and on top of the generalized weakness with debility management.  Cardiology and pulmonology consulted     Per discussion with RN, pt had abnormal stress test results and therefore will likely be transferred to [place]     Preadmission Environment:   Per discussion with RN, pt was renting and the house

## 2025-04-11 NOTE — CARE COORDINATION
Case Management Assessment  Initial Evaluation    Date/Time of Evaluation: 4/11/2025 1:31 PM  Assessment Completed by: JEVON Reveles    If patient is discharged prior to next notation, then this note serves as note for discharge by case management.    Patient Name: Bonny Serrano                   YOB: 1970  Diagnosis: Hypokalemia [E87.6]  Hypomagnesemia [E83.42]  Hyponatremia [E87.1]  Dyspnea and respiratory abnormalities [R06.00, R06.89]                   Date / Time: 4/10/2025  2:35 PM    Patient Admission Status: Inpatient   Readmission Risk (Low < 19, Mod (19-27), High > 27): Readmission Risk Score: 38.8    Current PCP: Brenda Carrillo APRN - CNP  PCP verified by CM? Yes    Chart Reviewed: Yes      History Provided by: Patient, Medical Record  Patient Orientation: Alert and Oriented    Patient Cognition: Alert    Hospitalization in the last 30 days (Readmission):  Yes    If yes, Readmission Assessment in CM Navigator will be completed.    Advance Directives:      Code Status: Full Code   Patient's Primary Decision Maker is: Legal Next of Kin    Primary Decision Maker: TAYLER BEDOLLA - [place] - [phone]    Secondary Decision Maker: ZachTate - Brother/Sister - [phone]    Discharge Planning:    Patient lives with: Other (Comment) (Homeless) Type of Home: Other (Comment) (Has been living in a rented house, but the house has been sold.)  Primary Care Giver: Self  Patient Support Systems include: Children, Family Members, Friends/Neighbors   Current Financial resources: Medicaid  Current community resources: None  Current services prior to admission: Durable Medical Equipment, Oxygen Therapy            Current DME: Walker, Oxygen Therapy (Comment) (2L O2 via [employer].)            Type of Home Care services:  None    ADLS  Prior functional level: Independent in ADLs/IADLs  Current functional level: Independent in ADLs/IADLs    PT AM-PAC: 20 /24  OT AM-PAC:   /24    Family can provide assistance at

## 2025-04-12 PROBLEM — D64.9 ANEMIA: Status: ACTIVE | Noted: 2025-04-12

## 2025-04-12 PROBLEM — R94.39 ABNORMAL STRESS TEST: Status: ACTIVE | Noted: 2025-04-12

## 2025-04-12 PROBLEM — R55 SYNCOPE AND COLLAPSE: Status: ACTIVE | Noted: 2025-04-12

## 2025-04-12 PROBLEM — R07.89 ATYPICAL CHEST PAIN: Status: ACTIVE | Noted: 2025-04-11

## 2025-04-12 PROBLEM — I10 PRIMARY HYPERTENSION: Status: ACTIVE | Noted: 2025-04-12

## 2025-04-12 LAB
ABO/RH: NORMAL
ANION GAP SERPL CALCULATED.3IONS-SCNC: 9 MMOL/L (ref 3–16)
ANTIBODY SCREEN: NORMAL
BACTERIA UR CULT: ABNORMAL
BACTERIA UR CULT: ABNORMAL
BASOPHILS # BLD: 0 K/UL (ref 0–0.2)
BASOPHILS NFR BLD: 0.2 %
BLOOD BANK DISPENSE STATUS: NORMAL
BLOOD BANK PRODUCT CODE: NORMAL
BPU ID: NORMAL
BUN SERPL-MCNC: 5 MG/DL (ref 7–20)
CALCIUM SERPL-MCNC: 8.1 MG/DL (ref 8.3–10.6)
CHLORIDE SERPL-SCNC: 95 MMOL/L (ref 99–110)
CO2 SERPL-SCNC: 31 MMOL/L (ref 21–32)
CREAT SERPL-MCNC: <0.2 MG/DL (ref 0.6–1.1)
DEPRECATED RDW RBC AUTO: 19.5 % (ref 12.4–15.4)
DESCRIPTION BLOOD BANK: NORMAL
EOSINOPHIL # BLD: 0 K/UL (ref 0–0.6)
EOSINOPHIL NFR BLD: 0.1 %
FERRITIN SERPL IA-MCNC: 9.9 NG/ML (ref 15–150)
FOLATE SERPL-MCNC: 17.1 NG/ML (ref 4.78–24.2)
GFR SERPLBLD CREATININE-BSD FMLA CKD-EPI: >90 ML/MIN/{1.73_M2}
GLUCOSE SERPL-MCNC: 151 MG/DL (ref 70–99)
HCT VFR BLD AUTO: 21.9 % (ref 36–48)
HCT VFR BLD AUTO: 31.5 % (ref 36–48)
HEMOCCULT STL QL: ABNORMAL
HGB BLD-MCNC: 10 G/DL (ref 12–16)
HGB BLD-MCNC: 6.8 G/DL (ref 12–16)
IRON SATN MFR SERPL: 3 % (ref 15–50)
IRON SERPL-MCNC: 11 UG/DL (ref 37–145)
LYMPHOCYTES # BLD: 0.7 K/UL (ref 1–5.1)
LYMPHOCYTES NFR BLD: 22.1 %
MCH RBC QN AUTO: 24.5 PG (ref 26–34)
MCHC RBC AUTO-ENTMCNC: 31 G/DL (ref 31–36)
MCV RBC AUTO: 79.2 FL (ref 80–100)
MONOCYTES # BLD: 0.3 K/UL (ref 0–1.3)
MONOCYTES NFR BLD: 10.6 %
NEUTROPHILS # BLD: 2.1 K/UL (ref 1.7–7.7)
NEUTROPHILS NFR BLD: 67 %
ORGANISM: ABNORMAL
PLATELET # BLD AUTO: 203 K/UL (ref 135–450)
PMV BLD AUTO: 5.9 FL (ref 5–10.5)
POTASSIUM SERPL-SCNC: 3.8 MMOL/L (ref 3.5–5.1)
PROCALCITONIN SERPL IA-MCNC: <0.02 NG/ML (ref 0–0.15)
RBC # BLD AUTO: 2.76 M/UL (ref 4–5.2)
SODIUM SERPL-SCNC: 135 MMOL/L (ref 136–145)
TIBC SERPL-MCNC: 366 UG/DL (ref 260–445)
TROPONIN, HIGH SENSITIVITY: 7 NG/L (ref 0–14)
TROPONIN, HIGH SENSITIVITY: 8 NG/L (ref 0–14)
TROPONIN, HIGH SENSITIVITY: 9 NG/L (ref 0–14)
VIT B12 SERPL-MCNC: 411 PG/ML (ref 211–911)
WBC # BLD AUTO: 3.2 K/UL (ref 4–11)

## 2025-04-12 PROCEDURE — 2060000000 HC ICU INTERMEDIATE R&B

## 2025-04-12 PROCEDURE — 6370000000 HC RX 637 (ALT 250 FOR IP): Performed by: INTERNAL MEDICINE

## 2025-04-12 PROCEDURE — 85014 HEMATOCRIT: CPT

## 2025-04-12 PROCEDURE — 82728 ASSAY OF FERRITIN: CPT

## 2025-04-12 PROCEDURE — 85018 HEMOGLOBIN: CPT

## 2025-04-12 PROCEDURE — 2580000003 HC RX 258: Performed by: INTERNAL MEDICINE

## 2025-04-12 PROCEDURE — 84145 PROCALCITONIN (PCT): CPT

## 2025-04-12 PROCEDURE — P9016 RBC LEUKOCYTES REDUCED: HCPCS

## 2025-04-12 PROCEDURE — 94761 N-INVAS EAR/PLS OXIMETRY MLT: CPT

## 2025-04-12 PROCEDURE — 86901 BLOOD TYPING SEROLOGIC RH(D): CPT

## 2025-04-12 PROCEDURE — 6360000002 HC RX W HCPCS: Performed by: STUDENT IN AN ORGANIZED HEALTH CARE EDUCATION/TRAINING PROGRAM

## 2025-04-12 PROCEDURE — 99233 SBSQ HOSP IP/OBS HIGH 50: CPT | Performed by: INTERNAL MEDICINE

## 2025-04-12 PROCEDURE — 30233N1 TRANSFUSION OF NONAUTOLOGOUS RED BLOOD CELLS INTO PERIPHERAL VEIN, PERCUTANEOUS APPROACH: ICD-10-PCS

## 2025-04-12 PROCEDURE — 2500000003 HC RX 250 WO HCPCS: Performed by: INTERNAL MEDICINE

## 2025-04-12 PROCEDURE — 86850 RBC ANTIBODY SCREEN: CPT

## 2025-04-12 PROCEDURE — 80048 BASIC METABOLIC PNL TOTAL CA: CPT

## 2025-04-12 PROCEDURE — 6360000002 HC RX W HCPCS: Performed by: INTERNAL MEDICINE

## 2025-04-12 PROCEDURE — 36415 COLL VENOUS BLD VENIPUNCTURE: CPT

## 2025-04-12 PROCEDURE — 94640 AIRWAY INHALATION TREATMENT: CPT

## 2025-04-12 PROCEDURE — 83540 ASSAY OF IRON: CPT

## 2025-04-12 PROCEDURE — 6360000002 HC RX W HCPCS

## 2025-04-12 PROCEDURE — 2500000003 HC RX 250 WO HCPCS: Performed by: STUDENT IN AN ORGANIZED HEALTH CARE EDUCATION/TRAINING PROGRAM

## 2025-04-12 PROCEDURE — 2700000000 HC OXYGEN THERAPY PER DAY

## 2025-04-12 PROCEDURE — 86923 COMPATIBILITY TEST ELECTRIC: CPT

## 2025-04-12 PROCEDURE — 6370000000 HC RX 637 (ALT 250 FOR IP): Performed by: STUDENT IN AN ORGANIZED HEALTH CARE EDUCATION/TRAINING PROGRAM

## 2025-04-12 PROCEDURE — 86900 BLOOD TYPING SEROLOGIC ABO: CPT

## 2025-04-12 PROCEDURE — 94669 MECHANICAL CHEST WALL OSCILL: CPT

## 2025-04-12 PROCEDURE — 2580000003 HC RX 258

## 2025-04-12 PROCEDURE — 85025 COMPLETE CBC W/AUTO DIFF WBC: CPT

## 2025-04-12 PROCEDURE — 36430 TRANSFUSION BLD/BLD COMPNT: CPT

## 2025-04-12 PROCEDURE — 30233N1 TRANSFUSION OF NONAUTOLOGOUS RED BLOOD CELLS INTO PERIPHERAL VEIN, PERCUTANEOUS APPROACH: ICD-10-PCS | Performed by: INTERNAL MEDICINE

## 2025-04-12 PROCEDURE — 82270 OCCULT BLOOD FECES: CPT

## 2025-04-12 PROCEDURE — 84484 ASSAY OF TROPONIN QUANT: CPT

## 2025-04-12 PROCEDURE — 83550 IRON BINDING TEST: CPT

## 2025-04-12 RX ORDER — PREDNISONE 20 MG/1
40 TABLET ORAL DAILY
Status: DISCONTINUED | OUTPATIENT
Start: 2025-04-12 | End: 2025-04-15 | Stop reason: HOSPADM

## 2025-04-12 RX ORDER — SODIUM CHLORIDE 9 MG/ML
INJECTION, SOLUTION INTRAVENOUS PRN
Status: DISCONTINUED | OUTPATIENT
Start: 2025-04-12 | End: 2025-04-15 | Stop reason: HOSPADM

## 2025-04-12 RX ADMIN — ATENOLOL 25 MG: 25 TABLET ORAL at 08:07

## 2025-04-12 RX ADMIN — ASPIRIN 81 MG: 81 TABLET, CHEWABLE ORAL at 08:08

## 2025-04-12 RX ADMIN — SODIUM CHLORIDE, PRESERVATIVE FREE 10 ML: 5 INJECTION INTRAVENOUS at 08:20

## 2025-04-12 RX ADMIN — GABAPENTIN 300 MG: 300 CAPSULE ORAL at 08:08

## 2025-04-12 RX ADMIN — IPRATROPIUM BROMIDE AND ALBUTEROL SULFATE 1 DOSE: 2.5; .5 SOLUTION RESPIRATORY (INHALATION) at 19:42

## 2025-04-12 RX ADMIN — DULOXETINE 60 MG: 60 CAPSULE, DELAYED RELEASE ORAL at 08:08

## 2025-04-12 RX ADMIN — GUAIFENESIN 600 MG: 600 TABLET, EXTENDED RELEASE ORAL at 08:07

## 2025-04-12 RX ADMIN — BUDESONIDE AND FORMOTEROL FUMARATE DIHYDRATE 2 PUFF: 160; 4.5 AEROSOL RESPIRATORY (INHALATION) at 07:27

## 2025-04-12 RX ADMIN — SODIUM CHLORIDE 1000 MG: 9 INJECTION, SOLUTION INTRAVENOUS at 08:16

## 2025-04-12 RX ADMIN — GUAIFENESIN 600 MG: 600 TABLET, EXTENDED RELEASE ORAL at 21:04

## 2025-04-12 RX ADMIN — PANTOPRAZOLE SODIUM 40 MG: 40 TABLET, DELAYED RELEASE ORAL at 06:18

## 2025-04-12 RX ADMIN — TRAZODONE HYDROCHLORIDE 50 MG: 50 TABLET ORAL at 21:04

## 2025-04-12 RX ADMIN — Medication 100 MG: at 08:08

## 2025-04-12 RX ADMIN — IPRATROPIUM BROMIDE AND ALBUTEROL SULFATE 1 DOSE: 2.5; .5 SOLUTION RESPIRATORY (INHALATION) at 07:27

## 2025-04-12 RX ADMIN — VANCOMYCIN HYDROCHLORIDE 1000 MG: 1 INJECTION, POWDER, LYOPHILIZED, FOR SOLUTION INTRAVENOUS at 23:15

## 2025-04-12 RX ADMIN — IPRATROPIUM BROMIDE AND ALBUTEROL SULFATE 1 DOSE: 2.5; .5 SOLUTION RESPIRATORY (INHALATION) at 16:16

## 2025-04-12 RX ADMIN — FOLIC ACID 1 MG: 1 TABLET ORAL at 08:06

## 2025-04-12 RX ADMIN — ATORVASTATIN CALCIUM 10 MG: 10 TABLET, FILM COATED ORAL at 21:04

## 2025-04-12 RX ADMIN — METHYLPREDNISOLONE SODIUM SUCCINATE 40 MG: 125 INJECTION, POWDER, LYOPHILIZED, FOR SOLUTION INTRAMUSCULAR; INTRAVENOUS at 08:13

## 2025-04-12 RX ADMIN — BUDESONIDE AND FORMOTEROL FUMARATE DIHYDRATE 2 PUFF: 160; 4.5 AEROSOL RESPIRATORY (INHALATION) at 19:42

## 2025-04-12 RX ADMIN — Medication 1 CAPSULE: at 08:07

## 2025-04-12 RX ADMIN — Medication 400 MG: at 08:07

## 2025-04-12 RX ADMIN — MULTIVITAMIN TABLET 1 TABLET: TABLET at 08:07

## 2025-04-12 RX ADMIN — DOXYCYCLINE HYCLATE 100 MG: 100 TABLET, COATED ORAL at 08:07

## 2025-04-12 RX ADMIN — DILTIAZEM HYDROCHLORIDE 240 MG: 240 CAPSULE, COATED, EXTENDED RELEASE ORAL at 08:08

## 2025-04-12 RX ADMIN — POTASSIUM CHLORIDE 20 MEQ: 20 TABLET, EXTENDED RELEASE ORAL at 08:08

## 2025-04-12 RX ADMIN — IPRATROPIUM BROMIDE AND ALBUTEROL SULFATE 1 DOSE: 2.5; .5 SOLUTION RESPIRATORY (INHALATION) at 11:17

## 2025-04-12 RX ADMIN — DOXYCYCLINE HYCLATE 100 MG: 100 TABLET, COATED ORAL at 21:05

## 2025-04-12 RX ADMIN — VANCOMYCIN HYDROCHLORIDE 1000 MG: 1 INJECTION, POWDER, LYOPHILIZED, FOR SOLUTION INTRAVENOUS at 11:28

## 2025-04-12 RX ADMIN — SODIUM CHLORIDE, PRESERVATIVE FREE 10 ML: 5 INJECTION INTRAVENOUS at 21:05

## 2025-04-12 NOTE — PROGRESS NOTES
04/12/25 0700   RT Protocol   History Pulmonary Disease 2   Respiratory pattern 0   Breath sounds 2   Cough 1   Indications for Bronchodilator Therapy Decreased or absent breath sounds   Bronchodilator Assessment Score 5

## 2025-04-12 NOTE — PLAN OF CARE
Problem: Discharge Planning  Goal: Discharge to home or other facility with appropriate resources  4/12/2025 1020 by Bryanna Baldwin RN  Outcome: Progressing  4/12/2025 0248 by Kim Dial RN  Outcome: Progressing  Flowsheets (Taken 4/11/2025 2108)  Discharge to home or other facility with appropriate resources: Identify barriers to discharge with patient and caregiver     Problem: Safety - Adult  Goal: Free from fall injury  4/12/2025 1020 by Bryanna Baldwin RN  Outcome: Progressing  4/12/2025 0248 by Kim Dial RN  Outcome: Progressing     Problem: Respiratory - Adult  Goal: Achieves optimal ventilation and oxygenation  4/12/2025 1020 by Bryanna Baldwin RN  Outcome: Progressing  4/12/2025 0248 by Kim Dial RN  Outcome: Progressing  Flowsheets (Taken 4/11/2025 2108)  Achieves optimal ventilation and oxygenation:   Assess for changes in respiratory status   Assess for changes in mentation and behavior   Position to facilitate oxygenation and minimize respiratory effort   Oxygen supplementation based on oxygen saturation or arterial blood gases   Assess and instruct to report shortness of breath or any respiratory difficulty     Problem: Cardiovascular - Adult  Goal: Maintains optimal cardiac output and hemodynamic stability  4/12/2025 1020 by Bryanna Baldwin RN  Outcome: Progressing  4/12/2025 0248 by Kim Dial RN  Outcome: Progressing  Flowsheets (Taken 4/11/2025 2108)  Maintains optimal cardiac output and hemodynamic stability:   Monitor blood pressure and heart rate   Monitor urine output and notify Licensed Independent Practitioner for values outside of normal range  Goal: Absence of cardiac dysrhythmias or at baseline  4/12/2025 1020 by Bryanna Baldwin RN  Outcome: Progressing  4/12/2025 0248 by Kim Dial RN  Outcome: Progressing

## 2025-04-12 NOTE — PLAN OF CARE
Problem: Discharge Planning  Goal: Discharge to home or other facility with appropriate resources  Outcome: Progressing  Flowsheets (Taken 4/11/2025 2108)  Discharge to home or other facility with appropriate resources: Identify barriers to discharge with patient and caregiver     Problem: Safety - Adult  Goal: Free from fall injury  Outcome: Progressing     Problem: Respiratory - Adult  Goal: Achieves optimal ventilation and oxygenation  Outcome: Progressing  Flowsheets (Taken 4/11/2025 2108)  Achieves optimal ventilation and oxygenation:   Assess for changes in respiratory status   Assess for changes in mentation and behavior   Position to facilitate oxygenation and minimize respiratory effort   Oxygen supplementation based on oxygen saturation or arterial blood gases   Assess and instruct to report shortness of breath or any respiratory difficulty     Problem: Cardiovascular - Adult  Goal: Maintains optimal cardiac output and hemodynamic stability  Outcome: Progressing  Flowsheets (Taken 4/11/2025 2108)  Maintains optimal cardiac output and hemodynamic stability:   Monitor blood pressure and heart rate   Monitor urine output and notify Licensed Independent Practitioner for values outside of normal range  Goal: Absence of cardiac dysrhythmias or at baseline  Outcome: Progressing

## 2025-04-12 NOTE — PROGRESS NOTES
Pulmonary Progress Note    CC: COPD exacerbation    Subjective:   Shortness of breath and cough  2 L O2, uses 2 L at home  Clear sputum        Intake/Output Summary (Last 24 hours) at 4/12/2025 0753  Last data filed at 4/11/2025 2016  Gross per 24 hour   Intake 342 ml   Output 1150 ml   Net -808 ml       Exam:   /72   Pulse 100   Temp 98.1 °F (36.7 °C) (Oral)   Resp 18   Ht 1.549 m (5' 1\")   Wt 38 kg (83 lb 12.8 oz)   LMP 07/12/2015   SpO2 94%   BMI 15.83 kg/m²  on 2 L  Gen: No distress.   Eyes: No sclera icterus. No conjunctival injection.   Neck: Trachea midline. No obvious mass.    Resp: Mild accessory muscle use. No crackles.  Bilateral wheezes. No rhonchi. No dullness on percussion.  CV: Regular rate. Regular rhythm. No murmur or rub. No edema.  GI: Non-tender. Non-distended. No hernia.   Skin: Warm and dry. No nodule on exposed extremities.   Lymph: No cervical LAD. No supraclavicular LAD.   M/S: No cyanosis. No joint deformity. No clubbing.   Neuro: Awake. Alert. Moves all four extremities.   Psych: Oriented. No anxiety.     Scheduled Meds:   cefTRIAXone (ROCEPHIN) IV  1,000 mg IntraVENous Q24H    guaiFENesin  600 mg Oral BID    doxycycline hyclate  100 mg Oral 2 times per day    atenolol  25 mg Oral Daily    atorvastatin  10 mg Oral Nightly    dilTIAZem  240 mg Oral Daily    DULoxetine  60 mg Oral Daily    folic acid  1 mg Oral Daily    gabapentin  300 mg Oral Daily    lactobacillus  1 capsule Oral Daily    multivitamin  1 tablet Oral Daily    pantoprazole  40 mg Oral QAM AC    thiamine  100 mg Oral Daily    sodium chloride flush  5-40 mL IntraVENous 2 times per day    enoxaparin  30 mg SubCUTAneous Daily    potassium chloride  20 mEq Oral Daily with breakfast    magnesium oxide  400 mg Oral Daily    azithromycin  500 mg IntraVENous Q24H    methylPREDNISolone  40 mg IntraVENous Daily    aspirin  81 mg Oral Daily    ipratropium 0.5 mg-albuterol 2.5 mg  1 Dose Inhalation 4x Daily RT     budesonide-formoterol  2 puff Inhalation BID RT     Continuous Infusions:   sodium chloride 100 mL/hr at 04/11/25 0922    sodium chloride      dextrose       PRN Meds:  traZODone, sodium chloride flush, sodium chloride, potassium chloride **OR** potassium alternative oral replacement **OR** potassium chloride, magnesium sulfate, ondansetron **OR** ondansetron, polyethylene glycol, acetaminophen **OR** acetaminophen, glucose, dextrose bolus **OR** dextrose bolus, glucagon (rDNA), dextrose, PHENobarbital    Labs:  CBC:   Recent Labs     04/10/25  1510 04/11/25  0457 04/11/25  0908   WBC 9.5 5.6 5.5   HGB 10.4* 7.9* 8.8*   HCT 32.0* 25.2* 27.9*   MCV 79.6* 79.0* 79.7*    265 285     BMP:   Recent Labs     04/11/25  0734 04/11/25  0908 04/12/25  0310    137 135*   K 3.6 3.2* 3.8   CL 94* 94* 95*   CO2 35* 33* 31   BUN 3* 3* 5*   CREATININE <0.2* <0.2* <0.2*     LIVER PROFILE:   Recent Labs     04/10/25  1445 04/10/25  1510   AST  --  19   ALT  --  10   BILITOT <0.2  --    ALKPHOS 143*  --      PT/INR:   Recent Labs     04/10/25  1445   PROTIME 13.4   INR 1.00     APTT: No results for input(s): \"APTT\" in the last 72 hours.  UA:  Recent Labs     04/10/25  2230   COLORU Yellow   PHUR 8.0   WBCUA 10-20*   RBCUA 5-10*   MUCUS Rare*   BACTERIA 4+*   CLARITYU SL CLOUDY*   LEUKOCYTESUR SMALL*   UROBILINOGEN 1.0   BILIRUBINUR Negative   BLOODU SMALL*   GLUCOSEU Negative     No results for input(s): \"PHART\", \"[id number]\", \"PO2ART\" in the last 72 hours.      Microbiology:  4/10 UC Enterococcus faecium   4/10 BC NGTD  4/10 COVID-19 influenza     Imaging:  Chest x-ray 4/10 images independently reviewed by me and showed:  1. No acute cardiopulmonary process.   2. Right apical pleuroparenchymal scarring with calcified right hilar nodes.      CT chest 3/25/2025 imaging reviewed by me and showed  . No evidence for pulmonary embolism.  . Stable appearance of cavitary lesion seen in the superior segment the right lower lobe with

## 2025-04-12 NOTE — CONSENT
Informed Consent for Blood Component Transfusion Note    I have discussed with the patient the rationale for blood component transfusion; its benefits in treating or preventing fatigue, organ damage, or death; and its risk which includes mild transfusion reactions, rare risk of blood borne infection, or more serious but rare reactions. I have discussed the alternatives to transfusion, including the risk and consequences of not receiving transfusion. The patient had an opportunity to ask questions and had agreed to proceed with transfusion of blood components.    Electronically signed by Julian Dill MD on 4/12/25 at 10:05 AM EDT

## 2025-04-12 NOTE — CONSULTS
Consultation Note    Patient Name: Bonny Serrano  : 1970  Age: 55 y.o.     Admitting Physician: Eric Montenegro,*   Date of Admission: 4/10/2025  2:35 PM   Primary Care Physician: Brenda Carrillo APRN - KYA        Bonny Serrano is being seen at the request of Eric Montenegro,* for aneimia.    History of Present Illness:  55 year old female with ETOH use disorder, A fib not on anticoagulation, COPD exacerbation admitted with shortness of breath.  Today on the patient's routine laboratory monitoring her hemoglobin came back at 6.8 mg/dL.  The patient denies any bright red blood per rectum or any melena.  She denies any abdominal discomfort.  She denies any nausea, vomiting, hematemesis or coffee-ground emesis.  The patient denies any significant NSAID use at home, she states she does use acetaminophen for joint pains.    GI History:  The patient tells me she has never had a previous upper endoscopy or colonoscopy.    Past Medical History:  Past Medical History:   Diagnosis Date    A-fib (HCC)     Anemia     Anxiety     Chronic pancreatitis (HCC)     Collapse of right lung     COPD (chronic obstructive pulmonary disease) (HCC)     Depression     GI bleed     H/O colonoscopy     Hypertension     Pancreatitis     Pulmonary embolism (HCC) 2018        Past Surgical History:  Past Surgical History:   Procedure Laterality Date    HIP SURGERY Right 2024    RIGHT FEMUR GAMMA NAILING performed by Simba Man MD at [facility] OR    UPPER GASTROINTESTINAL ENDOSCOPY          Historical Medications:  Prior to Visit Medications    Medication Sig Taking? Authorizing Provider   predniSONE (DELTASONE) 10 MG tablet Take 3 tabs a day for 3 days, Then 2 tabs a day for 3 days , Then 1 tab a day for 3 days.  Julian Dill MD   lactobacillus (CULTURELLE) capsule Take 1 capsule by mouth daily  Julian Dill MD   atorvastatin (LIPITOR) 10 MG tablet Take 1 tablet by mouth nightly  Provider, MD Pan  membranes moist  Cardiovascular: Regular rate and rhythm.  No murmurs.  Respiratory: Respirations nonlabored, no crepitus  GI: Abdomen nondistended, soft, and nontender.  Normal active bowel sounds.  No masses palpable.   Rectal: Deferred  Musculoskeletal: No pitting edema of the lower legs.  Neurological: Gross memory appears intact.  Patient is alert and oriented      Recent Imaging:   Nuclear stress test with myocardial perfusion    Abnormal myocardial perfusion study.    Perfusion Defect: There is a left ventricular stress perfusion defect   that is small to medium in size present in the anterior segment(s) that is   partially reversible. This defect was visualized during the stress and   rest phases of imaging.    Stress Function: Post-stress ejection fraction is 69%. Stress end   diastolic volume: 64 mL. Stress end systolic volume: 20 mL. LV mass: 108.0   g.  CT HEAD WO CONTRAST  Narrative: EXAMINATION:  CT OF THE HEAD WITHOUT CONTRAST  4/11/2025 11:25 am    TECHNIQUE:  CT of the head was performed without the administration of intravenous  contrast. Automated exposure control, iterative reconstruction, and/or weight  based adjustment of the mA/kV was utilized to reduce the radiation dose to as  low as reasonably achievable.    COMPARISON:  CT dated 01/03/2025    HISTORY:  ORDERING SYSTEM PROVIDED HISTORY: fall  TECHNOLOGIST PROVIDED HISTORY:  Reason for exam:->fall  Has a \"code stroke\" or \"stroke alert\" been called?->No  Reason for Exam: fall    FINDINGS:  BRAIN/VENTRICLES: There is no acute intracranial hemorrhage, mass effect or  midline shift.  No abnormal extra-axial fluid collection.  The gray-white  differentiation is maintained without evidence of an acute infarct.  There is  no evidence of hydrocephalus. Similar appearance of patchy low attenuation of  the subcortical white matter, most consistent with findings of chronic small  vessel ischemia.    ORBITS: The visualized portion of the orbits

## 2025-04-12 NOTE — PROGRESS NOTES
CARDIOLOGY CONSULTATION/PROGRESS NOTE        Patient Name: Bonny Serrano  Date of admission: 4/10/2025  2:35 PM  Admission Dx: Hypokalemia [E87.6]  Hypomagnesemia [E83.42]  Hyponatremia [E87.1]  Dyspnea and respiratory abnormalities [R06.00, R06.89]  Requesting Physician: Timmy Lim MD  Primary Care physician: Brenda Carrillo APRN - CNP    Reason for Consultation/Chief Complaint: Weakness/chest pain/syncope    History of Present Illness:     Bonny Serrano is a 55 y.o. patient with a prior medical history notable for hyperlipidemia, hypertension, coronary artery disease by CT, COPD with chronic hypoxic respiratory failure 2L, cavitary lung disease, history of PE, possible atrial fibrillation, alcohol use disorder who presented to the hospital on 4/10/2025 with complaints of weakness and chest discomfort.    Prior she was seeing Dr. Martin. Possible atrial fibrillation documented. She notes diagnosed at [facility]. Per OSH cardio notes last 2020, plan was to continue ASA and no AC. On review of 2015 ECGs from [facility] show NSR.    ED course/Vital signs on presentation: Initially noted with low-normal blood pressure.  Blood pressure however moderately elevated 150 systolic subsequently and she was tachycardic on last vitals.  ECG here showed sinus rhythm, T wave inversions anterior leads, poor R wave progression that is likely due to lead  placement issues.  High-sensitivity troponin 17/17/15/15.  Sodium level 126 and appears chronically low.  CBC reviewed.  Chest x-ray showed no acute cardiopulmonary process.     She reported having chest pain on couch prior to going to bathroom. She noted pressure anterior chest. No radiation. Nauseous/diaphoretic with heat intolerance. \"Having a hard time with her breathing\" and felt like she had a panic attack.     Also reported an episode in bathroom prior to admission. Alyssa up from commode and came into her kitchen. She was standing and working on catching  (NEURONTIN) capsule 300 mg, Daily  lactobacillus (CULTURELLE) capsule 1 capsule, Daily  multivitamin 1 tablet, Daily  pantoprazole (PROTONIX) tablet 40 mg, QAM AC  thiamine tablet 100 mg, Daily  sodium chloride flush 0.9 % injection 5-40 mL, 2 times per day  sodium chloride flush 0.9 % injection 5-40 mL, PRN  0.9 % sodium chloride infusion, PRN  potassium chloride (KLOR-CON M) extended release tablet 40 mEq, PRN   Or  potassium bicarb-citric acid (EFFER-K) effervescent tablet 40 mEq, PRN   Or  potassium chloride 10 mEq/100 mL IVPB (Peripheral Line), PRN  magnesium sulfate 2000 mg in 50 mL IVPB premix, PRN  enoxaparin Sodium (LOVENOX) injection 30 mg, Daily  ondansetron (ZOFRAN-ODT) disintegrating tablet 4 mg, Q8H PRN   Or  ondansetron (ZOFRAN) injection 4 mg, Q6H PRN  polyethylene glycol (GLYCOLAX) packet 17 g, Daily PRN  acetaminophen (TYLENOL) tablet 650 mg, Q6H PRN   Or  acetaminophen (TYLENOL) suppository 650 mg, Q6H PRN  potassium chloride (KLOR-CON M) extended release tablet 20 mEq, Daily with breakfast  magnesium oxide (MAG-OX) tablet 400 mg, Daily  azithromycin (ZITHROMAX) 500 mg in sodium chloride 0.9 % 250 mL IVPB ([id number]), Q24H  methylPREDNISolone sodium succ (SOLU-MEDROL) 40 mg in sterile water 0.64 mL injection, Daily  aspirin chewable tablet 81 mg, Daily  glucose chewable tablet 16 g, PRN  dextrose bolus 10% 125 mL, PRN   Or  dextrose bolus 10% 250 mL, PRN  glucagon injection 1 mg, PRN  dextrose 10 % infusion, Continuous PRN  PHENobarbital tablet 32.4 mg, BID PRN  ipratropium 0.5 mg-albuterol 2.5 mg (DUONEB) nebulizer solution 1 Dose, 4x Daily RT  budesonide-formoterol (SYMBICORT) 160-4.5 MCG/ACT inhaler 2 puff, BID RT        Allergies:  Bee venom and Sulfa antibiotics       Objective:     Vitals:    04/11/25 2333 04/12/25 0426 04/12/25 0723 04/12/25 0730   BP: 119/70 121/62 122/72    Pulse: 94 87 100    Resp: 16 16 18    Temp: 98.5 °F (36.9 °C) 98.4 °F (36.9 °C) 98.1 °F (36.7 °C)    TempSrc: Oral

## 2025-04-12 NOTE — PROGRESS NOTES
Transferred care to SARAH Gould. Face to face bedside report given, no need voiced at this time. Pt awake in bed.   No signs of distress noted.  Call light within reach.   Denies needs.

## 2025-04-12 NOTE — PROGRESS NOTES
Consult has been called to Dr. Clark on 4/12/25. Spoke with Zohreh. 9:35 AM    Connie Castillo  4/12/2025

## 2025-04-12 NOTE — PROGRESS NOTES
Prior to receiving transfusion of blood products, patient educated on the following:    Blood product transfusion process  Benefits of receiving blood product transfusion  Risks associated with receiving the transfusion  Signs and symptoms of complications associated with blood product transfusion  Vague, uneasy feeling  Onset of pain (especially at the IV site, back, or chest)  Chills  Flushing  Fever  Nausea  Dizziness  Rash  Itching  Dark or red urine  Instructed patient to notify RN immediately if any sign or symptom occurs  Pt anxious about idea of blood transfusion told her referred to GI and iron studies and other labs done and will need a stool sample to test.

## 2025-04-12 NOTE — PROGRESS NOTES
-continue home atenolol and Cardizem    #Hypertension  -Monitor BP  -continue home atenolol and cardizem     #Hyperlipidemia.   -continue statin.     #Acute on chronic normocytic anemia.  -hemoglobin 10.4  -monitor with daily CBC.    #Protein Calorie Malnutrition  -Body mass index is 15.83 kg/m².  -encourage proper nutritional intake  -dietary consult    #GERD.  -continue PPI    #Peripheral neuropathy  -continue gabapentin    Note above makes patient higher risk for morbidity and mortality requiring testing and treatment.     DVT Prophylaxis: Lovenox   Diet: ADULT DIET; Clear Liquid  Code Status: Full Code    Total time today 40 minutes. > 50 % time dominated by counseling and coordination of care       Julian Dill MD 4/12/2025 10:06 AM

## 2025-04-12 NOTE — PROGRESS NOTES
/70   Pulse 94   Temp 98.5 °F (36.9 °C) (Oral)   Resp 16   Ht 1.549 m (5' 1\")   Wt 38 kg (83 lb 12.8 oz)   LMP 07/12/2015   SpO2 98%   BMI 15.83 kg/m²     Patient alert and oriented resting in bed, watching tv. With telemetry and with O2 at 2 lpm via nasal cannula. Assessment completed. Respiration easy, even and unlabored. Patient tolerated night meds well. Call light and bedside table within reach. Bed at lowest position, locked, side rails x2, bed alarm on. Patient requesting for sleeping pill. Message sent to MD.

## 2025-04-12 NOTE — PROGRESS NOTES
[facility]   Pharmacy Pharmacokinetic Monitoring Service - Vancomycin     Bonny Serrano is a 55 y.o. female starting on vancomycin therapy for UTIX7 days. Pharmacy consulted by Dr. Dill for monitoring and adjustment.    Target Concentration: Goal AUC/CHANI 400-600 mg*hr/L    Additional Antimicrobials: rocephin    Pertinent Laboratory Values:   Wt Readings from Last 1 Encounters:   04/10/25 38 kg (83 lb 12.8 oz)     Temp Readings from Last 1 Encounters:   04/12/25 98.1 °F (36.7 °C) (Oral)     Estimated Creatinine Clearance: 193 mL/min (based on SCr of 0.2 mg/dL).  Recent Labs     04/11/25  0908 04/12/25  0310 04/12/25  0832   CREATININE <0.2* <0.2*  --    BUN 3* 5*  --    WBC 5.5  --  3.2*       Plan:  Dosing recommendations based on Bayesian software  Start vancomycin 1000mg q12h  Anticipated AUC of 501 and trough concentration of 9.3 at steady state  Renal labs as indicated   Vancomycin concentration ordered for 4/13 @ 2200   Pharmacy will continue to monitor patient and adjust therapy as indicated    Thank you for the consult,  Britni Honeycutt RPH  4/12/2025 10:23 AM

## 2025-04-12 NOTE — FLOWSHEET NOTE
04/12/25 0723   Vital Signs   Temp 98.1 °F (36.7 °C)   Temp Source Oral   Pulse 100   Heart Rate Source Monitor   Respirations 18   /72   MAP (Calculated) 89   BP Location Right upper arm   BP Method Automatic   Patient Position Lying left side   Oxygen Therapy   SpO2 98 %   O2 Device Nasal cannula   O2 Flow Rate (L/min) 2 L/min     Am assessment complete plan of care discussed.Call light in reach.Critical hemoglobin 6.8 called from lab perfect serve to Dr Dill and pt discussed with her and she placed orders

## 2025-04-13 PROBLEM — I25.10 CAD IN NATIVE ARTERY: Status: ACTIVE | Noted: 2025-04-13

## 2025-04-13 LAB
ANION GAP SERPL CALCULATED.3IONS-SCNC: 8 MMOL/L (ref 3–16)
ANISOCYTOSIS BLD QL SMEAR: ABNORMAL
BASOPHILS # BLD: 0 K/UL (ref 0–0.2)
BASOPHILS NFR BLD: 0 %
BUN SERPL-MCNC: 10 MG/DL (ref 7–20)
CALCIUM SERPL-MCNC: 8.6 MG/DL (ref 8.3–10.6)
CHLORIDE SERPL-SCNC: 95 MMOL/L (ref 99–110)
CO2 SERPL-SCNC: 33 MMOL/L (ref 21–32)
CREAT SERPL-MCNC: <0.2 MG/DL (ref 0.6–1.1)
DEPRECATED RDW RBC AUTO: 20.2 % (ref 12.4–15.4)
EOSINOPHIL # BLD: 0 K/UL (ref 0–0.6)
EOSINOPHIL NFR BLD: 0 %
GFR SERPLBLD CREATININE-BSD FMLA CKD-EPI: >90 ML/MIN/{1.73_M2}
GLUCOSE SERPL-MCNC: 129 MG/DL (ref 70–99)
HCT VFR BLD AUTO: 31 % (ref 36–48)
HGB BLD-MCNC: 9.9 G/DL (ref 12–16)
HYPOCHROMIA BLD QL SMEAR: ABNORMAL
LYMPHOCYTES # BLD: 0.6 K/UL (ref 1–5.1)
LYMPHOCYTES NFR BLD: 9 %
MCH RBC QN AUTO: 26.2 PG (ref 26–34)
MCHC RBC AUTO-ENTMCNC: 31.9 G/DL (ref 31–36)
MCV RBC AUTO: 82.1 FL (ref 80–100)
MICROCYTES BLD QL SMEAR: ABNORMAL
MONOCYTES # BLD: 0.4 K/UL (ref 0–1.3)
MONOCYTES NFR BLD: 7 %
NEUTROPHILS # BLD: 4.8 K/UL (ref 1.7–7.7)
NEUTROPHILS NFR BLD: 82 %
PLATELET # BLD AUTO: 258 K/UL (ref 135–450)
PLATELET BLD QL SMEAR: ADEQUATE
PMV BLD AUTO: 6.6 FL (ref 5–10.5)
POLYCHROMASIA BLD QL SMEAR: ABNORMAL
POTASSIUM SERPL-SCNC: 3.7 MMOL/L (ref 3.5–5.1)
RBC # BLD AUTO: 3.78 M/UL (ref 4–5.2)
SLIDE REVIEW: ABNORMAL
SODIUM SERPL-SCNC: 136 MMOL/L (ref 136–145)
STOMATOCYTES BLD QL SMEAR: ABNORMAL
TARGETS BLD QL SMEAR: ABNORMAL
VANCOMYCIN TROUGH SERPL-MCNC: <4 UG/ML (ref 10–20)
VARIANT LYMPHS NFR BLD MANUAL: 2 % (ref 0–6)
WBC # BLD AUTO: 5.8 K/UL (ref 4–11)

## 2025-04-13 PROCEDURE — 94761 N-INVAS EAR/PLS OXIMETRY MLT: CPT

## 2025-04-13 PROCEDURE — 80048 BASIC METABOLIC PNL TOTAL CA: CPT

## 2025-04-13 PROCEDURE — 2580000003 HC RX 258: Performed by: INTERNAL MEDICINE

## 2025-04-13 PROCEDURE — 6370000000 HC RX 637 (ALT 250 FOR IP): Performed by: STUDENT IN AN ORGANIZED HEALTH CARE EDUCATION/TRAINING PROGRAM

## 2025-04-13 PROCEDURE — 99232 SBSQ HOSP IP/OBS MODERATE 35: CPT | Performed by: INTERNAL MEDICINE

## 2025-04-13 PROCEDURE — 6360000002 HC RX W HCPCS

## 2025-04-13 PROCEDURE — 6370000000 HC RX 637 (ALT 250 FOR IP): Performed by: INTERNAL MEDICINE

## 2025-04-13 PROCEDURE — 2060000000 HC ICU INTERMEDIATE R&B

## 2025-04-13 PROCEDURE — 85025 COMPLETE CBC W/AUTO DIFF WBC: CPT

## 2025-04-13 PROCEDURE — 2580000003 HC RX 258

## 2025-04-13 PROCEDURE — 36415 COLL VENOUS BLD VENIPUNCTURE: CPT

## 2025-04-13 PROCEDURE — 6360000002 HC RX W HCPCS: Performed by: INTERNAL MEDICINE

## 2025-04-13 PROCEDURE — 2500000003 HC RX 250 WO HCPCS: Performed by: INTERNAL MEDICINE

## 2025-04-13 PROCEDURE — 94669 MECHANICAL CHEST WALL OSCILL: CPT

## 2025-04-13 PROCEDURE — 2700000000 HC OXYGEN THERAPY PER DAY

## 2025-04-13 PROCEDURE — 94640 AIRWAY INHALATION TREATMENT: CPT

## 2025-04-13 PROCEDURE — 80202 ASSAY OF VANCOMYCIN: CPT

## 2025-04-13 PROCEDURE — 99233 SBSQ HOSP IP/OBS HIGH 50: CPT | Performed by: INTERNAL MEDICINE

## 2025-04-13 RX ORDER — ATORVASTATIN CALCIUM 40 MG/1
40 TABLET, FILM COATED ORAL NIGHTLY
Status: DISCONTINUED | OUTPATIENT
Start: 2025-04-13 | End: 2025-04-15 | Stop reason: HOSPADM

## 2025-04-13 RX ADMIN — DOXYCYCLINE HYCLATE 100 MG: 100 TABLET, COATED ORAL at 20:30

## 2025-04-13 RX ADMIN — SODIUM CHLORIDE, PRESERVATIVE FREE 10 ML: 5 INJECTION INTRAVENOUS at 08:26

## 2025-04-13 RX ADMIN — IPRATROPIUM BROMIDE AND ALBUTEROL SULFATE 1 DOSE: 2.5; .5 SOLUTION RESPIRATORY (INHALATION) at 10:59

## 2025-04-13 RX ADMIN — VANCOMYCIN HYDROCHLORIDE 1000 MG: 1 INJECTION, POWDER, LYOPHILIZED, FOR SOLUTION INTRAVENOUS at 22:55

## 2025-04-13 RX ADMIN — ASPIRIN 81 MG: 81 TABLET, CHEWABLE ORAL at 08:15

## 2025-04-13 RX ADMIN — DULOXETINE 60 MG: 60 CAPSULE, DELAYED RELEASE ORAL at 08:16

## 2025-04-13 RX ADMIN — BUDESONIDE AND FORMOTEROL FUMARATE DIHYDRATE 2 PUFF: 160; 4.5 AEROSOL RESPIRATORY (INHALATION) at 19:09

## 2025-04-13 RX ADMIN — IPRATROPIUM BROMIDE AND ALBUTEROL SULFATE 1 DOSE: 2.5; .5 SOLUTION RESPIRATORY (INHALATION) at 19:07

## 2025-04-13 RX ADMIN — Medication 1 CAPSULE: at 08:16

## 2025-04-13 RX ADMIN — SODIUM CHLORIDE 1000 MG: 9 INJECTION, SOLUTION INTRAVENOUS at 08:20

## 2025-04-13 RX ADMIN — BUDESONIDE AND FORMOTEROL FUMARATE DIHYDRATE 2 PUFF: 160; 4.5 AEROSOL RESPIRATORY (INHALATION) at 07:59

## 2025-04-13 RX ADMIN — VANCOMYCIN HYDROCHLORIDE 1000 MG: 1 INJECTION, POWDER, LYOPHILIZED, FOR SOLUTION INTRAVENOUS at 10:47

## 2025-04-13 RX ADMIN — FOLIC ACID 1 MG: 1 TABLET ORAL at 08:16

## 2025-04-13 RX ADMIN — PREDNISONE 40 MG: 20 TABLET ORAL at 08:15

## 2025-04-13 RX ADMIN — GUAIFENESIN 600 MG: 600 TABLET, EXTENDED RELEASE ORAL at 08:15

## 2025-04-13 RX ADMIN — GABAPENTIN 300 MG: 300 CAPSULE ORAL at 08:16

## 2025-04-13 RX ADMIN — POLYETHYLENE GLYCOL-3350 AND ELECTROLYTES 2000 ML: 236; 6.74; 5.86; 2.97; 22.74 POWDER, FOR SOLUTION ORAL at 18:19

## 2025-04-13 RX ADMIN — Medication 100 MG: at 08:16

## 2025-04-13 RX ADMIN — IPRATROPIUM BROMIDE AND ALBUTEROL SULFATE 1 DOSE: 2.5; .5 SOLUTION RESPIRATORY (INHALATION) at 15:13

## 2025-04-13 RX ADMIN — DILTIAZEM HYDROCHLORIDE 240 MG: 240 CAPSULE, COATED, EXTENDED RELEASE ORAL at 08:16

## 2025-04-13 RX ADMIN — DOXYCYCLINE HYCLATE 100 MG: 100 TABLET, COATED ORAL at 08:16

## 2025-04-13 RX ADMIN — MULTIVITAMIN TABLET 1 TABLET: TABLET at 08:15

## 2025-04-13 RX ADMIN — ATORVASTATIN CALCIUM 40 MG: 40 TABLET, FILM COATED ORAL at 20:30

## 2025-04-13 RX ADMIN — IPRATROPIUM BROMIDE AND ALBUTEROL SULFATE 1 DOSE: 2.5; .5 SOLUTION RESPIRATORY (INHALATION) at 07:59

## 2025-04-13 RX ADMIN — PANTOPRAZOLE SODIUM 40 MG: 40 TABLET, DELAYED RELEASE ORAL at 06:54

## 2025-04-13 RX ADMIN — ATENOLOL 25 MG: 25 TABLET ORAL at 08:16

## 2025-04-13 RX ADMIN — Medication 400 MG: at 08:16

## 2025-04-13 RX ADMIN — POTASSIUM CHLORIDE 20 MEQ: 20 TABLET, EXTENDED RELEASE ORAL at 08:26

## 2025-04-13 RX ADMIN — GUAIFENESIN 600 MG: 600 TABLET, EXTENDED RELEASE ORAL at 20:30

## 2025-04-13 NOTE — PROGRESS NOTES
RT Inhaler-Nebulizer Bronchodilator Protocol Note    There is a bronchodilator order in the chart from a provider indicating to follow the RT Bronchodilator Protocol and there is an “Initiate RT Inhaler-Nebulizer Bronchodilator Protocol” order as well (see protocol at bottom of note).    CXR Findings:  No results found.    The findings from the last RT Protocol Assessment were as follows:   History Pulmonary Disease: (P) Chronic pulmonary disease  Respiratory Pattern: (P) Dyspnea on exertion or RR 21-25 bpm  Breath Sounds: (P) Intermittent or unilateral wheezes  Cough: (P) Strong, productive  Indication for Bronchodilator Therapy: (P) Wheezing associated with pulm disorder  Bronchodilator Assessment Score: (P) 9    Aerosolized bronchodilator medication orders have been revised according to the RT Inhaler-Nebulizer Bronchodilator Protocol below.    Respiratory Therapist to perform RT Therapy Protocol Assessment initially then follow the protocol.  Repeat RT Therapy Protocol Assessment PRN for score 0-3 or on second treatment, BID, and PRN for scores above 3.    No Indications - adjust the frequency to every 6 hours PRN wheezing or bronchospasm, if no treatments needed after 48 hours then discontinue using Per Protocol order mode.     If indication present, adjust the RT bronchodilator orders based on the Bronchodilator Assessment Score as indicated below.  Use Inhaler orders unless patient has one or more of the following: on home nebulizer, not able to hold breath for 10 seconds, is not alert and oriented, cannot activate and use MDI correctly, or respiratory rate 25 breaths per minute or more, then use the equivalent nebulizer order(s) with same Frequency and PRN reasons based on the score.  If a patient is on this medication at home then do not decrease Frequency below that used at home.    0-3 - enter or revise RT bronchodilator order(s) to equivalent RT Bronchodilator order with Frequency of every 4 hours PRN

## 2025-04-13 NOTE — PROGRESS NOTES
Progress Note    Admit Date:  4/10/2025    Presented with Chest pressure, SOB, and fatigue  Admitted for electrolyte abnormalities and chest pain  Has history of alcohol abuse.   Patient also gave history of syncope a few days ago while standing in her kitchen. She thinks she \"fell asleep\" while standing. She hit her head. She is denying vision changes and headache. She does not feel dizzy currently but has been the past few days.  CT head was done and this was negative.     Patient was just discharged from the hospital about 2 weeks ago.  She has continued to drink alcohol-although she says she has cut it down now    Seen by cardiology for chest pressure, completed stress test - which was abnormal.  Plan is for medical management    Pulmonology following for very severe COPD and history of cavitary lung disease-she was due for bronchoscopy and biopsy - not completed yet.    4/12 drop in hemoglobin -1 unit of PRBC transfused  GI consulted          Subjective:  Ms. Serrano seen .  Seen by GI for anemia plan is for endoscopic evaluation tomorrow.   Denies any chest pains or shortness of breath today.   Had some epigastric discomfort;   She denies any previous history of GI bleed.  Has noticed occasional dark stools but denies any current dark stools or rectal bleeding.  No hematemesis.    Sodium levels have improved and up to 136 today    Objective:   /65   Pulse 85   Temp 98 °F (36.7 °C) (Oral)   Resp 18   Ht 1.549 m (5' 0.98\")   Wt 38 kg (83 lb 12.8 oz)   LMP 07/12/2015   SpO2 96%   BMI 15.84 kg/m²        Intake/Output Summary (Last 24 hours) at 4/13/2025 1656  Last data filed at 4/13/2025 0857  Gross per 24 hour   Intake 720 ml   Output --   Net 720 ml       Physical Exam:   Gen: No distress. Alert. Chronically ill appearing.  Appears frail.  Awake and well-oriented and in no distress  Eyes: PERRL. No sclera icterus. No conjunctival injection.   ENT: No discharge. Pharynx clear.   Neck: No JVD.  No

## 2025-04-13 NOTE — PROGRESS NOTES
Pulmonary Progress Note    CC: COPD exacerbation    Subjective:   Cough with clear phlegm  2 L O2, uses 2 L at home        Intake/Output Summary (Last 24 hours) at 4/13/2025 0740  Last data filed at 4/13/2025 0405  Gross per 24 hour   Intake 1002.5 ml   Output 150 ml   Net 852.5 ml       Exam:   /87   Pulse 99   Temp 98.6 °F (37 °C) (Oral)   Resp 20   Ht 1.549 m (5' 0.98\")   Wt 38 kg (83 lb 12.8 oz)   LMP 07/12/2015   SpO2 96%   BMI 15.84 kg/m²  on 2 L  Constitutional:  No acute distress. .   HEENT: no scleral icterus  Neck: No tracheal deviation present.    Cardiovascular: Normal heart sounds.   Pulmonary/Chest: Few wheezes. No rhonchi.  Few rales. No decreased breath sounds.  No accessory muscle usage or stridor.   Abdominal: Soft.   Musculoskeletal: No cyanosis. No clubbing.  Skin: Skin is warm and dry.       Scheduled Meds:   vancomycin  1,000 mg IntraVENous Q12H    predniSONE  40 mg Oral Daily    cefTRIAXone (ROCEPHIN) IV  1,000 mg IntraVENous Q24H    guaiFENesin  600 mg Oral BID    doxycycline hyclate  100 mg Oral 2 times per day    atenolol  25 mg Oral Daily    atorvastatin  10 mg Oral Nightly    dilTIAZem  240 mg Oral Daily    DULoxetine  60 mg Oral Daily    folic acid  1 mg Oral Daily    gabapentin  300 mg Oral Daily    lactobacillus  1 capsule Oral Daily    multivitamin  1 tablet Oral Daily    pantoprazole  40 mg Oral QAM AC    thiamine  100 mg Oral Daily    sodium chloride flush  5-40 mL IntraVENous 2 times per day    enoxaparin  30 mg SubCUTAneous Daily    potassium chloride  20 mEq Oral Daily with breakfast    magnesium oxide  400 mg Oral Daily    aspirin  81 mg Oral Daily    ipratropium 0.5 mg-albuterol 2.5 mg  1 Dose Inhalation 4x Daily RT    budesonide-formoterol  2 puff Inhalation BID RT     Continuous Infusions:   sodium chloride      sodium chloride      dextrose       PRN Meds:  sodium chloride, traZODone, sodium chloride flush, sodium chloride, potassium chloride **OR** potassium

## 2025-04-13 NOTE — PROGRESS NOTES
Progress Note    Patient Bonny Serrano  MRN: [id number]  YOB: 1970 Age: 55 y.o. Sex: female  Room: [place] [address]       Admitting Physician: Timmy Lim MD   Date of Admission: 4/10/2025  2:35 PM   Primary Care Physician: Brenda Carrillo APRN - CNP     Subjective:  Bonny Serrano was seen and examined. We are following for anemia.  -- The patient denies any overt blood loss.  NO abdominal pain.  Hb stable at 9.9mg/dL    ROS:  Constitutional: Denies fever, no change in appetite  Respiratory: Denies cough or shortness of breath  Cardiovascular: Denies chest pain or edema    Objective:  Vital Signs:   Vitals:    04/13/25 1200   BP: 113/65   Pulse: 85   Resp: 18   Temp:    SpO2: 96%         Physical Exam:  Constitutional: Alert and oriented x 4. No acute distress.   HEENT: Sclera anicteric, mucosal membranes moist  Cardiovascular: Regular rate and rhythm.  No murmurs.  Respiratory: Respirations nonlabored, no crepitus  GI: Abdomen nondistended, soft, and nontender.  Normal active bowel sounds.  No masses palpable.   Rectal: Deferred  Musculoskeletal:  No pitting edema of the lower legs.  Neurological: Gross memory appears intact.    Intake/Output:    Intake/Output Summary (Last 24 hours) at 4/13/2025 1250  Last data filed at 4/13/2025 0857  Gross per 24 hour   Intake 1022.5 ml   Output --   Net 1022.5 ml        Current Medications:  Current Facility-Administered Medications   Medication Dose Route Frequency Provider Last Rate Last Admin    atorvastatin (LIPITOR) tablet 40 mg  40 mg Oral Nightly Toni Lorenzana MD        polyethylene glycol (GoLYTELY) solution 2,000 mL  2,000 mL Oral Once Ivette Clark MD        [START ON 4/14/2025] polyethylene glycol (GoLYTELY) solution 2,000 mL  2,000 mL Oral Once Ivette Clark MD        0.9 % sodium chloride infusion   IntraVENous PRN Julian Dill MD        vancomycin (VANCOCIN) 1,000 mg in sodium chloride 0.9 % 250 mL IVPB ([id number])  1,000 mg  160-4.5 MCG/ACT inhaler 2 puff  2 puff Inhalation BID RT Timmy Lim MD   2 puff at 04/13/25 0758         Recent Imaging:   Nuclear stress test with myocardial perfusion    Abnormal myocardial perfusion study.    Perfusion Defect: There is a left ventricular stress perfusion defect   that is small to medium in size present in the anterior segment(s) that is   partially reversible. This defect was visualized during the stress and   rest phases of imaging.    Stress Function: Post-stress ejection fraction is 69%. Stress end   diastolic volume: 64 mL. Stress end systolic volume: 20 mL. LV mass: 108.0   g.  CT HEAD WO CONTRAST  Narrative: EXAMINATION:  CT OF THE HEAD WITHOUT CONTRAST  4/11/2025 11:25 am    TECHNIQUE:  CT of the head was performed without the administration of intravenous  contrast. Automated exposure control, iterative reconstruction, and/or weight  based adjustment of the mA/kV was utilized to reduce the radiation dose to as  low as reasonably achievable.    COMPARISON:  CT dated 01/03/2025    HISTORY:  ORDERING SYSTEM PROVIDED HISTORY: fall  TECHNOLOGIST PROVIDED HISTORY:  Reason for exam:->fall  Has a \"code stroke\" or \"stroke alert\" been called?->No  Reason for Exam: fall    FINDINGS:  BRAIN/VENTRICLES: There is no acute intracranial hemorrhage, mass effect or  midline shift.  No abnormal extra-axial fluid collection.  The gray-white  differentiation is maintained without evidence of an acute infarct.  There is  no evidence of hydrocephalus. Similar appearance of patchy low attenuation of  the subcortical white matter, most consistent with findings of chronic small  vessel ischemia.    ORBITS: The visualized portion of the orbits demonstrate no acute abnormality.    SINUSES: The visualized paranasal sinuses and mastoid air cells demonstrate  no acute abnormality.    SOFT TISSUES/SKULL:  No acute abnormality of the visualized skull or soft  tissues.  Impression: Stable CT of the head with no

## 2025-04-13 NOTE — FLOWSHEET NOTE
04/13/25 0802   Vital Signs   Temp 98 °F (36.7 °C)   Temp Source Oral   Pulse (!) 110   Heart Rate Source Monitor   Respirations 18   BP (!) 144/86   MAP (Calculated) 105   BP Location Left upper arm   BP Method Automatic   Oxygen Therapy   SpO2 97 %   O2 Device Nasal cannula   O2 Flow Rate (L/min) 2 L/min  (1 liter decreased)     Am assessment complete,plan of care discussed

## 2025-04-13 NOTE — PROGRESS NOTES
Transferred care to SARAH Gould. Face to face bedside report given, no need voiced at this time. Pt in bed with eyes closed.   No signs of distress noted.  Call light within reach.   Denies needs.

## 2025-04-13 NOTE — PLAN OF CARE
Problem: Discharge Planning  Goal: Discharge to home or other facility with appropriate resources  Outcome: Progressing  Flowsheets (Taken 4/12/2025 2101)  Discharge to home or other facility with appropriate resources: Identify barriers to discharge with patient and caregiver     Problem: Safety - Adult  Goal: Free from fall injury  Outcome: Progressing     Problem: Respiratory - Adult  Goal: Achieves optimal ventilation and oxygenation  Outcome: Progressing  Flowsheets (Taken 4/12/2025 2101)  Achieves optimal ventilation and oxygenation:   Assess for changes in respiratory status   Assess for changes in mentation and behavior   Position to facilitate oxygenation and minimize respiratory effort   Oxygen supplementation based on oxygen saturation or arterial blood gases   Assess and instruct to report shortness of breath or any respiratory difficulty     Problem: Cardiovascular - Adult  Goal: Maintains optimal cardiac output and hemodynamic stability  Outcome: Progressing  Flowsheets (Taken 4/12/2025 2101)  Maintains optimal cardiac output and hemodynamic stability:   Monitor blood pressure and heart rate   Monitor urine output and notify Licensed Independent Practitioner for values outside of normal range  Goal: Absence of cardiac dysrhythmias or at baseline  Outcome: Progressing  Flowsheets (Taken 4/12/2025 2101)  Absence of cardiac dysrhythmias or at baseline: Monitor cardiac rate and rhythm     Problem: Nutrition Deficit:  Goal: Optimize nutritional status  Outcome: Progressing

## 2025-04-13 NOTE — PROGRESS NOTES
CARDIOLOGY PROGRESS NOTE      Patient Name: Bonny Serrano  Date of admission: 4/10/2025  2:35 PM  Admission Dx: Hypokalemia [E87.6]  Hypomagnesemia [E83.42]  Hyponatremia [E87.1]  Dyspnea and respiratory abnormalities [R06.00, R06.89]  Reason for Consult:  chest pain and syncope  Requesting Physician: Timmy Lim MD  Primary Care physician: Brenda Carrillo, PEEWEE - CNP    Subjective:     Bonny Serrano is a 55 y.o. woman with a prior medical history notable for hyperlipidemia, hypertension, coronary artery disease by CT, COPD with chronic hypoxic respiratory failure 2L, cavitary lung disease, history of PE, possible atrial fibrillation, alcohol use disorder who presented to the hospital on 4/10/2025 with complaints of weakness and chest discomfort.  Patient also had syncopal episode at home which prompted ED evaluation.      04/13 -- Stress test was abnormal this admit and results evaluated by my colleagues Dr Martinez and Willie who recommend continued med mgmt at this time due to ongoing anemia management.  No acute events overnight, no CP or SOB.  EGD/colo scheduled for tmrw.      Home Medications:  Were reviewed and are listed in nursing record and/or below  Prior to Admission medications    Medication Sig Start Date End Date Taking? Authorizing Provider   predniSONE (DELTASONE) 10 MG tablet Take 3 tabs a day for 3 days, Then 2 tabs a day for 3 days , Then 1 tab a day for 3 days. 3/28/25   Julian Dill MD   lactobacillus (CULTURELLE) capsule Take 1 capsule by mouth daily 3/28/25   Julian Dill MD   atorvastatin (LIPITOR) 10 MG tablet Take 1 tablet by mouth nightly    ProviderPan MD   DULoxetine (CYMBALTA) 60 MG extended release capsule Take 1 capsule by mouth daily    Pan Peterson MD   omeprazole (PRILOSEC) 20 MG delayed release capsule Take 1 capsule by mouth daily    Pan Peterson MD   budesonide-formoterol (SYMBICORT) 160-4.5 MCG/ACT AERO Inhale 2 puffs into the

## 2025-04-13 NOTE — PLAN OF CARE
Problem: Discharge Planning  Goal: Discharge to home or other facility with appropriate resources  4/13/2025 1015 by Bryanna Baldwin RN  Outcome: Progressing  4/13/2025 0144 by Kim Dial RN  Outcome: Progressing  Flowsheets (Taken 4/12/2025 2101)  Discharge to home or other facility with appropriate resources: Identify barriers to discharge with patient and caregiver     Problem: Safety - Adult  Goal: Free from fall injury  4/13/2025 1015 by Bryanna Baldwin RN  Outcome: Progressing  4/13/2025 0144 by Kim Dial RN  Outcome: Progressing     Problem: Respiratory - Adult  Goal: Achieves optimal ventilation and oxygenation  4/13/2025 1015 by Bryanna Baldwin RN  Outcome: Progressing  4/13/2025 0144 by Kim Dial RN  Outcome: Progressing  Flowsheets (Taken 4/12/2025 2101)  Achieves optimal ventilation and oxygenation:   Assess for changes in respiratory status   Assess for changes in mentation and behavior   Position to facilitate oxygenation and minimize respiratory effort   Oxygen supplementation based on oxygen saturation or arterial blood gases   Assess and instruct to report shortness of breath or any respiratory difficulty     Problem: Cardiovascular - Adult  Goal: Maintains optimal cardiac output and hemodynamic stability  4/13/2025 1015 by Bryanna Baldwin RN  Outcome: Progressing  4/13/2025 0144 by Kim Dial RN  Outcome: Progressing  Flowsheets (Taken 4/12/2025 2101)  Maintains optimal cardiac output and hemodynamic stability:   Monitor blood pressure and heart rate   Monitor urine output and notify Licensed Independent Practitioner for values outside of normal range  Goal: Absence of cardiac dysrhythmias or at baseline  4/13/2025 1015 by Bryanna Baldwin RN  Outcome: Progressing  4/13/2025 0144 by Kim Dial RN  Outcome: Progressing  Flowsheets (Taken 4/12/2025 2101)  Absence of cardiac dysrhythmias or at baseline: Monitor cardiac rate and rhythm     Problem:

## 2025-04-13 NOTE — PROGRESS NOTES
04/13/25 0800   RT Protocol   History Pulmonary Disease 2   Respiratory pattern 2   Breath sounds 4   Cough 1   Indications for Bronchodilator Therapy Wheezing associated with pulm disorder   Bronchodilator Assessment Score 9

## 2025-04-13 NOTE — PROGRESS NOTES
/70   Pulse 100   Temp 97.3 °F (36.3 °C) (Oral)   Resp 18   Ht 1.549 m (5' 0.98\")   Wt 38 kg (83 lb 12.8 oz)   LMP 07/12/2015   SpO2 96%   BMI 15.84 kg/m²     Patient alert and oriented resting in bed.  With telemetry. With O2 at 2  lpm via nasal canula  Assessment completed. Respiration easy, even and unlabored. Patient tolerated night meds well. Call light and bedside table within reach. Bed at lowest position, locked, side rails x2. Pt denies needs at this time.

## 2025-04-14 ENCOUNTER — ANESTHESIA EVENT (OUTPATIENT)
Dept: ENDOSCOPY | Age: 55
DRG: 207 | End: 2025-04-14
Payer: MEDICAID

## 2025-04-14 ENCOUNTER — ANESTHESIA (OUTPATIENT)
Dept: ENDOSCOPY | Age: 55
DRG: 207 | End: 2025-04-14
Payer: MEDICAID

## 2025-04-14 ENCOUNTER — TELEPHONE (OUTPATIENT)
Dept: CARDIOLOGY CLINIC | Age: 55
End: 2025-04-14

## 2025-04-14 DIAGNOSIS — R55 SYNCOPE, UNSPECIFIED SYNCOPE TYPE: Primary | ICD-10-CM

## 2025-04-14 LAB — VANCOMYCIN TROUGH SERPL-MCNC: 11.2 UG/ML (ref 10–20)

## 2025-04-14 PROCEDURE — 7100000011 HC PHASE II RECOVERY - ADDTL 15 MIN: Performed by: INTERNAL MEDICINE

## 2025-04-14 PROCEDURE — 6360000002 HC RX W HCPCS: Performed by: INTERNAL MEDICINE

## 2025-04-14 PROCEDURE — 2580000003 HC RX 258: Performed by: NURSE ANESTHETIST, CERTIFIED REGISTERED

## 2025-04-14 PROCEDURE — 3700000000 HC ANESTHESIA ATTENDED CARE: Performed by: INTERNAL MEDICINE

## 2025-04-14 PROCEDURE — 2709999900 HC NON-CHARGEABLE SUPPLY: Performed by: INTERNAL MEDICINE

## 2025-04-14 PROCEDURE — 94761 N-INVAS EAR/PLS OXIMETRY MLT: CPT

## 2025-04-14 PROCEDURE — 2060000000 HC ICU INTERMEDIATE R&B

## 2025-04-14 PROCEDURE — 0DB98ZX EXCISION OF DUODENUM, VIA NATURAL OR ARTIFICIAL OPENING ENDOSCOPIC, DIAGNOSTIC: ICD-10-PCS | Performed by: INTERNAL MEDICINE

## 2025-04-14 PROCEDURE — 6370000000 HC RX 637 (ALT 250 FOR IP): Performed by: INTERNAL MEDICINE

## 2025-04-14 PROCEDURE — 99232 SBSQ HOSP IP/OBS MODERATE 35: CPT | Performed by: INTERNAL MEDICINE

## 2025-04-14 PROCEDURE — 7100000010 HC PHASE II RECOVERY - FIRST 15 MIN: Performed by: INTERNAL MEDICINE

## 2025-04-14 PROCEDURE — 3609009900 HC COLONOSCOPY W/CONTROL BLEEDING ANY METHOD: Performed by: INTERNAL MEDICINE

## 2025-04-14 PROCEDURE — 88312 SPECIAL STAINS GROUP 1: CPT

## 2025-04-14 PROCEDURE — 2580000003 HC RX 258: Performed by: INTERNAL MEDICINE

## 2025-04-14 PROCEDURE — 0DB78ZX EXCISION OF STOMACH, PYLORUS, VIA NATURAL OR ARTIFICIAL OPENING ENDOSCOPIC, DIAGNOSTIC: ICD-10-PCS | Performed by: INTERNAL MEDICINE

## 2025-04-14 PROCEDURE — 0DB18ZX EXCISION OF UPPER ESOPHAGUS, VIA NATURAL OR ARTIFICIAL OPENING ENDOSCOPIC, DIAGNOSTIC: ICD-10-PCS | Performed by: INTERNAL MEDICINE

## 2025-04-14 PROCEDURE — 6370000000 HC RX 637 (ALT 250 FOR IP): Performed by: STUDENT IN AN ORGANIZED HEALTH CARE EDUCATION/TRAINING PROGRAM

## 2025-04-14 PROCEDURE — 94669 MECHANICAL CHEST WALL OSCILL: CPT

## 2025-04-14 PROCEDURE — 0DBP8ZZ EXCISION OF RECTUM, VIA NATURAL OR ARTIFICIAL OPENING ENDOSCOPIC: ICD-10-PCS | Performed by: INTERNAL MEDICINE

## 2025-04-14 PROCEDURE — 36415 COLL VENOUS BLD VENIPUNCTURE: CPT

## 2025-04-14 PROCEDURE — 3609010600 HC COLONOSCOPY POLYPECTOMY SNARE/COLD BIOPSY: Performed by: INTERNAL MEDICINE

## 2025-04-14 PROCEDURE — 2700000000 HC OXYGEN THERAPY PER DAY

## 2025-04-14 PROCEDURE — C1889 IMPLANT/INSERT DEVICE, NOC: HCPCS | Performed by: INTERNAL MEDICINE

## 2025-04-14 PROCEDURE — 6360000002 HC RX W HCPCS: Performed by: NURSE ANESTHETIST, CERTIFIED REGISTERED

## 2025-04-14 PROCEDURE — 80202 ASSAY OF VANCOMYCIN: CPT

## 2025-04-14 PROCEDURE — 3609012400 HC EGD TRANSORAL BIOPSY SINGLE/MULTIPLE: Performed by: INTERNAL MEDICINE

## 2025-04-14 PROCEDURE — 0DBN8ZZ EXCISION OF SIGMOID COLON, VIA NATURAL OR ARTIFICIAL OPENING ENDOSCOPIC: ICD-10-PCS | Performed by: INTERNAL MEDICINE

## 2025-04-14 PROCEDURE — 94640 AIRWAY INHALATION TREATMENT: CPT

## 2025-04-14 PROCEDURE — 2500000003 HC RX 250 WO HCPCS: Performed by: INTERNAL MEDICINE

## 2025-04-14 PROCEDURE — 88305 TISSUE EXAM BY PATHOLOGIST: CPT

## 2025-04-14 PROCEDURE — 0DBL8ZZ EXCISION OF TRANSVERSE COLON, VIA NATURAL OR ARTIFICIAL OPENING ENDOSCOPIC: ICD-10-PCS | Performed by: INTERNAL MEDICINE

## 2025-04-14 PROCEDURE — 3700000001 HC ADD 15 MINUTES (ANESTHESIA): Performed by: INTERNAL MEDICINE

## 2025-04-14 DEVICE — WORKING LENGTH 235CM, WORKING CHANNEL 2.8MM
Type: IMPLANTABLE DEVICE | Status: FUNCTIONAL
Brand: RESOLUTION 360 CLIP

## 2025-04-14 RX ORDER — SODIUM CHLORIDE, SODIUM LACTATE, POTASSIUM CHLORIDE, CALCIUM CHLORIDE 600; 310; 30; 20 MG/100ML; MG/100ML; MG/100ML; MG/100ML
INJECTION, SOLUTION INTRAVENOUS
Status: DISCONTINUED | OUTPATIENT
Start: 2025-04-14 | End: 2025-04-14 | Stop reason: SDUPTHER

## 2025-04-14 RX ORDER — LIDOCAINE HYDROCHLORIDE 20 MG/ML
INJECTION, SOLUTION EPIDURAL; INFILTRATION; INTRACAUDAL; PERINEURAL
Status: DISCONTINUED | OUTPATIENT
Start: 2025-04-14 | End: 2025-04-14 | Stop reason: SDUPTHER

## 2025-04-14 RX ORDER — PROPOFOL 10 MG/ML
INJECTION, EMULSION INTRAVENOUS
Status: DISCONTINUED | OUTPATIENT
Start: 2025-04-14 | End: 2025-04-14 | Stop reason: SDUPTHER

## 2025-04-14 RX ADMIN — SODIUM CHLORIDE 100 MG: 9 INJECTION, SOLUTION INTRAVENOUS at 11:11

## 2025-04-14 RX ADMIN — PROPOFOL 200 MG: 10 INJECTION, EMULSION INTRAVENOUS at 12:58

## 2025-04-14 RX ADMIN — SODIUM CHLORIDE, PRESERVATIVE FREE 10 ML: 5 INJECTION INTRAVENOUS at 20:37

## 2025-04-14 RX ADMIN — IPRATROPIUM BROMIDE AND ALBUTEROL SULFATE 1 DOSE: 2.5; .5 SOLUTION RESPIRATORY (INHALATION) at 07:08

## 2025-04-14 RX ADMIN — SODIUM CHLORIDE, PRESERVATIVE FREE 10 ML: 5 INJECTION INTRAVENOUS at 10:54

## 2025-04-14 RX ADMIN — IPRATROPIUM BROMIDE AND ALBUTEROL SULFATE 1 DOSE: 2.5; .5 SOLUTION RESPIRATORY (INHALATION) at 20:51

## 2025-04-14 RX ADMIN — BUDESONIDE AND FORMOTEROL FUMARATE DIHYDRATE 2 PUFF: 160; 4.5 AEROSOL RESPIRATORY (INHALATION) at 07:08

## 2025-04-14 RX ADMIN — DILTIAZEM HYDROCHLORIDE 240 MG: 240 CAPSULE, COATED, EXTENDED RELEASE ORAL at 10:48

## 2025-04-14 RX ADMIN — GUAIFENESIN 600 MG: 600 TABLET, EXTENDED RELEASE ORAL at 20:36

## 2025-04-14 RX ADMIN — TRAZODONE HYDROCHLORIDE 50 MG: 50 TABLET ORAL at 20:36

## 2025-04-14 RX ADMIN — DOXYCYCLINE HYCLATE 100 MG: 100 TABLET, COATED ORAL at 20:36

## 2025-04-14 RX ADMIN — ATORVASTATIN CALCIUM 40 MG: 40 TABLET, FILM COATED ORAL at 20:37

## 2025-04-14 RX ADMIN — IPRATROPIUM BROMIDE AND ALBUTEROL SULFATE 1 DOSE: 2.5; .5 SOLUTION RESPIRATORY (INHALATION) at 10:52

## 2025-04-14 RX ADMIN — VANCOMYCIN HYDROCHLORIDE 1000 MG: 1 INJECTION, POWDER, LYOPHILIZED, FOR SOLUTION INTRAVENOUS at 04:12

## 2025-04-14 RX ADMIN — VANCOMYCIN HYDROCHLORIDE 1000 MG: 1 INJECTION, POWDER, LYOPHILIZED, FOR SOLUTION INTRAVENOUS at 23:48

## 2025-04-14 RX ADMIN — IPRATROPIUM BROMIDE AND ALBUTEROL SULFATE 1 DOSE: 2.5; .5 SOLUTION RESPIRATORY (INHALATION) at 14:54

## 2025-04-14 RX ADMIN — SODIUM CHLORIDE, POTASSIUM CHLORIDE, SODIUM LACTATE AND CALCIUM CHLORIDE: 600; 310; 30; 20 INJECTION, SOLUTION INTRAVENOUS at 12:42

## 2025-04-14 RX ADMIN — BUDESONIDE AND FORMOTEROL FUMARATE DIHYDRATE 2 PUFF: 160; 4.5 AEROSOL RESPIRATORY (INHALATION) at 20:51

## 2025-04-14 RX ADMIN — ATENOLOL 25 MG: 25 TABLET ORAL at 10:48

## 2025-04-14 RX ADMIN — PROPOFOL 200 MG: 10 INJECTION, EMULSION INTRAVENOUS at 12:42

## 2025-04-14 RX ADMIN — VANCOMYCIN HYDROCHLORIDE 1000 MG: 1 INJECTION, POWDER, LYOPHILIZED, FOR SOLUTION INTRAVENOUS at 15:29

## 2025-04-14 RX ADMIN — LIDOCAINE HYDROCHLORIDE 60 MG: 20 INJECTION, SOLUTION EPIDURAL; INFILTRATION; INTRACAUDAL; PERINEURAL at 12:42

## 2025-04-14 ASSESSMENT — ENCOUNTER SYMPTOMS: SHORTNESS OF BREATH: 1

## 2025-04-14 NOTE — ANESTHESIA POSTPROCEDURE EVALUATION
Department of Anesthesiology  Postprocedure Note    Patient: Bonny Serrano  MRN: [id number]  YOB: 1970  Date of evaluation: 4/14/2025    Procedure Summary       Date: 04/14/25 Room / Location: [place] [address] / [facility]    Anesthesia Start: 1240 Anesthesia Stop: 1322    Procedures:       ESOPHAGOGASTRODUODENOSCOPY BIOPSY      COLONOSCOPY POLYPECTOMY SNARE/BIOPSY      COLONOSCOPY CONTROL HEMORRHAGE Diagnosis:       Anemia, unspecified type      (Anemia, unspecified type [D64.9])    Surgeons: Delmar Mac MD Responsible Provider: Pantera Henry MD    Anesthesia Type: general ASA Status: 3            Anesthesia Type: No value filed.    Phoenix Phase I: Phoenix Score: 9    Phoenix Phase II: Phoenix Score: 9    Anesthesia Post Evaluation    Comments: Postoperative Anesthesia Note    Name:    Bonny Serrano  MRN:      [id number]    Patient Vitals in the past 12 hrs:  04/14/25 1522, BP:125/74, Temp:97.7 °F (36.5 °C), Temp src:Axillary, Pulse:(!) 106, Resp:18, SpO2:91 %  04/14/25 1455, Pulse:100, Resp:18, SpO2:90 %  04/14/25 1351, BP:139/78, Temp:97.8 °F (36.6 °C), Temp src:Oral, Pulse:93, Resp:18, SpO2:90 %  04/14/25 1335, BP:129/76, Pulse:(!) 102, Resp:16, SpO2:100 %  04/14/25 1331, BP:108/70, Pulse:91, Resp:14, SpO2:94 %  04/14/25 1324, BP:(!) 86/62, Temp:97.6 °F (36.4 °C), Temp src:Axillary, Pulse:91, Resp:14, SpO2:92 %  04/14/25 1148, BP:(!) 146/85, Temp:97.5 °F (36.4 °C), Temp src:Temporal, Pulse:(!) 118, Resp:18, SpO2:95 %  04/14/25 1052, Pulse:(!) 128, Resp:18, SpO2:97 %  04/14/25 1046, BP:134/88, Temp:98.1 °F (36.7 °C), Temp src:Oral, Pulse:(!) 125, Resp:18, SpO2:97 %  04/14/25 0721, BP:(!) 147/77, Temp:97.9 °F (36.6 °C), Temp src:Oral, Pulse:(!) 120, Resp:20, SpO2:95 %  04/14/25 0708, Pulse:(!) 117, Resp:20, SpO2:98 %  04/14/25 0446, BP:(!) 155/90, Temp:98 °F (36.7 °C), Temp src:Oral, Pulse:(!) 107, Resp:18, SpO2:95 %     LABS:    CBC  Lab Results       Component

## 2025-04-14 NOTE — H&P
[facility]   Pre-operative History and Physical    Patient: Bonny Serrano  : 1970  Acct#:     HISTORY OF PRESENT ILLNESS:    The patient is a 55 y.o. female who presents for THERESA    Indications: THERESA    Past Medical History:        Diagnosis Date    A-fib (HCC)     Anemia     Anxiety     Chronic pancreatitis (HCC)     Collapse of right lung     COPD (chronic obstructive pulmonary disease) (HCC)     Depression     GI bleed     H/O colonoscopy     Hypertension     Pancreatitis     Pulmonary embolism 2018      Past Surgical History:        Procedure Laterality Date    COLONOSCOPY      HIP SURGERY Right 2024    RIGHT FEMUR GAMMA NAILING performed by Simba Man MD at [facility] OR    UPPER GASTROINTESTINAL ENDOSCOPY        Medications Prior to Admission:   No current facility-administered medications on file prior to encounter.     Current Outpatient Medications on File Prior to Encounter   Medication Sig Dispense Refill    predniSONE (DELTASONE) 10 MG tablet Take 3 tabs a day for 3 days, Then 2 tabs a day for 3 days , Then 1 tab a day for 3 days. 18 tablet 0    lactobacillus (CULTURELLE) capsule Take 1 capsule by mouth daily 30 capsule 0    atorvastatin (LIPITOR) 10 MG tablet Take 1 tablet by mouth nightly      DULoxetine (CYMBALTA) 60 MG extended release capsule Take 1 capsule by mouth daily      omeprazole (PRILOSEC) 20 MG delayed release capsule Take 1 capsule by mouth daily      budesonide-formoterol (SYMBICORT) 160-4.5 MCG/ACT AERO Inhale 2 puffs into the lungs in the morning and 2 puffs in the evening. 10.2 g 3    tiotropium (SPIRIVA RESPIMAT) 2.5 MCG/ACT AERS inhaler Inhale 2 puffs into the lungs daily 1 each 0    gabapentin (NEURONTIN) 300 MG capsule Take 1 capsule by mouth daily for 30 days. 30 capsule 0    atenolol (TENORMIN) 25 MG tablet Take 1 tablet by mouth daily 30 tablet 3    dilTIAZem (CARDIZEM CD) 240 MG extended release capsule Take 1 capsule by mouth daily 30 capsule 0

## 2025-04-14 NOTE — ANESTHESIA PRE PROCEDURE
hours.    Coags:   Lab Results   Component Value Date/Time    PROTIME 13.4 04/10/2025 02:45 PM    INR 1.00 04/10/2025 02:45 PM       HCG (If Applicable):   Lab Results   Component Value Date    HCGQUANT <5.0 04/11/2025        ABGs: No results found for: \"PHART\", \"PO2ART\", \"VHI1KSB\", \"WIS7SUG\", \"BEART\", \"F5BYKVDZ\"     Type & Screen (If Applicable):  Lab Results   Component Value Date    ABORH O POS 04/12/2025    LABANTI NEG 04/12/2025       Drug/Infectious Status (If Applicable):  No results found for: \"HIV\", \"HEPCAB\"    COVID-19 Screening (If Applicable):   Lab Results   Component Value Date/Time    COVID19 NOT DETECTED 04/10/2025 07:11 PM    COVID19 NOT DETECTED 08/27/2020 12:20 PM           Anesthesia Evaluation  Patient summary reviewed and Nursing notes reviewed   no history of anesthetic complications:   Airway: Mallampati: II     Neck ROM: full     Dental:          Pulmonary:Negative Pulmonary ROS and normal exam    (+) pneumonia:  COPD:  shortness of breath:                                    Cardiovascular:Negative CV ROS    (+) hypertension:, CAD:, dysrhythmias: atrial fibrillation, hyperlipidemia                  Neuro/Psych:   Negative Neuro/Psych ROS  (+) psychiatric history:             ROS comment: Alcohol withdrawal syndrome, with unspecified complication (HCC)   GI/Hepatic/Renal: Neg GI/Hepatic/Renal ROS       (-) hiatal hernia and GERD       Endo/Other: Negative Endo/Other ROS                    Abdominal:             Vascular:   + DVT, PE.       Other Findings:             Anesthesia Plan      general     ASA 3     (I discussed with the patient the risks and benefits of PIV, general anesthesia, IV Narcotics, PACU.  All questions were answered the patient agrees with the plan and wishes to proceed.  )  Induction: intravenous.                      Abnormal myocardial perfusion study.    Perfusion Defect: There is a left ventricular stress perfusion defect that is small to medium in size present in

## 2025-04-14 NOTE — PROGRESS NOTES
04/14/25 0712   Treatment   Treatment Type MDI   $Treatment Type $Inhaled Therapy/Meds   Medications Budesonide/Formoterol   Pre-Tx Pulse 115   Pre-Tx Resps 20   Breath Sounds Pre-Tx YE Expiratory wheezes   Breath Sounds Pre-Tx LLL Expiratory wheezes   Breath Sounds Pre-Tx RUL Expiratory wheezes   Breath Sounds Pre-Tx RML Expiratory wheezes   Breath Sounds Pre-Tx RLL Expiratory wheezes   Delivery Source MDI with spacer

## 2025-04-14 NOTE — PROGRESS NOTES
Progress Note    Admit Date:  4/10/2025    Presented with Chest pressure, SOB, and fatigue  Admitted for electrolyte abnormalities and chest pain  Has history of alcohol abuse.   Patient also gave history of syncope a few days ago while standing in her kitchen. She thinks she \"fell asleep\" while standing. She hit her head. She is denying vision changes and headache. She does not feel dizzy currently but has been the past few days.  CT head was done and this was negative.     Patient was just discharged from the hospital about 2 weeks ago.  She has continued to drink alcohol-although she says she has cut it down now    Seen by cardiology for chest pressure, completed stress test - which was abnormal.  Plan is for medical management    Pulmonology following for very severe COPD and history of cavitary lung disease-she was due for bronchoscopy and biopsy - not completed yet.    4/12 drop in hemoglobin -1 unit of PRBC transfused  GI consulted          Subjective:  Ms. Serrano seen .  Feels fatigued   On 2 L of O2        Objective:   BP (!) 147/77   Pulse (!) 120   Temp 97.9 °F (36.6 °C) (Oral)   Resp 20   Ht 1.549 m (5' 0.98\")   Wt 38 kg (83 lb 12.8 oz)   LMP 07/12/2015   SpO2 95%   BMI 15.84 kg/m²        Intake/Output Summary (Last 24 hours) at 4/14/2025 0852  Last data filed at 4/14/2025 0818  Gross per 24 hour   Intake 2690 ml   Output 400 ml   Net 2290 ml       Physical Exam:   Gen: No distress. Alert. Chronically ill appearing.  Appears frail.  Awake and well-oriented and in no distress  Eyes: PERRL. No sclera icterus. No conjunctival injection.   ENT: No discharge. Pharynx clear.   Neck: No JVD.  No Carotid Bruit. Trachea midline.  Resp: No accessory muscle use. No crackles. No rhonchi. Distant breath sounds.   CV: Regular rate. Regular rhythm. No murmur.  No rub. No edema.   Capillary Refill: Brisk,< 3 seconds   Peripheral Pulses: +2 palpable, equal bilaterally   GI: Epigastric area is tender.

## 2025-04-14 NOTE — FLOWSHEET NOTE
04/14/25 1046   Vital Signs   Temp 98.1 °F (36.7 °C)   Temp Source Oral   Pulse (!) 125   Heart Rate Source Monitor   Respirations 18   /88   MAP (Calculated) 103   BP Location Left upper arm   BP Method Automatic   Patient Position Sitting   Pain Assessment   Pain Assessment None - Denies Pain   Oxygen Therapy   SpO2 97 %   O2 Device Nasal cannula   O2 Flow Rate (L/min) 1 L/min     AM assessment complete. Pt a&o x4. NPO for EGD/colonoscopy today. Diltiazem and atenolol given for HR that will elevate to 140's. Dr Durham is aware of this. No edema. Lung sounds diminished with wheezing to LLL. She can maintain RA while at rest but with exertion she will need 1-2L. Call light and bedside table within reach.

## 2025-04-14 NOTE — CARE COORDINATION
12:10 PM  Chart review:   DC plan remains for pt to DC to [facility]. Pt's HENS and SANTOS completed by CHASTITY on 4/11. Pt's O2 equipment will need to be picked up by ESTEVAN. [facility]

## 2025-04-14 NOTE — PROGRESS NOTES
04/14/25 1052   Treatment   Treatment Type HHN   $Bronchial Hygiene $Oscillatory therapy   $Treatment Type $Inhaled Therapy/Meds   Medications Albuterol/Ipratropium   Pre-Tx Pulse 128   Pre-Tx Resps 18   Breath Sounds Pre-Tx YE Diminished   Breath Sounds Pre-Tx LLL Diminished   Breath Sounds Pre-Tx RUL Expiratory wheezes   Breath Sounds Pre-Tx RML Expiratory wheezes   Breath Sounds Pre-Tx RLL Expiratory wheezes   Breath Sounds Post-Tx YE Expiratory wheezes   Breath Sounds Post-Tx LLL Expiratory wheezes   Breath Sounds Post-Tx RUL Expiratory wheezes   Breath Sounds Post-Tx RML Expiratory wheezes   Breath Sounds Post-Tx RLL Expiratory wheezes   Delivery Source Air;Mask   Position Sitting   Treatment Tolerance Well   Is patient on O2? N   Oxygen Therapy/Pulse Ox   O2 Therapy Room air   Pulse (!) 128   Respirations 18   SpO2 97 %

## 2025-04-14 NOTE — PROGRESS NOTES
Pharmacy Vancomycin Consult     Vancomycin Day:   Current Dosin mg q12h  Current indication: UTI    Recent Labs     25  0310 25  0832 25  0434   BUN 5*  --  10   CREATININE <0.2*  --  <0.2*   WBC  --  3.2* 5.8       Estimated Creatinine Clearance: 193 mL/min (based on SCr of 0.2 mg/dL).    Trough: <4.0  AUC: 325    Assessment/Plan:  Patient is subtherapeutic on current dosing. Will increase to 1000 mg q8h starting with a dose sooner than next dosing interval would be. Expected AUC of 482. Next trough  @ 1900.     Elsy Britt PharmD, [facility], 2025 11:09 PM      Elsy Britt PharmD, [facility], 2025 11:08 PM

## 2025-04-14 NOTE — PROGRESS NOTES
Patient taken back up to the floor per transport at this time on O2 at 2L per NC, verified with [facility] that patient is picking up on tele.

## 2025-04-14 NOTE — PROGRESS NOTES
[facility]                                 Pulmonary Consult /Progress Note :                                                                    CC: sob     Subjective:   Cough with clear phlegm  2 L O2, uses 2 L at home  No events  SOB at base line         Intake/Output Summary (Last 24 hours) at 4/14/2025 1138  Last data filed at 4/14/2025 0818  Gross per 24 hour   Intake 2450 ml   Output 400 ml   Net 2050 ml       Exam:   /88   Pulse (!) 128   Temp 98.1 °F (36.7 °C) (Oral)   Resp 18   Ht 1.549 m (5' 0.98\")   Wt 38 kg (83 lb 12.8 oz)   LMP 07/12/2015   SpO2 97%   BMI 15.84 kg/m²  on 2 L  Constitutional:  No acute distress. .   HEENT: no scleral icterus  Neck: No tracheal deviation present.    Cardiovascular: Normal heart sounds.   Pulmonary/Chest: Few wheezes. No rhonchi.  Few rales. No decreased breath sounds.  No accessory muscle usage or stridor.   Abdominal: Soft.   Musculoskeletal: No cyanosis. No clubbing.  Skin: Skin is warm and dry.       Scheduled Meds:   iron sucrose  100 mg IntraVENous Q24H    atorvastatin  40 mg Oral Nightly    polyethylene glycol  2,000 mL Oral Once    vancomycin  1,000 mg IntraVENous Q8H    predniSONE  40 mg Oral Daily    guaiFENesin  600 mg Oral BID    doxycycline hyclate  100 mg Oral 2 times per day    atenolol  25 mg Oral Daily    dilTIAZem  240 mg Oral Daily    DULoxetine  60 mg Oral Daily    folic acid  1 mg Oral Daily    gabapentin  300 mg Oral Daily    lactobacillus  1 capsule Oral Daily    multivitamin  1 tablet Oral Daily    pantoprazole  40 mg Oral QAM AC    thiamine  100 mg Oral Daily    sodium chloride flush  5-40 mL IntraVENous 2 times per day    enoxaparin  30 mg SubCUTAneous Daily    potassium chloride  20 mEq Oral Daily with breakfast    magnesium oxide  400 mg Oral Daily    aspirin  81 mg Oral Daily    ipratropium 0.5 mg-albuterol 2.5 mg  1 Dose Inhalation 4x Daily

## 2025-04-14 NOTE — PLAN OF CARE
Problem: Discharge Planning  Goal: Discharge to home or other facility with appropriate resources  4/13/2025 2011 by Lily Melchor RN  Outcome: Progressing  4/13/2025 1015 by Bryanna Baldwin RN  Outcome: Progressing     Problem: Safety - Adult  Goal: Free from fall injury  4/13/2025 2011 by Lily Melchor RN  Outcome: Progressing  4/13/2025 1015 by Bryanna Baldwin RN  Outcome: Progressing     Problem: Respiratory - Adult  Goal: Achieves optimal ventilation and oxygenation  4/13/2025 2011 by Lily Melchor RN  Outcome: Progressing  4/13/2025 1015 by Bryanna Baldwin RN  Outcome: Progressing     Problem: Cardiovascular - Adult  Goal: Maintains optimal cardiac output and hemodynamic stability  4/13/2025 2011 by Lily Melchor RN  Outcome: Progressing  4/13/2025 1015 by Bryanna Baldwin RN  Outcome: Progressing  Goal: Absence of cardiac dysrhythmias or at baseline  4/13/2025 2011 by Lily Melchor RN  Outcome: Progressing  4/13/2025 1015 by Bryanna Baldwin RN  Outcome: Progressing

## 2025-04-14 NOTE — PROGRESS NOTES
RT Inhaler-Nebulizer Bronchodilator Protocol Note    There is a bronchodilator order in the chart from a provider indicating to follow the RT Bronchodilator Protocol and there is an “Initiate RT Inhaler-Nebulizer Bronchodilator Protocol” order as well (see protocol at bottom of note).    CXR Findings:  No results found.    The findings from the last RT Protocol Assessment were as follows:   History Pulmonary Disease: (P) Chronic pulmonary disease  Respiratory Pattern: (P) Dyspnea on exertion or RR 21-25 bpm  Breath Sounds: (P) Intermittent or unilateral wheezes  Cough: (P) Strong, productive  Indication for Bronchodilator Therapy: (P) Wheezing associated with pulm disorder  Bronchodilator Assessment Score: (P) 9    Aerosolized bronchodilator medication orders have been revised according to the RT Inhaler-Nebulizer Bronchodilator Protocol below.    Respiratory Therapist to perform RT Therapy Protocol Assessment initially then follow the protocol.  Repeat RT Therapy Protocol Assessment PRN for score 0-3 or on second treatment, BID, and PRN for scores above 3.    No Indications - adjust the frequency to every 6 hours PRN wheezing or bronchospasm, if no treatments needed after 48 hours then discontinue using Per Protocol order mode.     If indication present, adjust the RT bronchodilator orders based on the Bronchodilator Assessment Score as indicated below.  Use Inhaler orders unless patient has one or more of the following: on home nebulizer, not able to hold breath for 10 seconds, is not alert and oriented, cannot activate and use MDI correctly, or respiratory rate 25 breaths per minute or more, then use the equivalent nebulizer order(s) with same Frequency and PRN reasons based on the score.  If a patient is on this medication at home then do not decrease Frequency below that used at home.    0-3 - enter or revise RT bronchodilator order(s) to equivalent RT Bronchodilator order with Frequency of every 4 hours PRN  for wheezing or increased work of breathing using Per Protocol order mode.        4-6 - enter or revise RT Bronchodilator order(s) to two equivalent RT bronchodilator orders with one order with BID Frequency and one order with Frequency of every 4 hours PRN wheezing or increased work of breathing using Per Protocol order mode.        7-10 - enter or revise RT Bronchodilator order(s) to two equivalent RT bronchodilator orders with one order with TID Frequency and one order with Frequency of every 4 hours PRN wheezing or increased work of breathing using Per Protocol order mode.       11-13 - enter or revise RT Bronchodilator order(s) to one equivalent RT bronchodilator order with QID Frequency and an Albuterol order with Frequency of every 4 hours PRN wheezing or increased work of breathing using Per Protocol order mode.      Greater than 13 - enter or revise RT Bronchodilator order(s) to one equivalent RT bronchodilator order with every 4 hours Frequency and an Albuterol order with Frequency of every 2 hours PRN wheezing or increased work of breathing using Per Protocol order mode.     RT to enter RT Home Evaluation for COPD & MDI Assessment order using Per Protocol order mode.    Electronically signed by Azucena Townsend RCP on 4/14/2025 at 7:19 AM

## 2025-04-15 ENCOUNTER — TELEPHONE (OUTPATIENT)
Dept: CARDIOLOGY CLINIC | Age: 55
End: 2025-04-15

## 2025-04-15 ENCOUNTER — TELEPHONE (OUTPATIENT)
Dept: OTHER | Age: 55
End: 2025-04-15

## 2025-04-15 ENCOUNTER — ANCILLARY PROCEDURE (OUTPATIENT)
Dept: CARDIOLOGY CLINIC | Age: 55
End: 2025-04-15

## 2025-04-15 VITALS
TEMPERATURE: 98.6 F | HEIGHT: 61 IN | SYSTOLIC BLOOD PRESSURE: 142 MMHG | DIASTOLIC BLOOD PRESSURE: 73 MMHG | HEART RATE: 92 BPM | BODY MASS INDEX: 15.82 KG/M2 | RESPIRATION RATE: 16 BRPM | WEIGHT: 83.8 LBS | OXYGEN SATURATION: 97 %

## 2025-04-15 DIAGNOSIS — R55 SYNCOPE, UNSPECIFIED SYNCOPE TYPE: ICD-10-CM

## 2025-04-15 LAB
ANION GAP SERPL CALCULATED.3IONS-SCNC: 10 MMOL/L (ref 3–16)
BACTERIA BLD CULT ORG #2: NORMAL
BACTERIA BLD CULT: NORMAL
BUN SERPL-MCNC: 10 MG/DL (ref 7–20)
CALCIUM SERPL-MCNC: 8.4 MG/DL (ref 8.3–10.6)
CHLORIDE SERPL-SCNC: 97 MMOL/L (ref 99–110)
CO2 SERPL-SCNC: 30 MMOL/L (ref 21–32)
CREAT SERPL-MCNC: 0.3 MG/DL (ref 0.6–1.1)
DEPRECATED RDW RBC AUTO: 21.2 % (ref 12.4–15.4)
ECHO BSA: 1.28 M2
GFR SERPLBLD CREATININE-BSD FMLA CKD-EPI: >90 ML/MIN/{1.73_M2}
GLUCOSE SERPL-MCNC: 137 MG/DL (ref 70–99)
HCT VFR BLD AUTO: 32.7 % (ref 36–48)
HGB BLD-MCNC: 10.4 G/DL (ref 12–16)
MCH RBC QN AUTO: 25.8 PG (ref 26–34)
MCHC RBC AUTO-ENTMCNC: 31.7 G/DL (ref 31–36)
MCV RBC AUTO: 81.4 FL (ref 80–100)
PLATELET # BLD AUTO: 261 K/UL (ref 135–450)
PMV BLD AUTO: 6.8 FL (ref 5–10.5)
POTASSIUM SERPL-SCNC: 3.7 MMOL/L (ref 3.5–5.1)
RBC # BLD AUTO: 4.02 M/UL (ref 4–5.2)
SODIUM SERPL-SCNC: 137 MMOL/L (ref 136–145)
WBC # BLD AUTO: 7.1 K/UL (ref 4–11)

## 2025-04-15 PROCEDURE — 6360000002 HC RX W HCPCS: Performed by: INTERNAL MEDICINE

## 2025-04-15 PROCEDURE — 94669 MECHANICAL CHEST WALL OSCILL: CPT

## 2025-04-15 PROCEDURE — 6370000000 HC RX 637 (ALT 250 FOR IP): Performed by: INTERNAL MEDICINE

## 2025-04-15 PROCEDURE — 36415 COLL VENOUS BLD VENIPUNCTURE: CPT

## 2025-04-15 PROCEDURE — 6370000000 HC RX 637 (ALT 250 FOR IP): Performed by: STUDENT IN AN ORGANIZED HEALTH CARE EDUCATION/TRAINING PROGRAM

## 2025-04-15 PROCEDURE — 99239 HOSP IP/OBS DSCHRG MGMT >30: CPT | Performed by: INTERNAL MEDICINE

## 2025-04-15 PROCEDURE — 2700000000 HC OXYGEN THERAPY PER DAY

## 2025-04-15 PROCEDURE — 2580000003 HC RX 258: Performed by: INTERNAL MEDICINE

## 2025-04-15 PROCEDURE — 99232 SBSQ HOSP IP/OBS MODERATE 35: CPT | Performed by: INTERNAL MEDICINE

## 2025-04-15 PROCEDURE — 85027 COMPLETE CBC AUTOMATED: CPT

## 2025-04-15 PROCEDURE — 2500000003 HC RX 250 WO HCPCS: Performed by: INTERNAL MEDICINE

## 2025-04-15 PROCEDURE — 94761 N-INVAS EAR/PLS OXIMETRY MLT: CPT

## 2025-04-15 PROCEDURE — 80048 BASIC METABOLIC PNL TOTAL CA: CPT

## 2025-04-15 PROCEDURE — 94640 AIRWAY INHALATION TREATMENT: CPT

## 2025-04-15 RX ORDER — FLUCONAZOLE 100 MG/1
400 TABLET ORAL DAILY
Status: DISCONTINUED | OUTPATIENT
Start: 2025-04-15 | End: 2025-04-15 | Stop reason: HOSPADM

## 2025-04-15 RX ORDER — DOXYCYCLINE HYCLATE 100 MG
100 TABLET ORAL EVERY 12 HOURS SCHEDULED
Qty: 4 TABLET | Refills: 0
Start: 2025-04-15 | End: 2025-04-17

## 2025-04-15 RX ORDER — IPRATROPIUM BROMIDE AND ALBUTEROL SULFATE 2.5; .5 MG/3ML; MG/3ML
1 SOLUTION RESPIRATORY (INHALATION) EVERY 4 HOURS PRN
Status: DISCONTINUED | OUTPATIENT
Start: 2025-04-15 | End: 2025-04-15 | Stop reason: HOSPADM

## 2025-04-15 RX ORDER — ASPIRIN 81 MG/1
81 TABLET, CHEWABLE ORAL DAILY
Qty: 30 TABLET | Refills: 3
Start: 2025-04-15

## 2025-04-15 RX ORDER — PREDNISONE 10 MG/1
TABLET ORAL
Qty: 18 TABLET | Refills: 0
Start: 2025-04-15

## 2025-04-15 RX ORDER — FLUCONAZOLE 200 MG/1
400 TABLET ORAL DAILY
Qty: 20 TABLET | Refills: 0
Start: 2025-04-15 | End: 2025-04-25

## 2025-04-15 RX ORDER — ATORVASTATIN CALCIUM 40 MG/1
40 TABLET, FILM COATED ORAL NIGHTLY
Qty: 30 TABLET | Refills: 0
Start: 2025-04-15

## 2025-04-15 RX ADMIN — PREDNISONE 40 MG: 20 TABLET ORAL at 08:37

## 2025-04-15 RX ADMIN — ASPIRIN 81 MG: 81 TABLET, CHEWABLE ORAL at 08:37

## 2025-04-15 RX ADMIN — SODIUM CHLORIDE, PRESERVATIVE FREE 10 ML: 5 INJECTION INTRAVENOUS at 08:46

## 2025-04-15 RX ADMIN — FLUCONAZOLE 400 MG: 100 TABLET ORAL at 08:37

## 2025-04-15 RX ADMIN — Medication 100 MG: at 08:37

## 2025-04-15 RX ADMIN — IPRATROPIUM BROMIDE AND ALBUTEROL SULFATE 1 DOSE: 2.5; .5 SOLUTION RESPIRATORY (INHALATION) at 10:50

## 2025-04-15 RX ADMIN — DULOXETINE 60 MG: 60 CAPSULE, DELAYED RELEASE ORAL at 08:37

## 2025-04-15 RX ADMIN — DOXYCYCLINE HYCLATE 100 MG: 100 TABLET, COATED ORAL at 08:37

## 2025-04-15 RX ADMIN — GUAIFENESIN 600 MG: 600 TABLET, EXTENDED RELEASE ORAL at 08:44

## 2025-04-15 RX ADMIN — GABAPENTIN 300 MG: 300 CAPSULE ORAL at 08:44

## 2025-04-15 RX ADMIN — Medication 400 MG: at 08:37

## 2025-04-15 RX ADMIN — MULTIVITAMIN TABLET 1 TABLET: TABLET at 08:37

## 2025-04-15 RX ADMIN — POTASSIUM CHLORIDE 20 MEQ: 20 TABLET, EXTENDED RELEASE ORAL at 08:36

## 2025-04-15 RX ADMIN — SODIUM CHLORIDE 100 MG: 9 INJECTION, SOLUTION INTRAVENOUS at 08:53

## 2025-04-15 RX ADMIN — DILTIAZEM HYDROCHLORIDE 240 MG: 240 CAPSULE, COATED, EXTENDED RELEASE ORAL at 08:37

## 2025-04-15 RX ADMIN — PANTOPRAZOLE SODIUM 40 MG: 40 TABLET, DELAYED RELEASE ORAL at 05:20

## 2025-04-15 RX ADMIN — Medication 1 CAPSULE: at 08:37

## 2025-04-15 RX ADMIN — FOLIC ACID 1 MG: 1 TABLET ORAL at 08:44

## 2025-04-15 RX ADMIN — BUDESONIDE AND FORMOTEROL FUMARATE DIHYDRATE 2 PUFF: 160; 4.5 AEROSOL RESPIRATORY (INHALATION) at 06:54

## 2025-04-15 RX ADMIN — IPRATROPIUM BROMIDE AND ALBUTEROL SULFATE 1 DOSE: 2.5; .5 SOLUTION RESPIRATORY (INHALATION) at 06:54

## 2025-04-15 RX ADMIN — ATENOLOL 25 MG: 25 TABLET ORAL at 08:37

## 2025-04-15 NOTE — PROGRESS NOTES
Pharmacy Vancomycin Consult     Vancomycin Day:   Current Dosin mg q8h  Current indication: UTI    Recent Labs     25  0310 25  0832 25  0434   BUN 5*  --  10   CREATININE <0.2*  --  <0.2*   WBC  --  3.2* 5.8       Estimated Creatinine Clearance: 193 mL/min (based on SCr of 0.2 mg/dL).    Trough: 11.2  AUC: 547    Assessment/Plan:  Patient is therapeutic at current dose. Continue with 1000 mg every 8 hours. Pharmacy will continue to monitor.     Elsy Britt PharmD, [facility], 4/15/2025 12:12 AM

## 2025-04-15 NOTE — TELEPHONE ENCOUNTER
Monitor placed by Parveen RN at discharge  Monitor company [employer]  Length of monitor 14 days  Monitor ordered by Ekweyrisbe   Patch ID [id number]  Patch ID [id number]    Phone ID [phone]  Activation successful prior to pt leaving office? Yes

## 2025-04-15 NOTE — PROGRESS NOTES
Progress Note    Admit Date:  4/10/2025    Presented with Chest pressure, SOB, and fatigue  Admitted for electrolyte abnormalities and chest pain  Has history of alcohol abuse.   Patient also gave history of syncope a few days ago while standing in her kitchen. She thinks she \"fell asleep\" while standing. She hit her head. She is denying vision changes and headache. She does not feel dizzy currently but has been the past few days.  CT head was done and this was negative.     Patient was just discharged from the hospital about 2 weeks ago.  She has continued to drink alcohol-although she says she has cut it down now    Seen by cardiology for chest pressure, completed stress test - which was abnormal.  Plan is for medical management    Pulmonology following for very severe COPD and history of cavitary lung disease-she was due for bronchoscopy and biopsy - not completed yet.    4/12 drop in hemoglobin -1 unit of PRBC transfused  GI consulted          Subjective:  Ms. Serrano seen .  Feels better today  On 1 L of O2        Objective:   /75   Pulse (!) 109   Temp 99 °F (37.2 °C) (Oral)   Resp 16   Ht 1.549 m (5' 0.98\")   Wt 38 kg (83 lb 12.8 oz)   LMP 07/12/2015   SpO2 92%   BMI 15.84 kg/m²        Intake/Output Summary (Last 24 hours) at 4/15/2025 0737  Last data filed at 4/14/2025 1432  Gross per 24 hour   Intake 465 ml   Output --   Net 465 ml       Physical Exam:   Gen: No distress. Alert. Chronically ill appearing.  Appears frail.  Awake and well-oriented and in no distress  Eyes: PERRL. No sclera icterus. No conjunctival injection.   ENT: No discharge. Pharynx clear.   Neck: No JVD.  No Carotid Bruit. Trachea midline.  Resp: No accessory muscle use. No crackles. No rhonchi. Distant breath sounds.   CV: Regular rate. Regular rhythm. No murmur.  No rub. No edema.   Capillary Refill: Brisk,< 3 seconds   Peripheral Pulses: +2 palpable, equal bilaterally   GI: Epigastric area is tender. Non-distended. No  Biopsied.  Start diflucan  Colonoscopy  Hemorrhoids found on perianal exam. Internal hemorrhoids. Three 3 to 8 mm polyps in the sigmoid colon, removed with a cold snare. Resected and retrieved. Clip was placed. Two polyps in the rectum, removed with a cold snare. Resected and retrieved. Three medium (7-9 mm) polyps in the transverse colon, removed with a cold snare. Resected and retrieved. Diverticulosis in the entire examined colon. The examination was otherwise normal on direct and retroflexion views. The examined portion of the ileum was normal.    #Syncopal episode  -pt reports \"falling asleep\" while standing in kitchen and hit her head  -cardiology consulted  -IVF  -CT head negative for any acute pathology.  - Syncope could be related to anemia    #UTI   -UA with leuks and WBC   -Urine Cx growing Enterococcus  - Patient on vancomycin-continue day #3  Now on doxy    #Severe COPD with acute exacerbation  #chronic hypoxic respiratory failure  #Cavitary lung lesion  -presented with SOB- possibly 2/2 anxiety/ panic attack   -CXR as above   -does not appear to be in acute exacerbation  -wears 2L prn O2 at baseline, requiring 2L here   -continue home meds  -pulmonology consulted   - doxy,prednisone   - Patient was supposed to have outpatient follow-up for bronchoscopy and biopsy -  has not done that yet.    #Paroxysmal atrial fibrillation  -not on home anticoagulation   -continue home atenolol and Cardizem    #Hypertension  -Monitor BP  -continue home atenolol and cardizem     #Acute on chronic normocytic anemia.  -hemoglobin 10.4  -monitor with daily CBC.    #Protein Calorie Malnutrition  -Body mass index is 15.84 kg/m².  -encourage proper nutritional intake  -dietary consult    #Peripheral neuropathy  -continue gabapentin    DVT Prophylaxis: Lovenox   Diet: ADULT DIET; Regular  Code Status: Full Code    Dc to [facility]     CASEY PASCUAL MD 4/15/2025 7:37 AM

## 2025-04-15 NOTE — PROGRESS NOTES
[facility]                                 Pulmonary Consult /Progress Note :                                                                    CC: sob     Subjective:   Doing a lot better  2 L O2, uses 2 L at home  No events  SOB at base line         Intake/Output Summary (Last 24 hours) at 4/15/2025 0906  Last data filed at 4/15/2025 0853  Gross per 24 hour   Intake 945 ml   Output --   Net 945 ml       Exam:   /69   Pulse (!) 135   Temp 98.6 °F (37 °C) (Oral)   Resp 20   Ht 1.549 m (5' 0.98\")   Wt 38 kg (83 lb 12.8 oz)   LMP 07/12/2015   SpO2 92%   BMI 15.84 kg/m²  on 2 L  Constitutional:  No acute distress. .   HEENT: no scleral icterus  Neck: No tracheal deviation present.    Cardiovascular: Normal heart sounds.   Pulmonary/Chest: Few wheezes. No rhonchi.  Few rales. No decreased breath sounds.  No accessory muscle usage or stridor.   Abdominal: Soft.   Musculoskeletal: No cyanosis. No clubbing.  Skin: Skin is warm and dry.       Scheduled Meds:   fluconazole  400 mg Oral Daily    iron sucrose  100 mg IntraVENous Q24H    atorvastatin  40 mg Oral Nightly    polyethylene glycol  2,000 mL Oral Once    predniSONE  40 mg Oral Daily    guaiFENesin  600 mg Oral BID    doxycycline hyclate  100 mg Oral 2 times per day    atenolol  25 mg Oral Daily    dilTIAZem  240 mg Oral Daily    DULoxetine  60 mg Oral Daily    folic acid  1 mg Oral Daily    gabapentin  300 mg Oral Daily    lactobacillus  1 capsule Oral Daily    multivitamin  1 tablet Oral Daily    pantoprazole  40 mg Oral QAM AC    thiamine  100 mg Oral Daily    sodium chloride flush  5-40 mL IntraVENous 2 times per day    enoxaparin  30 mg SubCUTAneous Daily    potassium chloride  20 mEq Oral Daily with breakfast    magnesium oxide  400 mg Oral Daily    aspirin  81 mg Oral Daily    ipratropium 0.5 mg-albuterol 2.5 mg  1 Dose Inhalation 4x Daily RT

## 2025-04-15 NOTE — PROGRESS NOTES
[facility]   COPD PROGRAM      NAME:  Bonny Serrano  AGE: 55 y.o.   GENDER: female  : 1970  TODAY'S DATE:  4/15/2025    Subjective:     VISIT TYPE: Education    ADMIT DATE: 4/10/2025    PAST MEDICAL HISTORY:      Diagnosis Date    A-fib (HCC)     Anemia     Anxiety     Chronic pancreatitis (HCC)     Collapse of right lung     COPD (chronic obstructive pulmonary disease) (HCC)     Depression     GI bleed     H/O colonoscopy     Hypertension     Pancreatitis     Pulmonary embolism 2018     HOME MEDICATIONS:  Prior to Admission medications    Medication Sig Start Date End Date Taking? Authorizing Provider   aspirin 81 MG chewable tablet Take 1 tablet by mouth daily 4/15/25  Yes Sohail Durham MD   fluconazole (DIFLUCAN) 200 MG tablet Take 2 tablets by mouth daily for 10 days 4/15/25 4/25/25 Yes Sohail Durham MD   atorvastatin (LIPITOR) 40 MG tablet Take 1 tablet by mouth nightly 4/15/25  Yes Sohail Durham MD   predniSONE (DELTASONE) 10 MG tablet Take 3 tabs a day for 3 days, Then 2 tabs a day for 3 days , Then 1 tab a day for 3 days. 4/15/25  Yes Sohail Durham MD   doxycycline hyclate (VIBRA-TABS) 100 MG tablet Take 1 tablet by mouth every 12 hours for 4 doses 4/15/25 4/17/25 Yes Sohail Durham MD   lactobacillus (CULTURELLE) capsule Take 1 capsule by mouth daily 3/28/25   Julian Dill MD   DULoxetine (CYMBALTA) 60 MG extended release capsule Take 1 capsule by mouth daily    Pan Peterson MD   omeprazole (PRILOSEC) 20 MG delayed release capsule Take 1 capsule by mouth daily    Pan Peterson MD   budesonide-formoterol (SYMBICORT) 160-4.5 MCG/ACT AERO Inhale 2 puffs into the lungs in the morning and 2 puffs in the evening. 25   Pantera Hollis MD   tiotropium (SPIRIVA RESPIMAT) 2.5 MCG/ACT AERS inhaler Inhale 2 puffs into the lungs daily 25   Pantera Hollis MD   gabapentin (NEURONTIN)

## 2025-04-15 NOTE — PROGRESS NOTES
Patient called writer due to pain and leaking at infusion site during vancomycin infusion.  Author stopped infusion, attempted to draw out any medication, and applied heat pack. Pharmacy and covering provider notified.  Small amount of soft redness proximal to site, pt denies any pain after heat pack application.

## 2025-04-15 NOTE — TELEPHONE ENCOUNTER
[facility]--CHW    Letter of Intent mailed to try to reach patient. 10 day deferment. If there is no response in this time frame, the referral will be closed.

## 2025-04-15 NOTE — CARE COORDINATION
Patient noted to have a discharge order.  Pt has been medically cleared to return to LTC (Long Term Care)    Patient discharged to   [address]  [place] [address]        Pre-cert Required/obtained: na  Transportation scheduled for 1330  Transportation provided by [facility]  AVS faxed and agency notified: Y    Family Notified: Y      Nurse to call report to facility  Phone: [phone]

## 2025-04-15 NOTE — PLAN OF CARE
Problem: Discharge Planning  Goal: Discharge to home or other facility with appropriate resources  Outcome: Progressing     Problem: Safety - Adult  Goal: Free from fall injury  Outcome: Progressing     Problem: Respiratory - Adult  Goal: Achieves optimal ventilation and oxygenation  Outcome: Progressing     Problem: Cardiovascular - Adult  Goal: Maintains optimal cardiac output and hemodynamic stability  Outcome: Progressing  Goal: Absence of cardiac dysrhythmias or at baseline  Outcome: Progressing     Problem: Nutrition Deficit:  Goal: Optimize nutritional status  Outcome: Progressing

## 2025-04-15 NOTE — PROGRESS NOTES
RT Inhaler-Nebulizer Bronchodilator Protocol Note    There is a bronchodilator order in the chart from a provider indicating to follow the RT Bronchodilator Protocol and there is an “Initiate RT Inhaler-Nebulizer Bronchodilator Protocol” order as well (see protocol at bottom of note).    CXR Findings:  No results found.    The findings from the last RT Protocol Assessment were as follows:   History Pulmonary Disease: (P) Chronic pulmonary disease  Respiratory Pattern: (P) Dyspnea on exertion or RR 21-25 bpm  Breath Sounds: (P) Intermittent or unilateral wheezes  Cough: (P) Strong, productive  Indication for Bronchodilator Therapy: (P) Decreased or absent breath sounds, Wheezing associated with pulm disorder  Bronchodilator Assessment Score: (P) 9    Aerosolized bronchodilator medication orders have been revised according to the RT Inhaler-Nebulizer Bronchodilator Protocol below.    Respiratory Therapist to perform RT Therapy Protocol Assessment initially then follow the protocol.  Repeat RT Therapy Protocol Assessment PRN for score 0-3 or on second treatment, BID, and PRN for scores above 3.    No Indications - adjust the frequency to every 6 hours PRN wheezing or bronchospasm, if no treatments needed after 48 hours then discontinue using Per Protocol order mode.     If indication present, adjust the RT bronchodilator orders based on the Bronchodilator Assessment Score as indicated below.  Use Inhaler orders unless patient has one or more of the following: on home nebulizer, not able to hold breath for 10 seconds, is not alert and oriented, cannot activate and use MDI correctly, or respiratory rate 25 breaths per minute or more, then use the equivalent nebulizer order(s) with same Frequency and PRN reasons based on the score.  If a patient is on this medication at home then do not decrease Frequency below that used at home.    0-3 - enter or revise RT bronchodilator order(s) to equivalent RT Bronchodilator order

## 2025-04-15 NOTE — DISCHARGE SUMMARY
Name:  Bonny Serrano  Room:  /0304-01  MRN:    [id number]    Discharge Summary      This discharge summary is in conjunction with a complete physical exam done on the day of discharge.      Discharging Physician: CASEY PASCUAL MD      Admit: 4/10/2025  Discharge:  4/15/2025     Diagnoses this Admission    Principal Problem:    Shortness of breath  Active Problems:    PAF (paroxysmal atrial fibrillation) (HCC)    Severe protein-calorie malnutrition    Chest pain    Hypomagnesemia    Chronic respiratory failure with hypoxia    Cavitary lung disease    Anemia    Abnormal stress test    Primary hypertension    Syncope and collapse    CAD in native artery  Resolved Problems:    * No resolved hospital problems. *          Procedures (Please Review Full Report for Details)  EGD  Colonoscopy     Consults    IP CONSULT TO CARDIOLOGY  IP CONSULT TO SOCIAL WORK  IP CONSULT TO PULMONOLOGY  IP CONSULT TO SOCIAL WORK  IP CONSULT TO DIETITIAN  IP CONSULT TO GI  IP CONSULT TO PHARMACY      HPI:  Bonny Serrano is a 55 y.o. female who presents with chest pain and shortness of breath.  Patient reports that she has these kind of symptoms at baseline.  Sharp chest pain is more with deep breath but happens constantly for several months.  Patient felt that the shortness of breath was worse than before uses 2 L oxygen at baseline.  Reported history of tobacco use, alcohol use and marijuana use.  Denies any nausea vomiting diarrhea constipation dizziness lightheadedness leg pain or leg swelling.  Patient does have wheezing.  To be admitted for the management of severe electrolyte deficiencies and on top of the generalized weakness with debility management.  Cardiology and pulmonology consulted           Hospital Course        #Acute on chronic hyponatremia in a patient with alcohol abuse  -Improved  #Alcohol Abuse  #Alcohol Withdrawal  #Generalized weakness  -reports drinking an occasional 6 pack, last drink Thursday  anemia     #UTI   -UA with leuks and WBC   -Urine Cx growing Enterococcus  - Patient on vancomycin-continue day #3  Now on doxy     #Severe COPD with acute exacerbation  #chronic hypoxic respiratory failure  #Cavitary lung lesion  -presented with SOB- possibly 2/2 anxiety/ panic attack   -CXR as above   -does not appear to be in acute exacerbation  -wears 2L prn O2 at baseline, requiring 2L here   -continue home meds  -pulmonology consulted   - doxy,prednisone   - Patient was supposed to have outpatient follow-up for bronchoscopy and biopsy -  has not done that yet.     #Paroxysmal atrial fibrillation  -not on home anticoagulation   -continue home atenolol and Cardizem     #Hypertension  -Monitor BP  -continue home atenolol and cardizem      #Acute on chronic normocytic anemia.  -hemoglobin 10.4  -monitor with daily CBC.     #Protein Calorie Malnutrition  -Body mass index is 15.84 kg/m².  -encourage proper nutritional intake  -dietary consult     #Peripheral neuropathy  -continue gabapentin      CT HEAD WO CONTRAST   Final Result   Stable CT of the head with no acute intracranial abnormality.         XR CHEST PORTABLE   Final Result   1. No acute cardiopulmonary process.   2. Right apical pleuroparenchymal scarring with calcified right hilar nodes.                Discharge Medications     Medication List        START taking these medications      aspirin 81 MG chewable tablet  Take 1 tablet by mouth daily     doxycycline hyclate 100 MG tablet  Commonly known as: VIBRA-TABS  Take 1 tablet by mouth every 12 hours for 4 doses     fluconazole 200 MG tablet  Commonly known as: DIFLUCAN  Take 2 tablets by mouth daily for 10 days            CHANGE how you take these medications      atorvastatin 40 MG tablet  Commonly known as: LIPITOR  Take 1 tablet by mouth nightly  What changed:   medication strength  how much to take            CONTINUE taking these medications      atenolol 25 MG tablet  Commonly known as:

## 2025-04-15 NOTE — PROGRESS NOTES
Called and spoke with Stacey with cardiology. They have order for event monitor and bring monitor over later.

## 2025-04-15 NOTE — FLOWSHEET NOTE
04/14/25 1955   Vital Signs   Temp 98.6 °F (37 °C)   Temp Source Oral   Pulse (!) 101   Heart Rate Source Monitor   Respirations 18   /80   MAP (Calculated) 99   BP Location Left upper arm   BP Method Automatic   Patient Position Sitting   Oxygen Therapy   SpO2 91 %   O2 Device Nasal cannula   O2 Flow Rate (L/min) 1 L/min     Shift assessment completed. Scheduled meds given per MAR.  Vital signs stable and in no visible distress. A/O x 4.  Denies any needs at this time.  Call light within reach.

## 2025-04-15 NOTE — FLOWSHEET NOTE
04/15/25 0834   Vital Signs   Temp 98.6 °F (37 °C)   Temp Source Oral   Pulse (!) 135   Heart Rate Source Monitor   Respirations 20   /69   MAP (Calculated) 92   BP Location Left upper arm   BP Method Automatic   Patient Position Sitting   Pain Assessment   Pain Assessment None - Denies Pain   Oxygen Therapy   O2 Device Nasal cannula   O2 Flow Rate (L/min) 2 L/min     AM assessment complete. Pt a&o x4. Pt to discharge today. She is okay with this. While ambulating or eating without oxygen spo2 will drop to the 80's. O2 reapplied at 2L via nc with spo2 increasing to 93%. She has bedside pulse ox. No edema. LCTA but diminished. Call light and bedside table within reach.

## 2025-04-15 NOTE — PROGRESS NOTES
Cardiac event monitor placed on pt per orders. Instructions explained to patient and she states understanding.

## 2025-04-15 NOTE — PROGRESS NOTES
Patient educated on discharge instructions as well as new medications use, dosage, administration and possible side effects.  Patient verified knowledge. IV removed without difficulty and dry dressing in place. Telemetry monitor removed and returned to [facility]. Pt left facility in stable condition to Nursing Home with all of their personal belongings. Cardiac event monitor in place. Attempted to call report 3 times for pt. First time other facility hung up on this writer, second time waiting for over 5 minutes on the phone, and third time waited over 5 minutes again. Will await for [place] to call this writer back.

## 2025-04-22 ENCOUNTER — COMMUNITY OUTREACH (OUTPATIENT)
Dept: OTHER | Age: 55
End: 2025-04-22

## 2025-04-22 NOTE — PROGRESS NOTES
[facility]--CHW    CHW called to make contact with patient. Patient answered. She was expecting my call. She stated that she is currently in [facility] for rehab therapy. She asked CHW about transportation and clothing. Patient stated [facility] is trying to help her with housing for whenever she is released from there. CHW asked patient who her Medicaid was thru and she stated [facility]. CHW advised patient that [facility] has transportation vans that can take her to her scheduled appointments. CHW will work on finding some clothing for patient and taking them to her.

## 2025-04-24 ENCOUNTER — TELEPHONE (OUTPATIENT)
Dept: CARDIAC REHAB | Age: 55
End: 2025-04-24

## 2025-04-24 NOTE — TELEPHONE ENCOUNTER
Patient does not have transportation to come to pulmonary rehab. Discussed transportation options available to patient. Patient will call her insurance and cart bus to see if they could bring her to appointments.

## 2025-04-30 ENCOUNTER — TELEPHONE (OUTPATIENT)
Dept: OTHER | Age: 55
End: 2025-04-30

## 2025-04-30 NOTE — TELEPHONE ENCOUNTER
[facility]--CHW    CHW called patient to set up a time to go meet with her in person. Patient did not answer. Voicemail was left for patient to return the call.

## 2025-05-02 ENCOUNTER — TELEPHONE (OUTPATIENT)
Dept: OTHER | Age: 55
End: 2025-05-02

## 2025-05-02 NOTE — TELEPHONE ENCOUNTER
[facility]--CHW    CHW missed patient's phone call. CHW called patient back and voicemail came on. CHW left patient a message to return the call.

## 2025-05-05 ENCOUNTER — COMMUNITY OUTREACH (OUTPATIENT)
Dept: OTHER | Age: 55
End: 2025-05-05

## 2025-05-05 ENCOUNTER — RESULTS FOLLOW-UP (OUTPATIENT)
Dept: CARDIOLOGY CLINIC | Age: 55
End: 2025-05-05

## 2025-05-05 LAB — ECHO BSA: 1.28 M2

## 2025-05-05 NOTE — PROGRESS NOTES
[facility]--CHW    Patient called CHW. CHW answered and explained to patient that it would be best if CHW went to see her in person. Patient said she would like that and that she would rather speak in person. CHW and patient scheduled the initial home visit for Tuesday, May 13th, at 12 noon at the rehab facility. Patient is in room 45A.

## 2025-05-13 ENCOUNTER — COMMUNITY OUTREACH (OUTPATIENT)
Dept: OTHER | Age: 55
End: 2025-05-13

## 2025-05-13 NOTE — PROGRESS NOTES
[facility]--CHW    CHW called patient to confirm our home visit today. Patient stated she still wants to meet to get some clothing resources and to speak with CHW in person.

## 2025-05-14 NOTE — PROGRESS NOTES
[facility]--CHW    CHW met with patient at the rehab center. CHW and patient sat outside, while she smoked her cigarettes. Patient reported living with a friend, who recently passed away. The friend's son sold the house that patient and her friend were living in while patient was hospitalized with COPD issues. All of patient's belongings were left behind. As a result, patient does not have any identification or clothing items. She also does not have a residence and is considered homeless at this time. Patient did state that the rehab center's  is trying to sign patient up for the [employer] Program to assist with housing. Patient was upset about the rehab center cancelling her eye appointment because they want her to see only their physicians, although patient has her own primary care physician. Patient was getting $900 in SSDI, but currently is getting $560.  Patient scored an 18 on her PHQ9 and reports being depressed about her current situation regarding her health and housing. She did say that she is prescribed and taking 25 mg of Setraline by her primary care physician. ([facility] Manager notified of patient's score as well).    CHW's next home visit with patient is scheduled for Tuesday, June 17, at 12 noon.

## 2025-05-20 ENCOUNTER — OFFICE VISIT (OUTPATIENT)
Age: 55
End: 2025-05-20
Payer: MEDICAID

## 2025-05-20 VITALS
DIASTOLIC BLOOD PRESSURE: 68 MMHG | HEIGHT: 61 IN | HEART RATE: 114 BPM | OXYGEN SATURATION: 98 % | SYSTOLIC BLOOD PRESSURE: 128 MMHG | BODY MASS INDEX: 17.48 KG/M2 | WEIGHT: 92.6 LBS

## 2025-05-20 DIAGNOSIS — I25.10 CAD IN NATIVE ARTERY: ICD-10-CM

## 2025-05-20 DIAGNOSIS — R07.9 CHEST PAIN, UNSPECIFIED TYPE: ICD-10-CM

## 2025-05-20 DIAGNOSIS — E78.2 MIXED HYPERLIPIDEMIA: ICD-10-CM

## 2025-05-20 DIAGNOSIS — R00.0 TACHYCARDIA: ICD-10-CM

## 2025-05-20 DIAGNOSIS — D50.9 IRON DEFICIENCY ANEMIA, UNSPECIFIED IRON DEFICIENCY ANEMIA TYPE: ICD-10-CM

## 2025-05-20 DIAGNOSIS — Z72.0 TOBACCO USE: ICD-10-CM

## 2025-05-20 DIAGNOSIS — I10 PRIMARY HYPERTENSION: ICD-10-CM

## 2025-05-20 DIAGNOSIS — R94.39 ABNORMAL STRESS TEST: Primary | ICD-10-CM

## 2025-05-20 PROCEDURE — 99214 OFFICE O/P EST MOD 30 MIN: CPT | Performed by: INTERNAL MEDICINE

## 2025-05-20 PROCEDURE — 93000 ELECTROCARDIOGRAM COMPLETE: CPT | Performed by: INTERNAL MEDICINE

## 2025-05-20 PROCEDURE — 3074F SYST BP LT 130 MM HG: CPT | Performed by: INTERNAL MEDICINE

## 2025-05-20 PROCEDURE — 3078F DIAST BP <80 MM HG: CPT | Performed by: INTERNAL MEDICINE

## 2025-05-20 PROCEDURE — G2211 COMPLEX E/M VISIT ADD ON: HCPCS | Performed by: INTERNAL MEDICINE

## 2025-05-20 RX ORDER — METOPROLOL TARTRATE 1 MG/ML
5 INJECTION, SOLUTION INTRAVENOUS EVERY 5 MIN PRN
OUTPATIENT
Start: 2025-05-20

## 2025-05-20 RX ORDER — METOPROLOL TARTRATE 50 MG
TABLET ORAL
Qty: 3 TABLET | Refills: 0 | Status: SHIPPED | OUTPATIENT
Start: 2025-05-20

## 2025-05-20 RX ORDER — NITROGLYCERIN 0.4 MG/1
0.8 TABLET SUBLINGUAL
OUTPATIENT
Start: 2025-05-20

## 2025-05-20 RX ORDER — NITROGLYCERIN 0.4 MG/1
0.4 TABLET SUBLINGUAL
OUTPATIENT
Start: 2025-05-20

## 2025-05-20 RX ORDER — BUDESONIDE, GLYCOPYRROLATE, AND FORMOTEROL FUMARATE 160; 9; 4.8 UG/1; UG/1; UG/1
AEROSOL, METERED RESPIRATORY (INHALATION)
COMMUNITY

## 2025-05-20 RX ORDER — HYDROXYZINE HYDROCHLORIDE 25 MG/1
25 TABLET, FILM COATED ORAL 3 TIMES DAILY PRN
COMMUNITY

## 2025-05-20 RX ORDER — SODIUM CHLORIDE 0.9 % (FLUSH) 0.9 %
5-40 SYRINGE (ML) INJECTION EVERY 12 HOURS SCHEDULED
OUTPATIENT
Start: 2025-05-20

## 2025-05-20 RX ORDER — SODIUM CHLORIDE 0.9 % (FLUSH) 0.9 %
5-40 SYRINGE (ML) INJECTION PRN
OUTPATIENT
Start: 2025-05-20

## 2025-05-20 RX ORDER — SODIUM CHLORIDE 9 MG/ML
INJECTION, SOLUTION INTRAVENOUS PRN
OUTPATIENT
Start: 2025-05-20

## 2025-05-20 RX ORDER — METOPROLOL SUCCINATE 25 MG/1
25 TABLET, EXTENDED RELEASE ORAL 2 TIMES DAILY
Qty: 60 TABLET | Refills: 3 | Status: SHIPPED | OUTPATIENT
Start: 2025-05-20

## 2025-05-20 NOTE — PROGRESS NOTES
succinate 25 mg twice per daily.   Order Cardiac CTA which is a noninvasive test to visualize your coronary arteries.   Lopressor 50 mg (Metoprolol tartrate) was sent to your pharmacy, this medication is used to lower your heart rate to get adequate pictures of your heart.   Two hours before your cardiac CTA check your heart rate.If your heart rate is less than 55 bpm do not take any tablets. If your heart rate is 55-70 bpm take 1 tablet. If your heart rate is 71-80 bpm take 2 tablets. If your heart rate is greater than 80 bpm take 3 tablets.   You should be NPO (nothing to eat or drink) 3-4 hours prior to test.   No caffeine for 24 hours prior.   Take all other regularly scheduled medications as normal.    Discussed possible need for left heart catheterization if cardiac CTA is abnormal.   Follow up as scheduled with GI.  Recommend smoking cessation: 1-800-QUITNOW   Advised patient to quit and offered support.  Educational material provided to patient.  Follow up with me in 3 months. Call [phone] to schedule.      Patient will be started on new medication metoprolol succinate. Patient verbalizes understanding of the need for treatment and education provided at today's visit. Additional education materials will be provided in the AVS.      This note was scribed in the presence of Toni Lorenzana MD, [facility] by Pamela Nolen RN.      The scribe’s documentation has been prepared under my direction and personally reviewed by me in its entirety.  I confirm that the note above accurately reflects all work, treatment, procedures, and medical decision making performed by me.  I, Toni Lorenzana MD, personally performed the services described in this documentation as scribed by Pamela Nolen RN in my presence, and it is both accurate and complete to the best of our ability.     I will address the patient's cardiac risk factors and adjusted pharmacologic treatment as needed. In addition, I have reinforced the need for

## 2025-05-20 NOTE — PATIENT INSTRUCTIONS
Plan:  Stop atenolol.   Start metoprolol succinate 25 mg twice per daily.   Order Cardiac CTA which is a noninvasive test to visualize your coronary arteries.   Lopressor 50 mg (Metoprolol tartrate) was sent to your pharmacy, this medication is used to lower your heart rate to get adequate pictures of your heart.   Two hours before your cardiac CTA check your heart rate.If your heart rate is less than 55 bpm do not take any tablets. If your heart rate is 55-70 bpm take 1 tablet. If your heart rate is 71-80 bpm take 2 tablets. If your heart rate is greater than 80 bpm take 3 tablets.   You should be NPO (nothing to eat or drink) 3-4 hours prior to test.   No caffeine for 24 hours prior.   Take all other regularly scheduled medications as normal.    Discussed possible need for left heart catheterization if cardiac CTA is abnormal.   Follow up as scheduled with GI.  Recommend smoking cessation: 1-800-QUITNOW   Advised patient to quit and offered support.  Educational material provided to patient.  Follow up with me in 3 months. Call [phone] to schedule.      Call [phone] to schedule Cardiac CTA at [place].     To schedule at [facility] call [phone].

## 2025-05-21 ENCOUNTER — TELEPHONE (OUTPATIENT)
Dept: CARDIOLOGY CLINIC | Age: 55
End: 2025-05-21

## 2025-05-21 NOTE — TELEPHONE ENCOUNTER
Called [facility] to confirm that they received AVS with pt medication instructions and testing instructions. Talked to RN Supervisor Lizzeth, she states they have a copy of the AVS and are aware of the medication changes, and the CCTA instructions. They will call CS # to schedule.     ALFREDO JEAN

## 2025-05-29 ENCOUNTER — HOSPITAL ENCOUNTER (OUTPATIENT)
Dept: CT IMAGING | Age: 55
Discharge: HOME | End: 2025-05-29
Attending: INTERNAL MEDICINE
Payer: MEDICAID

## 2025-05-29 VITALS — SYSTOLIC BLOOD PRESSURE: 148 MMHG | DIASTOLIC BLOOD PRESSURE: 82 MMHG | HEART RATE: 84 BPM

## 2025-05-29 DIAGNOSIS — R94.39 ABNORMAL STRESS TEST: ICD-10-CM

## 2025-05-29 DIAGNOSIS — R07.9 CHEST PAIN, UNSPECIFIED TYPE: ICD-10-CM

## 2025-05-29 DIAGNOSIS — I25.10 CAD IN NATIVE ARTERY: ICD-10-CM

## 2025-05-29 PROCEDURE — 2500000003 HC RX 250 WO HCPCS: Performed by: INTERNAL MEDICINE

## 2025-05-29 PROCEDURE — 75571 CT HRT W/O DYE W/CA TEST: CPT

## 2025-05-29 RX ORDER — METOPROLOL TARTRATE 1 MG/ML
5 INJECTION, SOLUTION INTRAVENOUS EVERY 5 MIN PRN
Status: DISCONTINUED | OUTPATIENT
Start: 2025-05-29 | End: 2025-05-30 | Stop reason: HOSPADM

## 2025-05-29 RX ORDER — NITROGLYCERIN 0.4 MG/1
0.4 TABLET SUBLINGUAL
Status: DISCONTINUED | OUTPATIENT
Start: 2025-05-29 | End: 2025-05-30 | Stop reason: HOSPADM

## 2025-05-29 RX ORDER — SODIUM CHLORIDE 0.9 % (FLUSH) 0.9 %
5-40 SYRINGE (ML) INJECTION EVERY 12 HOURS SCHEDULED
Status: DISCONTINUED | OUTPATIENT
Start: 2025-05-29 | End: 2025-05-30 | Stop reason: HOSPADM

## 2025-05-29 RX ORDER — SODIUM CHLORIDE 9 MG/ML
INJECTION, SOLUTION INTRAVENOUS PRN
Status: DISCONTINUED | OUTPATIENT
Start: 2025-05-29 | End: 2025-05-30 | Stop reason: HOSPADM

## 2025-05-29 RX ORDER — NITROGLYCERIN 0.4 MG/1
0.8 TABLET SUBLINGUAL
Status: DISCONTINUED | OUTPATIENT
Start: 2025-05-29 | End: 2025-05-30 | Stop reason: HOSPADM

## 2025-05-29 RX ORDER — SODIUM CHLORIDE 0.9 % (FLUSH) 0.9 %
5-40 SYRINGE (ML) INJECTION PRN
Status: DISCONTINUED | OUTPATIENT
Start: 2025-05-29 | End: 2025-05-30 | Stop reason: HOSPADM

## 2025-05-29 RX ADMIN — METOPROLOL TARTRATE 5 MG: 5 INJECTION INTRAVENOUS at 10:44

## 2025-05-29 RX ADMIN — METOPROLOL TARTRATE 5 MG: 5 INJECTION INTRAVENOUS at 10:51

## 2025-05-29 NOTE — PROGRESS NOTES
Pt given 2 doses IV metoprolol HRT remains 85. No further medications given. Pt will be unable to have CTA with HRT flow, r/t HRT, however, calcium score will still be evaluated by CT tech Lizandro.

## 2025-06-02 ENCOUNTER — RESULTS FOLLOW-UP (OUTPATIENT)
Dept: CARDIOLOGY CLINIC | Age: 55
End: 2025-06-02

## 2025-06-03 ENCOUNTER — TELEPHONE (OUTPATIENT)
Dept: ORTHOPEDIC SURGERY | Age: 55
End: 2025-06-03

## 2025-06-03 ENCOUNTER — TELEPHONE (OUTPATIENT)
Dept: CARDIOLOGY CLINIC | Age: 55
End: 2025-06-03

## 2025-06-03 DIAGNOSIS — R07.9 CHEST PAIN, UNSPECIFIED TYPE: ICD-10-CM

## 2025-06-03 DIAGNOSIS — R93.1 ELEVATED CORONARY ARTERY CALCIUM SCORE: ICD-10-CM

## 2025-06-03 DIAGNOSIS — E78.2 MIXED HYPERLIPIDEMIA: ICD-10-CM

## 2025-06-03 DIAGNOSIS — Z72.0 TOBACCO USE: ICD-10-CM

## 2025-06-03 DIAGNOSIS — R00.0 TACHYCARDIA: ICD-10-CM

## 2025-06-03 DIAGNOSIS — R94.39 ABNORMAL STRESS TEST: Primary | ICD-10-CM

## 2025-06-03 DIAGNOSIS — I25.10 CAD IN NATIVE ARTERY: ICD-10-CM

## 2025-06-03 NOTE — RESULT ENCOUNTER NOTE
Spoke with patient, relayed results, patient v/u. Informed pt a RN will reach out with further information and to schedule procedure.     Pt states metoprolol has been causing increased chest pain, she requested her nurses to hold metoprolol. Pt is unsure how long it has been since she has held the medication.

## 2025-06-03 NOTE — TELEPHONE ENCOUNTER
Patient last seen 10/22/24.    She will need to schedule a FU apt.   Patient stated she was scheduled for 6/10/25 but the facility canceled the apt and RS it for 7/1/25.   She will talk to the  and have her call back to schedule a sooner apt.

## 2025-06-03 NOTE — TELEPHONE ENCOUNTER
Cardiac Cath orders: [facility]  Ordering Provider: ALFREDO  Performing Provider: Next available  Length of time for procedure:  IV Sedation: Yes  Reps needed:  Specific equip needed: NA  Medications to hold: Vitamins/supplements and OTC medications the morning of procedure.  Pt aware of meds to hold?: No, pt will need called, and instructions will need relayed to [facility] staff  Urgent? Next available  (All echos should be completed prior to the scheduled procedure date, preferably [place] or other outreach)     Call [phone] to schedule procedure with [facility]

## 2025-06-03 NOTE — RESULT ENCOUNTER NOTE
Called pt to further discuss testing results and recommendation for LHC, also discuss with Galina Diaz. Pt is agreeable to proceed with procedure. See telephone encounter.

## 2025-06-04 PROBLEM — R93.1 ELEVATED CORONARY ARTERY CALCIUM SCORE: Status: ACTIVE | Noted: 2025-06-03

## 2025-06-04 NOTE — TELEPHONE ENCOUNTER
Date of Procedure: Tuesday 6/10/25 @ [facility] with Dr. Fournier    Time of arrival: 7:30 am     Procedure time: 9:00 am     Called [facility] and spoke to Bonny's RN Galina and she is agreeable to date and time. Reviewed Bonny's procedure instructions and Galina verbalized understanding. Encouraged to call with any questions or concerns.     Published on [employer] and scheduled on [employer].

## 2025-06-09 NOTE — TELEPHONE ENCOUNTER
Called pt and relayed instructions below, she V/U. Also called [facility] to relay instructions below, medical staff was unavailable to confirm they are aware of instructions below. Medical staff to call back.      [facility] instructions:    NPO (nothing to eat or drink) after midnight the night before your procedure. A sip of water with your medications is okay.   Hold Vitamins/supplements and OTC medications the morning of procedure    Pack an overnight bag and have a  with you.    Avoid lotions, oils and creams 1 day prior to your procedure.

## 2025-06-10 ENCOUNTER — TELEPHONE (OUTPATIENT)
Dept: OTHER | Age: 55
End: 2025-06-10

## 2025-06-10 ENCOUNTER — HOSPITAL ENCOUNTER (OUTPATIENT)
Age: 55
Discharge: HOME | End: 2025-06-10
Attending: INTERNAL MEDICINE | Admitting: INTERNAL MEDICINE
Payer: MEDICAID

## 2025-06-10 VITALS
HEART RATE: 98 BPM | HEIGHT: 61 IN | WEIGHT: 98.2 LBS | OXYGEN SATURATION: 93 % | BODY MASS INDEX: 18.54 KG/M2 | DIASTOLIC BLOOD PRESSURE: 75 MMHG | SYSTOLIC BLOOD PRESSURE: 120 MMHG | RESPIRATION RATE: 31 BRPM

## 2025-06-10 DIAGNOSIS — R93.1 ELEVATED CORONARY ARTERY CALCIUM SCORE: ICD-10-CM

## 2025-06-10 DIAGNOSIS — R94.39 ABNORMAL STRESS TEST: ICD-10-CM

## 2025-06-10 LAB
ANION GAP SERPL CALCULATED.3IONS-SCNC: 15 MMOL/L (ref 3–16)
BUN SERPL-MCNC: 13 MG/DL (ref 7–20)
CALCIUM SERPL-MCNC: 9.3 MG/DL (ref 8.3–10.6)
CHLORIDE SERPL-SCNC: 96 MMOL/L (ref 99–110)
CHOLEST SERPL-MCNC: 188 MG/DL (ref 0–199)
CO2 SERPL-SCNC: 27 MMOL/L (ref 21–32)
CREAT SERPL-MCNC: 0.4 MG/DL (ref 0.6–1.1)
DEPRECATED RDW RBC AUTO: 23.6 % (ref 12.4–15.4)
ECHO BSA: 1.38 M2
EKG ATRIAL RATE: 89 BPM
EKG DIAGNOSIS: NORMAL
EKG P AXIS: 80 DEGREES
EKG P-R INTERVAL: 136 MS
EKG Q-T INTERVAL: 384 MS
EKG QRS DURATION: 82 MS
EKG QTC CALCULATION (BAZETT): 467 MS
EKG R AXIS: 87 DEGREES
EKG T AXIS: 75 DEGREES
EKG VENTRICULAR RATE: 89 BPM
GFR SERPLBLD CREATININE-BSD FMLA CKD-EPI: >90 ML/MIN/{1.73_M2}
GLUCOSE SERPL-MCNC: 100 MG/DL (ref 70–99)
HCG SERPL QL: NEGATIVE
HCT VFR BLD AUTO: 37.6 % (ref 36–48)
HDLC SERPL-MCNC: 81 MG/DL (ref 40–60)
HGB BLD-MCNC: 12.2 G/DL (ref 12–16)
INR PPP: 0.91 (ref 0.85–1.15)
LDLC SERPL CALC-MCNC: 91 MG/DL
MCH RBC QN AUTO: 28 PG (ref 26–34)
MCHC RBC AUTO-ENTMCNC: 32.6 G/DL (ref 31–36)
MCV RBC AUTO: 85.8 FL (ref 80–100)
PLATELET # BLD AUTO: 303 K/UL (ref 135–450)
PMV BLD AUTO: 7 FL (ref 5–10.5)
POTASSIUM SERPL-SCNC: 5 MMOL/L (ref 3.5–5.1)
PROTHROMBIN TIME: 12.5 SEC (ref 11.9–14.9)
RBC # BLD AUTO: 4.38 M/UL (ref 4–5.2)
SODIUM SERPL-SCNC: 138 MMOL/L (ref 136–145)
TRIGL SERPL-MCNC: 78 MG/DL (ref 0–150)
VLDLC SERPL CALC-MCNC: 16 MG/DL
WBC # BLD AUTO: 6.7 K/UL (ref 4–11)

## 2025-06-10 PROCEDURE — 7100000011 HC PHASE II RECOVERY - ADDTL 15 MIN: Performed by: INTERNAL MEDICINE

## 2025-06-10 PROCEDURE — 85610 PROTHROMBIN TIME: CPT

## 2025-06-10 PROCEDURE — 84703 CHORIONIC GONADOTROPIN ASSAY: CPT

## 2025-06-10 PROCEDURE — 6360000002 HC RX W HCPCS: Performed by: INTERNAL MEDICINE

## 2025-06-10 PROCEDURE — 85027 COMPLETE CBC AUTOMATED: CPT

## 2025-06-10 PROCEDURE — 2580000003 HC RX 258: Performed by: INTERNAL MEDICINE

## 2025-06-10 PROCEDURE — 93005 ELECTROCARDIOGRAM TRACING: CPT | Performed by: INTERNAL MEDICINE

## 2025-06-10 PROCEDURE — 93010 ELECTROCARDIOGRAM REPORT: CPT | Performed by: INTERNAL MEDICINE

## 2025-06-10 PROCEDURE — C1769 GUIDE WIRE: HCPCS | Performed by: INTERNAL MEDICINE

## 2025-06-10 PROCEDURE — 99152 MOD SED SAME PHYS/QHP 5/>YRS: CPT | Performed by: INTERNAL MEDICINE

## 2025-06-10 PROCEDURE — 7100000010 HC PHASE II RECOVERY - FIRST 15 MIN: Performed by: INTERNAL MEDICINE

## 2025-06-10 PROCEDURE — 80048 BASIC METABOLIC PNL TOTAL CA: CPT

## 2025-06-10 PROCEDURE — 6360000004 HC RX CONTRAST MEDICATION: Performed by: INTERNAL MEDICINE

## 2025-06-10 PROCEDURE — 93458 L HRT ARTERY/VENTRICLE ANGIO: CPT | Performed by: INTERNAL MEDICINE

## 2025-06-10 PROCEDURE — 2709999900 HC NON-CHARGEABLE SUPPLY: Performed by: INTERNAL MEDICINE

## 2025-06-10 PROCEDURE — C1894 INTRO/SHEATH, NON-LASER: HCPCS | Performed by: INTERNAL MEDICINE

## 2025-06-10 PROCEDURE — 6370000000 HC RX 637 (ALT 250 FOR IP): Performed by: INTERNAL MEDICINE

## 2025-06-10 PROCEDURE — 80061 LIPID PANEL: CPT

## 2025-06-10 RX ORDER — ACETAMINOPHEN 325 MG/1
650 TABLET ORAL EVERY 4 HOURS PRN
Status: DISCONTINUED | OUTPATIENT
Start: 2025-06-10 | End: 2025-06-10 | Stop reason: HOSPADM

## 2025-06-10 RX ORDER — ASPIRIN 81 MG/1
81 TABLET, CHEWABLE ORAL ONCE
Status: COMPLETED | OUTPATIENT
Start: 2025-06-10 | End: 2025-06-10

## 2025-06-10 RX ORDER — SODIUM CHLORIDE 9 MG/ML
INJECTION, SOLUTION INTRAVENOUS CONTINUOUS PRN
Status: COMPLETED | OUTPATIENT
Start: 2025-06-10 | End: 2025-06-10

## 2025-06-10 RX ORDER — FENTANYL CITRATE 50 UG/ML
INJECTION, SOLUTION INTRAMUSCULAR; INTRAVENOUS PRN
Status: DISCONTINUED | OUTPATIENT
Start: 2025-06-10 | End: 2025-06-10 | Stop reason: HOSPADM

## 2025-06-10 RX ORDER — SODIUM CHLORIDE 0.9 % (FLUSH) 0.9 %
5-40 SYRINGE (ML) INJECTION EVERY 12 HOURS SCHEDULED
Status: DISCONTINUED | OUTPATIENT
Start: 2025-06-10 | End: 2025-06-10 | Stop reason: HOSPADM

## 2025-06-10 RX ORDER — HEPARIN SODIUM 1000 [USP'U]/ML
INJECTION, SOLUTION INTRAVENOUS; SUBCUTANEOUS PRN
Status: DISCONTINUED | OUTPATIENT
Start: 2025-06-10 | End: 2025-06-10 | Stop reason: HOSPADM

## 2025-06-10 RX ORDER — SODIUM CHLORIDE 9 MG/ML
INJECTION, SOLUTION INTRAVENOUS PRN
Status: DISCONTINUED | OUTPATIENT
Start: 2025-06-10 | End: 2025-06-10 | Stop reason: HOSPADM

## 2025-06-10 RX ORDER — SODIUM CHLORIDE 0.9 % (FLUSH) 0.9 %
5-40 SYRINGE (ML) INJECTION PRN
Status: DISCONTINUED | OUTPATIENT
Start: 2025-06-10 | End: 2025-06-10 | Stop reason: HOSPADM

## 2025-06-10 RX ORDER — LORAZEPAM 0.5 MG/1
0.5 TABLET ORAL
Status: DISCONTINUED | OUTPATIENT
Start: 2025-06-10 | End: 2025-06-10 | Stop reason: HOSPADM

## 2025-06-10 RX ORDER — MIDAZOLAM HYDROCHLORIDE 1 MG/ML
INJECTION, SOLUTION INTRAMUSCULAR; INTRAVENOUS PRN
Status: DISCONTINUED | OUTPATIENT
Start: 2025-06-10 | End: 2025-06-10 | Stop reason: HOSPADM

## 2025-06-10 RX ORDER — IOPAMIDOL 755 MG/ML
INJECTION, SOLUTION INTRAVASCULAR PRN
Status: DISCONTINUED | OUTPATIENT
Start: 2025-06-10 | End: 2025-06-10 | Stop reason: HOSPADM

## 2025-06-10 RX ORDER — ASPIRIN 81 MG/1
81 TABLET, CHEWABLE ORAL ONCE
Status: DISCONTINUED | OUTPATIENT
Start: 2025-06-10 | End: 2025-06-10

## 2025-06-10 RX ORDER — ASPIRIN 81 MG/1
244 TABLET, CHEWABLE ORAL ONCE
Status: DISCONTINUED | OUTPATIENT
Start: 2025-06-10 | End: 2025-06-10

## 2025-06-10 RX ORDER — ONDANSETRON 2 MG/ML
4 INJECTION INTRAMUSCULAR; INTRAVENOUS EVERY 6 HOURS PRN
Status: DISCONTINUED | OUTPATIENT
Start: 2025-06-10 | End: 2025-06-10 | Stop reason: HOSPADM

## 2025-06-10 RX ADMIN — ASPIRIN 81 MG: 81 TABLET, CHEWABLE ORAL at 08:25

## 2025-06-10 NOTE — TELEPHONE ENCOUNTER
[facility]--CHW    CHW called patient to reschedule our home visit that is set for June 17 to June 24 due to a scheduling conflict. Patient did not answer, but her voicemail came on. Message was left for her to return the call.

## 2025-06-10 NOTE — PROGRESS NOTES
Patient/family given discharge instructions. Patient/family verbalize understanding of discharge instructions, all questions addressed, copy given to patient/family. Pt transferred to vehicle via wheelchair to be discharged home with family.  Site remains unremarkable. Pt continues to elevate.  No c/o voiced.

## 2025-06-10 NOTE — PROGRESS NOTES
PRELIMINARY PROCEDURE REPORT        INDICATION FOR PROCEDURE  Abnormal stress test      PROCEDURE FINDINGS  Successful left heart cath via right radial approach, access closed with TR band with excellent hemostasis  Normal coronaries suggesting noncardiac cause of symptoms and false positive stress test  Normal LV function, LVEF 65% with normal wall motion  Hide normal left-sided filling pressures, LVEDP 15 mmHg      PLAN/RECOMMENDATIONS  Workup noncardiac causes of symptoms      No complications.    See full report for details.      Dru Fournier MD, FACC, [facility]  Interventional Cardiology  [facility]  [phone] (c)

## 2025-06-10 NOTE — H&P
Sedation Pre-Procedure Note    Patient Name: Bonny Serrano   YOB: 1970  Room/Bed: [facility] Room/  Medical Record Number: [id number]  Date: 6/10/2025   Time: 9:20 AM       Indication:  abnormal stress test    Consent: I have discussed with the patient and/or the patient representative the indication, alternatives, and the possible risks and/or complications of the planned procedure and the anesthesia methods.  Risks including MI, cardiac failure, cardiac arrest and other fatal and nonfatal dysrhythmias, excessive bleeding, stroke and others discussed at length with the patient the patient and/or patient representative appear to understand and agree to proceed.    Vital Signs:   Vitals:    06/10/25 0800   BP: 125/76   SpO2: 91%       Past Medical History:   has a past medical history of A-fib (HCC), Anemia, Anxiety, Chronic pancreatitis (HCC), Collapse of right lung, COPD (chronic obstructive pulmonary disease) (HCC), Depression, GI bleed, H/O colonoscopy, Hypertension, Pancreatitis, and Pulmonary embolism (HCC).    Past Surgical History:   has a past surgical history that includes Upper gastrointestinal endoscopy; hip surgery (Right, 07/12/2024); Colonoscopy; Upper gastrointestinal endoscopy (N/A, 4/14/2025); Colonoscopy (N/A, 4/14/2025); and Colonoscopy (N/A, 4/14/2025).    Medications:   Scheduled Meds:    sodium chloride flush  5-40 mL IntraVENous 2 times per day    aspirin  81 mg Oral Once     Continuous Infusions:    sodium chloride       PRN Meds: sodium chloride flush, sodium chloride, ondansetron, LORazepam  Home Meds:   Prior to Admission medications    Medication Sig Start Date End Date Taking? Authorizing Provider   Budeson-Glycopyrrol-Formoterol (BREZTRI AEROSPHERE) 160-9-4.8 MCG/ACT AERO Inhale into the lungs   Yes Provider, MD Pan   metoprolol succinate (TOPROL XL) 25 MG extended release tablet Take 1 tablet by mouth in the morning and at bedtime 5/20/25  Yes Toni Lorenzana,

## 2025-06-10 NOTE — DISCHARGE INSTRUCTIONS
Cath Labs at  [facility]   Discharge Instructions        6/10/2025  Bonny Serrano   Date of Birth 1970       Activity:  No driving for 24 hours.  In 24 hours you may remove dressing and shower, wash site gently with soap and water and leave open to air  Avoid submerging your arm in sitting water for 5 days.  Do not use your right hand for 24 hours, then  No lifting more than 5 pounds for 5 days.   No lotions, powders, or ointments near site for 5 days.   No work/school for 5 days unless instructed otherwise by your cardiologist.    Diet:   Resume previous diet, if a cardiac diet is specified you will receive a handout with  general guidelines.   Drink extra non-alcoholic/decaffienated fluids for first 24 hours after your procedure.    Arm Management:  If bleeding occurs from the site or a hematoma (lump) begins to increase in size, apply pressure directly over the site, call 911 to return to the hospital.    Special Instructions:  Report any coolness or numbness in the arm  Report any chills, fever, itching, red bumps or rash   Report any of the following to the MD: drainage from the site, redness and/or swelling at the site, increased tenderness at the site   If you are currently taking Metformin or Metformin combination medications for Diabetes, hold your dose for 48 hours after your procedure.  Consult your Cardiologist before taking any NSAIDS, vitamin supplements, estrogen, or estrogen plus progestin.  Do not stop taking Plavix, Brilinta or Effient, without first consulting your cardiologist.    Sedation Discharge Instructions:  For the next 24 hours do not drive a car, operate machinery, power tools or kitchen appliances.    Do not drink alcohol; including beer or wine.    Do not make any important decisions or sign any important papers.  For the next 24 hours you can expect drowsiness, light-headed or dizziness, nausea/ vomiting, inability to concentrate, fatigue and desire to sleep.  We strongly  strange feeling in your back, neck or jaw, or upper belly or in one or both shoulders or arms.  Lightheadedness or sudden weakness.  A fast or irregular heartbeat.       After you call 911, the  may tell you to chew 1 adult-strength or 2 to 4                  low-dose aspirin. Wait for an ambulance. Do not try to drive yourself.  Call your doctor today if :  You have any trouble breathing.  Your chest pain gets worse.  You are dizzy or lightheaded, or you feel like you may faint.  You are not getting better as expected.  You are having new or different chest pain.

## 2025-06-19 LAB — ECHO BSA: 1.38 M2

## 2025-06-24 ENCOUNTER — COMMUNITY OUTREACH (OUTPATIENT)
Dept: OTHER | Age: 55
End: 2025-06-24

## 2025-06-24 NOTE — PROGRESS NOTES
[facility]--CHW    Patient did not confirm appointment today that was scheduled at 12 noon. Patient called CHW later and home visit was rescheduled to Monday, July 7 at 3 pm. Patient did say that she was still at the rehab center, but was trying to apply for the [employer] Waiver Program while at the rehab center.

## 2025-07-01 ENCOUNTER — OFFICE VISIT (OUTPATIENT)
Dept: ORTHOPEDIC SURGERY | Age: 55
End: 2025-07-01
Payer: MEDICAID

## 2025-07-01 VITALS — HEIGHT: 61 IN | BODY MASS INDEX: 18.5 KG/M2 | WEIGHT: 98 LBS

## 2025-07-01 DIAGNOSIS — S72.141A INTERTROCHANTERIC FRACTURE OF RIGHT FEMUR, CLOSED, INITIAL ENCOUNTER (HCC): Primary | ICD-10-CM

## 2025-07-01 DIAGNOSIS — M54.30 SCIATICA, UNSPECIFIED LATERALITY: ICD-10-CM

## 2025-07-01 DIAGNOSIS — M16.11 PRIMARY OSTEOARTHRITIS OF RIGHT HIP: ICD-10-CM

## 2025-07-01 DIAGNOSIS — F17.200 CURRENT SMOKER: ICD-10-CM

## 2025-07-01 PROCEDURE — 99406 BEHAV CHNG SMOKING 3-10 MIN: CPT | Performed by: ORTHOPAEDIC SURGERY

## 2025-07-01 PROCEDURE — 99214 OFFICE O/P EST MOD 30 MIN: CPT | Performed by: ORTHOPAEDIC SURGERY

## 2025-07-01 RX ORDER — PREDNISONE 10 MG/1
TABLET ORAL
Qty: 26 TABLET | Refills: 0 | Status: SHIPPED | OUTPATIENT
Start: 2025-07-01

## 2025-07-01 NOTE — PROGRESS NOTES
CHIEF COMPLAINT:   1- Right knee pain/osteoarthritis.  2- Right intertrochanteric femur comminuted fracture, status post Gamma nail.  3- Right posterior hip and knee pain and LBP.    DATE OF SURGERY:  7/12/2024 .  HISTORY:  Ms. Serrano 55 y.o.  female presents today for evaluation of right knee pain which started July 2024 got worse in Aug 2024. She had right knee cortisone injection on 9/17/2024 with good improvement.  She is complaining of sharp right posterior hip and knee with LBP pain, 10/10.  Pain is increase with standing and walking and decrease with rest. Pain is sharp early in the morning with first few steps, dull achy pain by the end of the day. Alleviating factors: rest. No radiation and no numbness and tingling sensation. No other complaint.  No h/o gout.    She also came in today for one year postoperative visit.  The patient denies any significant pain in the right hip. Rates pain a 10/10 VAS moderate, aching, constant and show no change. Aggravating factors walking. Alleviating factors rest. She has been working on ROM and WBAT.  No numbness or tingling sensation. No fever or Chills. She has a h/o heavy alcohol use. She is a smoker. She is on SSI and in ECF.       Past Medical History:   Diagnosis Date    A-fib (HCC)     Anemia     Anxiety     Chronic pancreatitis (HCC)     Collapse of right lung     COPD (chronic obstructive pulmonary disease) (HCC)     Depression     GI bleed     H/O colonoscopy     Hypertension     Pancreatitis     Pulmonary embolism (HCC) 02/2018       Past Surgical History:   Procedure Laterality Date    CARDIAC PROCEDURE N/A 6/10/2025    Left heart cath / coronary angiography performed by Dru Fournier MD at [facility] CARDIAC CATH LAB    COLONOSCOPY      COLONOSCOPY N/A 4/14/2025    COLONOSCOPY POLYPECTOMY SNARE/BIOPSY performed by Delmar Mac MD at [facility] ENDOSCOPY    COLONOSCOPY N/A 4/14/2025    COLONOSCOPY CONTROL HEMORRHAGE performed by Delmar Mac MD at

## 2025-07-10 ENCOUNTER — TELEPHONE (OUTPATIENT)
Dept: OTHER | Age: 55
End: 2025-07-10

## 2025-07-10 NOTE — TELEPHONE ENCOUNTER
[facility]--CHW    CHW reached out to patient via phone call. Patient did not answer. CHW will attempt to reach patient in a few days.    Regla Manuel  CHW  [phone]

## 2025-08-04 ENCOUNTER — OFFICE VISIT (OUTPATIENT)
Dept: ORTHOPEDIC SURGERY | Age: 55
End: 2025-08-04
Payer: MEDICAID

## 2025-08-04 VITALS — WEIGHT: 98 LBS | BODY MASS INDEX: 18.5 KG/M2 | HEIGHT: 61 IN

## 2025-08-04 DIAGNOSIS — M54.41 CHRONIC BILATERAL LOW BACK PAIN WITH RIGHT-SIDED SCIATICA: Primary | ICD-10-CM

## 2025-08-04 DIAGNOSIS — G89.29 CHRONIC BILATERAL LOW BACK PAIN WITH RIGHT-SIDED SCIATICA: Primary | ICD-10-CM

## 2025-08-04 PROCEDURE — 99214 OFFICE O/P EST MOD 30 MIN: CPT | Performed by: PHYSICIAN ASSISTANT

## 2025-08-08 ENCOUNTER — TELEPHONE (OUTPATIENT)
Dept: ORTHOPEDIC SURGERY | Age: 55
End: 2025-08-08

## 2025-08-14 ENCOUNTER — HOSPITAL ENCOUNTER (OUTPATIENT)
Dept: LAB | Age: 55
Discharge: HOME | End: 2025-08-14
Payer: MEDICAID

## 2025-08-14 ENCOUNTER — OFFICE VISIT (OUTPATIENT)
Dept: CARDIOLOGY CLINIC | Age: 55
End: 2025-08-14
Payer: MEDICAID

## 2025-08-14 ENCOUNTER — TELEPHONE (OUTPATIENT)
Dept: OTHER | Age: 55
End: 2025-08-14

## 2025-08-14 VITALS
HEART RATE: 89 BPM | DIASTOLIC BLOOD PRESSURE: 58 MMHG | HEIGHT: 61 IN | OXYGEN SATURATION: 90 % | BODY MASS INDEX: 20.77 KG/M2 | WEIGHT: 110 LBS | SYSTOLIC BLOOD PRESSURE: 110 MMHG

## 2025-08-14 DIAGNOSIS — D50.9 IRON DEFICIENCY ANEMIA, UNSPECIFIED IRON DEFICIENCY ANEMIA TYPE: ICD-10-CM

## 2025-08-14 DIAGNOSIS — Z72.0 TOBACCO USE: ICD-10-CM

## 2025-08-14 DIAGNOSIS — Z79.899 MEDICATION MANAGEMENT: ICD-10-CM

## 2025-08-14 DIAGNOSIS — Z79.899 MEDICATION MANAGEMENT: Primary | ICD-10-CM

## 2025-08-14 DIAGNOSIS — E78.2 MIXED HYPERLIPIDEMIA: ICD-10-CM

## 2025-08-14 DIAGNOSIS — I25.10 MILD CAD: ICD-10-CM

## 2025-08-14 DIAGNOSIS — I10 PRIMARY HYPERTENSION: ICD-10-CM

## 2025-08-14 LAB
ALBUMIN SERPL-MCNC: 4.4 G/DL (ref 3.4–5)
ALBUMIN/GLOB SERPL: 1.3 {RATIO} (ref 1.1–2.2)
ALP SERPL-CCNC: 103 U/L (ref 40–129)
ALT SERPL-CCNC: 16 U/L (ref 10–40)
ANION GAP SERPL CALCULATED.3IONS-SCNC: 13 MMOL/L (ref 3–16)
AST SERPL-CCNC: 20 U/L (ref 15–37)
BASOPHILS # BLD: 0.1 K/UL (ref 0–0.2)
BASOPHILS NFR BLD: 1.3 %
BILIRUB SERPL-MCNC: <0.2 MG/DL (ref 0–1)
BUN SERPL-MCNC: 12 MG/DL (ref 7–20)
CALCIUM SERPL-MCNC: 9.7 MG/DL (ref 8.3–10.6)
CHLORIDE SERPL-SCNC: 95 MMOL/L (ref 99–110)
CHOLEST SERPL-MCNC: 156 MG/DL (ref 0–199)
CO2 SERPL-SCNC: 26 MMOL/L (ref 21–32)
CREAT SERPL-MCNC: 0.5 MG/DL (ref 0.6–1.1)
DEPRECATED RDW RBC AUTO: 15.3 % (ref 12.4–15.4)
EOSINOPHIL # BLD: 0.2 K/UL (ref 0–0.6)
EOSINOPHIL NFR BLD: 2.5 %
EST. AVERAGE GLUCOSE BLD GHB EST-MCNC: 145.6 MG/DL
GFR SERPLBLD CREATININE-BSD FMLA CKD-EPI: >90 ML/MIN/{1.73_M2}
GLUCOSE SERPL-MCNC: 90 MG/DL (ref 70–99)
HBA1C MFR BLD: 6.7 %
HCT VFR BLD AUTO: 35.8 % (ref 36–48)
HDLC SERPL-MCNC: 66 MG/DL (ref 40–60)
HGB BLD-MCNC: 11.6 G/DL (ref 12–16)
LDLC SERPL CALC-MCNC: 77 MG/DL
LYMPHOCYTES # BLD: 2.2 K/UL (ref 1–5.1)
LYMPHOCYTES NFR BLD: 30.8 %
MAGNESIUM SERPL-MCNC: 1.68 MG/DL (ref 1.8–2.4)
MCH RBC QN AUTO: 28.7 PG (ref 26–34)
MCHC RBC AUTO-ENTMCNC: 32.5 G/DL (ref 31–36)
MCV RBC AUTO: 88.3 FL (ref 80–100)
MONOCYTES # BLD: 0.7 K/UL (ref 0–1.3)
MONOCYTES NFR BLD: 9.1 %
NEUTROPHILS # BLD: 4.1 K/UL (ref 1.7–7.7)
NEUTROPHILS NFR BLD: 56.3 %
PLATELET # BLD AUTO: 407 K/UL (ref 135–450)
PMV BLD AUTO: 7.2 FL (ref 5–10.5)
POTASSIUM SERPL-SCNC: 4.4 MMOL/L (ref 3.5–5.1)
PROT SERPL-MCNC: 7.8 G/DL (ref 6.4–8.2)
RBC # BLD AUTO: 4.06 M/UL (ref 4–5.2)
SODIUM SERPL-SCNC: 134 MMOL/L (ref 136–145)
TRIGL SERPL-MCNC: 65 MG/DL (ref 0–150)
TSH SERPL DL<=0.005 MIU/L-ACNC: 1.24 UIU/ML (ref 0.27–4.2)
VLDLC SERPL CALC-MCNC: 13 MG/DL
WBC # BLD AUTO: 7.2 K/UL (ref 4–11)

## 2025-08-14 PROCEDURE — 85025 COMPLETE CBC W/AUTO DIFF WBC: CPT

## 2025-08-14 PROCEDURE — 3074F SYST BP LT 130 MM HG: CPT | Performed by: INTERNAL MEDICINE

## 2025-08-14 PROCEDURE — G2211 COMPLEX E/M VISIT ADD ON: HCPCS | Performed by: INTERNAL MEDICINE

## 2025-08-14 PROCEDURE — 83735 ASSAY OF MAGNESIUM: CPT

## 2025-08-14 PROCEDURE — 99214 OFFICE O/P EST MOD 30 MIN: CPT | Performed by: INTERNAL MEDICINE

## 2025-08-14 PROCEDURE — 36415 COLL VENOUS BLD VENIPUNCTURE: CPT

## 2025-08-14 PROCEDURE — 80053 COMPREHEN METABOLIC PANEL: CPT

## 2025-08-14 PROCEDURE — 84443 ASSAY THYROID STIM HORMONE: CPT

## 2025-08-14 PROCEDURE — 3078F DIAST BP <80 MM HG: CPT | Performed by: INTERNAL MEDICINE

## 2025-08-14 PROCEDURE — 80061 LIPID PANEL: CPT

## 2025-08-14 PROCEDURE — 83036 HEMOGLOBIN GLYCOSYLATED A1C: CPT

## 2025-08-14 RX ORDER — HYDROXYZINE PAMOATE 25 MG/1
25 CAPSULE ORAL 3 TIMES DAILY PRN
COMMUNITY
Start: 2025-08-06

## 2025-09-03 ENCOUNTER — HOSPITAL ENCOUNTER (OUTPATIENT)
Dept: MRI IMAGING | Age: 55
Discharge: HOME | End: 2025-09-03
Payer: MEDICAID

## 2025-09-03 DIAGNOSIS — G89.29 CHRONIC BILATERAL LOW BACK PAIN WITH RIGHT-SIDED SCIATICA: ICD-10-CM

## 2025-09-03 DIAGNOSIS — M54.41 CHRONIC BILATERAL LOW BACK PAIN WITH RIGHT-SIDED SCIATICA: ICD-10-CM

## 2025-09-03 PROCEDURE — 72148 MRI LUMBAR SPINE W/O DYE: CPT

## 2025-09-05 ENCOUNTER — HOSPITAL ENCOUNTER (OUTPATIENT)
Dept: CT IMAGING | Age: 55
Discharge: HOME | End: 2025-09-05
Attending: INTERNAL MEDICINE
Payer: MEDICAID

## 2025-09-05 DIAGNOSIS — J44.9 COPD, SEVERE (HCC): ICD-10-CM

## 2025-09-05 PROCEDURE — 71250 CT THORAX DX C-: CPT

## 2025-09-06 DIAGNOSIS — R91.1 LUNG NODULE: Primary | ICD-10-CM

## (undated) DEVICE — MERCY HEALTH WEST TURNOVER: Brand: MEDLINE INDUSTRIES, INC.

## (undated) DEVICE — Device

## (undated) DEVICE — CANNULA,OXY,ADULT,SUPERSOFT,W/7'TUB,SC: Brand: MEDLINE INDUSTRIES, INC.

## (undated) DEVICE — GLOVE SURG SZ 65 THK91MIL LTX FREE SYN POLYISOPRENE

## (undated) DEVICE — GLOVE SURG SZ 8 CRM LTX FREE POLYISOPRENE POLYMER BEAD ANTI

## (undated) DEVICE — CONMED SCOPE SAVER BITE BLOCK, 20X27 MM: Brand: SCOPE SAVER

## (undated) DEVICE — 6619 2 PTNT ISO SYS INCISE AREA&LT;(&GT;&&LT;)&GT;P: Brand: STERI-DRAPE™ IOBAN™ 2

## (undated) DEVICE — GLOVE SURG SZ 65 L12IN FNGR THK79MIL GRN LTX FREE

## (undated) DEVICE — YANKAUER,BULB TIP,W/O VENT,RIGID,STERILE: Brand: MEDLINE

## (undated) DEVICE — TRAP POLYP ETRAP

## (undated) DEVICE — 3M™ COBAN™ NL STERILE NON-LATEX SELF-ADHERENT WRAP, 2084S, 4 IN X 5 YD (10 CM X 4,5 M), 18 ROLLS/CASE: Brand: 3M™ COBAN™

## (undated) DEVICE — PRECISION PIN TAPERED: Brand: GAMMA

## (undated) DEVICE — LAG SCREW STEP DRILL: Brand: GAMMA

## (undated) DEVICE — SNARE ENDOSCP L240CM SHTH DIA24MM LOOP W10MM POLYP RND REINF

## (undated) DEVICE — ENDOSCOPIC KIT 2 12 FT OP4 DE2 GWN SYR

## (undated) DEVICE — ELECTRODE PT RET AD L9FT HI MOIST COND ADH HYDRGEL CORDED

## (undated) DEVICE — HIP PINNING: Brand: MEDLINE INDUSTRIES, INC.

## (undated) DEVICE — ONE STEP CONICAL REAMER: Brand: GAMMA

## (undated) DEVICE — TRANSFER SET 3": Brand: MEDLINE INDUSTRIES, INC.

## (undated) DEVICE — ELECTRODE,RADIOTRANSLUCENT,FOAM,3PK: Brand: MEDLINE

## (undated) DEVICE — DRESSING FOAM W3XL3IN GENTLE SIL FACE BORD ADH PD SUP ABSRB

## (undated) DEVICE — FORCEPS BX PED L160CM JAW DIA1.8MM WRK CHN 2MM W/ NDL DISP

## (undated) DEVICE — ENDO CARRY-ON PROCEDURE KIT INCLUDES SUCTION TUBING, LUBRICANT, GAUZE, BIOHAZARD STICKER, TRANSPORT PAD AND INTERCEPT BEDSIDE KIT.: Brand: ENDO CARRY-ON PROCEDURE KIT

## (undated) DEVICE — GOWN SIRUS NONREIN XL W/TWL: Brand: MEDLINE INDUSTRIES, INC.

## (undated) DEVICE — SUTURE VICRYL + SZ 0 L18IN ABSRB UD L36MM CT-1 1/2 CIR VCP840D